# Patient Record
Sex: MALE | Race: BLACK OR AFRICAN AMERICAN | Employment: OTHER | RURAL
[De-identification: names, ages, dates, MRNs, and addresses within clinical notes are randomized per-mention and may not be internally consistent; named-entity substitution may affect disease eponyms.]

---

## 2017-03-14 ENCOUNTER — OFFICE VISIT (OUTPATIENT)
Dept: FAMILY MEDICINE CLINIC | Age: 57
End: 2017-03-14

## 2017-03-14 VITALS
RESPIRATION RATE: 20 BRPM | DIASTOLIC BLOOD PRESSURE: 100 MMHG | SYSTOLIC BLOOD PRESSURE: 142 MMHG | TEMPERATURE: 96.4 F | HEART RATE: 89 BPM

## 2017-03-14 DIAGNOSIS — R07.2 PRECORDIAL PAIN: Primary | ICD-10-CM

## 2017-03-30 ENCOUNTER — HOSPITAL ENCOUNTER (EMERGENCY)
Age: 57
Discharge: HOME | End: 2017-03-30
Attending: EMERGENCY MEDICINE | Admitting: EMERGENCY MEDICINE
Payer: SELF-PAY

## 2017-03-30 VITALS
TEMPERATURE: 97.2 F | RESPIRATION RATE: 18 BRPM | BODY MASS INDEX: 31.44 KG/M2 | WEIGHT: 245 LBS | OXYGEN SATURATION: 97 % | SYSTOLIC BLOOD PRESSURE: 181 MMHG | HEART RATE: 100 BPM | HEIGHT: 74 IN | DIASTOLIC BLOOD PRESSURE: 110 MMHG

## 2017-03-30 DIAGNOSIS — Z79.4 TYPE 2 DIABETES MELLITUS WITH DIABETIC NEPHROPATHY, WITH LONG-TERM CURRENT USE OF INSULIN (HCC): ICD-10-CM

## 2017-03-30 DIAGNOSIS — E11.21 TYPE 2 DIABETES MELLITUS WITH DIABETIC NEPHROPATHY, WITH LONG-TERM CURRENT USE OF INSULIN (HCC): ICD-10-CM

## 2017-03-30 DIAGNOSIS — E16.2 HYPOGLYCEMIA: Primary | ICD-10-CM

## 2017-03-30 LAB
ALBUMIN SERPL BCP-MCNC: 1.9 G/DL (ref 3.5–5)
ALBUMIN/GLOB SERPL: 0.3 {RATIO} (ref 1.1–2.2)
ALP SERPL-CCNC: 84 U/L (ref 45–117)
ALT SERPL-CCNC: 27 U/L (ref 12–78)
ANION GAP BLD CALC-SCNC: 7 MMOL/L (ref 5–15)
APPEARANCE UR: CLEAR
AST SERPL W P-5'-P-CCNC: 51 U/L (ref 15–37)
BACTERIA URNS QL MICRO: NEGATIVE /HPF
BASOPHILS # BLD AUTO: 0.1 K/UL (ref 0–0.1)
BASOPHILS # BLD: 1 % (ref 0–1)
BILIRUB SERPL-MCNC: 0.7 MG/DL (ref 0.2–1)
BILIRUB UR QL: NEGATIVE
BUN SERPL-MCNC: 24 MG/DL (ref 6–20)
BUN/CREAT SERPL: 13 (ref 12–20)
CALCIUM SERPL-MCNC: 7.8 MG/DL (ref 8.5–10.1)
CHLORIDE SERPL-SCNC: 108 MMOL/L (ref 97–108)
CO2 SERPL-SCNC: 22 MMOL/L (ref 21–32)
COLOR UR: ABNORMAL
CREAT SERPL-MCNC: 1.84 MG/DL (ref 0.7–1.3)
DIFFERENTIAL METHOD BLD: ABNORMAL
EOSINOPHIL # BLD: 0.1 K/UL (ref 0–0.4)
EOSINOPHIL NFR BLD: 3 % (ref 0–7)
EPITH CASTS URNS QL MICRO: ABNORMAL /LPF
ERYTHROCYTE [DISTWIDTH] IN BLOOD BY AUTOMATED COUNT: 19.1 % (ref 11.5–14.5)
GLOBULIN SER CALC-MCNC: 7.1 G/DL (ref 2–4)
GLUCOSE BLD STRIP.AUTO-MCNC: 140 MG/DL (ref 65–100)
GLUCOSE BLD STRIP.AUTO-MCNC: 147 MG/DL (ref 65–100)
GLUCOSE BLD STRIP.AUTO-MCNC: 82 MG/DL (ref 65–100)
GLUCOSE BLD STRIP.AUTO-MCNC: 92 MG/DL (ref 65–100)
GLUCOSE SERPL-MCNC: 106 MG/DL (ref 65–100)
GLUCOSE UR STRIP.AUTO-MCNC: NEGATIVE MG/DL
HCT VFR BLD AUTO: 29.5 % (ref 36.6–50.3)
HGB BLD-MCNC: 9.8 G/DL (ref 12.1–17)
HGB UR QL STRIP: ABNORMAL
HYALINE CASTS URNS QL MICRO: ABNORMAL /LPF (ref 0–5)
KETONES UR QL STRIP.AUTO: NEGATIVE MG/DL
LEUKOCYTE ESTERASE UR QL STRIP.AUTO: NEGATIVE
LYMPHOCYTES # BLD AUTO: 29 % (ref 12–49)
LYMPHOCYTES # BLD: 1.4 K/UL (ref 0.8–3.5)
MCH RBC QN AUTO: 21.4 PG (ref 26–34)
MCHC RBC AUTO-ENTMCNC: 33.2 G/DL (ref 30–36.5)
MCV RBC AUTO: 64.3 FL (ref 80–99)
MONOCYTES # BLD: 0.4 K/UL (ref 0–1)
MONOCYTES NFR BLD AUTO: 9 % (ref 5–13)
NEUTS SEG # BLD: 2.9 K/UL (ref 1.8–8)
NEUTS SEG NFR BLD AUTO: 58 % (ref 32–75)
NITRITE UR QL STRIP.AUTO: NEGATIVE
PH UR STRIP: 6 [PH] (ref 5–8)
PLATELET # BLD AUTO: 150 K/UL (ref 150–400)
POTASSIUM SERPL-SCNC: 5.1 MMOL/L (ref 3.5–5.1)
PROT SERPL-MCNC: 9 G/DL (ref 6.4–8.2)
PROT UR STRIP-MCNC: 300 MG/DL
RBC # BLD AUTO: 4.59 M/UL (ref 4.1–5.7)
RBC #/AREA URNS HPF: ABNORMAL /HPF (ref 0–5)
RBC MORPH BLD: ABNORMAL
SERVICE CMNT-IMP: ABNORMAL
SERVICE CMNT-IMP: ABNORMAL
SERVICE CMNT-IMP: NORMAL
SERVICE CMNT-IMP: NORMAL
SODIUM SERPL-SCNC: 137 MMOL/L (ref 136–145)
SP GR UR REFRACTOMETRY: 1.01 (ref 1–1.03)
UA: UC IF INDICATED,UAUC: ABNORMAL
UROBILINOGEN UR QL STRIP.AUTO: 0.2 EU/DL (ref 0.2–1)
WBC # BLD AUTO: 4.9 K/UL (ref 4.1–11.1)
WBC URNS QL MICRO: ABNORMAL /HPF (ref 0–4)

## 2017-03-30 PROCEDURE — 36415 COLL VENOUS BLD VENIPUNCTURE: CPT

## 2017-03-30 PROCEDURE — 82962 GLUCOSE BLOOD TEST: CPT

## 2017-03-30 PROCEDURE — 81001 URINALYSIS AUTO W/SCOPE: CPT

## 2017-03-30 PROCEDURE — 85025 COMPLETE CBC W/AUTO DIFF WBC: CPT

## 2017-03-30 PROCEDURE — 99285 EMERGENCY DEPT VISIT HI MDM: CPT

## 2017-03-30 PROCEDURE — 80053 COMPREHEN METABOLIC PANEL: CPT

## 2017-03-30 NOTE — LETTER
[address] 
[facility] EMERGENCY DEPT 
[address] [place] [address] [address] [id number] [phone] Work/School Note Date: 3/30/2017 To Whom It May concern: 
 
Hardy Jones was seen and treated today in the emergency room by the following provider(s): 
Attending Provider: Marlon Montiel MD 
Resident: Oscar Cortes MD.   
 
Hardy Jones may return to work on 4/1/17. Sincerely, Oscar Cortes MD

## 2017-03-30 NOTE — DISCHARGE INSTRUCTIONS
Diabetes Blood Sugar Emergencies: Your Action Plan  How can you prevent a blood sugar emergency? An important part of living with diabetes is keeping your blood sugar in your target range. You'll need to know what to do if it's too high or too low. Managing your blood sugar levels helps you avoid emergencies. This care sheet will teach you about the signs of high and low blood sugar. It will help you make an action plan with your doctor for when these signs occur. Low blood sugar is more likely to happen if you take certain medicines for diabetes. It can also happen if you skip a meal, drink alcohol, or exercise more than usual.  You may get high blood sugar if you eat differently than you normally do. One example is eating more carbohydrate than usual. Having a cold, the flu, or other sudden illness can also cause high blood sugar levels. Levels can also rise if you miss a dose of medicine. Any change in how you take your medicine may affect your blood sugar level. So it's important to work with your doctor before you make any changes. Check your blood sugar  Work with your doctor to fill in the blank spaces below that apply to you. Track your levels, know your target range, and write down ways you can get your blood sugar back in your target range. A log book can help you track your levels. Take the book to all of your medical appointments. · Check your blood sugar _____ times a day, at these times:________________________________________________. (For example: Before meals, at bedtime, before exercise, during exercise, other.)  · Your blood sugar target range before a meal is ___________________. Your blood sugar target range after a meal is _______________________. · Do this--___________________________________________________--to get your blood sugar back within your safe range if your blood sugar results are _________________________________________.  (For example: Less than 70 or above 250 mg/dL.)  Call your doctor when your blood sugar results are ___________________________________. (For example: Less than 70 or above 250 mg/dL.)  What are the symptoms of low and high blood sugar? Common symptoms of low blood sugar are sweating and feeling shaky, weak, hungry, or confused. Symptoms can start quickly. Common symptoms of high blood sugar are feeling very thirsty or very hungry. You may also pass urine more often than usual. You may have blurry vision and may lose weight without trying. But some people may have high or low blood sugar without having any symptoms. That's a good reason to check your blood sugar on a regular schedule. What should you do if you have symptoms? Work with your doctor to fill in the blank spaces below that apply to you. Low blood sugar  If you have symptoms of low blood sugar, check your blood sugar. If it's below _____ (for example, below 70), eat or drink a quick-sugar food that has about 15 grams of carbohydrate. Your goal is to get your level back to your safe range. Check your blood sugar again 15 minutes later. If it's still not in your target range, take another 15 grams of carbohydrate and check your blood sugar again in 15 minutes. Repeat this until you reach your target. Then go back to your regular testing schedule. When you have low blood sugar, it's best to stop or reduce any physical activity until your blood sugar is back in your target range and is stable. If you must stay active, eat or drink 30 grams of carbohydrate. Then check your blood sugar again in 15 minutes. If it's not in your target range, take another 30 grams of carbohydrates. Check your blood sugar again in 15 minutes. Keep doing this until you reach your target. You can then go back to your regular testing schedule. If your symptoms or blood sugar levels are getting worse or have not improved after 15 minutes, seek medical care right away.   Here are some examples of quick-sugar foods with 15 grams of carbohydrate:  · 3 or 4 glucose tablets  · 1 tube of glucose gel  · Hard candy (such as 3 Jolly Ranchers or 5 to 7 Life Savers)  · ½ cup to ¾ cup (4 to 6 ounces) of fruit juice or regular (not diet) soda  High blood sugar  If you have symptoms of high blood sugar, check your blood sugar. Your goal is to get your level back to your target range. If it's above ______ (for example, above 250), follow these steps:  · If you missed a dose of your diabetes medicine, take it now. Take only the amount of medicine that you have been prescribed. Do not take more or less medicine. · Give yourself insulin if your doctor has prescribed it for high blood sugar. · Test for ketones, if the doctor told you to do so. If the results of the ketone test show a moderate-to-large amount of ketones, call the doctor for advice. · Wait 30 minutes after you take the extra insulin or the missed medicine. Check your blood sugar again. If your symptoms or blood sugar levels are getting worse or have not improved after taking these steps, seek medical care right away. Follow-up care is a key part of your treatment and safety. Be sure to make and go to all appointments, and call your doctor if you are having problems. It's also a good idea to know your test results and keep a list of the medicines you take. Where can you learn more? Go to http://mercy-duyen.info/. Enter C213 in the search box to learn more about \"Diabetes Blood Sugar Emergencies: Your Action Plan. \"  Current as of: May 23, 2016  Content Version: 11.2  © 4996-5603 [employer]. Care instructions adapted under license by [employer] (which disclaims liability or warranty for this information). If you have questions about a medical condition or this instruction, always ask your healthcare professional. [address] any warranty or liability for your use of this information.

## 2017-03-30 NOTE — ROUTINE PROCESS
The ED physician reviewed discharge instructions with the patient. The patient verbalized understanding.
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
## 2017-03-31 ENCOUNTER — OFFICE VISIT (OUTPATIENT)
Dept: FAMILY MEDICINE CLINIC | Age: 57
End: 2017-03-31

## 2017-03-31 VITALS
SYSTOLIC BLOOD PRESSURE: 178 MMHG | OXYGEN SATURATION: 95 % | HEART RATE: 100 BPM | RESPIRATION RATE: 20 BRPM | DIASTOLIC BLOOD PRESSURE: 109 MMHG | TEMPERATURE: 98.4 F | BODY MASS INDEX: 29.13 KG/M2 | HEIGHT: 74 IN | WEIGHT: 227 LBS

## 2017-03-31 DIAGNOSIS — I10 ESSENTIAL HYPERTENSION: ICD-10-CM

## 2017-03-31 DIAGNOSIS — E78.00 PURE HYPERCHOLESTEROLEMIA: ICD-10-CM

## 2017-03-31 DIAGNOSIS — E11.21 TYPE 2 DIABETES MELLITUS WITH DIABETIC NEPHROPATHY, WITH LONG-TERM CURRENT USE OF INSULIN (HCC): Primary | ICD-10-CM

## 2017-03-31 DIAGNOSIS — Z86.39 HISTORY OF HYPOGLYCEMIA: ICD-10-CM

## 2017-03-31 DIAGNOSIS — E16.2 HYPOGLYCEMIA: ICD-10-CM

## 2017-03-31 DIAGNOSIS — Z79.4 TYPE 2 DIABETES MELLITUS WITH DIABETIC NEPHROPATHY, WITH LONG-TERM CURRENT USE OF INSULIN (HCC): Primary | ICD-10-CM

## 2017-03-31 DIAGNOSIS — R60.0 LOCALIZED EDEMA: ICD-10-CM

## 2017-03-31 RX ORDER — FUROSEMIDE 40 MG/1
40 TABLET ORAL DAILY
Qty: 30 TAB | Refills: 11 | Status: SHIPPED | OUTPATIENT
Start: 2017-03-31 | End: 2018-01-22 | Stop reason: SDUPTHER
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
## 2017-03-31 NOTE — MR AVS SNAPSHOT
Visit Information Date & Time Provider Department Dept. Phone Encounter #  
 3/31/2017  1:40 PM Arlene Payan MD [address] [place] [address] [id number] Follow-up Instructions Return in about 1 week (around 4/7/2017). Follow-up and Disposition History Your Appointments 4/7/2017  1:40 PM  
ESTABLISHED PATIENT with Arlene Payan MD  
[address] ([facility]) Appt Note: PER BAVUSO/1 wk F/U  
 [address] [facility]. [address] [employer] [address] [phone]  
  
   
 [address] [facility] [address] [employer] [employer] [address] Upcoming Health Maintenance Date Due Hepatitis C Screening 1960 DTaP/Tdap/Td series (1 - Tdap) 1/31/1983 FOBT Q 1 YEAR AGE 50-75 1/31/2012 EYE EXAM RETINAL OR DILATED Q1 4/17/2016 Pneumococcal 19-64 Highest Risk (2 of 3 - PPSV23) 2/8/2017 HEMOGLOBIN A1C Q6M 5/9/2017 MICROALBUMIN Q1 11/9/2017 LIPID PANEL Q1 11/9/2017 FOOT EXAM Q1 12/14/2017 Allergies as of 3/31/2017  Review Complete On: 3/31/2017 By: Arlene Payan MD  
 No Known Allergies Current Immunizations  Never Reviewed Name Date Influenza Vaccine 12/14/2016  4:38 PM, 11/25/2015, 10/4/2013 Pneumococcal Conjugate (PCV-13) 12/14/2016  4:37 PM  
  
 Not reviewed this visit You Were Diagnosed With   
  
 Codes Comments Type 2 diabetes mellitus with diabetic nephropathy, with long-term current use of insulin (HCC)    -  Primary ICD-10-CM: E11.21, Z79.4 ICD-9-CM: 250.40, 583.81, V58.67 Pure hypercholesterolemia     ICD-10-CM: E78.00 ICD-9-CM: 272.0 Essential hypertension     ICD-10-CM: I10 
ICD-9-CM: 401.9 Hypoglycemia     ICD-10-CM: E16.2 ICD-9-CM: 251.2 Localized edema     ICD-10-CM: R60.0 ICD-9-CM: 782.3 History of hypoglycemia     ICD-10-CM: Z86.39 
ICD-9-CM: V12.29 Vitals BP Pulse Temp Resp Height(growth percentile) Weight(growth percentile) (!) 178/109 (BP 1 Location: Right arm, BP Patient Position: Sitting) 100 98.4 °F (36.9 °C) (Oral) 20 6' 2\" (1.88 m) 227 lb (103 kg) SpO2 BMI Smoking Status 95% 29.15 kg/m2 Never Smoker BMI and BSA Data Body Mass Index Body Surface Area  
 29.15 kg/m 2 2.32 m 2 Preferred Pharmacy Pharmacy Name Phone [address], [address] Daniela Gregory [phone] Your Updated Medication List  
  
   
This list is accurate as of: 3/31/17  2:54 PM.  Always use your most recent med list.  
  
  
  
  
 aspirin delayed-release 81 mg tablet Take  by mouth daily. atorvastatin 20 mg tablet Commonly known as:  LIPITOR Take 1 Tab by mouth nightly. CENTRUM SILVER Tab tablet Generic drug:  multivitamins-minerals-lutein Take  by mouth. clotrimazole 1 % topical cream  
Commonly known as:  LOTRIMIN  
AAA in thin coat to scaly areas on feet and between toes BID for 2 weeks and as needed for recurrence  
  
 furosemide 40 mg tablet Commonly known as:  LASIX Take 1 Tab by mouth daily. Indications: fluid, new higher dose  
  
 insulin aspart protamine/insulin aspart 100 unit/mL (70-30) injection Commonly known as:  NovoLOG Mix 70-30  
42 units AC breakfast, 30 AC lunch, 48units AC dinner  Indications: type 2 diabetes mellitus  
  
 lisinopril 20 mg tablet Commonly known as:  Darling Romero Take 1 Tab by mouth daily. Indications: kidney, heart and pressure  
  
 metFORMIN 1,000 mg tablet Commonly known as:  GLUCOPHAGE Take 1 Tab by mouth two (2) times daily (with meals). Indications: sugar, boosted dose  
  
 spironolactone 25 mg tablet Commonly known as:  ALDACTONE Take 1 Tab by mouth daily. Indications: fluid and pressure Prescriptions Sent to Pharmacy Refills  
 furosemide (LASIX) 40 mg tablet 11 Sig: Take 1 Tab by mouth daily. Indications: fluid, new higher dose  Class: Normal  
 Pharmacy: [address] [facility], [address] Daniela Montes  #: [phone] Route: Oral  
  
Follow-up Instructions Return in about 1 week (around 4/7/2017). Introducing [facility] SERVICES! Donte Jones introduces fl3ur patient portal. Now you can access parts of your medical record, email your doctor's office, and request medication refills online. 1. In your internet browser, go to [email or web address] 2. Click on the First Time User? Click Here link in the Sign In box. You will see the New Member Sign Up page. 3. Enter your fl3ur Access Code exactly as it appears below. You will not need to use this code after youve completed the sign-up process. If you do not sign up before the expiration date, you must request a new code. · fl3ur Access Code: [email or web address] Expires: 6/12/2017  5:44 PM 
 
4. Enter the last four digits of your Social Security Number (xxxx) and Date of Birth (mm/dd/yyyy) as indicated and click Submit. You will be taken to the next sign-up page. 5. Create a fl3ur ID. This will be your fl3ur login ID and cannot be changed, so think of one that is secure and easy to remember. 6. Create a fl3ur password. You can change your password at any time. 7. Enter your Password Reset Question and Answer. This can be used at a later time if you forget your password. 8. Enter your e-mail address. You will receive e-mail notification when new information is available in [address] 9. Click Sign Up. You can now view and download portions of your medical record. 10. Click the Download Summary menu link to download a portable copy of your medical information. If you have questions, please visit the Frequently Asked Questions section of the fl3ur website. Remember, fl3ur is NOT to be used for urgent needs. For medical emergencies, dial 911. Now available from your iPhone and Android! Please provide this summary of care documentation to your next provider. Your primary care clinician is listed as Radha Amaro. If you have any questions after today's visit, please call [phone].

## 2017-05-11 ENCOUNTER — OFFICE VISIT (OUTPATIENT)
Dept: FAMILY MEDICINE CLINIC | Age: 57
End: 2017-05-11

## 2017-05-11 VITALS
OXYGEN SATURATION: 98 % | BODY MASS INDEX: 27.85 KG/M2 | DIASTOLIC BLOOD PRESSURE: 85 MMHG | TEMPERATURE: 97.6 F | HEIGHT: 74 IN | RESPIRATION RATE: 16 BRPM | HEART RATE: 95 BPM | WEIGHT: 217 LBS | SYSTOLIC BLOOD PRESSURE: 149 MMHG

## 2017-05-11 DIAGNOSIS — Z79.4 TYPE 2 DIABETES MELLITUS WITH DIABETIC NEPHROPATHY, WITH LONG-TERM CURRENT USE OF INSULIN (HCC): ICD-10-CM

## 2017-05-11 DIAGNOSIS — E11.21 TYPE 2 DIABETES MELLITUS WITH DIABETIC NEPHROPATHY, WITH LONG-TERM CURRENT USE OF INSULIN (HCC): ICD-10-CM

## 2017-05-11 RX ORDER — INSULIN ASPART 100 [IU]/ML
INJECTION, SUSPENSION SUBCUTANEOUS
Qty: 40 ML | Refills: 11
Start: 2017-05-11 | End: 2018-01-22

## 2017-05-11 NOTE — PROGRESS NOTES
Volodymyr Tolliver is a 56 y.o. male presenting for/with:    Diabetes (dmv papers)    HPI:  Hypertension. Blood pressures better lately. Management at last visit included boosting lasix to 40mg and con't aldactone 25mg every day and lisnopril 20 every day. Sandee well. Current regimen: ACE inhibitor, loop diuretic, aldactone. Symptoms include no sx. Patient denies chest pain, palpitations, orthopnea  Lab review:   Lab Results   Component Value Date/Time    Sodium 137 03/30/2017 03:41 PM    Potassium 5.1 03/30/2017 03:41 PM    Chloride 108 03/30/2017 03:41 PM    CO2 22 03/30/2017 03:41 PM    Anion gap 7 03/30/2017 03:41 PM    Glucose 106 03/30/2017 03:41 PM    BUN 24 03/30/2017 03:41 PM    Creatinine 1.84 03/30/2017 03:41 PM    BUN/Creatinine ratio 13 03/30/2017 03:41 PM    GFR est AA 47 03/30/2017 03:41 PM    GFR est non-AA 38 03/30/2017 03:41 PM    Calcium 7.8 03/30/2017 03:41 PM     Diabetes. Sugars still controlled fairly to poorly by A1c's. home checks showed running low 100's in AM and 500's at bedtime. Hypoglycemia: had a 50 fasting on the 20 unit dose qhs, but no AMS. Tolerating current treatment well. Current medications include insulin novolog mix 70/30, supposed to be 20/15/20, but pt actually taking 10/7/10 qac. Con't on metformin 1000 BID. Lab Results   Component Value Date/Time    Hemoglobin A1c 12.5 11/09/2016 04:12 PM    Hemoglobin A1c 12.0 11/25/2015 09:23 AM    Hemoglobin A1c 11.1 06/30/2015 10:00 AM    Glucose 106 03/30/2017 03:41 PM    Glucose (POC) 147 03/30/2017 05:36 PM    Microalb/Creat ratio (ug/mg creat.) 3487.6 11/09/2016 04:11 PM    LDL, calculated 92 11/09/2016 04:12 PM    Creatinine 1.84 03/30/2017 03:41 PM     Lab Results   Component Value Date/Time    Microalb/Creat ratio (ug/mg creat.) 3487.6 11/09/2016 04:11 PM    Microalbumin, urine 3295.8 11/09/2016 04:11 PM     Last eye exam performed 4/2015 with Dr. Luanne Carey, No DR. Last foot exam 6/15 performed.   warm, good capillary refill, no trophic changes or ulcerative lesions and reduced sensation at all points    ROS:  Constitutional: No fever, chills or weight loss  Respiratory: No cough, SOB   CV: No chest pain or Palpitations    Visit Vitals    /85    Pulse 95    Temp 97.6 °F (36.4 °C) (Oral)    Resp 16    Ht 6' 2\" (1.88 m)    Wt 217 lb (98.4 kg)    SpO2 98%    BMI 27.86 kg/m2     Wt Readings from Last 3 Encounters:   05/11/17 217 lb (98.4 kg)   03/31/17 227 lb (103 kg)   03/30/17 245 lb (111.1 kg)     BP Readings from Last 3 Encounters:   05/11/17 149/85   03/31/17 (!) 178/109   03/30/17 (!) 181/110     Physical Examination: General appearance - alert, well appearing, and in no distress  Mental status - alert, oriented to person, place, and time  Eyes - pupils equal and reactive, extraocular eye movements intact  ENT - bilateral external ears and nose normal. Normal lips  Neck - supple, no significant adenopathy, no thyromegaly or mass  Lymphatics - no palpable lymphadenopathy, no hepatosplenomegaly  Chest - clear to auscultation, no wheezes, rales or rhonchi, symmetric air entry  Heart - normal rate, regular rhythm, normal S1, S2, no murmurs, rubs, clicks or gallops  Extremities - peripheral pulses normal, 1+ pedal edema, no clubbing or cyanosis. Foot check: No calluses, mild dry tinea bilat. DP, PT pulses 2+ bilat. Monofilament test normal bilat. A/p:  DM2  Poor control still. Had some severe hypoglycemia in March, but none since cutting back. Boost insulin dose back up a bit to 15 units AM, 15 units with lunch, and 15 units before dinner. Sugar checks with each meal x1wk. Cut metformin to 500mg BID due to low GFR aa ~40. Review sugars over phone in 1 wk and adjust.    HTN  With improving GFRaa. Pressure better. con't lisinopril at 20mg every day due to CKD and elev K+. Keep lasix at 40mg qam, keep spironolactone 25mg every day. consider adding adalat CC (cheaper than norvasc for cash pt) if needs add'l control. F/U 1wk by phone, 1mo in person.

## 2017-05-11 NOTE — MR AVS SNAPSHOT
Visit Information Date & Time Provider Department Dept. Phone Encounter #  
 5/11/2017  3:40 PM Brooke Jones MD [address] [place] [address] [id number] Follow-up Instructions Return in about 4 weeks (around 6/8/2017), or if symptoms worsen or fail to improve. Follow-up and Disposition History Upcoming Health Maintenance Date Due Hepatitis C Screening 1960 DTaP/Tdap/Td series (1 - Tdap) 1/31/1983 FOBT Q 1 YEAR AGE 50-75 1/31/2012 EYE EXAM RETINAL OR DILATED Q1 4/17/2016 Pneumococcal 19-64 Highest Risk (2 of 3 - PPSV23) 2/8/2017 HEMOGLOBIN A1C Q6M 5/9/2017 INFLUENZA AGE 9 TO ADULT 8/1/2017 MICROALBUMIN Q1 11/9/2017 LIPID PANEL Q1 11/9/2017 FOOT EXAM Q1 12/14/2017 Allergies as of 5/11/2017  Review Complete On: 5/11/2017 By: Brooke Jones MD  
 No Known Allergies Current Immunizations  Never Reviewed Name Date Influenza Vaccine 12/14/2016  4:38 PM, 11/25/2015, 10/4/2013 Pneumococcal Conjugate (PCV-13) 12/14/2016  4:37 PM  
  
 Not reviewed this visit You Were Diagnosed With   
  
 Codes Comments Type 2 diabetes mellitus with diabetic nephropathy, with long-term current use of insulin (HCC)     ICD-10-CM: E11.21, Z79.4 ICD-9-CM: 250.40, 583.81, V58.67 Vitals BP Pulse Temp Resp Height(growth percentile) Weight(growth percentile) 149/85 95 97.6 °F (36.4 °C) (Oral) 16 6' 2\" (1.88 m) 217 lb (98.4 kg) SpO2 BMI Smoking Status 98% 27.86 kg/m2 Never Smoker BMI and BSA Data Body Mass Index Body Surface Area  
 27.86 kg/m 2 2.27 m 2 Preferred Pharmacy Pharmacy Name Phone [address], [facility] [place] Daniela Stanley [phone] Your Updated Medication List  
  
   
This list is accurate as of: 5/11/17  4:13 PM.  Always use your most recent med list.  
  
  
  
  
 aspirin delayed-release 81 mg tablet Take  by mouth daily. atorvastatin 20 mg tablet Commonly known as:  LIPITOR Take 1 Tab by mouth nightly. CENTRUM SILVER Tab tablet Generic drug:  multivitamins-minerals-lutein Take  by mouth. clotrimazole 1 % topical cream  
Commonly known as:  LOTRIMIN  
AAA in thin coat to scaly areas on feet and between toes BID for 2 weeks and as needed for recurrence  
  
 furosemide 40 mg tablet Commonly known as:  LASIX Take 1 Tab by mouth daily. Indications: fluid, new higher dose  
  
 insulin aspart protamine/insulin aspart 100 unit/mL (70-30) injection Commonly known as:  NovoLOG Mix 70-30  
15 units AC breakfast, 15 AC lunch, 15 units AC dinner and as directed. Indications: type 2 diabetes mellitus  
  
 lisinopril 20 mg tablet Commonly known as:  Gabo Leys Take 1 Tab by mouth daily. Indications: kidney, heart and pressure  
  
 metFORMIN 1,000 mg tablet Commonly known as:  GLUCOPHAGE Take 1 Tab by mouth two (2) times daily (with meals). Indications: sugar, boosted dose  
  
 spironolactone 25 mg tablet Commonly known as:  ALDACTONE Take 1 Tab by mouth daily. Indications: fluid and pressure Follow-up Instructions Return in about 4 weeks (around 6/8/2017), or if symptoms worsen or fail to improve. Patient Instructions New insulin dose 15 units before each meal. 
 
 If you feel the need to cut your dose because of low sugars, cut 2 units off the dose that was prior to the low sugar spell, and carry that new dose forward. If you're below 70, cut the current dose in half only once, then revert back to the usual dose for that time's shot. If you have any questions regarding Sharypic, you may call Sharypic support at [phone]. Patient Instructions History Introducing \[facility]\"" & HEALTH SERVICES!    
 Idania Cruz introduces [employer] patient portal. Now you can access parts of your medical record, email your doctor's office, and request medication refills online. 1. In your internet browser, go to [email or web address] 2. Click on the First Time User? Click Here link in the Sign In box. You will see the New Member Sign Up page. 3. Enter your [employer] Access Code exactly as it appears below. You will not need to use this code after youve completed the sign-up process. If you do not sign up before the expiration date, you must request a new code. · [employer] Access Code: [email or web address] Expires: 6/12/2017  5:44 PM 
 
4. Enter the last four digits of your Social Security Number (xxxx) and Date of Birth (mm/dd/yyyy) as indicated and click Submit. You will be taken to the next sign-up page. 5. Create a [employer] ID. This will be your [employer] login ID and cannot be changed, so think of one that is secure and easy to remember. 6. Create a [employer] password. You can change your password at any time. 7. Enter your Password Reset Question and Answer. This can be used at a later time if you forget your password. 8. Enter your e-mail address. You will receive e-mail notification when new information is available in [address] 9. Click Sign Up. You can now view and download portions of your medical record. 10. Click the Download Summary menu link to download a portable copy of your medical information. If you have questions, please visit the Frequently Asked Questions section of the [employer] website. Remember, [employer] is NOT to be used for urgent needs. For medical emergencies, dial 911. Now available from your iPhone and Android! Please provide this summary of care documentation to your next provider. Your primary care clinician is listed as Kell Amaro. If you have any questions after today's visit, please call [phone].

## 2017-08-10 RX ORDER — ATORVASTATIN CALCIUM 20 MG/1
20 TABLET, FILM COATED ORAL
Qty: 30 TAB | Refills: 11 | Status: SHIPPED | OUTPATIENT
Start: 2017-08-10 | End: 2018-02-13 | Stop reason: DRUGHIGH

## 2018-01-22 ENCOUNTER — OFFICE VISIT (OUTPATIENT)
Dept: FAMILY MEDICINE CLINIC | Age: 58
End: 2018-01-22

## 2018-01-22 VITALS
DIASTOLIC BLOOD PRESSURE: 80 MMHG | SYSTOLIC BLOOD PRESSURE: 154 MMHG | BODY MASS INDEX: 25.67 KG/M2 | HEART RATE: 86 BPM | OXYGEN SATURATION: 100 % | HEIGHT: 74 IN | WEIGHT: 200 LBS | TEMPERATURE: 97.9 F

## 2018-01-22 DIAGNOSIS — Z79.4 TYPE 2 DIABETES MELLITUS WITH DIABETIC NEPHROPATHY, WITH LONG-TERM CURRENT USE OF INSULIN (HCC): Primary | ICD-10-CM

## 2018-01-22 DIAGNOSIS — E11.21 TYPE 2 DIABETES MELLITUS WITH DIABETIC NEPHROPATHY, WITH LONG-TERM CURRENT USE OF INSULIN (HCC): Primary | ICD-10-CM

## 2018-01-22 DIAGNOSIS — R60.0 LOCALIZED EDEMA: ICD-10-CM

## 2018-01-22 DIAGNOSIS — E78.00 PURE HYPERCHOLESTEROLEMIA: ICD-10-CM

## 2018-01-22 DIAGNOSIS — I10 ESSENTIAL HYPERTENSION: ICD-10-CM

## 2018-01-22 LAB
ALBUMIN UR QL STRIP: 150 MG/L
CREATININE, URINE POC: 200 MG/DL
GLUCOSE POC: 65 MG/DL
MICROALBUMIN/CREAT RATIO POC: >300 MG/G

## 2018-01-22 RX ORDER — FUROSEMIDE 40 MG/1
40 TABLET ORAL DAILY
Qty: 90 TAB | Refills: 3 | Status: SHIPPED | OUTPATIENT
Start: 2018-01-22 | End: 2018-02-15

## 2018-01-22 RX ORDER — SPIRONOLACTONE 25 MG/1
25 TABLET ORAL DAILY
Qty: 90 TAB | Refills: 3 | Status: SHIPPED | OUTPATIENT
Start: 2018-01-22 | End: 2018-02-15

## 2018-01-22 RX ORDER — NIFEDIPINE 30 MG/1
30 TABLET, FILM COATED, EXTENDED RELEASE ORAL DAILY
Qty: 30 TAB | Refills: 11 | Status: SHIPPED | OUTPATIENT
Start: 2018-01-22 | End: 2018-02-19 | Stop reason: ALTCHOICE

## 2018-01-22 RX ORDER — ASPIRIN 81 MG/1
81 TABLET ORAL DAILY
Qty: 100 TAB | Refills: 3
Start: 2018-01-22 | End: 2018-02-13 | Stop reason: DRUGHIGH

## 2018-01-22 RX ORDER — LISINOPRIL 20 MG/1
TABLET ORAL
Qty: 90 TAB | Refills: 3 | Status: SHIPPED | OUTPATIENT
Start: 2018-01-22 | End: 2018-02-19 | Stop reason: ALTCHOICE

## 2018-01-22 NOTE — MR AVS SNAPSHOT
[address] [facility] [address] 
 
 
 1000 [facility] [address] [facility],[address] [address] [phone] Patient: Victoriano Lesches MRN: [id number] Visit Information Date & Time Provider Department Dept. Phone Encounter #  
 1/22/2018  2:40 PM Jaison Miranda MD [address] [place] [address] [id number] Follow-up Instructions Return in about 3 months (around 4/22/2018). Your Appointments 2/9/2018  7:30 AM  
ESTABLISHED PATIENT with MD Honorio Posada 38 ([facility]) Appt Note: for general f/up visit,pt will pay $60 towards the bill at time of appt/abdullahi/1/8/17  
 [address] [id number] [phone]  
  
   
 [address] [place] [address],[address] [address] Upcoming Health Maintenance Date Due Hepatitis C Screening 1960 FOBT Q 1 YEAR AGE 50-75 1/31/2012 EYE EXAM RETINAL OR DILATED Q1 4/17/2016 HEMOGLOBIN A1C Q6M 5/9/2017 Influenza Age 5 to Adult 8/1/2017 MICROALBUMIN Q1 11/9/2017 LIPID PANEL Q1 11/9/2017 FOOT EXAM Q1 12/14/2017 Pneumococcal 19-64 Highest Risk (2 of 3 - PPSV23) 2/1/2027* DTaP/Tdap/Td series (2 - Td) 1/22/2028 *Topic was postponed. The date shown is not the original due date. Allergies as of 1/22/2018  Review Complete On: 1/22/2018 By: Gianfranco Kerr LPN No Known Allergies Current Immunizations  Never Reviewed Name Date Influenza Vaccine 12/14/2016  4:38 PM, 11/25/2015, 10/4/2013 Pneumococcal Conjugate (PCV-13) 12/14/2016  4:37 PM  
  
 Not reviewed this visit You Were Diagnosed With   
  
 Codes Comments Type 2 diabetes mellitus with diabetic nephropathy, with long-term current use of insulin (HCC)    -  Primary ICD-10-CM: E11.21, Z79.4 ICD-9-CM: 250.40, 583.81, V58.67 Pure hypercholesterolemia     ICD-10-CM: E78.00 ICD-9-CM: 272.0 Essential hypertension     ICD-10-CM: I10 
ICD-9-CM: 401.9 Localized edema     ICD-10-CM: R60.0 ICD-9-CM: 784. 3 Vitals BP Pulse Temp Height(growth percentile) Weight(growth percentile) SpO2  
 154/80 86 97.9 °F (36.6 °C) (Oral) 6' 2\" (1.88 m) 200 lb (90.7 kg) 100% BMI Smoking Status 25.68 kg/m2 Never Smoker BMI and BSA Data Body Mass Index Body Surface Area  
 25.68 kg/m 2 2.18 m 2 Preferred Pharmacy Pharmacy Name Phone [address], [place] Daniela Funez [phone] Your Updated Medication List  
  
   
This list is accurate as of: 1/22/18  4:33 PM.  Always use your most recent med list.  
  
  
  
  
 aspirin delayed-release 81 mg tablet Take 1 Tab by mouth daily. atorvastatin 20 mg tablet Commonly known as:  LIPITOR Take 1 Tab by mouth nightly. CENTRUM SILVER Tab tablet Generic drug:  multivitamins-minerals-lutein Take  by mouth. clotrimazole 1 % topical cream  
Commonly known as:  LOTRIMIN  
AAA in thin coat to scaly areas on feet and between toes BID for 2 weeks and as needed for recurrence  
  
 furosemide 40 mg tablet Commonly known as:  LASIX Take 1 Tab by mouth daily. Indications: fluid  
  
 lisinopril 20 mg tablet Commonly known as:  PRINIVIL, ZESTRIL  
TAKE ONE TABLET BY MOUTH EVERY DAY for heart and pressure NIFEdipine ER 30 mg ER tablet Commonly known as:  ADALAT CC Take 1 Tab by mouth daily. Indications: pressure, add to regimen  
  
 ondansetron 4 mg disintegrating tablet Commonly known as:  ZOFRAN ODT Take 1 Tab by mouth every eight (8) hours as needed for Nausea. spironolactone 25 mg tablet Commonly known as:  ALDACTONE Take 1 Tab by mouth daily. Indications: fluid and pressure Prescriptions Sent to Pharmacy Refills  
 furosemide (LASIX) 40 mg tablet 3 Sig: Take 1 Tab by mouth daily. Indications: fluid  Class: Normal  
 Pharmacy: [address] [place] [address] Daniela AlmonteVadito Ph #: [phone] Route: Oral  
 lisinopril (PRINIVIL, ZESTRIL) 20 mg tablet 3 Sig: TAKE ONE TABLET BY MOUTH EVERY DAY for heart and pressure Class: Normal  
 Pharmacy: [address] [place] [address] Daniela AlmonteVadito Ph #: [phone]  
 spironolactone (ALDACTONE) 25 mg tablet 3 Sig: Take 1 Tab by mouth daily. Indications: fluid and pressure Class: Normal  
 Pharmacy: [address] [place], [place] [address]alexi AlmonteVadito Ph #: [phone] Route: Oral  
 NIFEdipine ER (ADALAT CC) 30 mg ER tablet 11 Sig: Take 1 Tab by mouth daily. Indications: pressure, add to regimen Class: Normal  
 Pharmacy: [address] [place] [address] Daniela AlmonteVadito Ph #: [phone] Route: Oral  
  
We Performed the Following AMB POC GLUCOSE, QUANTITATIVE, BLOOD [59318 CPT(R)] AMB POC URINE, MICROALBUMIN, SEMIQUANT (3 RESULTS) [96479 CPT(R)] HEMOGLOBIN A1C WITH EAG [07505 CPT(R)]  DIABETES FOOT EXAM [HM7 Custom] LIPID PANEL [81016 CPT(R)] METABOLIC PANEL, BASIC [71285 CPT(R)] Follow-up Instructions Return in about 3 months (around 4/22/2018). Patient Instructions Please bring your sugar log to every visit. Introducing [facility] & HEALTH SERVICES! Conchita Gordon introduces Sendmail patient portal. Now you can access parts of your medical record, email your doctor's office, and request medication refills online. 1. In your internet browser, go to [email or web address] 2. Click on the First Time User? Click Here link in the Sign In box. You will see the New Member Sign Up page. 3. Enter your Sendmail Access Code exactly as it appears below. You will not need to use this code after youve completed the sign-up process. If you do not sign up before the expiration date, you must request a new code. · [employer] Access Code: [email or web address] Expires: 3/27/2018  9:50 AM 
 
4. Enter the last four digits of your Social Security Number (xxxx) and Date of Birth (mm/dd/yyyy) as indicated and click Submit. You will be taken to the next sign-up page. 5. Create a [employer] ID. This will be your [employer] login ID and cannot be changed, so think of one that is secure and easy to remember. 6. Create a [employer] password. You can change your password at any time. 7. Enter your Password Reset Question and Answer. This can be used at a later time if you forget your password. 8. Enter your e-mail address. You will receive e-mail notification when new information is available in [address] 9. Click Sign Up. You can now view and download portions of your medical record. 10. Click the Download Summary menu link to download a portable copy of your medical information. If you have questions, please visit the Frequently Asked Questions section of the [employer] website. Remember, [employer] is NOT to be used for urgent needs. For medical emergencies, dial 911. Now available from your iPhone and Android! Please provide this summary of care documentation to your next provider. Your primary care clinician is listed as Nasim Amaro. If you have any questions after today's visit, please call [phone].

## 2018-01-22 NOTE — PROGRESS NOTES
Corie Quach is a 56 y.o. male presenting for/with:    Diabetes (not eating) and Diarrhea (loose stool.)    HPI:  Hypertension. Blood pressures better lately. Management at last visit included boosting lasix to 40mg and con't aldactone 25mg every day and lisnopril 20 every day. Sandee well. Current regimen: ACE inhibitor, loop diuretic, aldactone. Symptoms include no sx. Patient denies chest pain, palpitations, orthopnea  Lab review:   Lab Results   Component Value Date/Time    Sodium 134 10/21/2017 11:15 PM    Potassium 4.1 10/21/2017 11:15 PM    Chloride 101 10/21/2017 11:15 PM    CO2 21 10/21/2017 11:15 PM    Anion gap 12 10/21/2017 11:15 PM    Glucose 573 10/21/2017 11:15 PM    BUN 32 10/21/2017 11:15 PM    Creatinine 3.54 10/21/2017 11:15 PM    BUN/Creatinine ratio 9 10/21/2017 11:15 PM    GFR est AA 22 10/21/2017 11:15 PM    GFR est non-AA 18 10/21/2017 11:15 PM    Calcium 8.1 10/21/2017 11:15 PM     Diabetes. Sugars still controlled fairly to poorly by A1c's. home checks showed running low 100's in AM and 500's at bedtime. Hypoglycemia: had a 50 fasting on the 20 unit dose qhs, but no AMS. Tolerating current treatment well. Current medications include insulin novolin 70/30, taking 15/15/15 qac. Was on metformin 500mg qd due to CKD last fall, but GFR a lot lower on fall check.     Lab Results   Component Value Date/Time    Hemoglobin A1c 12.5 11/09/2016 04:12 PM    Hemoglobin A1c 12.0 11/25/2015 09:23 AM    Hemoglobin A1c 11.1 06/30/2015 10:00 AM    Glucose 573 10/21/2017 11:15 PM    Glucose (POC) 293 10/22/2017 03:00 AM    Glucose POC 65 01/22/2018 03:11 PM    Microalb/Creat ratio (ug/mg creat.) 3487.6 11/09/2016 04:11 PM    Microalbumin/creat ratio (POC) >300 01/22/2018 03:30 PM    LDL, calculated 92 11/09/2016 04:12 PM    Creatinine 3.54 10/21/2017 11:15 PM     Lab Results   Component Value Date/Time    Microalb/Creat ratio (ug/mg creat.) 3487.6 11/09/2016 04:11 PM     Last eye exam performed 4/2015 with Dr. Danita Kahn, No DR. Last foot exam 6/15 performed. warm, good capillary refill, no trophic changes or ulcerative lesions and reduced sensation at all points    ROS:  Constitutional: No fever, chills or weight loss  Respiratory: No cough, SOB   CV: No chest pain or Palpitations    Visit Vitals    /80    Pulse 86    Temp 97.9 °F (36.6 °C) (Oral)    Ht 6' 2\" (1.88 m)    Wt 200 lb (90.7 kg)    SpO2 100%    BMI 25.68 kg/m2     Wt Readings from Last 3 Encounters:   01/22/18 200 lb (90.7 kg)   10/21/17 200 lb (90.7 kg)   05/11/17 217 lb (98.4 kg)     BP Readings from Last 3 Encounters:   01/22/18 154/80   10/22/17 152/79   05/11/17 149/85     Physical Examination: General appearance - alert, well appearing, and in no distress  Mental status - alert, oriented to person, place, and time  Eyes - pupils equal and reactive, extraocular eye movements intact  ENT - bilateral external ears and nose normal. Normal lips  Neck - supple, no significant adenopathy, no thyromegaly or mass  Lymphatics - no palpable lymphadenopathy, no hepatosplenomegaly  Chest - clear to auscultation, no wheezes, rales or rhonchi, symmetric air entry  Heart - normal rate, regular rhythm, normal S1, S2, no murmurs, rubs, clicks or gallops  Extremities - peripheral pulses normal, 1+ pedal edema, no clubbing or cyanosis. Foot check: No calluses, mild dry tinea bilat. DP, PT pulses 2+ bilat. Monofilament test normal bilat. A/p:  DM2  Much better control by reports. No further severe hypoglycemia, in March, but none since cutting back. con't insulin dose at 15 units AM, 15 units with lunch, and 15 units before dinner. Sugar checks with each meal. D/c metformin due to low GFR aa. Review labs and adjust PRN. HTN  With worsening GFRaa on last check. Pressure high. con't lisinopril at 20mg every day due to CKD and elev K+. Keep lasix at 40mg qam, keep spironolactone 25mg every day.  Add adalat CC (cheaper than norvasc for cash pt) if needs add'l control. F/U 1wk by phone, 1mo in person.

## 2018-01-23 ENCOUNTER — TELEPHONE (OUTPATIENT)
Dept: FAMILY MEDICINE CLINIC | Age: 58
End: 2018-01-23

## 2018-01-23 LAB
BUN SERPL-MCNC: 40 MG/DL (ref 6–24)
BUN/CREAT SERPL: 10 (ref 9–20)
CALCIUM SERPL-MCNC: 7.6 MG/DL (ref 8.7–10.2)
CHLORIDE SERPL-SCNC: 103 MMOL/L (ref 96–106)
CHOLEST SERPL-MCNC: 115 MG/DL (ref 100–199)
CO2 SERPL-SCNC: 15 MMOL/L (ref 18–29)
CREAT SERPL-MCNC: 4.1 MG/DL (ref 0.76–1.27)
EST. AVERAGE GLUCOSE BLD GHB EST-MCNC: 266 MG/DL
GLUCOSE SERPL-MCNC: 27 MG/DL (ref 65–99)
HBA1C MFR BLD: 10.9 % (ref 4.8–5.6)
HDLC SERPL-MCNC: 44 MG/DL
LDLC SERPL CALC-MCNC: 48 MG/DL (ref 0–99)
POTASSIUM SERPL-SCNC: 4.6 MMOL/L (ref 3.5–5.2)
SODIUM SERPL-SCNC: 135 MMOL/L (ref 134–144)
TRIGL SERPL-MCNC: 117 MG/DL (ref 0–149)
VLDLC SERPL CALC-MCNC: 23 MG/DL (ref 5–40)

## 2018-02-02 ENCOUNTER — OFFICE VISIT (OUTPATIENT)
Dept: FAMILY MEDICINE CLINIC | Age: 58
End: 2018-02-02

## 2018-02-02 ENCOUNTER — TELEPHONE (OUTPATIENT)
Dept: FAMILY MEDICINE CLINIC | Age: 58
End: 2018-02-02

## 2018-02-02 VITALS
TEMPERATURE: 99.8 F | BODY MASS INDEX: 27.11 KG/M2 | WEIGHT: 211.2 LBS | HEART RATE: 102 BPM | DIASTOLIC BLOOD PRESSURE: 88 MMHG | OXYGEN SATURATION: 93 % | RESPIRATION RATE: 20 BRPM | SYSTOLIC BLOOD PRESSURE: 152 MMHG | HEIGHT: 74 IN

## 2018-02-02 DIAGNOSIS — I10 ESSENTIAL HYPERTENSION: ICD-10-CM

## 2018-02-02 DIAGNOSIS — Z79.4 TYPE 2 DIABETES MELLITUS WITH DIABETIC NEPHROPATHY, WITH LONG-TERM CURRENT USE OF INSULIN (HCC): ICD-10-CM

## 2018-02-02 DIAGNOSIS — E11.22 CKD STAGE 4 DUE TO TYPE 2 DIABETES MELLITUS (HCC): ICD-10-CM

## 2018-02-02 DIAGNOSIS — N18.4 CKD STAGE 4 DUE TO TYPE 2 DIABETES MELLITUS (HCC): ICD-10-CM

## 2018-02-02 DIAGNOSIS — E11.21 TYPE 2 DIABETES MELLITUS WITH DIABETIC NEPHROPATHY, WITH LONG-TERM CURRENT USE OF INSULIN (HCC): ICD-10-CM

## 2018-02-02 DIAGNOSIS — E11.21 DIABETIC NEPHROPATHY ASSOCIATED WITH TYPE 2 DIABETES MELLITUS (HCC): Primary | ICD-10-CM

## 2018-02-02 DIAGNOSIS — J11.1 FLU: Primary | ICD-10-CM

## 2018-02-02 RX ORDER — OSELTAMIVIR PHOSPHATE 30 MG/1
30 CAPSULE ORAL DAILY
Qty: 5 CAP | Refills: 0 | Status: SHIPPED | OUTPATIENT
Start: 2018-02-02 | End: 2018-02-07

## 2018-02-02 NOTE — MR AVS SNAPSHOT
[facility] 
 
 
 [address] [address] [phone] Patient: Sumi Salgado MRN: [id number] RENATO:[birth year] Visit Information Date & Time Provider Department Dept. Phone Encounter #  
 2/2/2018  8:15 AM Bert Smith MD [address] [place] [address] [id number] Follow-up Instructions Return in about 4 weeks (around 3/2/2018). Follow-up and Disposition History Your Appointments 2/9/2018  7:30 AM  
ESTABLISHED PATIENT with MD Honorio Cao ([facility]) Appt Note: for general f/up visit,pt will pay $60 towards the bill at time of appt/abdullahi/1/8/17  
 [address] [address] [phone]  
  
   
 [address] [place] [address],[address] [address] Upcoming Health Maintenance Date Due Hepatitis C Screening 1960 FOBT Q 1 YEAR AGE 50-75 1/31/2012 EYE EXAM RETINAL OR DILATED Q1 4/17/2016 Influenza Age 5 to Adult 8/1/2017 Pneumococcal 19-64 Highest Risk (2 of 3 - PPSV23) 2/1/2027* HEMOGLOBIN A1C Q6M 7/22/2018 FOOT EXAM Q1 1/22/2019 MICROALBUMIN Q1 1/22/2019 LIPID PANEL Q1 1/22/2019 DTaP/Tdap/Td series (2 - Td) 1/22/2028 *Topic was postponed. The date shown is not the original due date. Allergies as of 2/2/2018  Review Complete On: 2/2/2018 By: Bert Smith MD  
 No Known Allergies Current Immunizations  Never Reviewed Name Date Influenza Vaccine 12/14/2016  4:38 PM, 11/25/2015, 10/4/2013 Pneumococcal Conjugate (PCV-13) 12/14/2016  4:37 PM  
  
 Not reviewed this visit You Were Diagnosed With   
  
 Codes Comments Flu    -  Primary ICD-10-CM: J11.1 ICD-9-CM: 466.8 Type 2 diabetes mellitus with diabetic nephropathy, with long-term current use of insulin (HCC)     ICD-10-CM: E11.21, Z79.4 ICD-9-CM: 250.40, 583.81, V58.67 Essential hypertension     ICD-10-CM: I10 
ICD-9-CM: 401.9 CKD stage 4 due to type 2 diabetes mellitus ([facility]     ICD-10-CM: E11.22, N18.4 ICD-9-CM: 250.40, 585.4 Vitals BP Pulse Temp Resp Height(growth percentile) Weight(growth percentile) 152/88 (BP 1 Location: Left arm, BP Patient Position: Sitting) (!) 102 99.8 °F (37.7 °C) (Oral) 20 6' 2\" (1.88 m) 211 lb 3.2 oz (95.8 kg) SpO2 PF BMI Smoking Status 93% 93 L/min 27.12 kg/m2 Never Smoker BMI and BSA Data Body Mass Index Body Surface Area  
 27.12 kg/m 2 2.24 m 2 Preferred Pharmacy Pharmacy Name Phone [address], [address] [phone] Your Updated Medication List  
  
   
This list is accurate as of: 2/2/18  9:18 AM.  Always use your most recent med list.  
  
  
  
  
 aspirin delayed-release 81 mg tablet Take 1 Tab by mouth daily. atorvastatin 20 mg tablet Commonly known as:  LIPITOR Take 1 Tab by mouth nightly. CENTRUM SILVER Tab tablet Generic drug:  multivitamins-minerals-lutein Take  by mouth. clotrimazole 1 % topical cream  
Commonly known as:  LOTRIMIN  
AAA in thin coat to scaly areas on feet and between toes BID for 2 weeks and as needed for recurrence  
  
 furosemide 40 mg tablet Commonly known as:  LASIX Take 1 Tab by mouth daily. Indications: fluid  
  
 insulin NPH/insulin regular 100 unit/mL (70-30) injection Commonly known as:  NOVOLIN 70/30, HUMULIN 70/30  
15 units with Breakfast, lunch, and dinner  
  
 lisinopril 20 mg tablet Commonly known as:  PRINIVIL, ZESTRIL  
TAKE ONE TABLET BY MOUTH EVERY DAY for heart and pressure NIFEdipine ER 30 mg ER tablet Commonly known as:  ADALAT CC Take 1 Tab by mouth daily. Indications: pressure, add to regimen  
  
 oseltamivir 30 mg capsule Commonly known as:  TAMIFLU Take 1 Cap by mouth daily for 5 doses. Indications: flu  
  
 spironolactone 25 mg tablet Commonly known as:  ALDACTONE  
 Take 1 Tab by mouth daily. Indications: fluid and pressure Prescriptions Sent to Pharmacy Refills  
 oseltamivir (TAMIFLU) 30 mg capsule 0 Sig: Take 1 Cap by mouth daily for 5 doses. Indications: flu  
 Class: Normal  
 Pharmacy: [address], [address]  #: [phone] Route: Oral  
  
Follow-up Instructions Return in about 4 weeks (around 3/2/2018). Introducing [facility]! [facility] introduces WalkMe patient portal. Now you can access parts of your medical record, email your doctor's office, and request medication refills online. 1. In your internet browser, go to [email or web address] 2. Click on the First Time User? Click Here link in the Sign In box. You will see the New Member Sign Up page. 3. Enter your WalkMe Access Code exactly as it appears below. You will not need to use this code after youve completed the sign-up process. If you do not sign up before the expiration date, you must request a new code. · WalkMe Access Code: [email or web address] Expires: 3/27/2018  9:50 AM 
 
4. Enter the last four digits of your Social Security Number (xxxx) and Date of Birth (mm/dd/yyyy) as indicated and click Submit. You will be taken to the next sign-up page. 5. Create a WalkMe ID. This will be your WalkMe login ID and cannot be changed, so think of one that is secure and easy to remember. 6. Create a WalkMe password. You can change your password at any time. 7. Enter your Password Reset Question and Answer. This can be used at a later time if you forget your password. 8. Enter your e-mail address. You will receive e-mail notification when new information is available in [address] 9. Click Sign Up. You can now view and download portions of your medical record. 10. Click the Download Summary menu link to download a portable copy of your medical information. If you have questions, please visit the Frequently Asked Questions section of the [employer] website. Remember, [employer] is NOT to be used for urgent needs. For medical emergencies, dial 911. Now available from your iPhone and Android! Please provide this summary of care documentation to your next provider. Your primary care clinician is listed as Kal Amaro. If you have any questions after today's visit, please call [phone].

## 2018-02-02 NOTE — TELEPHONE ENCOUNTER
[phone] contact # per Yanni(wife) need a referral for Kidney Specialist that Dr. Esther Buchanan wanted him to make an appt with (Dr. Tommie Rivera) at the [facility] is who they are requesting the referral to be scheduled with. .    Thanks,

## 2018-02-02 NOTE — PROGRESS NOTES
Derek Murcia is a 66 y.o. male presenting for/with:    Cold Symptoms (X 1 Week.)    HPI:  Hypertension. Blood pressures better lately. Management at last visit included boosting lasix to 40mg and con't aldactone 25mg every day and lisnopril 20 every day. Sandee well. Current regimen: ACE inhibitor, loop diuretic, aldactone. Symptoms include no sx. Patient denies chest pain, palpitations, orthopnea  Lab review:   Lab Results   Component Value Date/Time    Sodium 135 01/22/2018 03:55 PM    Potassium 4.6 01/22/2018 03:55 PM    Chloride 103 01/22/2018 03:55 PM    CO2 15 01/22/2018 03:55 PM    Anion gap 12 10/21/2017 11:15 PM    Glucose 27 01/22/2018 03:55 PM    BUN 40 01/22/2018 03:55 PM    Creatinine 4.10 01/22/2018 03:55 PM    BUN/Creatinine ratio 10 01/22/2018 03:55 PM    GFR est AA 18 01/22/2018 03:55 PM    GFR est non-AA 15 01/22/2018 03:55 PM    Calcium 7.6 01/22/2018 03:55 PM     Diabetes. Sugars still controlled fairly to poorly by A1c's. home checks showed running low 100's in AM and 500's at bedtime. Hypoglycemia: had a 50 fasting on the 20 unit dose qhs, but no AMS. Tolerating current treatment well. Current medications include insulin novolin 70/30, taking 15/15/15 qac. Was on metformin 500mg qd due to CKD last fall, but GFR a lot lower on fall check.     Lab Results   Component Value Date/Time    Hemoglobin A1c 10.9 01/22/2018 03:55 PM    Hemoglobin A1c 12.5 11/09/2016 04:12 PM    Hemoglobin A1c 12.0 11/25/2015 09:23 AM    Glucose 27 01/22/2018 03:55 PM    Glucose (POC) 293 10/22/2017 03:00 AM    Glucose POC 65 01/22/2018 03:11 PM    Microalb/Creat ratio (ug/mg creat.) 3487.6 11/09/2016 04:11 PM    Microalbumin/creat ratio (POC) >300 01/22/2018 03:30 PM    LDL, calculated 48 01/22/2018 03:55 PM    Creatinine 4.10 01/22/2018 03:55 PM     Lab Results   Component Value Date/Time    Microalb/Creat ratio (ug/mg creat.) 3487.6 11/09/2016 04:11 PM     Last eye exam performed 4/2015 with Dr. Nicholas Aiken, No    Last foot exam 6/15 performed. warm, good capillary refill, no trophic changes or ulcerative lesions and reduced sensation at all points    ROS:  Constitutional: No fever, chills or weight loss  Respiratory: No cough, SOB   CV: No chest pain or Palpitations    Visit Vitals    /88 (BP 1 Location: Left arm, BP Patient Position: Sitting)    Pulse (!) 102    Temp 99.8 °F (37.7 °C) (Oral)    Resp 20    Ht 6' 2\" (1.88 m)    Wt 211 lb 3.2 oz (95.8 kg)    SpO2 93%    PF 93 L/min    BMI 27.12 kg/m2     Wt Readings from Last 3 Encounters:   02/02/18 211 lb 3.2 oz (95.8 kg)   01/22/18 200 lb (90.7 kg)   10/21/17 200 lb (90.7 kg)     BP Readings from Last 3 Encounters:   02/02/18 152/88   01/22/18 154/80   10/22/17 152/79     Physical Examination: General appearance - alert, ill appearing, and in mod discomfort  Mental status - alert, oriented to person, place, and time  Eyes - pupils equal and reactive, extraocular eye movements intact  ENT - bilateral external ears and nose normal. Normal lips  Neck - supple, no significant adenopathy, no thyromegaly or mass  Lymphatics - no palpable lymphadenopathy, no hepatosplenomegaly  Chest - clear to auscultation, no wheezes, rales or rhonchi, symmetric air entry  Heart - normal rate, regular rhythm, normal S1, S2, no murmurs, rubs, clicks or gallops  Extremities - peripheral pulses normal, 1+ pedal edema, no clubbing or cyanosis. Widespread ttp to muscle bellies    A/p:  Acute systemic infection c/w influenza. Tx empirically with tamiflu, given compromised immune status. No sign CAP. Renal dose 30mg qdx5d ore. DM2  Much better control by reports. No further severe hypoglycemia. con't insulin dose at 15 units AM, 15 units with lunch, and 15 units before dinner. Sugar checks with each meal. stay off metformin due to low GFR aa. HTN  Not in goal. worsening GFRaa on last check. con't lisinopril at 20mg every day, K+ ok.  Keep lasix at 40mg qam, keep spironolactone 25mg every day. Add adalat CC 30mg daily. F/U 1wk by phone, 1mo in person.

## 2018-02-13 ENCOUNTER — HOSPITAL ENCOUNTER (INPATIENT)
Age: 58
LOS: 2 days | Discharge: HOME | DRG: 637 | End: 2018-02-15
Attending: EMERGENCY MEDICINE | Admitting: INTERNAL MEDICINE
Payer: MEDICARE

## 2018-02-13 ENCOUNTER — APPOINTMENT (OUTPATIENT)
Dept: ULTRASOUND IMAGING | Age: 58
DRG: 637 | End: 2018-02-13
Attending: INTERNAL MEDICINE
Payer: MEDICARE

## 2018-02-13 DIAGNOSIS — Z79.4 TYPE 2 DIABETES MELLITUS WITH KETOACIDOSIS WITHOUT COMA, WITH LONG-TERM CURRENT USE OF INSULIN (HCC): Primary | ICD-10-CM

## 2018-02-13 DIAGNOSIS — E11.10 TYPE 2 DIABETES MELLITUS WITH KETOACIDOSIS WITHOUT COMA, WITH LONG-TERM CURRENT USE OF INSULIN (HCC): Primary | ICD-10-CM

## 2018-02-13 LAB
ABO + RH BLD: NORMAL
ADMINISTERED INITIALS, ADMINIT: NORMAL
ALBUMIN SERPL-MCNC: 1.6 G/DL (ref 3.5–5)
ALBUMIN/GLOB SERPL: 0.2 {RATIO} (ref 1.1–2.2)
ALP SERPL-CCNC: 101 U/L (ref 45–117)
ALT SERPL-CCNC: 12 U/L (ref 12–78)
ANION GAP SERPL CALC-SCNC: 8 MMOL/L (ref 5–15)
AST SERPL-CCNC: 22 U/L (ref 15–37)
BASOPHILS # BLD: 0 K/UL (ref 0–0.1)
BASOPHILS NFR BLD: 0 % (ref 0–1)
BILIRUB SERPL-MCNC: 0.4 MG/DL (ref 0.2–1)
BLOOD GROUP ANTIBODIES SERPL: NORMAL
BUN SERPL-MCNC: 70 MG/DL (ref 6–20)
BUN/CREAT SERPL: 13 (ref 12–20)
CALCIUM SERPL-MCNC: 7.2 MG/DL (ref 8.5–10.1)
CHLORIDE SERPL-SCNC: 104 MMOL/L (ref 97–108)
CO2 SERPL-SCNC: 16 MMOL/L (ref 21–32)
CREAT SERPL-MCNC: 5.48 MG/DL (ref 0.7–1.3)
D50 ADMINISTERED, D50ADM: 0 ML
D50 ORDER, D50ORD: 0 ML
DIFFERENTIAL METHOD BLD: ABNORMAL
EOSINOPHIL # BLD: 0.1 K/UL (ref 0–0.4)
EOSINOPHIL NFR BLD: 1 % (ref 0–7)
ERYTHROCYTE [DISTWIDTH] IN BLOOD BY AUTOMATED COUNT: 17.1 % (ref 11.5–14.5)
GLOBULIN SER CALC-MCNC: 6.5 G/DL (ref 2–4)
GLSCOM COMMENTS: NORMAL
GLUCOSE BLD STRIP.AUTO-MCNC: 242 MG/DL (ref 65–100)
GLUCOSE BLD STRIP.AUTO-MCNC: 354 MG/DL (ref 65–100)
GLUCOSE BLD STRIP.AUTO-MCNC: 452 MG/DL (ref 65–100)
GLUCOSE BLD STRIP.AUTO-MCNC: 473 MG/DL (ref 65–100)
GLUCOSE BLD STRIP.AUTO-MCNC: 597 MG/DL (ref 65–100)
GLUCOSE BLD STRIP.AUTO-MCNC: >600 MG/DL (ref 65–100)
GLUCOSE SERPL-MCNC: 507 MG/DL (ref 65–100)
GLUCOSE, GLC: 242 MG/DL
GLUCOSE, GLC: 354 MG/DL
GLUCOSE, GLC: 452 MG/DL
GLUCOSE, GLC: 473 MG/DL
HCT VFR BLD AUTO: 23.2 % (ref 36.6–50.3)
HGB BLD-MCNC: 7.7 G/DL (ref 12.1–17)
HIGH TARGET, HITG: 250 MG/DL
IMM GRANULOCYTES # BLD: 0.2 K/UL (ref 0–0.04)
IMM GRANULOCYTES NFR BLD AUTO: 2 % (ref 0–0.5)
INSULIN ADMINSTERED, INSADM: 11.8 UNITS/HOUR
INSULIN ADMINSTERED, INSADM: 11.8 UNITS/HOUR
INSULIN ADMINSTERED, INSADM: 7.3 UNITS/HOUR
INSULIN ADMINSTERED, INSADM: 8.3 UNITS/HOUR
INSULIN ORDER, INSORD: 11.8 UNITS/HOUR
INSULIN ORDER, INSORD: 11.8 UNITS/HOUR
INSULIN ORDER, INSORD: 7.3 UNITS/HOUR
INSULIN ORDER, INSORD: 8.3 UNITS/HOUR
IRON SATN MFR SERPL: 22 % (ref 20–50)
IRON SERPL-MCNC: 31 UG/DL (ref 35–150)
LACTATE SERPL-SCNC: 1.2 MMOL/L (ref 0.4–2)
LOW TARGET, LOT: 150 MG/DL
LYMPHOCYTES # BLD: 1.4 K/UL (ref 0.8–3.5)
LYMPHOCYTES NFR BLD: 17 % (ref 12–49)
MAGNESIUM SERPL-MCNC: 1.9 MG/DL (ref 1.6–2.4)
MAGNESIUM SERPL-MCNC: 2.1 MG/DL (ref 1.6–2.4)
MCH RBC QN AUTO: 21.4 PG (ref 26–34)
MCHC RBC AUTO-ENTMCNC: 33.2 G/DL (ref 30–36.5)
MCV RBC AUTO: 64.6 FL (ref 80–99)
MINUTES UNTIL NEXT BG, NBG: 60 MIN
MONOCYTES # BLD: 0.6 K/UL (ref 0–1)
MONOCYTES NFR BLD: 7 % (ref 5–13)
MULTIPLIER, MUL: 0.02
MULTIPLIER, MUL: 0.03
MULTIPLIER, MUL: 0.04
MULTIPLIER, MUL: 0.04
NEUTS SEG # BLD: 6.1 K/UL (ref 1.8–8)
NEUTS SEG NFR BLD: 73 % (ref 32–75)
NRBC # BLD: 0.03 K/UL (ref 0–0.01)
NRBC BLD-RTO: 0.4 PER 100 WBC
ORDER INITIALS, ORDINIT: NORMAL
PHOSPHATE SERPL-MCNC: 6.5 MG/DL (ref 2.6–4.7)
PLATELET # BLD AUTO: 129 K/UL (ref 150–400)
PMV BLD AUTO: ABNORMAL FL (ref 8.9–12.9)
POTASSIUM SERPL-SCNC: 5 MMOL/L (ref 3.5–5.1)
PROT SERPL-MCNC: 8.1 G/DL (ref 6.4–8.2)
RBC # BLD AUTO: 3.59 M/UL (ref 4.1–5.7)
RBC MORPH BLD: ABNORMAL
RBC MORPH BLD: ABNORMAL
SERVICE CMNT-IMP: ABNORMAL
SODIUM SERPL-SCNC: 128 MMOL/L (ref 136–145)
SPECIMEN EXP DATE BLD: NORMAL
TIBC SERPL-MCNC: 141 UG/DL (ref 250–450)
VIT B12 SERPL-MCNC: 1356 PG/ML (ref 211–911)
WBC # BLD AUTO: 8.4 K/UL (ref 4.1–11.1)

## 2018-02-13 PROCEDURE — 82607 VITAMIN B-12: CPT | Performed by: INTERNAL MEDICINE

## 2018-02-13 PROCEDURE — 99284 EMERGENCY DEPT VISIT MOD MDM: CPT

## 2018-02-13 PROCEDURE — 74011250636 HC RX REV CODE- 250/636: Performed by: INTERNAL MEDICINE

## 2018-02-13 PROCEDURE — 83735 ASSAY OF MAGNESIUM: CPT | Performed by: INTERNAL MEDICINE

## 2018-02-13 PROCEDURE — 76700 US EXAM ABDOM COMPLETE: CPT

## 2018-02-13 PROCEDURE — 83036 HEMOGLOBIN GLYCOSYLATED A1C: CPT | Performed by: INTERNAL MEDICINE

## 2018-02-13 PROCEDURE — 36415 COLL VENOUS BLD VENIPUNCTURE: CPT | Performed by: EMERGENCY MEDICINE

## 2018-02-13 PROCEDURE — 65660000000 HC RM CCU STEPDOWN

## 2018-02-13 PROCEDURE — 82962 GLUCOSE BLOOD TEST: CPT

## 2018-02-13 PROCEDURE — 74011000258 HC RX REV CODE- 258: Performed by: INTERNAL MEDICINE

## 2018-02-13 PROCEDURE — 74011000250 HC RX REV CODE- 250: Performed by: INTERNAL MEDICINE

## 2018-02-13 PROCEDURE — 86900 BLOOD TYPING SEROLOGIC ABO: CPT | Performed by: INTERNAL MEDICINE

## 2018-02-13 PROCEDURE — 80053 COMPREHEN METABOLIC PANEL: CPT | Performed by: EMERGENCY MEDICINE

## 2018-02-13 PROCEDURE — 94761 N-INVAS EAR/PLS OXIMETRY MLT: CPT

## 2018-02-13 PROCEDURE — 83605 ASSAY OF LACTIC ACID: CPT | Performed by: INTERNAL MEDICINE

## 2018-02-13 PROCEDURE — 85025 COMPLETE CBC W/AUTO DIFF WBC: CPT | Performed by: INTERNAL MEDICINE

## 2018-02-13 PROCEDURE — 83735 ASSAY OF MAGNESIUM: CPT | Performed by: EMERGENCY MEDICINE

## 2018-02-13 PROCEDURE — 87040 BLOOD CULTURE FOR BACTERIA: CPT | Performed by: INTERNAL MEDICINE

## 2018-02-13 PROCEDURE — 80048 BASIC METABOLIC PNL TOTAL CA: CPT | Performed by: INTERNAL MEDICINE

## 2018-02-13 PROCEDURE — 84100 ASSAY OF PHOSPHORUS: CPT | Performed by: EMERGENCY MEDICINE

## 2018-02-13 PROCEDURE — 74011636637 HC RX REV CODE- 636/637: Performed by: INTERNAL MEDICINE

## 2018-02-13 PROCEDURE — 82728 ASSAY OF FERRITIN: CPT | Performed by: INTERNAL MEDICINE

## 2018-02-13 PROCEDURE — 83540 ASSAY OF IRON: CPT | Performed by: INTERNAL MEDICINE

## 2018-02-13 PROCEDURE — 82746 ASSAY OF FOLIC ACID SERUM: CPT | Performed by: INTERNAL MEDICINE

## 2018-02-13 RX ORDER — MAGNESIUM SULFATE 100 %
4 CRYSTALS MISCELLANEOUS AS NEEDED
Status: DISCONTINUED | OUTPATIENT
Start: 2018-02-13 | End: 2018-02-14 | Stop reason: SDUPTHER

## 2018-02-13 RX ORDER — MAGNESIUM SULFATE 100 %
4 CRYSTALS MISCELLANEOUS AS NEEDED
Status: DISCONTINUED | OUTPATIENT
Start: 2018-02-13 | End: 2018-02-15 | Stop reason: HOSPADM

## 2018-02-13 RX ORDER — ATORVASTATIN CALCIUM 20 MG/1
20 TABLET, FILM COATED ORAL DAILY
COMMUNITY
End: 2019-01-01 | Stop reason: SDUPTHER

## 2018-02-13 RX ORDER — HEPARIN SODIUM 5000 [USP'U]/ML
5000 INJECTION, SOLUTION INTRAVENOUS; SUBCUTANEOUS EVERY 8 HOURS
Status: DISCONTINUED | OUTPATIENT
Start: 2018-02-13 | End: 2018-02-15 | Stop reason: HOSPADM

## 2018-02-13 RX ORDER — FACIAL-BODY WIPES
10 EACH TOPICAL DAILY PRN
Status: DISCONTINUED | OUTPATIENT
Start: 2018-02-13 | End: 2018-02-15 | Stop reason: HOSPADM

## 2018-02-13 RX ORDER — CHLORPHENIRAMINE MALEATE 4 MG
TABLET ORAL 2 TIMES DAILY
COMMUNITY
End: 2018-03-20

## 2018-02-13 RX ORDER — INSULIN LISPRO 100 [IU]/ML
INJECTION, SOLUTION INTRAVENOUS; SUBCUTANEOUS
Status: DISCONTINUED | OUTPATIENT
Start: 2018-02-13 | End: 2018-02-15 | Stop reason: SDUPTHER

## 2018-02-13 RX ORDER — LEVOFLOXACIN 5 MG/ML
750 INJECTION, SOLUTION INTRAVENOUS
Status: COMPLETED | OUTPATIENT
Start: 2018-02-13 | End: 2018-02-13

## 2018-02-13 RX ORDER — INSULIN LISPRO 100 [IU]/ML
INJECTION, SOLUTION INTRAVENOUS; SUBCUTANEOUS
Status: DISCONTINUED | OUTPATIENT
Start: 2018-02-13 | End: 2018-02-14 | Stop reason: SDUPTHER

## 2018-02-13 RX ORDER — DEXTROSE 50 % IN WATER (D50W) INTRAVENOUS SYRINGE
12.5-25 AS NEEDED
Status: DISCONTINUED | OUTPATIENT
Start: 2018-02-13 | End: 2018-02-15 | Stop reason: HOSPADM

## 2018-02-13 RX ORDER — SODIUM CHLORIDE 0.9 % (FLUSH) 0.9 %
5-10 SYRINGE (ML) INJECTION EVERY 8 HOURS
Status: DISCONTINUED | OUTPATIENT
Start: 2018-02-13 | End: 2018-02-15 | Stop reason: HOSPADM

## 2018-02-13 RX ORDER — METRONIDAZOLE 500 MG/100ML
500 INJECTION, SOLUTION INTRAVENOUS EVERY 8 HOURS
Status: DISCONTINUED | OUTPATIENT
Start: 2018-02-13 | End: 2018-02-14

## 2018-02-13 RX ORDER — SODIUM CHLORIDE 0.9 % (FLUSH) 0.9 %
5-10 SYRINGE (ML) INJECTION AS NEEDED
Status: DISCONTINUED | OUTPATIENT
Start: 2018-02-13 | End: 2018-02-15 | Stop reason: HOSPADM

## 2018-02-13 RX ORDER — NIFEDIPINE 30 MG/1
30 TABLET, EXTENDED RELEASE ORAL DAILY
Status: DISCONTINUED | OUTPATIENT
Start: 2018-02-14 | End: 2018-02-15 | Stop reason: HOSPADM

## 2018-02-13 RX ORDER — HYDRALAZINE HYDROCHLORIDE 20 MG/ML
10 INJECTION INTRAMUSCULAR; INTRAVENOUS
Status: DISCONTINUED | OUTPATIENT
Start: 2018-02-13 | End: 2018-02-15 | Stop reason: HOSPADM

## 2018-02-13 RX ORDER — DEXTROSE 50 % IN WATER (D50W) INTRAVENOUS SYRINGE
12.5-25 AS NEEDED
Status: DISCONTINUED | OUTPATIENT
Start: 2018-02-13 | End: 2018-02-14 | Stop reason: SDUPTHER

## 2018-02-13 RX ORDER — ASPIRIN 81 MG/1
162 TABLET ORAL DAILY
COMMUNITY
End: 2018-05-12 | Stop reason: DRUGHIGH

## 2018-02-13 RX ORDER — ENOXAPARIN SODIUM 100 MG/ML
40 INJECTION SUBCUTANEOUS EVERY 24 HOURS
Status: DISCONTINUED | OUTPATIENT
Start: 2018-02-13 | End: 2018-02-13

## 2018-02-13 RX ORDER — SODIUM CHLORIDE 9 MG/ML
75 INJECTION, SOLUTION INTRAVENOUS CONTINUOUS
Status: DISCONTINUED | OUTPATIENT
Start: 2018-02-13 | End: 2018-02-14

## 2018-02-13 RX ORDER — ASPIRIN 81 MG/1
162 TABLET ORAL DAILY
Status: DISCONTINUED | OUTPATIENT
Start: 2018-02-14 | End: 2018-02-15 | Stop reason: HOSPADM

## 2018-02-13 RX ORDER — ONDANSETRON 2 MG/ML
4 INJECTION INTRAMUSCULAR; INTRAVENOUS
Status: DISCONTINUED | OUTPATIENT
Start: 2018-02-13 | End: 2018-02-15 | Stop reason: HOSPADM

## 2018-02-13 RX ORDER — ATORVASTATIN CALCIUM 20 MG/1
20 TABLET, FILM COATED ORAL DAILY
Status: DISCONTINUED | OUTPATIENT
Start: 2018-02-14 | End: 2018-02-15 | Stop reason: HOSPADM

## 2018-02-13 RX ORDER — ACETAMINOPHEN 325 MG/1
650 TABLET ORAL
Status: DISCONTINUED | OUTPATIENT
Start: 2018-02-13 | End: 2018-02-15 | Stop reason: HOSPADM

## 2018-02-13 RX ORDER — LEVOFLOXACIN 5 MG/ML
500 INJECTION, SOLUTION INTRAVENOUS
Status: DISCONTINUED | OUTPATIENT
Start: 2018-02-15 | End: 2018-02-15 | Stop reason: HOSPADM

## 2018-02-13 RX ADMIN — Medication 10 ML: at 23:08

## 2018-02-13 RX ADMIN — SODIUM CHLORIDE 75 ML/HR: 900 INJECTION, SOLUTION INTRAVENOUS at 19:00

## 2018-02-13 RX ADMIN — SODIUM CHLORIDE 8.3 UNITS/HR: 900 INJECTION, SOLUTION INTRAVENOUS at 19:10

## 2018-02-13 RX ADMIN — METRONIDAZOLE 500 MG: 500 INJECTION, SOLUTION INTRAVENOUS at 20:18

## 2018-02-13 RX ADMIN — LEVOFLOXACIN 750 MG: 5 INJECTION, SOLUTION INTRAVENOUS at 21:23

## 2018-02-13 NOTE — PROGRESS NOTES
Pharmacy Clarification of Prior to Admission Medication Regimen     The patient was interviewed regarding clarification of the prior to admission medication regimen and was questioned regarding use of any other inhalers, topical products, over the counter medications, herbal medications, vitamin products or ophthalmic/nasal/otic medication use. Information Obtained From: Patient and Rx Query    Pertinent Pharmacy Findings:   clindamycin (CLEOCIN) 300 mg capsule: Patient started a 7 day regimen on 02/12/2013. Patient has completed 1 day of therapy as of 02/13/2018. Antibiotic Indication: Tooth infection   GUAIFENESIN/DEXTROMETHORPHAN (ROBITUSSIN-DM PO): Patient stated that he recently took this PRN OTC agent at home, but was unsure of the strength. PTA medication list was corrected to the following:     Prior to Admission Medications   Prescriptions Last Dose Informant Patient Reported? Taking? GUAIFENESIN/DEXTROMETHORPHAN (ROBITUSSIN-DM PO) 2/10/2018 at Unknown time Self Yes Yes   Sig: Take  by mouth daily as needed for Cough. Patient takes 'a little cupful.'   NIFEdipine ER (ADALAT CC) 30 mg ER tablet 2/12/2018 at Unknown time Self No Yes   Sig: Take 1 Tab by mouth daily. Indications: pressure, add to regimen   aspirin delayed-release 81 mg tablet 2/12/2018 at Unknown time Self Yes Yes   Sig: Take 162 mg by mouth daily. atorvastatin (LIPITOR) 20 mg tablet 2/12/2018 at Unknown time Self Yes Yes   Sig: Take 20 mg by mouth daily. clindamycin (CLEOCIN) 300 mg capsule 2/12/2018 at Unknown time Self Yes Yes   Sig: Take 300 mg by mouth four (4) times daily. For 7 days. clotrimazole (LOTRIMIN) 1 % topical cream 2/12/2018 at Unknown time Self Yes Yes   Sig: Apply  to affected area two (2) times a day. Patient applies 'a thin layer' to scaly areas on feet and between toes   furosemide (LASIX) 40 mg tablet 2/12/2018 at Unknown time Self No Yes   Sig: Take 1 Tab by mouth daily.  Indications: fluid   insulin NPH/insulin regular (NOVOLIN 70/30, HUMULIN 70/30) 100 unit/mL (70-30) injection 2/12/2018 at Unknown time Self No Yes   Sig: 15 units with Breakfast, lunch, and dinner   lisinopril (PRINIVIL, ZESTRIL) 20 mg tablet 2/12/2018 at Unknown time Self No Yes   Sig: TAKE ONE TABLET BY MOUTH EVERY DAY for heart and pressure   multivit-min-FA-lycopen-lutein (CENTRUM SILVER MEN) 300-600-300 mcg tab 2/12/2018 at Unknown time Self Yes Yes   Sig: Take 1 Tab by mouth daily. spironolactone (ALDACTONE) 25 mg tablet 2/12/2018 at Unknown time Self No Yes   Sig: Take 1 Tab by mouth daily.  Indications: fluid and pressure      Facility-Administered Medications: None              Thank you,  Tori Mcdonnell, Shanae  Medication History Pharmacy Technician
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
## 2018-02-13 NOTE — IP AVS SNAPSHOT
[address] [facility] 
[phone] Patient: Gary Oliveros MRN: [id number] WOB:[birth year] A check  indicates which time of day the medication should be taken. My Medications START taking these medications Instructions Each Dose to Equal  
 Morning Noon Evening Bedtime  
 calcitRIOL 0.25 mcg capsule Commonly known as:  ROCALTROL Your last dose was: Your next dose is: Take 1 Cap by mouth daily. 0.25 mcg  
    
   
   
   
  
 carvedilol 6.25 mg tablet Commonly known as:  Cherelle Chuy Your last dose was: Your next dose is: Take 1 Tab by mouth two (2) times daily (with meals). 6.25 mg  
    
   
   
   
  
 levoFLOXacin 500 mg tablet Commonly known as:  Flodavid Silvius Your last dose was: Your next dose is: Take 1 Tab by mouth every other day for 6 days. 500 mg  
    
   
   
   
  
 metroNIDAZOLE 500 mg tablet Commonly known as:  FLAGYL Your last dose was: Your next dose is: Take 1 Tab by mouth two (2) times a day for 5 days. 500 mg CHANGE how you take these medications Instructions Each Dose to Equal  
 Morning Noon Evening Bedtime  
 aspirin delayed-release 81 mg tablet What changed:  Another medication with the same name was removed. Continue taking this medication, and follow the directions you see here. Your last dose was: Your next dose is: Take 162 mg by mouth daily. 162 mg  
    
   
   
   
  
 atorvastatin 20 mg tablet Commonly known as:  LIPITOR What changed:  Another medication with the same name was removed. Continue taking this medication, and follow the directions you see here. Your last dose was: Your next dose is: Take 20 mg by mouth daily.   
 20 mg  
    
   
   
   
  
 clotrimazole 1 % topical cream  
Commonly known as:  Ratna Lester What changed:  Another medication with the same name was removed. Continue taking this medication, and follow the directions you see here. Your last dose was: Your next dose is:    
   
   
 Apply  to affected area two (2) times a day. Patient applies 'a thin layer' to scaly areas on feet and between toes  
     
   
   
   
  
 furosemide 20 mg tablet Commonly known as:  LASIX What changed:   
- medication strength 
- how much to take Your last dose was: Your next dose is: Take 1 Tab by mouth daily. 20 mg CONTINUE taking these medications Instructions Each Dose to Equal  
 Morning Noon Evening Bedtime CENTRUM SILVER -600-300 mcg Tab Generic drug:  multivit-min-FA-lycopen-lutein Your last dose was: Your next dose is: Take 1 Tab by mouth daily. 1 Tab  
    
   
   
   
  
 insulin NPH/insulin regular 100 unit/mL (70-30) injection Commonly known as:  NOVOLIN 70/30, HUMULIN 70/30 Your last dose was: Your next dose is:    
   
   
 15 units with Breakfast, lunch, and dinner  
     
   
   
   
  
 lisinopril 20 mg tablet Commonly known as:  Yamilex Bolanos Your last dose was: Your next dose is: TAKE ONE TABLET BY MOUTH EVERY DAY for heart and pressure NIFEdipine ER 30 mg ER tablet Commonly known as:  ADALAT CC Your last dose was: Your next dose is: Take 1 Tab by mouth daily. Indications: pressure, add to regimen 30 mg  
    
   
   
   
  
 ROBITUSSIN-DM PO Your last dose was: Your next dose is: Take  by mouth daily as needed for Cough. Patient takes 'a little cupful.' STOP taking these medications CENTRUM SILVER Tab tablet Generic drug:  multivitamins-minerals-lutein clindamycin 300 mg capsule Commonly known as:  CLEOCIN  
   
  
 spironolactone 25 mg tablet Commonly known as:  ALDACTONE Where to Get Your Medications Information on where to get these meds will be given to you by the nurse or doctor. ! Ask your nurse or doctor about these medications  
  calcitRIOL 0.25 mcg capsule  
 carvedilol 6.25 mg tablet  
 furosemide 20 mg tablet  
 levoFLOXacin 500 mg tablet  
 metroNIDAZOLE 500 mg tablet
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
## 2018-02-13 NOTE — PROGRESS NOTES
Pharmacy Automatic Renal Dosing Protocol - Antimicrobials    Indication for Antimicrobials: CAP, Aspiration    Current Regimen of Each Antimicrobial:  Levofloxacin 750 mg x 1 then 500 mg Q48H (Start Date ; Day # 1)  Metronidazole 500 mg Q8H (Start Date , Day 1)    Previous Antimicrobial Therapy:    Vancomycin Goal Level:     Measured / Extrapolated Vancomycin Level:     Significant Cultures:       Labs:  Recent Labs      18   1022   CREA  5.94*   BUN  75*   WBC  8.5     Temp (24hrs), Av.6 ° F (36.4 ° C), Min:97.6 ° F (36.4 ° C), Max:97.6 ° F (36.4 ° C)        Paralysis, amputations, malnutrition:   Creatinine Clearance (mL/min) or Dialysis: 16    Impression/Plan:   Levofloxacin 750 mg x 1 then 500 mg Q48H per renal dosing protocol  Metronidazole 500 mg Q8H   BMP Q4H per MD     Pharmacy will follow daily and adjust medications as appropriate for renal function and/or serum levels. Thank you,  Sherron Castro, [facility]      Recommended duration of therapy  [email or web address]/[place]/[facility]/Pharmacy/Clinical%20Companion/[email or web address]. docx    Renal Dosing  [email or web address]/[place]/[facility]/Pharmacy/Clinical%20Companion/Renal%[email or web address] pdf

## 2018-02-13 NOTE — IP AVS SNAPSHOT
[address] 
[phone] Patient: Saúl Briscoe MRN: [id number] MP About your hospitalization You were admitted on:  2018 You last received care in the:  [facility] 2 PROGRESSIVE CARE You were discharged on:  February 15, 2018 Why you were hospitalized Your primary diagnosis was:  Not on File Your diagnoses also included:  Dka (Diabetic Ketoacidoses) ([facility]) Follow-up Information Follow up With Details Comments Contact Info [facility] DIABETIC TREATMENT Go on 2018 appointment scheduled at 1:00pm- please bring discharge instructions from your hospital stay [address] [facility] [address]. [address] [facility] 
[phone] Dr. Landy Galvan In 2 weeks Practice will contact you with appointment date and time  [phone] Make the appointment at the [address] [place] [address] for couple weeks after discharge. Dental  Patient states he has a dental appointment scheduled for  Johanny Wallis MD On 3/7/2018 For hospital follow up appointment at 8:10AM  [address] [facility] [address] [phone] Your Scheduled Appointments 2018  1:00 PM EST DIABETES CLASS 1HR with SURVIVAL SKILLS CLASS [facility] DIABETIC TREATMENT ([address]) [address] [facility] [address]. [address]  
[phone] 2018  8:10 AM EST  
ESTABLISHED PATIENT with MD Honorio Rutledge 38 ([address]) [address] [facility] [address] [phone] Discharge Orders None A check  indicates which time of day the medication should be taken. My Medications START taking these medications Instructions Each Dose to Equal  
 Morning Noon Evening Bedtime  
 calcitRIOL 0.25 mcg capsule Commonly known as:  ROCALTROL Your last dose was: Your next dose is: Take 1 Cap by mouth daily. 0.25 mcg  
    
   
   
   
  
 carvedilol 6.25 mg tablet Commonly known as:  Mohsen Ralph Your last dose was: Your next dose is: Take 1 Tab by mouth two (2) times daily (with meals). 6.25 mg  
    
   
   
   
  
 levoFLOXacin 500 mg tablet Commonly known as:  Dav Julian Your last dose was: Your next dose is: Take 1 Tab by mouth every other day for 6 days. 500 mg  
    
   
   
   
  
 metroNIDAZOLE 500 mg tablet Commonly known as:  FLAGYL Your last dose was: Your next dose is: Take 1 Tab by mouth two (2) times a day for 5 days. 500 mg CHANGE how you take these medications Instructions Each Dose to Equal  
 Morning Noon Evening Bedtime  
 aspirin delayed-release 81 mg tablet What changed:  Another medication with the same name was removed. Continue taking this medication, and follow the directions you see here. Your last dose was: Your next dose is: Take 162 mg by mouth daily. 162 mg  
    
   
   
   
  
 atorvastatin 20 mg tablet Commonly known as:  LIPITOR What changed:  Another medication with the same name was removed. Continue taking this medication, and follow the directions you see here. Your last dose was: Your next dose is: Take 20 mg by mouth daily. 20 mg  
    
   
   
   
  
 clotrimazole 1 % topical cream  
Commonly known as:  Jesus Donaldson What changed:  Another medication with the same name was removed. Continue taking this medication, and follow the directions you see here. Your last dose was: Your next dose is:    
   
   
 Apply  to affected area two (2) times a day. Patient applies 'a thin layer' to scaly areas on feet and between toes furosemide 20 mg tablet Commonly known as:  LASIX What changed:   
- medication strength 
- how much to take Your last dose was: Your next dose is: Take 1 Tab by mouth daily. 20 mg CONTINUE taking these medications Instructions Each Dose to Equal  
 Morning Noon Evening Bedtime CENTRUM SILVER -600-300 mcg Tab Generic drug:  multivit-min-FA-lycopen-lutein Your last dose was: Your next dose is: Take 1 Tab by mouth daily. 1 Tab  
    
   
   
   
  
 insulin NPH/insulin regular 100 unit/mL (70-30) injection Commonly known as:  NOVOLIN 70/30, HUMULIN 70/30 Your last dose was: Your next dose is:    
   
   
 15 units with Breakfast, lunch, and dinner  
     
   
   
   
  
 lisinopril 20 mg tablet Commonly known as:  Betzaida Aguilar Your last dose was: Your next dose is: TAKE ONE TABLET BY MOUTH EVERY DAY for heart and pressure NIFEdipine ER 30 mg ER tablet Commonly known as:  ADALAT CC Your last dose was: Your next dose is: Take 1 Tab by mouth daily. Indications: pressure, add to regimen 30 mg  
    
   
   
   
  
 ROBITUSSIN-DM PO Your last dose was: Your next dose is: Take  by mouth daily as needed for Cough. Patient takes 'a little cupful.' STOP taking these medications CENTRUM SILVER Tab tablet Generic drug:  multivitamins-minerals-lutein  
   
  
 clindamycin 300 mg capsule Commonly known as:  CLEOCIN  
   
  
 spironolactone 25 mg tablet Commonly known as:  ALDACTONE Where to Get Your Medications Information on where to get these meds will be given to you by the nurse or doctor. ! Ask your nurse or doctor about these medications  
  calcitRIOL 0.25 mcg capsule  
 carvedilol 6.25 mg tablet  
 furosemide 20 mg tablet levoFLOXacin 500 mg tablet  
 metroNIDAZOLE 500 mg tablet Discharge Instructions None [facility] Announcement We are excited to announce that we are making your provider's discharge notes available to you in Education.[email or web address]. You will see these notes when they are completed and signed by the physician that discharged you from your recent hospital stay. If you have any questions or concerns about any information you see in Education.com, please call the Health Information Department where you were seen or reach out to your Primary Care Provider for more information about your plan of care. Introducing [facility]! [employer] introduces Education.com patient portal. Now you can access parts of your medical record, email your doctor's office, and request medication refills online. 1. In your internet browser, go to [email or web address] 2. Click on the First Time User? Click Here link in the Sign In box. You will see the New Member Sign Up page. 3. Enter your Education.com Access Code exactly as it appears below. You will not need to use this code after youve completed the sign-up process. If you do not sign up before the expiration date, you must request a new code. · Education.com Access Code: [email or web address] Expires: 3/27/2018  9:50 AM 
 
4. Enter the last four digits of your Social Security Number (xxxx) and Date of Birth (mm/dd/yyyy) as indicated and click Submit. You will be taken to the next sign-up page. 5. Create a Education.com ID. This will be your Education.com login ID and cannot be changed, so think of one that is secure and easy to remember. 6. Create a Education.com password. You can change your password at any time. 7. Enter your Password Reset Question and Answer. This can be used at a later time if you forget your password. 8. Enter your e-mail address. You will receive e-mail notification when new information is available in [address] 9. Click Sign Up. You can now view and download portions of your medical record. 10. Click the Download Summary menu link to download a portable copy of your medical information. If you have questions, please visit the Frequently Asked Questions section of the [employer] website. Remember, [employer] is NOT to be used for urgent needs. For medical emergencies, dial 911. Now available from your iPhone and Android! Unresulted Labs-Please follow up with your PCP about these lab tests Order Current Status OVA & PARASITES, STOOL In process CULTURE, BLOOD Preliminary result CULTURE, BLOOD Preliminary result CULTURE, STOOL Preliminary result Providers Seen During Your Hospitalization Provider Specialty Primary office phone Lissy Curiel. Isa García MD Emergency Medicine [phone] Clifford Norton MD Emergency Medicine [phone] Jj Sagastume MD Internal Medicine [phone] Immunizations Administered for This Admission Name Date Influenza Vaccine (Quad) PF 2/14/2018 Your Primary Care Physician (PCP) Primary Care Physician Office Phone Office Fax Jacques Gottron, [address] [phone] You are allergic to the following No active allergies Recent Documentation Smoking Status Never Smoker Emergency Contacts Name Discharge Info Relation Home Work Mobile WillieYanni DISCHARGE CAREGIVER [3] Spouse [3] [phone] Patient Belongings The following personal items are in your possession at time of discharge: 
  Dental Appliances: None  Visual Aid: None      Home Medications: None   Jewelry: None  Clothing: Footwear, At bedside, Pants, Shirt, Socks, Undergarments, With patient    Other Valuables: Cell Phone Discharge Instructions Attachments/References CERTIFIED DIABETES EDUCATOR REFERRAL: GENERAL INFO (ENGLISH) Patient Handouts Learning About Certified Diabetes Educators What is a certified diabetes educator? Certified diabetes educators are health professionals who have special training to help you manage your diabetes. They may be: · Registered nurses. · Registered dietitians. · Pharmacists. · Social workers. · Doctors. Your diabetes educator will give you tips and help with daily diabetes care. He or she also may teach classes. These classes give you a chance to learn from and connect with others who have diabetes. Why see a diabetes educator? Your doctor wants you to get the personal support and help that a diabetes educator can give. And most insurance plans will cover part or all of the cost. 
Learning is a key part of living with diabetes. A diabetes educator teaches about the most important parts of your care. You will learn about: 
· Eating healthy meals. · Being active. · Taking medicine. · Checking your blood sugar. · Dealing with your feelings about having diabetes. He or she can help you find ways to live better with diabetes. And your diabetes educator can show you small changes that can make a big difference in your daily routine and your health. If you need to make a big change, he or she will be able to answer your questions and guide you though each step. When should you see one? It can be helpful to see a diabetes educator at certain points in your care. He or she can: · Get you started when you're first diagnosed with diabetes. · Check in once a year for a review of your health and daily routine. · Show you how to handle a new health problem along with your diabetes. · Help you work with a new health care team. 
Follow-up care is a key part of your treatment and safety. Be sure to make and go to all appointments, and call your doctor if you are having problems. It's also a good idea to know your test results and keep a list of the medicines you take. Where can you learn more? Go to http://mercy-duyen.info/. Enter I097 in the search box to learn more about \"Learning About Certified Diabetes Educators. \" Current as of: March 13, 2017 Content Version: 11.4 © 2006-2017 Healthwise, Incorporated. Care instructions adapted under license by [employer] (which disclaims liability or warranty for this information). If you have questions about a medical condition or this instruction, always ask your healthcare professional. [address] any warranty or liability for your use of this information. Please provide this summary of care documentation to your next provider. Signatures-by signing, you are acknowledging that this After Visit Summary has been reviewed with you and you have received a copy. Patient Signature:  ____________________________________________________________ Date:  ____________________________________________________________  
  
Evette Rand Provider Signature:  ____________________________________________________________ Date:  ____________________________________________________________

## 2018-02-13 NOTE — ED NOTES
Pt reported to MD that he had diarrhea stool after starting clindamycin 2 days ago.   Placed on enteric precautions

## 2018-02-13 NOTE — ED TRIAGE NOTES
Assumed care of pt upon transfer from [facility]. Pt reports he has not fell well for several days. States he has had a poor appetite as well as a bad tooth at left lower. Clindamycin was started yesterday. To [facility] this am, found to have elevated blood glucose and AG of 17. Transferred here for management and admission     IV NS at 100cc/hr and insulin gtt at [address] currently infusing. FSBS 597 and BERTHA SIMMONS to bedside and current orders entered. Request to pharmacy to send insulin asap. Pt resting, denies specific complaints, states he does not feel well all over. Cough nonproductive for 1 week. Plan for admission explained.

## 2018-02-14 LAB
ADMINISTERED INITIALS, ADMINIT: NORMAL
ANION GAP SERPL CALC-SCNC: 11 MMOL/L (ref 5–15)
ANION GAP SERPL CALC-SCNC: 8 MMOL/L (ref 5–15)
ANION GAP SERPL CALC-SCNC: 8 MMOL/L (ref 5–15)
BUN SERPL-MCNC: 64 MG/DL (ref 6–20)
BUN SERPL-MCNC: 66 MG/DL (ref 6–20)
BUN SERPL-MCNC: 72 MG/DL (ref 6–20)
BUN/CREAT SERPL: 12 (ref 12–20)
BUN/CREAT SERPL: 13 (ref 12–20)
BUN/CREAT SERPL: 14 (ref 12–20)
C DIFF TOX GENS STL QL NAA+PROBE: NEGATIVE
CALCIUM SERPL-MCNC: 6.7 MG/DL (ref 8.5–10.1)
CALCIUM SERPL-MCNC: 7.5 MG/DL (ref 8.5–10.1)
CALCIUM SERPL-MCNC: 7.6 MG/DL (ref 8.5–10.1)
CHLORIDE SERPL-SCNC: 106 MMOL/L (ref 97–108)
CHLORIDE SERPL-SCNC: 109 MMOL/L (ref 97–108)
CHLORIDE SERPL-SCNC: 109 MMOL/L (ref 97–108)
CO2 SERPL-SCNC: 17 MMOL/L (ref 21–32)
CO2 SERPL-SCNC: 17 MMOL/L (ref 21–32)
CO2 SERPL-SCNC: 19 MMOL/L (ref 21–32)
CREAT SERPL-MCNC: 5.01 MG/DL (ref 0.7–1.3)
CREAT SERPL-MCNC: 5.12 MG/DL (ref 0.7–1.3)
CREAT SERPL-MCNC: 5.19 MG/DL (ref 0.7–1.3)
CREAT UR-MCNC: 91.4 MG/DL
D50 ADMINISTERED, D50ADM: 0 ML
D50 ADMINISTERED, D50ADM: 17 ML
D50 ADMINISTERED, D50ADM: NORMAL ML
D50 ORDER, D50ORD: 0 ML
D50 ORDER, D50ORD: 17 ML
D50 ORDER, D50ORD: NORMAL ML
EST. AVERAGE GLUCOSE BLD GHB EST-MCNC: 298 MG/DL
FERRITIN SERPL-MCNC: 420 NG/ML (ref 26–388)
FOLATE SERPL-MCNC: 5.9 NG/ML (ref 5–21)
GLSCOM COMMENTS: NORMAL
GLUCOSE BLD STRIP.AUTO-MCNC: 109 MG/DL (ref 65–100)
GLUCOSE BLD STRIP.AUTO-MCNC: 110 MG/DL (ref 65–100)
GLUCOSE BLD STRIP.AUTO-MCNC: 110 MG/DL (ref 65–100)
GLUCOSE BLD STRIP.AUTO-MCNC: 111 MG/DL (ref 65–100)
GLUCOSE BLD STRIP.AUTO-MCNC: 174 MG/DL (ref 65–100)
GLUCOSE BLD STRIP.AUTO-MCNC: 177 MG/DL (ref 65–100)
GLUCOSE BLD STRIP.AUTO-MCNC: 187 MG/DL (ref 65–100)
GLUCOSE BLD STRIP.AUTO-MCNC: 203 MG/DL (ref 65–100)
GLUCOSE BLD STRIP.AUTO-MCNC: 205 MG/DL (ref 65–100)
GLUCOSE BLD STRIP.AUTO-MCNC: 236 MG/DL (ref 65–100)
GLUCOSE BLD STRIP.AUTO-MCNC: 281 MG/DL (ref 65–100)
GLUCOSE BLD STRIP.AUTO-MCNC: 294 MG/DL (ref 65–100)
GLUCOSE BLD STRIP.AUTO-MCNC: 312 MG/DL (ref 65–100)
GLUCOSE BLD STRIP.AUTO-MCNC: 57 MG/DL (ref 65–100)
GLUCOSE BLD STRIP.AUTO-MCNC: 81 MG/DL (ref 65–100)
GLUCOSE BLD STRIP.AUTO-MCNC: 84 MG/DL (ref 65–100)
GLUCOSE SERPL-MCNC: 259 MG/DL (ref 65–100)
GLUCOSE SERPL-MCNC: 324 MG/DL (ref 65–100)
GLUCOSE SERPL-MCNC: 77 MG/DL (ref 65–100)
GLUCOSE, GLC: 109 MG/DL
GLUCOSE, GLC: 110 MG/DL
GLUCOSE, GLC: 110 MG/DL
GLUCOSE, GLC: 111 MG/DL
GLUCOSE, GLC: 174 MG/DL
GLUCOSE, GLC: 177 MG/DL
GLUCOSE, GLC: 203 MG/DL
GLUCOSE, GLC: 205 MG/DL
GLUCOSE, GLC: 236 MG/DL
GLUCOSE, GLC: 294 MG/DL
GLUCOSE, GLC: 312 MG/DL
GLUCOSE, GLC: 57 MG/DL
GLUCOSE, GLC: 81 MG/DL
GLUCOSE, GLC: 84 MG/DL
GLUCOSE, GLC: NORMAL MG/DL
HBA1C MFR BLD: 12 % (ref 4.2–6.3)
HEMOCCULT STL QL: POSITIVE
HIGH TARGET, HITG: 250 MG/DL
HIGH TARGET, HITG: NORMAL MG/DL
INSULIN ADMINSTERED, INSADM: 0 UNITS/HOUR
INSULIN ADMINSTERED, INSADM: 0.1 UNITS/HOUR
INSULIN ADMINSTERED, INSADM: 1.5 UNITS/HOUR
INSULIN ADMINSTERED, INSADM: 2.3 UNITS/HOUR
INSULIN ADMINSTERED, INSADM: 4.7 UNITS/HOUR
INSULIN ADMINSTERED, INSADM: 5 UNITS/HOUR
INSULIN ADMINSTERED, INSADM: NORMAL UNITS/HOUR
INSULIN ORDER, INSORD: 0 UNITS/HOUR
INSULIN ORDER, INSORD: 0.1 UNITS/HOUR
INSULIN ORDER, INSORD: 1.5 UNITS/HOUR
INSULIN ORDER, INSORD: 2.3 UNITS/HOUR
INSULIN ORDER, INSORD: 4.7 UNITS/HOUR
INSULIN ORDER, INSORD: 5 UNITS/HOUR
INSULIN ORDER, INSORD: NORMAL UNITS/HOUR
LOW TARGET, LOT: 150 MG/DL
LOW TARGET, LOT: NORMAL MG/DL
MAGNESIUM SERPL-MCNC: 2 MG/DL (ref 1.6–2.4)
MINUTES UNTIL NEXT BG, NBG: 15 MIN
MINUTES UNTIL NEXT BG, NBG: 60 MIN
MINUTES UNTIL NEXT BG, NBG: NORMAL MIN
MULTIPLIER, MUL: 0
MULTIPLIER, MUL: 0.01
MULTIPLIER, MUL: 0.02
MULTIPLIER, MUL: 0.03
MULTIPLIER, MUL: 0.04
MULTIPLIER, MUL: NORMAL
ORDER INITIALS, ORDINIT: NORMAL
POTASSIUM SERPL-SCNC: 3.7 MMOL/L (ref 3.5–5.1)
POTASSIUM SERPL-SCNC: 3.8 MMOL/L (ref 3.5–5.1)
POTASSIUM SERPL-SCNC: 5.3 MMOL/L (ref 3.5–5.1)
PROT UR-MCNC: 386 MG/DL (ref 0–11.9)
PROT/CREAT UR-RTO: 4.2
SERVICE CMNT-IMP: ABNORMAL
SERVICE CMNT-IMP: NORMAL
SERVICE CMNT-IMP: NORMAL
SODIUM SERPL-SCNC: 134 MMOL/L (ref 136–145)
SODIUM SERPL-SCNC: 134 MMOL/L (ref 136–145)
SODIUM SERPL-SCNC: 136 MMOL/L (ref 136–145)
WBC #/AREA STL HPF: NORMAL /HPF (ref 0–4)

## 2018-02-14 PROCEDURE — G8987 SELF CARE CURRENT STATUS: HCPCS | Performed by: OCCUPATIONAL THERAPIST

## 2018-02-14 PROCEDURE — 97165 OT EVAL LOW COMPLEX 30 MIN: CPT | Performed by: OCCUPATIONAL THERAPIST

## 2018-02-14 PROCEDURE — G8988 SELF CARE GOAL STATUS: HCPCS | Performed by: OCCUPATIONAL THERAPIST

## 2018-02-14 PROCEDURE — 97161 PT EVAL LOW COMPLEX 20 MIN: CPT

## 2018-02-14 PROCEDURE — 74011250637 HC RX REV CODE- 250/637: Performed by: INTERNAL MEDICINE

## 2018-02-14 PROCEDURE — 90686 IIV4 VACC NO PRSV 0.5 ML IM: CPT | Performed by: INTERNAL MEDICINE

## 2018-02-14 PROCEDURE — 74011000250 HC RX REV CODE- 250: Performed by: INTERNAL MEDICINE

## 2018-02-14 PROCEDURE — 82962 GLUCOSE BLOOD TEST: CPT

## 2018-02-14 PROCEDURE — 36415 COLL VENOUS BLD VENIPUNCTURE: CPT | Performed by: INTERNAL MEDICINE

## 2018-02-14 PROCEDURE — 74011636637 HC RX REV CODE- 636/637: Performed by: INTERNAL MEDICINE

## 2018-02-14 PROCEDURE — 74011250636 HC RX REV CODE- 250/636: Performed by: INTERNAL MEDICINE

## 2018-02-14 PROCEDURE — 90471 IMMUNIZATION ADMIN: CPT

## 2018-02-14 PROCEDURE — 87493 C DIFF AMPLIFIED PROBE: CPT | Performed by: INTERNAL MEDICINE

## 2018-02-14 PROCEDURE — 84156 ASSAY OF PROTEIN URINE: CPT | Performed by: INTERNAL MEDICINE

## 2018-02-14 PROCEDURE — 82272 OCCULT BLD FECES 1-3 TESTS: CPT | Performed by: INTERNAL MEDICINE

## 2018-02-14 PROCEDURE — 83735 ASSAY OF MAGNESIUM: CPT | Performed by: INTERNAL MEDICINE

## 2018-02-14 PROCEDURE — 87177 OVA AND PARASITES SMEARS: CPT | Performed by: INTERNAL MEDICINE

## 2018-02-14 PROCEDURE — 87045 FECES CULTURE AEROBIC BACT: CPT | Performed by: INTERNAL MEDICINE

## 2018-02-14 PROCEDURE — 74011000258 HC RX REV CODE- 258: Performed by: INTERNAL MEDICINE

## 2018-02-14 PROCEDURE — 65660000000 HC RM CCU STEPDOWN

## 2018-02-14 PROCEDURE — 80048 BASIC METABOLIC PNL TOTAL CA: CPT | Performed by: INTERNAL MEDICINE

## 2018-02-14 PROCEDURE — 89055 LEUKOCYTE ASSESSMENT FECAL: CPT | Performed by: INTERNAL MEDICINE

## 2018-02-14 PROCEDURE — G8989 SELF CARE D/C STATUS: HCPCS | Performed by: OCCUPATIONAL THERAPIST

## 2018-02-14 RX ORDER — INSULIN LISPRO 100 [IU]/ML
8 INJECTION, SOLUTION INTRAVENOUS; SUBCUTANEOUS ONCE
Status: COMPLETED | OUTPATIENT
Start: 2018-02-14 | End: 2018-02-14

## 2018-02-14 RX ORDER — METRONIDAZOLE 500 MG/100ML
500 INJECTION, SOLUTION INTRAVENOUS EVERY 12 HOURS
Status: DISCONTINUED | OUTPATIENT
Start: 2018-02-14 | End: 2018-02-15 | Stop reason: HOSPADM

## 2018-02-14 RX ORDER — DEXTROSE 50 % IN WATER (D50W) INTRAVENOUS SYRINGE
12.5-25 AS NEEDED
Status: DISCONTINUED | OUTPATIENT
Start: 2018-02-14 | End: 2018-02-15 | Stop reason: SDUPTHER

## 2018-02-14 RX ORDER — DEXTROSE MONOHYDRATE AND SODIUM CHLORIDE 5; .45 G/100ML; G/100ML
100 INJECTION, SOLUTION INTRAVENOUS CONTINUOUS
Status: DISCONTINUED | OUTPATIENT
Start: 2018-02-14 | End: 2018-02-14

## 2018-02-14 RX ORDER — INSULIN LISPRO 100 [IU]/ML
INJECTION, SOLUTION INTRAVENOUS; SUBCUTANEOUS
Status: DISCONTINUED | OUTPATIENT
Start: 2018-02-14 | End: 2018-02-15 | Stop reason: HOSPADM

## 2018-02-14 RX ORDER — LANOLIN ALCOHOL/MO/W.PET/CERES
1 CREAM (GRAM) TOPICAL
Status: DISCONTINUED | OUTPATIENT
Start: 2018-02-15 | End: 2018-02-15 | Stop reason: HOSPADM

## 2018-02-14 RX ORDER — MAGNESIUM SULFATE 100 %
4 CRYSTALS MISCELLANEOUS AS NEEDED
Status: DISCONTINUED | OUTPATIENT
Start: 2018-02-14 | End: 2018-02-15 | Stop reason: SDUPTHER

## 2018-02-14 RX ADMIN — HEPARIN SODIUM 5000 UNITS: 5000 INJECTION, SOLUTION INTRAVENOUS; SUBCUTANEOUS at 18:33

## 2018-02-14 RX ADMIN — INSULIN HUMAN 15 UNITS: 100 INJECTION, SUSPENSION SUBCUTANEOUS at 18:32

## 2018-02-14 RX ADMIN — ATORVASTATIN CALCIUM 20 MG: 20 TABLET, FILM COATED ORAL at 09:51

## 2018-02-14 RX ADMIN — ERYTHROPOIETIN 10000 UNITS: 10000 INJECTION, SOLUTION INTRAVENOUS; SUBCUTANEOUS at 22:24

## 2018-02-14 RX ADMIN — INFLUENZA VIRUS VACCINE 0.5 ML: 15; 15; 15; 15 SUSPENSION INTRAMUSCULAR at 09:50

## 2018-02-14 RX ADMIN — Medication 10 ML: at 13:48

## 2018-02-14 RX ADMIN — ASPIRIN 162 MG: 81 TABLET, COATED ORAL at 09:51

## 2018-02-14 RX ADMIN — SODIUM CHLORIDE 0.1 UNITS/HR: 900 INJECTION, SOLUTION INTRAVENOUS at 09:40

## 2018-02-14 RX ADMIN — INSULIN LISPRO 2 UNITS: 100 INJECTION, SOLUTION INTRAVENOUS; SUBCUTANEOUS at 22:29

## 2018-02-14 RX ADMIN — ACETAMINOPHEN 650 MG: 325 TABLET ORAL at 07:15

## 2018-02-14 RX ADMIN — Medication 10 ML: at 22:29

## 2018-02-14 RX ADMIN — Medication 10 ML: at 05:05

## 2018-02-14 RX ADMIN — NIFEDIPINE 30 MG: 30 TABLET, FILM COATED, EXTENDED RELEASE ORAL at 09:51

## 2018-02-14 RX ADMIN — HEPARIN SODIUM 5000 UNITS: 5000 INJECTION, SOLUTION INTRAVENOUS; SUBCUTANEOUS at 09:51

## 2018-02-14 RX ADMIN — DEXTROSE MONOHYDRATE 8.5 G: 25 INJECTION, SOLUTION INTRAVENOUS at 03:33

## 2018-02-14 RX ADMIN — METRONIDAZOLE 500 MG: 500 INJECTION, SOLUTION INTRAVENOUS at 18:32

## 2018-02-14 RX ADMIN — DEXTROSE MONOHYDRATE AND SODIUM CHLORIDE 100 ML/HR: 5; .45 INJECTION, SOLUTION INTRAVENOUS at 13:46

## 2018-02-14 RX ADMIN — INSULIN HUMAN 15 UNITS: 100 INJECTION, SUSPENSION SUBCUTANEOUS at 13:33

## 2018-02-14 RX ADMIN — HEPARIN SODIUM 5000 UNITS: 5000 INJECTION, SOLUTION INTRAVENOUS; SUBCUTANEOUS at 00:05

## 2018-02-14 RX ADMIN — METRONIDAZOLE 500 MG: 500 INJECTION, SOLUTION INTRAVENOUS at 03:36

## 2018-02-14 RX ADMIN — INSULIN LISPRO 8 UNITS: 100 INJECTION, SOLUTION INTRAVENOUS; SUBCUTANEOUS at 18:32

## 2018-02-14 RX ADMIN — GUAIFENESIN AND DEXTROMETHORPHAN HYDROBROMIDE 1 TABLET: 600; 30 TABLET, EXTENDED RELEASE ORAL at 00:08
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
## 2018-02-14 NOTE — PROGRESS NOTES
Primary Nurse Russel Hodges RN and Rocío Garrett RN performed a dual skin assessment on this patient No impairment noted  Ruddy score is 22

## 2018-02-14 NOTE — ED NOTES
Bedside shift change report given to Marva Nobles  (oncoming nurse) by Liz Lutz  (offgoing nurse). Report included the following information SBAR and ED Summary.

## 2018-02-14 NOTE — CONSULTS
Pt seen. Consult dictated [id number]    A:  Mild COLTEN on CKD 4/5 vs advance CKD 4/5- due to diabetic/HTN CKD  DM-2, uncontrolled, with DKA and DN (has proteinuria)  HTN  Anemia- likely anemia of CKD; iron stores OK. B12 high (not low)  Acidosis- d/t DKA as well as renal failure; improved. AG normal. DKA seems to have resolved  Solitary R kidney; s/p previous L Nephrectomy for RCC; US shows appropriate compensatory R kidney hypertrophy (14 cm)  Proteinuria- due to DN    P:  Ct Insulin- needs optimal DM control; would benefit from Endo consult- if not inpt, then as outpt  Avoid aldactone with advance CKD 4/5  Preferably even avoid lisinopril on d/c.  We may resume as outpt based on his Cr/K/BP   Discussed advanced CKD with pt, potential need for dialysis in future and importance of preparing him for future dialysis  Will review HD vs PD in more detail tomm; briefly introduced him today  Would not be a good PD candidate given his poorly controlled brittle DM; he also does not seem interested  Protect L arm from blood draws and BP checks- for future AV access  Will need close outpt renal f/u; I will arrange f/u in [address] [place] [address] office after discharge  Daily labs  Urine prot/cr ratio in am  Check other CKD labs- Phos, PTH, 25-D levels in am  Start EPO- will need to ct as outpt thru our office  Can reduce freq of labs with resolving DKA    D/W pt    Ayleen Hopper MD  [address]

## 2018-02-14 NOTE — ED NOTES
TRANSFER - OUT REPORT:    Verbal report given to Edyta Avila (name) on Saúl Briscoe  being transferred to PCU (unit) for routine progression of care       Report consisted of patients Situation, Background, Assessment and   Recommendations(SBAR). Information from the following report(s) SBAR and ED Summary was reviewed with the receiving nurse. Lines:   Peripheral IV 02/13/18 Right Forearm (Active)   Site Assessment Clean, dry, & intact 2/13/2018  5:48 PM   Phlebitis Assessment 0 2/13/2018  5:48 PM   Infiltration Assessment 0 2/13/2018  5:48 PM   Dressing Status Clean, dry, & intact 2/13/2018  5:48 PM   Dressing Type Transparent 2/13/2018  5:48 PM   Hub Color/Line Status Flushed 2/13/2018  5:48 PM   Action Taken Blood drawn 2/13/2018  5:48 PM       Peripheral IV 02/13/18 Left Hand (Active)   Site Assessment Clean, dry, & intact 2/13/2018  7:10 PM   Phlebitis Assessment 0 2/13/2018  7:10 PM   Infiltration Assessment 0 2/13/2018  7:10 PM   Dressing Status Clean, dry, & intact 2/13/2018  7:10 PM        Opportunity for questions and clarification was provided.       Patient transported with:   Monitor  Registered Nurse

## 2018-02-14 NOTE — PROGRESS NOTES
This CM sent an email to Mimi Aviles ([facility] [employer] [facility]) for screening    11;41am  Per Samantha Toledo, the patient had already been screened and a care card will be mailed to his home to send with his verifications upon d/c    Darío Blanco  Ext [phone]

## 2018-02-14 NOTE — PROGRESS NOTES
Bedside and Verbal shift change report given to Zara Zhang RN (oncoming nurse) by Madhu Vogt (offgoing nurse). Report included the following information SBAR, Kardex, Intake/Output, MAR, Recent Results, Med Rec Status and Cardiac Rhythm NSR.

## 2018-02-14 NOTE — PROGRESS NOTES
Problem: Falls - Risk of  Goal: *Absence of Falls  Document Chuck Fall Risk and appropriate interventions in the flowsheet.    Outcome: Progressing Towards Goal  Fall Risk Interventions:            Medication Interventions: Evaluate medications/consider consulting pharmacy, Patient to call before getting OOB, Teach patient to arise slowly    Elimination Interventions: Call light in reach, Patient to call for help with toileting needs, Toileting schedule/hourly rounds, Urinal in reach
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
## 2018-02-14 NOTE — ED NOTES
Pt turned s/s, perineal care done, placed on clean linens and positioned up in bed for comfort. Socks provided. Pt resting with lights off. No distress or complaints at this time.

## 2018-02-14 NOTE — PROGRESS NOTES
Pharmacy Automatic Renal Dosing Protocol - Antimicrobials    Indication for Antimicrobials: Aspiration pneumonia, dental infection (PTA)    Current Regimen of Each Antimicrobial:  Levofloxacin 750 mg x 1 then 500 mg Q48H (Start Date ; Day # 2)  Metronidazole 500 mg Q12H (Start Date , Day 2)    Previous Antimicrobial Therapy:  Clindamycin 300 mg QID (for 7 days PTA)    Vancomycin Goal Level:     Measured / Extrapolated Vancomycin Level:     Significant Cultures:    Blood: NGTD- Pending   Blood: Prelim   C. Dif DNA: Prelim   Stool: Prelim    Labs:  Recent Labs      18   0202  18   2121  18   1719  18   1022   CREA  5.12*  5.01*  5.48*  5.94*   BUN  72*  66*  70*  75*   WBC   --    --   8.4  8.5     Temp (24hrs), Av.7 °F (36.5 °C), Min:97.5 °F (36.4 °C), Max:97.9 °F (36.6 °C)    Paralysis, amputations, malnutrition: n/a  Creatinine Clearance (mL/min) or Dialysis: ~19 ml/min    Impression/Plan:   - Patient with diarrhea previously on clindamycin, C. Dif DNA and stool cultures pending  - Levaquin dosed appropriately for renal function and indication  - Metronidazole dose adjusted to 500 mg IV Q12 hours per protocol  - BMP Q4H per MD     Pharmacy will follow daily and adjust medications as appropriate for renal function and/or serum levels. Thank you,  SYL Kelley      Recommended duration of therapy  http://[facility]/[place]/[facility]/Pharmacy/Clinical%20Companion/Duration%20of%20ABX%20therapy. docx    Renal Dosing  http://[facility]/[place]/[facility]/Pharmacy/Clinical%20Companion/Renal%[id number]. pdf

## 2018-02-14 NOTE — PROGRESS NOTES
TRANSFER - IN REPORT:    Verbal report received from [facility]) on Griffin Rodriguez  being received from ED\(unit) for routine progression of care      Report consisted of patients Situation, Background, Assessment and   Recommendations(SBAR). Information from the following report(s) SBAR, ED Summary, Intake/Output, Recent Results and Cardiac Rhythm NSR was reviewed with the receiving nurse. Opportunity for questions and clarification was provided. ED nurse to check with lab to verify if Lactic was drawn and obtain 2100 BS and adjust Insulin gtt as needed. Assessment completed upon patients arrival to unit and care assumed.

## 2018-02-14 NOTE — PROGRESS NOTES
Patient is a 66yr old [place] American male admitted with DKA. Patient is independent with ADL/IADL and drives. Lives with spouse in a one story home with one step to enter the home. 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 PCP-Dr Amaro  Pt does not use any DME  Family will transport pt to follow-up appointments  An email was sent to [facility] for screening    CM will continue to follow for discharge needs    Care Management Interventions  PCP Verified by CM: Yes (Dr Robert Marie)  Mode of Transport at Discharge: Other (see comment) (wife will transport pt home at [address] [facility])  Transition of Care Consult (CM Consult): Discharge Planning  Discharge Durable Medical Equipment: No (pt does not use any DME)  Physical Therapy Consult: No  Occupational Therapy Consult: No  Speech Therapy Consult: No  Current Support Network: Lives with Spouse, Own Home (lives in a one story home with one step to enter the home.   other family members live in the home (grand daughter,mother in-law, son,and grandson)  Confirm Follow Up Transport: Family (Patient drives, but family will transport to appointments at this time)  Plan discussed with Pt/Family/Caregiver: Yes (Wife was at the bedside and helped verify info)  Discharge Location  Discharge Placement: Home      Darío Blanco  Ext [phone]

## 2018-02-14 NOTE — PROGRESS NOTES
Hospitalist Progress Note    NAME: Salvador Nathan   :  1960   MRN:  [id number]   LOS:   1      Assessment / Plan:  Diabetic Ketoacidosis   -likely precipitated by acute PNA and vomiting/diarrhea, dental infection  -anion gap closed, transition to SC insulin, on 70/30 bid, will start NPH for now  -diabetic teaching consulted    Community acquired PNA  -continue levaquin and flagyl     Anemia  -likely in setting of CKD    Diarrhea  -no c diff  -likely related to abx    Dental infection  -stop PTA clindamycin  -on levaquin and flagyl as above     COLTEN with CKD 4   Hx of renal CA s/p nephrectomy  -cr elevated due to dehydration  -cont' IVF, monitor volume status     HTN  -resume nifedipine  -hold nephrotoxic agents due to worsening of renal (lasix, spironolactone, lisinopril)  -hydralazine prn     Code Status: Full  Surrogate Decision Maker: pt's wife  DVT Prophylaxis: heparin     Subjective:     Chief Complaint: DKA  Patient doing well, wanting to eat more      Objective:     VITALS:   Last 24hrs VS reviewed since prior progress note.  Most recent are:  Patient Vitals for the past 24 hrs:   Temp Pulse Resp BP SpO2   18 1526 97.8 °F (36.6 °C) 73 18 150/88 100 %   18 1106 98.1 °F (36.7 °C) 82 24 149/74 99 %   18 0953 - 82 - 149/74 100 %   18 0950 - 82 - 149/74 -   18 0738 98.2 °F (36.8 °C) 78 20 142/80 97 %   18 0400 97.7 °F (36.5 °C) 75 16 131/66 98 %   18 0200 - 81 - 141/84 -   18 2300 97.5 °F (36.4 °C) 79 15 128/77 100 %   18 2218 97.9 °F (36.6 °C) 75 16 130/67 99 %   18 2100 - - - 145/88 96 %   18 - 74 9 139/87 96 %   18 -  17 147/87 95 %   18 1930 - 81 18 144/79 93 %   18 1900 - 77 16 132/73 94 %   18 1830 - 76 17 129/77 96 %       Intake/Output Summary (Last 24 hours) at 18 1829  Last data filed at 18 0940   Gross per 24 hour   Intake           273.28 ml   Output              525 ml   Net -251.72 ml        PHYSICAL EXAM:  General: Alert, cooperative, no acute distress    EENT:  EOMI. Anicteric sclerae. Mucous membranes moist  Resp:  CTA bilaterally, no wheezing or rales. No accessory muscle use  CV:  Regular rhythm, no edema  GI:  Soft, non distended, non tender. +Bowel sounds  Neurologic:  Alert and oriented X 3, normal speech  Psych:   Good insight, not anxious nor agitated  Skin:  No rashes, no jaundice  ________________________________________________________________________  Care Plan discussed with:    Comments   Patient x    Family      RN x    Care Manager     Consultant                        Multidiciplinary team rounds were held today with , nursing, pharmacist and clinical coordinator. Patient's plan of care was discussed; medications were reviewed and discharge planning was addressed. ________________________________________________________________________  Total NON critical care TIME:  30   Minutes    >50% of visit spent in counseling and coordination of care       ________________________________________________________________________    Procedures: see electronic medical records for all procedures/Xrays and details which were not copied into this note but were reviewed prior to creation of Plan. LABS:  I reviewed today's most current labs and imaging studies.   Pertinent labs include:  Recent Labs      02/13/18 1719 02/13/18   1022   WBC  8.4  8.5   HGB  7.7*  7.5*   HCT  23.2*  22.6*   PLT  129*  139*     Recent Labs      02/14/18   1154  02/14/18   0202  02/13/18   2121 02/13/18   1719  02/13/18   1022   NA  134*  136  134*  128*  122*   K  5.3*  3.8  3.7  5.0  5.0   CL  106  109*  109*  104  93*   CO2  17*  19*  17*  16*  12*   GLU  259*  77  324*  507*  911*   BUN  64*  72*  66*  70*  75*   CREA  5.19*  5.12*  5.01*  5.48*  5.94*   CA  7.5*  7.6*  6.7*  7.2*  7.9*   MG   --   2.0  1.9  2.1   --    PHOS   --    --    --   6.5*   --    ALB   --    -- --   1.6*  1.7*   TBILI   --    --    --   0.4  0.3   SGOT   --    --    --   22  6*   ALT   --    --    --   12  16       Signed: Zoya Sandy MD

## 2018-02-14 NOTE — DIABETES MGMT
DTC Progress Note    Recommendations/ Comments: If appropriate, please consider:  1) using Lispro correctional insulin high sensitivity scale due to renal status. 2) when po intakes transition to solids, consider starting 3 units Lispro ac tid for prandial coverage. Current hospital DM medication: NPH 15 units bid - transitioning off insulin drip. Chart reviewed on Flori Orourke and attempted to complete consult on pt - sleeping and would not arouse to name x2 occasions. Will attempt to see pt tomorrow for education. Patient is a 66 y.o. male with known Type 2 DM complicated by CKD 4 and jansen kidney on Novolin 70/30 15 units bid for his DM management. Pt is currently on 21 units of total basal insulin and 9 units of prandial coverage. A1c:   Lab Results   Component Value Date/Time    Hemoglobin A1c 12.0 (H) 02/13/2018 05:19 PM    Hemoglobin A1c 10.9 (H) 01/22/2018 03:55 PM       Recent Glucose Results: Lab Results   Component Value Date/Time    GLU 77 02/14/2018 02:02 AM     (H) 02/13/2018 09:21 PM     (H) 02/13/2018 05:19 PM    GLUCPOC 294 (H) 02/14/2018 01:56 PM    GLUCPOC 236 (H) 02/14/2018 12:52 PM    GLUCPOC 205 (H) 02/14/2018 11:47 AM        Lab Results   Component Value Date/Time    Creatinine 5.12 (H) 02/14/2018 02:02 AM     Estimated Creatinine Clearance: 18.7 mL/min (based on Cr of 5.12). Active Orders   Diet    DIET CLEAR LIQUID        PO intake: Patient Vitals for the past 72 hrs:   % Diet Eaten   02/14/18 0830 100 %       Will continue to follow as needed.     Thank you  Nuha Sheets [address] [facility]  [phone]

## 2018-02-15 VITALS
RESPIRATION RATE: 18 BRPM | HEART RATE: 79 BPM | DIASTOLIC BLOOD PRESSURE: 76 MMHG | TEMPERATURE: 98.3 F | OXYGEN SATURATION: 100 % | SYSTOLIC BLOOD PRESSURE: 142 MMHG

## 2018-02-15 DIAGNOSIS — E11.9 DIABETES MELLITUS WITHOUT COMPLICATION (HCC): Primary | ICD-10-CM

## 2018-02-15 LAB
25(OH)D3 SERPL-MCNC: <9 NG/ML (ref 30–100)
ANION GAP SERPL CALC-SCNC: 10 MMOL/L (ref 5–15)
BUN SERPL-MCNC: 66 MG/DL (ref 6–20)
BUN/CREAT SERPL: 13 (ref 12–20)
CALCIUM SERPL-MCNC: 7.3 MG/DL (ref 8.5–10.1)
CALCIUM SERPL-MCNC: 7.3 MG/DL (ref 8.5–10.1)
CHLORIDE SERPL-SCNC: 109 MMOL/L (ref 97–108)
CO2 SERPL-SCNC: 17 MMOL/L (ref 21–32)
CREAT SERPL-MCNC: 4.95 MG/DL (ref 0.7–1.3)
ERYTHROCYTE [DISTWIDTH] IN BLOOD BY AUTOMATED COUNT: 17.2 % (ref 11.5–14.5)
GLUCOSE BLD STRIP.AUTO-MCNC: 189 MG/DL (ref 65–100)
GLUCOSE BLD STRIP.AUTO-MCNC: 214 MG/DL (ref 65–100)
GLUCOSE BLD STRIP.AUTO-MCNC: 70 MG/DL (ref 65–100)
GLUCOSE BLD STRIP.AUTO-MCNC: 77 MG/DL (ref 65–100)
GLUCOSE BLD STRIP.AUTO-MCNC: 79 MG/DL (ref 65–100)
GLUCOSE BLD STRIP.AUTO-MCNC: 82 MG/DL (ref 65–100)
GLUCOSE BLD STRIP.AUTO-MCNC: 82 MG/DL (ref 65–100)
GLUCOSE SERPL-MCNC: 63 MG/DL (ref 65–100)
HCT VFR BLD AUTO: 23.6 % (ref 36.6–50.3)
HGB BLD-MCNC: 8 G/DL (ref 12.1–17)
MCH RBC QN AUTO: 21.7 PG (ref 26–34)
MCHC RBC AUTO-ENTMCNC: 33.9 G/DL (ref 30–36.5)
MCV RBC AUTO: 64.1 FL (ref 80–99)
NRBC # BLD: 0.02 K/UL (ref 0–0.01)
NRBC BLD-RTO: 0.3 PER 100 WBC
PHOSPHATE SERPL-MCNC: 4.5 MG/DL (ref 2.6–4.7)
PLATELET # BLD AUTO: 112 K/UL (ref 150–400)
PMV BLD AUTO: ABNORMAL FL (ref 8.9–12.9)
POTASSIUM SERPL-SCNC: 3.9 MMOL/L (ref 3.5–5.1)
PTH-INTACT SERPL-MCNC: 222.3 PG/ML (ref 18.4–88)
RBC # BLD AUTO: 3.68 M/UL (ref 4.1–5.7)
SERVICE CMNT-IMP: ABNORMAL
SERVICE CMNT-IMP: ABNORMAL
SERVICE CMNT-IMP: NORMAL
SODIUM SERPL-SCNC: 136 MMOL/L (ref 136–145)
WBC # BLD AUTO: 7.4 K/UL (ref 4.1–11.1)

## 2018-02-15 PROCEDURE — 74011636637 HC RX REV CODE- 636/637: Performed by: INTERNAL MEDICINE

## 2018-02-15 PROCEDURE — 74011000250 HC RX REV CODE- 250: Performed by: INTERNAL MEDICINE

## 2018-02-15 PROCEDURE — 84100 ASSAY OF PHOSPHORUS: CPT | Performed by: INTERNAL MEDICINE

## 2018-02-15 PROCEDURE — 83970 ASSAY OF PARATHORMONE: CPT | Performed by: INTERNAL MEDICINE

## 2018-02-15 PROCEDURE — 82306 VITAMIN D 25 HYDROXY: CPT | Performed by: INTERNAL MEDICINE

## 2018-02-15 PROCEDURE — 74011250636 HC RX REV CODE- 250/636: Performed by: INTERNAL MEDICINE

## 2018-02-15 PROCEDURE — 85027 COMPLETE CBC AUTOMATED: CPT | Performed by: INTERNAL MEDICINE

## 2018-02-15 PROCEDURE — 80048 BASIC METABOLIC PNL TOTAL CA: CPT | Performed by: INTERNAL MEDICINE

## 2018-02-15 PROCEDURE — 74011250637 HC RX REV CODE- 250/637: Performed by: INTERNAL MEDICINE

## 2018-02-15 PROCEDURE — 82962 GLUCOSE BLOOD TEST: CPT

## 2018-02-15 PROCEDURE — 36415 COLL VENOUS BLD VENIPUNCTURE: CPT | Performed by: INTERNAL MEDICINE

## 2018-02-15 RX ORDER — CARVEDILOL 6.25 MG/1
6.25 TABLET ORAL 2 TIMES DAILY WITH MEALS
Status: DISCONTINUED | OUTPATIENT
Start: 2018-02-15 | End: 2018-02-15 | Stop reason: HOSPADM

## 2018-02-15 RX ORDER — FUROSEMIDE 20 MG/1
20 TABLET ORAL DAILY
Qty: 30 TAB | Refills: 0 | Status: SHIPPED | OUTPATIENT
Start: 2018-02-15 | End: 2018-02-23 | Stop reason: SDUPTHER

## 2018-02-15 RX ORDER — CARVEDILOL 6.25 MG/1
6.25 TABLET ORAL 2 TIMES DAILY WITH MEALS
Qty: 60 TAB | Refills: 0 | Status: SHIPPED | OUTPATIENT
Start: 2018-02-15 | End: 2018-02-19 | Stop reason: SDUPTHER

## 2018-02-15 RX ORDER — METRONIDAZOLE 500 MG/1
500 TABLET ORAL 2 TIMES DAILY
Qty: 10 TAB | Refills: 0 | Status: SHIPPED | OUTPATIENT
Start: 2018-02-15 | End: 2018-02-20

## 2018-02-15 RX ORDER — CALCITRIOL 0.25 UG/1
0.25 CAPSULE ORAL DAILY
Qty: 30 CAP | Refills: 0 | Status: SHIPPED | OUTPATIENT
Start: 2018-02-15 | End: 2018-03-16

## 2018-02-15 RX ORDER — LEVOFLOXACIN 500 MG/1
500 TABLET, FILM COATED ORAL EVERY OTHER DAY
Qty: 3 TAB | Refills: 0 | Status: SHIPPED | OUTPATIENT
Start: 2018-02-15 | End: 2018-02-21

## 2018-02-15 RX ORDER — CALCITRIOL 0.25 UG/1
0.25 CAPSULE ORAL DAILY
Status: DISCONTINUED | OUTPATIENT
Start: 2018-02-15 | End: 2018-02-15 | Stop reason: HOSPADM

## 2018-02-15 RX ADMIN — ACETAMINOPHEN 650 MG: 325 TABLET ORAL at 08:48

## 2018-02-15 RX ADMIN — CARVEDILOL 6.25 MG: 6.25 TABLET, FILM COATED ORAL at 13:46

## 2018-02-15 RX ADMIN — Medication 10 ML: at 03:24

## 2018-02-15 RX ADMIN — ASPIRIN 162 MG: 81 TABLET, COATED ORAL at 08:48

## 2018-02-15 RX ADMIN — NIFEDIPINE 30 MG: 30 TABLET, FILM COATED, EXTENDED RELEASE ORAL at 08:48

## 2018-02-15 RX ADMIN — FERROUS SULFATE TAB 325 MG (65 MG ELEMENTAL FE) 325 MG: 325 (65 FE) TAB at 08:48

## 2018-02-15 RX ADMIN — INSULIN HUMAN 15 UNITS: 100 INJECTION, SUSPENSION SUBCUTANEOUS at 08:48

## 2018-02-15 RX ADMIN — ATORVASTATIN CALCIUM 20 MG: 20 TABLET, FILM COATED ORAL at 08:48

## 2018-02-15 RX ADMIN — METRONIDAZOLE 500 MG: 500 INJECTION, SOLUTION INTRAVENOUS at 03:24

## 2018-02-15 RX ADMIN — CARVEDILOL 6.25 MG: 6.25 TABLET, FILM COATED ORAL at 16:57

## 2018-02-15 RX ADMIN — INSULIN LISPRO 2 UNITS: 100 INJECTION, SOLUTION INTRAVENOUS; SUBCUTANEOUS at 13:59

## 2018-02-15 RX ADMIN — HEPARIN SODIUM 5000 UNITS: 5000 INJECTION, SOLUTION INTRAVENOUS; SUBCUTANEOUS at 08:48

## 2018-02-15 RX ADMIN — Medication 10 ML: at 13:49

## 2018-02-15 RX ADMIN — CALCITRIOL 0.25 MCG: 0.25 CAPSULE, LIQUID FILLED ORAL at 13:46

## 2018-02-15 RX ADMIN — HEPARIN SODIUM 5000 UNITS: 5000 INJECTION, SOLUTION INTRAVENOUS; SUBCUTANEOUS at 03:23

## 2018-02-15 NOTE — PROGRESS NOTES
Pharmacy Automatic Renal Dosing Protocol - Antimicrobials    Indication for Antimicrobials: Aspiration pneumonia, dental infection (PTA)    Current Regimen of Each Antimicrobial:  Levofloxacin 750 mg x 1 then 500 mg Q48H (Start Date ; Day # 3)  Metronidazole 500 mg Q12H (Start Date , Day 3)    Previous Antimicrobial Therapy:  Clindamycin 300 mg QID (for 7 days PTA)    Significant Cultures:    Blood: NGTD- Prelim   Blood: NGTD- Prelim   C. Dif DNA: Negative (final)   Stool: NGTD- Prelim    CXR : Right lung base airspace consolidation. Labs:  Recent Labs      18   0202  18   2121  18   1719  18   1022   CREA  5.12*  5.01*  5.48*  5.94*   BUN  72*  66*  70*  75*   WBC   --    --   8.4  8.5     Temp (24hrs), Av.7 °F (36.5 °C), Min:97.5 °F (36.4 °C), Max:97.9 °F (36.6 °C)    Paralysis, amputations, malnutrition: n/a  Creatinine Clearance (mL/min) or Dialysis: ~19 ml/min    Impression/Plan:   - Patient with diarrhea previously on clindamycin, C. Dif DNA and stool cultures negative  - Levaquin dosed appropriately for renal function and indication  - Metronidazole dose adjusted to 500 mg IV Q12 hours per protocol  - BMP and CBC ordered for tomorrow AM per protocol     Pharmacy will follow daily and adjust medications as appropriate for renal function and/or serum levels.     Thank you,  Lenora Bray, PHARMD

## 2018-02-15 NOTE — PROGRESS NOTES
Patient is being discharged to home with family today. He has an appointment set up to see his dentist on Monday.   The nephrology office will call him to set up an appointment to see him at the [place] [facility] per clinical specialist and this was placed on avs.

## 2018-02-15 NOTE — DISCHARGE SUMMARY
Hospitalist Discharge Summary     Patient ID:  Gary Oliveros  [id number]  66 y.o.  1960    PCP on record: Jason Aranda MD    Admit date: 2/13/2018  Discharge date: 2/15/2018       DISCHARGE DIAGNOSES  DISCHARGE SUMMARY/HOSPITAL COURSE: for full details see H&P, daily progress notes, labs, consult notes. Diabetic Ketoacidosis   -likely precipitated by acute PNA and vomiting/diarrhea, dental infection  -anion gap closed, transition to SC insulin, on 70/30 bid     Community acquired PNA  -will discharge with total 7 day course of levaquin with flagyl      Anemia  -likely in setting of CKD  -plan for follow up and EPO as outpatient     Diarrhea  -no c diff  -likely related to abx     Dental infection  -stop PTA clindamycin  -on levaquin and flagyl as above  -has appt with his dentist early next week      COLTEN with CKD 4   Hx of renal CA s/p nephrectomy  -plan for outpatient renal follow up, likely need dialysis in the future  -started coreg  -decreased lasix to 20 mg daily  -stop spironolactone, avoid ACE/ARB  -start calcitrol    HTN  -resume nifedipine  -coreg as above, stopping spironolactone      Code Status: Full  Surrogate Decision Maker: pt's wife      CONSULTATIONS:  IP CONSULT TO NEPHROLOGY    Excerpted HPI from H&P of Kaitlyn Johsnon MD:  Leidy is a 66 y.o.  male w pmhx significant for renal CA s/p nephrectomy, HTN, IDDM, who was transferred from [facility] [place] for further evaluation of DKA. Per pt, he presented to [facility] [place] with c/o glucose reading too high to registered on glucometer. Pt states he has a dental infection and was recently started on clindamycin which he took 4 doses. He started having multiple episodes of watery diarrhea shortly after. Pt also c/o 2 episodes of vomiting started this morning. Other associated symptoms including including mild sob and nonproductive cough with subjective fever and poor appeitite started about 4-5 days ago.     In the ER, pt was not hypoxic, his BG was 911. He as then giving 2 L IVF boluses and was started on insulin gtt. On arrival to ED [facility], pt appeared comfortable. Pertinent labs from [facility]: , K 5.0, CO2 12, AG 17, , Cr 5.9, A1C 10.9, WBC 8.5,  hgb 7.5, , UA neg  XR acute abd showed R lung base airspace consolidation. 8 mm right upper quadrant calcification. Gallstone or renal stone not excluded. We were asked to admit for work up and evaluation         _______________________________________________________________________  Patient seen and examined by me on discharge day. Pertinent Findings:  Gen:    Not in distress  Chest: Clear lungs  CVS:   Regular rhythm. No edema  Abd:  Soft, not distended, not tender  Neuro:  Alert, calm  _______________________________________________________________________  DISCHARGE MEDICATIONS:   Current Discharge Medication List      START taking these medications    Details   calcitRIOL (ROCALTROL) 0.25 mcg capsule Take 1 Cap by mouth daily. Qty: 30 Cap, Refills: 0      carvedilol (COREG) 6.25 mg tablet Take 1 Tab by mouth two (2) times daily (with meals). Qty: 60 Tab, Refills: 0      levoFLOXacin (LEVAQUIN) 500 mg tablet Take 1 Tab by mouth every other day for 6 days. Qty: 3 Tab, Refills: 0      metroNIDAZOLE (FLAGYL) 500 mg tablet Take 1 Tab by mouth two (2) times a day for 5 days. Qty: 10 Tab, Refills: 0         CONTINUE these medications which have CHANGED    Details   furosemide (LASIX) 20 mg tablet Take 1 Tab by mouth daily. Qty: 30 Tab, Refills: 0         CONTINUE these medications which have NOT CHANGED    Details   aspirin delayed-release 81 mg tablet Take 162 mg by mouth daily. atorvastatin (LIPITOR) 20 mg tablet Take 20 mg by mouth daily. clotrimazole (LOTRIMIN) 1 % topical cream Apply  to affected area two (2) times a day.  Patient applies 'a thin layer' to scaly areas on feet and between toes      multivit-min-FA-lycopen-lutein (CENTRUM SILVER MEN) 300-600-300 mcg tab Take 1 Tab by mouth daily. GUAIFENESIN/DEXTROMETHORPHAN (ROBITUSSIN-DM PO) Take  by mouth daily as needed for Cough. Patient takes 'a little cupful.'      insulin NPH/insulin regular (NOVOLIN 70/30, HUMULIN 70/30) 100 unit/mL (70-30) injection 15 units with Breakfast, lunch, and dinner  Qty: 10 mL, Refills: 11    Associated Diagnoses: Type 2 diabetes mellitus with diabetic nephropathy, with long-term current use of insulin (HCC)      lisinopril (PRINIVIL, ZESTRIL) 20 mg tablet TAKE ONE TABLET BY MOUTH EVERY DAY for heart and pressure  Qty: 90 Tab, Refills: 3    Associated Diagnoses: Essential hypertension; Type 2 diabetes mellitus with diabetic nephropathy, with long-term current use of insulin (HCC)      NIFEdipine ER (ADALAT CC) 30 mg ER tablet Take 1 Tab by mouth daily. Indications: pressure, add to regimen  Qty: 30 Tab, Refills: 11    Associated Diagnoses: Essential hypertension         STOP taking these medications       clindamycin (CLEOCIN) 300 mg capsule Comments:   Reason for Stopping:         spironolactone (ALDACTONE) 25 mg tablet Comments:   Reason for Stopping:         multivitamins-minerals-lutein (CENTRUM SILVER) tab Comments:   Reason for Stopping:               My Recommended Diet, Activity, Wound Care, and follow-up labs are listed in the patient's Discharge Insturctions which I have personally completed and reviewed.     _______________________________________________________________________  DISPOSITION:    Home with Family: x   Home with HH/PT/OT/RN:    SNF/LTC:    ADONAY:    OTHER:        Condition at Discharge:  Stable  _______________________________________________________________________  Follow up with:   PCP : Stephane Parkinson MD  Follow-up Information     Follow up With Details Comments Contact Info    [facility] Go on 2/23/2018 appointment scheduled at 1:00pm- please bring discharge instructions from your hospital stay [address]    Dr. Landy Galvan In 2 weeks Practice will contact you with appointment date and time  [phone]    Make the appointment at the [address] [place] [address] for couple weeks after discharge.     Dental  Patient states he has a dental appointment scheduled for Monday, Uma Payan MD On 3/7/2018 For hospital follow up appointment at 8:10AM  [address] [place] [address]  [phone]                Total time in minutes spent coordinating this discharge (includes going over instructions, follow-up, prescriptions, and preparing report for sign off to her PCP) :  35 minutes    Signed:  Fawad Suggs MD

## 2018-02-15 NOTE — PROGRESS NOTES
Name: Deandre Thakur   MRN: [id number]  : 1960      A:  Mild COLTEN on CKD 4/5 vs advance CKD 4/5- due to diabetic/HTN CKD  DM-2, uncontrolled, with DKA and DN (has proteinuria)  HTN  Anemia- likely anemia of CKD; iron stores OK. B12 high (not low)  Acidosis- d/t DKA as well as renal failure; improved. AG normal. DKA seems to have resolved  Solitary R kidney; s/p previous L Nephrectomy for RCC; US shows appropriate compensatory R kidney hypertrophy (14 cm)  Proteinuria- due to DN  SHPT- PTH is high at 222    Cr is slightly better. BP is not optimal. Need to avoid Aldactone/ACE-I due to advance CKD. Will use Coreg. Agrees to try (HR is also high nl which should be helped by Coreg). He needs close outpt renal f/u     P:  Insulin resumed. Ct EPO/oral iron  Start Coreg 6.25 mg BID  start Calcitriol 0.25 mcg 1 cap daily  Needs outpt renal f/u. Will arrange at our [place] office in couple weeks after d/c  No nsaids; ct to protect L arm  Will need to prepare him for future dialysis (i.e. access planning) as outpt  Avoid aldactone/lisinopril at time of d/c  Hopefully d/c soon  Will need prescriptn for Calcitriol and Coreg (new meds)  Can resume lasix 20 mg every day at time of d/c and will increase to 40 mg daily as outpt    D/W pt    Subjective:  Feels better. Resting.      ROS:   No nausea, no vomiting  No chest pain, no shortness of breath    Exam:  Visit Vitals    /89    Pulse 87    Temp 98 °F (36.7 °C)    Resp 18    SpO2 98%       WB/WN in NAD  No icterus, mmm  Clear  RRR  No edema  AOx3    Current Facility-Administered Medications   Medication Dose Route Frequency Last Dose    carvedilol (COREG) tablet 6.25 mg  6.25 mg Oral BID WITH MEALS      insulin NPH (NOVOLIN N, HUMULIN N) injection 15 Units  15 Units SubCUTAneous ACB&D 15 Units at 02/15/18 0848    metroNIDAZOLE (FLAGYL) IVPB premix 500 mg  500 mg IntraVENous Q12H 500 mg at 02/15/18 0324    epoetin tyler (EPOGEN;PROCRIT) injection 10,000 Units  10,000 Units SubCUTAneous Q MON, WED & FRI 10,000 Units at 02/14/18 2224    ferrous sulfate tablet 325 mg  1 Tab Oral DAILY WITH BREAKFAST 325 mg at 02/15/18 0848    glucagon (GLUCAGEN) injection 1 mg  1 mg IntraMUSCular PRN      insulin lispro (HUMALOG) injection   SubCUTAneous AC&HS Stopped at 02/15/18 0730    glucose chewable tablet 16 g  4 Tab Oral PRN      sodium chloride (NS) flush 5-10 mL  5-10 mL IntraVENous Q8H 10 mL at 02/14/18 2229    sodium chloride (NS) flush 5-10 mL  5-10 mL IntraVENous PRN 10 mL at 02/15/18 0324    dextrose (D50W) injection syrg 12.5-25 g  12.5-25 g IntraVENous PRN 8.5 g at 02/14/18 0333    acetaminophen (TYLENOL) tablet 650 mg  650 mg Oral Q4H  mg at 02/15/18 0848    ondansetron (ZOFRAN) injection 4 mg  4 mg IntraVENous Q4H PRN      bisacodyl (DULCOLAX) suppository 10 mg  10 mg Rectal DAILY PRN      heparin (porcine) injection 5,000 Units  5,000 Units SubCUTAneous Q8H 5,000 Units at 02/15/18 0848    levoFLOXacin (LEVAQUIN) 500 mg in D5W IVPB  500 mg IntraVENous Q48H      aspirin delayed-release tablet 162 mg  162 mg Oral DAILY 162 mg at 02/15/18 0848    atorvastatin (LIPITOR) tablet 20 mg  20 mg Oral DAILY 20 mg at 02/15/18 0848    guaiFENesin-dextromethorphan SR (HUMIBID DM) 600-30 mg tablet 1 Tab  1 Tab Oral Q12H PRN 1 Tab at 02/14/18 0008    NIFEdipine ER (PROCARDIA XL) tablet 30 mg  30 mg Oral DAILY 30 mg at 02/15/18 0848    hydrALAZINE (APRESOLINE) 20 mg/mL injection 10 mg  10 mg IntraVENous Q6H PRN         Labs/Data:    Lab Results   Component Value Date/Time    WBC 7.4 02/15/2018 03:31 AM    HGB (POC) 13.2 11/27/2013 09:37 AM    HGB 8.0 (L) 02/15/2018 03:31 AM    HCT (POC) 41.7 11/27/2013 09:37 AM    HCT 23.6 (L) 02/15/2018 03:31 AM    PLATELET 376 (L) 39/21/7699 03:31 AM    MCV 64.1 (L) 02/15/2018 03:31 AM Lab Results   Component Value Date/Time    Sodium 136 02/15/2018 03:31 AM    Potassium 3.9 02/15/2018 03:31 AM    Chloride 109 (H) 02/15/2018 03:31 AM    CO2 17 (L) 02/15/2018 03:31 AM    Anion gap 10 02/15/2018 03:31 AM    Glucose 63 (L) 02/15/2018 03:31 AM    BUN 66 (H) 02/15/2018 03:31 AM    Creatinine 4.95 (H) 02/15/2018 03:31 AM    BUN/Creatinine ratio 13 02/15/2018 03:31 AM    GFR est AA 15 (L) 02/15/2018 03:31 AM    GFR est non-AA 12 (L) 02/15/2018 03:31 AM    Calcium 7.3 (L) 02/15/2018 03:31 AM    Calcium 7.3 (L) 02/15/2018 03:31 AM       Wt Readings from Last 3 Encounters:   02/13/18 90.5 kg (199 lb 8.3 oz)   02/02/18 95.8 kg (211 lb 3.2 oz)   01/22/18 90.7 kg (200 lb)         Intake/Output Summary (Last 24 hours) at 02/15/18 1144  Last data filed at 02/15/18 0417   Gross per 24 hour   Intake                0 ml   Output              525 ml   Net             -525 ml       Patient seen and examined. Chart reviewed. Labs, data and other pertinent notes reviewed in last 24 hrs.     PMH/SH/FH reviewed and unchanged compared to H&P    Ronnie Andre MD

## 2018-02-15 NOTE — DIABETES MGMT
DTC Consult Note    Recommendations/ Comments: If appropriate, please consider decreasing home Novolog 70/30- to BID- patient has been taking TID. Please also consider decreasing NPH to 12 units BID 2' hypoglycemia. Patient reports hypoglycemia every other morning. Current hospital DM medication: NPH- 15 units BID, Lispro Correctional insulin with normal sensitivity      Consult received for:  [x]             Assessment of home management    Chart reviewed and initial evaluation complete on Carlitos Virgen. Patient is a 66 y.o. male with 9 year history of diabetes- states he takes Novolog 70/30 15 units TID. Also reports low sugars every other morning- discussed how 70/30 is typically given BID. Assessed and instructed patient on the following:   ·  interpretation of lab results, blood sugar goals, insulin adjustments, nutrition, referred to Diabetes Educator, site rotation and self-injection of insulin    Provided patient with the following: [x]             Survival skills education materials               [x]             Insulin education materials                     [x]             Outpatient DTC contact number    Discussed with patient and/or family need for follow up appointment for diabetes management after discharge- patient is scheduled for DTC outpatient education for a survivals skills class- February 23rd. A1c:   Lab Results   Component Value Date/Time    Hemoglobin A1c 12.0 (H) 02/13/2018 05:19 PM       Recent Glucose Results: Lab Results   Component Value Date/Time    GLU 63 (L) 02/15/2018 03:31 AM     (H) 02/14/2018 11:54 AM    GLUCPOC 82 02/15/2018 08:08 AM    GLUCPOC 82 02/15/2018 08:07 AM    GLUCPOC 79 02/15/2018 08:06 AM        Lab Results   Component Value Date/Time    Creatinine 4.95 (H) 02/15/2018 03:31 AM     CrCl cannot be calculated (Unknown ideal weight. ).     Active Orders   Diet    DIET DIABETIC CONSISTENT CARB Regular        PO intake: Patient Vitals for the past 72 hrs:   % Diet Eaten   02/14/18 0830 100 %       Will continue to follow as needed. Thank you.     Miryam Ac, [address] [place] [address], [address]   Office:  [phone]

## 2018-02-15 NOTE — PROGRESS NOTES
PCU SHIFT NURSING NOTE      Bedside shift change report given to Og Zhang (oncoming nurse) by Matt Kapadia (offgoing nurse). Report included the following information SBAR and Cardiac Rhythm NSR. Shift Summary:   5675 Patient in awake in bed watching tv.  VS are WNL  0800Gave patient two cups of orange juice with glucose of 82  Recheck after breakfast  1430 patient aware of discharge orders, but he is waiting for his ride. 36 Wife called concerned about the medication that the patient was on prior to hospitalization. I covered the discharge medication directions with the patient's wife over the phone and let her know that the hard copy would be coming home in a blue and white folder. Wife informed me that the son would be picking the patient up.  [phone] I have reviewed discharge instructions with the patient and spouse. The patient and spouse verbalized understanding. Removed IV and patient getting dressed. 36 Patient's son arrived down stairs on his way up. [id number] Patient taken down in wheelchair    Admission Date 2/13/2018   Admission Diagnosis DKA (diabetic ketoacidoses) ([facility] [address]   Consults IP CONSULT TO NEPHROLOGY        Consults   []PT   []OT   []Speech   []Case Management      [] Palliative      Cardiac Monitoring Order   []Yes   []No     IV drips   []Yes    Drip:                            Dose:  Drip:                            Dose:  Drip:                            Dose:   []No     GI Prophylaxis   []Yes   []No         DVT Prophylaxis   SCDs:             Cornelio stockings:         [] Medication   []Contraindicated   []None      Activity Level Activity Level: Bed Rest     Activity Assistance: Partial (one person)   Purposeful Rounding every 1-2 hour?    []Yes   Cárdenas Score  Total Score: 2   Bed Alarm (If score 3 or >)   []Yes   [] Refused (See signed refusal form in chart)   Ruddy Score  Ruddy Score: 23   Ruddy Score (if score 14 or less)   []PMT consult   []Wound Care consult      []Specialty bed [] Nutrition consult          Needs prior to discharge:   Home O2 required:    []Yes   []No    If yes, how much O2 required? Other:    Last Bowel Movement: Last Bowel Movement Date: 02/14/18      Influenza Vaccine Received Flu Vaccine for Current Season (usually Sept-March): No    Patient/Guardian Refused (Notify MD): No   Pneumonia Vaccine           Diet Active Orders   Diet    DIET DIABETIC CONSISTENT CARB Regular      LDAs               Peripheral IV 02/13/18 Right Forearm (Active)   Site Assessment Clean, dry, & intact 2/14/2018 11:45 PM   Phlebitis Assessment 0 2/14/2018 11:45 PM   Infiltration Assessment 0 2/14/2018 11:45 PM   Dressing Status Clean, dry, & intact 2/14/2018 11:45 PM   Dressing Type Tape;Transparent 2/14/2018 11:45 PM   Hub Color/Line Status Patent; Flushed 2/14/2018 11:45 PM   Action Taken Open ports on tubing capped 2/14/2018  3:04 PM   Alcohol Cap Used Yes 2/14/2018  3:04 PM                      Urinary Catheter      Intake & Output   Date 02/14/18 0700 - 02/15/18 0659 02/15/18 0700 - 02/16/18 0659   Shift 5318-1321 9071-5728 24 Hour Total 3155-5248 5279-8407 24 Hour Total   I  N  T  A  K  E   P.O. 240  240         P. O. 240  240       Shift Total 240  240      O  U  T  P  U  T   Urine 300 525 825         Urine Voided 300 525 825       Shift Total 300 525 825      NET -60 -525 -585      Weight (kg)               Readmission Risk Assessment Tool Score High Risk            22       Total Score        3 Has Seen PCP in Last 6 Months (Yes=3, No=0)    2 . Living with Significant Other. Assisted Living. LTAC. SNF. or   Rehab    4 Pt.  Coverage (Medicare=5 , Medicaid, or Self-Pay=4)    13 Charlson Comorbidity Score (Age + Comorbid Conditions)        Criteria that do not apply:    Patient Length of Stay (>5 days = 3)    IP Visits Last 12 Months (1-3=4, 4=9, >4=11)       Expected Length of Stay 3d 19h   Actual Length of Stay 2

## 2018-02-15 NOTE — PROGRESS NOTES
Problem: Falls - Risk of  Goal: *Absence of Falls  Document Chuck Fall Risk and appropriate interventions in the flowsheet.    Outcome: Progressing Towards Goal  Fall Risk Interventions:            Medication Interventions: Bed/chair exit alarm    Elimination Interventions: Bed/chair exit alarm, Call light in reach    History of Falls Interventions: Bed/chair exit alarm

## 2018-02-15 NOTE — PROGRESS NOTES
Received report from [address] [place] [address]. SARA PEREZ, MAR, RECENT RESULTS. No complaints of pain, nausea, or shortness of breath. Sleeping in bed.

## 2018-02-15 NOTE — PROGRESS NOTES
PCU SHIFT NURSING NOTE      Bedside shift change report given to TE Zhang (oncoming nurse) by Monserrat Gr (offgoing nurse). Report included the following information SBAR, Recent Results and Cardiac Rhythm NSR. Shift Summary:   0730 Patient in bed calm and cooperative. VS are WNL. 1400 Paged Dr Veronica Gray about the patients insulin orders  1620 Paged Dr Veronica Gray about the needing a sliding scale order for insulin. 1930 Removed the patients IV in the left had due to the order from nephrology to limit use of left limb. I placed a limb alert sign in the patients room and informed the patient. Admission Date 2/13/2018   Admission Diagnosis DKA (diabetic ketoacidoses) ([facility] [address]   Consults IP CONSULT TO NEPHROLOGY        Consults   []PT   []OT   []Speech   []Case Management      [] Palliative      Cardiac Monitoring Order   []Yes   []No     IV drips   []Yes    Drip:                            Dose:  Drip:                            Dose:  Drip:                            Dose:   []No     GI Prophylaxis   []Yes   []No         DVT Prophylaxis   SCDs:             Cornelio stockings:         [] Medication   []Contraindicated   []None      Activity Level Activity Level: Bed Rest     Activity Assistance: Partial (one person)   Purposeful Rounding every 1-2 hour? []Yes   Cárdenas Score  Total Score: 2   Bed Alarm (If score 3 or >)   []Yes   [] Refused (See signed refusal form in chart)   Ruddy Score  Ruddy Score: 23   Ruddy Score (if score 14 or less)   []PMT consult   []Wound Care consult      []Specialty bed   [] Nutrition consult          Needs prior to discharge:   Home O2 required:    []Yes   []No    If yes, how much O2 required?     Other:    Last Bowel Movement: Last Bowel Movement Date: 02/14/18      Influenza Vaccine Received Flu Vaccine for Current Season (usually Sept-March): No    Patient/Guardian Refused (Notify MD): No   Pneumonia Vaccine           Diet Active Orders   Diet    DIET DIABETIC CONSISTENT CARB Regular LDAs               Peripheral IV 02/13/18 Right Forearm (Active)   Site Assessment Clean, dry, & intact 2/14/2018 11:45 PM   Phlebitis Assessment 0 2/14/2018 11:45 PM   Infiltration Assessment 0 2/14/2018 11:45 PM   Dressing Status Clean, dry, & intact 2/14/2018 11:45 PM   Dressing Type Tape;Transparent 2/14/2018 11:45 PM   Hub Color/Line Status Patent; Flushed 2/14/2018 11:45 PM   Action Taken Open ports on tubing capped 2/14/2018  3:04 PM   Alcohol Cap Used Yes 2/14/2018  3:04 PM                      Urinary Catheter      Intake & Output   Date 02/14/18 0700 - 02/15/18 0659 02/15/18 0700 - 02/16/18 0659   Shift 5837-9818 9815-0267 24 Hour Total 3086-2194 6458-1163 24 Hour Total   I  N  T  A  K  E   P.O. 240  240         P. O. 240  240       Shift Total 240  240      O  U  T  P  U  T   Urine 300 525 825         Urine Voided 300 525 825       Shift Total 300 525 825      NET -60 -525 -585      Weight (kg)               Readmission Risk Assessment Tool Score High Risk            22       Total Score        3 Has Seen PCP in Last 6 Months (Yes=3, No=0)    2 . Living with Significant Other. Assisted Living. LTAC. SNF. or   Rehab    4 Pt.  Coverage (Medicare=5 , Medicaid, or Self-Pay=4)    13 Charlson Comorbidity Score (Age + Comorbid Conditions)        Criteria that do not apply:    Patient Length of Stay (>5 days = 3)    IP Visits Last 12 Months (1-3=4, 4=9, >4=11)       Expected Length of Stay 3d 19h   Actual Length of Stay 2

## 2018-02-16 ENCOUNTER — DOCUMENTATION ONLY (OUTPATIENT)
Dept: FAMILY MEDICINE CLINIC | Age: 58
End: 2018-02-16

## 2018-02-16 LAB
BACTERIA SPEC CULT: NORMAL
BACTERIA SPEC CULT: NORMAL
C JEJUNI+C COLI AG STL QL: NEGATIVE
E COLI SXT1+2 STL IA: NEGATIVE
O+P SPEC MICRO: NORMAL
O+P STL CONC: NORMAL
SERVICE CMNT-IMP: NORMAL
SPECIMEN SOURCE: NORMAL

## 2018-02-16 NOTE — PROGRESS NOTES
Called pt from hospital discharge on the feb 15th. Reviewed medications and his new diagnosis' with the pt. He is stated he is doing well at this time.  I have made an appointment for him here on feb 19 at 930.  2/16/2018 7:22 AM Addendum:    Robinson Leonard LPN

## 2018-02-19 ENCOUNTER — OFFICE VISIT (OUTPATIENT)
Dept: FAMILY MEDICINE CLINIC | Age: 58
End: 2018-02-19

## 2018-02-19 VITALS
HEIGHT: 74 IN | DIASTOLIC BLOOD PRESSURE: 100 MMHG | TEMPERATURE: 97.9 F | SYSTOLIC BLOOD PRESSURE: 170 MMHG | OXYGEN SATURATION: 100 % | RESPIRATION RATE: 12 BRPM | BODY MASS INDEX: 26.72 KG/M2 | HEART RATE: 78 BPM | WEIGHT: 208.2 LBS

## 2018-02-19 DIAGNOSIS — Z79.4 TYPE 2 DIABETES MELLITUS WITH DIABETIC NEPHROPATHY, WITH LONG-TERM CURRENT USE OF INSULIN (HCC): Primary | ICD-10-CM

## 2018-02-19 DIAGNOSIS — I10 ESSENTIAL HYPERTENSION: ICD-10-CM

## 2018-02-19 DIAGNOSIS — Z09 HOSPITAL DISCHARGE FOLLOW-UP: ICD-10-CM

## 2018-02-19 DIAGNOSIS — E11.21 TYPE 2 DIABETES MELLITUS WITH DIABETIC NEPHROPATHY, WITH LONG-TERM CURRENT USE OF INSULIN (HCC): Primary | ICD-10-CM

## 2018-02-19 DIAGNOSIS — E11.22 CKD STAGE 5 DUE TO TYPE 2 DIABETES MELLITUS (HCC): ICD-10-CM

## 2018-02-19 DIAGNOSIS — N18.5 CKD STAGE 5 DUE TO TYPE 2 DIABETES MELLITUS (HCC): ICD-10-CM

## 2018-02-19 PROBLEM — E11.10 DKA (DIABETIC KETOACIDOSES): Status: RESOLVED | Noted: 2018-02-13 | Resolved: 2018-02-19

## 2018-02-19 RX ORDER — CARVEDILOL 12.5 MG/1
12.5 TABLET ORAL 2 TIMES DAILY WITH MEALS
Qty: 60 TAB | Refills: 11 | Status: SHIPPED | OUTPATIENT
Start: 2018-02-19 | End: 2018-02-23 | Stop reason: ALTCHOICE

## 2018-02-19 RX ORDER — NIFEDIPINE 30 MG/1
30 TABLET, FILM COATED, EXTENDED RELEASE ORAL DAILY
Qty: 30 TAB | Refills: 11 | Status: SHIPPED | OUTPATIENT
Start: 2018-02-19 | End: 2018-02-23 | Stop reason: SDUPTHER

## 2018-02-19 NOTE — PROGRESS NOTES
Vahid Gregory is a 66 y.o. male presenting for/with:    Pneumonia (follow up )    HPI:  Diabetes with hx recent spell of DKA. Pt had onset of cough, fever, then blood sugars went up to 500's, started feeling weak, and went to [facility] for eval. Dx with DKA, CAP, tx with abx, fluids, and had good improvement in sugar and breathing. Feeling much better today. Sugars still controlled fairly to poorly by A1c's. home checks showed running low 100's in AM.  Hypoglycemia: had a 60's and 70's on the 15 TID, so hosp cut to 15 units BID. Tolerating current treatment well. Current medications include insulin novolin 70/30, taking 15 units BID ac. Taking insulin regularly now, getting it ok from the pharmacy. Off metformin due to more severe CKD. GFR headed down on last check, but K+ and water balance is ok. Lab Results   Component Value Date/Time    Hemoglobin A1c 12.0 (H) 02/13/2018 05:19 PM    Hemoglobin A1c 10.9 (H) 01/22/2018 03:55 PM    Hemoglobin A1c 12.5 (H) 11/09/2016 04:12 PM    Glucose 63 (L) 02/15/2018 03:31 AM    Glucose (POC) 214 (H) 02/15/2018 04:27 PM    Microalb/Creat ratio (ug/mg creat.) 3487.6 (H) 11/09/2016 04:11 PM    Microalbumin/creat ratio (POC) >300 01/22/2018 03:30 PM    LDL, calculated 48 01/22/2018 03:55 PM    Creatinine 4.95 (H) 02/15/2018 03:31 AM     Lab Results   Component Value Date/Time    Microalb/Creat ratio (ug/mg creat.) 3487.6 (H) 11/09/2016 04:11 PM     Last eye exam performed 4/2015 with Dr. Dwayne Ruiz, Marcelina DR. Last foot exam 6/15 performed. warm, good capillary refill, no trophic changes or ulcerative lesions and reduced sensation at all points    Hypertension. Blood pressures higher lately. Management at hosp visit included con't lasix 40mg and d/c aldactone 25mg, and lisinopril 20's. R/S on nifedipine ER 30's every day, and adding coreg 6.25 BID. Sandee well. Current Rx: B-blocker, loop diuretic, (CCB- but not taking now). Symptoms include no sx.  Patient denies chest pain, palpitations, orthopnea  Lab review:   Lab Results   Component Value Date/Time    Sodium 136 02/15/2018 03:31 AM    Potassium 3.9 02/15/2018 03:31 AM    Chloride 109 (H) 02/15/2018 03:31 AM    CO2 17 (L) 02/15/2018 03:31 AM    Anion gap 10 02/15/2018 03:31 AM    Glucose 63 (L) 02/15/2018 03:31 AM    BUN 66 (H) 02/15/2018 03:31 AM    Creatinine 4.95 (H) 02/15/2018 03:31 AM    BUN/Creatinine ratio 13 02/15/2018 03:31 AM    GFR est AA 15 (L) 02/15/2018 03:31 AM    GFR est non-AA 12 (L) 02/15/2018 03:31 AM    Calcium 7.3 (L) 02/15/2018 03:31 AM    Calcium 7.3 (L) 02/15/2018 03:31 AM       ROS:  Constitutional: No fever, chills or weight loss  Respiratory: No cough, SOB   CV: No chest pain or Palpitations    Visit Vitals    BP (!) 170/100 (BP 1 Location: Left arm, BP Patient Position: Sitting)    Pulse 78    Temp 97.9 °F (36.6 °C) (Oral)    Resp 12    Ht 6' 2\" (1.88 m)    Wt 208 lb 3.2 oz (94.4 kg)    SpO2 100%    BMI 26.73 kg/m2     Wt Readings from Last 3 Encounters:   02/19/18 208 lb 3.2 oz (94.4 kg)   02/13/18 199 lb 8.3 oz (90.5 kg)   02/02/18 211 lb 3.2 oz (95.8 kg)   +9#  BP Readings from Last 3 Encounters:   02/19/18 (!) 170/100   02/15/18 142/76   02/13/18 127/60     Physical Examination: General appearance - alert, ill appearing, and in mod discomfort  Mental status - alert, oriented to person, place, and time  Eyes - pupils equal and reactive, extraocular eye movements intact  ENT - bilateral external ears and nose normal. Normal lips  Neck - supple, no significant adenopathy, no thyromegaly or mass  Lymphatics - no palpable lymphadenopathy, no hepatosplenomegaly  Chest - clear to auscultation, no wheezes, rales or rhonchi, symmetric air entry  Heart - normal rate, regular rhythm, normal S1, S2, no murmurs, rubs, clicks or gallops  Extremities - peripheral pulses normal, 1+ pedal edema, no clubbing or cyanosis.  Widespread ttp to muscle bellies    A/p:  DM2, with recent DKA episode and CKD5 (worsening). .  Much better control by reports. No further severe hypoglycemia. con't insulin dose at 15 units AM, 15units before dinner. Sugar checks with breakfast and dinner meals. stay off metformin due to low GFR aa. Bring log to next visit. Con't Calcitriol 0.25 mcg 1 cap daily. Set up visit with renal Dr. Anay Henry. Plan labs at f/u (Renal panel). CAP  Sx resolved, lungs clear. Plan rpt cxr next mo for clearance. HTN  Not in goal. worsening GFRaa dropping on last check. Edema worsening. Boost coreg to 12.5 BID, Keep lasix at 20mg qam, r/s adalat CC 30mg daily (vs norvasc due to cost concerns).     F/U 2wk

## 2018-02-19 NOTE — MR AVS SNAPSHOT
[facility] 
 
 
 [address] [facility] [address] [facility] [address] [phone] Patient: Griffin Rodriguez MRN: [id number] Visit Information Date & Time Provider Department Dept. Phone Encounter #  
 2/19/2018  9:50 AM Barney Price MD [address] [place] [address] [id number] Follow-up Instructions Return in about 2 weeks (around 3/5/2018). Routing History Follow-up and Disposition History Your Appointments 3/7/2018  8:10 AM  
ESTABLISHED PATIENT with MD Honorio Barraza 38 ([facility]) Appt Note: hosp f/u DKA  
 [address] [facility]. [address] [facility] [address] [phone]  
  
   
 [address] [address] Upcoming Health Maintenance Date Due Hepatitis C Screening 1960 EYE EXAM RETINAL OR DILATED Q1 4/17/2016 Pneumococcal 19-64 Highest Risk (2 of 3 - PPSV23) 2/1/2027* HEMOGLOBIN A1C Q6M 8/13/2018 FOOT EXAM Q1 1/22/2019 MICROALBUMIN Q1 1/22/2019 LIPID PANEL Q1 1/22/2019 FOBT Q 1 YEAR AGE 50-75 2/14/2019 DTaP/Tdap/Td series (2 - Td) 1/22/2028 *Topic was postponed. The date shown is not the original due date. Allergies as of 2/19/2018  Review Complete On: 2/19/2018 By: Barney Price MD  
 No Known Allergies Current Immunizations  Never Reviewed Name Date Influenza Vaccine 12/14/2016  4:38 PM, 11/25/2015, 10/4/2013 Influenza Vaccine (Quad) PF 2/14/2018  9:50 AM  
 Pneumococcal Conjugate (PCV-13) 12/14/2016  4:37 PM  
  
 Not reviewed this visit You Were Diagnosed With   
  
 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 
ICD-9-CM: 401.9 Vitals BP Pulse Temp Resp Height(growth percentile) Weight(growth percentile) (!) 170/100 (BP 1 Location: Left arm, BP Patient Position: Sitting) 78 97.9 °F (36.6 °C) (Oral) 12 6' 2\" (1.88 m) 208 lb 3.2 oz (94.4 kg) SpO2 BMI Smoking Status 100% 26.73 kg/m2 Never Smoker BMI and BSA Data Body Mass Index Body Surface Area  
 26.73 kg/m 2 2.22 m 2 Preferred Pharmacy Pharmacy Name Phone [address], [facility] [place] Daniela Polanco [phone] Your Updated Medication List  
  
   
This list is accurate as of: 2/19/18 10:34 AM.  Always use your most recent med list.  
  
  
  
  
 aspirin delayed-release 81 mg tablet Take 162 mg by mouth daily. atorvastatin 20 mg tablet Commonly known as:  LIPITOR Take 20 mg by mouth daily. calcitRIOL 0.25 mcg capsule Commonly known as:  ROCALTROL Take 1 Cap by mouth daily. carvedilol 12.5 mg tablet Commonly known as:  Jennifer Sham Take 1 Tab by mouth two (2) times daily (with meals). Indications: pressure and heart  
  
 clotrimazole 1 % topical cream  
Commonly known as:  Tony Frame Apply  to affected area two (2) times a day. Patient applies 'a thin layer' to scaly areas on feet and between toes  
  
 furosemide 20 mg tablet Commonly known as:  LASIX Take 1 Tab by mouth daily. insulin NPH/insulin regular 100 unit/mL (70-30) injection Commonly known as:  NOVOLIN 70/30, HUMULIN 70/30  
15 units with Breakfast, lunch, and dinner  
  
 levoFLOXacin 500 mg tablet Commonly known as:  Margoth Stalling Take 1 Tab by mouth every other day for 6 days. metroNIDAZOLE 500 mg tablet Commonly known as:  FLAGYL Take 1 Tab by mouth two (2) times a day for 5 days. NIFEdipine ER 30 mg ER tablet Commonly known as:  ADALAT CC  
 Take 1 Tab by mouth daily. Indications: pressure, add to regimen Prescriptions Sent to Pharmacy Refills NIFEdipine ER (ADALAT CC) 30 mg ER tablet 11 Sig: Take 1 Tab by mouth daily. Indications: pressure, add to regimen Class: Normal  
 [facility] [address] Ph #: [phone] Route: Oral  
 carvedilol (COREG) 12.5 mg tablet 11 Sig: Take 1 Tab by mouth two (2) times daily (with meals). Indications: pressure and heart Class: Normal  
 Pharmacy: [address], [place]a Garduno Ph #: [phone] Route: Oral  
  
Follow-up Instructions Return in about 2 weeks (around 3/5/2018). To-Do List   
 02/23/2018 1:00 PM  
  Appointment with [facility] ([phone]) Patient Instructions Restart nifedipine. Stop lisinopril. Boost carvedilol to 2x6.25mg tabs twice daily. Introducing [facility]! [facility] introduces LaunchKey patient portal. Now you can access parts of your medical record, email your doctor's office, and request medication refills online. 1. In your internet browser, go to [email or web address]. [email or web address] 2. Click on the First Time User? Click Here link in the Sign In box. You will see the New Member Sign Up page. 3. Enter your LaunchKey Access Code exactly as it appears below. You will not need to use this code after youve completed the sign-up process. If you do not sign up before the expiration date, you must request a new code. · LaunchKey Access Code: [email or web address] Expires: 3/27/2018  9:50 AM 
 
4. Enter the last four digits of your Social Security Number (xxxx) and Date of Birth (mm/dd/yyyy) as indicated and click Submit. You will be taken to the next sign-up page. 5. Create a LaunchKey ID.  This will be your LaunchKey login ID and cannot be changed, so think of one that is secure and easy to remember. 6. Create a Pickie password. You can change your password at any time. 7. Enter your Password Reset Question and Answer. This can be used at a later time if you forget your password. 8. Enter your e-mail address. You will receive e-mail notification when new information is available in [address] 9. Click Sign Up. You can now view and download portions of your medical record. 10. Click the Download Summary menu link to download a portable copy of your medical information. If you have questions, please visit the Frequently Asked Questions section of the Pickie website. Remember, Pickie is NOT to be used for urgent needs. For medical emergencies, dial 911. Now available from your iPhone and Android! Please provide this summary of care documentation to your next provider. Your primary care clinician is listed as Dewitt Severance K. Bavuso. If you have any questions after today's visit, please call [phone].

## 2018-02-19 NOTE — Clinical Note
PARISA. He had some confusion about his meds. I did my best to straighten him out. I'll order the f/u chest xray at next visit with me in early March.

## 2018-02-20 LAB
BACTERIA SPEC CULT: NORMAL
BACTERIA SPEC CULT: NORMAL
SERVICE CMNT-IMP: NORMAL
SERVICE CMNT-IMP: NORMAL

## 2018-02-23 ENCOUNTER — TELEPHONE (OUTPATIENT)
Dept: DIABETES SERVICES | Age: 58
End: 2018-02-23

## 2018-02-23 ENCOUNTER — OFFICE VISIT (OUTPATIENT)
Dept: FAMILY MEDICINE CLINIC | Age: 58
End: 2018-02-23

## 2018-02-23 VITALS
WEIGHT: 220 LBS | RESPIRATION RATE: 16 BRPM | SYSTOLIC BLOOD PRESSURE: 140 MMHG | DIASTOLIC BLOOD PRESSURE: 80 MMHG | OXYGEN SATURATION: 100 % | BODY MASS INDEX: 28.23 KG/M2 | HEART RATE: 78 BPM | HEIGHT: 74 IN | TEMPERATURE: 97.9 F

## 2018-02-23 DIAGNOSIS — Z79.4 TYPE 2 DIABETES MELLITUS WITH DIABETIC NEPHROPATHY, WITH LONG-TERM CURRENT USE OF INSULIN (HCC): ICD-10-CM

## 2018-02-23 DIAGNOSIS — E11.22 CKD STAGE 5 DUE TO TYPE 2 DIABETES MELLITUS (HCC): Primary | ICD-10-CM

## 2018-02-23 DIAGNOSIS — I10 ESSENTIAL HYPERTENSION: ICD-10-CM

## 2018-02-23 DIAGNOSIS — N18.5 CKD STAGE 5 DUE TO TYPE 2 DIABETES MELLITUS (HCC): Primary | ICD-10-CM

## 2018-02-23 DIAGNOSIS — E11.21 TYPE 2 DIABETES MELLITUS WITH DIABETIC NEPHROPATHY, WITH LONG-TERM CURRENT USE OF INSULIN (HCC): ICD-10-CM

## 2018-02-23 RX ORDER — HYDROCODONE BITARTRATE AND ACETAMINOPHEN 5; 325 MG/1; MG/1
TABLET ORAL
Refills: 0 | COMMUNITY
Start: 2018-02-19 | End: 2018-03-16

## 2018-02-23 RX ORDER — FUROSEMIDE 20 MG/1
40 TABLET ORAL DAILY
Qty: 30 TAB | Refills: 0 | Status: ON HOLD
Start: 2018-02-23 | End: 2018-03-20

## 2018-02-23 RX ORDER — METOLAZONE 2.5 MG/1
2.5 TABLET ORAL 2 TIMES WEEKLY
Qty: 2 TAB | Refills: 0 | Status: SHIPPED | OUTPATIENT
Start: 2018-02-23 | End: 2018-02-27

## 2018-02-23 RX ORDER — CARVEDILOL 6.25 MG/1
TABLET ORAL
Refills: 0 | COMMUNITY
Start: 2018-02-15 | End: 2018-02-23 | Stop reason: ALTCHOICE

## 2018-02-23 RX ORDER — CARVEDILOL 12.5 MG/1
12.5 TABLET ORAL 2 TIMES DAILY WITH MEALS
Qty: 60 TAB | Refills: 11 | Status: SHIPPED | OUTPATIENT
Start: 2018-02-23 | End: 2019-01-01 | Stop reason: SDUPTHER

## 2018-02-23 RX ORDER — METRONIDAZOLE 500 MG/1
TABLET ORAL 3 TIMES DAILY
COMMUNITY
End: 2018-03-08

## 2018-02-23 RX ORDER — NIFEDIPINE 30 MG/1
30 TABLET, FILM COATED, EXTENDED RELEASE ORAL DAILY
Qty: 30 TAB | Refills: 11 | Status: SHIPPED | OUTPATIENT
Start: 2018-02-23 | End: 2019-02-05 | Stop reason: SDUPTHER

## 2018-02-23 NOTE — PROGRESS NOTES
Caitlin Wallis is a 66 y.o. male presenting for/with:    Leg Swelling (both feet)    HPI:  Diabetes with hx recent spell of DKA. Feeling ok today, but worsening edema. Sugars still controlled fairly to poorly by A1c's. home checks showed running low 100's in AM.  Hypoglycemia: no lows lately. Tolerating current treatment well. Current medications include insulin novolin 70/30, taking 15 units BID ac. Taking insulin regularly now. GFR headed down on last check. K+ was ok. Lab Results   Component Value Date/Time    Potassium 3.9 02/15/2018 03:31 AM     Lab Results   Component Value Date/Time    Hemoglobin A1c 12.0 (H) 02/13/2018 05:19 PM    Hemoglobin A1c 10.9 (H) 01/22/2018 03:55 PM    Hemoglobin A1c 12.5 (H) 11/09/2016 04:12 PM    Glucose 63 (L) 02/15/2018 03:31 AM    Glucose (POC) 214 (H) 02/15/2018 04:27 PM    Microalb/Creat ratio (ug/mg creat.) 3487.6 (H) 11/09/2016 04:11 PM    Microalbumin/creat ratio (POC) >300 01/22/2018 03:30 PM    LDL, calculated 48 01/22/2018 03:55 PM    Creatinine 4.95 (H) 02/15/2018 03:31 AM     Lab Results   Component Value Date/Time    Microalb/Creat ratio (ug/mg creat.) 3487.6 (H) 11/09/2016 04:11 PM     Last eye exam performed 4/2015 with Dr. Odessa Santos, No DR. Last foot exam 6/15 performed. warm, good capillary refill, no trophic changes or ulcerative lesions and reduced sensation at all points    Hypertension. Blood pressures fair today. Management at hosp visit included con't lasix, but on just 20mg now. Aldactone 25mg and lisinopril 20's d/c due to CKD5. Supposed to be on nifedipine ER 30's, but not with current bag of meds. Sandee well. Current Rx: B-blocker, loop diuretic, (CCB- but not sure if taking). Symptoms include no sx.  Patient denies chest pain, palpitations, orthopnea  Lab review:   Lab Results   Component Value Date/Time    Sodium 136 02/15/2018 03:31 AM    Potassium 3.9 02/15/2018 03:31 AM    Chloride 109 (H) 02/15/2018 03:31 AM    CO2 17 (L) 02/15/2018 03:31 AM    Anion gap 10 02/15/2018 03:31 AM    Glucose 63 (L) 02/15/2018 03:31 AM    BUN 66 (H) 02/15/2018 03:31 AM    Creatinine 4.95 (H) 02/15/2018 03:31 AM    BUN/Creatinine ratio 13 02/15/2018 03:31 AM    GFR est AA 15 (L) 02/15/2018 03:31 AM    GFR est non-AA 12 (L) 02/15/2018 03:31 AM    Calcium 7.3 (L) 02/15/2018 03:31 AM    Calcium 7.3 (L) 02/15/2018 03:31 AM       ROS:  Constitutional: No fever, chills or weight loss  Respiratory: No cough, SOB   CV: No chest pain or Palpitations    Visit Vitals    /80 (BP 1 Location: Right arm, BP Patient Position: Sitting)    Pulse 78    Temp 97.9 °F (36.6 °C)    Resp 16    Ht 6' 2\" (1.88 m)    Wt 220 lb (99.8 kg)    SpO2 100%    BMI 28.25 kg/m2     Wt Readings from Last 3 Encounters:   02/23/18 220 lb (99.8 kg)   02/19/18 208 lb 3.2 oz (94.4 kg)   02/13/18 199 lb 8.3 oz (90.5 kg)   +9#  BP Readings from Last 3 Encounters:   02/23/18 140/80   02/19/18 (!) 170/100   02/15/18 142/76     Physical Examination: General appearance - alert, ill appearing, and in mod discomfort  Mental status - alert, oriented to person, place, and time  Eyes - pupils equal and reactive, extraocular eye movements intact  ENT - bilateral external ears and nose normal. Normal lips  Neck - supple, no significant adenopathy, no thyromegaly or mass  Lymphatics - no palpable lymphadenopathy, no hepatosplenomegaly  Chest - clear to auscultation, no wheezes, rales or rhonchi, symmetric air entry  Heart - normal rate, regular rhythm, normal S1, S2, no murmurs, rubs, clicks or gallops  Extremities - peripheral pulses normal, 3+ pedal edema, no clubbing or cyanosis. A/p:  DM2, with recent DKA episode and CKD5 (worsening). .  Sugar doing better, but not great yet. Boost insulin dose to 18 units AM, 18 units before dinner. Sugar checks with breakfast and dinner meals. stay off metformin due to low GFR aa. Bring log to next visit. Con't Calcitriol 0.25 mcg 1 cap daily.  Keep trying to set up visit with renal Dr. Anay Henry. Check labs (Renal panel). CAP  Rpt cxr for clearance in March    HTN  Much better, but not quite to goal. Worsening GFRaa is concerning. Edema worsening. Add metolazone 2.5mg 2d/week x1wk. Con't coreg to 12.5 BID, Boost lasix to 40mg qam, try again to r/s adalat CC 30mg daily (vs norvasc due to cost concerns).     F/U 1wk

## 2018-02-23 NOTE — PATIENT INSTRUCTIONS
Please bring sugar log to visit. If you have any questions regarding [employer], you may call [employer] support at [phone].

## 2018-02-23 NOTE — MR AVS SNAPSHOT
[facility] 
 
 
 [address] [address] [phone] Patient: Saúl Briscoe MRN: [id number] Visit Information Date & Time Provider Department Dept. Phone Encounter #  
 2/23/2018  9:10 AM Johanny Wallis MD [address] [place] [address] [id number] Follow-up Instructions Return in about 1 week (around 3/2/2018). Follow-up and Disposition History Your Appointments 3/7/2018  8:10 AM  
ESTABLISHED PATIENT with MD Honorio Rutledge 38 ([facility]) Appt Note: hosp f/u DKA  
 1000 [facility]. [address] [place] [address],[address] [address] [phone]  
  
   
 [address] [place] [address],[address] [address] Upcoming Health Maintenance Date Due Hepatitis C Screening 1960 EYE EXAM RETINAL OR DILATED Q1 4/17/2016 Pneumococcal 19-64 Highest Risk (2 of 3 - PPSV23) 2/1/2027* HEMOGLOBIN A1C Q6M 8/13/2018 FOOT EXAM Q1 1/22/2019 MICROALBUMIN Q1 1/22/2019 LIPID PANEL Q1 1/22/2019 FOBT Q 1 YEAR AGE 50-75 2/14/2019 DTaP/Tdap/Td series (2 - Td) 1/22/2028 *Topic was postponed. The date shown is not the original due date. Allergies as of 2/23/2018  Review Complete On: 2/23/2018 By: Johanny Wallis MD  
 No Known Allergies Current Immunizations  Never Reviewed Name Date Influenza Vaccine 12/14/2016  4:38 PM, 11/25/2015, 10/4/2013 Influenza Vaccine (Quad) PF 2/14/2018  9:50 AM  
 Pneumococcal Conjugate (PCV-13) 12/14/2016  4:37 PM  
  
 Not reviewed this visit You Were Diagnosed With   
  
 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 
 ICD-9-CM: 401.9 Vitals BP Pulse Temp Resp Height(growth percentile) Weight(growth percentile) 140/80 (BP 1 Location: Right arm, BP Patient Position: Sitting) 78 97.9 °F (36.6 °C) 16 6' 2\" (1.88 m) 220 lb (99.8 kg) SpO2 BMI Smoking Status 100% 28.25 kg/m2 Never Smoker BMI and BSA Data Body Mass Index Body Surface Area  
 28.25 kg/m 2 2.28 m 2 Preferred Pharmacy Pharmacy Name Phone [address], [place] [phone] Your Updated Medication List  
  
   
This list is accurate as of 2/23/18 11:42 AM.  Always use your most recent med list.  
  
  
  
  
 aspirin delayed-release 81 mg tablet Take 162 mg by mouth daily. atorvastatin 20 mg tablet Commonly known as:  LIPITOR Take 20 mg by mouth daily. calcitRIOL 0.25 mcg capsule Commonly known as:  ROCALTROL Take 1 Cap by mouth daily. carvedilol 12.5 mg tablet Commonly known as:  Easter Brinda Take 1 Tab by mouth two (2) times daily (with meals). Indications: pressure and heart  
  
 clotrimazole 1 % topical cream  
Commonly known as:  Minerva Muzzy Apply  to affected area two (2) times a day. Patient applies 'a thin layer' to scaly areas on feet and between toes FLAGYL 500 mg tablet Generic drug:  metroNIDAZOLE Take  by mouth three (3) times daily. furosemide 20 mg tablet Commonly known as:  LASIX Take 2 Tabs by mouth daily. HYDROcodone-acetaminophen 5-325 mg per tablet Commonly known as:  Mertie [place]  
take 1 tablet by mouth every 4 to 6 hours if needed for pain  
  
 insulin NPH/insulin regular 100 unit/mL (70-30) injection Commonly known as:  NOVOLIN 70/30, HUMULIN 70/30  
18 units with Breakfast and dinner  
  
 metOLazone 2.5 mg tablet Commonly known as:  Orlando Glad Take 1 Tab by mouth two (2) times a week for 2 doses. Indications: fluid NIFEdipine ER 30 mg ER tablet Commonly known as:  ADALAT CC  
 Take 1 Tab by mouth daily. Indications: pressure, add to regimen Prescriptions Sent to Pharmacy Refills NIFEdipine ER (ADALAT CC) 30 mg ER tablet 11 Sig: Take 1 Tab by mouth daily. Indications: pressure, add to regimen Class: Normal  
 [facility]: [address] [place] [address] Ph #: [phone] Route: Oral  
 carvedilol (COREG) 12.5 mg tablet 11 Sig: Take 1 Tab by mouth two (2) times daily (with meals). Indications: pressure and heart Class: Normal  
 Pharm[facility] [address], [facility] Ph #: [phone] Route: Oral  
 metOLazone (ZAROXOLYN) 2.5 mg tablet 0 Sig: Take 1 Tab by mouth two (2) times a week for 2 doses. Indications: fluid Class: Normal  
 Pharmacy: [address] [place] [address], [facility] Ph #: [phone] Route: Oral  
  
We Performed the Following METABOLIC PANEL, BASIC [87909 CPT(R)] Follow-up Instructions Return in about 1 week (around 3/2/2018). To-Do List   
 03/09/2018 9:00 AM  
  Appointment with DIABETES CLASS #1 [facility] at [facility] DIABETIC TREATMENT ([phone]) Patient Instructions Please bring sugar log to visit. If you have any questions regarding [employer], you may call [employer] support at [phone]. Patient Instructions History Introducing [facility]! [facility] introduces [employer] patient portal. Now you can access parts of your medical record, email your doctor's office, and request medication refills online. 1. In your internet browser, go to [email or web address] 2. Click on the First Time User? Click Here link in the Sign In box. You will see the New Member Sign Up page. 3. Enter your [employer] Access Code exactly as it appears below. You will not need to use this code after youve completed the sign-up process.  If you do not sign up before the expiration date, you must request a new code. · [employer] Access Code: [email or web address] Expires: 3/27/2018  9:50 AM 
 
4. Enter the last four digits of your Social Security Number (xxxx) and Date of Birth (mm/dd/yyyy) as indicated and click Submit. You will be taken to the next sign-up page. 5. Create a [employer] ID. This will be your [employer] login ID and cannot be changed, so think of one that is secure and easy to remember. 6. Create a [employer] password. You can change your password at any time. 7. Enter your Password Reset Question and Answer. This can be used at a later time if you forget your password. 8. Enter your e-mail address. You will receive e-mail notification when new information is available in [address] 9. Click Sign Up. You can now view and download portions of your medical record. 10. Click the Download Summary menu link to download a portable copy of your medical information. If you have questions, please visit the Frequently Asked Questions section of the [employer] website. Remember, [employer] is NOT to be used for urgent needs. For medical emergencies, dial 911. Now available from your iPhone and Android! Please provide this summary of care documentation to your next provider. Your primary care clinician is listed as Calos Amaro. If you have any questions after today's visit, please call [phone].

## 2018-02-24 LAB
BUN SERPL-MCNC: 43 MG/DL (ref 6–24)
BUN/CREAT SERPL: 12 (ref 9–20)
CALCIUM SERPL-MCNC: 7.6 MG/DL (ref 8.7–10.2)
CHLORIDE SERPL-SCNC: 105 MMOL/L (ref 96–106)
CO2 SERPL-SCNC: 16 MMOL/L (ref 18–29)
CREAT SERPL-MCNC: 3.56 MG/DL (ref 0.76–1.27)
GFR SERPLBLD CREATININE-BSD FMLA CKD-EPI: 18 ML/MIN/1.73
GFR SERPLBLD CREATININE-BSD FMLA CKD-EPI: 21 ML/MIN/1.73
GLUCOSE SERPL-MCNC: 48 MG/DL (ref 65–99)
POTASSIUM SERPL-SCNC: 4.5 MMOL/L (ref 3.5–5.2)
SODIUM SERPL-SCNC: 135 MMOL/L (ref 134–144)

## 2018-03-08 ENCOUNTER — OFFICE VISIT (OUTPATIENT)
Dept: FAMILY MEDICINE CLINIC | Age: 58
End: 2018-03-08

## 2018-03-08 VITALS
OXYGEN SATURATION: 97 % | BODY MASS INDEX: 29.31 KG/M2 | DIASTOLIC BLOOD PRESSURE: 86 MMHG | HEART RATE: 67 BPM | RESPIRATION RATE: 12 BRPM | WEIGHT: 228.4 LBS | HEIGHT: 74 IN | TEMPERATURE: 97.3 F | SYSTOLIC BLOOD PRESSURE: 140 MMHG

## 2018-03-08 DIAGNOSIS — Z79.4 TYPE 2 DIABETES MELLITUS WITH DIABETIC NEPHROPATHY, WITH LONG-TERM CURRENT USE OF INSULIN (HCC): Primary | ICD-10-CM

## 2018-03-08 DIAGNOSIS — J18.9 COMMUNITY ACQUIRED PNEUMONIA, UNSPECIFIED LATERALITY: ICD-10-CM

## 2018-03-08 DIAGNOSIS — E11.21 TYPE 2 DIABETES MELLITUS WITH DIABETIC NEPHROPATHY, WITH LONG-TERM CURRENT USE OF INSULIN (HCC): Primary | ICD-10-CM

## 2018-03-08 DIAGNOSIS — E11.22 CKD STAGE 5 DUE TO TYPE 2 DIABETES MELLITUS (HCC): ICD-10-CM

## 2018-03-08 DIAGNOSIS — I10 ESSENTIAL HYPERTENSION: ICD-10-CM

## 2018-03-08 DIAGNOSIS — N18.5 CKD STAGE 5 DUE TO TYPE 2 DIABETES MELLITUS (HCC): ICD-10-CM

## 2018-03-08 RX ORDER — METOLAZONE 5 MG/1
5 TABLET ORAL
Qty: 3 TAB | Refills: 0 | Status: SHIPPED | OUTPATIENT
Start: 2018-03-09 | End: 2018-03-16

## 2018-03-08 NOTE — PATIENT INSTRUCTIONS
Boost the insulin to 18 units. Take the metolazone today, Saturday, and see what Dr. Price Chavez says about the 3rd dose.

## 2018-03-08 NOTE — MR AVS SNAPSHOT
[address] [place] [address] Via [email or web address] 62 
[phone] Patient: Sharri Stephenson MRN: [id number] Visit Information Date & Time Provider Department Dept. Phone Encounter #  
 3/8/2018 11:30 AM Laxmi Davidson, [address] [place] [address] [phone] [id number] Follow-up Instructions Return in about 1 week (around 3/15/2018). Upcoming Health Maintenance Date Due Hepatitis C Screening 1960 EYE EXAM RETINAL OR DILATED Q1 4/17/2016 Pneumococcal 19-64 Highest Risk (2 of 3 - PPSV23) 2/1/2027* HEMOGLOBIN A1C Q6M 8/13/2018 FOOT EXAM Q1 1/22/2019 MICROALBUMIN Q1 1/22/2019 LIPID PANEL Q1 1/22/2019 FOBT Q 1 YEAR AGE 50-75 2/14/2019 DTaP/Tdap/Td series (2 - Td) 1/22/2028 *Topic was postponed. The date shown is not the original due date. Allergies as of 3/8/2018  Review Complete On: 3/8/2018 By: Rosalba Pan LPN No Known Allergies Current Immunizations  Never Reviewed Name Date Influenza Vaccine 12/14/2016  4:38 PM, 11/25/2015, 10/4/2013 Influenza Vaccine (Quad) PF 2/14/2018  9:50 AM  
 Pneumococcal Conjugate (PCV-13) 12/14/2016  4:37 PM  
  
 Not reviewed this visit You Were Diagnosed With   
  
 Codes Comments Type 2 diabetes mellitus with diabetic nephropathy, with long-term current use of insulin (HCC)    -  Primary ICD-10-CM: E11.21, Z79.4 ICD-9-CM: 250.40, 583.81, V58.67 Essential hypertension     ICD-10-CM: I10 
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  
 29.32 kg/m 2 2.33 m 2 Preferred Pharmacy Pharmacy Name Phone [facility] [address] [facility], [address] [place] [address] [address] Your Updated Medication List  
  
   
This list is accurate as of 3/8/18 12:48 PM.  Always use your most recent med list.  
  
  
  
  
 aspirin delayed-release 81 mg tablet Take 162 mg by mouth daily. atorvastatin 20 mg tablet Commonly known as:  LIPITOR Take 20 mg by mouth daily. calcitRIOL 0.25 mcg capsule Commonly known as:  ROCALTROL Take 1 Cap by mouth daily. carvedilol 12.5 mg tablet Commonly known as:  Ruthell Fellers Take 1 Tab by mouth two (2) times daily (with meals). Indications: pressure and heart  
  
 clotrimazole 1 % topical cream  
Commonly known as:  Vesta Lester Apply  to affected area two (2) times a day. Patient applies 'a thin layer' to scaly areas on feet and between toes  
  
 furosemide 20 mg tablet Commonly known as:  LASIX Take 2 Tabs by mouth daily. HYDROcodone-acetaminophen 5-325 mg per tablet Commonly known as:  Torie Dalton  
take 1 tablet by mouth every 4 to 6 hours if needed for pain  
  
 insulin NPH/insulin regular 100 unit/mL (70-30) injection Commonly known as:  NOVOLIN 70/30, HUMULIN 70/30  
18 units with Breakfast and dinner  
  
 metOLazone 5 mg tablet Commonly known as:  Kenn Swann Take 1 Tab by mouth every Monday, Wednesday, Friday. Start taking on:  3/9/2018 NIFEdipine ER 30 mg ER tablet Commonly known as:  ADALAT CC Take 1 Tab by mouth daily. Indications: pressure, add to regimen Prescriptions Sent to Pharmacy Refills  
 metOLazone (ZAROXOLYN) 5 mg tablet 0 Starting on: 3/9/2018 Sig: Take 1 Tab by mouth every Monday, Wednesday, Friday. Class: Normal  
 Pharmacy: [id number] [place] Ph #: [phone] Route: Oral  
  
Follow-up Instructions Return in about 1 week (around 3/15/2018). To-Do List   
 03/08/2018 Imaging:  XR CHEST PA LAT   
  
 03/09/2018 9:00 AM  
  Appointment with DIABETES CLASS #1 \[facility]\"" at [facility]"" DI[facility]IC [facility] ([phone]) Patient Instructions Boost the insulin to 18 units. Take the metolazone today, Saturday, and see what Dr. Chela Diaz says about the 3rd dose. Introducing [address]! Apolinar Radford introduces [employer] patient portal. Now you can access parts of your medical record, email your doctor's office, and request medication refills online. 1. In your internet browser, go to [email or web address] 2. Click on the First Time User? Click Here link in the Sign In box. You will see the New Member Sign Up page. 3. Enter your Ep[employer] Access Code exactly as it appears below. You will not need to use this code after youve completed the sign-up process. If you do not sign up before the expiration date, you must request a new code. · [employer] Access Code: [email or web address] Expires: 3/27/2018  9:50 AM 
 
4. Enter the last four digits of your Social Security Number (xxxx) and Date of Birth (mm/dd/yyyy) as indicated and click Submit. You will be taken to the next sign-up page. 5. Create a RealityMinet ID. This will be your Ep[employer] login ID and cannot be changed, so think of one that is secure and easy to remember. 6. Create a Ep[employer]iro password. You can change your password at any time. 7. Enter your Password Reset Question and Answer. This can be used at a later time if you forget your password. 8. Enter your e-mail address. You will receive e-mail notification when new information is available in [address] 9. Click Sign Up. You can now view and download portions of your medical record. 10. Click the Download Summary menu link to download a portable copy of your medical information. If you have questions, please visit the Frequently Asked Questions section of the [facility] website. Remember, [facility] is NOT to be used for urgent needs. For medical emergencies, dial 911. Now available from your iPhone and Android! Please provide this summary of care documentation to your next provider. Your primary care clinician is listed as Israel Amaro. If you have any questions after today's visit, please call [phone].

## 2018-03-08 NOTE — PROGRESS NOTES
Randall Monroe is a 66 y.o. male presenting for/with:    Diabetes (hospital follow up )    HPI:  Diabetes with hx recent spell of DKA. Feeling ok today, but worsening edema. Sugars still controlled fairly to poorly. home checks showed running high 100's in AM, low 200's PM, but never bumped to 18 units as I ordered last visit. Hypoglycemia: no lows lately. Tolerating current treatment well. Current medications include insulin novolin 70/30, taking 15 units BID ac. Taking insulin regularly. GFR headed down on last check. K+ was ok. Lab Results   Component Value Date/Time    Potassium 4.5 02/23/2018 11:35 AM     Lab Results   Component Value Date/Time    Hemoglobin A1c 12.0 (H) 02/13/2018 05:19 PM    Hemoglobin A1c 10.9 (H) 01/22/2018 03:55 PM    Hemoglobin A1c 12.5 (H) 11/09/2016 04:12 PM    Glucose 48 (L) 02/23/2018 11:35 AM    Glucose (POC) 214 (H) 02/15/2018 04:27 PM    Microalb/Creat ratio (ug/mg creat.) 3487.6 (H) 11/09/2016 04:11 PM    Microalbumin/creat ratio (POC) >300 01/22/2018 03:30 PM    LDL, calculated 48 01/22/2018 03:55 PM    Creatinine 3.56 (H) 02/23/2018 11:35 AM     Lab Results   Component Value Date/Time    Microalb/Creat ratio (ug/mg creat.) 3487.6 (H) 11/09/2016 04:11 PM     Hypertension. Blood pressures fair today again. Thinks he's back on nifedipine ER 30mg daily, and con't on coreg 12.5 BID and lasix at 40mg daily. We tried adding metolazone 2.5 BIW last week, but got no results. Weight is up a ton again. Symptoms include edema, marked weight gain.  Patient denies chest pain, palpitations, orthopnea  Lab review:   Lab Results   Component Value Date/Time    Sodium 135 02/23/2018 11:35 AM    Potassium 4.5 02/23/2018 11:35 AM    Chloride 105 02/23/2018 11:35 AM    CO2 16 (L) 02/23/2018 11:35 AM    Anion gap 10 02/15/2018 03:31 AM    Glucose 48 (L) 02/23/2018 11:35 AM    BUN 43 (H) 02/23/2018 11:35 AM    Creatinine 3.56 (H) 02/23/2018 11:35 AM    BUN/Creatinine ratio 12 02/23/2018 11:35 AM    GFR est AA 21 (L) 02/23/2018 11:35 AM    GFR est non-AA 18 (L) 02/23/2018 11:35 AM    Calcium 7.6 (L) 02/23/2018 11:35 AM       ROS:  Constitutional: No fever, chills or weight loss  Respiratory: No cough, SOB   CV: No chest pain or Palpitations    Visit Vitals    /86 (BP 1 Location: Right arm, BP Patient Position: Sitting)    Pulse 67    Temp 97.3 °F (36.3 °C) (Oral)    Resp 12    Ht 6' 2\" (1.88 m)    Wt 228 lb 6.4 oz (103.6 kg)    SpO2 97%    BMI 29.32 kg/m2     Wt Readings from Last 3 Encounters:   03/08/18 228 lb 6.4 oz (103.6 kg)   02/23/18 220 lb (99.8 kg)   02/19/18 208 lb 3.2 oz (94.4 kg)   +8#  BP Readings from Last 3 Encounters:   03/08/18 140/86   02/23/18 140/80   02/19/18 (!) 170/100     Physical Examination: General appearance - alert, ill appearing, and in mod discomfort  Mental status - alert, oriented to person, place, and time  Eyes - pupils equal and reactive, extraocular eye movements intact  ENT - bilateral external ears and nose normal. Normal lips  Neck - supple, no significant adenopathy, no thyromegaly or mass  Lymphatics - no palpable lymphadenopathy, no hepatosplenomegaly  Chest - clear to auscultation, no wheezes, rales or rhonchi, symmetric air entry  Heart - normal rate, regular rhythm, normal S1, S2, no murmurs, rubs, clicks or gallops  Extremities - peripheral pulses normal, 3+ pedal edema, no clubbing or cyanosis. A/p:  DM2, with recent DKA episode and CKD5 (worsening). .  Sugar doing so/so, but never boosted to 18 units AM, 18 units before dinner. Try again to do that. Con't sugar checks with breakfast and dinner meals. Stay off metformin due to low GFR aa. Bring log to next visit. Con't Calcitriol 0.25 mcg 1 cap daily. Keep trying to set up visit with renal Dr. Darion Epps. Check labs (Renal panel). CAP  Rpt cxr for clearance. HTN  Much better, almost to goal. Heri Mech a little better on last check. Edema about the same and weight is worse.  Try boost metolazone to 5mg 3d/week x1wk. Meeting with renal next week to discuss dialysis. Con't coreg 12.5 BID, con't lasix at 40mg qam, con't adalat CC 30mg daily (vs norvasc due to cost concerns).     F/U 1wk

## 2018-03-14 NOTE — PROGRESS NOTES
Contacted by Dr. Linette Alberto. The boosted metolazone didn't do much, pt just not excreting free water. She boosted lasix to 80 BID, and is getting him set up for dialysis, but he's very iron deficient and anemic. Pt trying to get set up for [employer] care card, which will probably help if he needs iron infusion, labs, etc at [facility].

## 2018-03-15 ENCOUNTER — HOSPITAL ENCOUNTER (INPATIENT)
Age: 58
LOS: 4 days | Discharge: HOME | DRG: 637 | End: 2018-03-20
Attending: EMERGENCY MEDICINE | Admitting: INTERNAL MEDICINE
Payer: MEDICARE

## 2018-03-15 DIAGNOSIS — N17.9 ACUTE RENAL FAILURE SUPERIMPOSED ON CHRONIC KIDNEY DISEASE, UNSPECIFIED CKD STAGE, UNSPECIFIED ACUTE RENAL FAILURE TYPE (HCC): ICD-10-CM

## 2018-03-15 DIAGNOSIS — N18.9 ACUTE RENAL FAILURE SUPERIMPOSED ON CHRONIC KIDNEY DISEASE, UNSPECIFIED CKD STAGE, UNSPECIFIED ACUTE RENAL FAILURE TYPE (HCC): ICD-10-CM

## 2018-03-15 DIAGNOSIS — N18.5 CKD STAGE 5 DUE TO TYPE 2 DIABETES MELLITUS (HCC): ICD-10-CM

## 2018-03-15 DIAGNOSIS — I10 ESSENTIAL HYPERTENSION: ICD-10-CM

## 2018-03-15 DIAGNOSIS — E08.10 DIABETIC KETOACIDOSIS WITHOUT COMA ASSOCIATED WITH DIABETES MELLITUS DUE TO UNDERLYING CONDITION (HCC): Primary | ICD-10-CM

## 2018-03-15 DIAGNOSIS — E11.22 CKD STAGE 5 DUE TO TYPE 2 DIABETES MELLITUS (HCC): ICD-10-CM

## 2018-03-15 DIAGNOSIS — J18.9 HCAP (HEALTHCARE-ASSOCIATED PNEUMONIA): ICD-10-CM

## 2018-03-15 LAB
GLUCOSE BLD STRIP.AUTO-MCNC: 465 MG/DL (ref 65–100)
SERVICE CMNT-IMP: ABNORMAL

## 2018-03-15 PROCEDURE — 82962 GLUCOSE BLOOD TEST: CPT

## 2018-03-15 PROCEDURE — 99285 EMERGENCY DEPT VISIT HI MDM: CPT

## 2018-03-15 RX ORDER — MAGNESIUM SULFATE 100 %
4 CRYSTALS MISCELLANEOUS AS NEEDED
Status: DISCONTINUED | OUTPATIENT
Start: 2018-03-15 | End: 2018-03-17

## 2018-03-15 RX ORDER — MAGNESIUM SULFATE 100 %
4 CRYSTALS MISCELLANEOUS AS NEEDED
Status: DISCONTINUED | OUTPATIENT
Start: 2018-03-15 | End: 2018-03-15

## 2018-03-15 RX ORDER — DEXTROSE 50 % IN WATER (D50W) INTRAVENOUS SYRINGE
12.5-25 AS NEEDED
Status: DISCONTINUED | OUTPATIENT
Start: 2018-03-15 | End: 2018-03-15

## 2018-03-15 RX ORDER — DEXTROSE 50 % IN WATER (D50W) INTRAVENOUS SYRINGE
12.5-25 AS NEEDED
Status: DISCONTINUED | OUTPATIENT
Start: 2018-03-15 | End: 2018-03-17

## 2018-03-15 RX ORDER — ENOXAPARIN SODIUM 100 MG/ML
40 INJECTION SUBCUTANEOUS EVERY 24 HOURS
Status: DISCONTINUED | OUTPATIENT
Start: 2018-03-15 | End: 2018-03-15

## 2018-03-15 RX ORDER — INSULIN LISPRO 100 [IU]/ML
INJECTION, SOLUTION INTRAVENOUS; SUBCUTANEOUS
Status: DISCONTINUED | OUTPATIENT
Start: 2018-03-16 | End: 2018-03-15

## 2018-03-15 RX ORDER — SODIUM CHLORIDE 0.9 % (FLUSH) 0.9 %
5-10 SYRINGE (ML) INJECTION EVERY 8 HOURS
Status: DISCONTINUED | OUTPATIENT
Start: 2018-03-15 | End: 2018-03-15

## 2018-03-15 RX ORDER — SODIUM CHLORIDE 0.9 % (FLUSH) 0.9 %
5-10 SYRINGE (ML) INJECTION AS NEEDED
Status: DISCONTINUED | OUTPATIENT
Start: 2018-03-15 | End: 2018-03-15

## 2018-03-15 NOTE — IP AVS SNAPSHOT
[address] 845 [facility] 
[phone] Patient: Fritz Batista MRN: [id number] [facility]:[birth year] About your hospitalization You were admitted on:  March 16, 2018 You last received care in the:  [facility] 3 ORTHOPEDICS You were discharged on:  March 20, 2018 Why you were hospitalized Your primary diagnosis was:  Not on File Your diagnoses also included:  Dka (Diabetic Ketoacidoses) (Hcc), Htn (Hypertension), Renal Cell Cancer (Hcc), Hld (Hyperlipidemia), Acute On Chronic Renal Failure (Hcc), Hcap (Healthcare-Associated Pneumonia) Follow-up Information Follow up With Details Comments Contact Info Kike Hussein MD Schedule an appointment as soon as possible for a visit in 2 weeks  1000 [facility] 2200 [facility],5Th Floor [id number] [phone] Valeria Libman, MD Schedule an appointment as soon as possible for a visit in 3 weeks  [address] 845 [facility] 
[phone] Discharge Orders None A check  indicates which time of day the medication should be taken. My Medications START taking these medications Instructions Each Dose to Equal  
 Morning Noon Evening Bedtime  
 levoFLOXacin 500 mg tablet Commonly known as:  Dav Julian Start taking on:  3/21/2018 Your last dose was: Your next dose is: Take 1 Tab by mouth every fourty-eight (48) hours for 1 day. 500 mg  
    
   
   
   
  
 sodium bicarbonate 650 mg tablet Your last dose was: Your next dose is: Take 1 Tab by mouth three (3) times daily. 650 mg CHANGE how you take these medications Instructions Each Dose to Equal  
 Morning Noon Evening Bedtime  
 furosemide 80 mg tablet Commonly known as:  LASIX What changed:   
- medication strength 
- how much to take Your last dose was: Your next dose is: Take 1 Tab by mouth daily. 80 mg CONTINUE taking these medications Instructions Each Dose to Equal  
 Morning Noon Evening Bedtime  
 aspirin delayed-release 81 mg tablet Your last dose was: Your next dose is: Take 162 mg by mouth daily. 162 mg  
    
   
   
   
  
 atorvastatin 20 mg tablet Commonly known as:  LIPITOR Your last dose was: Your next dose is: Take 20 mg by mouth daily. 20 mg  
    
   
   
   
  
 carvedilol 12.5 mg tablet Commonly known as:  Brianne Lorenzo Your last dose was: Your next dose is: Take 1 Tab by mouth two (2) times daily (with meals). Indications: pressure and heart 12.5 mg  
    
   
   
   
  
 insulin NPH/insulin regular 100 unit/mL (70-30) injection Commonly known as:  NOVOLIN 70/30, HUMULIN 70/30 Your last dose was: Your next dose is:    
   
   
 18 units with Breakfast and dinner NIFEdipine ER 30 mg ER tablet Commonly known as:  ADALAT CC Your last dose was: Your next dose is: Take 1 Tab by mouth daily. Indications: pressure, add to regimen 30 mg  
    
   
   
   
  
  
STOP taking these medications   
 clotrimazole 1 % topical cream  
Commonly known as:  David Canal Where to Get Your Medications Information on where to get these meds will be given to you by the nurse or doctor. ! Ask your nurse or doctor about these medications  
  furosemide 80 mg tablet  
 levoFLOXacin 500 mg tablet  
 sodium bicarbonate 650 mg tablet Discharge Instructions Patient Discharge Instructions Saúl Briscoe / [id number] : 1960 Admitted 3/15/2018 Discharged: 3/20/2018 DISCHARGE DIAGNOSIS:  
 
  DKA (diabetic ketoacidoses) ([facility] (3/16/2018) Acute on chronic renal failure ([facility] (3/16/2018) HCAP (healthcare-associated pneumonia) (3/16/2018) What to do at [facility] 1. Recommended diet: Diabetic Diet 2. Recommended activity: Activity as tolerated 3. If you experience any of the following symptoms then please call your primary care physician or return to the emergency room if you cannot get hold of your doctor: 
 Fevers > 100.5, chills Nausea or vomiting, persistent diarrhea > 24 hours Blood in stool or black stools Chest pain or SOB Follow-up Information Follow up With Details Comments Contact Info Osman Hatfield MD Schedule an appointment as soon as possible for a visit in 2 weeks  1000 [facility] 2200 [facility],5Th Floor [address] [phone] Loco Sánchez MD Schedule an appointment as soon as possible for a visit in 3 weeks  [address] 845 [facility] 
[phone] Take the last dose of antibiotic Wednesday Am, then you are done Information obtained by : 
I understand that if any problems occur once I am at home I am to contact my physician. I understand and acknowledge receipt of the instructions indicated above. Physician's or R.N.'s Signature                                                                  Date/Time Patient or Representative Signature                                                          Date/Time [place] Announcement We are excited to announce that we are making your provider's discharge notes available to you in EZ4U. You will see these notes when they are completed and signed by the physician that discharged you from your recent hospital stay.   If you have any questions or concerns about any information you see in ZikBit, please call the Health Information Department where you were seen or reach out to your Primary Care Provider for more information about your plan of care. Introducing [facility]! [employer] introduces ZikBit patient portal. Now you can access parts of your medical record, email your doctor's office, and request medication refills online. 1. In your internet browser, go to [email or web address] 2. Click on the First Time User? Click Here link in the Sign In box. You will see the New Member Sign Up page. 3. Enter your ZikBit Access Code exactly as it appears below. You will not need to use this code after youve completed the sign-up process. If you do not sign up before the expiration date, you must request a new code. · ZikBit Access Code: [email or web address] Expires: 3/27/2018 10:50 AM 
 
4. Enter the last four digits of your Social Security Number (xxxx) and Date of Birth (mm/dd/yyyy) as indicated and click Submit. You will be taken to the next sign-up page. 5. Create a ZikBit ID. This will be your ZikBit login ID and cannot be changed, so think of one that is secure and easy to remember. 6. Create a ZikBit password. You can change your password at any time. 7. Enter your Password Reset Question and Answer. This can be used at a later time if you forget your password. 8. Enter your e-mail address. You will receive e-mail notification when new information is available in [address] 9. Click Sign Up. You can now view and download portions of your medical record. 10. Click the Download Summary menu link to download a portable copy of your medical information. If you have questions, please visit the Frequently Asked Questions section of the ZikBit website. Remember, ZikBit is NOT to be used for urgent needs. For medical emergencies, dial 911. Now available from your iPhone and Android! Providers Seen During Your Hospitalization Provider Specialty Primary office phone Arlin Orourke MD Emergency Medicine [phone] Rabia Gottlieb MD Internal Medicine MD Edwige Hospitalist [phone] Your Primary Care Physician (PCP) Primary Care Physician Office Phone Office Fax Anoop West [address] [phone] You are allergic to the following No active allergies Recent Documentation Height Weight BMI Smoking Status 1.88 m 100.3 kg 28.39 kg/m2 Never Smoker Emergency Contacts Name Discharge Info Relation Home Work Mobile WillieYanni DISCHARGE CAREGIVER [3] Spouse [3] [phone] Patient Belongings The following personal items are in your possession at time of discharge: 
     Visual Aid: None      Home Medications: Kept at bedside   Jewelry: None       Other Valuables: Cell Phone Please provide this summary of care documentation to your next provider. Signatures-by signing, you are acknowledging that this After Visit Summary has been reviewed with you and you have received a copy. Patient Signature:  ____________________________________________________________ Date:  ____________________________________________________________  
  
Arley Moya Provider Signature:  ____________________________________________________________ Date:  ____________________________________________________________

## 2018-03-16 ENCOUNTER — APPOINTMENT (OUTPATIENT)
Dept: ULTRASOUND IMAGING | Age: 58
DRG: 637 | End: 2018-03-16
Attending: INTERNAL MEDICINE
Payer: MEDICARE

## 2018-03-16 PROBLEM — E11.10 DKA (DIABETIC KETOACIDOSES): Status: ACTIVE | Noted: 2018-03-16

## 2018-03-16 PROBLEM — J18.9 HCAP (HEALTHCARE-ASSOCIATED PNEUMONIA): Status: ACTIVE | Noted: 2018-03-16

## 2018-03-16 PROBLEM — N18.9 ACUTE ON CHRONIC RENAL FAILURE (HCC): Status: ACTIVE | Noted: 2018-03-16

## 2018-03-16 PROBLEM — N17.9 ACUTE ON CHRONIC RENAL FAILURE (HCC): Status: ACTIVE | Noted: 2018-03-16

## 2018-03-16 LAB
ADMINISTERED INITIALS, ADMINIT: NORMAL
ALBUMIN SERPL-MCNC: 1.5 G/DL (ref 3.5–5)
ALBUMIN/GLOB SERPL: 0.2 {RATIO} (ref 1.1–2.2)
ALP SERPL-CCNC: 153 U/L (ref 45–117)
ALT SERPL-CCNC: 13 U/L (ref 12–78)
AMORPH CRY URNS QL MICRO: ABNORMAL
ANION GAP SERPL CALC-SCNC: 10 MMOL/L (ref 5–15)
ANION GAP SERPL CALC-SCNC: 11 MMOL/L (ref 5–15)
ANION GAP SERPL CALC-SCNC: 13 MMOL/L (ref 5–15)
ANION GAP SERPL CALC-SCNC: 13 MMOL/L (ref 5–15)
ANION GAP SERPL CALC-SCNC: 14 MMOL/L (ref 5–15)
ANION GAP SERPL CALC-SCNC: 8 MMOL/L (ref 5–15)
APPEARANCE UR: CLEAR
ARTERIAL PATENCY WRIST A: ABNORMAL
AST SERPL-CCNC: 6 U/L (ref 15–37)
B-OH-BUTYR SERPL-SCNC: 0.13 MMOL/L
BACTERIA URNS QL MICRO: ABNORMAL /HPF
BASE DEFICIT BLDA-SCNC: 10.7 MMOL/L
BDY SITE: ABNORMAL
BILIRUB SERPL-MCNC: 0.4 MG/DL (ref 0.2–1)
BILIRUB UR QL: NEGATIVE
BUN SERPL-MCNC: 54 MG/DL (ref 6–20)
BUN SERPL-MCNC: 54 MG/DL (ref 6–20)
BUN SERPL-MCNC: 55 MG/DL (ref 6–20)
BUN SERPL-MCNC: 58 MG/DL (ref 6–20)
BUN SERPL-MCNC: 59 MG/DL (ref 6–20)
BUN SERPL-MCNC: 59 MG/DL (ref 6–20)
BUN/CREAT SERPL: 11 (ref 12–20)
BUN/CREAT SERPL: 11 (ref 12–20)
BUN/CREAT SERPL: 12 (ref 12–20)
CALCIUM SERPL-MCNC: 6.4 MG/DL (ref 8.5–10.1)
CALCIUM SERPL-MCNC: 6.6 MG/DL (ref 8.5–10.1)
CALCIUM SERPL-MCNC: 6.9 MG/DL (ref 8.5–10.1)
CALCIUM SERPL-MCNC: 6.9 MG/DL (ref 8.5–10.1)
CALCIUM SERPL-MCNC: 7 MG/DL (ref 8.5–10.1)
CALCIUM SERPL-MCNC: 7.2 MG/DL (ref 8.5–10.1)
CHLORIDE SERPL-SCNC: 104 MMOL/L (ref 97–108)
CHLORIDE SERPL-SCNC: 105 MMOL/L (ref 97–108)
CHLORIDE SERPL-SCNC: 107 MMOL/L (ref 97–108)
CHLORIDE SERPL-SCNC: 108 MMOL/L (ref 97–108)
CHLORIDE SERPL-SCNC: 109 MMOL/L (ref 97–108)
CHLORIDE SERPL-SCNC: 112 MMOL/L (ref 97–108)
CO2 SERPL-SCNC: 12 MMOL/L (ref 21–32)
CO2 SERPL-SCNC: 14 MMOL/L (ref 21–32)
CO2 SERPL-SCNC: 15 MMOL/L (ref 21–32)
CO2 SERPL-SCNC: 15 MMOL/L (ref 21–32)
CO2 SERPL-SCNC: 16 MMOL/L (ref 21–32)
CO2 SERPL-SCNC: 17 MMOL/L (ref 21–32)
COLOR UR: ABNORMAL
CREAT SERPL-MCNC: 4.61 MG/DL (ref 0.7–1.3)
CREAT SERPL-MCNC: 4.63 MG/DL (ref 0.7–1.3)
CREAT SERPL-MCNC: 4.74 MG/DL (ref 0.7–1.3)
CREAT SERPL-MCNC: 4.8 MG/DL (ref 0.7–1.3)
CREAT SERPL-MCNC: 4.83 MG/DL (ref 0.7–1.3)
CREAT SERPL-MCNC: 5.16 MG/DL (ref 0.7–1.3)
D50 ADMINISTERED, D50ADM: 0 ML
D50 ADMINISTERED, D50ADM: 13 ML
D50 ORDER, D50ORD: 0 ML
D50 ORDER, D50ORD: 13 ML
EPITH CASTS URNS QL MICRO: ABNORMAL /LPF
EST. AVERAGE GLUCOSE BLD GHB EST-MCNC: 255 MG/DL
GAS FLOW.O2 O2 DELIVERY SYS: 3 L/MIN
GLOBULIN SER CALC-MCNC: 6.5 G/DL (ref 2–4)
GLSCOM COMMENTS: NORMAL
GLUCOSE BLD STRIP.AUTO-MCNC: 104 MG/DL (ref 65–100)
GLUCOSE BLD STRIP.AUTO-MCNC: 104 MG/DL (ref 65–100)
GLUCOSE BLD STRIP.AUTO-MCNC: 106 MG/DL (ref 65–100)
GLUCOSE BLD STRIP.AUTO-MCNC: 122 MG/DL (ref 65–100)
GLUCOSE BLD STRIP.AUTO-MCNC: 131 MG/DL (ref 65–100)
GLUCOSE BLD STRIP.AUTO-MCNC: 134 MG/DL (ref 65–100)
GLUCOSE BLD STRIP.AUTO-MCNC: 142 MG/DL (ref 65–100)
GLUCOSE BLD STRIP.AUTO-MCNC: 158 MG/DL (ref 65–100)
GLUCOSE BLD STRIP.AUTO-MCNC: 162 MG/DL (ref 65–100)
GLUCOSE BLD STRIP.AUTO-MCNC: 163 MG/DL (ref 65–100)
GLUCOSE BLD STRIP.AUTO-MCNC: 182 MG/DL (ref 65–100)
GLUCOSE BLD STRIP.AUTO-MCNC: 193 MG/DL (ref 65–100)
GLUCOSE BLD STRIP.AUTO-MCNC: 211 MG/DL (ref 65–100)
GLUCOSE BLD STRIP.AUTO-MCNC: 238 MG/DL (ref 65–100)
GLUCOSE BLD STRIP.AUTO-MCNC: 342 MG/DL (ref 65–100)
GLUCOSE BLD STRIP.AUTO-MCNC: 391 MG/DL (ref 65–100)
GLUCOSE BLD STRIP.AUTO-MCNC: 455 MG/DL (ref 65–100)
GLUCOSE BLD STRIP.AUTO-MCNC: 477 MG/DL (ref 65–100)
GLUCOSE BLD STRIP.AUTO-MCNC: 68 MG/DL (ref 65–100)
GLUCOSE BLD STRIP.AUTO-MCNC: 84 MG/DL (ref 65–100)
GLUCOSE BLD STRIP.AUTO-MCNC: 86 MG/DL (ref 65–100)
GLUCOSE BLD STRIP.AUTO-MCNC: 94 MG/DL (ref 65–100)
GLUCOSE BLD STRIP.AUTO-MCNC: 99 MG/DL (ref 65–100)
GLUCOSE SERPL-MCNC: 106 MG/DL (ref 65–100)
GLUCOSE SERPL-MCNC: 196 MG/DL (ref 65–100)
GLUCOSE SERPL-MCNC: 340 MG/DL (ref 65–100)
GLUCOSE SERPL-MCNC: 410 MG/DL (ref 65–100)
GLUCOSE SERPL-MCNC: 450 MG/DL (ref 65–100)
GLUCOSE SERPL-MCNC: 99 MG/DL (ref 65–100)
GLUCOSE UR STRIP.AUTO-MCNC: NEGATIVE MG/DL
GLUCOSE, GLC: 104 MG/DL
GLUCOSE, GLC: 104 MG/DL
GLUCOSE, GLC: 106 MG/DL
GLUCOSE, GLC: 122 MG/DL
GLUCOSE, GLC: 131 MG/DL
GLUCOSE, GLC: 134 MG/DL
GLUCOSE, GLC: 158 MG/DL
GLUCOSE, GLC: 162 MG/DL
GLUCOSE, GLC: 163 MG/DL
GLUCOSE, GLC: 182 MG/DL
GLUCOSE, GLC: 193 MG/DL
GLUCOSE, GLC: 211 MG/DL
GLUCOSE, GLC: 238 MG/DL
GLUCOSE, GLC: 342 MG/DL
GLUCOSE, GLC: 391 MG/DL
GLUCOSE, GLC: 455 MG/DL
GLUCOSE, GLC: 465 MG/DL
GLUCOSE, GLC: 477 MG/DL
GLUCOSE, GLC: 68 MG/DL
GLUCOSE, GLC: 84 MG/DL
GLUCOSE, GLC: 86 MG/DL
GLUCOSE, GLC: 99 MG/DL
HBA1C MFR BLD: 10.5 % (ref 4.2–6.3)
HCO3 BLDA-SCNC: 14 MMOL/L (ref 22–26)
HGB UR QL STRIP: ABNORMAL
HIGH TARGET, HITG: 250 MG/DL
HYALINE CASTS URNS QL MICRO: ABNORMAL /LPF (ref 0–5)
INSULIN ADMINSTERED, INSADM: 0 UNITS/HOUR
INSULIN ADMINSTERED, INSADM: 0.1 UNITS/HOUR
INSULIN ADMINSTERED, INSADM: 0.1 UNITS/HOUR
INSULIN ADMINSTERED, INSADM: 1 UNITS/HOUR
INSULIN ADMINSTERED, INSADM: 10.2 UNITS/HOUR
INSULIN ADMINSTERED, INSADM: 10.7 UNITS/HOUR
INSULIN ADMINSTERED, INSADM: 12.5 UNITS/HOUR
INSULIN ADMINSTERED, INSADM: 13.3 UNITS/HOUR
INSULIN ADMINSTERED, INSADM: 15.1 UNITS/HOUR
INSULIN ADMINSTERED, INSADM: 15.8 UNITS/HOUR
INSULIN ADMINSTERED, INSADM: 16.6 UNITS/HOUR
INSULIN ADMINSTERED, INSADM: 16.9 UNITS/HOUR
INSULIN ADMINSTERED, INSADM: 2.1 UNITS/HOUR
INSULIN ADMINSTERED, INSADM: 2.9 UNITS/HOUR
INSULIN ADMINSTERED, INSADM: 5.7 UNITS/HOUR
INSULIN ADMINSTERED, INSADM: 5.9 UNITS/HOUR
INSULIN ADMINSTERED, INSADM: 8.1 UNITS/HOUR
INSULIN ORDER, INSORD: 0 UNITS/HOUR
INSULIN ORDER, INSORD: 0.1 UNITS/HOUR
INSULIN ORDER, INSORD: 0.1 UNITS/HOUR
INSULIN ORDER, INSORD: 0.2 UNITS/HOUR
INSULIN ORDER, INSORD: 1 UNITS/HOUR
INSULIN ORDER, INSORD: 10.2 UNITS/HOUR
INSULIN ORDER, INSORD: 10.7 UNITS/HOUR
INSULIN ORDER, INSORD: 12.5 UNITS/HOUR
INSULIN ORDER, INSORD: 13.3 UNITS/HOUR
INSULIN ORDER, INSORD: 15.1 UNITS/HOUR
INSULIN ORDER, INSORD: 15.8 UNITS/HOUR
INSULIN ORDER, INSORD: 16.6 UNITS/HOUR
INSULIN ORDER, INSORD: 16.9 UNITS/HOUR
INSULIN ORDER, INSORD: 2.1 UNITS/HOUR
INSULIN ORDER, INSORD: 2.9 UNITS/HOUR
INSULIN ORDER, INSORD: 5.7 UNITS/HOUR
INSULIN ORDER, INSORD: 5.9 UNITS/HOUR
INSULIN ORDER, INSORD: 8.1 UNITS/HOUR
KETONES UR QL STRIP.AUTO: NEGATIVE MG/DL
LEUKOCYTE ESTERASE UR QL STRIP.AUTO: NEGATIVE
LOW TARGET, LOT: 150 MG/DL
MAGNESIUM SERPL-MCNC: 1.9 MG/DL (ref 1.6–2.4)
MAGNESIUM SERPL-MCNC: 2.1 MG/DL (ref 1.6–2.4)
MINUTES UNTIL NEXT BG, NBG: 15 MIN
MINUTES UNTIL NEXT BG, NBG: 60 MIN
MULTIPLIER, MUL: 0
MULTIPLIER, MUL: 0.01
MULTIPLIER, MUL: 0.02
MULTIPLIER, MUL: 0.02
MULTIPLIER, MUL: 0.03
MULTIPLIER, MUL: 0.04
MULTIPLIER, MUL: 0.04
MULTIPLIER, MUL: 0.05
MULTIPLIER, MUL: 0.05
MULTIPLIER, MUL: 0.06
MULTIPLIER, MUL: 0.08
MULTIPLIER, MUL: 0.1
NITRITE UR QL STRIP.AUTO: NEGATIVE
ORDER INITIALS, ORDINIT: NORMAL
PCO2 BLDA: 29 MMHG (ref 35–45)
PH BLDA: 7.3 [PH] (ref 7.35–7.45)
PH UR STRIP: 5 [PH] (ref 5–8)
PHOSPHATE SERPL-MCNC: 5.7 MG/DL (ref 2.6–4.7)
PHOSPHATE SERPL-MCNC: 6.3 MG/DL (ref 2.6–4.7)
PO2 BLDA: 60 MMHG (ref 80–100)
POTASSIUM SERPL-SCNC: 3.7 MMOL/L (ref 3.5–5.1)
POTASSIUM SERPL-SCNC: 3.7 MMOL/L (ref 3.5–5.1)
POTASSIUM SERPL-SCNC: 3.8 MMOL/L (ref 3.5–5.1)
POTASSIUM SERPL-SCNC: 4.3 MMOL/L (ref 3.5–5.1)
POTASSIUM SERPL-SCNC: 4.5 MMOL/L (ref 3.5–5.1)
POTASSIUM SERPL-SCNC: 4.6 MMOL/L (ref 3.5–5.1)
PROT SERPL-MCNC: 8 G/DL (ref 6.4–8.2)
PROT UR STRIP-MCNC: 300 MG/DL
RBC #/AREA URNS HPF: ABNORMAL /HPF (ref 0–5)
SAO2 % BLD: 89 % (ref 92–97)
SAO2% DEVICE SAO2% SENSOR NAME: ABNORMAL
SERVICE CMNT-IMP: ABNORMAL
SERVICE CMNT-IMP: NORMAL
SODIUM SERPL-SCNC: 132 MMOL/L (ref 136–145)
SODIUM SERPL-SCNC: 132 MMOL/L (ref 136–145)
SODIUM SERPL-SCNC: 133 MMOL/L (ref 136–145)
SODIUM SERPL-SCNC: 134 MMOL/L (ref 136–145)
SODIUM SERPL-SCNC: 134 MMOL/L (ref 136–145)
SODIUM SERPL-SCNC: 138 MMOL/L (ref 136–145)
SP GR UR REFRACTOMETRY: 1.01 (ref 1–1.03)
SPECIMEN SITE: ABNORMAL
UA: UC IF INDICATED,UAUC: ABNORMAL
UROBILINOGEN UR QL STRIP.AUTO: 0.2 EU/DL (ref 0.2–1)
WBC URNS QL MICRO: ABNORMAL /HPF (ref 0–4)

## 2018-03-16 PROCEDURE — G8980 MOBILITY D/C STATUS: HCPCS

## 2018-03-16 PROCEDURE — 36600 WITHDRAWAL OF ARTERIAL BLOOD: CPT | Performed by: INTERNAL MEDICINE

## 2018-03-16 PROCEDURE — G8989 SELF CARE D/C STATUS: HCPCS | Performed by: OCCUPATIONAL THERAPIST

## 2018-03-16 PROCEDURE — 80048 BASIC METABOLIC PNL TOTAL CA: CPT | Performed by: INTERNAL MEDICINE

## 2018-03-16 PROCEDURE — 87086 URINE CULTURE/COLONY COUNT: CPT | Performed by: INTERNAL MEDICINE

## 2018-03-16 PROCEDURE — 82962 GLUCOSE BLOOD TEST: CPT

## 2018-03-16 PROCEDURE — 96368 THER/DIAG CONCURRENT INF: CPT

## 2018-03-16 PROCEDURE — 82803 BLOOD GASES ANY COMBINATION: CPT | Performed by: INTERNAL MEDICINE

## 2018-03-16 PROCEDURE — 97161 PT EVAL LOW COMPLEX 20 MIN: CPT

## 2018-03-16 PROCEDURE — 74011000250 HC RX REV CODE- 250

## 2018-03-16 PROCEDURE — 74011250637 HC RX REV CODE- 250/637: Performed by: INTERNAL MEDICINE

## 2018-03-16 PROCEDURE — 80053 COMPREHEN METABOLIC PANEL: CPT | Performed by: EMERGENCY MEDICINE

## 2018-03-16 PROCEDURE — 74011000250 HC RX REV CODE- 250: Performed by: INTERNAL MEDICINE

## 2018-03-16 PROCEDURE — 82010 KETONE BODYS QUAN: CPT | Performed by: INTERNAL MEDICINE

## 2018-03-16 PROCEDURE — G8987 SELF CARE CURRENT STATUS: HCPCS | Performed by: OCCUPATIONAL THERAPIST

## 2018-03-16 PROCEDURE — 83036 HEMOGLOBIN GLYCOSYLATED A1C: CPT | Performed by: EMERGENCY MEDICINE

## 2018-03-16 PROCEDURE — 74011000258 HC RX REV CODE- 258: Performed by: EMERGENCY MEDICINE

## 2018-03-16 PROCEDURE — G8988 SELF CARE GOAL STATUS: HCPCS | Performed by: OCCUPATIONAL THERAPIST

## 2018-03-16 PROCEDURE — 74011636637 HC RX REV CODE- 636/637: Performed by: EMERGENCY MEDICINE

## 2018-03-16 PROCEDURE — 97165 OT EVAL LOW COMPLEX 30 MIN: CPT | Performed by: OCCUPATIONAL THERAPIST

## 2018-03-16 PROCEDURE — 83735 ASSAY OF MAGNESIUM: CPT | Performed by: INTERNAL MEDICINE

## 2018-03-16 PROCEDURE — 76770 US EXAM ABDO BACK WALL COMP: CPT

## 2018-03-16 PROCEDURE — 74011250636 HC RX REV CODE- 250/636: Performed by: INTERNAL MEDICINE

## 2018-03-16 PROCEDURE — 81001 URINALYSIS AUTO W/SCOPE: CPT | Performed by: INTERNAL MEDICINE

## 2018-03-16 PROCEDURE — 96366 THER/PROPH/DIAG IV INF ADDON: CPT

## 2018-03-16 PROCEDURE — 65660000000 HC RM CCU STEPDOWN

## 2018-03-16 PROCEDURE — G8978 MOBILITY CURRENT STATUS: HCPCS

## 2018-03-16 PROCEDURE — 84100 ASSAY OF PHOSPHORUS: CPT | Performed by: INTERNAL MEDICINE

## 2018-03-16 PROCEDURE — 96365 THER/PROPH/DIAG IV INF INIT: CPT

## 2018-03-16 PROCEDURE — 36415 COLL VENOUS BLD VENIPUNCTURE: CPT | Performed by: INTERNAL MEDICINE

## 2018-03-16 PROCEDURE — 74011000258 HC RX REV CODE- 258: Performed by: INTERNAL MEDICINE

## 2018-03-16 PROCEDURE — 77010033678 HC OXYGEN DAILY

## 2018-03-16 PROCEDURE — G8979 MOBILITY GOAL STATUS: HCPCS

## 2018-03-16 PROCEDURE — 97530 THERAPEUTIC ACTIVITIES: CPT

## 2018-03-16 PROCEDURE — 74011250636 HC RX REV CODE- 250/636: Performed by: EMERGENCY MEDICINE

## 2018-03-16 RX ORDER — SODIUM CHLORIDE 0.9 % (FLUSH) 0.9 %
5-10 SYRINGE (ML) INJECTION AS NEEDED
Status: DISCONTINUED | OUTPATIENT
Start: 2018-03-16 | End: 2018-03-20 | Stop reason: HOSPADM

## 2018-03-16 RX ORDER — HYDRALAZINE HYDROCHLORIDE 20 MG/ML
10 INJECTION INTRAMUSCULAR; INTRAVENOUS
Status: DISCONTINUED | OUTPATIENT
Start: 2018-03-16 | End: 2018-03-20 | Stop reason: HOSPADM

## 2018-03-16 RX ORDER — ATORVASTATIN CALCIUM 20 MG/1
20 TABLET, FILM COATED ORAL DAILY
Status: DISCONTINUED | OUTPATIENT
Start: 2018-03-16 | End: 2018-03-20 | Stop reason: HOSPADM

## 2018-03-16 RX ORDER — ACETAMINOPHEN 325 MG/1
650 TABLET ORAL
Status: DISCONTINUED | OUTPATIENT
Start: 2018-03-16 | End: 2018-03-20 | Stop reason: HOSPADM

## 2018-03-16 RX ORDER — DEXTROSE MONOHYDRATE AND SODIUM CHLORIDE 5; .45 G/100ML; G/100ML
100 INJECTION, SOLUTION INTRAVENOUS CONTINUOUS
Status: DISCONTINUED | OUTPATIENT
Start: 2018-03-16 | End: 2018-03-16

## 2018-03-16 RX ORDER — DEXTROSE 50 % IN WATER (D50W) INTRAVENOUS SYRINGE
Status: COMPLETED
Start: 2018-03-16 | End: 2018-03-16

## 2018-03-16 RX ORDER — SODIUM CHLORIDE 0.9 % (FLUSH) 0.9 %
5-10 SYRINGE (ML) INJECTION EVERY 8 HOURS
Status: DISCONTINUED | OUTPATIENT
Start: 2018-03-16 | End: 2018-03-20 | Stop reason: HOSPADM

## 2018-03-16 RX ORDER — ONDANSETRON 2 MG/ML
4 INJECTION INTRAMUSCULAR; INTRAVENOUS
Status: DISCONTINUED | OUTPATIENT
Start: 2018-03-16 | End: 2018-03-20 | Stop reason: HOSPADM

## 2018-03-16 RX ORDER — LEVOFLOXACIN 5 MG/ML
750 INJECTION, SOLUTION INTRAVENOUS EVERY 24 HOURS
Status: DISCONTINUED | OUTPATIENT
Start: 2018-03-16 | End: 2018-03-16 | Stop reason: SDUPTHER

## 2018-03-16 RX ORDER — CARVEDILOL 12.5 MG/1
12.5 TABLET ORAL 2 TIMES DAILY WITH MEALS
Status: DISCONTINUED | OUTPATIENT
Start: 2018-03-16 | End: 2018-03-20 | Stop reason: HOSPADM

## 2018-03-16 RX ORDER — CALCITRIOL 0.25 UG/1
0.25 CAPSULE ORAL DAILY
Status: DISCONTINUED | OUTPATIENT
Start: 2018-03-16 | End: 2018-03-18

## 2018-03-16 RX ORDER — SODIUM CHLORIDE 9 MG/ML
50 INJECTION, SOLUTION INTRAVENOUS CONTINUOUS
Status: DISCONTINUED | OUTPATIENT
Start: 2018-03-16 | End: 2018-03-16

## 2018-03-16 RX ORDER — LEVOFLOXACIN 5 MG/ML
750 INJECTION, SOLUTION INTRAVENOUS
Status: DISCONTINUED | OUTPATIENT
Start: 2018-03-16 | End: 2018-03-18

## 2018-03-16 RX ORDER — NIFEDIPINE 30 MG/1
30 TABLET, FILM COATED, EXTENDED RELEASE ORAL DAILY
Status: DISCONTINUED | OUTPATIENT
Start: 2018-03-16 | End: 2018-03-18

## 2018-03-16 RX ORDER — ASPIRIN 81 MG/1
162 TABLET ORAL DAILY
Status: DISCONTINUED | OUTPATIENT
Start: 2018-03-16 | End: 2018-03-20 | Stop reason: HOSPADM

## 2018-03-16 RX ORDER — LEVOFLOXACIN 5 MG/ML
750 INJECTION, SOLUTION INTRAVENOUS
Status: DISCONTINUED | OUTPATIENT
Start: 2018-03-17 | End: 2018-03-16

## 2018-03-16 RX ORDER — DEXTROSE MONOHYDRATE AND SODIUM CHLORIDE 5; .45 G/100ML; G/100ML
75 INJECTION, SOLUTION INTRAVENOUS CONTINUOUS
Status: DISCONTINUED | OUTPATIENT
Start: 2018-03-16 | End: 2018-03-17

## 2018-03-16 RX ORDER — VANCOMYCIN/0.9 % SOD CHLORIDE 1.5G/250ML
1500 PLASTIC BAG, INJECTION (ML) INTRAVENOUS
Status: COMPLETED | OUTPATIENT
Start: 2018-03-16 | End: 2018-03-16

## 2018-03-16 RX ORDER — VANCOMYCIN 1.75 GRAM/500 ML IN 0.9 % SODIUM CHLORIDE INTRAVENOUS
1750
Status: DISCONTINUED | OUTPATIENT
Start: 2018-03-18 | End: 2018-03-17

## 2018-03-16 RX ORDER — HEPARIN SODIUM 5000 [USP'U]/ML
5000 INJECTION, SOLUTION INTRAVENOUS; SUBCUTANEOUS EVERY 8 HOURS
Status: DISCONTINUED | OUTPATIENT
Start: 2018-03-16 | End: 2018-03-17

## 2018-03-16 RX ADMIN — WATER: 1000 INJECTION, SOLUTION INTRAVENOUS at 13:29

## 2018-03-16 RX ADMIN — ACETAMINOPHEN 650 MG: 325 TABLET ORAL at 16:38

## 2018-03-16 RX ADMIN — ACETAMINOPHEN 650 MG: 325 TABLET ORAL at 06:54

## 2018-03-16 RX ADMIN — Medication 10 ML: at 14:00

## 2018-03-16 RX ADMIN — HEPARIN SODIUM 5000 UNITS: 5000 INJECTION, SOLUTION INTRAVENOUS; SUBCUTANEOUS at 14:00

## 2018-03-16 RX ADMIN — SODIUM CHLORIDE 50 ML/HR: 0.9 INJECTION, SOLUTION INTRAVENOUS at 03:06

## 2018-03-16 RX ADMIN — DEXTROSE MONOHYDRATE AND SODIUM CHLORIDE 75 ML/HR: 5; .45 INJECTION, SOLUTION INTRAVENOUS at 14:29

## 2018-03-16 RX ADMIN — ERYTHROPOIETIN 10000 UNITS: 10000 INJECTION, SOLUTION INTRAVENOUS; SUBCUTANEOUS at 18:36

## 2018-03-16 RX ADMIN — Medication 10 ML: at 22:21

## 2018-03-16 RX ADMIN — SODIUM CHLORIDE 16.6 UNITS/HR: 900 INJECTION, SOLUTION INTRAVENOUS at 03:49

## 2018-03-16 RX ADMIN — NIFEDIPINE 30 MG: 30 TABLET, EXTENDED RELEASE ORAL at 09:39

## 2018-03-16 RX ADMIN — CALCITRIOL 0.25 MCG: 0.25 CAPSULE, LIQUID FILLED ORAL at 09:39

## 2018-03-16 RX ADMIN — DEXTROSE MONOHYDRATE 6.5 G: 500 INJECTION PARENTERAL at 10:12

## 2018-03-16 RX ADMIN — ATORVASTATIN CALCIUM 20 MG: 20 TABLET, FILM COATED ORAL at 08:02

## 2018-03-16 RX ADMIN — HEPARIN SODIUM 5000 UNITS: 5000 INJECTION, SOLUTION INTRAVENOUS; SUBCUTANEOUS at 22:21

## 2018-03-16 RX ADMIN — ASPIRIN 162 MG: 81 TABLET, COATED ORAL at 08:02

## 2018-03-16 RX ADMIN — CEFEPIME 2 G: 2 INJECTION, POWDER, FOR SOLUTION INTRAMUSCULAR; INTRAVENOUS at 03:53

## 2018-03-16 RX ADMIN — VANCOMYCIN HYDROCHLORIDE 1500 MG: 10 INJECTION, POWDER, LYOPHILIZED, FOR SOLUTION INTRAVENOUS at 04:47

## 2018-03-16 RX ADMIN — SODIUM CHLORIDE 2.1 UNITS/HR: 900 INJECTION, SOLUTION INTRAVENOUS at 07:57

## 2018-03-16 RX ADMIN — CARVEDILOL 12.5 MG: 12.5 TABLET, FILM COATED ORAL at 16:37

## 2018-03-16 RX ADMIN — CARVEDILOL 12.5 MG: 12.5 TABLET, FILM COATED ORAL at 08:02

## 2018-03-16 RX ADMIN — SODIUM CHLORIDE 8.1 UNITS/HR: 900 INJECTION, SOLUTION INTRAVENOUS at 00:33

## 2018-03-16 RX ADMIN — DEXTROSE MONOHYDRATE AND SODIUM CHLORIDE 100 ML/HR: 5; .45 INJECTION, SOLUTION INTRAVENOUS at 08:38

## 2018-03-16 RX ADMIN — LEVOFLOXACIN 750 MG: 5 INJECTION, SOLUTION INTRAVENOUS at 02:02

## 2018-03-16 RX ADMIN — DEXTROSE MONOHYDRATE 6.5 G: 25 INJECTION, SOLUTION INTRAVENOUS at 10:12

## 2018-03-16 NOTE — ED NOTES
Pt readjusted self in bed for comfort. No further needs at this time. Existing RAC PIV placed by [facility] removed due to infiltration.   Alternate IV placed to L wrist.

## 2018-03-16 NOTE — DIABETES MGMT
DTC Consult Note    Recommendations/ Comments: Pt's anion gap now 13 and BG rebounded to 410 on current labs. Please consider resuming insulin gtt until pt has 2 consecutive anion gap values less than 12, beginning D5 IVF to run concurrently with insulin gtt once BG below 250 again and checking a serum ketone level. Serum ketone would help clarify if acidosis is related to DKA vs related to renal failure. Discussed pt with Dr. Shruthi Mendieta and she will contact the nurse. DTC will f/u with pt. Addendum: Current [facility] 94, spoke with Dr. Shruthi Mendieta. Continue plan above until serum ketone resulted. Current hospital DM medication: insulin gtt stopped, NPH 12units ac b/d    Chart reviewed on Fritz Batista. Patient is a 66 y.o. male with known Type 2 Diabetes on Novolin 70/30 18units ac b/d at home. A1c:   Lab Results   Component Value Date/Time    Hemoglobin A1c 10.5 (H) 03/16/2018 12:06 AM    Hemoglobin A1c 12.0 (H) 02/13/2018 05:19 PM       Recent Glucose Results:   Lab Results   Component Value Date/Time     (H) 03/16/2018 10:42 AM    GLU 99 03/16/2018 08:04 AM     (H) 03/16/2018 04:00 AM    GLUCPOC 94 03/16/2018 11:35 AM    GLUCPOC 84 03/16/2018 10:38 AM    GLUCPOC 68 03/16/2018 10:08 AM        Lab Results   Component Value Date/Time    Creatinine 4.63 (H) 03/16/2018 10:42 AM     Estimated Creatinine Clearance: 22.5 mL/min (based on Cr of 4.63). Active Orders   Diet    DIET DIABETIC CONSISTENT CARB Regular        PO intake: No data found. Will continue to follow as needed.     Thank you  Geetha Cordero, EMERSONN, RN, [address]
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
## 2018-03-16 NOTE — ED NOTES
Bedside and Verbal shift change report given to Shannon Jimenez RN (oncoming nurse) by Nydia Rico RN (offgoing nurse). Report included the following information SBAR, Kardex, ED Summary, MAR, Recent Results and Med Rec Status.
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
## 2018-03-16 NOTE — ED NOTES
11:37 AM  Paged Dr. Vikram Perez for critical labs. Awaiting a call back. 11:41 AM  Spoke with Dr. Vikram Perez made aware of CA 6.4 and CO2 14.

## 2018-03-16 NOTE — ED NOTES
Assumed care of pt from EMS at this time, report received. Pt arrives via transfer from [address] for DKA, BG ranging from 300-500, on insulin drip on arrival at 2 units/hr. Pt states he was recently dc'd from hospital for pneumonia, followed up with PCP for CXR which showed worsening RML pneumonia today. Went to [address] for SOB and \"fluid on lungs\" as well as a worsening, non-productive cough. Pt states he has been compliant with insulin and other medications. Pt denies any recent illness. Pt denies any CP, SOB. Pt denies any pain at this time. Pt denies N/V. Pt resting comfortably on the stretcher in a position of comfort.  Pt in no acute distress at this time.  Call bell within reach.  Side rails x 2.  Cardiac monitor x 3. Pt ANO x 4  Stretcher locked in the lowest position. Pt aware of plan to await for MD/PA-C/NP assessment, and pt/family verbalizes understanding.  Will continue to monitor BG levels.

## 2018-03-16 NOTE — PROGRESS NOTES
Hospitalist Note:    Presented with DKA, HCAP, COLTEN    DKA  -await next BMP if gap remains closed will bridge to SC insulin, NPH 12 units BID  -renal failure could also be contributing to acidosis, as ketones negative on both urine and serum today suggests ketosis resolved    HCAP  -continue antibiotics    Chronic diastolic heart failure  -not in acute exacerbation    COLTEN  -Cr slightly improving, good UOP  -nephrology consulted    Karen De León MD

## 2018-03-16 NOTE — PROGRESS NOTES
Pharmacy Clarification of the Prior to Admission Medication Regimen Retrospective to the Admission Medication Reconciliation    The patient was interviewed regarding clarification of the prior to admission medication regimen and was questioned regarding use of any other inhalers, topical products, over the counter medications, herbal medications, vitamin products or ophthalmic/nasal/otic medication use. Information Obtained From: Rx Query and patient    Recommendations/Findings: The following amendments were made to the patient's active medication list on file at [address]:     1) Additions: None      2) Removals:    Calcitriol   Metolazone    3) Changes: None      4) Pertinent Pharmacy Findings: None         PTA medication list was corrected to the following:     Prior to Admission Medications   Prescriptions Last Dose Informant Patient Reported? Taking? NIFEdipine ER (ADALAT CC) 30 mg ER tablet 3/15/2018 at Unknown time Self No Yes   Sig: Take 1 Tab by mouth daily. Indications: pressure, add to regimen   aspirin delayed-release 81 mg tablet 3/15/2018 at Unknown time Self Yes Yes   Sig: Take 162 mg by mouth daily. atorvastatin (LIPITOR) 20 mg tablet 3/15/2018 at Unknown time Self Yes Yes   Sig: Take 20 mg by mouth daily. carvedilol (COREG) 12.5 mg tablet 3/15/2018 at Unknown time Self No Yes   Sig: Take 1 Tab by mouth two (2) times daily (with meals). Indications: pressure and heart   clotrimazole (LOTRIMIN) 1 % topical cream 3/15/2018 at Unknown time Self Yes Yes   Sig: Apply  to affected area two (2) times a day. Patient applies 'a thin layer' to scaly areas on feet and between toes   furosemide (LASIX) 20 mg tablet 3/15/2018 at Unknown time Self No Yes   Sig: Take 2 Tabs by mouth daily.    insulin NPH/insulin regular (NOVOLIN 70/30, HUMULIN 70/30) 100 unit/mL (70-30) injection 3/15/2018 at Unknown time Self No Yes   Si units with Breakfast and dinner      Facility-Administered Medications: None Thank you,  Cyndee Liao, [facility]  Medication History Pharmacy Technician
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
## 2018-03-16 NOTE — ED NOTES
Spoke with Dr. Polo Paez about pt blood sugar. Hold insulin drip at this time and check metabolic panel.

## 2018-03-16 NOTE — PROGRESS NOTES
Nurse from floor called  To let us know that MR. Jarett Sanders is ready for his Ultrasound now. Order was placed in ER 3:15am as routine, attempted to do the ultrasound earlier. Pt refused.

## 2018-03-16 NOTE — CDMP QUERY
Dr. Ava Hooks :  Please clarify if this patient is (was) being treated/managed for:     => Chronic diastolic (congestive) heart failure  => Other explanation of clinical findings  => Clinically Undetermined (no explanation for clinical findings)    The medical record reflects the following clinical findings, treatment, and risk factors. Risk Factors:  65 yo male admitted w/ Diabetic Ketoacidosis  Clinical Indicators: BNP: 1938. .. Noted in the chart: Pt also states he has continued to have dry cough and SOB and had to come to ED. Ahmet Odonnell Gentle IV hydration considering +3 edema and history of volume overload/pulmonary edema with CKD  Treatment: Holding diuretics /  Monitor fluid status closely    Please clarify and document your clinical opinion in the progress notes and discharge summary including the definitive and/or presumptive diagnosis, (suspected or probable), related to the above clinical findings. Please include clinical findings supporting your diagnosis.   Thank Hao Pal

## 2018-03-16 NOTE — DIABETES MGMT
[facility] Consult Note    Recommendations/ Comments: If appropriate, please consider transitioning patient off insulin per protocol using NPH 10 units BID. Please continue to have drip and D5 run for 2 hours after giving NPH insulin. Note ketones negative in urinalysis. Current hospital DM medication: insulin gtt    Consult received for:  [x]             Assessment of home management                  Chart reviewed and initial evaluation complete on Flori Orourke. Patient was educated last month when he was hospitalized. Patient very tired and politely refused education at this time. Patient does plan to attend diabetes classes in the near future. Was scheduled for classes starting on Friday, but cancelled. Patient to contact [facility] when discharged to reschedule class. A1C down to 10.5% from 12% 1 month ago. Patient is a 66 y.o. male with known diabetes- medication verified with patient- Novolin 70/30 18  Units BID at home. Provided patient with the following: [x]             Survival skills education materials                        [x]             Outpatient [facility] contact number         A1c:   Lab Results   Component Value Date/Time    Hemoglobin A1c 10.5 (H) 03/16/2018 12:06 AM       Recent Glucose Results:   Lab Results   Component Value Date/Time     (H) 03/16/2018 10:42 AM    GLU 99 03/16/2018 08:04 AM     (H) 03/16/2018 04:00 AM    GLUCPOC 122 (H) 03/16/2018 02:32 PM    GLUCPOC 104 (H) 03/16/2018 01:33 PM    GLUCPOC 99 03/16/2018 12:37 PM        Lab Results   Component Value Date/Time    Creatinine 4.63 (H) 03/16/2018 10:42 AM     Estimated Creatinine Clearance: 22.5 mL/min (based on Cr of 4.63). Active Orders   Diet    DIET DIABETIC CONSISTENT CARB Regular        PO intake: No data found. Will continue to follow as needed. Thank you.   Mj Rodriguez, [address], [address]  Office:  [phone]

## 2018-03-16 NOTE — PROGRESS NOTES
Went to get patient, Patient refused the exam at this time. Want to wait until later because he want to rest. Nurse  Kamari Maza was notified.  RK
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
## 2018-03-16 NOTE — ED NOTES
TRANSFER - OUT REPORT:    Verbal report given to Radu(name) on You Skelton  being transferred to PCU(unit) for ordered procedure       Report consisted of patients Situation, Background, Assessment and   Recommendations(SBAR). Information from the following report(s) SBAR, Kardex and Intake/Output was reviewed with the receiving nurse. Lines:   Peripheral IV 03/16/18 Left Forearm (Active)   Site Assessment Clean, dry, & intact 3/16/2018  9:00 AM   Phlebitis Assessment 0 3/16/2018  9:00 AM   Infiltration Assessment 0 3/16/2018  9:00 AM   Dressing Status Clean, dry, & intact 3/16/2018  9:00 AM   Dressing Type Tape;Transparent 3/16/2018  9:00 AM   Hub Color/Line Status Patent; Infusing 3/16/2018  9:00 AM   Action Taken Dressing reinforced 3/16/2018  2:03 AM   Alcohol Cap Used Yes 3/16/2018  2:03 AM       Peripheral IV 03/16/18 Left Wrist (Active)   Site Assessment Clean, dry, & intact 3/16/2018  9:00 AM   Phlebitis Assessment 0 3/16/2018  9:00 AM   Infiltration Assessment 0 3/16/2018  9:00 AM   Dressing Status Clean, dry, & intact 3/16/2018  9:00 AM   Dressing Type Tape;Transparent 3/16/2018  9:00 AM   Hub Color/Line Status Pink 3/16/2018  9:00 AM   Action Taken Dressing reinforced 3/16/2018  2:26 AM   Alcohol Cap Used Yes 3/16/2018  2:26 AM        Opportunity for questions and clarification was provided.       Patient transported with:   Monitor  Registered Nurse
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
## 2018-03-16 NOTE — ROUTINE PROCESS
TRANSFER - IN REPORT:    Verbal report received from Maggie(name) on Vernel Shone  being received from ED (unit) for routine progression of care      Report consisted of patients Situation, Background, Assessment and   Recommendations(SBAR). Information from the following report(s) SBAR, Kardex, Intake/Output, MAR, Accordion and Recent Results was reviewed with the receiving nurse. Opportunity for questions and clarification was provided. Assessment completed upon patients arrival to unit and care assumed. Primary Nurse Ricci Galeazzi, RN and TE Massey performed a dual skin assessment on this patient No impairment noted. Dry scaly skin no skin breakdown noted. Ruddy score is 19     1732: Respiratory turned NC to 5Lnc. 1930: Bedside shift change report given to Sara Rubio (oncoming nurse) by Cirilo Haynes (offgoing nurse). Report included the following information SBAR, Kardex, Intake/Output, MAR, Accordion and Recent Results.

## 2018-03-17 LAB
ADMINISTERED INITIALS, ADMINIT: NORMAL
ALBUMIN SERPL-MCNC: 1.2 G/DL (ref 3.5–5)
ALBUMIN/GLOB SERPL: 0.2 {RATIO} (ref 1.1–2.2)
ALP SERPL-CCNC: 122 U/L (ref 45–117)
ALT SERPL-CCNC: 11 U/L (ref 12–78)
ANION GAP SERPL CALC-SCNC: 13 MMOL/L (ref 5–15)
ANION GAP SERPL CALC-SCNC: 8 MMOL/L (ref 5–15)
AST SERPL-CCNC: 11 U/L (ref 15–37)
BASOPHILS # BLD: 0 K/UL (ref 0–0.1)
BASOPHILS NFR BLD: 0 % (ref 0–1)
BILIRUB SERPL-MCNC: 0.3 MG/DL (ref 0.2–1)
BUN SERPL-MCNC: 57 MG/DL (ref 6–20)
BUN SERPL-MCNC: 58 MG/DL (ref 6–20)
BUN/CREAT SERPL: 12 (ref 12–20)
BUN/CREAT SERPL: 12 (ref 12–20)
CALCIUM SERPL-MCNC: 7.1 MG/DL (ref 8.5–10.1)
CALCIUM SERPL-MCNC: 7.2 MG/DL (ref 8.5–10.1)
CHLORIDE SERPL-SCNC: 106 MMOL/L (ref 97–108)
CHLORIDE SERPL-SCNC: 109 MMOL/L (ref 97–108)
CO2 SERPL-SCNC: 16 MMOL/L (ref 21–32)
CO2 SERPL-SCNC: 18 MMOL/L (ref 21–32)
CREAT SERPL-MCNC: 4.7 MG/DL (ref 0.7–1.3)
CREAT SERPL-MCNC: 4.82 MG/DL (ref 0.7–1.3)
D50 ADMINISTERED, D50ADM: 0 ML
D50 ADMINISTERED, D50ADM: 13 ML
D50 ORDER, D50ORD: 0 ML
D50 ORDER, D50ORD: 13 ML
DIFFERENTIAL METHOD BLD: ABNORMAL
EOSINOPHIL # BLD: 0.1 K/UL (ref 0–0.4)
EOSINOPHIL NFR BLD: 1 % (ref 0–7)
ERYTHROCYTE [DISTWIDTH] IN BLOOD BY AUTOMATED COUNT: 18.4 % (ref 11.5–14.5)
GLOBULIN SER CALC-MCNC: 6.4 G/DL (ref 2–4)
GLSCOM COMMENTS: NORMAL
GLUCOSE BLD STRIP.AUTO-MCNC: 100 MG/DL (ref 65–100)
GLUCOSE BLD STRIP.AUTO-MCNC: 111 MG/DL (ref 65–100)
GLUCOSE BLD STRIP.AUTO-MCNC: 135 MG/DL (ref 65–100)
GLUCOSE BLD STRIP.AUTO-MCNC: 150 MG/DL (ref 65–100)
GLUCOSE BLD STRIP.AUTO-MCNC: 179 MG/DL (ref 65–100)
GLUCOSE BLD STRIP.AUTO-MCNC: 180 MG/DL (ref 65–100)
GLUCOSE BLD STRIP.AUTO-MCNC: 186 MG/DL (ref 65–100)
GLUCOSE BLD STRIP.AUTO-MCNC: 207 MG/DL (ref 65–100)
GLUCOSE BLD STRIP.AUTO-MCNC: 209 MG/DL (ref 65–100)
GLUCOSE BLD STRIP.AUTO-MCNC: 67 MG/DL (ref 65–100)
GLUCOSE BLD STRIP.AUTO-MCNC: 68 MG/DL (ref 65–100)
GLUCOSE BLD STRIP.AUTO-MCNC: 73 MG/DL (ref 65–100)
GLUCOSE BLD STRIP.AUTO-MCNC: 83 MG/DL (ref 65–100)
GLUCOSE BLD STRIP.AUTO-MCNC: 88 MG/DL (ref 65–100)
GLUCOSE BLD STRIP.AUTO-MCNC: 91 MG/DL (ref 65–100)
GLUCOSE BLD STRIP.AUTO-MCNC: 95 MG/DL (ref 65–100)
GLUCOSE SERPL-MCNC: 74 MG/DL (ref 65–100)
GLUCOSE SERPL-MCNC: 80 MG/DL (ref 65–100)
GLUCOSE, GLC: 100 MG/DL
GLUCOSE, GLC: 111 MG/DL
GLUCOSE, GLC: 135 MG/DL
GLUCOSE, GLC: 179 MG/DL
GLUCOSE, GLC: 180 MG/DL
GLUCOSE, GLC: 68 MG/DL
GLUCOSE, GLC: 83 MG/DL
GLUCOSE, GLC: 88 MG/DL
GLUCOSE, GLC: 91 MG/DL
GLUCOSE, GLC: 95 MG/DL
HCT VFR BLD AUTO: 20.3 % (ref 36.6–50.3)
HGB BLD-MCNC: 6.5 G/DL (ref 12.1–17)
HIGH TARGET, HITG: 250 MG/DL
IMM GRANULOCYTES # BLD: 0.1 K/UL (ref 0–0.04)
IMM GRANULOCYTES NFR BLD AUTO: 1 % (ref 0–0.5)
INSULIN ADMINSTERED, INSADM: 0 UNITS/HOUR
INSULIN ADMINSTERED, INSADM: 0.1 UNITS/HOUR
INSULIN ADMINSTERED, INSADM: 0.2 UNITS/HOUR
INSULIN ADMINSTERED, INSADM: 0.6 UNITS/HOUR
INSULIN ADMINSTERED, INSADM: 1.2 UNITS/HOUR
INSULIN ADMINSTERED, INSADM: 1.2 UNITS/HOUR
INSULIN ORDER, INSORD: 0 UNITS/HOUR
INSULIN ORDER, INSORD: 0.1 UNITS/HOUR
INSULIN ORDER, INSORD: 0.2 UNITS/HOUR
INSULIN ORDER, INSORD: 0.6 UNITS/HOUR
INSULIN ORDER, INSORD: 1.2 UNITS/HOUR
INSULIN ORDER, INSORD: 1.2 UNITS/HOUR
LOW TARGET, LOT: 150 MG/DL
LYMPHOCYTES # BLD: 1 K/UL (ref 0.8–3.5)
LYMPHOCYTES NFR BLD: 8 % (ref 12–49)
MAGNESIUM SERPL-MCNC: 2 MG/DL (ref 1.6–2.4)
MCH RBC QN AUTO: 20.4 PG (ref 26–34)
MCHC RBC AUTO-ENTMCNC: 32 G/DL (ref 30–36.5)
MCV RBC AUTO: 63.6 FL (ref 80–99)
MINUTES UNTIL NEXT BG, NBG: 15 MIN
MINUTES UNTIL NEXT BG, NBG: 60 MIN
MONOCYTES # BLD: 0.8 K/UL (ref 0–1)
MONOCYTES NFR BLD: 7 % (ref 5–13)
MULTIPLIER, MUL: 0
MULTIPLIER, MUL: 0.01
MULTIPLIER, MUL: 0.02
MULTIPLIER, MUL: 0.03
MULTIPLIER, MUL: 0.03
MULTIPLIER, MUL: 0.05
NEUTS SEG # BLD: 10 K/UL (ref 1.8–8)
NEUTS SEG NFR BLD: 83 % (ref 32–75)
NRBC # BLD: 0.06 K/UL (ref 0–0.01)
NRBC BLD-RTO: 0.5 PER 100 WBC
ORDER INITIALS, ORDINIT: NORMAL
PHOSPHATE SERPL-MCNC: 6.4 MG/DL (ref 2.6–4.7)
PLATELET # BLD AUTO: 220 K/UL (ref 150–400)
POTASSIUM SERPL-SCNC: 3.9 MMOL/L (ref 3.5–5.1)
POTASSIUM SERPL-SCNC: 4.5 MMOL/L (ref 3.5–5.1)
PROT SERPL-MCNC: 7.6 G/DL (ref 6.4–8.2)
RBC # BLD AUTO: 3.19 M/UL (ref 4.1–5.7)
RBC MORPH BLD: ABNORMAL
SERVICE CMNT-IMP: ABNORMAL
SERVICE CMNT-IMP: NORMAL
SODIUM SERPL-SCNC: 135 MMOL/L (ref 136–145)
SODIUM SERPL-SCNC: 135 MMOL/L (ref 136–145)
WBC # BLD AUTO: 12 K/UL (ref 4.1–11.1)

## 2018-03-17 PROCEDURE — 74011636637 HC RX REV CODE- 636/637: Performed by: INTERNAL MEDICINE

## 2018-03-17 PROCEDURE — 87070 CULTURE OTHR SPECIMN AEROBIC: CPT | Performed by: INTERNAL MEDICINE

## 2018-03-17 PROCEDURE — 74011250636 HC RX REV CODE- 250/636: Performed by: INTERNAL MEDICINE

## 2018-03-17 PROCEDURE — 80048 BASIC METABOLIC PNL TOTAL CA: CPT | Performed by: INTERNAL MEDICINE

## 2018-03-17 PROCEDURE — 74011250637 HC RX REV CODE- 250/637: Performed by: INTERNAL MEDICINE

## 2018-03-17 PROCEDURE — 82962 GLUCOSE BLOOD TEST: CPT

## 2018-03-17 PROCEDURE — 85025 COMPLETE CBC W/AUTO DIFF WBC: CPT | Performed by: INTERNAL MEDICINE

## 2018-03-17 PROCEDURE — 74011000250 HC RX REV CODE- 250: Performed by: EMERGENCY MEDICINE

## 2018-03-17 PROCEDURE — 36415 COLL VENOUS BLD VENIPUNCTURE: CPT | Performed by: INTERNAL MEDICINE

## 2018-03-17 PROCEDURE — 77010033678 HC OXYGEN DAILY

## 2018-03-17 PROCEDURE — 65660000000 HC RM CCU STEPDOWN

## 2018-03-17 PROCEDURE — 80053 COMPREHEN METABOLIC PANEL: CPT | Performed by: INTERNAL MEDICINE

## 2018-03-17 PROCEDURE — 83735 ASSAY OF MAGNESIUM: CPT | Performed by: INTERNAL MEDICINE

## 2018-03-17 PROCEDURE — 84100 ASSAY OF PHOSPHORUS: CPT | Performed by: INTERNAL MEDICINE

## 2018-03-17 RX ORDER — SODIUM CHLORIDE 9 MG/ML
100 INJECTION, SOLUTION INTRAVENOUS CONTINUOUS
Status: DISCONTINUED | OUTPATIENT
Start: 2018-03-17 | End: 2018-03-18

## 2018-03-17 RX ORDER — INSULIN LISPRO 100 [IU]/ML
INJECTION, SOLUTION INTRAVENOUS; SUBCUTANEOUS
Status: DISCONTINUED | OUTPATIENT
Start: 2018-03-17 | End: 2018-03-20 | Stop reason: HOSPADM

## 2018-03-17 RX ORDER — VANCOMYCIN HYDROCHLORIDE
1250
Status: DISCONTINUED | OUTPATIENT
Start: 2018-03-17 | End: 2018-03-19

## 2018-03-17 RX ORDER — MAGNESIUM SULFATE 100 %
4 CRYSTALS MISCELLANEOUS AS NEEDED
Status: DISCONTINUED | OUTPATIENT
Start: 2018-03-17 | End: 2018-03-20 | Stop reason: HOSPADM

## 2018-03-17 RX ORDER — DEXTROSE 50 % IN WATER (D50W) INTRAVENOUS SYRINGE
12.5-25 AS NEEDED
Status: DISCONTINUED | OUTPATIENT
Start: 2018-03-17 | End: 2018-03-20 | Stop reason: HOSPADM

## 2018-03-17 RX ORDER — HEPARIN SODIUM 5000 [USP'U]/ML
5000 INJECTION, SOLUTION INTRAVENOUS; SUBCUTANEOUS EVERY 12 HOURS
Status: DISCONTINUED | OUTPATIENT
Start: 2018-03-17 | End: 2018-03-20 | Stop reason: HOSPADM

## 2018-03-17 RX ORDER — SODIUM BICARBONATE 650 MG/1
650 TABLET ORAL 3 TIMES DAILY
Status: DISCONTINUED | OUTPATIENT
Start: 2018-03-17 | End: 2018-03-20 | Stop reason: HOSPADM

## 2018-03-17 RX ADMIN — CALCITRIOL 0.25 MCG: 0.25 CAPSULE, LIQUID FILLED ORAL at 08:21

## 2018-03-17 RX ADMIN — CARVEDILOL 12.5 MG: 12.5 TABLET, FILM COATED ORAL at 08:20

## 2018-03-17 RX ADMIN — HEPARIN SODIUM 5000 UNITS: 5000 INJECTION, SOLUTION INTRAVENOUS; SUBCUTANEOUS at 06:36

## 2018-03-17 RX ADMIN — INSULIN HUMAN 5 UNITS: 100 INJECTION, SUSPENSION SUBCUTANEOUS at 17:07

## 2018-03-17 RX ADMIN — VANCOMYCIN HYDROCHLORIDE 1250 MG: 10 INJECTION, POWDER, LYOPHILIZED, FOR SOLUTION INTRAVENOUS at 17:06

## 2018-03-17 RX ADMIN — INSULIN HUMAN 12 UNITS: 100 INJECTION, SUSPENSION SUBCUTANEOUS at 11:07

## 2018-03-17 RX ADMIN — ASPIRIN 162 MG: 81 TABLET, COATED ORAL at 08:20

## 2018-03-17 RX ADMIN — INSULIN LISPRO 3 UNITS: 100 INJECTION, SOLUTION INTRAVENOUS; SUBCUTANEOUS at 12:05

## 2018-03-17 RX ADMIN — CARVEDILOL 12.5 MG: 12.5 TABLET, FILM COATED ORAL at 17:16

## 2018-03-17 RX ADMIN — Medication 10 ML: at 14:00

## 2018-03-17 RX ADMIN — SODIUM BICARBONATE 650 MG: 650 TABLET ORAL at 21:18

## 2018-03-17 RX ADMIN — CEFEPIME 2 G: 2 INJECTION, POWDER, FOR SOLUTION INTRAMUSCULAR; INTRAVENOUS at 04:37

## 2018-03-17 RX ADMIN — Medication 10 ML: at 06:36

## 2018-03-17 RX ADMIN — ATORVASTATIN CALCIUM 20 MG: 20 TABLET, FILM COATED ORAL at 08:20

## 2018-03-17 RX ADMIN — SODIUM CHLORIDE 100 ML/HR: 900 INJECTION, SOLUTION INTRAVENOUS at 17:07

## 2018-03-17 RX ADMIN — HEPARIN SODIUM 5000 UNITS: 5000 INJECTION, SOLUTION INTRAVENOUS; SUBCUTANEOUS at 21:18

## 2018-03-17 RX ADMIN — Medication 10 ML: at 21:18

## 2018-03-17 RX ADMIN — NIFEDIPINE 30 MG: 30 TABLET, EXTENDED RELEASE ORAL at 08:31

## 2018-03-17 RX ADMIN — SODIUM BICARBONATE 650 MG: 650 TABLET ORAL at 17:06

## 2018-03-17 RX ADMIN — INSULIN LISPRO 2 UNITS: 100 INJECTION, SOLUTION INTRAVENOUS; SUBCUTANEOUS at 17:06

## 2018-03-17 RX ADMIN — DEXTROSE MONOHYDRATE 12.5 G: 25 INJECTION, SOLUTION INTRAVENOUS at 01:44
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
## 2018-03-17 NOTE — PROGRESS NOTES
Pharmacy Automatic Renal Dosing Protocol - Antimicrobials     Indication for Antimicrobials: HCAP   PMH:   unilateral nephrectomy due to renal cell cancer    Current Regimen of Each Antimicrobial:  Cefepime 2gm IV q24h - started 3/16 day 2  Levofloxacin 750 mg IV Q48H -started 3/16 day 2  Vancomycin 1750mg (17.4mg/kg) IV q48h - started 3/16 day 2    Discontinued:  Zosyn 3.375 g IV Q8H (Start Date 3/16; Day # 1)    Significant Cultures:  Microbiology Results (Current encounter)   Date/Time Order Name Specimen Source Lab Status   3/16/18 1239 CULTURE, URINE -- In process   3/16/18 0318 CULTURE, RESPIRATORY/SPUTUM/BRONCH W GRAM STAIN Sputum --         Paralysis, amputations, malnutrition: documented malnutrition, albumin 1.2  Estimated Creatinine Clearance: 22.2 mL/min (based on Cr of 4.7). Estimated Creatinine Clearance (using IBW):20.4 mL/min    Recent Labs      18   0524  18   0046  18   1859  18   1453  18   1042   18   0400  18   0006  03/15/18   1630   CREA  4.70*  4.82*  4.74*  4.80*  4.63*   < >  4.83*  5.16*  5.29*   BUN  58*  57*  59*  58*  55*   < >  54*  59*  60*   WBC  12.0*   --    --    --    --    --    --    --   11.9*   NA  135*  135*  133*  134*  132*   < >  134*  132*  139   K  4.5  3.9  4.3  4.6  3.8   < >  3.7  4.5  4.6   MG  2.0   --    --    --   1.9   --   2.1   --   2.0   CA  7.2*  7.1*  7.2*  7.0*  6.4*   < >  6.6*  6.9*  7.1*   PHOS  6.4*   --    --    --   5.7*   --   6.3*   --    --    ALB  1.2*   --    --    --    --    --    --   1.5*  1.4*   HGB  6.5*   --    --    --    --    --    --    --   7.3*   HCT  20.3*   --    --    --    --    --    --    --   22.4*   PLT  220   --    --    --    --    --    --    --   255   INR   --    --    --    --    --    --    --    --   1.2*   ALT  11*   --    --    --    --    --    --   13  16    < > = values in this interval not displayed.      Temp (24hrs), Av.7 °F (36.5 °C), Min:97.3 °F (36.3 °C), Max:98 °F (36.7 °C)    Impression/Plan:   -Per renal note pt is non-oliguric, no acute need for RRT  -Afebrile, WBC about the same as 3/16  -ordered daily BMP and CBC  -Note:  Vancomycin given at [facility] Thursday 3/15:18:50 per ED provider note from 3/15 16:22  -Continue LQ 750mg IV q48h, Cefepime 2gm IV q24h. -Given this am SCr the projected Vancomycin trough would be 14.8, thus will adjust Vancomycin to 1250mg IV q36h for a projected trough of 16.4 at Css  -Antimicrobial stop date TBD  -Will need to order Vancomycin trough before second maintenance dose 3/19 4am      Pharmacy will follow daily and adjust medications as appropriate for renal function and/or serum levels.     Thank you,  Latrice Mcfarlane, [facility]

## 2018-03-17 NOTE — PROGRESS NOTES
Hospitalist Progress Note    NAME: Randall Monroe   :  1960   MRN:  [id number]   LOS:   1      Assessment / Plan:  DKA resolved with concomitant metabolic acidosis  -transitioned off of drip today  -continues to have low bicarb, however no ketones in serum or in urine so do not suspect this is due to ketoacidosis, may be related to renal failure  -continue NPH BID, add lispro 5 units before meals, and SSI    HCAP  -continue broad antibiotics  -wean O2 as tolerated    Acute on chronic renal failure with baseline CKD3  History of unilateral nephrectomy due to renal cell cancer  -appreciate nephrology evaluation  -Cr slightly improved  -continue IV hydration  -start bicarb PO  -for hypocalcemia follow PTH, vitamin D, started calcitriol    Anemia due to chronic disease  -started EPO, check iron may benefit from IV iron    HTN   Chronic diastolic heart failure without exacerbation  -continue home meds  -resume PO lasix tomorrow    HLD   Continue statins     Code Status: full  Surrogate Decision Maker: wife     Subjective:     Chief Complaint: sob  Feeling better today, no complaints      Objective:     VITALS:   Last 24hrs VS reviewed since prior progress note.  Most recent are:  Patient Vitals for the past 24 hrs:   Temp Pulse Resp BP SpO2   18 1043 97.3 °F (36.3 °C) 65 18 103/58 95 %   18 0722 97.8 °F (36.6 °C) 69 18 105/64 95 %   18 0339 97.4 °F (36.3 °C) 66 20 111/73 96 %   18 2251 - 69 - 116/70 91 %   18 2249 97.5 °F (36.4 °C) 71 20 116/70 93 %   18 1935 97.9 °F (36.6 °C) 73 18 115/57 96 %   18 1731 - - - - 93 %   18 1637 - 79 - 138/89 -   18 1600 98 °F (36.7 °C) 78 20 139/79 93 %       Intake/Output Summary (Last 24 hours) at 18 1415  Last data filed at 18 1355   Gross per 24 hour   Intake          2327.21 ml   Output              600 ml   Net          1727.21 ml        PHYSICAL EXAM:  General: Alert, cooperative, no acute distress    EENT:  EOMI. Anicteric sclerae. Mucous membranes moist  Resp:  CTA bilaterally, no wheezing or rales. No accessory muscle use  CV:  Regular rhythm, 1+edema in LE  GI:  Soft, non distended, non tender. +Bowel sounds  Neurologic:  Alert and oriented X 3, normal speech  Psych:   Good insight, not anxious nor agitated  Skin:  No rashes, no jaundice  ________________________________________________________________________  Care Plan discussed with:    Comments   Patient x    Family      RN x    Care Manager     Consultant                        Multidiciplinary team rounds were held today with , nursing, pharmacist and clinical coordinator. Patient's plan of care was discussed; medications were reviewed and discharge planning was addressed. ________________________________________________________________________  Total NON critical care TIME:  30   Minutes    >50% of visit spent in counseling and coordination of care       ________________________________________________________________________    Procedures: see electronic medical records for all procedures/Xrays and details which were not copied into this note but were reviewed prior to creation of Plan. LABS:  I reviewed today's most current labs and imaging studies.   Pertinent labs include:  Recent Labs      03/17/18   0524  03/15/18   1630   WBC  12.0*  11.9*   HGB  6.5*  7.3*   HCT  20.3*  22.4*   PLT  220  255     Recent Labs      03/17/18   0524  03/17/18   0046  03/16/18   1859   03/16/18   1042   03/16/18   0400  03/16/18   0006  03/15/18   1630   NA  135*  135*  133*   < >  132*   < >  134*  132*  139   K  4.5  3.9  4.3   < >  3.8   < >  3.7  4.5  4.6   CL  106  109*  107   < >  105   < >  108  104  99   CO2  16*  18*  16*   < >  14*   < >  12*  15*  14*   GLU  80  74  196*   < >  410*   < >  340*  450*  491*   BUN  58*  57*  59*   < >  55*   < >  54*  59*  60*   CREA  4.70*  4.82*  4.74*   < >  4.63*   < >  4.83*  5.16* 5.29*   CA  7.2*  7.1*  7.2*   < >  6.4*   < >  6.6*  6.9*  7.1*   MG  2.0   --    --    --   1.9   --   2.1   --   2.0   PHOS  6.4*   --    --    --   5.7*   --   6.3*   --    --    ALB  1.2*   --    --    --    --    --    --   1.5*  1.4*   TBILI  0.3   --    --    --    --    --    --   0.4  0.4   SGOT  11*   --    --    --    --    --    --   6*  12*   ALT  11*   --    --    --    --    --    --   13  16   INR   --    --    --    --    --    --    --    --   1.2*    < > = values in this interval not displayed.        Signed: Karson Sapp MD

## 2018-03-17 NOTE — PROGRESS NOTES
Problem: Falls - Risk of  Goal: *Absence of Falls  Document Chuck Fall Risk and appropriate interventions in the flowsheet.    Outcome: Progressing Towards Goal  Fall Risk Interventions:            Medication Interventions: Patient to call before getting OOB, Teach patient to arise slowly         History of Falls Interventions: Evaluate medications/consider consulting pharmacy, Investigate reason for fall, Room close to nurse's station

## 2018-03-17 NOTE — PROGRESS NOTES
PCU SHIFT NURSING NOTE      Bedside and Verbal shift change report given to TE Msasey and Bishnu Andre RN (oncoming nurse) by Thomas Faria RN (offgoing nurse). Report included the following information SBAR, Kardex, Intake/Output, MAR, Accordion and Recent Results. Shift Summary:    0730 Received report and assumed care of patient. A/Ox4. VSS. Skin intact with old scarring. NSR. O2 sats 89 on room air so initiated NC @ 1L which brought O2 sats up to 95. Lung sounds bilaterally diminished with coarse sounds in upper lobes. Coughing is sporadic and unproductive. Patient reports no pain. Insulin gtt still active, but held at 0.0 units per hour based on recent blood sugars. D5 in 0.45 NS gtt 75 ml/hr. Goals for therapy: work towards discontinuing insulin gtt, encourage nutrition/hydration, increase time oob. (TE Massey added magnesium and phospate to the BMP lab since patient values had not been assessed recently.)  0740 Blood sugar 111. Insulin gtt continued to be held per glucostabilizer. 0830 Patient washed up and up to the chair for breakfast.  0844 Blood sugar 135. Insulin gtt continued to be held per glucostabilizer. 4078 Blood sugar 180. Insulin gtt restarted at 0.1 units per hour per glucostabilizer. 1030 Added humidifier to NC @ 1L to prevent drying of the nares. 1107 Discontinued Insulin gtt and D5 in 0.45 NS per STAT order. Initiated NPH therapy 12 units.         Admission Date 3/15/2018   Admission Diagnosis DKA (diabetic ketoacidoses) ([facility] [address]   Consults IP CONSULT TO HOSPITALIST  IP CONSULT TO NEPHROLOGY        Consults   []PT   []OT   []Speech   []Case Management      [] Palliative      Cardiac Monitoring Order   [x]Yes   []No     IV drips   [x]Yes    Drip:    D5 NS                        Dose: 75 ml/hr  Drip:   Insulin                         Dose: 0.0   Drip:                            Dose:   []No     GI Prophylaxis   []Yes   [x]No         DVT Prophylaxis   SCDs:             Cornelio stockings:         [x] Medication   []Contraindicated   []None      Activity Level Activity Level: Up ad konrad     Activity Assistance: Partial (one person)   Purposeful Rounding every 1-2 hour? [x]Yes   Cárdenas Score  Total Score: 2   Bed Alarm (If score 3 or >)   [x]Yes   [] Refused (See signed refusal form in chart)   Ruddy Score  Ruddy Score: 19   Ruddy Score (if score 14 or less)   []PMT consult   []Wound Care consult      []Specialty bed   [] Nutrition consult          Needs prior to discharge:   Home O2 required:    []Yes   [x]No    If yes, how much O2 required? Other:    Last Bowel Movement: Last Bowel Movement Date: 03/16/18      Influenza Vaccine Received Flu Vaccine for Current Season (usually Sept-March): Yes (say he had flu shot this year )    Patient/Guardian Refused (Notify MD): No   Pneumonia Vaccine           Diet Active Orders   Diet    DIET DIABETIC CONSISTENT CARB Regular      LDAs               Peripheral IV 03/16/18 Left Forearm (Active)   Site Assessment Clean, dry, & intact 3/17/2018  7:22 AM   Phlebitis Assessment 0 3/17/2018  7:22 AM   Infiltration Assessment 0 3/17/2018  7:22 AM   Dressing Status Clean, dry, & intact 3/17/2018  7:22 AM   Dressing Type Tape;Transparent 3/17/2018  7:22 AM   Hub Color/Line Status Green; Infusing 3/17/2018  7:22 AM   Action Taken Dressing reinforced 3/16/2018  2:03 AM   Alcohol Cap Used Yes 3/16/2018  2:03 AM       Peripheral IV 03/16/18 Left Wrist (Active)   Site Assessment Clean, dry, & intact 3/17/2018  7:22 AM   Phlebitis Assessment 0 3/17/2018  7:22 AM   Infiltration Assessment 0 3/17/2018  7:22 AM   Dressing Status Clean, dry, & intact 3/17/2018  7:22 AM   Dressing Type Tape;Transparent 3/17/2018  7:22 AM   Hub Color/Line Status Pink; Infusing 3/17/2018  7:22 AM   Action Taken Open ports on tubing capped 3/16/2018  4:48 PM   Alcohol Cap Used Yes 3/16/2018  4:48 PM                      Urinary Catheter      Intake & Output   Date 03/16/18 0700 - 03/17/18 0659 03/17/18 0700 - 03/18/18 0659   Shift 6598-8423 4649-8869 24 Hour Total 9809-5115 6704-0991 24 Hour Total   I  N  T  A  K  E   I.V.  (mL/kg/hr) 330  (0.3) 333.8  (0.3) 663.8  (0.3) 1103.5  1103.5      Insulin Volume    151  151      Volume (dextrose 5 % - 0.45% NaCl infusion) 330 83.8 413.8 852.5  852.5      Volume (levoFLOXacin (LEVAQUIN) 750 mg in D5W IVPB)  150 150         Volume (cefepime (MAXIPIME) 2 g in 0.9 %  mL IVPB)  100 100 100  100    Shift Total  (mL/kg) 330  (3.3) 333.8  (3.3) 663.8  (6.6) 1103.5  (11)  1103.5  (11)   O  U  T  P  U  T   Urine  (mL/kg/hr) 1150  (1) 250  (0.2) 1400  (0.6)         Urine Voided 5564 766 2033       Shift Total  (mL/kg) 1150  (11.5) 250  (2.5) 1400  (14)      NET -820 83.8 -736.3 1103.5  1103.5   Weight (kg) 100.3 100.3 100.3 100.3 100.3 100.3         Readmission Risk Assessment Tool Score High Risk            24       Total Score        3 Has Seen PCP in Last 6 Months (Yes=3, No=0)    4 IP Visits Last 12 Months (1-3=4, 4=9, >4=11)    4 Pt. Coverage (Medicare=5 , Medicaid, or Self-Pay=4)    13 Charlson Comorbidity Score (Age + Comorbid Conditions)        Criteria that do not apply:    . Living with Significant Other. Assisted Living. LTAC. SNF.  or   Rehab    Patient Length of Stay (>5 days = 3)       Expected Length of Stay 3d 19h   Actual Length of Stay 1

## 2018-03-17 NOTE — PROGRESS NOTES
Bedside shift change report given to Marva Bowman (oncoming nurse) by Javon Sweet RN (offgoing nurse). Report included the following information SBAR and Kardex.

## 2018-03-18 LAB
25(OH)D3 SERPL-MCNC: 12.2 NG/ML (ref 30–100)
ALBUMIN SERPL-MCNC: 1.3 G/DL (ref 3.5–5)
ALBUMIN/GLOB SERPL: 0.2 {RATIO} (ref 1.1–2.2)
ALP SERPL-CCNC: 120 U/L (ref 45–117)
ALT SERPL-CCNC: 10 U/L (ref 12–78)
ANION GAP SERPL CALC-SCNC: 13 MMOL/L (ref 5–15)
ARTERIAL PATENCY WRIST A: YES
AST SERPL-CCNC: 10 U/L (ref 15–37)
BACTERIA SPEC CULT: NORMAL
BASE DEFICIT BLDA-SCNC: 8.2 MMOL/L
BASOPHILS # BLD: 0 K/UL (ref 0–0.1)
BASOPHILS NFR BLD: 0 % (ref 0–1)
BDY SITE: ABNORMAL
BILIRUB SERPL-MCNC: 0.3 MG/DL (ref 0.2–1)
BREATHS.SPONTANEOUS ON VENT: 28
BUN SERPL-MCNC: 61 MG/DL (ref 6–20)
BUN/CREAT SERPL: 13 (ref 12–20)
CALCIUM SERPL-MCNC: 6.9 MG/DL (ref 8.5–10.1)
CALCIUM SERPL-MCNC: 7 MG/DL (ref 8.5–10.1)
CC UR VC: NORMAL
CHLORIDE SERPL-SCNC: 109 MMOL/L (ref 97–108)
CO2 SERPL-SCNC: 13 MMOL/L (ref 21–32)
CREAT SERPL-MCNC: 4.85 MG/DL (ref 0.7–1.3)
DIFFERENTIAL METHOD BLD: ABNORMAL
EOSINOPHIL # BLD: 0.2 K/UL (ref 0–0.4)
EOSINOPHIL NFR BLD: 2 % (ref 0–7)
ERYTHROCYTE [DISTWIDTH] IN BLOOD BY AUTOMATED COUNT: 18.2 % (ref 11.5–14.5)
FERRITIN SERPL-MCNC: 336 NG/ML (ref 26–388)
GLOBULIN SER CALC-MCNC: 6.1 G/DL (ref 2–4)
GLUCOSE BLD STRIP.AUTO-MCNC: 150 MG/DL (ref 65–100)
GLUCOSE BLD STRIP.AUTO-MCNC: 205 MG/DL (ref 65–100)
GLUCOSE BLD STRIP.AUTO-MCNC: 260 MG/DL (ref 65–100)
GLUCOSE BLD STRIP.AUTO-MCNC: 274 MG/DL (ref 65–100)
GLUCOSE SERPL-MCNC: 108 MG/DL (ref 65–100)
HCO3 BLDA-SCNC: 15 MMOL/L (ref 22–26)
HCT VFR BLD AUTO: 20.3 % (ref 36.6–50.3)
HGB BLD-MCNC: 6.6 G/DL (ref 12.1–17)
IMM GRANULOCYTES # BLD: 0.1 K/UL (ref 0–0.04)
IMM GRANULOCYTES NFR BLD AUTO: 1 % (ref 0–0.5)
IRON SATN MFR SERPL: 15 % (ref 20–50)
IRON SERPL-MCNC: 16 UG/DL (ref 35–150)
LACTATE SERPL-SCNC: 1 MMOL/L (ref 0.4–2)
LYMPHOCYTES # BLD: 1.3 K/UL (ref 0.8–3.5)
LYMPHOCYTES NFR BLD: 12 % (ref 12–49)
MCH RBC QN AUTO: 20.4 PG (ref 26–34)
MCHC RBC AUTO-ENTMCNC: 32.5 G/DL (ref 30–36.5)
MCV RBC AUTO: 62.8 FL (ref 80–99)
MONOCYTES # BLD: 0.9 K/UL (ref 0–1)
MONOCYTES NFR BLD: 8 % (ref 5–13)
NEUTS SEG # BLD: 8.6 K/UL (ref 1.8–8)
NEUTS SEG NFR BLD: 77 % (ref 32–75)
NRBC # BLD: 0.06 K/UL (ref 0–0.01)
NRBC BLD-RTO: 0.5 PER 100 WBC
PCO2 BLDA: 24 MMHG (ref 35–45)
PH BLDA: 7.4 [PH] (ref 7.35–7.45)
PLATELET # BLD AUTO: 202 K/UL (ref 150–400)
PO2 BLDA: 75 MMHG (ref 80–100)
POTASSIUM SERPL-SCNC: 4.2 MMOL/L (ref 3.5–5.1)
PROT SERPL-MCNC: 7.4 G/DL (ref 6.4–8.2)
PTH-INTACT SERPL-MCNC: 240.9 PG/ML (ref 18.4–88)
RBC # BLD AUTO: 3.23 M/UL (ref 4.1–5.7)
RBC MORPH BLD: ABNORMAL
SAO2 % BLD: 95 % (ref 92–97)
SAO2% DEVICE SAO2% SENSOR NAME: ABNORMAL
SERVICE CMNT-IMP: ABNORMAL
SERVICE CMNT-IMP: NORMAL
SODIUM SERPL-SCNC: 135 MMOL/L (ref 136–145)
SPECIMEN SITE: ABNORMAL
TIBC SERPL-MCNC: 106 UG/DL (ref 250–450)
URATE SERPL-MCNC: 9.7 MG/DL (ref 3.5–7.2)
WBC # BLD AUTO: 11.1 K/UL (ref 4.1–11.1)

## 2018-03-18 PROCEDURE — 80053 COMPREHEN METABOLIC PANEL: CPT | Performed by: INTERNAL MEDICINE

## 2018-03-18 PROCEDURE — 36600 WITHDRAWAL OF ARTERIAL BLOOD: CPT | Performed by: INTERNAL MEDICINE

## 2018-03-18 PROCEDURE — 74011250636 HC RX REV CODE- 250/636: Performed by: INTERNAL MEDICINE

## 2018-03-18 PROCEDURE — 83970 ASSAY OF PARATHORMONE: CPT | Performed by: INTERNAL MEDICINE

## 2018-03-18 PROCEDURE — 83540 ASSAY OF IRON: CPT | Performed by: INTERNAL MEDICINE

## 2018-03-18 PROCEDURE — 74011250637 HC RX REV CODE- 250/637: Performed by: INTERNAL MEDICINE

## 2018-03-18 PROCEDURE — 36415 COLL VENOUS BLD VENIPUNCTURE: CPT | Performed by: INTERNAL MEDICINE

## 2018-03-18 PROCEDURE — 82306 VITAMIN D 25 HYDROXY: CPT | Performed by: INTERNAL MEDICINE

## 2018-03-18 PROCEDURE — 86923 COMPATIBILITY TEST ELECTRIC: CPT | Performed by: INTERNAL MEDICINE

## 2018-03-18 PROCEDURE — 74011636637 HC RX REV CODE- 636/637: Performed by: INTERNAL MEDICINE

## 2018-03-18 PROCEDURE — 82728 ASSAY OF FERRITIN: CPT | Performed by: INTERNAL MEDICINE

## 2018-03-18 PROCEDURE — 85025 COMPLETE CBC W/AUTO DIFF WBC: CPT | Performed by: INTERNAL MEDICINE

## 2018-03-18 PROCEDURE — 74011250636 HC RX REV CODE- 250/636: Performed by: EMERGENCY MEDICINE

## 2018-03-18 PROCEDURE — 82962 GLUCOSE BLOOD TEST: CPT

## 2018-03-18 PROCEDURE — P9016 RBC LEUKOCYTES REDUCED: HCPCS | Performed by: INTERNAL MEDICINE

## 2018-03-18 PROCEDURE — 65660000000 HC RM CCU STEPDOWN

## 2018-03-18 PROCEDURE — 86900 BLOOD TYPING SEROLOGIC ABO: CPT | Performed by: INTERNAL MEDICINE

## 2018-03-18 PROCEDURE — 83605 ASSAY OF LACTIC ACID: CPT | Performed by: INTERNAL MEDICINE

## 2018-03-18 PROCEDURE — 36430 TRANSFUSION BLD/BLD COMPNT: CPT

## 2018-03-18 PROCEDURE — 74011000250 HC RX REV CODE- 250: Performed by: INTERNAL MEDICINE

## 2018-03-18 PROCEDURE — 84550 ASSAY OF BLOOD/URIC ACID: CPT | Performed by: INTERNAL MEDICINE

## 2018-03-18 PROCEDURE — 82803 BLOOD GASES ANY COMBINATION: CPT | Performed by: INTERNAL MEDICINE

## 2018-03-18 RX ORDER — ERGOCALCIFEROL 1.25 MG/1
50000 CAPSULE ORAL
Status: DISCONTINUED | OUTPATIENT
Start: 2018-03-18 | End: 2018-03-20 | Stop reason: HOSPADM

## 2018-03-18 RX ORDER — INSULIN LISPRO 100 [IU]/ML
5 INJECTION, SOLUTION INTRAVENOUS; SUBCUTANEOUS
Status: DISCONTINUED | OUTPATIENT
Start: 2018-03-18 | End: 2018-03-19

## 2018-03-18 RX ORDER — SODIUM CHLORIDE 9 MG/ML
250 INJECTION, SOLUTION INTRAVENOUS AS NEEDED
Status: DISCONTINUED | OUTPATIENT
Start: 2018-03-18 | End: 2018-03-20 | Stop reason: HOSPADM

## 2018-03-18 RX ORDER — FUROSEMIDE 10 MG/ML
40 INJECTION INTRAMUSCULAR; INTRAVENOUS ONCE
Status: COMPLETED | OUTPATIENT
Start: 2018-03-18 | End: 2018-03-18

## 2018-03-18 RX ORDER — SODIUM BICARBONATE 1 MEQ/ML
100 SYRINGE (ML) INTRAVENOUS ONCE
Status: COMPLETED | OUTPATIENT
Start: 2018-03-18 | End: 2018-03-18

## 2018-03-18 RX ADMIN — SODIUM BICARBONATE 650 MG: 650 TABLET ORAL at 18:23

## 2018-03-18 RX ADMIN — ERGOCALCIFEROL 50000 UNITS: 1.25 CAPSULE ORAL at 18:23

## 2018-03-18 RX ADMIN — NEPHROCAP 1 CAPSULE: 1 CAP ORAL at 10:07

## 2018-03-18 RX ADMIN — INSULIN LISPRO 3 UNITS: 100 INJECTION, SOLUTION INTRAVENOUS; SUBCUTANEOUS at 22:31

## 2018-03-18 RX ADMIN — SODIUM BICARBONATE 100 MEQ: 84 INJECTION, SOLUTION INTRAVENOUS at 18:30

## 2018-03-18 RX ADMIN — INSULIN HUMAN 12 UNITS: 100 INJECTION, SUSPENSION SUBCUTANEOUS at 18:34

## 2018-03-18 RX ADMIN — HEPARIN SODIUM 5000 UNITS: 5000 INJECTION, SOLUTION INTRAVENOUS; SUBCUTANEOUS at 10:08

## 2018-03-18 RX ADMIN — INSULIN LISPRO 5 UNITS: 100 INJECTION, SOLUTION INTRAVENOUS; SUBCUTANEOUS at 18:34

## 2018-03-18 RX ADMIN — INSULIN LISPRO 2 UNITS: 100 INJECTION, SOLUTION INTRAVENOUS; SUBCUTANEOUS at 08:36

## 2018-03-18 RX ADMIN — Medication 10 ML: at 18:33

## 2018-03-18 RX ADMIN — ASPIRIN 162 MG: 81 TABLET, COATED ORAL at 10:08

## 2018-03-18 RX ADMIN — SODIUM CHLORIDE 250 ML: 900 INJECTION, SOLUTION INTRAVENOUS at 13:50

## 2018-03-18 RX ADMIN — Medication 10 ML: at 22:31

## 2018-03-18 RX ADMIN — CARVEDILOL 12.5 MG: 12.5 TABLET, FILM COATED ORAL at 18:23

## 2018-03-18 RX ADMIN — FUROSEMIDE 40 MG: 10 INJECTION, SOLUTION INTRAMUSCULAR; INTRAVENOUS at 18:22

## 2018-03-18 RX ADMIN — ATORVASTATIN CALCIUM 20 MG: 20 TABLET, FILM COATED ORAL at 10:08

## 2018-03-18 RX ADMIN — INSULIN LISPRO 5 UNITS: 100 INJECTION, SOLUTION INTRAVENOUS; SUBCUTANEOUS at 13:41

## 2018-03-18 RX ADMIN — CARVEDILOL 12.5 MG: 12.5 TABLET, FILM COATED ORAL at 10:07

## 2018-03-18 RX ADMIN — INSULIN LISPRO 3 UNITS: 100 INJECTION, SOLUTION INTRAVENOUS; SUBCUTANEOUS at 13:41

## 2018-03-18 RX ADMIN — SODIUM BICARBONATE 650 MG: 650 TABLET ORAL at 22:26

## 2018-03-18 RX ADMIN — SODIUM BICARBONATE 650 MG: 650 TABLET ORAL at 10:07

## 2018-03-18 RX ADMIN — HEPARIN SODIUM 5000 UNITS: 5000 INJECTION, SOLUTION INTRAVENOUS; SUBCUTANEOUS at 22:26

## 2018-03-18 RX ADMIN — SODIUM CHLORIDE 100 ML/HR: 900 INJECTION, SOLUTION INTRAVENOUS at 07:51

## 2018-03-18 RX ADMIN — LEVOFLOXACIN 750 MG: 5 INJECTION, SOLUTION INTRAVENOUS at 01:52

## 2018-03-18 RX ADMIN — CEFEPIME 2 G: 2 INJECTION, POWDER, FOR SOLUTION INTRAMUSCULAR; INTRAVENOUS at 04:52

## 2018-03-18 RX ADMIN — CALCITRIOL 0.25 MCG: 0.25 CAPSULE, LIQUID FILLED ORAL at 10:07

## 2018-03-18 RX ADMIN — INSULIN HUMAN 5 UNITS: 100 INJECTION, SUSPENSION SUBCUTANEOUS at 08:35

## 2018-03-18 RX ADMIN — IRON SUCROSE 200 MG: 20 INJECTION, SOLUTION INTRAVENOUS at 18:23

## 2018-03-18 RX ADMIN — Medication 5 ML: at 05:22
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
## 2018-03-18 NOTE — PROGRESS NOTES
Hospitalist Progress Note    NAME: Flori Orourke   :  1960   MRN:  [id number]   LOS:   2      Assessment / Plan:  DKA resolved with concomitant non gap metabolic acidosis  -transitioned off of drip  -continues to have low bicarb, however no ketones in serum or in urine so do not suspect this is due to ketoacidosis, may be related to renal failure  -lactate normal, ABG with normal pH 7.4  -continue NPH BID, add lispro 5 units before meals, and SSI    HCAP  -continue broad antibiotics, will d/c levaquin, cont cefepime  -wean O2 as tolerated    Acute on chronic renal failure with baseline CKD3  History of unilateral nephrectomy due to renal cell cancer  -appreciate nephrology evaluation  -Cr slightly improved  -will likely need HD soon  -stopped NaCl fluids  -start bicarb PO  -for hypocalcemia follow PTH, vitamin D, started calcitriol    Anemia due to chronic disease  -started EPO, check iron may benefit from IV iron  -will transfuse 1 unit today    HTN   Chronic diastolic heart failure without exacerbation  -continue home meds  -resume PO lasix per nephrology    HLD   Continue statins     Code Status: full  Surrogate Decision Maker: wife     Subjective:     Chief Complaint: sob  Still not feeling well, continues to have cough    Objective:     VITALS:   Last 24hrs VS reviewed since prior progress note. Most recent are:  Patient Vitals for the past 24 hrs:   Temp Pulse Resp BP SpO2   18 0755 98.2 °F (36.8 °C) 69 18 106/66 91 %   18 2337 98 °F (36.7 °C) 69 18 117/71 94 %   18 97.7 °F (36.5 °C) 70 18 127/64 92 %   18 1425 97.6 °F (36.4 °C) 73 18 118/69 92 %       Intake/Output Summary (Last 24 hours) at 18 1401  Last data filed at 18 5210   Gross per 24 hour   Intake          1413.33 ml   Output             1250 ml   Net           163.33 ml        PHYSICAL EXAM:  General: Alert, cooperative, no acute distress    EENT:  EOMI. Anicteric sclerae.  Mucous membranes moist  Resp:  CTA bilaterally, no wheezing or rales. No accessory muscle use  CV:  Regular rhythm, 1+edema in LE  GI:  Soft, non distended, non tender. +Bowel sounds  Neurologic:  Alert and oriented X 3, normal speech  Psych:   Good insight, not anxious nor agitated  Skin:  No rashes, no jaundice  ________________________________________________________________________  Care Plan discussed with:    Comments   Patient x    Family  x    RN x    Care Manager     Consultant                        Multidiciplinary team rounds were held today with , nursing, pharmacist and clinical coordinator. Patient's plan of care was discussed; medications were reviewed and discharge planning was addressed. ________________________________________________________________________  Total NON critical care TIME:  30   Minutes    >50% of visit spent in counseling and coordination of care       ________________________________________________________________________    Procedures: see electronic medical records for all procedures/Xrays and details which were not copied into this note but were reviewed prior to creation of Plan. LABS:  I reviewed today's most current labs and imaging studies.   Pertinent labs include:  Recent Labs      03/18/18   0422  03/17/18   0524  03/15/18   1630   WBC  11.1  12.0*  11.9*   HGB  6.6*  6.5*  7.3*   HCT  20.3*  20.3*  22.4*   PLT  202  220  255     Recent Labs      03/18/18   0422  03/17/18   0524  03/17/18   0046   03/16/18   1042   03/16/18   0400  03/16/18   0006  03/15/18   1630   NA  135*  135*  135*   < >  132*   < >  134*  132*  139   K  4.2  4.5  3.9   < >  3.8   < >  3.7  4.5  4.6   CL  109*  106  109*   < >  105   < >  108  104  99   CO2  13*  16*  18*   < >  14*   < >  12*  15*  14*   GLU  108*  80  74   < >  410*   < >  340*  450*  491*   BUN  61*  58*  57*   < >  55*   < >  54*  59*  60*   CREA  4.85*  4.70*  4.82*   < >  4.63*   < >  4.83*  5.16*  5.29*   CA  7.0* 6. 9*  7.2*  7.1*   < >  6.4*   < >  6.6*  6.9*  7.1*   MG   --   2.0   --    --   1.9   --   2.1   --   2.0   PHOS   --   6.4*   --    --   5.7*   --   6.3*   --    --    ALB  1.3*  1.2*   --    --    --    --    --   1.5*  1.4*   TBILI  0.3  0.3   --    --    --    --    --   0.4  0.4   SGOT  10*  11*   --    --    --    --    --   6*  12*   ALT  10*  11*   --    --    --    --    --   13  16   INR   --    --    --    --    --    --    --    --   1.2*    < > = values in this interval not displayed.        Signed: Seward Mohs, MD

## 2018-03-19 LAB
ABO + RH BLD: NORMAL
ANION GAP SERPL CALC-SCNC: 10 MMOL/L (ref 5–15)
BASOPHILS # BLD: 0 K/UL (ref 0–0.1)
BASOPHILS NFR BLD: 0 % (ref 0–1)
BLD PROD TYP BPU: NORMAL
BLOOD GROUP ANTIBODIES SERPL: NORMAL
BPU ID: NORMAL
BUN SERPL-MCNC: 60 MG/DL (ref 6–20)
BUN/CREAT SERPL: 13 (ref 12–20)
CALCIUM SERPL-MCNC: 7 MG/DL (ref 8.5–10.1)
CHLORIDE SERPL-SCNC: 111 MMOL/L (ref 97–108)
CO2 SERPL-SCNC: 17 MMOL/L (ref 21–32)
CREAT SERPL-MCNC: 4.68 MG/DL (ref 0.7–1.3)
CROSSMATCH RESULT,%XM: NORMAL
DATE LAST DOSE: ABNORMAL
DIFFERENTIAL METHOD BLD: ABNORMAL
EOSINOPHIL # BLD: 0.2 K/UL (ref 0–0.4)
EOSINOPHIL NFR BLD: 2 % (ref 0–7)
ERYTHROCYTE [DISTWIDTH] IN BLOOD BY AUTOMATED COUNT: 20.3 % (ref 11.5–14.5)
GLUCOSE BLD STRIP.AUTO-MCNC: 110 MG/DL (ref 65–100)
GLUCOSE BLD STRIP.AUTO-MCNC: 209 MG/DL (ref 65–100)
GLUCOSE BLD STRIP.AUTO-MCNC: 265 MG/DL (ref 65–100)
GLUCOSE BLD STRIP.AUTO-MCNC: 298 MG/DL (ref 65–100)
GLUCOSE BLD STRIP.AUTO-MCNC: 53 MG/DL (ref 65–100)
GLUCOSE BLD STRIP.AUTO-MCNC: 55 MG/DL (ref 65–100)
GLUCOSE SERPL-MCNC: 69 MG/DL (ref 65–100)
HCT VFR BLD AUTO: 23.5 % (ref 36.6–50.3)
HGB BLD-MCNC: 7.6 G/DL (ref 12.1–17)
IMM GRANULOCYTES # BLD: 0.2 K/UL (ref 0–0.04)
IMM GRANULOCYTES NFR BLD AUTO: 2 % (ref 0–0.5)
LYMPHOCYTES # BLD: 1.3 K/UL (ref 0.8–3.5)
LYMPHOCYTES NFR BLD: 12 % (ref 12–49)
MAGNESIUM SERPL-MCNC: 2 MG/DL (ref 1.6–2.4)
MCH RBC QN AUTO: 21.3 PG (ref 26–34)
MCHC RBC AUTO-ENTMCNC: 32.3 G/DL (ref 30–36.5)
MCV RBC AUTO: 65.8 FL (ref 80–99)
MONOCYTES # BLD: 1.1 K/UL (ref 0–1)
MONOCYTES NFR BLD: 10 % (ref 5–13)
NEUTS SEG # BLD: 7.9 K/UL (ref 1.8–8)
NEUTS SEG NFR BLD: 74 % (ref 32–75)
NRBC # BLD: 0.08 K/UL (ref 0–0.01)
NRBC BLD-RTO: 0.8 PER 100 WBC
PHOSPHATE SERPL-MCNC: 6.7 MG/DL (ref 2.6–4.7)
PLATELET # BLD AUTO: 222 K/UL (ref 150–400)
POTASSIUM SERPL-SCNC: 3.8 MMOL/L (ref 3.5–5.1)
RBC # BLD AUTO: 3.57 M/UL (ref 4.1–5.7)
RBC MORPH BLD: ABNORMAL
REPORTED DOSE,DOSE: ABNORMAL UNITS
REPORTED DOSE/TIME,TMG: 1706
SERVICE CMNT-IMP: ABNORMAL
SODIUM SERPL-SCNC: 138 MMOL/L (ref 136–145)
SPECIMEN EXP DATE BLD: NORMAL
STATUS OF UNIT,%ST: NORMAL
UNIT DIVISION, %UDIV: 0
VANCOMYCIN TROUGH SERPL-MCNC: 22 UG/ML (ref 5–10)
WBC # BLD AUTO: 10.7 K/UL (ref 4.1–11.1)

## 2018-03-19 PROCEDURE — 83735 ASSAY OF MAGNESIUM: CPT | Performed by: INTERNAL MEDICINE

## 2018-03-19 PROCEDURE — 80048 BASIC METABOLIC PNL TOTAL CA: CPT | Performed by: INTERNAL MEDICINE

## 2018-03-19 PROCEDURE — 74011000258 HC RX REV CODE- 258: Performed by: INTERNAL MEDICINE

## 2018-03-19 PROCEDURE — 85027 COMPLETE CBC AUTOMATED: CPT | Performed by: INTERNAL MEDICINE

## 2018-03-19 PROCEDURE — 74011250636 HC RX REV CODE- 250/636: Performed by: INTERNAL MEDICINE

## 2018-03-19 PROCEDURE — 82962 GLUCOSE BLOOD TEST: CPT

## 2018-03-19 PROCEDURE — 74011636637 HC RX REV CODE- 636/637: Performed by: INTERNAL MEDICINE

## 2018-03-19 PROCEDURE — 65270000029 HC RM PRIVATE

## 2018-03-19 PROCEDURE — 80202 ASSAY OF VANCOMYCIN: CPT | Performed by: INTERNAL MEDICINE

## 2018-03-19 PROCEDURE — 74011000250 HC RX REV CODE- 250: Performed by: INTERNAL MEDICINE

## 2018-03-19 PROCEDURE — 84100 ASSAY OF PHOSPHORUS: CPT | Performed by: INTERNAL MEDICINE

## 2018-03-19 PROCEDURE — 74011250637 HC RX REV CODE- 250/637: Performed by: INTERNAL MEDICINE

## 2018-03-19 PROCEDURE — 36415 COLL VENOUS BLD VENIPUNCTURE: CPT | Performed by: INTERNAL MEDICINE

## 2018-03-19 RX ORDER — LEVOFLOXACIN 750 MG/1
750 TABLET ORAL ONCE
Status: COMPLETED | OUTPATIENT
Start: 2018-03-19 | End: 2018-03-19

## 2018-03-19 RX ORDER — FUROSEMIDE 80 MG/1
80 TABLET ORAL DAILY
Status: DISCONTINUED | OUTPATIENT
Start: 2018-03-19 | End: 2018-03-20 | Stop reason: HOSPADM

## 2018-03-19 RX ORDER — NIFEDIPINE 30 MG/1
30 TABLET, EXTENDED RELEASE ORAL DAILY
Status: DISCONTINUED | OUTPATIENT
Start: 2018-03-19 | End: 2018-03-20 | Stop reason: HOSPADM

## 2018-03-19 RX ORDER — LEVOFLOXACIN 500 MG/1
500 TABLET, FILM COATED ORAL
Status: DISCONTINUED | OUTPATIENT
Start: 2018-03-21 | End: 2018-03-20 | Stop reason: HOSPADM

## 2018-03-19 RX ORDER — CALCIUM CARBONATE 200(500)MG
400 TABLET,CHEWABLE ORAL
Status: DISCONTINUED | OUTPATIENT
Start: 2018-03-19 | End: 2018-03-20 | Stop reason: HOSPADM

## 2018-03-19 RX ADMIN — INSULIN LISPRO 5 UNITS: 100 INJECTION, SOLUTION INTRAVENOUS; SUBCUTANEOUS at 17:09

## 2018-03-19 RX ADMIN — INSULIN LISPRO 3 UNITS: 100 INJECTION, SOLUTION INTRAVENOUS; SUBCUTANEOUS at 22:06

## 2018-03-19 RX ADMIN — VANCOMYCIN HYDROCHLORIDE 1250 MG: 10 INJECTION, POWDER, LYOPHILIZED, FOR SOLUTION INTRAVENOUS at 05:41

## 2018-03-19 RX ADMIN — CARVEDILOL 12.5 MG: 12.5 TABLET, FILM COATED ORAL at 17:10

## 2018-03-19 RX ADMIN — FUROSEMIDE 80 MG: 80 TABLET ORAL at 15:01

## 2018-03-19 RX ADMIN — CEFEPIME HYDROCHLORIDE 2 G: 2 INJECTION, POWDER, FOR SOLUTION INTRAVENOUS at 05:54

## 2018-03-19 RX ADMIN — ATORVASTATIN CALCIUM 20 MG: 20 TABLET, FILM COATED ORAL at 08:23

## 2018-03-19 RX ADMIN — CALCIUM CARBONATE (ANTACID) CHEW TAB 500 MG 400 MG: 500 CHEW TAB at 12:04

## 2018-03-19 RX ADMIN — IRON SUCROSE 200 MG: 20 INJECTION, SOLUTION INTRAVENOUS at 15:01

## 2018-03-19 RX ADMIN — ASPIRIN 162 MG: 81 TABLET, COATED ORAL at 08:24

## 2018-03-19 RX ADMIN — LEVOFLOXACIN 750 MG: 750 TABLET, FILM COATED ORAL at 15:01

## 2018-03-19 RX ADMIN — INSULIN HUMAN 12 UNITS: 100 INJECTION, SUSPENSION SUBCUTANEOUS at 17:10

## 2018-03-19 RX ADMIN — SODIUM BICARBONATE 650 MG: 650 TABLET ORAL at 22:06

## 2018-03-19 RX ADMIN — Medication 10 ML: at 15:01

## 2018-03-19 RX ADMIN — HYDRALAZINE HYDROCHLORIDE 10 MG: 20 INJECTION INTRAMUSCULAR; INTRAVENOUS at 17:39

## 2018-03-19 RX ADMIN — Medication 10 ML: at 05:41

## 2018-03-19 RX ADMIN — SODIUM BICARBONATE 650 MG: 650 TABLET ORAL at 08:23

## 2018-03-19 RX ADMIN — ERYTHROPOIETIN 10000 UNITS: 10000 INJECTION, SOLUTION INTRAVENOUS; SUBCUTANEOUS at 18:16

## 2018-03-19 RX ADMIN — CALCIUM CARBONATE (ANTACID) CHEW TAB 500 MG 400 MG: 500 CHEW TAB at 17:10

## 2018-03-19 RX ADMIN — CARVEDILOL 12.5 MG: 12.5 TABLET, FILM COATED ORAL at 08:23

## 2018-03-19 RX ADMIN — HEPARIN SODIUM 5000 UNITS: 5000 INJECTION, SOLUTION INTRAVENOUS; SUBCUTANEOUS at 08:24

## 2018-03-19 RX ADMIN — SODIUM BICARBONATE 650 MG: 650 TABLET ORAL at 17:10

## 2018-03-19 RX ADMIN — NEPHROCAP 1 CAPSULE: 1 CAP ORAL at 08:24

## 2018-03-19 RX ADMIN — Medication 10 ML: at 22:08

## 2018-03-19 RX ADMIN — DEXTROSE MONOHYDRATE 12.5 G: 25 INJECTION, SOLUTION INTRAVENOUS at 08:32

## 2018-03-19 RX ADMIN — HEPARIN SODIUM 5000 UNITS: 5000 INJECTION, SOLUTION INTRAVENOUS; SUBCUTANEOUS at 19:08

## 2018-03-19 RX ADMIN — NIFEDIPINE 30 MG: 30 TABLET, FILM COATED, EXTENDED RELEASE ORAL at 12:04

## 2018-03-19 RX ADMIN — NITROGLYCERIN 1 INCH: 20 OINTMENT TOPICAL at 15:10

## 2018-03-19 NOTE — PROGRESS NOTES
Hospitalist Progress Note    NAME: Victoriano Lesches   :  1960   MRN:  [id number]   LOS:   3      Assessment / Plan:  DKA resolved with concomitant non gap metabolic acidosis  -transitioned off of drip  -continues to have low bicarb, however no ketones in serum or in urine so do not suspect this is due to ketoacidosis, may be related to renal failure  -lactate normal, ABG with normal pH 7.4  -continue NPH BID, and SSI    HCAP  -will de escalate antibiotics today  -wean O2 as tolerated    Acute on chronic renal failure with baseline CKD3  History of unilateral nephrectomy due to renal cell cancer  -appreciate nephrology evaluation  -Cr slightly improved  -will likely need HD soon  -stopped NaCl fluids  -start bicarb PO  -for hypocalcemia follow PTH, vitamin D, started calcitriol    Anemia due to chronic disease  -started EPO, iron  -will transfuse 1 unit 3/18/18    HTN   Chronic diastolic heart failure without exacerbation  -continue home meds  -Nephrology recommends lasix 80 mg daily    HLD   Continue statins     Code Status: full  Surrogate Decision Maker: wife     Subjective:     Chief Complaint: sob  Feeling better today, anxious to go home    Objective:     VITALS:   Last 24hrs VS reviewed since prior progress note.  Most recent are:  Patient Vitals for the past 24 hrs:   Temp Pulse Resp BP SpO2   18 1204 98 °F (36.7 °C) 71 18 (!) 163/95 100 %   18 0743 97.8 °F (36.6 °C) 72 16 (!) 165/94 97 %   18 0006 98.3 °F (36.8 °C) 77 18 156/89 94 %   18 2038 98 °F (36.7 °C) 77 18 138/84 95 %   18 1700 97.8 °F (36.6 °C) 71 20 148/83 97 %   18 1600 97.9 °F (36.6 °C) 70 18 (!) 143/92 95 %   18 1530 97.7 °F (36.5 °C) 69 20 139/90 95 %   18 1500 98 °F (36.7 °C) 68 18 142/83 97 %   18 1445 98 °F (36.7 °C) 69 20 138/81 97 %       Intake/Output Summary (Last 24 hours) at 18 1431  Last data filed at 18 0400   Gross per 24 hour   Intake              350 ml Output             1200 ml   Net             -850 ml        PHYSICAL EXAM:  General: Alert, cooperative, no acute distress    EENT:  EOMI. Anicteric sclerae. Mucous membranes moist  Resp:  CTA bilaterally, no wheezing or rales. No accessory muscle use  CV:  Regular rhythm, 1+edema in LE  GI:  Soft, non distended, non tender. +Bowel sounds  Neurologic:  Alert and oriented X 3, normal speech  Psych:   Good insight, not anxious nor agitated  Skin:  No rashes, no jaundice  ________________________________________________________________________  Care Plan discussed with:    Comments   Patient x    Family      RN x    Care Manager x    Consultant                       x Multidiciplinary team rounds were held today with , nursing, pharmacist and clinical coordinator. Patient's plan of care was discussed; medications were reviewed and discharge planning was addressed. ________________________________________________________________________  Total NON critical care TIME:  30   Minutes    >50% of visit spent in counseling and coordination of care       ________________________________________________________________________    Procedures: see electronic medical records for all procedures/Xrays and details which were not copied into this note but were reviewed prior to creation of Plan. LABS:  I reviewed today's most current labs and imaging studies.   Pertinent labs include:  Recent Labs      03/19/18   0426  03/18/18   0422  03/17/18   0524   WBC  10.7  11.1  12.0*   HGB  7.6*  6.6*  6.5*   HCT  23.5*  20.3*  20.3*   PLT  222  202  220     Recent Labs      03/19/18   0426  03/18/18   0422 03/17/18   0524   NA  138  135*  135*   K  3.8  4.2  4.5   CL  111*  109*  106   CO2  17*  13*  16*   GLU  69  108*  80   BUN  60*  61*  58*   CREA  4.68*  4.85*  4.70*   CA  7.0*  7.0*  6.9*  7.2*   MG  2.0   --   2.0   PHOS  6.7*   --   6.4*   ALB   --   1.3*  1.2*   TBILI   --   0.3  0.3   SGOT   --   10*  11* ALT   --   10*  11*       Signed: Hamilton West MD

## 2018-03-19 NOTE — PROGRESS NOTES
..    PCU SHIFT NURSING NOTE      Bedside shift change report given to  [employer] [place] (oncoming nurse) by  Annelise Alegre RN (offgoing nurse). Report included the following information SBAR. Shift Summary:        Admission Date 3/15/2018   Admission Diagnosis DKA (diabetic ketoacidoses) ([facility] [address]   Consults IP CONSULT TO HOSPITALIST  IP CONSULT TO NEPHROLOGY        Consults   []PT   []OT   []Speech   []Case Management      [] Palliative      Cardiac Monitoring Order   [x]Yes   []No     IV drips   []Yes    Drip:                            Dose:  Drip:                            Dose:  Drip:                            Dose:   []No     GI Prophylaxis   [x]Yes   []No         DVT Prophylaxis   SCDs:             Cornelio stockings:         [x] Medication   []Contraindicated   []None      Activity Level Activity Level: Up with Assistance     Activity Assistance: Partial (one person)   Purposeful Rounding every 1-2 hour? [x]Yes   Cárdenas Score  Total Score: 2   Bed Alarm (If score 3 or >)   [x]Yes   [] Refused (See signed refusal form in chart)   Ruddy Score  Ruddy Score: 20   Ruddy Score (if score 14 or less)   []PMT consult   []Wound Care consult      []Specialty bed   [] Nutrition consult          Needs prior to discharge:   Home O2 required:    []Yes   [x]No    If yes, how much O2 required?     Other:    Last Bowel Movement: Last Bowel Movement Date: 03/16/18      Influenza Vaccine Received Flu Vaccine for Current Season (usually Sept-March): Yes (say he had flu shot this year )    Patient/Guardian Refused (Notify MD): No   Pneumonia Vaccine           Diet Active Orders   Diet    DIET DIABETIC CONSISTENT CARB Regular      LDAs               Peripheral IV 03/16/18 Left Forearm (Active)   Site Assessment Clean, dry, & intact 3/19/2018  4:00 AM   Phlebitis Assessment 0 3/19/2018  4:00 AM   Infiltration Assessment 0 3/19/2018  4:00 AM   Dressing Status Clean, dry, & intact 3/19/2018  4:00 AM   Dressing Type Transparent 3/19/2018  4:00 AM Hub Color/Line Status Green 3/19/2018  4:00 AM   Action Taken Dressing reinforced 3/16/2018  2:03 AM   Alcohol Cap Used Yes 3/16/2018  2:03 AM       Peripheral IV 03/16/18 Left Wrist (Active)   Site Assessment Clean, dry, & intact 3/19/2018  4:00 AM   Phlebitis Assessment 0 3/19/2018  4:00 AM   Infiltration Assessment 0 3/19/2018  4:00 AM   Dressing Status Clean, dry, & intact 3/19/2018  4:00 AM   Dressing Type Transparent 3/19/2018  4:00 AM   Hub Color/Line Status Pink 3/19/2018  4:00 AM   Action Taken Open ports on tubing capped 3/16/2018  4:48 PM   Alcohol Cap Used Yes 3/16/2018  4:48 PM                      Urinary Catheter      Intake & Output   Date 03/18/18 0700 - 03/19/18 0659 03/19/18 0700 - 03/20/18 0659   Shift 6842-3762 2662-1898 24 Hour Total 0648-8421 1031-4084 24 Hour Total   I  N  T  A  K  E   Blood 350  350         Volume (TRANSFUSE PACKED RBC'S) 350  350       Shift Total  (mL/kg) 350  (3.5)  350  (3.5)      O  U  T  P  U  T   Urine  (mL/kg/hr) 800  (0.7) 1200 2000         Urine Voided 800 1200 2000       Shift Total  (mL/kg) 800  (8) 1200  (12) 2000  (19.9)      NET -450 -1200 -1650      Weight (kg) 100.3 100.3 100.3 100.3 100.3 100.3         Readmission Risk Assessment Tool Score High Risk            24       Total Score        3 Has Seen PCP in Last 6 Months (Yes=3, No=0)    4 IP Visits Last 12 Months (1-3=4, 4=9, >4=11)    4 Pt. Coverage (Medicare=5 , Medicaid, or Self-Pay=4)    13 Charlson Comorbidity Score (Age + Comorbid Conditions)        Criteria that do not apply:    . Living with Significant Other. Assisted Living. LTAC. SNF.  or   Rehab    Patient Length of Stay (>5 days = 3)       Expected Length of Stay 3d 19h   Actual Length of Stay 3

## 2018-03-19 NOTE — PROGRESS NOTES
Nephrology Progress Note     Ruperto Parikh     [email or web address]                  Phone - [phone]   Patient: Randall Monroe   YOB: 1960    Date- 3/19/2018     CC: Follow up for CKD         Subjective: Interval History:   -  Cr. Stable  BP HIGH  ACIDOSIS PRESENT  PHOS HIGH  C/O LEG EDEMA  ROS-No c/o sob, fever. No c/o nausea or vomiting  No c/o chest pain     Assessment:   CKD 4/5- dm, htn  Dm 2  MET ACIDOSIS  ANEMIA OF CKD  PROTEINURIA  Solitary R kidney; s/p previous L Nephrectomy for RCC;  SHPT       Plan:   No need for RRT  Continue epogen and venofer  Continue po bicarb  Start tums with meals  Resume lasix 80 mg daily  Continue coreg and nifedipine        Care Plan discussed with: patient  Review of Systems: Pertinent items are noted in HPI. Objective:   Vitals:    03/18/18 1700 03/18/18 2038 03/19/18 0006 03/19/18 0743   BP: 148/83 138/84 156/89 (!) 165/94   Pulse: 71 77 77 72   Resp: 20 18 18 16   Temp: 97.8 °F (36.6 °C) 98 °F (36.7 °C) 98.3 °F (36.8 °C) 97.8 °F (36.6 °C)   SpO2: 97% 95% 94% 97%   Weight:       Height:         Last 3 Recorded Weights in this Encounter    03/15/18 2325   Weight: 100.3 kg (221 lb 1.9 oz)     Patient Vitals for the past 8 hrs:   BP Temp Pulse Resp SpO2   03/19/18 0743 (!) 165/94 97.8 °F (36.6 °C) 72 16 97 %        03/17 1901 - 03/19 0700  In: 1763.3 [I.V.:1413.3]  Out: 3050 [Urine:3050]  Physical Exam:   GEN: NAD  NECK- Supple, no thyromegaly  RESP: Clear b/l, no wheezing, No accessory muscle use  CVS: RRR,S1,S2    EXT:+++ Edema   NEURO: non focal, normal speech  SKIN: No Rash, No Jaundice  ABDO: soft , non tender, No hepatosplenomegaly    Chart reviewed. Pertinent Notes reviewed.      Medications list  reviewed       Data Review :  Recent Labs      03/19/18   0426  03/18/18   0422  03/17/18   0524   03/16/18   1042   NA  138  135*  135*   < >  132*   K  3.8  4.2  4.5   < >  3.8   CL  111*  109*  106   < >  105   CO2 17*  13*  16*   < >  14*   GLU  69  108*  80   < >  410*   BUN  60*  61*  58*   < >  55*   CREA  4.68*  4.85*  4.70*   < >  4.63*   CA  7.0*  7.0*  6.9*  7.2*   < >  6.4*   MG  2.0   --   2.0   --   1.9   PHOS  6.7*   --   6.4*   --   5.7*   ALB   --   1.3*  1.2*   --    --    SGOT   --   10*  11*   --    --    ALT   --   10*  11*   --    --     < > = values in this interval not displayed. Recent Labs      03/19/18   0426  03/18/18   0422  03/17/18   0524   WBC  10.7  11.1  12.0*   HGB  7.6*  6.6*  6.5*   HCT  23.5*  20.3*  20.3*   PLT  222  202  220     No results found for: SDES  Current Facility-Administered Medications   Medication    calcium carbonate (TUMS) chewable tablet 400 mg [elemental]    cefepime (MAXIPIME) 2 g in 0.9% sodium chloride (MBP/ADV) 100 mL    insulin NPH (NOVOLIN N, HUMULIN N) injection 12 Units    insulin lispro (HUMALOG) injection 5 Units    0.9% sodium chloride infusion 250 mL    ergocalciferol (ERGOCALCIFEROL) capsule 50,000 Units    iron sucrose (VENOFER) 100 mg iron/5 mL injection 200 mg    glucose chewable tablet 16 g    dextrose (D50W) injection syrg 12.5-25 g    glucagon (GLUCAGEN) injection 1 mg    insulin lispro (HUMALOG) injection    B complex-vitaminC-folic acid (NEPHROCAP) cap    heparin (porcine) injection 5,000 Units    sodium bicarbonate tablet 650 mg    vancomycin (VANCOCIN) 1250 mg in  ml infusion    aspirin delayed-release tablet 162 mg    atorvastatin (LIPITOR) tablet 20 mg    carvedilol (COREG) tablet 12.5 mg    sodium chloride (NS) flush 5-10 mL    sodium chloride (NS) flush 5-10 mL    acetaminophen (TYLENOL) tablet 650 mg    ondansetron (ZOFRAN) injection 4 mg    nitroglycerin (NITROBID) 2 % ointment 1 Inch    hydrALAZINE (APRESOLINE) 20 mg/mL injection 10 mg    epoetin tyler (EPOGEN;PROCRIT) injection 10,000 Units       Jna Nicholas MD  [facility]  [email or web address]  [address], [employer] [facility] [place], [address] [place]  Phone - [phone]         Fax - [phone]  [facility] Office  [address] [address], [facility]  Phone - [phone]        Fax - [phone]

## 2018-03-19 NOTE — PROGRESS NOTES
PCU SHIFT NURSING NOTE      Bedside shift change report given to Luba Torres (oncoming nurse) by Yajaira Nugent (offgoing nurse). Report included the following information SBAR, Kardex, Intake/Output, MAR and Recent Results. Shift Summary: 1576  Pt in bed resting, drowsy  NSR on monitor  On RA  A&O  BA on call bell in reach     BS 53  Gave 8 oz OJ  Recheck 55  Administered 12.5gm of D50     All insulin held  Continue to monitor  0843  Critical vanc level 22.0  Jax in pharmacy advised  1200  Spoke with Dr Jamila Deluna - will continue to hold insulin for now and monitor BS    1630  Pt being transferred to Ortho - room 3241  Attempted to call report  No answer   1720  Called report to [address] [place] [address] requested    1502  Pt left unit         Admission Date 3/15/2018   Admission Diagnosis DKA (diabetic ketoacidoses) ([facility] [address]   Consults IP CONSULT TO HOSPITALIST  IP CONSULT TO NEPHROLOGY        Consults   []PT   []OT   []Speech   []Case Management      [] Palliative      Cardiac Monitoring Order   []Yes   []No     IV drips   []Yes    Drip:                            Dose:  Drip:                            Dose:  Drip:                            Dose:   []No     GI Prophylaxis   []Yes   []No         DVT Prophylaxis   SCDs:             Cornelio stockings:         [] Medication   []Contraindicated   []None      Activity Level Activity Level: Up with Assistance     Activity Assistance: Partial (one person)   Purposeful Rounding every 1-2 hour? []Yes   Cárdenas Score  Total Score: 2   Bed Alarm (If score 3 or >)   []Yes   [] Refused (See signed refusal form in chart)   Ruddy Score  Ruddy Score: 20   Ruddy Score (if score 14 or less)   []PMT consult   []Wound Care consult      []Specialty bed   [] Nutrition consult          Needs prior to discharge:   Home O2 required:    []Yes   []No    If yes, how much O2 required?     Other:    Last Bowel Movement: Last Bowel Movement Date: 03/19/18      Influenza Vaccine Received Flu Vaccine for Current Season (usually Sept-March): Yes (say he had flu shot this year )    Patient/Guardian Refused (Notify MD): No   Pneumonia Vaccine           Diet Active Orders   Diet    DIET DIABETIC CONSISTENT CARB Regular      LDAs               Peripheral IV 03/16/18 Left Forearm (Active)   Site Assessment Clean, dry, & intact 3/19/2018  7:43 AM   Phlebitis Assessment 0 3/19/2018  7:43 AM   Infiltration Assessment 0 3/19/2018  7:43 AM   Dressing Status Clean, dry, & intact 3/19/2018  7:43 AM   Dressing Type Transparent 3/19/2018  7:43 AM   Hub Color/Line Status Green; Infusing 3/19/2018  7:43 AM   Action Taken Dressing reinforced 3/16/2018  2:03 AM   Alcohol Cap Used Yes 3/16/2018  2:03 AM       Peripheral IV 03/16/18 Left Wrist (Active)   Site Assessment Clean, dry, & intact 3/19/2018  7:43 AM   Phlebitis Assessment 0 3/19/2018  7:43 AM   Infiltration Assessment 0 3/19/2018  7:43 AM   Dressing Status Clean, dry, & intact 3/19/2018  7:43 AM   Dressing Type Transparent 3/19/2018  7:43 AM   Hub Color/Line Status Pink; Infusing 3/19/2018  7:43 AM   Action Taken Open ports on tubing capped 3/16/2018  4:48 PM   Alcohol Cap Used Yes 3/16/2018  4:48 PM                      Urinary Catheter      Intake & Output   Date 03/18/18 0700 - 03/19/18 0659 03/19/18 0700 - 03/20/18 0659   Shift 4923-6837 9487-9474 24 Hour Total 8066-0551 0357-5285 24 Hour Total   I  N  T  A  K  E   Blood 350  350         Volume (TRANSFUSE PACKED RBC'S) 350  350       Shift Total  (mL/kg) 350  (3.5)  350  (3.5)      O  U  T  P  U  T   Urine  (mL/kg/hr) 800  (0.7) 1200  (1) 2000  (0.8)         Urine Voided 800 1200 2000       Shift Total  (mL/kg) 800  (8) 1200  (12) 2000  (19.9)      NET -450 -1200 -1650      Weight (kg) 100.3 100.3 100.3 100.3 100.3 100.3         Readmission Risk Assessment Tool Score High Risk            24       Total Score        3 Has Seen PCP in Last 6 Months (Yes=3, No=0)    4 IP Visits Last 12 Months (1-3=4, 4=9, >4=11)    4 Pt.  Coverage (Medicare=5 , Medicaid, or Self-Pay=4)    13 Charlson Comorbidity Score (Age + Comorbid Conditions)        Criteria that do not apply:    . Living with Significant Other. Assisted Living. LTAC. SNF.  or   Rehab    Patient Length of Stay (>5 days = 3)       Expected Length of Stay 3d 19h   Actual Length of Stay 3

## 2018-03-19 NOTE — PROGRESS NOTES
Problem: Falls - Risk of  Goal: *Absence of Falls  Document Chuck Fall Risk and appropriate interventions in the flowsheet.    Outcome: Progressing Towards Goal  Fall Risk Interventions:            Medication Interventions: Bed/chair exit alarm, Patient to call before getting OOB         History of Falls Interventions: Bed/chair exit alarm

## 2018-03-19 NOTE — DIABETES MGMT
DTC Progress Note    Recommendations/ Comments: If appropriate, please consider changing correctional insulin to Lispro Correctional insulin with high sensitivity given current renal function    Chart reviewed on Mary Jane Bangura.       Patient is a 66 y.o. male with known diabetes- medication verified with patient- Novolin 70/30 18  Units BID at home.     A1c:   Lab Results   Component Value Date/Time    Hemoglobin A1c 10.5 (H) 03/16/2018 12:06 AM    Hemoglobin A1c 12.0 (H) 02/13/2018 05:19 PM       Recent Glucose Results: Lab Results   Component Value Date/Time    GLU 69 03/19/2018 04:26 AM    GLUCPOC 110 (H) 03/19/2018 08:56 AM    GLUCPOC 55 (L) 03/19/2018 08:29 AM    GLUCPOC 53 (L) 03/19/2018 08:08 AM        Lab Results   Component Value Date/Time    Creatinine 4.68 (H) 03/19/2018 04:26 AM     Estimated Creatinine Clearance: 22.3 mL/min (based on Cr of 4.68). Active Orders   Diet    DIET DIABETIC CONSISTENT CARB Regular        PO intake: Patient Vitals for the past 72 hrs:   % Diet Eaten   03/17/18 1355 50 %   03/17/18 0954 50 %       Current hospital DM medication: NPH- 12 BID, Lispro 5 units ac meals, Lispro Correctional insulin with normal sensitivity      Will continue to follow as needed. Thank you.   Sula Mcburney, [address] [place] [address], [address]  Office:  [phone]

## 2018-03-19 NOTE — PROGRESS NOTES
Patient arrived to room via wheelchair at 18:35 with patient belongings.       Primary Nurse Terence Lamar RN and Daniel Cueto RN performed a dual skin assessment on this patient No impairment noted  Ruddy score is 20

## 2018-03-19 NOTE — PROGRESS NOTES
Patient admitted 3/16/2018 HCAP, DKA, Acute Renal Failure an h/o CHF  Patient confirmed name and  as pt identifiers. Demographics confirmed. Patient is self employed and provides medical transport. Patient is  and lives in a single story home with wife. 1 step entry. ADL's/IADl's - independent to include steps and driving  DME - none  Preferred pharmacy - [address] assists with cost of medications. Daughter and wife assist with transportation to/from appointments when necessary  HH/SNF - no experience. Patient reports having kept all follow up appointments after previous discharge. Patient reports being compliant with medications. Patient reports visiting PCP twice prior to this admission, complaining of not feeling well and shortness of breath. Reason for Admission:   HCAP, DKA       RRAT Score:         24  Reason for Admission as identified by patient:      Patient reportsgoingto PCP twice due to not feeling well, with shortness of breath. PCP told the patient he had the flu. Resources/supports as identified by patient/family:    wifeand daughters and sons         Challenges facing patient:        CM reviewed each of the categories with the patient and he confirmed no issues. Finances          Transportation             Support system           Living arrangements          Self-care/ADLs/Cognition           Connection to healthcare providers/adherence to healthcare plan             Health literacy                       Prescription concerns             Current Advanced Care Plan:        None in place    Plan for utilizing home health:        Pending.   PTOT notes pending   Plan while patient is hospitalized:        PTOT medical management   Expected Date of Discharge:       pending  Plan for communication with patient post discharge (who, when, how):         patient  Likelihood of readmission:     high  Transition of Care Plan:      Pending    Care Management Interventions  PCP Verified by CM: Yes  Transition of Care Consult (CM Consult): Discharge Planning  Physical Therapy Consult: Yes  Occupational Therapy Consult: Yes  Current Support Network: Lives with Spouse (one story home with 1 step entry )  Confirm Follow Up Transport: Family  Plan discussed with Pt/Family/Caregiver: Yes  Discharge Location  Discharge Placement:  (tbd)     PTOT notes are pending at this time.      [address] [facility]  Ext [phone]

## 2018-03-20 VITALS
HEART RATE: 75 BPM | DIASTOLIC BLOOD PRESSURE: 85 MMHG | BODY MASS INDEX: 28.38 KG/M2 | TEMPERATURE: 97.3 F | RESPIRATION RATE: 16 BRPM | SYSTOLIC BLOOD PRESSURE: 138 MMHG | HEIGHT: 74 IN | OXYGEN SATURATION: 96 % | WEIGHT: 221.12 LBS

## 2018-03-20 LAB
ANION GAP SERPL CALC-SCNC: 10 MMOL/L (ref 5–15)
BACTERIA SPEC CULT: ABNORMAL
BACTERIA SPEC CULT: ABNORMAL
BASOPHILS # BLD: 0 K/UL (ref 0–0.1)
BASOPHILS NFR BLD: 0 % (ref 0–1)
BUN SERPL-MCNC: 58 MG/DL (ref 6–20)
BUN/CREAT SERPL: 13 (ref 12–20)
CALCIUM SERPL-MCNC: 7.3 MG/DL (ref 8.5–10.1)
CHLORIDE SERPL-SCNC: 110 MMOL/L (ref 97–108)
CO2 SERPL-SCNC: 18 MMOL/L (ref 21–32)
CREAT SERPL-MCNC: 4.57 MG/DL (ref 0.7–1.3)
DIFFERENTIAL METHOD BLD: ABNORMAL
EOSINOPHIL # BLD: 0.2 K/UL (ref 0–0.4)
EOSINOPHIL NFR BLD: 2 % (ref 0–7)
ERYTHROCYTE [DISTWIDTH] IN BLOOD BY AUTOMATED COUNT: 20.6 % (ref 11.5–14.5)
GLUCOSE BLD STRIP.AUTO-MCNC: 185 MG/DL (ref 65–100)
GLUCOSE BLD STRIP.AUTO-MCNC: 236 MG/DL (ref 65–100)
GLUCOSE SERPL-MCNC: 131 MG/DL (ref 65–100)
GRAM STN SPEC: ABNORMAL
GRAM STN SPEC: ABNORMAL
HCT VFR BLD AUTO: 24 % (ref 36.6–50.3)
HGB BLD-MCNC: 7.9 G/DL (ref 12.1–17)
IMM GRANULOCYTES # BLD: 0 K/UL (ref 0–0.04)
IMM GRANULOCYTES NFR BLD AUTO: 0 % (ref 0–0.5)
LYMPHOCYTES # BLD: 1.1 K/UL (ref 0.8–3.5)
LYMPHOCYTES NFR BLD: 10 % (ref 12–49)
MCH RBC QN AUTO: 21.2 PG (ref 26–34)
MCHC RBC AUTO-ENTMCNC: 32.9 G/DL (ref 30–36.5)
MCV RBC AUTO: 64.3 FL (ref 80–99)
METAMYELOCYTES NFR BLD MANUAL: 1 %
MONOCYTES # BLD: 0.4 K/UL (ref 0–1)
MONOCYTES NFR BLD: 4 % (ref 5–13)
NEUTS SEG # BLD: 8.8 K/UL (ref 1.8–8)
NEUTS SEG NFR BLD: 83 % (ref 32–75)
NRBC # BLD: 0.07 K/UL (ref 0–0.01)
NRBC BLD-RTO: 0.7 PER 100 WBC
PLATELET # BLD AUTO: 220 K/UL (ref 150–400)
POTASSIUM SERPL-SCNC: 4 MMOL/L (ref 3.5–5.1)
RBC # BLD AUTO: 3.73 M/UL (ref 4.1–5.7)
RBC MORPH BLD: ABNORMAL
SERVICE CMNT-IMP: ABNORMAL
SODIUM SERPL-SCNC: 138 MMOL/L (ref 136–145)
WBC # BLD AUTO: 10.6 K/UL (ref 4.1–11.1)

## 2018-03-20 PROCEDURE — 74011250637 HC RX REV CODE- 250/637: Performed by: INTERNAL MEDICINE

## 2018-03-20 PROCEDURE — 82962 GLUCOSE BLOOD TEST: CPT

## 2018-03-20 PROCEDURE — 85025 COMPLETE CBC W/AUTO DIFF WBC: CPT | Performed by: INTERNAL MEDICINE

## 2018-03-20 PROCEDURE — 74011636637 HC RX REV CODE- 636/637: Performed by: INTERNAL MEDICINE

## 2018-03-20 PROCEDURE — 74011250636 HC RX REV CODE- 250/636: Performed by: INTERNAL MEDICINE

## 2018-03-20 PROCEDURE — 36415 COLL VENOUS BLD VENIPUNCTURE: CPT | Performed by: INTERNAL MEDICINE

## 2018-03-20 PROCEDURE — 80048 BASIC METABOLIC PNL TOTAL CA: CPT | Performed by: INTERNAL MEDICINE

## 2018-03-20 RX ORDER — LEVOFLOXACIN 500 MG/1
500 TABLET, FILM COATED ORAL
Qty: 1 TAB | Refills: 0 | Status: SHIPPED | OUTPATIENT
Start: 2018-03-21 | End: 2018-03-20

## 2018-03-20 RX ORDER — FUROSEMIDE 80 MG/1
80 TABLET ORAL DAILY
Qty: 30 TAB | Refills: 6 | Status: SHIPPED
Start: 2018-03-20 | End: 2018-07-23 | Stop reason: CLARIF

## 2018-03-20 RX ORDER — SODIUM BICARBONATE 650 MG/1
650 TABLET ORAL 3 TIMES DAILY
Qty: 90 TAB | Refills: 6 | Status: SHIPPED | OUTPATIENT
Start: 2018-03-20 | End: 2019-01-17

## 2018-03-20 RX ORDER — LEVOFLOXACIN 500 MG/1
500 TABLET, FILM COATED ORAL
Qty: 1 TAB | Refills: 0 | Status: SHIPPED | OUTPATIENT
Start: 2018-03-21 | End: 2018-03-22

## 2018-03-20 RX ADMIN — INSULIN LISPRO 2 UNITS: 100 INJECTION, SOLUTION INTRAVENOUS; SUBCUTANEOUS at 08:17

## 2018-03-20 RX ADMIN — SODIUM BICARBONATE 650 MG: 650 TABLET ORAL at 08:18

## 2018-03-20 RX ADMIN — NIFEDIPINE 30 MG: 30 TABLET, FILM COATED, EXTENDED RELEASE ORAL at 08:18

## 2018-03-20 RX ADMIN — NEPHROCAP 1 CAPSULE: 1 CAP ORAL at 08:19

## 2018-03-20 RX ADMIN — CALCIUM CARBONATE (ANTACID) CHEW TAB 500 MG 400 MG: 500 CHEW TAB at 08:18

## 2018-03-20 RX ADMIN — INSULIN HUMAN 12 UNITS: 100 INJECTION, SUSPENSION SUBCUTANEOUS at 08:17

## 2018-03-20 RX ADMIN — FUROSEMIDE 80 MG: 80 TABLET ORAL at 08:18

## 2018-03-20 RX ADMIN — ASPIRIN 162 MG: 81 TABLET, COATED ORAL at 08:18

## 2018-03-20 RX ADMIN — HEPARIN SODIUM 5000 UNITS: 5000 INJECTION, SOLUTION INTRAVENOUS; SUBCUTANEOUS at 08:18

## 2018-03-20 RX ADMIN — ATORVASTATIN CALCIUM 20 MG: 20 TABLET, FILM COATED ORAL at 08:18

## 2018-03-20 RX ADMIN — CARVEDILOL 12.5 MG: 12.5 TABLET, FILM COATED ORAL at 08:18
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
## 2018-03-20 NOTE — PROGRESS NOTES
Nephrology Progress Note     Ruperto Parikh     [facility]. [email or web address]                  Phone - [phone]   Patient: Vahid Gregory   YOB: 1960    Date- 3/20/2018     CC: Follow up for CKD         Subjective: Interval History:   -  Cr. Stable  bp improving  C/o leg edema  No sob  No nausea or vomiting     Assessment:   ckd 4/5- dm, htn  Dm 2  Met. acidosis  Anemia of ckd  PROTEINURIA  Solitary R kidney; s/p previous L Nephrectomy for RCC;  SHPT       Plan:   No need for RRT  Continue epogen  Continue po bicarb  Start tums with meals  Continue lasix 80 mg daily. Continue coreg and nifedipine  OKAY TO D/C TODAY- RENAL STAND POINT  He will follow up with Thomas discussed with: patient  Review of Systems: Pertinent items are noted in HPI. Objective:   Vitals:    03/19/18 2352 03/20/18 0417 03/20/18 0813 03/20/18 1204   BP: (!) 145/92 131/74 137/87 138/85   Pulse: 78 83 85 75   Resp: 16 16 16 16   Temp: 97.9 °F (36.6 °C) 98.3 °F (36.8 °C) 98.6 °F (37 °C) 97.3 °F (36.3 °C)   SpO2: 95%  93% 96%   Weight:       Height:         Last 3 Recorded Weights in this Encounter    03/15/18 2325   Weight: 100.3 kg (221 lb 1.9 oz)     Patient Vitals for the past 8 hrs:   BP Temp Pulse Resp SpO2   03/20/18 1204 138/85 97.3 °F (36.3 °C) 75 16 96 %   03/20/18 0813 137/87 98.6 °F (37 °C) 85 16 93 %   03/20/18 0417 131/74 98.3 °F (36.8 °C) 83 16 -     03/20 0701 - 03/20 1900  In: -   Out: 200 [Urine:200]  03/18 1901 - 03/20 0700  In: -   Out: 4331 [Urine:2950]  Physical Exam:   GEN:  NAD  NECK:  Supple, no thyromegaly  RESP: CTA  b/l, no  wheezing, decreased at base  CVS: RRR,S1,S2   NEURO: non focal, normal speech  ABDO:  soft , non tender, No hepatosplenomegaly  EXT: Edema +nt      Chart reviewed. Pertinent Notes reviewed.      Medications list  reviewed       Data Review :  Recent Labs      03/20/18   0431  03/19/18   0426  03/18/18   0422   NA  138  138  135*   K 4.0  3.8  4.2   CL  110*  111*  109*   CO2  18*  17*  13*   GLU  131*  69  108*   BUN  58*  60*  61*   CREA  4.57*  4.68*  4.85*   CA  7.3*  7.0*  7.0*  6.9*   MG   --   2.0   --    PHOS   --   6.7*   --    ALB   --    --   1.3*   SGOT   --    --   10*   ALT   --    --   10*     Recent Labs      03/20/18   0431  03/19/18   0426  03/18/18   0422   WBC  10.6  10.7  11.1   HGB  7.9*  7.6*  6.6*   HCT  24.0*  23.5*  20.3*   PLT  220  222  202     No results found for: SDES  Current Facility-Administered Medications   Medication    calcium carbonate (TUMS) chewable tablet 400 mg [elemental]    NIFEdipine ER (PROCARDIA XL) tablet 30 mg    furosemide (LASIX) tablet 80 mg    [START ON 3/21/2018] levoFLOXacin (LEVAQUIN) tablet 500 mg    insulin NPH (NOVOLIN N, HUMULIN N) injection 12 Units    0.9% sodium chloride infusion 250 mL    ergocalciferol (ERGOCALCIFEROL) capsule 50,000 Units    iron sucrose (VENOFER) 100 mg iron/5 mL injection 200 mg    glucose chewable tablet 16 g    dextrose (D50W) injection syrg 12.5-25 g    glucagon (GLUCAGEN) injection 1 mg    insulin lispro (HUMALOG) injection    B complex-vitaminC-folic acid (NEPHROCAP) cap    heparin (porcine) injection 5,000 Units    sodium bicarbonate tablet 650 mg    aspirin delayed-release tablet 162 mg    atorvastatin (LIPITOR) tablet 20 mg    carvedilol (COREG) tablet 12.5 mg    sodium chloride (NS) flush 5-10 mL    sodium chloride (NS) flush 5-10 mL    acetaminophen (TYLENOL) tablet 650 mg    ondansetron (ZOFRAN) injection 4 mg    nitroglycerin (NITROBID) 2 % ointment 1 Inch    hydrALAZINE (APRESOLINE) 20 mg/mL injection 10 mg    epoetin tyler (EPOGEN;PROCRIT) injection 10,000 Units       Latricia Bowles MD  [address] [facility]  www. [email or web address]  [facility] [address], [place], [address]  Phone - [phone]         Fax - [phone]  [address] [facility], [place] [address], [facility] [address]  Phone - [phone]        Fax - [phone]

## 2018-03-20 NOTE — PROGRESS NOTES
Discharge instructions discussed with the patient, IV removed x2. Prescriptions with patient. Patient belongings with patient. Volunteers requested.

## 2018-03-20 NOTE — DISCHARGE INSTRUCTIONS
Patient Discharge Instructions    Carlitos Virgen / [id number] : 1960    Admitted 3/15/2018 Discharged: 3/20/2018         DISCHARGE DIAGNOSIS:       DKA (diabetic ketoacidoses) ([facility] (3/16/2018)      Acute on chronic renal failure ([facility] (3/16/2018)      HCAP (healthcare-associated pneumonia) (3/16/2018)           What to do at Home    1. Recommended diet: Diabetic Diet    2. Recommended activity: Activity as tolerated    3. If you experience any of the following symptoms then please call your primary care physician or return to the emergency room if you cannot get hold of your doctor:   Fevers > 100.5, chills   Nausea or vomiting, persistent diarrhea > 24 hours   Blood in stool or black stools   Chest pain or SOB      Follow-up Information     Follow up With Details Comments 0917 Jerrell Nixon MD Schedule an appointment as soon as possible for a visit in 2 weeks  [address]      Linda Pérez MD Schedule an appointment as soon as possible for a visit in 3 weeks  [address] [facility]  [phone]          Take the last dose of antibiotic Wednesday Am, then you are done          Information obtained by :  I understand that if any problems occur once I am at home I am to contact my physician. I understand and acknowledge receipt of the instructions indicated above.                                                                                                                                            Physician's or R.N.'s Signature                                                                  Date/Time                                                                                                                                              Patient or Representative Signature                                                          Date/Time

## 2018-03-21 NOTE — PROGRESS NOTES
Documented on 3/21/18:    Spiritual Care Partner Volunteer visited patient in Ortho on 3/20/2018. Documented by:  Waleska Gonzalez M.Div.    Paging Service [phone])

## 2018-04-23 PROCEDURE — 30233N1 TRANSFUSION OF NONAUTOLOGOUS RED BLOOD CELLS INTO PERIPHERAL VEIN, PERCUTANEOUS APPROACH: ICD-10-PCS | Performed by: INTERNAL MEDICINE

## 2018-04-24 ENCOUNTER — APPOINTMENT (OUTPATIENT)
Dept: GENERAL RADIOLOGY | Age: 58
DRG: 189 | End: 2018-04-24
Attending: INTERNAL MEDICINE
Payer: MEDICARE

## 2018-04-24 ENCOUNTER — HOSPITAL ENCOUNTER (INPATIENT)
Age: 58
LOS: 4 days | Discharge: ANOTHER HOSPITAL | DRG: 189 | End: 2018-04-28
Attending: INTERNAL MEDICINE | Admitting: INTERNAL MEDICINE
Payer: MEDICARE

## 2018-04-24 ENCOUNTER — APPOINTMENT (OUTPATIENT)
Dept: INTERVENTIONAL RADIOLOGY/VASCULAR | Age: 58
DRG: 189 | End: 2018-04-24
Attending: INTERNAL MEDICINE
Payer: MEDICARE

## 2018-04-24 PROBLEM — E87.5 HYPERKALEMIA: Status: ACTIVE | Noted: 2018-04-24

## 2018-04-24 PROBLEM — J96.01 ACUTE RESPIRATORY FAILURE WITH HYPOXIA (HCC): Status: ACTIVE | Noted: 2018-04-24

## 2018-04-24 LAB
ADMINISTERED INITIALS, ADMINIT: NORMAL
ALBUMIN SERPL-MCNC: 1.6 G/DL (ref 3.5–5)
ALBUMIN/GLOB SERPL: 0.2 {RATIO} (ref 1.1–2.2)
ALP SERPL-CCNC: 169 U/L (ref 45–117)
ALT SERPL-CCNC: 15 U/L (ref 12–78)
ANION GAP SERPL CALC-SCNC: 4 MMOL/L (ref 5–15)
ANION GAP SERPL CALC-SCNC: 8 MMOL/L (ref 5–15)
ANION GAP SERPL CALC-SCNC: 9 MMOL/L (ref 5–15)
ANION GAP SERPL CALC-SCNC: 9 MMOL/L (ref 5–15)
APPEARANCE FLD: ABNORMAL
AST SERPL-CCNC: 10 U/L (ref 15–37)
BASOPHILS # BLD: 0 K/UL (ref 0–0.1)
BASOPHILS # BLD: 0 K/UL (ref 0–0.1)
BASOPHILS NFR BLD: 0 % (ref 0–1)
BASOPHILS NFR BLD: 0 % (ref 0–1)
BILIRUB SERPL-MCNC: 0.4 MG/DL (ref 0.2–1)
BNP SERPL-MCNC: ABNORMAL PG/ML (ref 0–125)
BUN SERPL-MCNC: 92 MG/DL (ref 6–20)
BUN SERPL-MCNC: 93 MG/DL (ref 6–20)
BUN SERPL-MCNC: 95 MG/DL (ref 6–20)
BUN SERPL-MCNC: 95 MG/DL (ref 6–20)
BUN/CREAT SERPL: 14 (ref 12–20)
CALCIUM SERPL-MCNC: 7.1 MG/DL (ref 8.5–10.1)
CALCIUM SERPL-MCNC: 7.1 MG/DL (ref 8.5–10.1)
CALCIUM SERPL-MCNC: 7.4 MG/DL (ref 8.5–10.1)
CALCIUM SERPL-MCNC: 7.5 MG/DL (ref 8.5–10.1)
CHLORIDE SERPL-SCNC: 107 MMOL/L (ref 97–108)
CHLORIDE SERPL-SCNC: 108 MMOL/L (ref 97–108)
CHLORIDE SERPL-SCNC: 110 MMOL/L (ref 97–108)
CHLORIDE SERPL-SCNC: 110 MMOL/L (ref 97–108)
CHOLEST FLD-MCNC: 66 MG/DL
CO2 SERPL-SCNC: 20 MMOL/L (ref 21–32)
CO2 SERPL-SCNC: 20 MMOL/L (ref 21–32)
CO2 SERPL-SCNC: 21 MMOL/L (ref 21–32)
CO2 SERPL-SCNC: 25 MMOL/L (ref 21–32)
COLOR FLD: ABNORMAL
CREAT SERPL-MCNC: 6.54 MG/DL (ref 0.7–1.3)
CREAT SERPL-MCNC: 6.62 MG/DL (ref 0.7–1.3)
CREAT SERPL-MCNC: 6.7 MG/DL (ref 0.7–1.3)
CREAT SERPL-MCNC: 6.75 MG/DL (ref 0.7–1.3)
D50 ADMINISTERED, D50ADM: 0 ML
D50 ADMINISTERED, D50ADM: 8 ML
D50 ORDER, D50ORD: 0 ML
D50 ORDER, D50ORD: 8 ML
DIFFERENTIAL METHOD BLD: ABNORMAL
DIFFERENTIAL METHOD BLD: ABNORMAL
EOSINOPHIL # BLD: 0.1 K/UL (ref 0–0.4)
EOSINOPHIL # BLD: 0.1 K/UL (ref 0–0.4)
EOSINOPHIL NFR BLD: 1 % (ref 0–7)
EOSINOPHIL NFR BLD: 1 % (ref 0–7)
ERYTHROCYTE [DISTWIDTH] IN BLOOD BY AUTOMATED COUNT: 21.2 % (ref 11.5–14.5)
ERYTHROCYTE [DISTWIDTH] IN BLOOD BY AUTOMATED COUNT: 21.5 % (ref 11.5–14.5)
EST. AVERAGE GLUCOSE BLD GHB EST-MCNC: 237 MG/DL
GLOBULIN SER CALC-MCNC: 7.3 G/DL (ref 2–4)
GLSCOM COMMENTS: NORMAL
GLUCOSE BLD STRIP.AUTO-MCNC: 113 MG/DL (ref 65–100)
GLUCOSE BLD STRIP.AUTO-MCNC: 183 MG/DL (ref 65–100)
GLUCOSE BLD STRIP.AUTO-MCNC: 211 MG/DL (ref 65–100)
GLUCOSE BLD STRIP.AUTO-MCNC: 256 MG/DL (ref 65–100)
GLUCOSE BLD STRIP.AUTO-MCNC: 290 MG/DL (ref 65–100)
GLUCOSE BLD STRIP.AUTO-MCNC: 322 MG/DL (ref 65–100)
GLUCOSE BLD STRIP.AUTO-MCNC: 372 MG/DL (ref 65–100)
GLUCOSE BLD STRIP.AUTO-MCNC: 415 MG/DL (ref 65–100)
GLUCOSE BLD STRIP.AUTO-MCNC: 449 MG/DL (ref 65–100)
GLUCOSE BLD STRIP.AUTO-MCNC: 80 MG/DL (ref 65–100)
GLUCOSE BLD STRIP.AUTO-MCNC: 89 MG/DL (ref 65–100)
GLUCOSE BLD STRIP.AUTO-MCNC: 96 MG/DL (ref 65–100)
GLUCOSE BLD STRIP.AUTO-MCNC: 98 MG/DL (ref 65–100)
GLUCOSE SERPL-MCNC: 109 MG/DL (ref 65–100)
GLUCOSE SERPL-MCNC: 146 MG/DL (ref 65–100)
GLUCOSE SERPL-MCNC: 355 MG/DL (ref 65–100)
GLUCOSE SERPL-MCNC: 397 MG/DL (ref 65–100)
GLUCOSE, GLC: 113 MG/DL
GLUCOSE, GLC: 183 MG/DL
GLUCOSE, GLC: 211 MG/DL
GLUCOSE, GLC: 290 MG/DL
GLUCOSE, GLC: 322 MG/DL
GLUCOSE, GLC: 372 MG/DL
GLUCOSE, GLC: 415 MG/DL
GLUCOSE, GLC: 80 MG/DL
GLUCOSE, GLC: 96 MG/DL
GLUCOSE, GLC: 98 MG/DL
HBA1C MFR BLD: 9.9 % (ref 4.2–6.3)
HCT VFR BLD AUTO: 21.6 % (ref 36.6–50.3)
HCT VFR BLD AUTO: 24.6 % (ref 36.6–50.3)
HCT VFR BLD AUTO: 25.1 % (ref 36.6–50.3)
HGB BLD-MCNC: 6.7 G/DL (ref 12.1–17)
HGB BLD-MCNC: 7.6 G/DL (ref 12.1–17)
HGB BLD-MCNC: 7.8 G/DL (ref 12.1–17)
HIGH TARGET, HITG: 300 MG/DL
IMM GRANULOCYTES # BLD: 0.1 K/UL (ref 0–0.04)
IMM GRANULOCYTES # BLD: 0.1 K/UL (ref 0–0.04)
IMM GRANULOCYTES NFR BLD AUTO: 1 % (ref 0–0.5)
IMM GRANULOCYTES NFR BLD AUTO: 1 % (ref 0–0.5)
INSULIN ADMINSTERED, INSADM: 0 UNITS/HOUR
INSULIN ADMINSTERED, INSADM: 0 UNITS/HOUR
INSULIN ADMINSTERED, INSADM: 0.4 UNITS/HOUR
INSULIN ADMINSTERED, INSADM: 1.1 UNITS/HOUR
INSULIN ADMINSTERED, INSADM: 10.5 UNITS/HOUR
INSULIN ADMINSTERED, INSADM: 3.7 UNITS/HOUR
INSULIN ADMINSTERED, INSADM: 6 UNITS/HOUR
INSULIN ADMINSTERED, INSADM: 7.1 UNITS/HOUR
INSULIN ADMINSTERED, INSADM: 9.2 UNITS/HOUR
INSULIN ADMINSTERED, INSADM: 9.4 UNITS/HOUR
INSULIN ORDER, INSORD: 0 UNITS/HOUR
INSULIN ORDER, INSORD: 0 UNITS/HOUR
INSULIN ORDER, INSORD: 0.4 UNITS/HOUR
INSULIN ORDER, INSORD: 1.1 UNITS/HOUR
INSULIN ORDER, INSORD: 10.5 UNITS/HOUR
INSULIN ORDER, INSORD: 3.7 UNITS/HOUR
INSULIN ORDER, INSORD: 6 UNITS/HOUR
INSULIN ORDER, INSORD: 7.1 UNITS/HOUR
INSULIN ORDER, INSORD: 9.2 UNITS/HOUR
INSULIN ORDER, INSORD: 9.4 UNITS/HOUR
LOW TARGET, LOT: 200 MG/DL
LYMPHOCYTES # BLD: 0.8 K/UL (ref 0.8–3.5)
LYMPHOCYTES # BLD: 1.4 K/UL (ref 0.8–3.5)
LYMPHOCYTES NFR BLD: 14 % (ref 12–49)
LYMPHOCYTES NFR BLD: 18 % (ref 12–49)
LYMPHOCYTES NFR FLD: 58 %
MCH RBC QN AUTO: 21.1 PG (ref 26–34)
MCH RBC QN AUTO: 21.1 PG (ref 26–34)
MCHC RBC AUTO-ENTMCNC: 31 G/DL (ref 30–36.5)
MCHC RBC AUTO-ENTMCNC: 31.1 G/DL (ref 30–36.5)
MCV RBC AUTO: 67.8 FL (ref 80–99)
MCV RBC AUTO: 67.9 FL (ref 80–99)
MINUTES UNTIL NEXT BG, NBG: 15 MIN
MINUTES UNTIL NEXT BG, NBG: 60 MIN
MONOCYTES # BLD: 0.4 K/UL (ref 0–1)
MONOCYTES # BLD: 0.5 K/UL (ref 0–1)
MONOCYTES NFR BLD: 6 % (ref 5–13)
MONOCYTES NFR BLD: 7 % (ref 5–13)
MONOS+MACROS NFR FLD: 9 %
MULTIPLIER, MUL: 0
MULTIPLIER, MUL: 0
MULTIPLIER, MUL: 0.01
MULTIPLIER, MUL: 0.02
MULTIPLIER, MUL: 0.02
MULTIPLIER, MUL: 0.03
MULTIPLIER, MUL: 0.03
MULTIPLIER, MUL: 0.04
NEUTROPHILS NFR FLD: 33 %
NEUTS SEG # BLD: 4.5 K/UL (ref 1.8–8)
NEUTS SEG # BLD: 5.5 K/UL (ref 1.8–8)
NEUTS SEG NFR BLD: 74 % (ref 32–75)
NEUTS SEG NFR BLD: 77 % (ref 32–75)
NRBC # BLD: 0.04 K/UL (ref 0–0.01)
NRBC # BLD: 0.05 K/UL (ref 0–0.01)
NRBC BLD-RTO: 0.7 PER 100 WBC
NRBC BLD-RTO: 0.7 PER 100 WBC
NUC CELL # FLD: 2450 /CU MM
ORDER INITIALS, ORDINIT: NORMAL
PLATELET # BLD AUTO: 134 K/UL (ref 150–400)
PLATELET # BLD AUTO: 158 K/UL (ref 150–400)
PMV BLD AUTO: ABNORMAL FL (ref 8.9–12.9)
POTASSIUM SERPL-SCNC: 4.4 MMOL/L (ref 3.5–5.1)
POTASSIUM SERPL-SCNC: 4.9 MMOL/L (ref 3.5–5.1)
POTASSIUM SERPL-SCNC: 5.1 MMOL/L (ref 3.5–5.1)
POTASSIUM SERPL-SCNC: 5.4 MMOL/L (ref 3.5–5.1)
PROT SERPL-MCNC: 8.9 G/DL (ref 6.4–8.2)
RBC # BLD AUTO: 3.18 M/UL (ref 4.1–5.7)
RBC # BLD AUTO: 3.7 M/UL (ref 4.1–5.7)
RBC # FLD: >100 /CU MM
RBC MORPH BLD: ABNORMAL
SERVICE CMNT-IMP: ABNORMAL
SERVICE CMNT-IMP: NORMAL
SODIUM SERPL-SCNC: 136 MMOL/L (ref 136–145)
SODIUM SERPL-SCNC: 136 MMOL/L (ref 136–145)
SODIUM SERPL-SCNC: 139 MMOL/L (ref 136–145)
SODIUM SERPL-SCNC: 140 MMOL/L (ref 136–145)
SPECIMEN SOURCE FLD: ABNORMAL
SPECIMEN SOURCE FLD: NORMAL
WBC # BLD AUTO: 5.9 K/UL (ref 4.1–11.1)
WBC # BLD AUTO: 7.6 K/UL (ref 4.1–11.1)

## 2018-04-24 PROCEDURE — 85025 COMPLETE CBC W/AUTO DIFF WBC: CPT | Performed by: INTERNAL MEDICINE

## 2018-04-24 PROCEDURE — C1729 CATH, DRAINAGE: HCPCS

## 2018-04-24 PROCEDURE — 77030011229 HC DIL VESL COON COOK -A

## 2018-04-24 PROCEDURE — 82962 GLUCOSE BLOOD TEST: CPT

## 2018-04-24 PROCEDURE — 71045 X-RAY EXAM CHEST 1 VIEW: CPT

## 2018-04-24 PROCEDURE — 74011000250 HC RX REV CODE- 250: Performed by: RADIOLOGY

## 2018-04-24 PROCEDURE — 77030002986 HC SUT PROL J&J -A

## 2018-04-24 PROCEDURE — 76937 US GUIDE VASCULAR ACCESS: CPT

## 2018-04-24 PROCEDURE — 77030012390 HC DRN CHST BTL GTNG -B

## 2018-04-24 PROCEDURE — 77030011230 HC DIL VESL COON COOK -B

## 2018-04-24 PROCEDURE — C1769 GUIDE WIRE: HCPCS

## 2018-04-24 PROCEDURE — C1894 INTRO/SHEATH, NON-LASER: HCPCS

## 2018-04-24 PROCEDURE — 83880 ASSAY OF NATRIURETIC PEPTIDE: CPT | Performed by: INTERNAL MEDICINE

## 2018-04-24 PROCEDURE — 65660000000 HC RM CCU STEPDOWN

## 2018-04-24 PROCEDURE — 74011636637 HC RX REV CODE- 636/637: Performed by: INTERNAL MEDICINE

## 2018-04-24 PROCEDURE — 36430 TRANSFUSION BLD/BLD COMPNT: CPT

## 2018-04-24 PROCEDURE — 86900 BLOOD TYPING SEROLOGIC ABO: CPT | Performed by: INTERNAL MEDICINE

## 2018-04-24 PROCEDURE — 86923 COMPATIBILITY TEST ELECTRIC: CPT | Performed by: INTERNAL MEDICINE

## 2018-04-24 PROCEDURE — 0W9930Z DRAINAGE OF RIGHT PLEURAL CAVITY WITH DRAINAGE DEVICE, PERCUTANEOUS APPROACH: ICD-10-PCS | Performed by: RADIOLOGY

## 2018-04-24 PROCEDURE — C1892 INTRO/SHEATH,FIXED,PEEL-AWAY: HCPCS

## 2018-04-24 PROCEDURE — 87205 SMEAR GRAM STAIN: CPT | Performed by: RADIOLOGY

## 2018-04-24 PROCEDURE — 93005 ELECTROCARDIOGRAM TRACING: CPT

## 2018-04-24 PROCEDURE — 83036 HEMOGLOBIN GLYCOSYLATED A1C: CPT | Performed by: INTERNAL MEDICINE

## 2018-04-24 PROCEDURE — P9016 RBC LEUKOCYTES REDUCED: HCPCS | Performed by: INTERNAL MEDICINE

## 2018-04-24 PROCEDURE — 83970 ASSAY OF PARATHORMONE: CPT | Performed by: INTERNAL MEDICINE

## 2018-04-24 PROCEDURE — 87116 MYCOBACTERIA CULTURE: CPT | Performed by: RADIOLOGY

## 2018-04-24 PROCEDURE — 76942 ECHO GUIDE FOR BIOPSY: CPT

## 2018-04-24 PROCEDURE — 74011000258 HC RX REV CODE- 258: Performed by: INTERNAL MEDICINE

## 2018-04-24 PROCEDURE — 02HV33Z INSERTION OF INFUSION DEVICE INTO SUPERIOR VENA CAVA, PERCUTANEOUS APPROACH: ICD-10-PCS | Performed by: RADIOLOGY

## 2018-04-24 PROCEDURE — 74011250637 HC RX REV CODE- 250/637: Performed by: INTERNAL MEDICINE

## 2018-04-24 PROCEDURE — 84311 SPECTROPHOTOMETRY: CPT | Performed by: RADIOLOGY

## 2018-04-24 PROCEDURE — 87075 CULTR BACTERIA EXCEPT BLOOD: CPT | Performed by: RADIOLOGY

## 2018-04-24 PROCEDURE — 74011250636 HC RX REV CODE- 250/636: Performed by: RADIOLOGY

## 2018-04-24 PROCEDURE — 80048 BASIC METABOLIC PNL TOTAL CA: CPT | Performed by: INTERNAL MEDICINE

## 2018-04-24 PROCEDURE — C9113 INJ PANTOPRAZOLE SODIUM, VIA: HCPCS | Performed by: INTERNAL MEDICINE

## 2018-04-24 PROCEDURE — 89050 BODY FLUID CELL COUNT: CPT | Performed by: RADIOLOGY

## 2018-04-24 PROCEDURE — 36415 COLL VENOUS BLD VENIPUNCTURE: CPT | Performed by: INTERNAL MEDICINE

## 2018-04-24 PROCEDURE — 74011250636 HC RX REV CODE- 250/636: Performed by: INTERNAL MEDICINE

## 2018-04-24 PROCEDURE — 77030018719 HC DRSG PTCH ANTIMIC J&J -A

## 2018-04-24 PROCEDURE — 77030010545

## 2018-04-24 PROCEDURE — 80053 COMPREHEN METABOLIC PANEL: CPT | Performed by: INTERNAL MEDICINE

## 2018-04-24 PROCEDURE — 85018 HEMOGLOBIN: CPT | Performed by: INTERNAL MEDICINE

## 2018-04-24 PROCEDURE — 74011000250 HC RX REV CODE- 250: Performed by: INTERNAL MEDICINE

## 2018-04-24 PROCEDURE — C1752 CATH,HEMODIALYSIS,SHORT-TERM: HCPCS

## 2018-04-24 RX ORDER — NIFEDIPINE 30 MG/1
30 TABLET, FILM COATED, EXTENDED RELEASE ORAL DAILY
Status: DISCONTINUED | OUTPATIENT
Start: 2018-04-24 | End: 2018-04-24

## 2018-04-24 RX ORDER — CARVEDILOL 12.5 MG/1
12.5 TABLET ORAL 2 TIMES DAILY WITH MEALS
Status: DISCONTINUED | OUTPATIENT
Start: 2018-04-24 | End: 2018-04-28 | Stop reason: HOSPADM

## 2018-04-24 RX ORDER — SODIUM CHLORIDE 0.9 % (FLUSH) 0.9 %
5-10 SYRINGE (ML) INJECTION AS NEEDED
Status: DISCONTINUED | OUTPATIENT
Start: 2018-04-24 | End: 2018-04-28 | Stop reason: HOSPADM

## 2018-04-24 RX ORDER — DEXTROSE 50 % IN WATER (D50W) INTRAVENOUS SYRINGE
12.5-25 AS NEEDED
Status: DISCONTINUED | OUTPATIENT
Start: 2018-04-24 | End: 2018-04-25

## 2018-04-24 RX ORDER — MAGNESIUM SULFATE 100 %
4 CRYSTALS MISCELLANEOUS AS NEEDED
Status: DISCONTINUED | OUTPATIENT
Start: 2018-04-24 | End: 2018-04-25

## 2018-04-24 RX ORDER — LIDOCAINE HYDROCHLORIDE 20 MG/ML
10 INJECTION, SOLUTION INFILTRATION; PERINEURAL ONCE
Status: COMPLETED | OUTPATIENT
Start: 2018-04-24 | End: 2018-04-24

## 2018-04-24 RX ORDER — HEPARIN 100 UNIT/ML
500 SYRINGE INTRAVENOUS ONCE
Status: COMPLETED | OUTPATIENT
Start: 2018-04-24 | End: 2018-04-24

## 2018-04-24 RX ORDER — DEXTROSE 50 % IN WATER (D50W) INTRAVENOUS SYRINGE
12.5-25 AS NEEDED
Status: DISCONTINUED | OUTPATIENT
Start: 2018-04-24 | End: 2018-04-28 | Stop reason: HOSPADM

## 2018-04-24 RX ORDER — INSULIN LISPRO 100 [IU]/ML
INJECTION, SOLUTION INTRAVENOUS; SUBCUTANEOUS
Status: DISCONTINUED | OUTPATIENT
Start: 2018-04-24 | End: 2018-04-25

## 2018-04-24 RX ORDER — ONDANSETRON 2 MG/ML
4 INJECTION INTRAMUSCULAR; INTRAVENOUS
Status: DISCONTINUED | OUTPATIENT
Start: 2018-04-24 | End: 2018-04-28 | Stop reason: HOSPADM

## 2018-04-24 RX ORDER — MAGNESIUM SULFATE 100 %
4 CRYSTALS MISCELLANEOUS AS NEEDED
Status: DISCONTINUED | OUTPATIENT
Start: 2018-04-24 | End: 2018-04-28 | Stop reason: HOSPADM

## 2018-04-24 RX ORDER — ACETAMINOPHEN 325 MG/1
650 TABLET ORAL
Status: DISCONTINUED | OUTPATIENT
Start: 2018-04-24 | End: 2018-04-28 | Stop reason: HOSPADM

## 2018-04-24 RX ORDER — SODIUM CHLORIDE 0.9 % (FLUSH) 0.9 %
5-10 SYRINGE (ML) INJECTION EVERY 8 HOURS
Status: DISCONTINUED | OUTPATIENT
Start: 2018-04-24 | End: 2018-04-28 | Stop reason: HOSPADM

## 2018-04-24 RX ORDER — BUMETANIDE 0.25 MG/ML
4 INJECTION INTRAMUSCULAR; INTRAVENOUS ONCE
Status: COMPLETED | OUTPATIENT
Start: 2018-04-24 | End: 2018-04-24

## 2018-04-24 RX ORDER — BUMETANIDE 0.25 MG/ML
4 INJECTION INTRAMUSCULAR; INTRAVENOUS 2 TIMES DAILY
Status: DISCONTINUED | OUTPATIENT
Start: 2018-04-24 | End: 2018-04-26

## 2018-04-24 RX ORDER — INSULIN LISPRO 100 [IU]/ML
INJECTION, SOLUTION INTRAVENOUS; SUBCUTANEOUS
Status: DISCONTINUED | OUTPATIENT
Start: 2018-04-24 | End: 2018-04-28 | Stop reason: HOSPADM

## 2018-04-24 RX ORDER — SODIUM CHLORIDE 9 MG/ML
250 INJECTION, SOLUTION INTRAVENOUS AS NEEDED
Status: DISCONTINUED | OUTPATIENT
Start: 2018-04-24 | End: 2018-04-28 | Stop reason: HOSPADM

## 2018-04-24 RX ORDER — SODIUM BICARBONATE 650 MG/1
650 TABLET ORAL 3 TIMES DAILY
Status: DISCONTINUED | OUTPATIENT
Start: 2018-04-24 | End: 2018-04-25

## 2018-04-24 RX ORDER — SODIUM POLYSTYRENE SULFONATE 15 G/60ML
30 SUSPENSION ORAL; RECTAL
Status: COMPLETED | OUTPATIENT
Start: 2018-04-24 | End: 2018-04-24

## 2018-04-24 RX ADMIN — BUMETANIDE 4 MG: 0.25 INJECTION INTRAMUSCULAR; INTRAVENOUS at 10:25

## 2018-04-24 RX ADMIN — IRON SUCROSE 300 MG: 20 INJECTION, SOLUTION INTRAVENOUS at 14:28

## 2018-04-24 RX ADMIN — PANTOPRAZOLE SODIUM 40 MG: 40 INJECTION, POWDER, FOR SOLUTION INTRAVENOUS at 21:23

## 2018-04-24 RX ADMIN — ERYTHROPOIETIN 10000 UNITS: 10000 INJECTION, SOLUTION INTRAVENOUS; SUBCUTANEOUS at 18:37

## 2018-04-24 RX ADMIN — SODIUM CHLORIDE, PRESERVATIVE FREE 500 UNITS: 5 INJECTION INTRAVENOUS at 15:34

## 2018-04-24 RX ADMIN — PANTOPRAZOLE SODIUM 40 MG: 40 INJECTION, POWDER, FOR SOLUTION INTRAVENOUS at 09:22

## 2018-04-24 RX ADMIN — CARVEDILOL 12.5 MG: 12.5 TABLET, FILM COATED ORAL at 18:20

## 2018-04-24 RX ADMIN — DEXTROSE MONOHYDRATE 16 G: 25 INJECTION, SOLUTION INTRAVENOUS at 14:52

## 2018-04-24 RX ADMIN — CARVEDILOL 12.5 MG: 12.5 TABLET, FILM COATED ORAL at 09:25

## 2018-04-24 RX ADMIN — BUMETANIDE 4 MG: 0.25 INJECTION INTRAMUSCULAR; INTRAVENOUS at 18:56

## 2018-04-24 RX ADMIN — SODIUM CHLORIDE 7.1 UNITS/HR: 900 INJECTION, SOLUTION INTRAVENOUS at 05:49

## 2018-04-24 RX ADMIN — LIDOCAINE HYDROCHLORIDE 200 MG: 20 INJECTION, SOLUTION INFILTRATION; PERINEURAL at 15:34

## 2018-04-24 RX ADMIN — SODIUM BICARBONATE 650 MG: 650 TABLET ORAL at 18:20

## 2018-04-24 RX ADMIN — PANTOPRAZOLE SODIUM 40 MG: 40 INJECTION, POWDER, FOR SOLUTION INTRAVENOUS at 03:45

## 2018-04-24 RX ADMIN — NIFEDIPINE 30 MG: 30 TABLET, FILM COATED, EXTENDED RELEASE ORAL at 09:38

## 2018-04-24 RX ADMIN — Medication 10 ML: at 14:28

## 2018-04-24 RX ADMIN — SODIUM BICARBONATE 650 MG: 650 TABLET ORAL at 21:23

## 2018-04-24 RX ADMIN — SODIUM POLYSTYRENE SULFONATE 30 G: 15 SUSPENSION ORAL; RECTAL at 01:10

## 2018-04-24 RX ADMIN — NITROGLYCERIN 1 INCH: 20 OINTMENT TOPICAL at 09:22

## 2018-04-24 RX ADMIN — BUMETANIDE 4 MG: 0.25 INJECTION INTRAMUSCULAR; INTRAVENOUS at 01:47

## 2018-04-24 RX ADMIN — Medication 10 ML: at 01:52

## 2018-04-24 RX ADMIN — INSULIN LISPRO 2 UNITS: 100 INJECTION, SOLUTION INTRAVENOUS; SUBCUTANEOUS at 22:05

## 2018-04-24 RX ADMIN — Medication 10 ML: at 21:23

## 2018-04-24 RX ADMIN — SODIUM BICARBONATE 650 MG: 650 TABLET ORAL at 09:33

## 2018-04-24 NOTE — PROGRESS NOTES
Problem: Falls - Risk of  Goal: *Absence of Falls  Document Chuck Fall Risk and appropriate interventions in the flowsheet.    Outcome: Progressing Towards Goal  Fall Risk Interventions:  Mobility Interventions: Patient to call before getting OOB              Elimination Interventions: Call light in reach, Patient to call for help with toileting needs
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
## 2018-04-24 NOTE — DIABETES MGMT
DTC Consult Note    Recommendations/ Comments: continue with drip at this time. 1) consider transitioning pt onto basal insulin - NPH consistent with home medication regimen - 10 units bid (80% of home dose) when ready to wean off drip. 2) consider adding prandial insulin to regimen once transitioned if consuming 50% of meals consistently. Current hospital DM medication: insulin gtt    Consult received for:  [x]             Insulin infusion     []             DKA/HHS    Chart reviewed and initial evaluation complete on Mecca Fuller. Patient is a 66 y.o. male with known Type 2 DM on Novolin 70/30 18 units bid ac - pt's A1c has improved since last seen 3/18/18. Pt was educated at this visit by DTC staff and insulin dosing was verified. A1c:   Lab Results   Component Value Date/Time    Hemoglobin A1c 9.9 (H) 04/24/2018 01:23 AM     Recent Glucose Results:   Lab Results   Component Value Date/Time     (H) 04/24/2018 06:47 AM     (H) 04/24/2018 01:23 AM    GLUCPOC 290 (H) 04/24/2018 09:12 AM    GLUCPOC 322 (H) 04/24/2018 07:58 AM    GLUCPOC 372 (H) 04/24/2018 06:51 AM        Lab Results   Component Value Date/Time    Creatinine 6.62 (H) 04/24/2018 06:47 AM     Estimated Creatinine Clearance: 14.9 mL/min (based on Cr of 6.62). Active Orders   Diet    DIET NPO Except Meds      PO intake: No data found. Will continue to follow as needed. Thank you.   Tracy Astorga RD CDE

## 2018-04-24 NOTE — PROGRESS NOTES
Hospitalist Progress Note    NAME: Zaida Noland   :  1960   MRN:  [id number]       Assessment / Plan:  Acute respiratory failure with hypoxia with Large Right Pleural Effusion due to Volume Overload in Settings of Renal Failure  -I have discussed case with IR, Dr. Corbin Courtney and chest tube to be placed today  -Respiratory status should improve with HD  -Pulmonary consult in place, Dr. Cody Kwong will be following (formal consult to be filled tomorrow) since patient receiving a chest tube  -TTE ordered for tomorrow     Hyperkalemia  COLTEN on CKD stage V  20 lb weight gain in a week  Worsening leg edema  -Nephrology assistance noted, potassium a little better with temporizing measures but patient will be starting HD    Accelerated HTN improved and patient with subsequent Hypotension  -discontinue NTG past, and Nifedipine  -BP and HR parameter added to Coreg    Acute blood loss anemia s/p 1 unit pRBC's  Lower GI bleed with bright red blood per rectum  -Hold home ASA; Uremia also likely contributing to platelet dysfunction  -continue empiric IV PPIs  -will start clear liquid diet once back from HD  -await GI consult, patient so busy today he may not been seen until tomorrow as patient at IR now (4:15 pm)     Hyperosmolar Non Ketotic State with h/o DM type 2  Hypoglycemia on insulin gtt  -stop insulin gtt  -SSI only for now  -start NPH 5 units BID tomorrow     Hyperlipidemia  -Hold statin for now     Code Status: Full  Surrogate Decision Maker: Wife     DVT Prophylaxis: SCDs    Body mass index is 27 kg/(m^2). Mr. Sandy Braun remains critically ill and a total of 30 minutes of non-procedure based critical care was provided during this encounter.        Subjective:     Chief Complaint / Reason for Physician Visit: follow-up COLTEN, hyperkalemia and hypoxia  Review of Systems:  Symptom Y/N Comments  Symptom Y/N Comments   Fever/Chills    Chest Pain n    Poor Appetite    Edema y    Cough    Abdominal Pain n    Sputum    Joint Pain     SOB/IRENE y   Pruritis/Rash     Nausea/vomit    Tolerating PT/OT     Diarrhea    Tolerating Diet     Constipation    Other       Could NOT obtain due to:      Objective:     VITALS:   Last 24hrs VS reviewed since prior progress note. Most recent are:  Patient Vitals for the past 24 hrs:   Temp Pulse Resp BP SpO2   04/24/18 0925 - 85 - 133/90 100 %   04/24/18 0922 - 85 - 133/90 -   04/24/18 0759 - 90 - (!) 169/98 94 %   04/24/18 0424 97.7 °F (36.5 °C) 93 18 (!) 159/108 100 %   04/24/18 0134 97.9 °F (36.6 °C) 93 17 (!) 167/108 95 %   04/24/18 0018 - 93 - (!) 170/107 94 %   04/24/18 0017 97.7 °F (36.5 °C) 93 18 (!) 178/111 93 %       Intake/Output Summary (Last 24 hours) at 04/24/18 1001  Last data filed at 04/24/18 0017   Gross per 24 hour   Intake                0 ml   Output              200 ml   Net             -200 ml        PHYSICAL EXAM:  General: WD, WN. Alert, cooperative, no acute distress    EENT:  EOMI. Anicteric sclerae. MM dry  Resp:  Decreased breath sounds right base. Mild accessory muscle use  CV:  Regular  rhythm,  + 3+ BLE edema  GI:  Soft, mildly distended, Non tender.  hypoactive bowel sounds  Neurologic:  Alert and oriented, speaks in short sentences   Psych:   Fair insight. Not anxious nor agitated  Skin:  No rashes noted. No jaundice    Reviewed most current lab test results and cultures  YES  Reviewed most current radiology test results   YES  Review and summation of old records today    NO  Reviewed patient's current orders and MAR    YES  PMH/SH reviewed - no change compared to H&P  ________________________________________________________________________  Care Plan discussed with:    Comments   Patient x    Family      RN x    Care Manager     Consultant  x IR and Pulm                     Multidiciplinary team rounds were held today with , nursing, pharmacist and clinical coordinator.   Patient's plan of care was discussed; medications were reviewed and discharge planning was addressed. ________________________________________________________________________  Total NON critical care TIME:     Minutes    Total CRITICAL CARE TIME Spent: 30  Minutes non procedure based (this note represents a 72974 charge given that Dr. Christal Richard spent 60 minutes of CC time at admission this am)      Comments   >50% of visit spent in counseling and coordination of care     ________________________________________________________________________  Celia Troy MD     Procedures: see electronic medical records for all procedures/Xrays and details which were not copied into this note but were reviewed prior to creation of Plan. LABS:  I reviewed today's most current labs and imaging studies.   Pertinent labs include:  Recent Labs      04/24/18   0647  04/24/18   0123  04/23/18   1812   WBC  7.6   --   8.0   HGB  7.8*  7.6*  6.7*   HCT  25.1*  24.6*  21.9*   PLT  158   --   156     Recent Labs      04/24/18   0647  04/24/18   0123  04/23/18   1812  04/23/18   1800   NA  136  136   --   130*   K  5.1  5.4*   --   6.0*   CL  108  107   --   99   CO2  20*  20*   --   20*   GLU  355*  397*   --   525*   BUN  95*  95*   --   93*   CREA  6.62*  6.70*   --   6.83*   CA  7.5*  7.1*   --   7.3*   ALB   --   1.6*   --   1.6*   TBILI   --   0.4   --   0.3   SGOT   --   10*   --   11*   ALT   --   15   --   18   INR   --    --   1.1   --        Signed: Celia Troy MD

## 2018-04-24 NOTE — CONSULTS
Renal -    Received consult, but it looks like Mr. Mercy Bose is a patient of Dr. Tasha Orozco. Changed consult to [address] [facility].     Yanni Garcia

## 2018-04-24 NOTE — PROGRESS NOTES
During dialysis catheter placement MD Stephanie Kaiser noted large pleural effusion. Order obtained to place Chest tube, patient agreed to have chest tube placed and consent signed.

## 2018-04-24 NOTE — PROGRESS NOTES
PCU SHIFT NURSING NOTE      Bedside and Verbal shift change report given to Cliff Booker RN (oncoming nurse) by Dante Gale RN (offgoing nurse). Report included the following information SBAR, Kardex, ED Summary, Intake/Output, MAR, Accordion, Recent Results and Cardiac Rhythm NSR. Shift Summary:   2100: Spoke with Dr. Sandhya Obregno from GI as he was calling back with consult called around 2000. Made Dr. Sandhya Obregon aware of pt history, admission reasons, and pt condition. Pt was admitted for possible GI bleed with negative occult stool. Pt has no signs of blood in stool, but has bright red blood from chest tube placed today. Per Dr. Sandhya Obregon, will have someone from GI see pt tomorrow as it doesn't sound like a GI emergency. 2130: Paged hospitalist with lab results (hgb 6.7). Spoke with Dr. Esvin Rodriguez and made aware of pt history, admission summary, and pt condition with current labs. Per Dr. Esvin Rodriguez, have dialysis draw h/h before start of dialysis and if still below 7 dialysis can transfuse during dialysis. 2145: Dialysis paged. Spoke with dialysis nurse on call and made aware of pt condition and current labs, as well as actions for when they come to do dialysis tonight. Per dialysis nurse, she received a note from Dr. Esvin Rodriguez with nephro to move pts dialysis to AM. Therefore, pt will not be receiving dialysis tonight. Paged hospitalist to make aware. Spoke with Dr. Travis Dooley and made aware of pts history, condition, and what RN and Dr. Esvin Rodriguez had discussed. New order for 1 unit of PRBC and recheck h/h in AM.  0400: Pt sat up on side of bed with feet flat on floor. No abundance of fluid out of chest tube like during the day when he stood. Bedside and Verbal shift change report given to [address]  (oncoming nurse) by Cliff Booker RN (offgoing nurse). Report included the following information SBAR, Kardex, ED Summary, Intake/Output, MAR, Accordion, Recent Results and Cardiac Rhythm NSR.        Admission Date 4/24/2018   Admission Diagnosis Hypoglycemia  Acute Renal Failure  Hyperkalemia   Consults IP CONSULT TO GASTROENTEROLOGY  IP CONSULT TO NEPHROLOGY  IP CONSULT TO PULMONOLOGY  IP CONSULT TO PULMONOLOGY        Consults   [x]PT   [x]OT   []Speech   []Case Management      [] Palliative      Cardiac Monitoring Order   [x]Yes   []No     IV drips   []Yes    Drip:                            Dose:  Drip:                            Dose:  Drip:                            Dose:   [x]No     GI Prophylaxis   []Yes   []No         DVT Prophylaxis   SCDs:  Sequential Compression Device: Bilateral     Patient Refused VTE Prophylaxis: Yes    Cornelio stockings:         [] Medication   []Contraindicated   []None      Activity Level Activity Level: Chair     Activity Assistance: Partial (one person)   Purposeful Rounding every 1-2 hour? [x]Yes   Cárdenas Score  Total Score: 2   Bed Alarm (If score 3 or >)   [x]Yes   [] Refused (See signed refusal form in chart)   Ruddy Score  Ruddy Score: 19   Ruddy Score (if score 14 or less)   []PMT consult   []Wound Care consult      []Specialty bed   [] Nutrition consult          Needs prior to discharge:   Home O2 required:    []Yes   [x]No    If yes, how much O2 required? Other:    Last Bowel Movement: Last Bowel Movement Date: 04/24/18      Influenza Vaccine Received Flu Vaccine for Current Season (usually Sept-March): Not Flu Season        Pneumonia Vaccine           Diet Active Orders   Diet    DIET CLEAR LIQUID      LDAs               Peripheral IV 04/23/18 Left Forearm (Active)   Site Assessment Clean, dry, & intact 4/24/2018  7:59 AM   Phlebitis Assessment 0 4/24/2018  7:59 AM   Infiltration Assessment 0 4/24/2018  7:59 AM   Dressing Status Clean, dry, & intact 4/24/2018  7:59 AM   Dressing Type Transparent;Tape 4/24/2018  7:59 AM   Hub Color/Line Status Green; Infusing 4/24/2018  7:59 AM   Action Taken Other (comment) 4/24/2018  7:59 AM   Alcohol Cap Used Yes 4/24/2018  7:59 AM       Peripheral IV 04/23/18 Right Forearm (Active)   Site Assessment Clean, dry, & intact 4/24/2018  7:59 AM   Phlebitis Assessment 0 4/24/2018  7:59 AM   Infiltration Assessment 0 4/24/2018  7:59 AM   Dressing Status Clean, dry, & intact 4/24/2018  7:59 AM   Dressing Type Transparent;Tape 4/24/2018  7:59 AM   Hub Color/Line Status Green;Capped 4/24/2018  7:59 AM   Action Taken Other (comment) 4/24/2018  7:59 AM   Alcohol Cap Used Yes 4/24/2018  7:59 AM        Hemodialysis Access 04/24/18 (Active)                  Urinary Catheter [REMOVED] Condom Catheter 04/24/18-Indications for Use: Strict I/Os, every 1-2 hours    Intake & Output   Date 04/23/18 0700 - 04/24/18 0659(Not Admitted) 04/24/18 0700 - 04/25/18 0659   Shift 7777-8027 2371-3585 24 Hour Total 4752-1098 8737-1588 24 Hour Total   I  N  T  A  K  E   Shift Total  (mL/kg)         O  U  T  P  U  T   Urine  200  (0.2) 200  (0.1)         Urine Voided  200 200         Urine Occurrence(s)  1 x 1 x 1 x  1 x    Stool            Stool Occurrence(s)  1 x 1 x 1 x  1 x    Shift Total  (mL/kg)  200  (2) 200  (2)      NET  -200 -200      Weight (kg)  98 98 98 98 98         Readmission Risk Assessment Tool Score High Risk            26       Total Score        3 Has Seen PCP in Last 6 Months (Yes=3, No=0)    2 . Living with Significant Other. Assisted Living. LTAC. SNF. or   Rehab    4 IP Visits Last 12 Months (1-3=4, 4=9, >4=11)    4 Pt.  Coverage (Medicare=5 , Medicaid, or Self-Pay=4)    13 Charlson Comorbidity Score (Age + Comorbid Conditions)        Criteria that do not apply:    Patient Length of Stay (>5 days = 3)       Expected Length of Stay 4d 7h   Actual Length of Stay 0

## 2018-04-24 NOTE — CONSULTS
Ruperto Parikh     NAME:Naresh Bishop Huyovany  [facility]:[id number]   :1960   -                    Pt seen and examined  ckd   Near esrd  htn  Anemia  Hyperkalemia  Resp. Failure  Leg edema    Plan  - start hd today  Continue bumex  Consent for hd cath and dialysis obtained. Giancarlo Zuñiga, [address] [place] [address] [facility]  [facility] SYSTM FRANCISCAN [facility]  [address], Nicolle Ramin  [place], [address] [place]  Phone - [phone]         Fax - [phone] [facility] Office  [address] [place] [address]  Phone - [phone]        Fax - [phone]     [email or web address]. [email or web address]

## 2018-04-24 NOTE — PROGRESS NOTES
4/24/2018 12:08 AM  Pt admitted to  2247. PRBC infusing at this time with ~125cc remaining. Primary Nurse Kristina Enrique, RN and Cliff Booker RN performed a dual skin assessment on this patient No impairment noted  Ruddy score is 22      0022: Pt glucose 449.    0114: EKG completed, STAT labs drawn per MD.    0305: Blood transfusion completed. Unable to complete in system r/t initial administration at [facility] 1841: Insulin gtt sent from pharmacy, BG check 415. Gtt initiated at 7. 1units/hr. Pt just cleaned of large urinary incontinence after condom cath displaced. 0600: Pt sats 92-93% on 2L NC sitting in bed with HBO elevated. When pt sits on side of bed, sats increase to % on 2L. Pt states it is easier to breathe sitting on the side of the bed. However, BLE 2-3+ pitting edema worsens with sitting upright having legs dangling.

## 2018-04-24 NOTE — PROGRESS NOTES
PCU SHIFT NURSING NOTE      Bedside shift change report given to TE Zhang (oncoming nurse) by Marcy Inman (offgoing nurse). Report included the following information SBAR and Cardiac Rhythm NS. Shift Summary:   2456 Patient awake sitting on side of bed. Patient states it feels more comfortable. Vital signs WNL Patient on gluco stabilizer. 1115 Patient BP dropped to 86/60; I removed the Nitro patch. Patient alert sitting on side of bed  1308 increased patients oxygen due to SpO2 dropping down to 88 range. Patient sleeping  1400 patient BP WNL patient going down to get a cath for dialysis. gluco stabilizer states hold insulin an recheck in one hour. 1 Dr Kyle Goff called to give me an update on the patient. Patient cath for dialysis placed, and also a chest tube was placed for pleural infusion.  stated that patient may not come back prior to getting dialysis.  also discussed the BG of the patient and instructed me to D/C the gluco stabilizer now and he would not give any NPH until tomorrow due to the patient's low BG's. We discussed the concern with the nitro patch since the patient BP dropped earlier. Dr Kyle Goff stated that he would D/C the nitro patch and look to make some other medication changes. 1648 patient returned from IR lab with two nurses. The chest tube drain was filling very fast with dark red sanguinous liquid. Vital signs were WNL however the volume and color of drainage from chest tube was excessive. I immediately called Dr. Kyle Goff and received the support of a fellow nurse [facility]. Dr Kyle Goff stated that he expected to see this type of volume draining from chest tube, but would call the dr that performed the procedure. I also expressed to Dr Pippa Morrison that there appeared to be a leak due to the air bubbles in the chamber with blue liquid.  The md that performed the procedure came up and [facility] and I explained that there was bubbles in the chamber which indicated a leak and the color of and volume of the drainage was alarming. The md that performed the procedure came up and then placed a call to someone else. 1710 I called Dr Gilberto Pulido again because the first pleural vac filled and was replaced and started to fill the first chamber immediately upon changing. Dr Gilberto Pulido stated he would place the consult with Dr Gerry Moya and we would see what the chest xray showed. 1730  Patient resting quietly, vital signs WNL  1800 I went to a room and found Dr Gerry Moya and asked to come to patients room for consult. Admission Date 4/24/2018   Admission Diagnosis Hypoglycemia  Acute Renal Failure  Hyperkalemia   Consults IP CONSULT TO GASTROENTEROLOGY  IP CONSULT TO INTENSIVIST  IP CONSULT TO NEPHROLOGY        Consults   []PT   []OT   []Speech   []Case Management      [] Palliative      Cardiac Monitoring Order   []Yes   []No     IV drips   []Yes    Drip:                            Dose:  Drip:                            Dose:  Drip:                            Dose:   []No     GI Prophylaxis   []Yes   []No         DVT Prophylaxis   SCDs:  Sequential Compression Device: Bilateral     Patient Refused VTE Prophylaxis: Yes    Cornelio stockings:         [] Medication   []Contraindicated   []None      Activity Level Activity Level: Bed Rest     Activity Assistance: Partial (one person)   Purposeful Rounding every 1-2 hour? []Yes   Cárdenas Score  Total Score: 2   Bed Alarm (If score 3 or >)   []Yes   [] Refused (See signed refusal form in chart)   Ruddy Score  Ruddy Score: 19   Ruddy Score (if score 14 or less)   []PMT consult   []Wound Care consult      []Specialty bed   [] Nutrition consult          Needs prior to discharge:   Home O2 required:    []Yes   []No    If yes, how much O2 required?     Other:    Last Bowel Movement: Last Bowel Movement Date: 04/24/18      Influenza Vaccine Received Flu Vaccine for Current Season (usually Sept-March): Not Flu Season        Pneumonia Vaccine           Diet Active Orders   Diet DIET NPO Except Meds      LDAs                                 Urinary Catheter [REMOVED] Condom Catheter 04/24/18-Indications for Use: Strict I/Os, every 1-2 hours    Intake & Output   Date 04/23/18 0700 - 04/24/18 0659(Not Admitted) 04/24/18 0700 - 04/25/18 0659   Shift 8872-6621 1672-5559 24 Hour Total 0338-5872 4071-4323 24 Hour Total   I  N  T  A  K  E   Shift Total  (mL/kg)         O  U  T  P  U  T   Urine  200  (0.2) 200  (0.1)         Urine Voided  200 200         Urine Occurrence(s)  1 x 1 x       Stool            Stool Occurrence(s)  1 x 1 x       Shift Total  (mL/kg)  200  (2) 200  (2)      NET  -200 -200      Weight (kg)  98 98 98 98 98         Readmission Risk Assessment Tool Score High Risk            26       Total Score        3 Has Seen PCP in Last 6 Months (Yes=3, No=0)    2 . Living with Significant Other. Assisted Living. LTAC. SNF. or   Rehab    4 IP Visits Last 12 Months (1-3=4, 4=9, >4=11)    4 Pt.  Coverage (Medicare=5 , Medicaid, or Self-Pay=4)    13 Charlson Comorbidity Score (Age + Comorbid Conditions)        Criteria that do not apply:    Patient Length of Stay (>5 days = 3)       Expected Length of Stay - - -   Actual Length of Stay 0

## 2018-04-24 NOTE — PROGRESS NOTES
Bedside report given to [address] [place] [address] Line Jsoe S. Informed of chest tube placement and dialysis catheter placement.

## 2018-04-24 NOTE — PROGRESS NOTES
Nephrology Progress Note     Ruperto Parikh     www. [facility]                  Phone - [phone]   Patient: Marva Peralta   YOB: 1960    Date- 4/24/2018     CC: Follow up for arf on ckd    Subjective: Interval History:   -  Petar Roldan is aa male with pmh of htn, ckd 5, dm. He was tx from [facility] with arf, resp. Failure, hyperglycemia. His labs on admission - k 6.0, glucose 525, bun 93, cr. 6.8  His cr was 4.57 on 3-10-18  He was seen by DR. JAVIER LAST WEEK  ROS-c/o sob  No fever  No cough  Denies vomiting or diarrhea   Assessment:   ARF-  Multiple factorial - progression of ckd, chf, accelarated htn, hyperglycemia  Acute resp. Failure  Right pl. Effusion  Chf. Fluid overload  Hyperosmolar non ketotic state - hyperglycemia  CKD 5- dm, htn  Dm 2  hyperkalemia  MET ACIDOSIS  ANEMIA OF CKD  PROTEINURIA  Solitary R kidney; s/p previous L Nephrectomy for RCC;  SHPT        Plan:   Start hd  Consult ir for asa cath placement  CONTINUE bumex  Follow bmp  Check hepatitis panel  Give epogen  Give iron    This procedure - asa cath placment and dailysis  has been fully reviewed with the patient and written informed consent has been obtained. Care Plan discussed with:pt and nurse  Review of Systems: Pertinent items are noted in HPI.   Objective:   Vitals:    04/24/18 0424 04/24/18 0759 04/24/18 0922 04/24/18 0925   BP: (!) 159/108 (!) 169/98 133/90 133/90   Pulse: 93 90 85 85   Resp: 18      Temp: 97.7 °F (36.5 °C)      SpO2: 100% 94%  100%   Weight:       Height:         Last 3 Recorded Weights in this Encounter    04/24/18 0017 04/24/18 0153   Weight: 99.3 kg (219 lb) 98 kg (216 lb)     Patient Vitals for the past 8 hrs:   BP Temp Pulse Resp SpO2   04/24/18 0925 133/90 - 85 - 100 %   04/24/18 0922 133/90 - 85 - -   04/24/18 0759 (!) 169/98 - 90 - 94 %   04/24/18 0424 (!) 159/108 97.7 °F (36.5 °C) 93 18 100 %        04/22 1901 - 04/24 0700  In: -   Out: 200 [Urine:200]  Physical Exam:   GEN: NAD  NECK- Supple, no thyromegaly  RESP: rales b/l, decreased air movement, +accessory muscle use  CVS: RRR,S1,S2    EXT: ++++ Edema   NEURO: non focal, normal speech  SKIN: No Rash, No Jaundice  ABDO: soft , non tender, No hepatosplenomegaly    Chart reviewed. Pertinent Notes reviewed.      Medications list  reviewed       Data Review :  Recent Labs      04/24/18   0647  04/24/18   0123  04/23/18   1812  04/23/18   1800   NA  136  136   --   130*   K  5.1  5.4*   --   6.0*   CL  108  107   --   99   CO2  20*  20*   --   20*   GLU  355*  397*   --   525*   BUN  95*  95*   --   93*   CREA  6.62*  6.70*   --   6.83*   CA  7.5*  7.1*   --   7.3*   ALB   --   1.6*   --   1.6*   SGOT   --   10*   --   11*   ALT   --   15   --   18   INR   --    --   1.1   --      Recent Labs      04/24/18   0647  04/24/18   0123  04/23/18   1812   WBC  7.6   --   8.0   HGB  7.8*  7.6*  6.7*   HCT  25.1*  24.6*  21.9*   PLT  158   --   156     No results found for: SDES  Current Facility-Administered Medications   Medication    insulin regular (NOVOLIN R, HUMULIN R) 100 Units in 0.9% sodium chloride 100 mL infusion    insulin lispro (HUMALOG) injection    glucose chewable tablet 16 g    dextrose (D50W) injection syrg 12.5-25 g    glucagon (GLUCAGEN) injection 1 mg    nitroglycerin (NITROBID) 2 % ointment 1 Inch    pantoprazole (PROTONIX) 40 mg in sodium chloride 0.9% 10 mL injection    bumetanide (BUMEX) injection 4 mg    sodium chloride (NS) flush 5-10 mL    sodium chloride (NS) flush 5-10 mL    acetaminophen (TYLENOL) tablet 650 mg    ondansetron (ZOFRAN) injection 4 mg    sodium bicarbonate tablet 650 mg    carvedilol (COREG) tablet 12.5 mg    NIFEdipine ER (ADALAT CC) tablet 30 mg    iron sucrose (VENOFER) 300 mg in 0.9% sodium chloride 100 mL IVPB    epoetin tyler (EPOGEN;PROCRIT) injection 10,000 Units       Baljinder Quintero MD  [address] [facility]  [email or web address]  [address] [place], [address]  Phone - [phone]         Fax - [phone]  [facility] Office  [address] [place] [address]  Phone - [phone]        Fax - [phone]

## 2018-04-24 NOTE — PROGRESS NOTES
TRANSFER - IN REPORT:    2240: Verbal report received from Erin Cooley RN(name) on Jacquie Caputo  being received from [facility]) for routine progression of care      Report consisted of patients Situation, Background, Assessment and   Recommendations(SBAR). Information from the following report(s) SBAR, Kardex, ED Summary, Intake/Output, MAR, Accordion, Recent Results and Cardiac Rhythm NSR was reviewed with the receiving nurse. Opportunity for questions and clarification was provided. Assessment completed upon patients arrival to unit and care assumed. 2300: Bedside and Verbal shift change report given to [address] [place] [address] (oncoming nurse) by Roshan Gr RN (offgoing nurse). Report included the following information SBAR, Kardex, ED Summary, Intake/Output, MAR, Accordion, Recent Results and Cardiac Rhythm NSR.

## 2018-04-24 NOTE — PROGRESS NOTES
Initial Nutrition Assessment:    INTERVENTIONS/RECOMMENDATIONS:   · Meals/Snacks: General/healthful diet: Advance to Consistent carbohydrate, 2g Na, low K+ diet as tolerated    ASSESSMENT:   Patient medically noted for CKD with plans to start HD, respiratory failure, right pleural effusion, and lower GI bleed. PMH for HTN, DM, and hyperlipidemia. Patient remains NPO at this time. Elevated BG noted. MST referral received reporting weight loss PTA. MD notes indicate volume overload and 20# weight gain. Current weight indicates 8% weight gain x 3 and 6 months, 2.3% weight loss x 1 month. Advance diet as tolerated. Monitor PO, labs, and need for ONS. Diet Order: NPO  % Eaten:  No data found. Pertinent Medications: [x]Reviewed []Other: Bumex, Coreg, Epogen, humalog, Protonix  Pertinent Labs: [x]Reviewed []Other: K+ 5.1, -[phone], A1c 9.9  Food Allergies: [x]None []Other   Last BM: 4/24   []Active     []Hyperactive  []Hypoactive       [] Absent BS  Skin:    [x] Intact   [] Incision  [] Breakdown  [x] Other: 2+ edema lower extremities     Anthropometrics:   Height: 6' 3\" (190.5 cm) Weight: 98 kg (216 lb)   IBW (%IBW):   ( ) UBW (%UBW):   (  %)   Last Weight Metrics:  Weight Loss Metrics 4/24/2018 4/23/2018 3/15/2018 3/15/2018 3/8/2018 2/23/2018 2/19/2018   Today's Wt 216 lb 219 lb 221 lb 1.9 oz 221 lb 1.9 oz 228 lb 6.4 oz 220 lb 208 lb 3.2 oz   BMI 27 kg/m2 27.37 kg/m2 28.39 kg/m2 28.39 kg/m2 29.32 kg/m2 28.25 kg/m2 26.73 kg/m2       BMI: Body mass index is 27 kg/(m^2). This BMI is indicative of:   []Underweight    []Normal    [x]Overweight    [] Obesity   [] Extreme Obesity (BMI>40)     Estimated Nutrition Needs (Based on):   2214 Kcals/day (BMR (1896) x 1. 3AF -250kcal) , 98 g (1.0 g/kg bw) Protein  Carbohydrate:  At Least 130 g/day  Fluids: 1800 mL/day (or per MD)    Pt expected to meet estimated nutrient needs: []Yes [x]No (Currently NPO)    NUTRITION DIAGNOSES:   Problem:  Altered nutrition-related lab values      Etiology: related to DM     Signs/Symptoms: as evidenced by , A1c 9.9      NUTRITION INTERVENTIONS:  Meals/Snacks: General/healthful diet                  GOAL:   Diet advanced next 1-2 days, PO intake >50% of meals next 1-3 days    LEARNING NEEDS (Diet, Food/Nutrient-Drug Interaction):    [x] None Identified   [] Identified and Education Provided/Documented   [] Identified and Pt declined/was not appropriate     Cultureal, Confucianism, OR Ethnic Dietary Needs:    [x] None Identified   [] Identified and Addressed     [x] Interdisciplinary Care Plan Reviewed/Documented    [x] Discharge Planning:  CCD/low sodium + renal restrictions as needed      MONITORING /EVALUATION:   Food/Nutrient Intake Outcomes:  Total energy intake, IV fluids  Physical Signs/Symptoms Outcomes: Weight/weight change, GI profile, Glucose profile, Electrolyte and renal profile    NUTRITION RISK:    [x] High              [] Moderate           []  Low  []  Minimal/Uncompromised    PT SEEN FOR:    []  MD Consult: []Calorie Count      []Diabetic Diet Education        []Diet Education     []Electrolyte Management     []General Nutrition Management and Supplements     []Management of Tube Feeding     []TPN Recommendations    [x]  RN Referral:  [x]MST score >=2     []Enteral/Parenteral Nutrition PTA     []Pregnant: Gestational DM or Multigestation     []Pressure Ulcer/Wound Care needs        []  Low BMI  []  TAJ Petersona  Pager [phone]                 Weekend Pager [phone]
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
## 2018-04-24 NOTE — PROGRESS NOTES
1700 Call received from PCU nurse Briana Manzanares), concern over pt. S/p chest tube placement currently with large amount of output. Per nurse pt. Has drained >2L. Pleura-vac change and currently draining approximately 800 ml. Nurse reports B/P stable at this time. Note CXR report of pleural effusion. Advised to notify Interventional Radiologist and MD.  3310 In to observe, pt. Sitting upright in bed. Eyes closed, respirations even and regular. SAO2 saturation %. Right chest tube pigtail intact draining dark serous colored drainage. Staff reports patient breathing easier tripod position prior to tube placement per staff. CXR and pulmonary consult pending. Currently, appears comfortable and in no respiratory distress at this time. 0407 Note CXR results post tube placement.

## 2018-04-25 ENCOUNTER — APPOINTMENT (OUTPATIENT)
Dept: GENERAL RADIOLOGY | Age: 58
DRG: 189 | End: 2018-04-25
Attending: INTERNAL MEDICINE
Payer: MEDICARE

## 2018-04-25 ENCOUNTER — APPOINTMENT (OUTPATIENT)
Dept: INTERVENTIONAL RADIOLOGY/VASCULAR | Age: 58
DRG: 189 | End: 2018-04-25
Attending: INTERNAL MEDICINE
Payer: MEDICARE

## 2018-04-25 ENCOUNTER — APPOINTMENT (OUTPATIENT)
Dept: CT IMAGING | Age: 58
DRG: 189 | End: 2018-04-25
Attending: INTERNAL MEDICINE
Payer: MEDICARE

## 2018-04-25 LAB
ALBUMIN SERPL-MCNC: 1.4 G/DL (ref 3.5–5)
ALBUMIN/GLOB SERPL: 0.2 {RATIO} (ref 1.1–2.2)
ALP SERPL-CCNC: 132 U/L (ref 45–117)
ALT SERPL-CCNC: 14 U/L (ref 12–78)
ANION GAP SERPL CALC-SCNC: 11 MMOL/L (ref 5–15)
AST SERPL-CCNC: 11 U/L (ref 15–37)
ATRIAL RATE: 96 BPM
BASOPHILS # BLD: 0 K/UL (ref 0–0.1)
BASOPHILS NFR BLD: 0 % (ref 0–1)
BILIRUB SERPL-MCNC: 0.4 MG/DL (ref 0.2–1)
BUN SERPL-MCNC: 97 MG/DL (ref 6–20)
BUN/CREAT SERPL: 15 (ref 12–20)
CALCIUM SERPL-MCNC: 7.1 MG/DL (ref 8.5–10.1)
CALCIUM SERPL-MCNC: 7.2 MG/DL (ref 8.5–10.1)
CALCULATED P AXIS, ECG09: 31 DEGREES
CALCULATED R AXIS, ECG10: -5 DEGREES
CALCULATED T AXIS, ECG11: 6 DEGREES
CHLORIDE SERPL-SCNC: 109 MMOL/L (ref 97–108)
CO2 SERPL-SCNC: 20 MMOL/L (ref 21–32)
CREAT SERPL-MCNC: 6.65 MG/DL (ref 0.7–1.3)
DIAGNOSIS, 93000: NORMAL
DIFFERENTIAL METHOD BLD: ABNORMAL
EOSINOPHIL # BLD: 0.1 K/UL (ref 0–0.4)
EOSINOPHIL NFR BLD: 1 % (ref 0–7)
ERYTHROCYTE [DISTWIDTH] IN BLOOD BY AUTOMATED COUNT: 22.6 % (ref 11.5–14.5)
GLOBULIN SER CALC-MCNC: 6.3 G/DL (ref 2–4)
GLUCOSE BLD STRIP.AUTO-MCNC: 217 MG/DL (ref 65–100)
GLUCOSE BLD STRIP.AUTO-MCNC: 233 MG/DL (ref 65–100)
GLUCOSE BLD STRIP.AUTO-MCNC: 251 MG/DL (ref 65–100)
GLUCOSE BLD STRIP.AUTO-MCNC: 268 MG/DL (ref 65–100)
GLUCOSE SERPL-MCNC: 184 MG/DL (ref 65–100)
HAV IGM SER QL: NONREACTIVE
HBV CORE IGM SER QL: NONREACTIVE
HBV CORE IGM SER QL: NONREACTIVE
HBV SURFACE AG SER QL: <0.1 INDEX
HBV SURFACE AG SER QL: NEGATIVE
HCT VFR BLD AUTO: 25.5 % (ref 36.6–50.3)
HCV AB SERPL QL IA: NONREACTIVE
HCV COMMENT,HCGAC: NORMAL
HGB BLD-MCNC: 8.1 G/DL (ref 12.1–17)
IMM GRANULOCYTES # BLD: 0.1 K/UL (ref 0–0.04)
IMM GRANULOCYTES NFR BLD AUTO: 1 % (ref 0–0.5)
LYMPHOCYTES # BLD: 1.1 K/UL (ref 0.8–3.5)
LYMPHOCYTES NFR BLD: 17 % (ref 12–49)
MCH RBC QN AUTO: 21.9 PG (ref 26–34)
MCHC RBC AUTO-ENTMCNC: 31.8 G/DL (ref 30–36.5)
MCV RBC AUTO: 68.9 FL (ref 80–99)
MONOCYTES # BLD: 0.4 K/UL (ref 0–1)
MONOCYTES NFR BLD: 6 % (ref 5–13)
NEUTS SEG # BLD: 4.9 K/UL (ref 1.8–8)
NEUTS SEG NFR BLD: 75 % (ref 32–75)
NRBC # BLD: 0.03 K/UL (ref 0–0.01)
NRBC BLD-RTO: 0.5 PER 100 WBC
P-R INTERVAL, ECG05: 174 MS
PLATELET # BLD AUTO: 137 K/UL (ref 150–400)
PMV BLD AUTO: ABNORMAL FL (ref 8.9–12.9)
POTASSIUM SERPL-SCNC: 5 MMOL/L (ref 3.5–5.1)
PROT SERPL-MCNC: 7.7 G/DL (ref 6.4–8.2)
PTH-INTACT SERPL-MCNC: 236.3 PG/ML (ref 18.4–88)
Q-T INTERVAL, ECG07: 366 MS
QRS DURATION, ECG06: 98 MS
QTC CALCULATION (BEZET), ECG08: 462 MS
RBC # BLD AUTO: 3.7 M/UL (ref 4.1–5.7)
RBC MORPH BLD: ABNORMAL
SERVICE CMNT-IMP: ABNORMAL
SODIUM SERPL-SCNC: 140 MMOL/L (ref 136–145)
SP1: NORMAL
SP2: NORMAL
SP3: NORMAL
VENTRICULAR RATE, ECG03: 96 BPM
WBC # BLD AUTO: 6.6 K/UL (ref 4.1–11.1)

## 2018-04-25 PROCEDURE — C9113 INJ PANTOPRAZOLE SODIUM, VIA: HCPCS | Performed by: INTERNAL MEDICINE

## 2018-04-25 PROCEDURE — 74011636637 HC RX REV CODE- 636/637: Performed by: INTERNAL MEDICINE

## 2018-04-25 PROCEDURE — 74011250636 HC RX REV CODE- 250/636: Performed by: RADIOLOGY

## 2018-04-25 PROCEDURE — 85025 COMPLETE CBC W/AUTO DIFF WBC: CPT | Performed by: INTERNAL MEDICINE

## 2018-04-25 PROCEDURE — 32561 LYSE CHEST FIBRIN INIT DAY: CPT

## 2018-04-25 PROCEDURE — 74011250637 HC RX REV CODE- 250/637: Performed by: INTERNAL MEDICINE

## 2018-04-25 PROCEDURE — 71250 CT THORAX DX C-: CPT

## 2018-04-25 PROCEDURE — 65660000000 HC RM CCU STEPDOWN

## 2018-04-25 PROCEDURE — 36415 COLL VENOUS BLD VENIPUNCTURE: CPT | Performed by: INTERNAL MEDICINE

## 2018-04-25 PROCEDURE — 71045 X-RAY EXAM CHEST 1 VIEW: CPT

## 2018-04-25 PROCEDURE — 88305 TISSUE EXAM BY PATHOLOGIST: CPT | Performed by: INTERNAL MEDICINE

## 2018-04-25 PROCEDURE — 82962 GLUCOSE BLOOD TEST: CPT

## 2018-04-25 PROCEDURE — 90935 HEMODIALYSIS ONE EVALUATION: CPT

## 2018-04-25 PROCEDURE — 86705 HEP B CORE ANTIBODY IGM: CPT | Performed by: INTERNAL MEDICINE

## 2018-04-25 PROCEDURE — 77010033678 HC OXYGEN DAILY

## 2018-04-25 PROCEDURE — 74011000250 HC RX REV CODE- 250: Performed by: INTERNAL MEDICINE

## 2018-04-25 PROCEDURE — 88112 CYTOPATH CELL ENHANCE TECH: CPT | Performed by: INTERNAL MEDICINE

## 2018-04-25 PROCEDURE — 32560 TREAT PLEURODESIS W/AGENT: CPT

## 2018-04-25 PROCEDURE — 74011250636 HC RX REV CODE- 250/636: Performed by: INTERNAL MEDICINE

## 2018-04-25 PROCEDURE — 5A1D70Z PERFORMANCE OF URINARY FILTRATION, INTERMITTENT, LESS THAN 6 HOURS PER DAY: ICD-10-PCS | Performed by: INTERNAL MEDICINE

## 2018-04-25 PROCEDURE — 80074 ACUTE HEPATITIS PANEL: CPT | Performed by: INTERNAL MEDICINE

## 2018-04-25 PROCEDURE — 93306 TTE W/DOPPLER COMPLETE: CPT

## 2018-04-25 PROCEDURE — 80053 COMPREHEN METABOLIC PANEL: CPT | Performed by: INTERNAL MEDICINE

## 2018-04-25 RX ORDER — PANTOPRAZOLE SODIUM 40 MG/1
40 TABLET, DELAYED RELEASE ORAL
Status: DISCONTINUED | OUTPATIENT
Start: 2018-04-26 | End: 2018-04-28 | Stop reason: HOSPADM

## 2018-04-25 RX ORDER — OXYCODONE HYDROCHLORIDE 5 MG/1
5 TABLET ORAL
Status: DISCONTINUED | OUTPATIENT
Start: 2018-04-25 | End: 2018-04-28 | Stop reason: HOSPADM

## 2018-04-25 RX ORDER — FENTANYL CITRATE 50 UG/ML
25 INJECTION, SOLUTION INTRAMUSCULAR; INTRAVENOUS
Status: DISCONTINUED | OUTPATIENT
Start: 2018-04-25 | End: 2018-04-28 | Stop reason: HOSPADM

## 2018-04-25 RX ORDER — INSULIN LISPRO 100 [IU]/ML
3 INJECTION, SOLUTION INTRAVENOUS; SUBCUTANEOUS
Status: DISCONTINUED | OUTPATIENT
Start: 2018-04-25 | End: 2018-04-28 | Stop reason: HOSPADM

## 2018-04-25 RX ADMIN — FENTANYL CITRATE 25 MCG: 50 INJECTION, SOLUTION INTRAMUSCULAR; INTRAVENOUS at 18:21

## 2018-04-25 RX ADMIN — PANTOPRAZOLE SODIUM 40 MG: 40 INJECTION, POWDER, FOR SOLUTION INTRAVENOUS at 08:27

## 2018-04-25 RX ADMIN — ALTEPLASE 2 MG: 2.2 INJECTION, POWDER, LYOPHILIZED, FOR SOLUTION INTRAVENOUS at 15:00

## 2018-04-25 RX ADMIN — FENTANYL CITRATE 25 MCG: 50 INJECTION, SOLUTION INTRAMUSCULAR; INTRAVENOUS at 21:32

## 2018-04-25 RX ADMIN — ACETAMINOPHEN 650 MG: 325 TABLET ORAL at 10:01

## 2018-04-25 RX ADMIN — INSULIN LISPRO 4 UNITS: 100 INJECTION, SOLUTION INTRAVENOUS; SUBCUTANEOUS at 13:42

## 2018-04-25 RX ADMIN — SODIUM BICARBONATE 650 MG: 650 TABLET ORAL at 08:27

## 2018-04-25 RX ADMIN — INSULIN LISPRO 1 UNITS: 100 INJECTION, SOLUTION INTRAVENOUS; SUBCUTANEOUS at 21:39

## 2018-04-25 RX ADMIN — INSULIN LISPRO 3 UNITS: 100 INJECTION, SOLUTION INTRAVENOUS; SUBCUTANEOUS at 13:41

## 2018-04-25 RX ADMIN — OXYCODONE HYDROCHLORIDE 5 MG: 5 TABLET ORAL at 16:20

## 2018-04-25 RX ADMIN — INSULIN HUMAN 8 UNITS: 100 INJECTION, SUSPENSION SUBCUTANEOUS at 18:25

## 2018-04-25 RX ADMIN — BUMETANIDE 4 MG: 0.25 INJECTION INTRAMUSCULAR; INTRAVENOUS at 11:36

## 2018-04-25 RX ADMIN — ACETAMINOPHEN 650 MG: 325 TABLET ORAL at 05:11

## 2018-04-25 RX ADMIN — BUMETANIDE 4 MG: 0.25 INJECTION INTRAMUSCULAR; INTRAVENOUS at 18:26

## 2018-04-25 RX ADMIN — INSULIN HUMAN 5 UNITS: 100 INJECTION, SUSPENSION SUBCUTANEOUS at 08:26

## 2018-04-25 RX ADMIN — INSULIN LISPRO 2 UNITS: 100 INJECTION, SOLUTION INTRAVENOUS; SUBCUTANEOUS at 18:24

## 2018-04-25 RX ADMIN — Medication 10 ML: at 03:41

## 2018-04-25 RX ADMIN — INSULIN LISPRO 3 UNITS: 100 INJECTION, SOLUTION INTRAVENOUS; SUBCUTANEOUS at 18:24

## 2018-04-25 RX ADMIN — Medication 10 ML: at 21:32

## 2018-04-25 RX ADMIN — CARVEDILOL 12.5 MG: 12.5 TABLET, FILM COATED ORAL at 11:35

## 2018-04-25 NOTE — PROGRESS NOTES
Hospitalist Progress Note    NAME: Henrry Myers   :  1960   MRN:  [id number]       Assessment / Plan:  Acute respiratory failure with hypoxia with Large Right Pleural Effusion due to Volume Overload in Settings of Renal Failure; s/p chest tube placement on   -CT Chest on  shows hemopneumothorax, case discussed with Dr. Ada Sanford  -follow-up pleural fluid cytology  -await TTE  -oxycodone added prn for pain from chest tube     Hyperkalemia: improved  COLTEN on CKD stage V  20 lb weight gain in a week  Worsening leg edema  -receiving HD this am    Accelerated HTN improved and patient with subsequent Hypotension; BP with adequate control today   -discontinued NTG paste and Nifedipine on  with hypotension  -Continue Coreg    Acute blood loss anemia s/p 1 unit pRBC's  Lower GI bleed with bright red blood per rectum  -Hold home ASA; Uremia also likely contributing to platelet dysfunction  -empiric IV PPI changed to PO  -appreciate GI eval  -since no further bleeding at this time I have advanced diet     Hyperosmolar Non Ketotic State with h/o DM type 2  Hypoglycemia on insulin gtt  -increase NPH to 8 units BID  -Humalog 3 units TID meals  -continue SSI     Hyperlipidemia  -Hold statin for now     Code Status: Full  Surrogate Decision Maker: Wife     DVT Prophylaxis: SCDs    Body mass index is 26.73 kg/(m^2).          Subjective:     Chief Complaint / Reason for Physician Visit: follow-up COLTEN, hyperkalemia and hypoxia  Patient reports breathing better  Patient receiving HD during my encounter  Having some pain at site of chest tube insertion    Review of Systems:  Symptom Y/N Comments  Symptom Y/N Comments   Fever/Chills    Chest Pain     Poor Appetite    Edema y And legs hurt   Cough    Abdominal Pain n    Sputum    Joint Pain     SOB/IRENE y   Pruritis/Rash     Nausea/vomit    Tolerating PT/OT     Diarrhea    Tolerating Diet y    Constipation    Other n bleeding     Could NOT obtain due to:      Objective: VITALS:   Last 24hrs VS reviewed since prior progress note. Most recent are:  Patient Vitals for the past 24 hrs:   Temp Pulse Resp BP SpO2   04/25/18 0945 - 77 - 123/84 100 %   04/25/18 0930 - 73 - 133/80 100 %   04/25/18 0915 - 83 - 124/88 97 %   04/25/18 0900 - 66 - 134/89 100 %   04/25/18 0848 97.6 °F (36.4 °C) 65 20 136/90 100 %   04/25/18 0745 97.6 °F (36.4 °C) 74 18 124/88 98 %   04/25/18 0300 98.1 °F (36.7 °C) 66 18 116/82 100 %   04/25/18 0106 98.1 °F (36.7 °C) 74 18 136/81 100 %   04/25/18 0015 98 °F (36.7 °C) 66 18 123/66 100 %   04/24/18 2350 - 66 - 120/80 99 %   04/24/18 2340 98.2 °F (36.8 °C) 66 18 120/80 100 %   04/24/18 2312 98 °F (36.7 °C) 76 18 (!) 141/99 100 %   04/24/18 2255 98 °F (36.7 °C) 76 18 123/89 100 %   04/24/18 2233 98.1 °F (36.7 °C) 71 18 109/68 100 %   04/24/18 2000 98 °F (36.7 °C) 70 18 116/76 100 %   04/24/18 1820 - 75 - 121/85 -   04/24/18 1730 - 74 - 122/79 98 %   04/24/18 1725 - 77 - 109/82 98 %   04/24/18 1721 - 69 - (!) 128/96 -   04/24/18 1716 - 73 - 118/76 98 %   04/24/18 1710 - 73 - 123/90 97 %   04/24/18 1705 - 73 - 121/82 98 %   04/24/18 1700 - 74 - 147/77 98 %   04/24/18 1659 - 72 - 128/83 99 %   04/24/18 1654 - 72 - 115/88 95 %   04/24/18 1648 98.1 °F (36.7 °C) 75 18 (!) 122/97 92 %   04/24/18 1605 - 69 - - -   04/24/18 1545 - 70 - - -   04/24/18 1540 97.7 °F (36.5 °C) 72 18 130/73 95 %   04/24/18 1110 97.7 °F (36.5 °C) 71 18 (!) 85/60 95 %   04/24/18 1100 97.7 °F (36.5 °C) 73 18 (!) 80/54 96 %       Intake/Output Summary (Last 24 hours) at 04/25/18 1006  Last data filed at 04/25/18 0300   Gross per 24 hour   Intake            283.8 ml   Output             5350 ml   Net          -5066.2 ml        PHYSICAL EXAM:  General: WD, WN. Alert, cooperative, no acute distress    EENT:  EOMI. Anicteric sclerae. MMM  Resp:  Increasing breath sounds right lung field. No accessory muscle use  CV:  Regular  rhythm,   3+ BLE edema persists  GI:  Soft, mildly distended, Non tender.  + bowel sounds  Neurologic:  Alert and oriented, speech is normal  Psych:   Fair insight. Not anxious nor agitated  Skin:  No rashes noted. No jaundice    Reviewed most current lab test results and cultures  YES  Reviewed most current radiology test results   YES  Review and summation of old records today    NO  Reviewed patient's current orders and MAR    YES  PMH/SH reviewed - no change compared to H&P  ________________________________________________________________________  Care Plan discussed with:    Comments   Patient x    Family      RN x    Care Manager     Consultant  x Dr. Wilda Day team rounds were held today with , nursing, pharmacist and clinical coordinator. Patient's plan of care was discussed; medications were reviewed and discharge planning was addressed. ________________________________________________________________________  Total NON critical care TIME:  25   Minutes    Total CRITICAL CARE TIME Spent:   Minutes non procedure based (this note represents a 72977 charge given that Dr. Martyn Schlatter spent 60 minutes of CC time at admission this am)      Comments   >50% of visit spent in counseling and coordination of care     ________________________________________________________________________  Derrick Perez MD     Procedures: see electronic medical records for all procedures/Xrays and details which were not copied into this note but were reviewed prior to creation of Plan. LABS:  I reviewed today's most current labs and imaging studies.   Pertinent labs include:  Recent Labs      04/25/18   0345  04/24/18   1904  04/24/18   0647   WBC  6.6  5.9  7.6   HGB  8.1*  6.7*  7.8*   HCT  25.5*  21.6*  25.1*   PLT  137*  134*  158     Recent Labs      04/25/18   0345  04/24/18   1904  04/24/18   1208   04/24/18   0123  04/23/18   1812  04/23/18   1800   NA  140  139  140   < >  136   --   130*   K  5.0  4.9  4.4   < >  5.4*   --   6.0*   CL  109*  110* 110*   < >  107   --   99   CO2  20*  25  21   < >  20*   --   20*   GLU  184*  146*  109*   < >  397*   --   525*   BUN  97*  93*  92*   < >  95*   --   93*   CREA  6.65*  6.54*  6.75*   < >  6.70*   --   6.83*   CA  7.2*  7.1*  7.1*  7.4*   < >  7.1*   --   7.3*   ALB  1.4*   --    --    --   1.6*   --   1.6*   TBILI  0.4   --    --    --   0.4   --   0.3   SGOT  11*   --    --    --   10*   --   11*   ALT  14   --    --    --   15   --   18   INR   --    --    --    --    --   1.1   --     < > = values in this interval not displayed.        Signed: Batsheva Billy MD

## 2018-04-25 NOTE — PROGRESS NOTES
PCU SHIFT NURSING NOTE      Bedside shift change report given to Hugo (oncoming nurse) by Robert Palmer (offgoing nurse). Report included the following information SBAR, Kardex, Intake/Output, MAR and Recent Results. Shift Summary: 0745  Pt in bed sleeping  NSR on monitor  4L O2 NC  CT w/ serosanguinous drainage at 560  Dressing CDI  New dialysis patient - to begin this morning  Lispro held for this reason    1100  Dialysis completed  2 kilos off  Pt tolerated well    1230  Pt left unit for CT of chest    1430  Needs TPA via chest tube per Dr Vickie Warner  Per Nicolle Acuña in IR, pt does not need to be NPO    1500  IR at bedside instilling TPA  WIll need to unclamp pigtail at 1600  1600  Chest tube unclamped  1630  Air leak observed   Dr Vickie Warner advised  No new orders at this time   1800  Pt c/o pain not relieved with oxycodone  Dr Kanchan Lawrence advised  New orders rec'd   [phone]  C[facility] tube at [facility]     Admission Date 4/24/2018   Admission Diagnosis Hypoglycemia  Acute Renal Failure  Hyperkalemia   Consults IP CONSULT TO GASTROENTEROLOGY  IP CONSULT TO NEPHROLOGY  IP CONSULT TO PULMONOLOGY  IP CONSULT TO PULMONOLOGY        Consults   []PT   []OT   []Speech   []Case Management      [] Palliative      Cardiac Monitoring Order   []Yes   []No     IV drips   []Yes    Drip:                            Dose:  Drip:                            Dose:  Drip:                            Dose:   []No     GI Prophylaxis   []Yes   []No         DVT Prophylaxis   SCDs:  Sequential Compression Device: Bilateral     Patient Refused VTE Prophylaxis: Yes    Cornelio stockings:         [] Medication   []Contraindicated   []None      Activity Level Activity Level: Bed Rest     Activity Assistance: Partial (two people)   Purposeful Rounding every 1-2 hour?    []Yes   Cárdenas Score  Total Score: 3   Bed Alarm (If score 3 or >)   []Yes   [] Refused (See signed refusal form in chart)   Ruddy Score  Ruddy Score: 17   Ruddy Score (if score 14 or less)   []PMT consult   []Wound Care consult      []Specialty bed   [] Nutrition consult          Needs prior to discharge:   Home O2 required:    []Yes   []No    If yes, how much O2 required?     Other:    Last Bowel Movement: Last Bowel Movement Date: 04/24/18      Influenza Vaccine Received Flu Vaccine for Current Season (usually Sept-March): Not Flu Season        Pneumonia Vaccine           Diet Active Orders   Diet    DIET DIABETIC CONSISTENT CARB Regular      LDAs               Peripheral IV 04/23/18 Left Forearm (Active)   Site Assessment Clean, dry, & intact 4/25/2018  3:00 AM   Phlebitis Assessment 0 4/25/2018  3:00 AM   Infiltration Assessment 0 4/25/2018  3:00 AM   Dressing Status Clean, dry, & intact 4/25/2018  3:00 AM   Dressing Type Tape;Transparent 4/25/2018  3:00 AM   Hub Color/Line Status Green;Capped;Flushed 4/25/2018  3:00 AM   Action Taken Open ports on tubing capped 4/25/2018  3:00 AM   Alcohol Cap Used Yes 4/25/2018  3:00 AM       Peripheral IV 04/23/18 Right Forearm (Active)   Site Assessment Clean, dry, & intact 4/25/2018  3:00 AM   Phlebitis Assessment 0 4/25/2018  3:00 AM   Infiltration Assessment 0 4/25/2018  3:00 AM   Dressing Status Clean, dry, & intact 4/25/2018  3:00 AM   Dressing Type Tape;Transparent 4/25/2018  3:00 AM   Hub Color/Line Status Green;Capped;Flushed 4/25/2018  3:00 AM   Action Taken Open ports on tubing capped 4/25/2018  3:00 AM   Alcohol Cap Used Yes 4/25/2018  3:00 AM        Hemodialysis Access 04/24/18 (Active)   Central Line Being Utilized Yes 4/25/2018  3:00 AM   Criteria for Appropriate Use Dialysis/apheresis 4/25/2018  3:00 AM   Date Accessed  04/24/18 4/25/2018  3:00 AM   Site Assessment Clean, dry, & intact 4/25/2018  3:00 AM   Date of Last Dressing Change 04/24/18 4/25/2018  3:00 AM   Dressing Status Clean, dry, & intact 4/25/2018  3:00 AM   Dressing Type Transparent 4/25/2018  3:00 AM   Proximal Hub Color/Line Status Capped 4/25/2018  3:00 AM   Distal Hub Color/Line Status Capped 4/25/2018  3:00 AM                  Urinary Catheter [REMOVED] Condom Catheter 04/24/18-Indications for Use: Strict I/Os, every 1-2 hours    Intake & Output   Date 04/24/18 0700 - 04/25/18 0659 04/25/18 0700 - 04/26/18 0659   Shift 4981-1790 3584-9907 24 Hour Total 2043-5989 6177-9973 24 Hour Total   I  N  T  A  K  E   Blood  283.8 283.8         Volume (TRANSFUSE PACKED RBC'S)  283.8 283.8       Shift Total  (mL/kg)  283.8  (2.9) 283.8  (2.9)      O  U  T  P  U  T   Urine  (mL/kg/hr)  700  (0.6) 700  (0.3)         Urine Voided  700 700         Urine Occurrence(s) 1 x 3 x 4 x       Stool            Stool Occurrence(s) 1 x 2 x 3 x       Chest Tube 2500 2150 4650         Output (ml) (Chest Tube #1 04/24/18 Right) 2500 2150 4650       Shift Total  (mL/kg) 2500  (25.5) 2850  (29.4) 5350  (55.2)      NET -2500 -2566.2 -5066.2      Weight (kg) 98 97 97 97 97 97         Readmission Risk Assessment Tool Score High Risk            26       Total Score        3 Has Seen PCP in Last 6 Months (Yes=3, No=0)    2 . Living with Significant Other. Assisted Living. LTAC. SNF. or   Rehab    4 IP Visits Last 12 Months (1-3=4, 4=9, >4=11)    4 Pt.  Coverage (Medicare=5 , Medicaid, or Self-Pay=4)    13 Charlson Comorbidity Score (Age + Comorbid Conditions)        Criteria that do not apply:    Patient Length of Stay (>5 days = 3)       Expected Length of Stay 4d 7h   Actual Length of Stay 1

## 2018-04-25 NOTE — PROGRESS NOTES
Spoke with Severa Farrow at [facility] in [facility] and gave her patient information. Faxed medicals to her at [phone]. Faxed medicals also to central intake at [phone]. They will review information and get back with me.

## 2018-04-25 NOTE — PROGRESS NOTES
Nephrology Progress Note     Ruperto Parikh     [email or web address]                  Phone - [phone]   Patient: Marva Peralta   YOB: 1960    Date- 4/25/2018     CC: Follow up for arf on ckd    Subjective: Interval History:   -    On 4/24: Pt with HTN, ckd 5, dm transferred from [facility] with arf, resp. Failure, hyperglycemia. His labs on admission - k 6.0, glucose 525, bun 93, cr. 6.8  His cr was 4.57 on 3-10-18  He was seen by DR. JAVIER LAST WEEK    On 4/25: The patient just finished his first dialysis session. He also had a chest tube placed and is feeling much better. He says his SOB is essentially resolved. He does have some pain on his right side from the chest tube. He also notes some discomfort from the LE edema. He says his wife has been talking to Dr. Elijah Staples who will be ready to take him at her unit in [facility]. ROS: no fever/chills. No HEENT complaints. SOB and orthopnea nearly resolved. + chest pain as above. No GI sx's. No joint pain (but discomfort from the LE edema). No skin lesions/ rashes. Assessment:     End-stage renal failure --just staring on dialysis today  Acute resp. Failure  Right pl. Effusion. S/p right-sided chest tube placement. Chf. Fluid overload  --improving with dialysis  Hyperosmolar non ketotic state - hyperglycemia  Anemia secondary to renal failure  Dm 2  MET ACIDOSIs  PROTEINURIA  Solitary R kidney; s/p previous L Nephrectomy for RCC;  SHPT        Plan:     Increase EPO. Check iron stores along with other chemistries including phos in AM.  Dialysis today, tomorrow, and Friday. Then continue a MWF schedule. Will d/c the po bicarb. Check hepatitis panel    Will ask radiology to change the Artie to a tunneled catheter tomorrow. This has to be done prior to discharge, as Dr. Elijah Staples will not accept a Sharif Quan in her unit. Care Plan discussed with the patient at length.       Review of Systems: Pertinent items are noted in HPI. Objective:   Vitals:    04/25/18 1030 04/25/18 1045 04/25/18 1100 04/25/18 1135   BP: 132/83 123/80 138/89 135/90   Pulse: 77 76 71 68   Resp:       Temp:  98 °F (36.7 °C)     SpO2: 100% 100% 100%    Weight:       Height:         Last 3 Recorded Weights in this Encounter    04/24/18 0017 04/24/18 0153 04/25/18 0357   Weight: 99.3 kg (219 lb) 98 kg (216 lb) 97 kg (213 lb 13.5 oz)     Patient Vitals for the past 8 hrs:   BP Temp Pulse Resp SpO2 Height   04/25/18 1135 135/90 - 68 - - -   04/25/18 1100 138/89 - 71 - 100 % -   04/25/18 1045 123/80 98 °F (36.7 °C) 76 - 100 % -   04/25/18 1030 132/83 - 77 - 100 % -   04/25/18 1015 123/84 - 75 - 100 % -   04/25/18 1000 107/84 - 77 - 100 % -   04/25/18 0945 123/84 - 77 - 100 % -   04/25/18 0930 133/80 - 73 - 100 % -   04/25/18 0915 124/88 - 83 - 97 % -   04/25/18 0900 134/89 - 66 - 100 % -   04/25/18 0848 136/90 97.6 °F (36.4 °C) 65 20 100 % -   04/25/18 0745 124/88 97.6 °F (36.4 °C) 74 18 98 % -     04/25 0701 - 04/25 1900  In: -   Out: 2000 04/23 1901 - 04/25 0700  In: 283.8   Out: 5550 [Urine:900]  Physical Exam:   GEN:  Middle-aged man in no distress. NECK- Supple; Artie in right IJ  RESP: diminished breath sounds, especially on the right; No accessory muscle use  CVS: RRR; no gallop or rub  EXT: 1-2+ edema   NEURO: non focal, normal speech; no tremor  SKIN: No Rash, No Jaundice  ABDO: soft and non-tender; no masses; no distension    Chart reviewed. Pertinent Notes reviewed.      Medications list  reviewed       Data Review :  Recent Labs      04/25/18   0345  04/24/18   1904  04/24/18   1208   04/24/18   0123  04/23/18   1812  04/23/18   1800   NA  140  139  140   < >  136   --   130*   K  5.0  4.9  4.4   < >  5.4*   --   6.0*   CL  109*  110*  110*   < >  107   --   99   CO2  20*  25  21   < >  20*   --   20*   GLU  184*  146*  109*   < >  397*   --   525*   BUN  97*  93*  92*   < >  95*   --   93*   CREA  6.65*  6.54* 6.75*   < >  6.70*   --   6.83*   CA  7.2*  7.1*  7.1*  7.4*   < >  7.1*   --   7.3*   ALB  1.4*   --    --    --   1.6*   --   1.6*   SGOT  11*   --    --    --   10*   --   11*   ALT  14   --    --    --   15   --   18   INR   --    --    --    --    --   1.1   --     < > = values in this interval not displayed. Recent Labs      04/25/18   0345  04/24/18   1904  04/24/18   0647   WBC  6.6  5.9  7.6   HGB  8.1*  6.7*  7.8*   HCT  25.5*  21.6*  25.1*   PLT  137*  134*  158     No results found for: SDES  Current Facility-Administered Medications   Medication    oxyCODONE IR (ROXICODONE) tablet 5 mg    insulin lispro (HUMALOG) injection    glucose chewable tablet 16 g    dextrose (D50W) injection syrg 12.5-25 g    glucagon (GLUCAGEN) injection 1 mg    pantoprazole (PROTONIX) 40 mg in sodium chloride 0.9% 10 mL injection    bumetanide (BUMEX) injection 4 mg    sodium chloride (NS) flush 5-10 mL    sodium chloride (NS) flush 5-10 mL    acetaminophen (TYLENOL) tablet 650 mg    ondansetron (ZOFRAN) injection 4 mg    sodium bicarbonate tablet 650 mg    carvedilol (COREG) tablet 12.5 mg    epoetin tyler (EPOGEN;PROCRIT) injection 10,000 Units    insulin lispro (HUMALOG) injection    glucose chewable tablet 16 g    dextrose (D50W) injection syrg 12.5-25 g    glucagon (GLUCAGEN) injection 1 mg    insulin NPH (NOVOLIN N, HUMULIN N) injection 5 Units    0.9% sodium chloride infusion 250 mL       Mian Brooke MD  [facility]  [email or web address]  [address] , [facility], [address] [place]  Phone - [phone]         Fax - [phone]  [facility] Office  [address] [place] [address]  Phone - [phone]        Fax - [phone]

## 2018-04-25 NOTE — PROCEDURES
[facility] D[facility] Team [facility]  [phone]    Vitals   Pre   Post   Assessment   Pre   Post     Temp  Temp: 97.6 °F (36.4 °C) (04/25/18 0848) 98 LOC  Alert and oriented x4 same   HR   Pulse (Heart Rate): 65 (04/25/18 0848) 74 Lungs   Diminished on 4lnc  same   B/P   BP: 136/90 (04/25/18 0848) 123/80 Cardiac   B/p wnl NSR on tele  same   Resp   Resp Rate: 20 (04/25/18 0848) 18 Skin   intact  same   Pain level  Just recently received pain med for 9/10 pain Pt feeling better Edema  +2 in lower extremites   same   Orders:    Duration:   Start:   6848 End:   7153 Total:   2hrs   Dialyzer:   Dialyzer/Set Up Inspection: Revaclear (04/25/18 0848)   K Bath:   Dialysate K (mEq/L): 2 (04/25/18 0848)   Ca Bath:   Dialysate CA (mEq/L): 3.0 (04/25/18 0848)   Na/Bicarb:   Dialysate NA (mEq/L): 140 (04/25/18 0848)   Target Fluid Removal:   Goal/Amount of Fluid to Remove (mL): 2000 mL (04/25/18 0848)   Access     Type & Location:   RT IJ asa, dressing clean dry and intact from 4/24/18. Ports cleaned, blood aspirated and lines flushed without any issues. Good blood flow present.  No signs of infection noted   Labs     Obtained/Reviewed   Critical Results Called   Date when labs were drawn-  Hgb-    HGB   Date Value Ref Range Status   04/25/2018 8.1 (L) 12.1 - 17.0 g/dL Final     K-    Potassium   Date Value Ref Range Status   04/25/2018 5.0 3.5 - 5.1 mmol/L Final     Ca-   Calcium   Date Value Ref Range Status   04/25/2018 7.2 (L) 8.5 - 10.1 MG/DL Final     Bun-   BUN   Date Value Ref Range Status   04/25/2018 97 (H) 6 - 20 MG/DL Final     Creat-   Creatinine   Date Value Ref Range Status   04/25/2018 6.65 (H) 0.70 - 1.30 MG/DL Final        Medications/ Blood Products Given     Name   Dose   Route and Time        None ordered             Blood Volume Processed (BVP):   23.3 Net Fluid   Removed:  2kg   Comments   Time Out Done: 0107  Primary Nurse Rpt Pre:Janine Rowe RN at 0730  Primary Nurse Rpt Post: same nurse at 80  Pt Education:Acute renal failure  Care Plan: acute renal failure  Tx Summary:  [facility] consents obtained  [phone] Dialysis started  1048 Dialysis completed and blood rinsed back. Admiting Diagnosis:Hypoglycemia, Acute renal failure  Pt's previous clinic-not assigned yet  Consent signed - Informed Consent Verified: Yes (04/25/18 0848)  Buffy Consent - obtained  Hepatitis Status- NEG AG 4/24/19  Machine #- Machine Number: [id number] (04/25/18 0848)  Telemetry status-NSR  Pre-dialysis wt. -
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
## 2018-04-25 NOTE — PROGRESS NOTES
Reason for Admission:   Patient states he came to the hospital because of swelling. He was transfer from [address] [place] [address] to [facility] for hyperkalemia and renal failure. RRAT Score:  26                Resources/supports as identified by patient/family:   Patient has devoted wife and grand daughter. Top Challenges facing patient (as identified by patient/family and CM): Finances/Medication cost?      Patient states he does not have problems obtaining his medications. Transportation? His wife drives him to his follow up appointments. Support system or lack thereof? I have good support system. Living arrangements? He lives in one story home with his wife,, grand daughter and mother in law. Self-care/ADLs/Cognition? He states prior to coming to the hospital he was independent in adl's. Current Advanced Directive/Advance Care Plan:   Not on file                            Plan for utilizing home health:    He will consider home health services if needed. He does not use dme at home to assist in ambulating. Likelihood of readmission: mod                   Transition of Care Plan:     He has the [employer] application and his family is filling that out. He is self pay. He plans to follow up with Dr Sergey Aguirre when discharged and will be initiating HD. Care Management Interventions  PCP Verified by CM:  Yes  Transition of Care Consult (CM Consult): Discharge Planning  Current Support Network: Lives with Spouse  Confirm Follow Up Transport: Family  Plan discussed with Pt/Family/Caregiver: Yes  Discharge Location  Discharge Placement: Home

## 2018-04-26 ENCOUNTER — APPOINTMENT (OUTPATIENT)
Dept: GENERAL RADIOLOGY | Age: 58
DRG: 189 | End: 2018-04-26
Attending: INTERNAL MEDICINE
Payer: MEDICARE

## 2018-04-26 ENCOUNTER — APPOINTMENT (OUTPATIENT)
Dept: INTERVENTIONAL RADIOLOGY/VASCULAR | Age: 58
DRG: 189 | End: 2018-04-26
Attending: INTERNAL MEDICINE
Payer: MEDICARE

## 2018-04-26 PROBLEM — I50.21 SYSTOLIC CHF, ACUTE (HCC): Status: RESOLVED | Noted: 2018-04-26 | Resolved: 2018-04-26

## 2018-04-26 PROBLEM — I50.21 SYSTOLIC CHF, ACUTE (HCC): Status: ACTIVE | Noted: 2018-04-26

## 2018-04-26 PROBLEM — I11.0 HYPERTENSIVE HEART DISEASE WITH ACUTE SYSTOLIC CONGESTIVE HEART FAILURE (HCC): Status: ACTIVE | Noted: 2018-04-26

## 2018-04-26 PROBLEM — I50.21 HYPERTENSIVE HEART DISEASE WITH ACUTE SYSTOLIC CONGESTIVE HEART FAILURE (HCC): Status: ACTIVE | Noted: 2018-04-26

## 2018-04-26 LAB
ALBUMIN SERPL-MCNC: 1.4 G/DL (ref 3.5–5)
ANION GAP SERPL CALC-SCNC: 12 MMOL/L (ref 5–15)
BUN SERPL-MCNC: 73 MG/DL (ref 6–20)
BUN/CREAT SERPL: 14 (ref 12–20)
CALCIUM SERPL-MCNC: 7.1 MG/DL (ref 8.5–10.1)
CHLORIDE SERPL-SCNC: 106 MMOL/L (ref 97–108)
CO2 SERPL-SCNC: 22 MMOL/L (ref 21–32)
CREAT SERPL-MCNC: 5.38 MG/DL (ref 0.7–1.3)
ERYTHROCYTE [DISTWIDTH] IN BLOOD BY AUTOMATED COUNT: 22.3 % (ref 11.5–14.5)
GLUCOSE BLD STRIP.AUTO-MCNC: 104 MG/DL (ref 65–100)
GLUCOSE BLD STRIP.AUTO-MCNC: 156 MG/DL (ref 65–100)
GLUCOSE BLD STRIP.AUTO-MCNC: 574 MG/DL (ref 65–100)
GLUCOSE BLD STRIP.AUTO-MCNC: 99 MG/DL (ref 65–100)
GLUCOSE SERPL-MCNC: 77 MG/DL (ref 65–100)
HCT VFR BLD AUTO: 25.7 % (ref 36.6–50.3)
HGB BLD-MCNC: 8.3 G/DL (ref 12.1–17)
IRON SATN MFR SERPL: 17 % (ref 20–50)
IRON SERPL-MCNC: 21 UG/DL (ref 35–150)
MCH RBC QN AUTO: 21.9 PG (ref 26–34)
MCHC RBC AUTO-ENTMCNC: 32.3 G/DL (ref 30–36.5)
MCV RBC AUTO: 67.8 FL (ref 80–99)
NRBC # BLD: 0.02 K/UL (ref 0–0.01)
NRBC BLD-RTO: 0.3 PER 100 WBC
PHOSPHATE SERPL-MCNC: 6.4 MG/DL (ref 2.6–4.7)
PLATELET # BLD AUTO: 115 K/UL (ref 150–400)
PMV BLD AUTO: ABNORMAL FL (ref 8.9–12.9)
POTASSIUM SERPL-SCNC: 4.4 MMOL/L (ref 3.5–5.1)
RBC # BLD AUTO: 3.79 M/UL (ref 4.1–5.7)
SERVICE CMNT-IMP: ABNORMAL
SERVICE CMNT-IMP: NORMAL
SODIUM SERPL-SCNC: 140 MMOL/L (ref 136–145)
TIBC SERPL-MCNC: 125 UG/DL (ref 250–450)
WBC # BLD AUTO: 6.1 K/UL (ref 4.1–11.1)

## 2018-04-26 PROCEDURE — 65660000000 HC RM CCU STEPDOWN

## 2018-04-26 PROCEDURE — 74011250636 HC RX REV CODE- 250/636: Performed by: INTERNAL MEDICINE

## 2018-04-26 PROCEDURE — 0JH63XZ INSERTION OF TUNNELED VASCULAR ACCESS DEVICE INTO CHEST SUBCUTANEOUS TISSUE AND FASCIA, PERCUTANEOUS APPROACH: ICD-10-PCS | Performed by: RADIOLOGY

## 2018-04-26 PROCEDURE — 02H633Z INSERTION OF INFUSION DEVICE INTO RIGHT ATRIUM, PERCUTANEOUS APPROACH: ICD-10-PCS | Performed by: RADIOLOGY

## 2018-04-26 PROCEDURE — 74011250636 HC RX REV CODE- 250/636: Performed by: RADIOLOGY

## 2018-04-26 PROCEDURE — 85027 COMPLETE CBC AUTOMATED: CPT | Performed by: INTERNAL MEDICINE

## 2018-04-26 PROCEDURE — 77030031139 HC SUT VCRL2 J&J -A

## 2018-04-26 PROCEDURE — 77010033678 HC OXYGEN DAILY

## 2018-04-26 PROCEDURE — C1750 CATH, HEMODIALYSIS,LONG-TERM: HCPCS

## 2018-04-26 PROCEDURE — 74011250637 HC RX REV CODE- 250/637: Performed by: INTERNAL MEDICINE

## 2018-04-26 PROCEDURE — 36558 INSERT TUNNELED CV CATH: CPT

## 2018-04-26 PROCEDURE — 74011000250 HC RX REV CODE- 250: Performed by: INTERNAL MEDICINE

## 2018-04-26 PROCEDURE — 71045 X-RAY EXAM CHEST 1 VIEW: CPT

## 2018-04-26 PROCEDURE — 90935 HEMODIALYSIS ONE EVALUATION: CPT

## 2018-04-26 PROCEDURE — 82962 GLUCOSE BLOOD TEST: CPT

## 2018-04-26 PROCEDURE — 77030018719 HC DRSG PTCH ANTIMIC J&J -A

## 2018-04-26 PROCEDURE — 36415 COLL VENOUS BLD VENIPUNCTURE: CPT | Performed by: INTERNAL MEDICINE

## 2018-04-26 PROCEDURE — 83540 ASSAY OF IRON: CPT | Performed by: INTERNAL MEDICINE

## 2018-04-26 PROCEDURE — 74011000250 HC RX REV CODE- 250: Performed by: RADIOLOGY

## 2018-04-26 PROCEDURE — 80069 RENAL FUNCTION PANEL: CPT | Performed by: INTERNAL MEDICINE

## 2018-04-26 PROCEDURE — 74011636637 HC RX REV CODE- 636/637: Performed by: INTERNAL MEDICINE

## 2018-04-26 RX ORDER — FUROSEMIDE 80 MG/1
80 TABLET ORAL DAILY
Status: DISCONTINUED | OUTPATIENT
Start: 2018-04-27 | End: 2018-04-28 | Stop reason: HOSPADM

## 2018-04-26 RX ORDER — FENTANYL CITRATE 50 UG/ML
100 INJECTION, SOLUTION INTRAMUSCULAR; INTRAVENOUS
Status: DISCONTINUED | OUTPATIENT
Start: 2018-04-26 | End: 2018-04-26

## 2018-04-26 RX ORDER — CEFAZOLIN SODIUM/WATER 2 G/20 ML
2 SYRINGE (ML) INTRAVENOUS ONCE
Status: COMPLETED | OUTPATIENT
Start: 2018-04-26 | End: 2018-04-26

## 2018-04-26 RX ORDER — HEPARIN SODIUM 200 [USP'U]/100ML
200 INJECTION, SOLUTION INTRAVENOUS ONCE
Status: COMPLETED | OUTPATIENT
Start: 2018-04-26 | End: 2018-04-26

## 2018-04-26 RX ORDER — MIDAZOLAM HYDROCHLORIDE 1 MG/ML
5 INJECTION, SOLUTION INTRAMUSCULAR; INTRAVENOUS
Status: DISCONTINUED | OUTPATIENT
Start: 2018-04-26 | End: 2018-04-26

## 2018-04-26 RX ORDER — HEPARIN SODIUM 1000 [USP'U]/ML
10000 INJECTION, SOLUTION INTRAVENOUS; SUBCUTANEOUS
Status: COMPLETED | OUTPATIENT
Start: 2018-04-26 | End: 2018-04-26

## 2018-04-26 RX ORDER — INSULIN LISPRO 100 [IU]/ML
9 INJECTION, SOLUTION INTRAVENOUS; SUBCUTANEOUS ONCE
Status: COMPLETED | OUTPATIENT
Start: 2018-04-26 | End: 2018-04-26

## 2018-04-26 RX ORDER — LIDOCAINE HYDROCHLORIDE 20 MG/ML
20 INJECTION, SOLUTION INFILTRATION; PERINEURAL ONCE
Status: COMPLETED | OUTPATIENT
Start: 2018-04-26 | End: 2018-04-26

## 2018-04-26 RX ADMIN — HEPARIN SODIUM IN SODIUM CHLORIDE 200 UNITS: 200 INJECTION INTRAVENOUS at 10:00

## 2018-04-26 RX ADMIN — ERYTHROPOIETIN 20000 UNITS: 20000 INJECTION, SOLUTION INTRAVENOUS; SUBCUTANEOUS at 17:04

## 2018-04-26 RX ADMIN — Medication 10 ML: at 21:54

## 2018-04-26 RX ADMIN — Medication 10 ML: at 06:21

## 2018-04-26 RX ADMIN — BUMETANIDE 4 MG: 0.25 INJECTION INTRAMUSCULAR; INTRAVENOUS at 11:20

## 2018-04-26 RX ADMIN — LIDOCAINE HYDROCHLORIDE 400 MG: 20 INJECTION, SOLUTION INFILTRATION; PERINEURAL at 10:00

## 2018-04-26 RX ADMIN — CARVEDILOL 12.5 MG: 12.5 TABLET, FILM COATED ORAL at 11:16

## 2018-04-26 RX ADMIN — HEPARIN SODIUM 3800 UNITS: 1000 INJECTION, SOLUTION INTRAVENOUS; SUBCUTANEOUS at 10:00

## 2018-04-26 RX ADMIN — INSULIN HUMAN 8 UNITS: 100 INJECTION, SUSPENSION SUBCUTANEOUS at 11:15

## 2018-04-26 RX ADMIN — CARVEDILOL 12.5 MG: 12.5 TABLET, FILM COATED ORAL at 16:56

## 2018-04-26 RX ADMIN — FENTANYL CITRATE 25 MCG: 50 INJECTION, SOLUTION INTRAMUSCULAR; INTRAVENOUS at 10:03

## 2018-04-26 RX ADMIN — PANTOPRAZOLE SODIUM 40 MG: 40 TABLET, DELAYED RELEASE ORAL at 11:16

## 2018-04-26 RX ADMIN — MIDAZOLAM 1 MG: 1 INJECTION INTRAMUSCULAR; INTRAVENOUS at 10:03

## 2018-04-26 RX ADMIN — INSULIN LISPRO 3 UNITS: 100 INJECTION, SOLUTION INTRAVENOUS; SUBCUTANEOUS at 17:34

## 2018-04-26 RX ADMIN — INSULIN HUMAN 8 UNITS: 100 INJECTION, SUSPENSION SUBCUTANEOUS at 16:57

## 2018-04-26 RX ADMIN — INSULIN LISPRO 9 UNITS: 100 INJECTION, SOLUTION INTRAVENOUS; SUBCUTANEOUS at 16:57

## 2018-04-26 RX ADMIN — Medication 2 G: at 09:45

## 2018-04-26 RX ADMIN — MIDAZOLAM 1 MG: 1 INJECTION INTRAMUSCULAR; INTRAVENOUS at 10:00

## 2018-04-26 RX ADMIN — FENTANYL CITRATE 25 MCG: 50 INJECTION, SOLUTION INTRAMUSCULAR; INTRAVENOUS at 10:00

## 2018-04-26 RX ADMIN — FENTANYL CITRATE 25 MCG: 50 INJECTION, SOLUTION INTRAMUSCULAR; INTRAVENOUS at 04:27

## 2018-04-26 NOTE — DIABETES MGMT
DTC Progress Note    Recommendations/ Comments: If appropriate, please consider decreasing evening NPH dose to 6 units. Pt with fasting hypoglycemia today - 77mg/dL. Current hospital DM medication:   -Humalog high sensitivity correction + 3 units ac tid   -NPH 8 times 2 times daily     Chart reviewed on [address] for hypoglycemia. Patient is a 66 y.o. male with known history of Type 2 Diabetes on Novolin 70/30 - 18 units with breakfast and dinner at home. A1c:   Lab Results   Component Value Date/Time    Hemoglobin A1c 9.9 (H) 04/24/2018 01:23 AM    Hemoglobin A1c 10.5 (H) 03/16/2018 12:06 AM       Recent Glucose Results:   Lab Results   Component Value Date/Time    GLU 77 04/26/2018 04:36 AM    GLUCPOC 99 04/26/2018 07:35 AM    GLUCPOC 217 (H) 04/25/2018 09:31 PM    GLUCPOC 233 (H) 04/25/2018 04:35 PM        Lab Results   Component Value Date/Time    Creatinine 5.38 (H) 04/26/2018 04:36 AM     Estimated Creatinine Clearance: 18.3 mL/min (based on Cr of 5.38). Active Orders   Diet    DIET NPO Except Meds        PO intake: No data found. Will continue to follow as needed.     Thank you    Ele Valenzuela, [address], [address] [facility]  Office: [phone]

## 2018-04-26 NOTE — PROGRESS NOTES
Name of procedure: Artie to permacath placement    Complications, if any, r/t procedure: none    Sedation medications given: 2 mg Versed, 50 mcg Fentanyl    Sedation tolerated: well    VS : Stable vs Unstable    Post Procedure Care Needed/order sets in connectcare: see orders    Pt tolerated procedure well. VSS. No C/O Pain. Dressing to site D&I. No bleeding or hematoma noted to site. HOB elevated. Pt taken back to room by transport. Floor RN to assume care of pt. Report given. Dr. Honey Vanegas has read [facility] and per MD no further TPA indicated. TRANSFER - OUT REPORT:    Verbal report given to Judy Saravia on Mumtaz Ndiaye  being transferred to room  [phone] for routine post - op       Report consisted of patients Situation, Background, Assessment and   Recommendations(SBAR). Information from the following report(s) SBAR, Kardex, Procedure Summary, Intake/Output and MAR was reviewed with the receiving nurse.     Lines:   Peripheral IV 04/23/18 Left Forearm (Active)   Site Assessment Clean, dry, & intact 4/25/2018  8:00 PM   Phlebitis Assessment 0 4/25/2018  8:00 PM   Infiltration Assessment 0 4/25/2018  8:00 PM   Dressing Status Clean, dry, & intact 4/25/2018  8:00 PM   Dressing Type Transparent;Tape 4/25/2018  8:00 PM   Hub Color/Line Status Flushed;End cap changed 4/25/2018  8:00 PM   Action Taken Open ports on tubing capped 4/25/2018  8:00 PM   Alcohol Cap Used Yes 4/25/2018  8:00 PM       Peripheral IV 04/23/18 Right Forearm (Active)   Site Assessment Clean, dry, & intact 4/25/2018  8:00 PM   Phlebitis Assessment 0 4/25/2018  8:00 PM   Infiltration Assessment 0 4/25/2018  8:00 PM   Dressing Status Clean, dry, & intact 4/25/2018  8:00 PM   Dressing Type Transparent;Tape 4/25/2018  8:00 PM   Hub Color/Line Status Flushed;End cap changed 4/25/2018  8:00 PM   Action Taken Open ports on tubing capped 4/25/2018  8:00 PM   Alcohol Cap Used Yes 4/25/2018  8:00 PM        Opportunity for questions and clarification was provided.       Patient transported with:   O2 @ 2 liters

## 2018-04-26 NOTE — PROGRESS NOTES
Called and spoke with Ismael Bourgeois at [address] [facility] dialysis and she gave me another number to fax the clinicals to her. The fax number is [phone]. Teo Dockery faxed to her to review.

## 2018-04-26 NOTE — PROGRESS NOTES
GI PROGRESS NOTE    NAME:             Debbi Zamora   :              1960   MRN:              [id number]   Admit Date:     2018  Todays Date:  2018      Subjective:           Had a non bloody bowel movement. No further abdominal pain  Says his last colonoscopy was around 5 years ago at hospital [address]     Medications-reviewed     Current Facility-Administered Medications   Medication Dose Route Frequency    oxyCODONE IR (ROXICODONE) tablet 5 mg  5 mg Oral Q4H PRN    epoetin tyler (EPOGEN;PROCRIT) injection 20,000 Units  20,000 Units SubCUTAneous Q TUE, THU & SAT    pantoprazole (PROTONIX) tablet 40 mg  40 mg Oral ACB    insulin NPH (NOVOLIN N, HUMULIN N) injection 8 Units  8 Units SubCUTAneous ACB&D    insulin lispro (HUMALOG) injection 3 Units  3 Units SubCUTAneous TIDAC    fentaNYL citrate (PF) injection 25 mcg  25 mcg IntraVENous Q2H PRN    bumetanide (BUMEX) injection 4 mg  4 mg IntraVENous BID    sodium chloride (NS) flush 5-10 mL  5-10 mL IntraVENous Q8H    sodium chloride (NS) flush 5-10 mL  5-10 mL IntraVENous PRN    acetaminophen (TYLENOL) tablet 650 mg  650 mg Oral Q6H PRN    ondansetron (ZOFRAN) injection 4 mg  4 mg IntraVENous Q4H PRN    carvedilol (COREG) tablet 12.5 mg  12.5 mg Oral BID WITH MEALS    insulin lispro (HUMALOG) injection   SubCUTAneous AC&HS    glucose chewable tablet 16 g  4 Tab Oral PRN    dextrose (D50W) injection syrg 12.5-25 g  12.5-25 g IntraVENous PRN    glucagon (GLUCAGEN) injection 1 mg  1 mg IntraMUSCular PRN    0.9% sodium chloride infusion 250 mL  250 mL IntraVENous PRN        Objective:   Patient Vitals for the past 8 hrs:   BP Temp Pulse Resp SpO2 Weight   18 1115 (!) 152/95 98.4 °F (36.9 °C) 81 18 100 % -   18 1015 112/63 - 84 18 99 % -   18 1010 110/60 - 82 18 99 % -   18 1005 105/62 - 80 18 99 % -   18 1000 121/73 - 78 18 99 % -   18 0916 133/89 98 °F (36.7 °C) 69 18 100 % -   18 0723 (!) 139/93 97.5 °F (36.4 °C) 67 18 100 % -   04/26/18 0620 - - - - - 87.6 kg (193 lb 3.2 oz)        04/24 1901 - 04/26 0700  In: 283.8   Out: 7215 [Urine:1450]    EXAM:     NEURO-a&o   HEENT-wnl   LUNGS-clear   COR-regular rate and rhythym   ABD-soft , no tenderness, no rebound, good bs        LABS:  Recent Labs      04/26/18   0436 04/25/18   0345   WBC  6.1  6.6   HGB  8.3*  8.1*   HCT  25.7*  25.5*   PLT  115*  137*     Recent Labs      04/26/18 0436 04/25/18 0345 04/24/18   1904   NA  140  140  139   K  4.4  5.0  4.9   CL  106  109*  110*   CO2  22  20*  25   BUN  73*  97*  93*   CREA  5.38*  6.65*  6.54*   GLU  77  184*  146*   CA  7.1*  7.2*  7.1*  7.1*   PHOS  6.4*   --    --      Recent Labs      04/26/18   0436  04/25/18   0345  04/24/18   0123  04/23/18   1800   SGOT   --   11*  10*  11*   AP   --   132*  169*  181*   TBILI   --   0.4  0.4  0.3   TP   --   7.7  8.9*  9.3*   ALB  1.4*  1.4*  1.6*  1.6*   GLOB   --   6.3*  7.3*  7.7*   ALT   --   14  15  18     Recent Labs      04/23/18   1812   INR  1.1   PTP  13.0   APTT  27.1      Recent Labs      04/26/18 0436   TIBC  125*   PSAT  17*      Lab Results   Component Value Date/Time    Folate 5.9 02/13/2018 08:12 PM                            Assessment:   1. Reported history of BRBPR prior to admission  2. Heme negative stool  3. Currently passing non-bloody brown stool  4.  Bloody pleural effusion                          Plan:   Watch for any BRBPR  Follow cbc  Will try to get info about any prior colonoscopies  Consider CT abd/pelvis if bleeding recurs

## 2018-04-26 NOTE — PROGRESS NOTES
PCU SHIFT NURSING NOTE      Bedside shift change report given to Tha Grimaldo (oncoming nurse) by Radha Smith (offgoing nurse). Report included the following information SBAR, Kardex, Intake/Output, MAR and Recent Results. Shift Summary: 0723  Pt in bed resting  States pain is a 2 and acceptable  NPO for permacath p/m  Weaned O2 from 3 to 2L NC  Plan for dialysis today  CT in place, dressing CDI with 300 serosanguinous drainage in cannister   0850  Pt left unit for permacath placement    1050  Pt returned to unit  Drowsy  R chest permacath in place   1150    Holding scheduled Lispro at this time  Pt to be dialyzed this afternoon  Continue to monitor BS    1630    Dr Blanca Davies advised  New orders rec'd  0064  Weaned to room air      Admission Date 4/24/2018   Admission Diagnosis Hypoglycemia  Acute Renal Failure  Hyperkalemia   Consults IP CONSULT TO GASTROENTEROLOGY  IP CONSULT TO NEPHROLOGY  IP CONSULT TO PULMONOLOGY  IP CONSULT TO PULMONOLOGY        Consults   []PT   []OT   []Speech   []Case Management      [] Palliative      Cardiac Monitoring Order   []Yes   []No     IV drips   []Yes    Drip:                            Dose:  Drip:                            Dose:  Drip:                            Dose:   []No     GI Prophylaxis   []Yes   []No         DVT Prophylaxis   SCDs:  Sequential Compression Device: Bilateral     Patient Refused VTE Prophylaxis: Yes    Cornelio stockings:         [] Medication   []Contraindicated   []None      Activity Level Activity Level: Bed Rest     Activity Assistance: Partial (two people)   Purposeful Rounding every 1-2 hour?    []Yes   Cárdenas Score  Total Score: 3   Bed Alarm (If score 3 or >)   []Yes   [] Refused (See signed refusal form in chart)   Ruddy Score  Ruddy Score: 17   Ruddy Score (if score 14 or less)   []PMT consult   []Wound Care consult      []Specialty bed   [] Nutrition consult          Needs prior to discharge:   Home O2 required:    []Yes   []No    If yes, how much O2 required?     Other:    Last Bowel Movement: Last Bowel Movement Date: 04/25/18      Influenza Vaccine Received Flu Vaccine for Current Season (usually Sept-March): Not Flu Season        Pneumonia Vaccine           Diet Active Orders   Diet    DIET NPO Except Meds      LDAs               Peripheral IV 04/23/18 Left Forearm (Active)   Site Assessment Clean, dry, & intact 4/25/2018  8:00 PM   Phlebitis Assessment 0 4/25/2018  8:00 PM   Infiltration Assessment 0 4/25/2018  8:00 PM   Dressing Status Clean, dry, & intact 4/25/2018  8:00 PM   Dressing Type Transparent;Tape 4/25/2018  8:00 PM   Hub Color/Line Status Flushed;End cap changed 4/25/2018  8:00 PM   Action Taken Open ports on tubing capped 4/25/2018  8:00 PM   Alcohol Cap Used Yes 4/25/2018  8:00 PM       Peripheral IV 04/23/18 Right Forearm (Active)   Site Assessment Clean, dry, & intact 4/25/2018  8:00 PM   Phlebitis Assessment 0 4/25/2018  8:00 PM   Infiltration Assessment 0 4/25/2018  8:00 PM   Dressing Status Clean, dry, & intact 4/25/2018  8:00 PM   Dressing Type Transparent;Tape 4/25/2018  8:00 PM   Hub Color/Line Status Flushed;End cap changed 4/25/2018  8:00 PM   Action Taken Open ports on tubing capped 4/25/2018  8:00 PM   Alcohol Cap Used Yes 4/25/2018  8:00 PM        Hemodialysis Access 04/24/18 (Active)   Central Line Being Utilized Yes 4/25/2018  8:00 PM   Criteria for Appropriate Use Dialysis/apheresis 4/25/2018  8:00 PM   Date Accessed  04/24/18 4/25/2018  8:00 PM   Site Assessment Clean, dry, & intact 4/25/2018  8:00 PM   Date of Last Dressing Change 04/24/18 4/25/2018  8:00 PM   Dressing Status Clean, dry, & intact 4/25/2018  8:00 PM   Dressing Type Transparent 4/25/2018  8:00 PM   Proximal Hub Color/Line Status Capped 4/25/2018  8:00 PM   Distal Hub Color/Line Status Capped 4/25/2018  8:00 PM                  Urinary Catheter [REMOVED] Condom Catheter 04/24/18-Indications for Use: Strict I/Os, every 1-2 hours    Intake & Output   Date 04/25/18 0700 - 04/26/18 0659 04/26/18 0700 - 04/27/18 0659   Shift [id number] 1900-0659 24 Hour Total 1310-2671 8783-8358 24 Hour Total   I  N  T  A  K  E   Shift Total  (mL/kg)         O  U  T  P  U  T   Urine  (mL/kg/hr)  750  (0.7) 750  (0.4)         Urine Voided  750 750       Chest Tube [phone]         Output (ml) (Chest Tube #1 04/24/18 Right) [phone]       Dialysis 2000 2000         NET Fluid Removed (mL) 2000 2000       Shift Total  (mL/kg) 3315  (34.2) 1050  (12) 4365  (49.8)      NET -3315 -1050 -4365      Weight (kg) 97 87.6 87.6 87.6 87.6 87.6         Readmission Risk Assessment Tool Score High Risk            26       Total Score        3 Has Seen PCP in Last 6 Months (Yes=3, No=0)    2 . Living with Significant Other. Assisted Living. LTAC. SNF. or   Rehab    4 IP Visits Last 12 Months (1-3=4, 4=9, >4=11)    4 Pt.  Coverage (Medicare=5 , Medicaid, or Self-Pay=4)    13 Charlson Comorbidity Score (Age + Comorbid Conditions)        Criteria that do not apply:    Patient Length of Stay (>5 days = 3)       Expected Length of Stay 4d 7h   Actual Length of Stay 2

## 2018-04-26 NOTE — PROGRESS NOTES
Hospitalist Progress Note    NAME: Moon Ards   :  1960   MRN:  [id number]       Assessment / Plan:  Acute respiratory failure with hypoxia (improved) with Large Right Pleural Effusion due to Volume Overload in Settings of Renal Failure; s/p chest tube placement on   -CT Chest on  shows Hemopneumothorax, case discussed with Dr. Fritzi Aase  -follow-up pleural fluid cytology  -tPA in Chest tube on  to try and help break up loculations  -oxycodone added prn for pain from chest tube, IV fentanyl prn for severe pain     TTE Showed: Acute Systolic CHF: LVEF 25-36%  -consult cardiology    Hyperkalemia: improved  COLTEN on CKD stage V  20 lb weight gain in a week  Worsening leg edema: now improving since starting HD  -Nephrology following  -Permcath placed     Accelerated HTN improved and patient with subsequent Hypotension; BP with adequate control today   -discontinued NTG paste and Nifedipine on  with hypotension  -Continue Coreg  -will hold on up-titiating antihypertensive regimen while aggressively pulling fluid with HD    Acute blood loss anemia s/p 1 unit pRBC's  Lower GI bleed with bright red blood per rectum  -Hold home ASA; Uremia also likely contributing to platelet dysfunction  -continue PPI   -appreciate GI eval  -since no further bleeding at this time I have advanced diet     Hyperosmolar Non Ketotic State with h/o DM type 2  Hypoglycemia on insulin gtt  -continue NPH 8 units BID  -Humalog 3 units TID meals  -continue SSI     Hyperlipidemia  -Hold statin for now     Code Status: Full  Surrogate Decision Maker: Wife     DVT Prophylaxis: SCDs    Body mass index is 24.15 kg/(m^2).          Subjective:     Chief Complaint / Reason for Physician Visit: follow-up COLTEN, hyperkalemia and hypoxia  Patient seen for follow-up  Some pain at chest tube insertion site  Case discussed with patient, RN and Dr. Fritzi Aase    Review of Systems:  Symptom Y/N Comments  Symptom Y/N Comments   Fever/Chills Chest Pain     Poor Appetite    Edema n improving   Cough    Abdominal Pain n    Sputum    Joint Pain     SOB/IRENE y   Pruritis/Rash     Nausea/vomit    Tolerating PT/OT     Diarrhea    Tolerating Diet y    Constipation    Other       Could NOT obtain due to:      Objective:     VITALS:   Last 24hrs VS reviewed since prior progress note. Most recent are:  Patient Vitals for the past 24 hrs:   Temp Pulse Resp BP SpO2   04/26/18 1115 98.4 °F (36.9 °C) 81 18 (!) 152/95 100 %   04/26/18 1015 - 84 18 112/63 99 %   04/26/18 1010 - 82 18 110/60 99 %   04/26/18 1005 - 80 18 105/62 99 %   04/26/18 1000 - 78 18 121/73 99 %   04/26/18 0916 98 °F (36.7 °C) 69 18 133/89 100 %   04/26/18 0723 97.5 °F (36.4 °C) 67 18 (!) 139/93 100 %   04/26/18 0430 98 °F (36.7 °C) 68 18 158/90 96 %   04/25/18 2300 97.6 °F (36.4 °C) (!) 59 19 140/87 100 %   04/25/18 2200 - 62 - 134/89 100 %   04/25/18 2100 - 62 - 127/90 100 %   04/25/18 2000 97.4 °F (36.3 °C) 64 20 144/84 100 %   04/25/18 1826 - - - 126/67 -   04/25/18 1528 97.8 °F (36.6 °C) 65 18 117/82 100 %       Intake/Output Summary (Last 24 hours) at 04/26/18 1307  Last data filed at 04/26/18 0600   Gross per 24 hour   Intake                0 ml   Output             2365 ml   Net            -2365 ml        PHYSICAL EXAM:  General: WD, WN. Alert, cooperative, no acute distress    EENT:  EOMI. Anicteric sclerae. MMM  Resp:  Increasing breath sounds right lung field. No accessory muscle use  CV:  Regular  rhythm,   BLE edema improving  GI:  Soft, less distended, mildly tender.  + bowel sounds  Neurologic:  Alert and oriented, speech is normal  Psych:   Fair insight. Not anxious nor agitated  Skin:  No rashes noted.   No jaundice    Reviewed most current lab test results and cultures  YES  Reviewed most current radiology test results   YES  Review and summation of old records today    NO  Reviewed patient's current orders and MAR    YES  PMH/SH reviewed - no change compared to H&P  ________________________________________________________________________  Care Plan discussed with:    Comments   Patient x    Family      RN x    Care Manager     Consultant  x Dr. Joss Bonds team rounds were held today with , nursing, pharmacist and clinical coordinator. Patient's plan of care was discussed; medications were reviewed and discharge planning was addressed. ________________________________________________________________________  Total NON critical care TIME:  35   Minutes    Total CRITICAL CARE TIME Spent:   Minutes non procedure based (this note represents a 16982 charge given that Dr. Sadia Holland spent 60 minutes of CC time at admission this am)      Comments   >50% of visit spent in counseling and coordination of care x    ________________________________________________________________________  Geetha Leslie MD     Procedures: see electronic medical records for all procedures/Xrays and details which were not copied into this note but were reviewed prior to creation of Plan. LABS:  I reviewed today's most current labs and imaging studies.   Pertinent labs include:  Recent Labs      04/26/18   0436  04/25/18   0345  04/24/18   1904   WBC  6.1  6.6  5.9   HGB  8.3*  8.1*  6.7*   HCT  25.7*  25.5*  21.6*   PLT  115*  137*  134*     Recent Labs      04/26/18   0436  04/25/18   0345  04/24/18   1904   04/24/18   0123  04/23/18   1812  04/23/18   1800   NA  140  140  139   < >  136   --   130*   K  4.4  5.0  4.9   < >  5.4*   --   6.0*   CL  106  109*  110*   < >  107   --   99   CO2  22  20*  25   < >  20*   --   20*   GLU  77  184*  146*   < >  397*   --   525*   BUN  73*  97*  93*   < >  95*   --   93*   CREA  5.38*  6.65*  6.54*   < >  6.70*   --   6.83*   CA  7.1*  7.2*  7.1*  7.1*   < >  7.1*   --   7.3*   PHOS  6.4*   --    --    --    --    --    --    ALB  1.4*  1.4*   --    --   1.6*   --   1.6*   TBILI   --   0.4   --    --   0.4   -- 0. 3   SGOT   --   11*   --    --   10*   --   11*   ALT   --   14   --    --   15   --   18   INR   --    --    --    --    --   1.1   --     < > = values in this interval not displayed.        Signed: Celia Troy MD
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
## 2018-04-26 NOTE — PROGRESS NOTES
Nephrology Progress Note     Ruperto Parikh     www. [facility]                  Phone - [phone]   Patient: Bib Ayala   YOB: 1960    Date- 4/26/2018     CC: Follow up for NEW ONSET ESRD    Subjective: Interval History:   -  S/p first hd yesterday  Sob improved  S/p permacath today      No c/o sob, fever. No c/o nausea or vomiting  No c/o chest pain     Assessment:   End-stage renal failure --just staring on dialysis today  Acute resp. Failure  Right pl. Effusion. S/p right-sided chest tube placement. Chf. Fluid overload  --improving with dialysis  Hyperosmolar non ketotic state - hyperglycemia  Anemia secondary to renal failure  Dm 2  MET ACIDOSIS  PROTEINURIA  Solitary R kidney; s/p previous L Nephrectomy for RCC;  SHPT        Plan:   Hd today  Hd tomorrow  Continue epogen  Waiting for placement  Stop high dose bumex  Resume home lasix dose  Care Plan discussed with the patient       Review of Systems: Pertinent items are noted in HPI.   Objective:   Vitals:    04/26/18 1005 04/26/18 1010 04/26/18 1015 04/26/18 1115   BP: 105/62 110/60 112/63 (!) 152/95   Pulse: 80 82 84 81   Resp: 18 18 18 18   Temp:    98.4 °F (36.9 °C)   SpO2: 99% 99% 99% 100%   Weight:       Height:         Last 3 Recorded Weights in this Encounter    04/24/18 0153 04/25/18 0357 04/26/18 0620   Weight: 98 kg (216 lb) 97 kg (213 lb 13.5 oz) 87.6 kg (193 lb 3.2 oz)     Patient Vitals for the past 8 hrs:   BP Temp Pulse Resp SpO2   04/26/18 1115 (!) 152/95 98.4 °F (36.9 °C) 81 18 100 %   04/26/18 1015 112/63 - 84 18 99 %   04/26/18 1010 110/60 - 82 18 99 %   04/26/18 1005 105/62 - 80 18 99 %   04/26/18 1000 121/73 - 78 18 99 %   04/26/18 0916 133/89 98 °F (36.7 °C) 69 18 100 %   04/26/18 0723 (!) 139/93 97.5 °F (36.4 °C) 67 18 100 %        04/24 1901 - 04/26 0700  In: 283.8   Out: 7215 [Urine:1450]  Physical Exam:   GEN:  NAD  NECK:  Supple, no thyromegaly  RESP: CTA  b/l, no  wheezing, decreased at base  CVS: RRR,S1,S2   NEURO: non focal, normal speech  ABDO:  soft , non tender, No hepatosplenomegaly  EXT: Edema +nt  Right IJ permacath +    Chart reviewed. Pertinent Notes reviewed. Medications list  reviewed       Data Review :  Recent Labs      04/26/18 0436  04/25/18 0345 04/24/18   1904   04/24/18   0123  04/23/18   1812  04/23/18   1800   NA  140  140  139   < >  136   --   130*   K  4.4  5.0  4.9   < >  5.4*   --   6.0*   CL  106  109*  110*   < >  107   --   99   CO2  22  20*  25   < >  20*   --   20*   GLU  77  184*  146*   < >  397*   --   525*   BUN  73*  97*  93*   < >  95*   --   93*   CREA  5.38*  6.65*  6.54*   < >  6.70*   --   6.83*   CA  7.1*  7.2*  7.1*  7.1*   < >  7.1*   --   7.3*   PHOS  6.4*   --    --    --    --    --    --    ALB  1.4*  1.4*   --    --   1.6*   --   1.6*   SGOT   --   11*   --    --   10*   --   11*   ALT   --   14   --    --   15   --   18   INR   --    --    --    --    --   1.1   --     < > = values in this interval not displayed.      Recent Labs      04/26/18 0436 04/25/18 0345 04/24/18   1904   WBC  6.1  6.6  5.9   HGB  8.3*  8.1*  6.7*   HCT  25.7*  25.5*  21.6*   PLT  115*  137*  134*     No results found for: SDES  Current Facility-Administered Medications   Medication    oxyCODONE IR (ROXICODONE) tablet 5 mg    epoetin tyler (EPOGEN;PROCRIT) injection 20,000 Units    pantoprazole (PROTONIX) tablet 40 mg    insulin NPH (NOVOLIN N, HUMULIN N) injection 8 Units    insulin lispro (HUMALOG) injection 3 Units    fentaNYL citrate (PF) injection 25 mcg    bumetanide (BUMEX) injection 4 mg    sodium chloride (NS) flush 5-10 mL    sodium chloride (NS) flush 5-10 mL    acetaminophen (TYLENOL) tablet 650 mg    ondansetron (ZOFRAN) injection 4 mg    carvedilol (COREG) tablet 12.5 mg    insulin lispro (HUMALOG) injection    glucose chewable tablet 16 g    dextrose (D50W) injection syrg 12.5-25 g    glucagon (GLUCAGEN) injection 1 mg    0.9% sodium chloride infusion 250 mL       Leeann Snyder MD  [facility]  www. [email or web address]  [address], [place], [address]  Phone - [phone]         Fax - [phone]  [facility] Office  [address] [place] [address]  Phone - [phone]        Fax - [phone]

## 2018-04-26 NOTE — DIALYSIS
[facility] Acutes  [phone]    Vitals   Pre   Post   Assessment   Pre   Post     Temp  Temp: 98.4 °F (36.9 °C) (04/26/18 1647)  98.5 LOC  Alert and oriented No change   HR   Pulse (Heart Rate): 80 (04/26/18 1900) 86 Lungs   CTAB w diminished bases and w/o c/o SOB No change   B/P   BP: (!) 149/96 (04/26/18 1900) 140/89 Cardiac   S1S2 and w/o c/o CP No  change   Resp   Resp Rate: 16 (04/26/18 1900) 16 Skin   Warm and dry No change   Pain level  Pain Intensity 1: 0 (04/26/18 1647) No complaints Edema  generalized Achieved 2 kg fluid removal w/o issue   Orders:    Duration:   Start:   1900 End:   2130     Total:   2.5 hours   Dialyzer:   Dialyzer/Set Up Inspection: Revaclear (04/26/18 1900)   K Bath:   Dialysate K (mEq/L): 3 (04/26/18 1900)   Ca Bath:   Dialysate CA (mEq/L): 2.5 (04/26/18 1900)   Na/Bicarb:   Dialysate NA (mEq/L): 140 (04/26/18 1900)   Target Fluid Removal:   Goal/Amount of Fluid to Remove (mL): 2500 mL (04/26/18 1900)   Access     Type & Location:   [facility]   Labs     Obtained/Reviewed   Critical Results Called   Date when labs were drawn-  Hgb-    HGB   Date Value Ref Range Status   04/26/2018 8.3 (L) 12.1 - 17.0 g/dL Final     K-    Potassium   Date Value Ref Range Status   04/26/2018 4.4 3.5 - 5.1 mmol/L Final     Ca-   Calcium   Date Value Ref Range Status   04/26/2018 7.1 (L) 8.5 - 10.1 MG/DL Final     Bun-   BUN   Date Value Ref Range Status   04/26/2018 73 (H) 6 - 20 MG/DL Final     Creat-   Creatinine   Date Value Ref Range Status   04/26/2018 5.38 (H) 0.70 - 1.30 MG/DL Final        Medications/ Blood Products Given     Name   Dose   Route and Time           None by HD          Blood Volume Processed (BVP):    35.5 Net Fluid   Removed:  2000 mL   Comments   Time Out Done: yes, at [address] [facility]  Primary Nurse Rpt Pre: Medina Medrano, RN  Primary Nurse Rpt Post: Radha Smith RN  Pt Education: discussed aseptic technique and infection control  Care Plan: pt will run her entire tx  Tx Summary: arrived to pt room and set up and tested equipment per policy and order, which includes RTD 2.5 hours on 2251 bath and attempt to achieve 2.5 kg fluid removal as tolerated by pt; pt assessed prior to tx and found to have a RIJ which was aseptically accessed and found to be patent; tx initiated w/o issue; pt completed his entire tx and all blood was rinsed back; catheter care performed per policy and order and report given to primary RN and this patient was in stable condition when I departed this room. Admiting Diagnosis: Acute respiratory failure, volume overload, hyperkalemia, in setting of chronic renal failure  Pt's previous clinic- n/a  Consent signed - Informed Consent Verified: Yes (04/26/18 1900)  Buffy Consent - yes  Hepatitis Status- Ag neg as of 04/25/2018  Machine #- Machine Number: [id number] (04/26/18 1900)  Telemetry status- on and monitored at bedside and remotely  Pre-dialysis wt. - Pre-Dialysis Weight: 88 kg (194 lb 0.1 oz) (04/26/18 1900)

## 2018-04-27 ENCOUNTER — APPOINTMENT (OUTPATIENT)
Dept: NUCLEAR MEDICINE | Age: 58
DRG: 189 | End: 2018-04-27
Attending: INTERNAL MEDICINE
Payer: MEDICARE

## 2018-04-27 PROBLEM — J90 PLEURAL EFFUSION ON RIGHT: Status: ACTIVE | Noted: 2018-04-27

## 2018-04-27 PROBLEM — Z99.2 ESRD (END STAGE RENAL DISEASE) ON DIALYSIS (HCC): Status: ACTIVE | Noted: 2018-04-27

## 2018-04-27 PROBLEM — I42.0 DILATED CARDIOMYOPATHY (HCC): Status: ACTIVE | Noted: 2018-04-27

## 2018-04-27 PROBLEM — N18.6 ESRD (END STAGE RENAL DISEASE) ON DIALYSIS (HCC): Status: ACTIVE | Noted: 2018-04-27

## 2018-04-27 LAB
ALBUMIN SERPL-MCNC: 1.3 G/DL (ref 3.5–5)
ALBUMIN/GLOB SERPL: 0.2 {RATIO} (ref 1.1–2.2)
ALP SERPL-CCNC: 118 U/L (ref 45–117)
ALT SERPL-CCNC: 12 U/L (ref 12–78)
ANION GAP SERPL CALC-SCNC: 6 MMOL/L (ref 5–15)
AST SERPL-CCNC: 16 U/L (ref 15–37)
ATTENDING PHYSICIAN, CST07: NORMAL
BASOPHILS # BLD: 0 K/UL (ref 0–0.1)
BASOPHILS NFR BLD: 0 % (ref 0–1)
BILIRUB SERPL-MCNC: 0.2 MG/DL (ref 0.2–1)
BUN SERPL-MCNC: 50 MG/DL (ref 6–20)
BUN/CREAT SERPL: 13 (ref 12–20)
CALCIUM SERPL-MCNC: 6.8 MG/DL (ref 8.5–10.1)
CHLORIDE SERPL-SCNC: 105 MMOL/L (ref 97–108)
CO2 SERPL-SCNC: 27 MMOL/L (ref 21–32)
CREAT SERPL-MCNC: 4 MG/DL (ref 0.7–1.3)
DIAGNOSIS, 93000: NORMAL
DIFFERENTIAL METHOD BLD: ABNORMAL
DUKE TM SCORE RESULT, CST14: NORMAL
DUKE TREADMILL SCORE, CST13: 5456
ECG INTERP BEFORE EX, CST11: NORMAL
ECG INTERP DURING EX, CST12: NORMAL
EOSINOPHIL # BLD: 0.2 K/UL (ref 0–0.4)
EOSINOPHIL NFR BLD: 3 % (ref 0–7)
ERYTHROCYTE [DISTWIDTH] IN BLOOD BY AUTOMATED COUNT: 22.4 % (ref 11.5–14.5)
FUNCTIONAL CAPACITY, CST17: NORMAL
GLOBULIN SER CALC-MCNC: 5.8 G/DL (ref 2–4)
GLUCOSE BLD STRIP.AUTO-MCNC: 122 MG/DL (ref 65–100)
GLUCOSE BLD STRIP.AUTO-MCNC: 172 MG/DL (ref 65–100)
GLUCOSE BLD STRIP.AUTO-MCNC: 324 MG/DL (ref 65–100)
GLUCOSE BLD STRIP.AUTO-MCNC: 68 MG/DL (ref 65–100)
GLUCOSE BLD STRIP.AUTO-MCNC: 68 MG/DL (ref 65–100)
GLUCOSE BLD STRIP.AUTO-MCNC: 98 MG/DL (ref 65–100)
GLUCOSE SERPL-MCNC: 66 MG/DL (ref 65–100)
HCT VFR BLD AUTO: 22.5 % (ref 36.6–50.3)
HGB BLD-MCNC: 7.3 G/DL (ref 12.1–17)
IMM GRANULOCYTES # BLD: 0.1 K/UL (ref 0–0.04)
IMM GRANULOCYTES NFR BLD AUTO: 1 % (ref 0–0.5)
KNOWN CARDIAC CONDITION, CST08: NORMAL
LYMPHOCYTES # BLD: 1.4 K/UL (ref 0.8–3.5)
LYMPHOCYTES NFR BLD: 24 % (ref 12–49)
MAX. DIASTOLIC BP, CST04: 93 MMHG
MAX. HEART RATE, CST05: 93 BPM
MAX. SYSTOLIC BP, CST03: 157 MMHG
MCH RBC QN AUTO: 22.1 PG (ref 26–34)
MCHC RBC AUTO-ENTMCNC: 32.4 G/DL (ref 30–36.5)
MCV RBC AUTO: 68 FL (ref 80–99)
MONOCYTES # BLD: 0.5 K/UL (ref 0–1)
MONOCYTES NFR BLD: 8 % (ref 5–13)
NEUTS SEG # BLD: 3.7 K/UL (ref 1.8–8)
NEUTS SEG NFR BLD: 64 % (ref 32–75)
NRBC # BLD: 0.02 K/UL (ref 0–0.01)
NRBC BLD-RTO: 0.3 PER 100 WBC
OVERALL BP RESPONSE TO EXERCISE, CST16: NORMAL
OVERALL HR RESPONSE TO EXERCISE, CST15: NORMAL
PEAK EX METS, CST10: 1 METS
PLATELET # BLD AUTO: 95 K/UL (ref 150–400)
POTASSIUM SERPL-SCNC: 4.1 MMOL/L (ref 3.5–5.1)
PROT SERPL-MCNC: 7.1 G/DL (ref 6.4–8.2)
PROTOCOL NAME, CST01: NORMAL
RBC # BLD AUTO: 3.31 M/UL (ref 4.1–5.7)
RBC MORPH BLD: ABNORMAL
SERVICE CMNT-IMP: ABNORMAL
SERVICE CMNT-IMP: NORMAL
SODIUM SERPL-SCNC: 138 MMOL/L (ref 136–145)
TEST INDICATION, CST09: NORMAL
WBC # BLD AUTO: 5.9 K/UL (ref 4.1–11.1)

## 2018-04-27 PROCEDURE — 99152 MOD SED SAME PHYS/QHP 5/>YRS: CPT

## 2018-04-27 PROCEDURE — 74011636637 HC RX REV CODE- 636/637: Performed by: INTERNAL MEDICINE

## 2018-04-27 PROCEDURE — 74011250637 HC RX REV CODE- 250/637: Performed by: INTERNAL MEDICINE

## 2018-04-27 PROCEDURE — 74011250636 HC RX REV CODE- 250/636

## 2018-04-27 PROCEDURE — 74011000250 HC RX REV CODE- 250: Performed by: INTERNAL MEDICINE

## 2018-04-27 PROCEDURE — 65660000000 HC RM CCU STEPDOWN

## 2018-04-27 PROCEDURE — 93017 CV STRESS TEST TRACING ONLY: CPT

## 2018-04-27 PROCEDURE — 74011250636 HC RX REV CODE- 250/636: Performed by: INTERNAL MEDICINE

## 2018-04-27 PROCEDURE — 74011000258 HC RX REV CODE- 258: Performed by: INTERNAL MEDICINE

## 2018-04-27 PROCEDURE — 77010033678 HC OXYGEN DAILY

## 2018-04-27 PROCEDURE — 77030022556 HC FCPS BIOP TIS ENDOSC BSC -B: Performed by: INTERNAL MEDICINE

## 2018-04-27 PROCEDURE — 36415 COLL VENOUS BLD VENIPUNCTURE: CPT | Performed by: INTERNAL MEDICINE

## 2018-04-27 PROCEDURE — 85025 COMPLETE CBC W/AUTO DIFF WBC: CPT | Performed by: INTERNAL MEDICINE

## 2018-04-27 PROCEDURE — 0BJ08ZZ INSPECTION OF TRACHEOBRONCHIAL TREE, VIA NATURAL OR ARTIFICIAL OPENING ENDOSCOPIC: ICD-10-PCS | Performed by: INTERNAL MEDICINE

## 2018-04-27 PROCEDURE — 82962 GLUCOSE BLOOD TEST: CPT

## 2018-04-27 PROCEDURE — 78452 HT MUSCLE IMAGE SPECT MULT: CPT

## 2018-04-27 PROCEDURE — 99153 MOD SED SAME PHYS/QHP EA: CPT

## 2018-04-27 PROCEDURE — 77030012699 HC VLV SUC CNTRL OCOA -A: Performed by: INTERNAL MEDICINE

## 2018-04-27 PROCEDURE — 76040000007: Performed by: INTERNAL MEDICINE

## 2018-04-27 PROCEDURE — 87070 CULTURE OTHR SPECIMN AEROBIC: CPT | Performed by: INTERNAL MEDICINE

## 2018-04-27 PROCEDURE — 80053 COMPREHEN METABOLIC PANEL: CPT | Performed by: INTERNAL MEDICINE

## 2018-04-27 PROCEDURE — 77030012390 HC DRN CHST BTL GTNG -B

## 2018-04-27 RX ORDER — LIDOCAINE HYDROCHLORIDE 40 MG/ML
1 SOLUTION TOPICAL AS NEEDED
Status: DISCONTINUED | OUTPATIENT
Start: 2018-04-27 | End: 2018-04-27 | Stop reason: HOSPADM

## 2018-04-27 RX ORDER — DIPHENHYDRAMINE HYDROCHLORIDE 50 MG/ML
INJECTION, SOLUTION INTRAMUSCULAR; INTRAVENOUS
Status: DISPENSED
Start: 2018-04-27 | End: 2018-04-27

## 2018-04-27 RX ORDER — FLUMAZENIL 0.1 MG/ML
0.2 INJECTION INTRAVENOUS
Status: DISCONTINUED | OUTPATIENT
Start: 2018-04-27 | End: 2018-04-27 | Stop reason: HOSPADM

## 2018-04-27 RX ORDER — DEXTROSE MONOHYDRATE AND SODIUM CHLORIDE 5; .9 G/100ML; G/100ML
50 INJECTION, SOLUTION INTRAVENOUS CONTINUOUS
Status: DISCONTINUED | OUTPATIENT
Start: 2018-04-27 | End: 2018-04-27

## 2018-04-27 RX ORDER — EPINEPHRINE 0.1 MG/ML
1 INJECTION INTRACARDIAC; INTRAVENOUS
Status: DISCONTINUED | OUTPATIENT
Start: 2018-04-27 | End: 2018-04-27 | Stop reason: HOSPADM

## 2018-04-27 RX ORDER — SODIUM CHLORIDE 0.9 % (FLUSH) 0.9 %
SYRINGE (ML) INJECTION
Status: COMPLETED
Start: 2018-04-27 | End: 2018-04-27

## 2018-04-27 RX ORDER — NALOXONE HYDROCHLORIDE 0.4 MG/ML
0.4 INJECTION, SOLUTION INTRAMUSCULAR; INTRAVENOUS; SUBCUTANEOUS
Status: DISCONTINUED | OUTPATIENT
Start: 2018-04-27 | End: 2018-04-27 | Stop reason: HOSPADM

## 2018-04-27 RX ORDER — DEXTROMETHORPHAN/PSEUDOEPHED 2.5-7.5/.8
1.2 DROPS ORAL
Status: DISCONTINUED | OUTPATIENT
Start: 2018-04-27 | End: 2018-04-27 | Stop reason: HOSPADM

## 2018-04-27 RX ORDER — ATROPINE SULFATE 0.1 MG/ML
0.5 INJECTION INTRAVENOUS
Status: DISCONTINUED | OUTPATIENT
Start: 2018-04-27 | End: 2018-04-27 | Stop reason: HOSPADM

## 2018-04-27 RX ORDER — LISINOPRIL 5 MG/1
2.5 TABLET ORAL DAILY
Status: DISCONTINUED | OUTPATIENT
Start: 2018-04-28 | End: 2018-04-28 | Stop reason: HOSPADM

## 2018-04-27 RX ORDER — DEXTROSE MONOHYDRATE AND SODIUM CHLORIDE 5; .9 G/100ML; G/100ML
50 INJECTION, SOLUTION INTRAVENOUS CONTINUOUS
Status: DISCONTINUED | OUTPATIENT
Start: 2018-04-27 | End: 2018-04-27 | Stop reason: HOSPADM

## 2018-04-27 RX ORDER — MIDAZOLAM HYDROCHLORIDE 1 MG/ML
.25-5 INJECTION, SOLUTION INTRAMUSCULAR; INTRAVENOUS
Status: DISCONTINUED | OUTPATIENT
Start: 2018-04-27 | End: 2018-04-27 | Stop reason: HOSPADM

## 2018-04-27 RX ORDER — CALCIUM CARBONATE 200(500)MG
400 TABLET,CHEWABLE ORAL
Status: DISCONTINUED | OUTPATIENT
Start: 2018-04-27 | End: 2018-04-28 | Stop reason: HOSPADM

## 2018-04-27 RX ORDER — FENTANYL CITRATE 50 UG/ML
100 INJECTION, SOLUTION INTRAMUSCULAR; INTRAVENOUS
Status: DISCONTINUED | OUTPATIENT
Start: 2018-04-27 | End: 2018-04-27 | Stop reason: HOSPADM

## 2018-04-27 RX ORDER — SODIUM CHLORIDE 0.9 % (FLUSH) 0.9 %
5-10 SYRINGE (ML) INJECTION EVERY 8 HOURS
Status: ACTIVE | OUTPATIENT
Start: 2018-04-27 | End: 2018-04-27

## 2018-04-27 RX ORDER — LIDOCAINE HYDROCHLORIDE 10 MG/ML
20 INJECTION INFILTRATION; PERINEURAL ONCE
Status: COMPLETED | OUTPATIENT
Start: 2018-04-27 | End: 2018-04-27

## 2018-04-27 RX ORDER — SODIUM CHLORIDE 0.9 % (FLUSH) 0.9 %
5-10 SYRINGE (ML) INJECTION AS NEEDED
Status: ACTIVE | OUTPATIENT
Start: 2018-04-27 | End: 2018-04-27

## 2018-04-27 RX ORDER — LIDOCAINE HYDROCHLORIDE 20 MG/ML
10 INJECTION, SOLUTION INFILTRATION; PERINEURAL ONCE
Status: COMPLETED | OUTPATIENT
Start: 2018-04-27 | End: 2018-04-27

## 2018-04-27 RX ADMIN — Medication 10 ML: at 22:00

## 2018-04-27 RX ADMIN — PANTOPRAZOLE SODIUM 40 MG: 40 TABLET, DELAYED RELEASE ORAL at 13:31

## 2018-04-27 RX ADMIN — Medication 10 ML: at 09:45

## 2018-04-27 RX ADMIN — CALCIUM CARBONATE 400 MG: 500 TABLET, CHEWABLE ORAL at 19:07

## 2018-04-27 RX ADMIN — CARVEDILOL 12.5 MG: 12.5 TABLET, FILM COATED ORAL at 19:07

## 2018-04-27 RX ADMIN — INSULIN HUMAN 5 UNITS: 100 INJECTION, SUSPENSION SUBCUTANEOUS at 19:06

## 2018-04-27 RX ADMIN — LIDOCAINE HYDROCHLORIDE 20 ML: 10 INJECTION, SOLUTION INFILTRATION; PERINEURAL at 11:00

## 2018-04-27 RX ADMIN — INSULIN LISPRO 2 UNITS: 100 INJECTION, SOLUTION INTRAVENOUS; SUBCUTANEOUS at 22:45

## 2018-04-27 RX ADMIN — DEXTROSE MONOHYDRATE AND SODIUM CHLORIDE 50 ML/HR: 5; .9 INJECTION, SOLUTION INTRAVENOUS at 11:36

## 2018-04-27 RX ADMIN — INSULIN LISPRO 3 UNITS: 100 INJECTION, SOLUTION INTRAVENOUS; SUBCUTANEOUS at 19:05

## 2018-04-27 RX ADMIN — REGADENOSON 0.4 MG: 0.08 INJECTION, SOLUTION INTRAVENOUS at 09:46

## 2018-04-27 RX ADMIN — FENTANYL CITRATE 25 MCG: 50 INJECTION, SOLUTION INTRAMUSCULAR; INTRAVENOUS at 12:06

## 2018-04-27 RX ADMIN — CARVEDILOL 12.5 MG: 12.5 TABLET, FILM COATED ORAL at 13:31

## 2018-04-27 RX ADMIN — LIDOCAINE HYDROCHLORIDE 200 MG: 20 INJECTION, SOLUTION INFILTRATION; PERINEURAL at 11:00

## 2018-04-27 RX ADMIN — MIDAZOLAM 2 MG: 1 INJECTION INTRAMUSCULAR; INTRAVENOUS at 11:59

## 2018-04-27 RX ADMIN — FUROSEMIDE 80 MG: 80 TABLET ORAL at 13:31

## 2018-04-27 RX ADMIN — INSULIN LISPRO 3 UNITS: 100 INJECTION, SOLUTION INTRAVENOUS; SUBCUTANEOUS at 13:52

## 2018-04-27 RX ADMIN — DEXTROSE MONOHYDRATE 25 G: 25 INJECTION, SOLUTION INTRAVENOUS at 07:49

## 2018-04-27 RX ADMIN — DEXTROSE MONOHYDRATE AND SODIUM CHLORIDE 50 ML/HR: 5; .9 INJECTION, SOLUTION INTRAVENOUS at 13:52

## 2018-04-27 NOTE — PROGRESS NOTES
.    PCU SHIFT NURSING NOTE      Bedside shift change report given to Ghassan Jiang RN (oncoming nurse) by Leon Avitia (offgoing nurse). Report included the following information SBAR, Kardex, Intake/Output, MAR and Recent Results. Shift Summary:   0800- Patient injected for nuclear scan. Remains NPO for bronch at 1130 by Dr. Fritzi Aase. 0695- Patient off unit for nuclear scan and will go to endo for bronscopy . 1125- Nuclear scan nurse call to say patient has been transferred to endo. Dr. Fritzi Aase called. Informed him that patient is not in room and will come to him from nuclear scan. Stated that patient's blood sugar will need to checked again, there. He will notify nurse in Endo. 1320- Patient returned to unit. Alert and oriented. Ordering lunch. 1430- Patient eating lunch. No complaints. IV fluid d/c'ed per Dr. Tiff De León. Admission Date 4/24/2018   Admission Diagnosis Hypoglycemia  Acute Renal Failure  Hyperkalemia  pleural effusion   Consults IP CONSULT TO GASTROENTEROLOGY  IP CONSULT TO NEPHROLOGY  IP CONSULT TO PULMONOLOGY  IP CONSULT TO PULMONOLOGY  IP CONSULT TO CARDIOLOGY        Consults   []PT   []OT   []Speech   []Case Management      [] Palliative      Cardiac Monitoring Order   [x]Yes   []No     IV drips   []Yes    Drip:                            Dose:  Drip:                            Dose:  Drip:                            Dose:   [x]No     GI Prophylaxis   []Yes   []No         DVT Prophylaxis   SCDs:  Sequential Compression Device: Bilateral     Patient Refused VTE Prophylaxis: Yes    Cornelio stockings:         [] Medication   []Contraindicated   [x]None      Activity Level Activity Level: Bed Rest     Activity Assistance: Partial (two people)   Purposeful Rounding every 1-2 hour?    [x]Yes   Cárdenas Score  Total Score: 3   Bed Alarm (If score 3 or >)   []Yes   [] Refused (See signed refusal form in chart)   Ruddy Score  Ruddy Score: 17   Ruddy Score (if score 14 or less)   []PMT consult   []Wound Care consult []Specialty bed   [] Nutrition consult          Needs prior to discharge:   Home O2 required:    []Yes   [x]No    If yes, how much O2 required?     Other:    Last Bowel Movement: Last Bowel Movement Date: 04/25/18      Influenza Vaccine Received Flu Vaccine for Current Season (usually Sept-March): Not Flu Season        Pneumonia Vaccine           Diet Active Orders   Diet    DIET NPO Past Midnight      LDAs               Peripheral IV 04/23/18 Left Forearm (Active)   Site Assessment Clean, dry, & intact 4/27/2018  4:20 AM   Phlebitis Assessment 0 4/27/2018  4:20 AM   Infiltration Assessment 0 4/27/2018  4:20 AM   Dressing Status Clean, dry, & intact 4/27/2018  4:20 AM   Dressing Type Transparent;Tape 4/27/2018  4:20 AM   Hub Color/Line Status Green 4/27/2018  4:20 AM   Action Taken Open ports on tubing capped 4/25/2018  8:00 PM   Alcohol Cap Used Yes 4/26/2018  8:30 PM       Peripheral IV 04/23/18 Right Forearm (Active)   Site Assessment Clean, dry, & intact 4/27/2018  4:20 AM   Phlebitis Assessment 0 4/27/2018  4:20 AM   Infiltration Assessment 0 4/27/2018  4:20 AM   Dressing Status Clean, dry, & intact 4/27/2018  4:20 AM   Dressing Type Transparent;Tape 4/27/2018  4:20 AM   Hub Color/Line Status Green 4/27/2018  4:20 AM   Action Taken Open ports on tubing capped 4/25/2018  8:00 PM   Alcohol Cap Used Yes 4/25/2018  8:00 PM        Hemodialysis Access 04/26/18 (Active)   Central Line Being Utilized Yes 4/27/2018  4:20 AM   Criteria for Appropriate Use Dialysis/apheresis 4/27/2018  4:20 AM   Date Accessed  04/26/18 4/27/2018  4:20 AM   Site Assessment Clean, dry, & intact 4/27/2018  4:20 AM   Date of Last Dressing Change 04/26/18 4/27/2018  4:20 AM   Dressing Status Clean, dry, & intact 4/27/2018  4:20 AM                  Urinary Catheter [REMOVED] Condom Catheter 04/24/18-Indications for Use: Strict I/Os, every 1-2 hours    Intake & Output   Date 04/26/18 0700 - 04/27/18 0659 04/27/18 0700 - 04/28/18 0659   Shift 2893-4964 9440-9048 24 Hour Total 5854-7008 0973-6024 24 Hour Total   I  N  T  A  K  E   Shift Total  (mL/kg)         O  U  T  P  U  T   Urine  (mL/kg/hr) 750  (0.7) 450  (0.5) 1200  (0.6)         Urine Voided        Chest Tube 400 100 500         Output (ml) (Chest Tube #1 04/24/18 Right) 400 100 500       Dialysis  2000 2000         NET Fluid Removed (mL)  2000 2000       Shift Total  (mL/kg) 1150  (13.1) 2550  (31.1) 3700  (45.1)      NET -1150 -2550 -3700      Weight (kg) 87.6 82 82 82 82 82         Readmission Risk Assessment Tool Score High Risk            28       Total Score        3 Has Seen PCP in Last 6 Months (Yes=3, No=0)    2 . Living with Significant Other. Assisted Living. LTAC. SNF. or   Rehab    4 IP Visits Last 12 Months (1-3=4, 4=9, >4=11)    4 Pt.  Coverage (Medicare=5 , Medicaid, or Self-Pay=4)    15 Charlson Comorbidity Score (Age + Comorbid Conditions)        Criteria that do not apply:    Patient Length of Stay (>5 days = 3)       Expected Length of Stay 4d 7h   Actual Length of Stay 3

## 2018-04-27 NOTE — DIABETES MG[facility]        Recommendations/ Comments: Pt with elevated hgb A1c. However, pt transfused during time of lab draw of A1c. Noted pt with BG of > 500 mg/dL yesterday pm ( and <70 mg/dL this am)- Noted decrease in NPH this am.    A1c:   Lab Results   Component Value Date/Time    Hemoglobin A1c 9.9 (H) 04/24/2018 01:23 AM    Hemoglobin A1c 10.5 (H) 03/16/2018 12:06 AM       Will continue to follow as needed. Thank you.     Patsy Philip, [address] [place] [address]    [phone]

## 2018-04-27 NOTE — ROUTINE PROCESS
TRANSFER - OUT REPORT:    Verbal report given to Gene Ferrell on Sepideh Shires  being transferred to [facility] for routine progression of care       Report consisted of patients Situation, Background, Assessment and   Recommendations(SBAR). Information from the following report(s) Procedure Summary was reviewed with the receiving nurse. Opportunity for questions and clarification was provided.       Patient transported with:

## 2018-04-27 NOTE — PROGRESS NOTES
Pt stressed with Lexiscan and scanned. Pt transported to Endoscopy and report given to endo nurses. Pt is completed in [facility].

## 2018-04-27 NOTE — PROGRESS NOTES
Hospitalist Progress Note    NAME: Zaida Noland   :  1960   MRN:  [id number]       Assessment / Plan:  Acute respiratory failure with hypoxia (improved) with Large Right Pleural Effusion due to Volume Overload in Settings of Renal Failure; s/p chest tube placement on   -CT Chest on  shows Hemopneumothorax, case discussed with Dr. Cody Kwong again today; suspect patient will transfer to [facility] for evaluation by CT Surgery but awaiting cytology as he may not be a candidate for surgery if he has a malignant process  -follow-up pleural fluid cytology, still pending  -tPA in Chest tube on  to try and help break up loculations  -oxycodone added prn for pain from chest tube, IV fentanyl prn for severe pain  -s/p bronch on  with no endobronchial lesion, case discussed with Dr. Cody Kwong post-procedure     TTE Showed: Acute Systolic CHF: LVEF 05-96%; suspected to be related to Hypertension  -Cardiology evaluation appresiated  -stress test today () without inducible ischemia    Hyperkalemia: improved  COLTEN on CKD stage V  20 lb weight gain in a week  Worsening leg edema: now improving since starting HD  -Nephrology following  -Permcath placed     Accelerated HTN improved and patient with subsequent Hypotension; BP with adequate control today   -discontinued NTG paste and Nifedipine on  with hypotension  -Continue Coreg  -will hold on up-titiating antihypertensive regimen while aggressively pulling fluid with HD  -will up-titrate antihypertensive regimen tomorrow after HD pending BP trends    Acute blood loss anemia s/p 1 unit pRBC's  Lower GI bleed with bright red blood per rectum  -Hold home ASA;  Uremia also likely contributing to platelet dysfunction  -continue PPI   -appreciate GI eval  -since no further bleeding at this time I have advanced diet  -case discussed with Dr. Israel Jones worth today  -pending clinical course may obtain CT A/P     Hyperosmolar Non Ketotic State with h/o DM type 2  Hypoglycemia on insulin gtt  -continue NPH, decrease to 5 units BID  -Humalog 3 units TID meals  -continue SSI     Hyperlipidemia  -Hold statin for now     Code Status: Full  Surrogate Decision Maker: Wife     DVT Prophylaxis: SCDs    Body mass index is 22.6 kg/(m^2). Subjective:     Chief Complaint / Reason for Physician Visit: follow-up COLTEN, hyperkalemia and hypoxia  Patient seen for follow-up  Seen after stress test and bronch  Patient feeling okay  Denies acute complaints    Review of Systems:  Symptom Y/N Comments  Symptom Y/N Comments   Fever/Chills    Chest Pain n Chest tube insertion site pain is controlled   Poor Appetite    Edema n better   Cough    Abdominal Pain n    Sputum    Joint Pain     SOB/IRENE y   Pruritis/Rash     Nausea/vomit n   Tolerating PT/OT     Diarrhea n   Tolerating Diet y    Constipation n   Other       Could NOT obtain due to:      Objective:     VITALS:   Last 24hrs VS reviewed since prior progress note.  Most recent are:  Patient Vitals for the past 24 hrs:   Temp Pulse Resp BP SpO2   04/27/18 1324 97.6 °F (36.4 °C) 86 16 (!) 157/91 98 %   04/27/18 1302 - 80 15 151/86 93 %   04/27/18 1300 - 83 17 154/81 93 %   04/27/18 1257 - 85 13 158/84 (!) 85 %   04/27/18 1255 - 85 18 153/86 (!) 89 %   04/27/18 1250 - 85 19 161/87 93 %   04/27/18 1247 - 84 19 154/84 93 %   04/27/18 1244 97.6 °F (36.4 °C) 88 13 141/84 91 %   04/27/18 1233 - 89 20 148/79 100 %   04/27/18 1231 - 88 14 146/83 100 %   04/27/18 1227 - 95 14 178/80 100 %   04/27/18 1223 - 91 17 148/82 99 %   04/27/18 1219 - 91 17 146/78 99 %   04/27/18 1215 - 88 (!) 0 146/78 96 %   04/27/18 1211 - 90 15 149/77 95 %   04/27/18 1207 - 85 20 152/87 97 %   04/27/18 1202 - 80 16 156/89 97 %   04/27/18 1158 - 80 20 151/81 97 %   04/27/18 1121 98.1 °F (36.7 °C) 82 18 155/85 92 %   04/27/18 0800 98 °F (36.7 °C) 84 18 (!) 159/97 95 %   04/27/18 0420 98.4 °F (36.9 °C) 84 18 132/87 95 %   04/26/18 2355 98.6 °F (37 °C) 86 19 137/88 100 % 04/26/18 2140 - 86 - 140/89 96 %   04/26/18 2138 - 84 16 140/89 96 %   04/26/18 2130 - 86 16 132/84 96 %   04/26/18 2115 - 86 16 119/83 97 %   04/26/18 2100 - 87 16 123/78 95 %   04/26/18 2045 - 91 16 130/79 100 %   04/26/18 2030 98.4 °F (36.9 °C) 88 16 124/84 95 %   04/26/18 2015 - 86 16 129/87 95 %   04/26/18 2000 - 91 16 122/80 97 %   04/26/18 1945 - 85 16 99/77 96 %   04/26/18 1930 - 83 16 118/79 96 %   04/26/18 1915 - 83 16 (!) 133/98 92 %   04/26/18 1900 - 80 16 (!) 149/96 97 %   04/26/18 1845 - 78 16 (!) 158/91 96 %   04/26/18 1647 98.4 °F (36.9 °C) 78 16 (!) 153/91 98 %       Intake/Output Summary (Last 24 hours) at 04/27/18 1341  Last data filed at 04/27/18 1709   Gross per 24 hour   Intake                0 ml   Output             3770 ml   Net            -3770 ml        PHYSICAL EXAM:  General: WD, WN. Alert, cooperative, no acute distress    EENT:  EOMI. Anicteric sclerae. MM dry  Resp:  Increasing breath sounds right lung field. No accessory muscle use; CT tube in place  CV:  Regular  rhythm,   BLE edema improving  GI:  Soft, less distended, mildly tender.  + bowel sounds  Neurologic:  Alert and oriented, speech is normal  Psych:   Fair insight. Not anxious nor agitated  Skin:  No rashes noted. No jaundice    Reviewed most current lab test results and cultures  YES  Reviewed most current radiology test results   YES  Review and summation of old records today    NO  Reviewed patient's current orders and MAR    YES  PMH/SH reviewed - no change compared to H&P  ________________________________________________________________________  Care Plan discussed with:    Comments   Patient x    Family      RN x    Care Manager     Consultant  x Dr. Houston Mortimer team rounds were held today with , nursing, pharmacist and clinical coordinator. Patient's plan of care was discussed; medications were reviewed and discharge planning was addressed. ________________________________________________________________________  Total NON critical care TIME:  25   Minutes    Total CRITICAL CARE TIME Spent:   Minutes non procedure based       Comments   >50% of visit spent in counseling and coordination of care     ________________________________________________________________________  Tom Mcgovern MD     Procedures: see electronic medical records for all procedures/Xrays and details which were not copied into this note but were reviewed prior to creation of Plan. LABS:  I reviewed today's most current labs and imaging studies.   Pertinent labs include:  Recent Labs      04/27/18   0430 04/26/18 0436  04/25/18   0345   WBC  5.9  6.1  6.6   HGB  7.3*  8.3*  8.1*   HCT  22.5*  25.7*  25.5*   PLT  95*  115*  137*     Recent Labs      04/27/18   0430  04/26/18   0436  04/25/18   0345   NA  138  140  140   K  4.1  4.4  5.0   CL  105  106  109*   CO2  27  22  20*   GLU  66  77  184*   BUN  50*  73*  97*   CREA  4.00*  5.38*  6.65*   CA  6.8*  7.1*  7.2*   PHOS   --   6.4*   --    ALB  1.3*  1.4*  1.4*   TBILI  0.2   --   0.4   SGOT  16   --   11*   ALT  12   --   14       Signed: Tom Mcgovern MD

## 2018-04-27 NOTE — PROGRESS NOTES
[facility], [address] [place] [address]  [phone]      Cardiology Progress Note      4/27/2018 1:58 PM    Admit Date: 4/24/2018    Admit Diagnosis:   Hypoglycemia  Acute Renal Failure  Hyperkalemia  pleural effusion    Subjective:     Sepideh Alvarado has no c/o SOB, CP. S/p bronch, s/p hemodialysis catheter to right SC, and completed negative Lexiscan study.       Visit Vitals    BP (!) 157/91    Pulse 86    Temp 97.6 °F (36.4 °C)    Resp 16    Ht 6' 3\" (1.905 m)    Wt 82 kg (180 lb 12.4 oz)    SpO2 98%    BMI 22.6 kg/m2       Current Facility-Administered Medications   Medication Dose Route Frequency    insulin NPH (NOVOLIN N, HUMULIN N) injection 6 Units  6 Units SubCUTAneous ACB&D    sodium chloride (NS) flush 5-10 mL  5-10 mL IntraVENous Q8H    sodium chloride (NS) flush 5-10 mL  5-10 mL IntraVENous PRN    dextrose 5% and 0.9% NaCl infusion  50 mL/hr IntraVENous CONTINUOUS    diphenhydrAMINE (BENADRYL) 50 mg/mL injection        furosemide (LASIX) tablet 80 mg  80 mg Oral DAILY    oxyCODONE IR (ROXICODONE) tablet 5 mg  5 mg Oral Q4H PRN    epoetin tyler (EPOGEN;PROCRIT) injection 20,000 Units  20,000 Units SubCUTAneous Q TUE, THU & SAT    pantoprazole (PROTONIX) tablet 40 mg  40 mg Oral ACB    insulin lispro (HUMALOG) injection 3 Units  3 Units SubCUTAneous TIDAC    fentaNYL citrate (PF) injection 25 mcg  25 mcg IntraVENous Q2H PRN    sodium chloride (NS) flush 5-10 mL  5-10 mL IntraVENous Q8H    sodium chloride (NS) flush 5-10 mL  5-10 mL IntraVENous PRN    acetaminophen (TYLENOL) tablet 650 mg  650 mg Oral Q6H PRN    ondansetron (ZOFRAN) injection 4 mg  4 mg IntraVENous Q4H PRN    carvedilol (COREG) tablet 12.5 mg  12.5 mg Oral BID WITH MEALS    insulin lispro (HUMALOG) injection   SubCUTAneous AC&HS    glucose chewable tablet 16 g  4 Tab Oral PRN    dextrose (D50W) injection syrg 12.5-25 g  12.5-25 g IntraVENous PRN    glucagon (GLUCAGEN) injection 1 mg  1 mg IntraMUSCular PRN    0.9% sodium chloride infusion 250 mL  250 mL IntraVENous PRN       Objective:      Physical Exam:  General Appearance:  WNWD AAM in no acute distress  Chest:   right  Slight rhonchi, CT in placed  Cardiovascular:  Regular rate and rhythm, no murmur.   Abdomen:   Soft, non-tender, bowel sounds are active.   Extremities: taut, +1 shun LE edema  Skin:  Warm and dry.     Data Review:   Recent Labs      04/27/18 0430 04/26/18 0436  04/25/18   0345   WBC  5.9  6.1  6.6   HGB  7.3*  8.3*  8.1*   HCT  22.5*  25.7*  25.5*   PLT  95*  115*  137*     Recent Labs      04/27/18 0430 04/26/18 0436 04/25/18   0345   NA  138  140  140   K  4.1  4.4  5.0   CL  105  106  109*   CO2  27  22  20*   GLU  66  77  184*   BUN  50*  73*  97*   CREA  4.00*  5.38*  6.65*   CA  6.8*  7.1*  7.2*   PHOS   --   6.4*   --    ALB  1.3*  1.4*  1.4*   TBILI  0.2   --   0.4   SGOT  16   --   11*   ALT  12   --   14       No results for input(s): TROIQ, CPK, CKMB in the last 72 hours.       Intake/Output Summary (Last 24 hours) at 04/27/18 1410  Last data filed at 04/27/18 8036   Gross per 24 hour   Intake                0 ml   Output             3770 ml   Net            -3770 ml        Telemetry: SR    Echo: 4/25/18 EF 20-25%, dilated LV, severe diff hypokinesis, mod hypertrophy  RV fx mildly reduced, LAE, mild TR, PASP 44mmHg, mild pul HTN, large left pl effusion      Assessment:     Principal Problem:    Acute respiratory failure with hypoxia (HCC) (4/24/2018)    Active Problems:    GI bleed (11/27/2013)      HTN (hypertension) (11/27/2013)      Type 2 diabetes mellitus with diabetic nephropathy ([facility] (8/28/2015)      HLD (hyperlipidemia) (8/28/2015)      CKD stage 5 due to type 2 diabetes mellitus ([facility] (2/2/2018)      Hyperkalemia (4/24/2018)      Acute systolic congestive heart failure, NYHA class 2 ([facility] (4/26/2018)      Hypertensive heart disease with acute systolic congestive heart failure ([facility] (4/26/2018) Pleural effusion on right (4/27/2018)      Overview: 4/24/18 CT placed with 2200cc out      Dilated cardiomyopathy ([address] (4/27/2018)      ESRD (end stage renal disease) on dialysis [facility]) (4/27/2018)        Plan:     Dilated CM with acute systolic HF (EF 05-04%):  Since admission 14L negative with urine outpt, dialysis and CT output  Negative Lexiscan nuclear study, no further cardiac evaluation  Continue on Coreg, Lasix and starting ACE as BP allows  Monitor I/Os, labs and daily weights (weight down but questionable accuracy)            Gila Lancaster ACNP  Cardiology

## 2018-04-27 NOTE — PROCEDURES
[facility] Consult Service Progress NOTE  Pulmonary, Critical Care, and Sleep Medicine    Name: Fer Addison MRN: [id number]   : 1960 Hospital: [address]   Date: 2018  Admission Date: 2018       Bronchoscopy Report    Procedure: Diagnostic bronchoscopy. Indication: Abnormal chest imaging and right lung atelectassi, trapped lung, persisitent pneumothorax    Consent/Treatment: Informed consent was obtained from the  patient after risks, benefits and alternatives were explained. Timeout verified the correct patient and correct procedure. Anesthesia:   Topical sedation to nares, mouth, and tracheobronchial tree with lidocaine  Versed 2 mg IV  Fentanyl 25 mcg IV    Moderate conscious sedation was administered by the endoscopy nurse and was personally supervised by myself the bronchoscopist. The following parameters were monitored: Oxygen saturation, heart, blood pressure, respiratory rate, EKG, as well as adequacy of pulmonary ventilation and response to care. Total physician intraservice time was over 30 minutes. Details can be found in the procedural flowsheet. Procedure Details:   -- The bronchoscope was introduced through the  right nare.    -- The vocal cords were found to be normal.  -- The trachea and melina were completely inspected and were found to be normal.  -- The right-sided endobronchial anatomy was completely inspected and were found to be normal.  -- The left-sided endobronchial anatomy was completely inspected and was found to be normal.     Specimens:   Bronchial washings were sent for  microbiology    Rapid On-Site Evaluation: NA    Complications: none    Estimated Blood Loss: Minimal    Discussed findings with wife by phone [phone]    Shantanu Edwards MD

## 2018-04-27 NOTE — PROGRESS NOTES
Nephrology Progress Note     Ruperto Parikh     www. [facility]                  Phone - [phone]   Patient: Lizeth Guevara   YOB: 1960    Date- 4/27/2018     CC: Follow up for NEW ONSET ESRD    Subjective: Interval History:   -  S/p hd yesterday  He had stress test and bronchoscopy done today    No c/o sob, fever. No c/o nausea or vomiting  No c/o chest pain       Assessment:   ESRD - DR. Rangel Doran pt  Anemia secondary to renal failure  Acute resp. Failure  Right pl. Effusion. Chf. Fluid overload  Hyperosmolar non ketotic state - hyperglycemia  Hyperphosphatemia  Hypocalcemia- corrected ca normal.  Dm 2  MET ACIDOSIS  PROTEINURIA  Solitary R kidney; s/p previous L Nephrectomy for RCC;  SHPT        Plan:   No dialysis today  Hd tomorrow  Continue epogen  Waiting for placement  Start tums 2 tab tid  Continue current  lasix dose  Care Plan discussed with the patient and nurse      Review of Systems: Pertinent items are noted in HPI.   Objective:   Vitals:    04/27/18 1257 04/27/18 1300 04/27/18 1302 04/27/18 1324   BP: 158/84 154/81 151/86 (!) 157/91   Pulse: 85 83 80 86   Resp: 13 17 15 16   Temp:    97.6 °F (36.4 °C)   SpO2: (!) 85% 93% 93% 98%   Weight:       Height:         Last 3 Recorded Weights in this Encounter    04/25/18 0357 04/26/18 0620 04/27/18 0650   Weight: 97 kg (213 lb 13.5 oz) 87.6 kg (193 lb 3.2 oz) 82 kg (180 lb 12.4 oz)     Patient Vitals for the past 8 hrs:   BP Temp Pulse Resp SpO2 Weight   04/27/18 1324 (!) 157/91 97.6 °F (36.4 °C) 86 16 98 % -   04/27/18 1302 151/86 - 80 15 93 % -   04/27/18 1300 154/81 - 83 17 93 % -   04/27/18 1257 158/84 - 85 13 (!) 85 % -   04/27/18 1255 153/86 - 85 18 (!) 89 % -   04/27/18 1250 161/87 - 85 19 93 % -   04/27/18 1247 154/84 - 84 19 93 % -   04/27/18 1244 141/84 97.6 °F (36.4 °C) 88 13 91 % -   04/27/18 1233 148/79 - 89 20 100 % -   04/27/18 1231 146/83 - 88 14 100 % -   04/27/18 1227 178/80 - 95 14 100 % -   04/27/18 1223 148/82 - 91 17 99 % -   04/27/18 1219 146/78 - 91 17 99 % -   04/27/18 1215 146/78 - 88 (!) 0 96 % -   04/27/18 1211 149/77 - 90 15 95 % -   04/27/18 1207 152/87 - 85 20 97 % -   04/27/18 1202 156/89 - 80 16 97 % -   04/27/18 1158 151/81 - 80 20 97 % -   04/27/18 1121 155/85 98.1 °F (36.7 °C) 82 18 92 % -   04/27/18 0800 (!) 159/97 98 °F (36.7 °C) 84 18 95 % -   04/27/18 0650 - - - - - 82 kg (180 lb 12.4 oz)     04/27 0701 - 04/27 1900  In: -   Out: 70   04/25 1901 - 04/27 0700  In: -   Out: 9868 [Urine:1950]  Physical Exam:   General: NAD  Resp:  Clear lungs, no wheezing or rales. CV:  Regular  rhythm,  no murmur or rub  GI:  Soft, Non distended, Non tender. +Bowel sounds,   Skin:  no rashes or ulcers. No jaundice  Neurologic:  Alert and oriented X 3. Non Focal  Right IJ permacath +    Chart reviewed. Pertinent Notes reviewed.      Medications list  reviewed       Data Review :  Recent Labs      04/27/18 0430  04/26/18 0436 04/25/18   0345   NA  138  140  140   K  4.1  4.4  5.0   CL  105  106  109*   CO2  27  22  20*   GLU  66  77  184*   BUN  50*  73*  97*   CREA  4.00*  5.38*  6.65*   CA  6.8*  7.1*  7.2*   PHOS   --   6.4*   --    ALB  1.3*  1.4*  1.4*   SGOT  16   --   11*   ALT  12   --   14     Recent Labs      04/27/18   0430  04/26/18   0436  04/25/18   0345   WBC  5.9  6.1  6.6   HGB  7.3*  8.3*  8.1*   HCT  22.5*  25.7*  25.5*   PLT  95*  115*  137*     No results found for: SDES  Current Facility-Administered Medications   Medication    insulin NPH (NOVOLIN N, HUMULIN N) injection 6 Units    sodium chloride (NS) flush 5-10 mL    sodium chloride (NS) flush 5-10 mL    dextrose 5% and 0.9% NaCl infusion    diphenhydrAMINE (BENADRYL) 50 mg/mL injection    furosemide (LASIX) tablet 80 mg    oxyCODONE IR (ROXICODONE) tablet 5 mg    epoetin tyler (EPOGEN;PROCRIT) injection 20,000 Units    pantoprazole (PROTONIX) tablet 40 mg    insulin lispro (HUMALOG) injection 3 Units  fentaNYL citrate (PF) injection 25 mcg    sodium chloride (NS) flush 5-10 mL    sodium chloride (NS) flush 5-10 mL    acetaminophen (TYLENOL) tablet 650 mg    ondansetron (ZOFRAN) injection 4 mg    carvedilol (COREG) tablet 12.5 mg    insulin lispro (HUMALOG) injection    glucose chewable tablet 16 g    dextrose (D50W) injection syrg 12.5-25 g    glucagon (GLUCAGEN) injection 1 mg    0.9% sodium chloride infusion 250 mL       Danielle Rodriguez MD  [facility]  [email or web address]. [email or web address]  [address] , [place]  [place], [address] [facility]  Phone - [phone]         Fax - [phone]  [facility] Office  [address] [place] [address]  Phone - [phone]        Fax - [phone]

## 2018-04-27 NOTE — PROGRESS NOTES
Nutrition Assessment:    INTERVENTIONS/RECOMMENDATIONS:   Meals/Snacks: Modify diet/texture/consistency/nutrients: Recommend Consistent carbohydrate, 2g Na, low phos diet    ASSESSMENT:   Patient medically noted for ESRD, new HD, pleural effusion, GI bleed, DM, HTN, respiratory failure, and heart failure. Patient has been NPO most of the morning for procedures. He reports his appetite is much improved compared to PTA. Ate 100% of his lunch and trying to figure out his dinner for later. Menu at bedside and aware of room service. Patient reports some weight loss PTA but difficult to determine amount given amount of fluid removed. Encouraged intake of meals. K+ currently WNL, elevated Phos. Tums added. Will monitor PO and labs to adjust diet as needed. Diet Order: Consistent carb, Renal  % Eaten:  Patient Vitals for the past 72 hrs:   % Diet Eaten   04/27/18 1444 100 %     Pertinent Medications: [x] Reviewed []Other: Coreg, Epogen, Lasix, humalog, NPH, Protonix, Tums  Pertinent Labs: [x]Reviewed  []Other: Phos 6.4, -[id number]  Food Allergies: [x]None []Other:     Last BM: 4/25   []Active     []Hyperactive  []Hypoactive       [] Absent  BS  Skin:    [x] Intact   [] Incision  [] Breakdown   []Edema   []Other:    Anthropometrics: Height: 6' 3\" (190.5 cm) Weight: 82 kg (180 lb 12.4 oz)    IBW (%IBW):   ( ) UBW (%UBW):   (  %)    BMI: Body mass index is 22.6 kg/(m^2). This BMI is indicative of:  []Underweight   [x]Normal   []Overweight   [] Obesity   [] Extreme Obesity (BMI>40)  Last Weight Metrics:  Weight Loss Metrics 4/27/2018 4/24/2018 4/23/2018 3/15/2018 3/15/2018 3/8/2018 2/23/2018   Today's Wt 180 lb 12.4 oz - 219 lb 221 lb 1.9 oz 221 lb 1.9 oz 228 lb 6.4 oz 220 lb   BMI - 22.6 kg/m2 27.37 kg/m2 28.39 kg/m2 28.39 kg/m2 29.32 kg/m2 28.25 kg/m2       Estimated Nutrition Needs (Based on): 2256 Kcals/day (BMR (1736) x 1. 3AF) , 98 g (1.2 g/kg bw) Protein  Carbohydrate:  At Least 130 g/day  Fluids: 1800 mL/day     Pt expected to meet estimated nutrient needs: [x]Yes []No    NUTRITION DIAGNOSES:   Problem:  Altered nutrition-related lab values      Etiology: related to ESRD     Signs/Symptoms: as evidenced by phos 6.4      NUTRITION INTERVENTIONS:  Meals/Snacks: Modify diet/texture/consistency/nutrients                  GOAL:   PO intake >70% of meals with phos WNL next 3-5 days    NUTRITION MONITORING AND EVALUATION   Food/Nutrient Intake Outcomes:  Total energy intake  Physical Signs/Symptoms Outcomes: Weight/weight change, Electrolyte and renal profile, GI profile, Glucose profile    Previous Goal Met:   [x] Met              [] Progressing Towards Goal              [] Not Progressing Towards Goal   Previous Recommendations:   [x] Implemented          [] Not Implemented          [] Not Applicable    LEARNING NEEDS (Diet, Food/Nutrient-Drug Interaction):    [x] None Identified   [] Identified and Education Provided/Documented   [] Identified and Pt declined/was not appropriate     Cultural, Congregational, OR Ethnic Dietary Needs:    [x] None Identified   [] Identified and Addressed     [x] Interdisciplinary Care Plan Reviewed/Documented    [x] Discharge Planning: Consistent carbohydrate/low sodium diet + renal restrictions as needed   [x] Participated in Interdisciplinary Rounds    NUTRITION RISK:    [] High              [x] Moderate           []  Low  []  Minimal/Uncompromised      [place]abila [place]  Pager [phone]              Weekend Pager [phone]

## 2018-04-27 NOTE — PROGRESS NOTES
GI PROGRESS NOTE    NAME:             Bib Ayala   :              1960   MRN:              [id number]   Admit Date:     2018  Todays Date:  2018      Subjective:         No sign of any rectal bleeding.       Medications-reviewed     Current Facility-Administered Medications   Medication Dose Route Frequency    insulin NPH (NOVOLIN N, HUMULIN N) injection 6 Units  6 Units SubCUTAneous ACB&D    sodium chloride (NS) flush        regadenoson (LEXISCAN) 0.4 mg/5 mL injection        furosemide (LASIX) tablet 80 mg  80 mg Oral DAILY    oxyCODONE IR (ROXICODONE) tablet 5 mg  5 mg Oral Q4H PRN    epoetin tyler (EPOGEN;PROCRIT) injection 20,000 Units  20,000 Units SubCUTAneous Q TUE, THU & SAT    pantoprazole (PROTONIX) tablet 40 mg  40 mg Oral ACB    insulin lispro (HUMALOG) injection 3 Units  3 Units SubCUTAneous TIDAC    fentaNYL citrate (PF) injection 25 mcg  25 mcg IntraVENous Q2H PRN    sodium chloride (NS) flush 5-10 mL  5-10 mL IntraVENous Q8H    sodium chloride (NS) flush 5-10 mL  5-10 mL IntraVENous PRN    acetaminophen (TYLENOL) tablet 650 mg  650 mg Oral Q6H PRN    ondansetron (ZOFRAN) injection 4 mg  4 mg IntraVENous Q4H PRN    carvedilol (COREG) tablet 12.5 mg  12.5 mg Oral BID WITH MEALS    insulin lispro (HUMALOG) injection   SubCUTAneous AC&HS    glucose chewable tablet 16 g  4 Tab Oral PRN    dextrose (D50W) injection syrg 12.5-25 g  12.5-25 g IntraVENous PRN    glucagon (GLUCAGEN) injection 1 mg  1 mg IntraMUSCular PRN    0.9% sodium chloride infusion 250 mL  250 mL IntraVENous PRN        Objective:     Patient Vitals for the past 8 hrs:   BP Temp Pulse Resp SpO2 Weight   18 0800 (!) 159/97 98 °F (36.7 °C) 84 18 95 % -   18 0650 - - - - - 82 kg (180 lb 12.4 oz)   18 0420 132/87 98.4 °F (36.9 °C) 84 18 95 % -      0701 -  1900  In: -   Out: 70    1901 -  0700  In: -   Out: 4189 [Urine:1950]          LABS:  Recent Labs 04/27/18 0430 04/26/18 0436   WBC  5.9  6.1   HGB  7.3*  8.3*   HCT  22.5*  25.7*   PLT  95*  115*     Recent Labs      04/27/18 0430 04/26/18 0436 04/25/18   0345   NA  138  140  140   K  4.1  4.4  5.0   CL  105  106  109*   CO2  27  22  20*   BUN  50*  73*  97*   CREA  4.00*  5.38*  6.65*   GLU  66  77  184*   CA  6.8*  7.1*  7.2*   PHOS   --   6.4*   --      Recent Labs      04/27/18 0430 04/26/18 0436 04/25/18 0345   SGOT  16   --   11*   AP  118*   --   132*   TBILI  0.2   --   0.4   TP  7.1   --   7.7   ALB  1.3*  1.4*  1.4*   GLOB  5.8*   --   6.3*   ALT  12   --   14     No results for input(s): INR, PTP, APTT in the last 72 hours. No lab exists for component: Daria Stair   Recent Labs      04/26/18 0436   TIBC  125*   PSAT  17*      Lab Results   Component Value Date/Time    Folate 5.9 02/13/2018 08:12 PM                            Assessment:   1. Reported history of BRBPR prior to admission  2. Heme negative stool  3. Currently passing non-bloody brown stool  4. Bloody pleural effusion                          Plan:   Watch for any BRBPR  Follow cbc      Will see on request. If passes significant blood per rectum please call.   Discussed with Dr. Ruth Buckley.
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
## 2018-04-27 NOTE — ROUTINE PROCESS
Louann Spray  1960  [id number]    Situation:  Verbal report received from: Frida Christine RN  Procedure: Procedure(s):  BRONCHOSCOPY  BRONCHIAL WASHINGS FLEXIBLE    Background:    Preoperative diagnosis: pleural effusion  Postoperative diagnosis: abnormal CT    :  Dr. Sanjay Diehl MD  Assistant(s): Endoscopy RN-1: Tara Van RN  Endoscopy RN-2: Cali Rogers    Specimens:   ID Type Source Tests Collected by Time Destination   1 : washing Bronchial Washing Bronch wash right upper lobe CULTURE, RESPIRATORY/SPUTUM/BRONCH Samuel Rasmussen MD 4/27/2018 1221 Microbiology     H. Pylori  no    Assessment:  Intra-procedure medications       Anesthesia gave intra-procedure sedation and medications, see anesthesia flow sheet no    Intravenous fluids: NS@ KVO     Vital signs stable     Abdominal assessment: round and soft     Recommendation:  Discharge patient per MD order.   Return to floor    Permission to share finding with family or friend yes and n/a

## 2018-04-27 NOTE — PROGRESS NOTES
Pt had a chest tube drainage  Collection chamber that was tipped over during the day and now 3 chambers were partialy filled. New collection chamber changed out. Pt side clamp 1st, new chamber made ready and then attached to pt. Pt unclamped with no air leak noted in chamber. Orange indicator in correct positon. Suction to wall at 20. Pt tolerated procedure well. Next shift nurse in room for assistance. 1100 total in old chamber before discarded.

## 2018-04-27 NOTE — PERIOP NOTES
TRANSFER - IN REPORT:    Verbal report received from [facility] on Jacquie Caputo  being received from 2247(unit) for ordered procedure      Report consisted of patients Situation, Background, Assessment and   Recommendations(SBAR). Information from the following report(s) SBAR was reviewed with the receiving nurse. Opportunity for questions and clarification was provided. Assessment completed upon patients arrival to unit and care assumed.

## 2018-04-28 ENCOUNTER — HOSPITAL ENCOUNTER (INPATIENT)
Age: 58
LOS: 9 days | Discharge: HOME | DRG: 163 | End: 2018-05-07
Attending: HOSPITALIST | Admitting: HOSPITALIST
Payer: MEDICARE

## 2018-04-28 VITALS
DIASTOLIC BLOOD PRESSURE: 85 MMHG | HEART RATE: 78 BPM | TEMPERATURE: 97.8 F | BODY MASS INDEX: 23.55 KG/M2 | HEIGHT: 75 IN | RESPIRATION RATE: 18 BRPM | SYSTOLIC BLOOD PRESSURE: 140 MMHG | WEIGHT: 189.38 LBS | OXYGEN SATURATION: 99 %

## 2018-04-28 LAB
ABO + RH BLD: NORMAL
ALBUMIN SERPL-MCNC: 1.3 G/DL (ref 3.5–5)
ALBUMIN/GLOB SERPL: 0.2 {RATIO} (ref 1.1–2.2)
ALP SERPL-CCNC: 122 U/L (ref 45–117)
ALT SERPL-CCNC: 11 U/L (ref 12–78)
ANION GAP SERPL CALC-SCNC: 5 MMOL/L (ref 5–15)
AST SERPL-CCNC: 21 U/L (ref 15–37)
BACTERIA SPEC CULT: NORMAL
BACTERIA SPEC CULT: NORMAL
BASOPHILS # BLD: 0 K/UL (ref 0–0.1)
BASOPHILS NFR BLD: 0 % (ref 0–1)
BILIRUB SERPL-MCNC: 0.2 MG/DL (ref 0.2–1)
BLD PROD TYP BPU: NORMAL
BLD PROD TYP BPU: NORMAL
BLOOD GROUP ANTIBODIES SERPL: NORMAL
BPU ID: NORMAL
BPU ID: NORMAL
BUN SERPL-MCNC: 50 MG/DL (ref 6–20)
BUN/CREAT SERPL: 11 (ref 12–20)
CALCIUM SERPL-MCNC: 6.7 MG/DL (ref 8.5–10.1)
CHLORIDE SERPL-SCNC: 101 MMOL/L (ref 97–108)
CO2 SERPL-SCNC: 27 MMOL/L (ref 21–32)
CREAT SERPL-MCNC: 4.41 MG/DL (ref 0.7–1.3)
CROSSMATCH RESULT,%XM: NORMAL
CROSSMATCH RESULT,%XM: NORMAL
DIFFERENTIAL METHOD BLD: ABNORMAL
EOSINOPHIL # BLD: 0.1 K/UL (ref 0–0.4)
EOSINOPHIL NFR BLD: 2 % (ref 0–7)
ERYTHROCYTE [DISTWIDTH] IN BLOOD BY AUTOMATED COUNT: 22.5 % (ref 11.5–14.5)
GLOBULIN SER CALC-MCNC: 6.1 G/DL (ref 2–4)
GLUCOSE BLD STRIP.AUTO-MCNC: 168 MG/DL (ref 65–100)
GLUCOSE BLD STRIP.AUTO-MCNC: 182 MG/DL (ref 65–100)
GLUCOSE BLD STRIP.AUTO-MCNC: 198 MG/DL (ref 65–100)
GLUCOSE BLD STRIP.AUTO-MCNC: 243 MG/DL (ref 65–100)
GLUCOSE SERPL-MCNC: 228 MG/DL (ref 65–100)
GRAM STN SPEC: NORMAL
GRAM STN SPEC: NORMAL
HCT VFR BLD AUTO: 23.3 % (ref 36.6–50.3)
HGB BLD-MCNC: 7.3 G/DL (ref 12.1–17)
IMM GRANULOCYTES # BLD: 0.1 K/UL (ref 0–0.04)
IMM GRANULOCYTES NFR BLD AUTO: 1 % (ref 0–0.5)
LYMPHOCYTES # BLD: 1.5 K/UL (ref 0.8–3.5)
LYMPHOCYTES NFR BLD: 23 % (ref 12–49)
MCH RBC QN AUTO: 21.7 PG (ref 26–34)
MCHC RBC AUTO-ENTMCNC: 31.3 G/DL (ref 30–36.5)
MCV RBC AUTO: 69.3 FL (ref 80–99)
MONOCYTES # BLD: 0.6 K/UL (ref 0–1)
MONOCYTES NFR BLD: 9 % (ref 5–13)
NEUTS SEG # BLD: 4.2 K/UL (ref 1.8–8)
NEUTS SEG NFR BLD: 65 % (ref 32–75)
NRBC # BLD: 0 K/UL (ref 0–0.01)
NRBC BLD-RTO: 0 PER 100 WBC
PLATELET # BLD AUTO: 92 K/UL (ref 150–400)
PMV BLD AUTO: ABNORMAL FL (ref 8.9–12.9)
POTASSIUM SERPL-SCNC: 4.5 MMOL/L (ref 3.5–5.1)
PROT SERPL-MCNC: 7.4 G/DL (ref 6.4–8.2)
RBC # BLD AUTO: 3.36 M/UL (ref 4.1–5.7)
RBC MORPH BLD: ABNORMAL
SERVICE CMNT-IMP: ABNORMAL
SERVICE CMNT-IMP: NORMAL
SERVICE CMNT-IMP: NORMAL
SODIUM SERPL-SCNC: 133 MMOL/L (ref 136–145)
SPECIMEN EXP DATE BLD: NORMAL
STATUS OF UNIT,%ST: NORMAL
STATUS OF UNIT,%ST: NORMAL
UNIT DIVISION, %UDIV: 0
UNIT DIVISION, %UDIV: 0
WBC # BLD AUTO: 6.5 K/UL (ref 4.1–11.1)

## 2018-04-28 PROCEDURE — 74011250637 HC RX REV CODE- 250/637: Performed by: NURSE PRACTITIONER

## 2018-04-28 PROCEDURE — 82962 GLUCOSE BLOOD TEST: CPT

## 2018-04-28 PROCEDURE — 74011636637 HC RX REV CODE- 636/637: Performed by: HOSPITALIST

## 2018-04-28 PROCEDURE — 90935 HEMODIALYSIS ONE EVALUATION: CPT

## 2018-04-28 PROCEDURE — 77010033678 HC OXYGEN DAILY

## 2018-04-28 PROCEDURE — 74011250637 HC RX REV CODE- 250/637: Performed by: INTERNAL MEDICINE

## 2018-04-28 PROCEDURE — 85025 COMPLETE CBC W/AUTO DIFF WBC: CPT | Performed by: INTERNAL MEDICINE

## 2018-04-28 PROCEDURE — 74011250637 HC RX REV CODE- 250/637: Performed by: HOSPITALIST

## 2018-04-28 PROCEDURE — 36415 COLL VENOUS BLD VENIPUNCTURE: CPT | Performed by: INTERNAL MEDICINE

## 2018-04-28 PROCEDURE — 65660000000 HC RM CCU STEPDOWN

## 2018-04-28 PROCEDURE — 80053 COMPREHEN METABOLIC PANEL: CPT | Performed by: INTERNAL MEDICINE

## 2018-04-28 PROCEDURE — 74011636637 HC RX REV CODE- 636/637: Performed by: INTERNAL MEDICINE

## 2018-04-28 RX ORDER — PANTOPRAZOLE SODIUM 40 MG/1
40 TABLET, DELAYED RELEASE ORAL
Status: DISCONTINUED | OUTPATIENT
Start: 2018-04-29 | End: 2018-05-07 | Stop reason: HOSPADM

## 2018-04-28 RX ORDER — MAGNESIUM SULFATE 100 %
4 CRYSTALS MISCELLANEOUS AS NEEDED
Status: DISCONTINUED | OUTPATIENT
Start: 2018-04-28 | End: 2018-05-07 | Stop reason: HOSPADM

## 2018-04-28 RX ORDER — ATORVASTATIN CALCIUM 20 MG/1
20 TABLET, FILM COATED ORAL DAILY
Status: DISCONTINUED | OUTPATIENT
Start: 2018-04-29 | End: 2018-04-29

## 2018-04-28 RX ORDER — CARVEDILOL 12.5 MG/1
12.5 TABLET ORAL 2 TIMES DAILY WITH MEALS
Status: DISCONTINUED | OUTPATIENT
Start: 2018-04-29 | End: 2018-04-28

## 2018-04-28 RX ORDER — CARVEDILOL 12.5 MG/1
12.5 TABLET ORAL 2 TIMES DAILY WITH MEALS
Status: DISCONTINUED | OUTPATIENT
Start: 2018-04-28 | End: 2018-05-07 | Stop reason: HOSPADM

## 2018-04-28 RX ORDER — LISINOPRIL 5 MG/1
5 TABLET ORAL DAILY
Status: DISCONTINUED | OUTPATIENT
Start: 2018-04-28 | End: 2018-04-30

## 2018-04-28 RX ORDER — CARVEDILOL 12.5 MG/1
12.5 TABLET ORAL 2 TIMES DAILY WITH MEALS
Status: DISCONTINUED | OUTPATIENT
Start: 2018-04-29 | End: 2018-04-28 | Stop reason: SDUPTHER

## 2018-04-28 RX ORDER — ZOLPIDEM TARTRATE 5 MG/1
5 TABLET ORAL
Status: DISCONTINUED | OUTPATIENT
Start: 2018-04-28 | End: 2018-04-28 | Stop reason: HOSPADM

## 2018-04-28 RX ORDER — OXYCODONE HYDROCHLORIDE 5 MG/1
5 TABLET ORAL
Status: DISCONTINUED | OUTPATIENT
Start: 2018-04-28 | End: 2018-05-01

## 2018-04-28 RX ORDER — CALCIUM CARBONATE 200(500)MG
400 TABLET,CHEWABLE ORAL
Status: DISCONTINUED | OUTPATIENT
Start: 2018-04-29 | End: 2018-05-07 | Stop reason: HOSPADM

## 2018-04-28 RX ORDER — ONDANSETRON 2 MG/ML
4 INJECTION INTRAMUSCULAR; INTRAVENOUS
Status: DISCONTINUED | OUTPATIENT
Start: 2018-04-28 | End: 2018-05-07 | Stop reason: HOSPADM

## 2018-04-28 RX ORDER — ACETAMINOPHEN 325 MG/1
650 TABLET ORAL
Status: DISCONTINUED | OUTPATIENT
Start: 2018-04-28 | End: 2018-05-01

## 2018-04-28 RX ORDER — ZOLPIDEM TARTRATE 5 MG/1
5 TABLET ORAL
Status: DISCONTINUED | OUTPATIENT
Start: 2018-04-28 | End: 2018-05-05

## 2018-04-28 RX ORDER — FUROSEMIDE 40 MG/1
80 TABLET ORAL DAILY
Status: DISCONTINUED | OUTPATIENT
Start: 2018-04-29 | End: 2018-05-07 | Stop reason: HOSPADM

## 2018-04-28 RX ORDER — INSULIN LISPRO 100 [IU]/ML
INJECTION, SOLUTION INTRAVENOUS; SUBCUTANEOUS
Status: DISCONTINUED | OUTPATIENT
Start: 2018-04-28 | End: 2018-05-07 | Stop reason: HOSPADM

## 2018-04-28 RX ORDER — SODIUM BICARBONATE 650 MG/1
650 TABLET ORAL 3 TIMES DAILY
Status: DISCONTINUED | OUTPATIENT
Start: 2018-04-28 | End: 2018-05-07 | Stop reason: HOSPADM

## 2018-04-28 RX ADMIN — LISINOPRIL 5 MG: 5 TABLET ORAL at 21:42

## 2018-04-28 RX ADMIN — INSULIN LISPRO 3 UNITS: 100 INJECTION, SOLUTION INTRAVENOUS; SUBCUTANEOUS at 08:46

## 2018-04-28 RX ADMIN — LISINOPRIL 2.5 MG: 5 TABLET ORAL at 11:52

## 2018-04-28 RX ADMIN — CALCIUM CARBONATE 400 MG: 500 TABLET, CHEWABLE ORAL at 11:52

## 2018-04-28 RX ADMIN — CARVEDILOL 12.5 MG: 12.5 TABLET, FILM COATED ORAL at 11:52

## 2018-04-28 RX ADMIN — INSULIN LISPRO 8 UNITS: 100 INJECTION, SOLUTION INTRAVENOUS; SUBCUTANEOUS at 22:00

## 2018-04-28 RX ADMIN — Medication 10 ML: at 06:00

## 2018-04-28 RX ADMIN — SODIUM BICARBONATE 650 MG: 650 TABLET ORAL at 21:42

## 2018-04-28 RX ADMIN — FUROSEMIDE 80 MG: 80 TABLET ORAL at 11:52

## 2018-04-28 RX ADMIN — ZOLPIDEM TARTRATE 5 MG: 5 TABLET ORAL at 21:42

## 2018-04-28 RX ADMIN — CARVEDILOL 12.5 MG: 12.5 TABLET, FILM COATED ORAL at 22:01

## 2018-04-28 RX ADMIN — INSULIN HUMAN 5 UNITS: 100 INJECTION, SUSPENSION SUBCUTANEOUS at 08:45

## 2018-04-28 RX ADMIN — PANTOPRAZOLE SODIUM 40 MG: 40 TABLET, DELAYED RELEASE ORAL at 11:56

## 2018-04-28 NOTE — PROGRESS NOTES
Bedside and Verbal shift change report given to [address] [place] [address] (oncoming nurse) by Tejas Hinds RN (offgoing nurse).  Report included the following information SBAR, ED Summary, OR Summary, MAR and Recent Results. ]

## 2018-04-28 NOTE — IP AVS SNAPSHOT
[address] [place] [address] 
[phone] Patient: Mumtaz Ndiaye MRN: [id number] LDV:6/45/[id number] About your hospitalization You were admitted on:  April 28, 2018 You last received care in the:  [facility] 4 SURG/BARIATRICS You were discharged on: May 7, 2018 Why you were hospitalized Your primary diagnosis was:  Pleural Effusion On Right Your diagnoses also included:  Pleural Effusion Follow-up Information Follow up With Details Comments Contact Info [address] [place] [address] On 5/10/2018 Hospital f/u PCP appointment on Thursday, 5/10/18 @ 2:00 p.m. with Dr. Frank Sheth. [address] [place] [id number] [phone] Your Scheduled Appointments Thursday May 10, 2018  2:00 PM EDT  
ESTABLISHED PATIENT with Db Hwang MD  
[address] [place] [address] ([facility]) [address] [employer] [address]  
[phone] Discharge Orders None A check  indicates which time of day the medication should be taken. My Medications START taking these medications Instructions Each Dose to Equal  
 Morning Noon Evening Bedtime  
 calcium carbonate 200 mg calcium (500 mg) Chew Commonly known as:  TUMS Your last dose was: Your next dose is: Take 1 Tab by mouth three (3) times daily (with meals). 1 Tab CHANGE how you take these medications Instructions Each Dose to Equal  
 Morning Noon Evening Bedtime  
 furosemide 80 mg tablet Commonly known as:  LASIX What changed:  when to take this Your last dose was: Your next dose is: Take 1 Tab by mouth daily. 80 mg  
    
   
   
   
  
 insulin NPH/insulin regular 100 unit/mL (70-30) injection Commonly known as:  NOVOLIN 70/30, HUMULIN 70/30 What changed: - how much to take 
- additional instructions Your last dose was: Your next dose is:    
   
   
 18 units with Breakfast and dinner CONTINUE taking these medications Instructions Each Dose to Equal  
 Morning Noon Evening Bedtime  
 aspirin delayed-release 81 mg tablet Your last dose was: Your next dose is: Take 162 mg by mouth daily. 162 mg  
    
   
   
   
  
 atorvastatin 20 mg tablet Commonly known as:  LIPITOR Your last dose was: Your next dose is: Take 20 mg by mouth daily. 20 mg  
    
   
   
   
  
 carvedilol 12.5 mg tablet Commonly known as:  Darnell Huynh Your last dose was: Your next dose is: Take 1 Tab by mouth two (2) times daily (with meals). Indications: pressure and heart 12.5 mg  
    
   
   
   
  
 IRON (FERROUS SULFATE) PO Your last dose was: Your next dose is: Take 1 Cap by mouth. Indications: unknown dosage of OTC iron supplement 1 Cap NIFEdipine ER 30 mg ER tablet Commonly known as:  ADALAT CC Your last dose was: Your next dose is: Take 1 Tab by mouth daily. Indications: pressure, add to regimen 30 mg  
    
   
   
   
  
 sodium bicarbonate 650 mg tablet Your last dose was: Your next dose is: Take 1 Tab by mouth three (3) times daily. 650 mg Where to Get Your Medications Information on where to get these meds will be given to you by the nurse or doctor. ! Ask your nurse or doctor about these medications  
  calcium carbonate 200 mg calcium (500 mg) [place] Person Discharge Instructions *DISCHARGE INSTRUCTIONS AFTER LUNG SURGERY [address] [facility] [address] [facility] [place] [facility] [address] Leave the chest tube dressing in place for 48 hours (5/8/18), then you can remove it and shower. SURGICAL INCISION: 
 
You will have two or three small incisions. These incisions are sealed with sutures that dissolve. The lower incision is from the chest tube. This lower incision will seal itself in 5 to 7 days. Until then you may have drainage from that incision. The drainage will be thin yellowish or red in color and may drain for 5 to 7 days. This is normal and expected. INCISION CARE: 
 
Wash incisions daily with soap. Pat dry. Showers only, no tub baths. If you have drainage, you can place a bandage over the area, otherwise keep open to air. You may experience drainage of clear-strawberry colored fluid from the chest tube site. This is to be expected and will stop in 24-48 hours. PAIN: 
 
What you will experience: ? Tenderness and soreness around incisions ? Burning and/or numbness ? Soreness around front/back of chest 
 
For relief of discomfort: ? Take the pain medicine you were given at discharge ? Aleve one or two tablets every 12 hrs (with food) along with pain medicine (this can be purchased over the counter at your local pharmacy/drug store). Advil may be substituted. Start this after you finish taking Toradol if prescribed. ? Heating pad 10 minutes at a time to the upper incision area as often as you wish. ? For the Ladies: Spandex or elastic sports bra will give you the support you need without pressure on the incision. Most will find this to be a great comfort. COUGHING: 
 
Coughing is helpful after lung surgery. Place a pillow over your incision and apply pressure when coughing to reduce pain. ACTIVITY: 
 
DO: 1. Walk daily outside if weather permits, at a mall if not. Increase your distance         
             each day. Exercise has many benefits.  It promotes healing, expands your lungs,  
             helps you cough, relaxes your body, tones muscles, lowers blood pressure and  
            improves your appetite. After you exercise expect to feel tired and probably short  
             of breath. Plan to take a nap 1-2 times a day to regain your strength . 2. Climb stairs 3. Ride in a car 4. Perform breathing exercises (incentive spirometer) DO NOT: 
            
1. Lift heavy objects (8-10 pounds) 2. Drive a car/truck until after your post-op visit 3. Do heavy yard or housework APPETITE: It is usual to have a decreased appetite after surgery. Exercise is the best stimulant. You may expect to slowly improve over 4-10 days. CALL THE OFFICE IF YOU DEVELOP: 
 
? Increasing pain that is not controlled by the pain medicine. ? Increasing redness or drainage around the incision. ? Unusual or increasing shortness of breath ? Fever greater than 101 degrees ? Change in the color of your sputum to yellow or green, especially if you also have increasing shortness of breath and a fever greater than 101. YOUR MEDICATIONS: 
As instructed FOLLOW-UP APPOINTMENT: 
AFTER DISCHARGE CALL THE OFFICE TO SCHEDULE YOUR VISIT TO SEE US IN 2 WEEKS. Please arrive 45 minutes prior to you appointment time in order to have a chest X-Ray. Check in at [address] [facility] on the ground floor of the [facility]. After your X-ray come to the [address] [place] [address] for your appointment. Physician Signature Personal Medicine Announcement We are excited to announce that we are making your provider's discharge notes available to you in Personal Medicine. You will see these notes when they are completed and signed by the physician that discharged you from your recent hospital stay. If you have any questions or concerns about any information you see in Personal Medicine, please call the Health Information Department where you were seen or reach out to your Primary Care Provider for more information about your plan of care. Introducing [facility]! [employer] introduces CivilisedMoneyt patient portal. Now you can access parts of your medical record, email your doctor's office, and request medication refills online. 1. In your internet browser, go to [email or web address]"[email or web address]. [email or web address]/"[employer] 2. Click on the First Time User? Click Here link in the Sign In box. You will see the New Member Sign Up page. 3. Enter your Quemulus Access Code exactly as it appears below. You will not need to use this code after youve completed the sign-up process. If you do not sign up before the expiration date, you must request a new code. · Quemulus Access Code: [email or web address] Expires: 6/26/2018  5:26 AM 
 
4. Enter the last four digits of your Social Security Number (xxxx) and Date of Birth (mm/dd/yyyy) as indicated and click Submit. You will be taken to the next sign-up page. 5. Create a Quemulus ID. This will be your Quemulus login ID and cannot be changed, so think of one that is secure and easy to remember. 6. Create a Quemulus password. You can change your password at any time. 7. Enter your Password Reset Question and Answer. This can be used at a later time if you forget your password. 8. Enter your e-mail address. You will receive e-mail notification when new information is available in [address] 9. Click Sign Up. You can now view and download portions of your medical record. 10. Click the Download Summary menu link to download a portable copy of your medical information. If you have questions, please visit the Frequently Asked Questions section of the Quemulus website. Remember, Quemulus is NOT to be used for urgent needs. For medical emergencies, dial 911. Now available from your iPhone and Android! Introducing Tal Dickson As a [employer] patient, I wanted to make you aware of our electronic visit tool called Tal Dickson. [employer] 24/7 allows you to connect within minutes with a medical provider 24 hours a day, seven days a week via a mobile device or tablet or logging into a secure website from your computer. You can access ecoATM from anywhere in the [place]. A virtual visit might be right for you when you have a simple condition and feel like you just dont want to get out of bed, or cant get away from work for an appointment, when your regular [facility] provider is not available (evenings, weekends or holidays), or when youre out of town and need minor care. Electronic visits cost only $49 and if the [facility] 24/7 provider determines a prescription is needed to treat your condition, one can be electronically transmitted to a nearby pharmacy*. Please take a moment to enroll today if you have not already done so. The enrollment process is free and takes just a few minutes. To enroll, please download the [facility] 24/7 geovanna to your tablet or phone, or visit [email or web address] to enroll on your computer. And, as an [address] [place] [address] patient with a [employer] account, the results of your visits will be scanned into your electronic medical record and your primary care provider will be able to view the scanned results. We urge you to continue to see your regular [facility] provider for your ongoing medical care. And while your primary care provider may not be the one available when you seek a [employer] virtual visit, the peace of mind you get from getting a real diagnosis real time can be priceless. For more information on Webvantamariferfin, view our Frequently Asked Questions (FAQs) at [email or web address]. Sincerely, 
 
Leana Dennis MD 
Chief Medical Officer Eliana8 Perlita Bowman *:  certain medications cannot be prescribed via [employer] Providers Seen During Your Hospitalization Provider Specialty Primary office phone Emilee Mcleod MD Internal Medicine [phone] Rosa Isela Stephens MD Internal Medicine [phone] Juliane Bennett MD Hospitalist [phone] Ariadna Umana MD Internal Medicine [phone] Your Primary Care Physician (PCP) Primary Care Physician Office Phone Office Fax [place] Pham, [address] [phone] You are allergic to the following No active allergies Recent Documentation Weight BMI Smoking Status 89.5 kg 24.66 kg/m2 Never Smoker Emergency Contacts Name Discharge Info Relation Home Work Mobile Yanni Tapia DISCHARGE CAREGIVER [3] Spouse [3] [phone] Patient Belongings The following personal items are in your possession at time of discharge: 
  Dental Appliances: None  Visual Aid: None      Home Medications: None   Jewelry: None  Clothing: At bedside    Other Valuables: Cell Phone Discharge Instructions Attachments/References HEART FAILURE: AVOIDING TRIGGERS (ENGLISH) Patient Handouts Avoiding Triggers With Heart Failure: Care Instructions Your Care Instructions Triggers are anything that make your heart failure flare up. A flare-up is also called \"sudden heart failure\" or \"acute heart failure. \" When you have a flare-up, fluid builds up in your lungs, and you have problems breathing. You might need to go to the hospital. By watching for changes in your condition and avoiding triggers, you can prevent heart failure flare-ups. Follow-up care is a key part of your treatment and safety. Be sure to make and go to all appointments, and call your doctor if you are having problems. It's also a good idea to know your test results and keep a list of the medicines you take. How can you care for yourself at home? Watch for changes in your weight and condition · Weigh yourself without clothing at the same time each day. Record your weight.  Call your doctor if you have sudden weight gain, such as more than 2 to 3 pounds in a day or 5 pounds in a week. (Your doctor may suggest a different range of weight gain.) A sudden weight gain may mean that your heart failure is getting worse. · Keep a daily record of your symptoms. Write down any changes in how you feel, such as new shortness of breath, cough, or problems eating. Also record if your ankles are more swollen than usual and if you feel more tired than usual. Note anything that you ate or did that could have triggered these changes. Limit sodium Sodium causes your body to hold on to extra water. This may cause your heart failure symptoms to get worse. People get most of their sodium from processed foods. Fast food and restaurant meals also tend to be very high in sodium. · Your doctor may suggest that you limit sodium to 2,000 milligrams (mg) a day or less. That is less than 1 teaspoon of salt a day, including all the salt you eat in cooking or in packaged foods. · Read food labels on cans and food packages. They tell you how much sodium you get in one serving. Check the serving size. If you eat more than one serving, you are getting more sodium. · Be aware that sodium can come in forms other than salt, including monosodium glutamate (MSG), sodium citrate, and sodium bicarbonate (baking soda). MSG is often added to Asian food. You can sometimes ask for food without MSG or salt. · Slowly reducing salt will help you adjust to the taste. Take the salt shaker off the table. · Flavor your food with garlic, lemon juice, onion, vinegar, herbs, and spices instead of salt. Do not use soy sauce, steak sauce, onion salt, garlic salt, mustard, or ketchup on your food, unless it is labeled \"low-sodium\" or \"low-salt. \" 
· Make your own salad dressings, sauces, and ketchup without adding salt. · Use fresh or frozen ingredients, instead of canned ones, whenever you can. Choose low-sodium canned goods. · Eat less processed food and food from restaurants, including fast food. Exercise as directed Moderate, regular exercise is very good for your heart. It improves your blood flow and helps control your weight. But too much exercise can stress your heart and cause a heart failure flare-up. · Check with your doctor before you start an exercise program. 
· Walking is an easy way to get exercise. Start out slowly. Gradually increase the length and pace of your walk. Swimming, riding a bike, and using a treadmill are also good forms of exercise. · When you exercise, watch for signs that your heart is working too hard. You are pushing yourself too hard if you cannot talk while you are exercising. If you become short of breath or dizzy or have chest pain, stop, sit down, and rest. 
· Do not exercise when you do not feel well. Take medicines correctly · Take your medicines exactly as prescribed. Call your doctor if you think you are having a problem with your medicine. · Make a list of all the medicines you take. Include those prescribed to you by other doctors and any over-the-counter medicines, vitamins, or supplements you take. Take this list with you when you go to any doctor. · Take your medicines at the same time every day. It may help you to post a list of all the medicines you take every day and what time of day you take them. · Make taking your medicine as simple as you can. Plan times to take your medicines when you are doing other things, such as eating a meal or getting ready for bed. This will make it easier to remember to take your medicines. · Get organized. Use helpful tools, such as daily or weekly pill containers. When should you call for help? Call 911 if you have symptoms of sudden heart failure such as: 
? · You have severe trouble breathing. ? · You cough up pink, foamy mucus. ? · You have a new irregular or rapid heartbeat. ?Call your doctor now or seek immediate medical care if: 
? · You have new or increased shortness of breath. ? · You are dizzy or lightheaded, or you feel like you may faint. ? · You have sudden weight gain, such as more than 2 to 3 pounds in a day or 5 pounds in a week. (Your doctor may suggest a different range of weight gain.) ? · You have increased swelling in your legs, ankles, or feet. ? · You are suddenly so tired or weak that you cannot do your usual activities. ? Watch closely for changes in your health, and be sure to contact your doctor if you develop new symptoms. Where can you learn more? Go to http://mercy-duyen.info/. Enter O803 in the search box to learn more about \"Avoiding Triggers With Heart Failure: Care Instructions. \" Current as of: September 21, 2016 Content Version: 11.4 © 6550-9524 Healthwise, Incorporated. Care instructions adapted under license by [employer] (which disclaims liability or warranty for this information). If you have questions about a medical condition or this instruction, always ask your healthcare professional. [address] any warranty or liability for your use of this information. Please provide this summary of care documentation to your next provider. Signatures-by signing, you are acknowledging that this After Visit Summary has been reviewed with you and you have received a copy. Patient Signature:  ____________________________________________________________ Date:  ____________________________________________________________  
  
Kell Marie Provider Signature:  ____________________________________________________________ Date:  ____________________________________________________________

## 2018-04-28 NOTE — PROGRESS NOTES
marlyn paperwork printed per request of Dr Vianney Goldsmith. Pt awaiting transfer and bed to [facility]. Wife on the way to hospital to see  and talk to dr Vianney Goldsmith for an update.

## 2018-04-28 NOTE — PROGRESS NOTES
TRANSFER - IN REPORT:    Verbal report received from [address] [place] [address] RN(name) on Mohan Eisenberg  being received from [facility] for urgent transfer      Report consisted of patients Situation, Background, Assessment and   Recommendations(SBAR). Information from the following report(s) SBAR, Kardex, ED Summary, Procedure Summary, MAR, Recent Results and Cardiac Rhythm NSR was reviewed with the receiving nurse. Opportunity for questions and clarification was provided. Assessment completed upon patients arrival to unit and care assumed.

## 2018-04-28 NOTE — PROCEDURES
[facility]  [phone]    Vitals   Pre   Post   Assessment   Pre   Post     Temp  Temp: 98.7 °F (37.1 °C) (04/28/18 0820)  98.7 LOC  Alert and oriented x3 same   HR   Pulse (Heart Rate): 79 (04/28/18 0820) 98 Lungs   Diminished on room air  same   B/P   BP: (!) 163/103 (04/28/18 0820) 138/84 Cardiac   B/p elevated . NSR b/pwnl   Resp   Resp Rate: 18 (04/28/18 0808) 20 Skin   intact  same   Pain level  Pain Intensity 1: 0 (04/28/18 0310) 0 Edema  +2/+3 in lower ext     same   Orders:    Duration:   Start:   8324 End:   1050 Total:   2.5hrs   Dialyzer:   Dialyzer/Set Up Inspection: Revaclear (04/28/18 0820)   K Bath:   Dialysate K (mEq/L): 3 (04/28/18 0820)   Ca Bath:   Dialysate CA (mEq/L): 2.5 (04/28/18 0820)   Na/Bicarb:   Dialysate NA (mEq/L): 140 (04/28/18 0820)   Target Fluid Removal:   Goal/Amount of Fluid to Remove (mL): 2500 mL (04/28/18 0820)   Access     Type & Location:   Rt Subclavian perma-cath. Old dressing with blood on it. Dressing changed at this time, No signs of infection noted. Ports cleaned, blood apsirated and liens flushed without any issues.  Good blood flow present from both ports   Labs     Obtained/Reviewed   Critical Results Called   Date when labs were drawn-  Hgb-    HGB   Date Value Ref Range Status   04/28/2018 7.3 (L) 12.1 - 17.0 g/dL Final     K-    Potassium   Date Value Ref Range Status   04/28/2018 4.5 3.5 - 5.1 mmol/L Final     Ca-   Calcium   Date Value Ref Range Status   04/28/2018 6.7 (L) 8.5 - 10.1 MG/DL Final     Bun-   BUN   Date Value Ref Range Status   04/28/2018 50 (H) 6 - 20 MG/DL Final     Creat-   Creatinine   Date Value Ref Range Status   04/28/2018 4.41 (H) 0.70 - 1.30 MG/DL Final        Medications/ Blood Products Given     Name   Dose   Route and Time        None ordered             Blood Volume Processed (BVP):    43.1 Net Fluid   Removed:  1.9kg   Comments   Time Out Done: 0076  Primary Nurse Rpt Pre: Imani Bowman RN  Primary Nurse Rpt Post:same nurse at   Pt Education:Renal failure  Care Plan:renal faulire  Tx Summary:  1987 treatment started  1015 Pt c/o feeling hot and uncomfortable. Uf turned off and 50ml Ns given b/p wnl 141/88 p115 but pt restless at this time (trying to sit up and then stand up even though pt has been instructed that he cant sit up or stand up during dialysis)  1020 Primary nurse called to check glucose level since pt is still feeling hot and uncomfortable. Glucose 182. Will leave Uf off for remainder of treatment  1050 Dialysis completed and blood rinsed back. New caps placed on each port. Pt stable  Admiting Diagnosis: Respiratory failure  Pt's previous clinic-Not assigned yet  Consent signed - Informed Consent Verified: Yes (04/28/18 0820)  [facility] Consent - on file  Hepatitis Status- NEG AG as of 4/25/18  Machine #- Machine Number: [id number] (04/28/18 0820)  Telemetry status-NSR  Pre-dialysis wt. - Pre-Dialysis Weight: 88 kg (194 lb 0.1 oz) (04/26/18 1900)

## 2018-04-28 NOTE — PROGRESS NOTES
Pt report given to Oscar Adams at [facility]. Pt to be transported by ambulance. Pt with R chest tube, and R SC dialysis port. Chest tube at 370, put out 220 this shift. IV to left arm 18 calvin flushes. Eduardo MORALES. Pt decided to bring belongings with him to [facility].

## 2018-04-28 NOTE — DISCHARGE SUMMARY
Hospitalist Discharge/EMTALA Transfer Summary     Patient ID:  Manette Cowden  [id number]  66 y.o.  1960    PCP on record: Jaguar Manuel MD    Admit date: 4/24/2018  Discharge date and time: 4/28/2018      DISCHARGE DIAGNOSIS:  Acute respiratory failure with hypoxia (improved) with Large Right Pleural Effusion due to Volume Overload in Settings of Renal Failure; s/p chest tube placement on 4/24  Hemopneumothorax: seen on CT Chest (6/51)  Acute Systolic CHF: LVEF 01-67%; suspected to be related to Hypertension  Hyperkalemia: improved  COLTEN on CKD stage V  Accelerated HTN improved and patient with subsequent Hypotension on 4/24  Acute blood loss anemia s/p 1 unit pRBC's  Lower GI bleed with bright red blood per rectum; resolved  Hyperosmolar Non Ketotic State; resolved  DM type 2  Hyperlipidemia    CONSULTATIONS:  IP CONSULT TO GASTROENTEROLOGY  IP CONSULT TO NEPHROLOGY  IP CONSULT TO PULMONOLOGY  IP CONSULT TO PULMONOLOGY  IP CONSULT TO CARDIOLOGY    Excerpted HPI from H&P of Mamadou Isaac MD:  Levy Gracia is a 66 y.o.  male who is transferred from [facility] for hyperkalemia, renal failure, pleural. Pt went to ED as for past 1 week he is been gaining weight and has gained 20lbs, having progressive shortness of breath at rest and worsening with exertion, worsening leg swelling and having blood in stool every day. Pt denies any fever, chills, nausea, vomiting, diarrhea, chest pain or abdominal pain. At [facility] pt noted to be hyperkalemia, Cr worsening then his baseline, Metabolic Acidotic, CXR with R Large Pleural Effusion and anemia. He was transferred to [facility] PCU for admission.     We were asked to admit for work up and evaluation of the above problems. \"     ______________________________________________________________________  DISCHARGE SUMMARY/HOSPITAL COURSE:  for full details see H&P, daily progress notes, labs, consult notes.      I discussed case last with Dr. Emerson Moritz on evening of 4/27 and I have initiated transfer request to [address] [place] [address] as patient may need VATS and Decortication. Thoracic Surgery service not available here at [address]. Ankita Santos discussed case with  (Thoracic Surgery) at [facility] on 4/27. Per their discussion  had planned to discuss with [facility] who is on call this weekend. We wanted to wait to transfer until cytology was back from his pleural fluid. This resulted yesterday evening and was negative for malignancy. Hospital course via problem below:  Acute respiratory failure with hypoxia (improved) with Large Right Pleural Effusion due to Volume Overload in Settings of Renal Failure; s/p chest tube placement on 4/24  Hemopneumothorax: seen on CT Chest (4/25)  -cytology negative  -tPA in Chest tube on 4/25 to try and help break up loculations  -oxycodone added prn for pain from chest tube, IV fentanyl prn for severe pain  -s/p bronch on 4/27 with no endobronchial lesion      TTE Showed: Acute Systolic CHF: LVEF 78-87%; suspected to be related to Hypertension  -Cardiology evaluation appreciated  -stress test on 4/27 without inducible ischemia     Hyperkalemia: improved  COLTEN on CKD stage V  Worsening leg edema: now improving since starting HD  -Nephrology following  -Permcath placed 4/26     Accelerated HTN improved and patient with subsequent Hypotension; BP better after dialysis today   -discontinued NTG paste and Nifedipine on 4/24 with hypotension  -Continue Coreg and low dose lisinopril     Acute blood loss anemia s/p 1 unit pRBC's  Lower GI bleed with bright red blood per rectum  -Hold home ASA;  Uremia also likely contributing to platelet dysfunction  -continue PPI   -appreciate GI eval  -since no further bleeding diet was advanced and patient is tolerating well  -case discussed with Dr. Azalea Eisenberg today  -pending clinical course may consider CT A/P      Hyperosmolar Non Ketotic State with h/o DM type 2  Hypoglycemia on insulin gtt  -continue NPH at 5 units BID  -Humalog 3 units TID meals  -continue SSI      Hyperlipidemia  -Hold statin for now            _______________________________________________________________________  See today's progress note for physical exam  _______________________________________________________________________      Follow up with:   PCP : Autumn Brody MD  Follow-up Information     None          Discharge summary done for completeness, no charge will be associated with this documentation.     Signed:  Slade Jones MD

## 2018-04-29 ENCOUNTER — APPOINTMENT (OUTPATIENT)
Dept: GENERAL RADIOLOGY | Age: 58
DRG: 163 | End: 2018-04-29
Attending: INTERNAL MEDICINE
Payer: MEDICARE

## 2018-04-29 ENCOUNTER — APPOINTMENT (OUTPATIENT)
Dept: GENERAL RADIOLOGY | Age: 58
DRG: 163 | End: 2018-04-29
Attending: HOSPITALIST
Payer: MEDICARE

## 2018-04-29 PROBLEM — J90 PLEURAL EFFUSION: Status: ACTIVE | Noted: 2018-04-29

## 2018-04-29 LAB
ALBUMIN SERPL-MCNC: 1.1 G/DL (ref 3.5–5)
ALBUMIN/GLOB SERPL: 0.2 {RATIO} (ref 1.1–2.2)
ALP SERPL-CCNC: 115 U/L (ref 45–117)
ALT SERPL-CCNC: 9 U/L (ref 12–78)
ANION GAP SERPL CALC-SCNC: 5 MMOL/L (ref 5–15)
AST SERPL-CCNC: 18 U/L (ref 15–37)
BACTERIA SPEC CULT: ABNORMAL
BACTERIA SPEC CULT: ABNORMAL
BILIRUB SERPL-MCNC: 0.3 MG/DL (ref 0.2–1)
BUN SERPL-MCNC: 37 MG/DL (ref 6–20)
BUN/CREAT SERPL: 11 (ref 12–20)
CALCIUM SERPL-MCNC: 6.6 MG/DL (ref 8.5–10.1)
CHLORIDE SERPL-SCNC: 101 MMOL/L (ref 97–108)
CO2 SERPL-SCNC: 26 MMOL/L (ref 21–32)
CREAT SERPL-MCNC: 3.51 MG/DL (ref 0.7–1.3)
ERYTHROCYTE [DISTWIDTH] IN BLOOD BY AUTOMATED COUNT: 21.8 % (ref 11.5–14.5)
GLOBULIN SER CALC-MCNC: 6.1 G/DL (ref 2–4)
GLUCOSE BLD STRIP.AUTO-MCNC: 251 MG/DL (ref 65–100)
GLUCOSE BLD STRIP.AUTO-MCNC: 327 MG/DL (ref 65–100)
GLUCOSE BLD STRIP.AUTO-MCNC: 362 MG/DL (ref 65–100)
GLUCOSE BLD STRIP.AUTO-MCNC: 372 MG/DL (ref 65–100)
GLUCOSE BLD STRIP.AUTO-MCNC: 374 MG/DL (ref 65–100)
GLUCOSE SERPL-MCNC: 205 MG/DL (ref 65–100)
GRAM STN SPEC: ABNORMAL
HCT VFR BLD AUTO: 22.4 % (ref 36.6–50.3)
HGB BLD-MCNC: 7 G/DL (ref 12.1–17)
MCH RBC QN AUTO: 21.9 PG (ref 26–34)
MCHC RBC AUTO-ENTMCNC: 31.3 G/DL (ref 30–36.5)
MCV RBC AUTO: 70.2 FL (ref 80–99)
NRBC # BLD: 0 K/UL (ref 0–0.01)
NRBC BLD-RTO: 0 PER 100 WBC
PLATELET # BLD AUTO: 89 K/UL (ref 150–400)
POTASSIUM SERPL-SCNC: 4.2 MMOL/L (ref 3.5–5.1)
PROT SERPL-MCNC: 7.2 G/DL (ref 6.4–8.2)
RBC # BLD AUTO: 3.19 M/UL (ref 4.1–5.7)
SERVICE CMNT-IMP: ABNORMAL
SODIUM SERPL-SCNC: 132 MMOL/L (ref 136–145)
WBC # BLD AUTO: 5.5 K/UL (ref 4.1–11.1)

## 2018-04-29 PROCEDURE — 80053 COMPREHEN METABOLIC PANEL: CPT | Performed by: HOSPITALIST

## 2018-04-29 PROCEDURE — 65660000000 HC RM CCU STEPDOWN

## 2018-04-29 PROCEDURE — 71045 X-RAY EXAM CHEST 1 VIEW: CPT

## 2018-04-29 PROCEDURE — 74011250636 HC RX REV CODE- 250/636: Performed by: HOSPITALIST

## 2018-04-29 PROCEDURE — 74011250637 HC RX REV CODE- 250/637: Performed by: HOSPITALIST

## 2018-04-29 PROCEDURE — 82962 GLUCOSE BLOOD TEST: CPT

## 2018-04-29 PROCEDURE — 74011636637 HC RX REV CODE- 636/637: Performed by: HOSPITALIST

## 2018-04-29 PROCEDURE — 85027 COMPLETE CBC AUTOMATED: CPT | Performed by: HOSPITALIST

## 2018-04-29 PROCEDURE — 74011250637 HC RX REV CODE- 250/637: Performed by: INTERNAL MEDICINE

## 2018-04-29 PROCEDURE — 71046 X-RAY EXAM CHEST 2 VIEWS: CPT

## 2018-04-29 PROCEDURE — 36415 COLL VENOUS BLD VENIPUNCTURE: CPT | Performed by: HOSPITALIST

## 2018-04-29 RX ORDER — HYDRALAZINE HYDROCHLORIDE 20 MG/ML
10 INJECTION INTRAMUSCULAR; INTRAVENOUS
Status: DISCONTINUED | OUTPATIENT
Start: 2018-04-29 | End: 2018-05-07 | Stop reason: HOSPADM

## 2018-04-29 RX ORDER — INSULIN GLARGINE 100 [IU]/ML
10 INJECTION, SOLUTION SUBCUTANEOUS
Status: DISCONTINUED | OUTPATIENT
Start: 2018-04-29 | End: 2018-04-29

## 2018-04-29 RX ORDER — SODIUM CHLORIDE 0.9 % (FLUSH) 0.9 %
5-10 SYRINGE (ML) INJECTION AS NEEDED
Status: DISCONTINUED | OUTPATIENT
Start: 2018-04-29 | End: 2018-05-07 | Stop reason: HOSPADM

## 2018-04-29 RX ORDER — SODIUM CHLORIDE 0.9 % (FLUSH) 0.9 %
5-10 SYRINGE (ML) INJECTION EVERY 8 HOURS
Status: DISCONTINUED | OUTPATIENT
Start: 2018-04-29 | End: 2018-05-07 | Stop reason: HOSPADM

## 2018-04-29 RX ORDER — ATORVASTATIN CALCIUM 40 MG/1
40 TABLET, FILM COATED ORAL DAILY
Status: DISCONTINUED | OUTPATIENT
Start: 2018-04-29 | End: 2018-05-07 | Stop reason: HOSPADM

## 2018-04-29 RX ORDER — INSULIN GLARGINE 100 [IU]/ML
10 INJECTION, SOLUTION SUBCUTANEOUS
Status: DISCONTINUED | OUTPATIENT
Start: 2018-04-29 | End: 2018-05-01

## 2018-04-29 RX ADMIN — INSULIN LISPRO 4 UNITS: 100 INJECTION, SOLUTION INTRAVENOUS; SUBCUTANEOUS at 17:26

## 2018-04-29 RX ADMIN — CALCIUM CARBONATE (ANTACID) CHEW TAB 500 MG 400 MG: 500 CHEW TAB at 13:13

## 2018-04-29 RX ADMIN — CARVEDILOL 12.5 MG: 12.5 TABLET, FILM COATED ORAL at 09:05

## 2018-04-29 RX ADMIN — CALCIUM CARBONATE (ANTACID) CHEW TAB 500 MG 400 MG: 500 CHEW TAB at 17:24

## 2018-04-29 RX ADMIN — ZOLPIDEM TARTRATE 5 MG: 5 TABLET ORAL at 22:09

## 2018-04-29 RX ADMIN — SODIUM BICARBONATE 650 MG: 650 TABLET ORAL at 22:09

## 2018-04-29 RX ADMIN — ATORVASTATIN CALCIUM 40 MG: 40 TABLET, FILM COATED ORAL at 09:08

## 2018-04-29 RX ADMIN — INSULIN LISPRO 8 UNITS: 100 INJECTION, SOLUTION INTRAVENOUS; SUBCUTANEOUS at 22:09

## 2018-04-29 RX ADMIN — FUROSEMIDE 80 MG: 40 TABLET ORAL at 09:05

## 2018-04-29 RX ADMIN — SODIUM BICARBONATE 650 MG: 650 TABLET ORAL at 17:24

## 2018-04-29 RX ADMIN — CARVEDILOL 12.5 MG: 12.5 TABLET, FILM COATED ORAL at 17:24

## 2018-04-29 RX ADMIN — INSULIN LISPRO 3 UNITS: 100 INJECTION, SOLUTION INTRAVENOUS; SUBCUTANEOUS at 06:48

## 2018-04-29 RX ADMIN — PANTOPRAZOLE SODIUM 40 MG: 40 TABLET, DELAYED RELEASE ORAL at 06:48

## 2018-04-29 RX ADMIN — INSULIN GLARGINE 10 UNITS: 100 INJECTION, SOLUTION SUBCUTANEOUS at 14:42

## 2018-04-29 RX ADMIN — Medication 10 ML: at 22:10

## 2018-04-29 RX ADMIN — SODIUM BICARBONATE 650 MG: 650 TABLET ORAL at 09:05

## 2018-04-29 RX ADMIN — INSULIN LISPRO 4 UNITS: 100 INJECTION, SOLUTION INTRAVENOUS; SUBCUTANEOUS at 13:13

## 2018-04-29 RX ADMIN — CALCIUM CARBONATE (ANTACID) CHEW TAB 500 MG 400 MG: 500 CHEW TAB at 09:05

## 2018-04-29 RX ADMIN — HYDRALAZINE HYDROCHLORIDE 10 MG: 20 INJECTION INTRAMUSCULAR; INTRAVENOUS at 00:25

## 2018-04-29 RX ADMIN — HYDRALAZINE HYDROCHLORIDE 10 MG: 20 INJECTION INTRAMUSCULAR; INTRAVENOUS at 17:24

## 2018-04-29 NOTE — PROGRESS NOTES
Bedside and Verbal shift change report given to Isreal Davison (oncoming nurse) by Oscar Adams (offgoing nurse). Report included the following information SBAR, ED Summary, OR Summary, Procedure Summary, MAR and Recent Results.

## 2018-04-29 NOTE — CONSULTS
[facility]   [address] [facility], [address] [place] [address] [address], [facility]  Phone: [phone]   [phone]       Nephrology Progress Note  Mecca Fuller     1960     [id number]  Date of Admission : 4/28/2018 04/29/18    CC: Follow up for ESRD      Assessment and Plan   New Onset ESRD  - Started dialysis this admission. Last HD 4/28  - Next HD tomorrow   - Out pt HD to be set up w/ Dr Elayne Terrazas at [address] [place] [address] dialysis unit     R sided Pleural effusion w/ Trapped Lung   - for VATS and decortication planned for Tuesday     Anemia of CKD     Solitary Kidney   S/p Prior Left Nephrectomy for RCC    Sec HTPH  - continue Binders      Interval History:  Mr Marisol Napoles had dialysis yesterday at [facility] and then transferred her for thoracic surgery eval   He denies any N/V/CP/SOB at this time       Review of Systems: Pertinent items are noted in HPI.     Current Medications:   Current Facility-Administered Medications   Medication Dose Route Frequency    hydrALAZINE (APRESOLINE) 20 mg/mL injection 10 mg  10 mg IntraVENous Q6H PRN    atorvastatin (LIPITOR) tablet 40 mg  40 mg Oral DAILY    insulin glargine (LANTUS) injection 10 Units  10 Units SubCUTAneous ACL    sodium bicarbonate tablet 650 mg  650 mg Oral TID    acetaminophen (TYLENOL) tablet 650 mg  650 mg Oral Q6H PRN    calcium carbonate (TUMS) chewable tablet 400 mg [elemental]  400 mg Oral TID WITH MEALS    [START ON 5/1/2018] epoetin tyler (EPOGEN;PROCRIT) injection 20,000 Units  20,000 Units SubCUTAneous Q TUE, THU & SAT    furosemide (LASIX) tablet 80 mg  80 mg Oral DAILY    glucagon (GLUCAGEN) injection 1 mg  1 mg IntraMUSCular PRN    glucose chewable tablet 16 g  4 Tab Oral PRN    insulin lispro (HUMALOG) injection   SubCUTAneous AC&HS    lisinopril (PRINIVIL, ZESTRIL) tablet 5 mg  5 mg Oral DAILY    ondansetron (ZOFRAN) injection 4 mg  4 mg IntraVENous Q4H PRN    oxyCODONE IR (ROXICODONE) tablet 5 mg  5 mg Oral Q4H PRN    pantoprazole (PROTONIX) tablet 40 mg  40 mg Oral ACB    zolpidem (AMBIEN) tablet 5 mg  5 mg Oral QHS    carvedilol (COREG) tablet 12.5 mg  12.5 mg Oral BID WITH MEALS      No Known Allergies    Objective:  Vitals:    Vitals:    04/29/18 0030 04/29/18 0318 04/29/18 0816 04/29/18 1231   BP:  133/77 122/74 (!) 156/96   Pulse:  75 81 76   Resp:  18 17 16   Temp:  98.7 °F (37.1 °C) 97.8 °F (36.6 °C) 97.6 °F (36.4 °C)   SpO2:  91% 93% 94%   Weight: 89.7 kg (197 lb 12 oz)        Intake and Output:  04/29 0701 - 04/29 1900  In: -   Out: 150   04/27 1901 - 04/29 0700  In: 600 [P.O.:600]  Out: 1205 [Urine:1025]    Physical Examination:    General: NAD,Conversant   Neck:  Supple, no mass, RIJ permacath   Resp:  Diminished BS on R   CV:  RRR,  no murmur or rub, no LE edema  GI:  Soft, NT, + Bowel sounds, no hepatosplenomegaly  Neurologic:  Non focal  Skin:  No Rash  :  No bowling     []    High complexity decision making was performed  []    Patient is at high-risk of decompensation with multiple organ involvement    Lab Data Personally Reviewed: I have reviewed all the pertinent labs, microbiology data and radiology studies during assessment.     Recent Labs      04/29/18 0447 04/28/18 0038  04/27/18   0430   NA  132*  133*  138   K  4.2  4.5  4.1   CL  101  101  105   CO2  26  27  27   GLU  205*  228*  66   BUN  37*  50*  50*   CREA  3.51*  4.41*  4.00*   CA  6.6*  6.7*  6.8*   ALB  1.1*  1.3*  1.3*   SGOT  18  21  16   ALT  9*  11*  12     Recent Labs      04/29/18 0447 04/28/18 0038  04/27/18   0430   WBC  5.5  6.5  5.9   HGB  7.0*  7.3*  7.3*   HCT  22.4*  23.3*  22.5*   PLT  89*  92*  95*     No results found for: SDES  Lab Results   Component Value Date/Time    Culture result: MODERATE NORMAL RESPIRATORY BON 04/27/2018 12:21 PM    Culture result: LIGHT CAPNOCYTOPHAGA SPECIES (A) 04/27/2018 12:21 PM    Culture result: NO GROWTH 4 DAYS 04/24/2018 04:13 PM    Culture result: NO GROWTH 4 DAYS 04/24/2018 04:13 PM    Culture result: MODERATE APPARENT CANDIDA ALBICANS (A) 03/17/2018 11:39 PM    Culture result: FEW NORMAL RESPIRATORY BON 03/17/2018 11:39 PM     Recent Results (from the past 24 hour(s))   GLUCOSE, POC    Collection Time: 04/28/18  4:38 PM   Result Value Ref Range    Glucose (POC) 243 (H) 65 - 100 mg/dL    Performed by Radha Henriquez    GLUCOSE, POC    Collection Time: 04/28/18  9:41 PM   Result Value Ref Range    Glucose (POC) 362 (H) 65 - 100 mg/dL    Performed by Hitesh RIVAS    CBC W/O DIFF    Collection Time: 04/29/18  4:47 AM   Result Value Ref Range    WBC 5.5 4.1 - 11.1 K/uL    RBC 3.19 (L) 4.10 - 5.70 M/uL    HGB 7.0 (L) 12.1 - 17.0 g/dL    HCT 22.4 (L) 36.6 - 50.3 %    MCV 70.2 (L) 80.0 - 99.0 FL    MCH 21.9 (L) 26.0 - 34.0 PG    MCHC 31.3 30.0 - 36.5 g/dL    RDW 21.8 (H) 11.5 - 14.5 %    PLATELET 89 (L) 819 - 400 K/uL    NRBC 0.0 0  WBC    ABSOLUTE NRBC 0.00 0.00 - 6.76 K/uL   METABOLIC PANEL, COMPREHENSIVE    Collection Time: 04/29/18  4:47 AM   Result Value Ref Range    Sodium 132 (L) 136 - 145 mmol/L    Potassium 4.2 3.5 - 5.1 mmol/L    Chloride 101 97 - 108 mmol/L    CO2 26 21 - 32 mmol/L    Anion gap 5 5 - 15 mmol/L    Glucose 205 (H) 65 - 100 mg/dL    BUN 37 (H) 6 - 20 MG/DL    Creatinine 3.51 (H) 0.70 - 1.30 MG/DL    BUN/Creatinine ratio 11 (L) 12 - 20      GFR est AA 22 (L) >60 ml/min/1.73m2    GFR est non-AA 18 (L) >60 ml/min/1.73m2    Calcium 6.6 (L) 8.5 - 10.1 MG/DL    Bilirubin, total 0.3 0.2 - 1.0 MG/DL    ALT (SGPT) 9 (L) 12 - 78 U/L    AST (SGOT) 18 15 - 37 U/L    Alk.  phosphatase 115 45 - 117 U/L    Protein, total 7.2 6.4 - 8.2 g/dL    Albumin 1.1 (L) 3.5 - 5.0 g/dL    Globulin 6.1 (H) 2.0 - 4.0 g/dL    A-G Ratio 0.2 (L) 1.1 - 2.2     GLUCOSE, POC    Collection Time: 04/29/18  6:35 AM   Result Value Ref Range    Glucose (POC) 251 (H) 65 - 100 mg/dL    Performed by Sarah Hill    GLUCOSE, POC    Collection Time: 04/29/18 12:53 PM   Result Value Ref Range    Glucose (POC) 374 (H) 65 - 100 mg/dL    Performed by Lorena Granados            I have reviewed the flowsheets. Chart and Pertinent Notes have been reviewed. No change in PMH ,family and social history from Consult note.       Perlita Cuellar MD
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
## 2018-04-29 NOTE — PROGRESS NOTES
Problem: Falls - Risk of  Goal: *Absence of Falls  Document Chuck Fall Risk and appropriate interventions in the flowsheet. Outcome: Progressing Towards Goal  Fall Risk Interventions:  Mobility Interventions: Patient to call before getting OOB         Medication Interventions: Patient to call before getting OOB, Teach patient to arise slowly    Elimination Interventions: Call light in reach, Patient to call for help with toileting needs             Problem: Pressure Injury - Risk of  Goal: *Prevention of pressure injury  Document Ruddy Scale and appropriate interventions in the flowsheet.    Outcome: Progressing Towards Goal  Pressure Injury Interventions:  Sensory Interventions: Assess changes in LOC    Moisture Interventions: Absorbent underpads, Check for incontinence Q2 hours and as needed    Activity Interventions: Increase time out of bed    Mobility Interventions: Pressure redistribution bed/mattress (bed type)    Nutrition Interventions: Document food/fluid/supplement intake    Friction and Shear Interventions: Lift sheet

## 2018-04-29 NOTE — PROGRESS NOTES
Hospitalist Progress Note  Demarco Azul MD  Answering service: [phone] from in house phone  Cell:       Date of Service:  2018  NAME:  Bhargav Gong  :  1960  MRN:  [id number]      Admission Summary:   The patient is a 65-year-old -American gentleman who was initially admitted to [facility] with a presumed diagnosis of pneumonia/flu. Patient was found to be in renal failure. He had gained 20 pounds before admission. Patient at [facility] was diagnosed with metabolic acidosis due to renal failure. A chest x-ray was done, which showed large right-sided pleural effusion . Patient was subsequently transferred to [facility] for further evaluation. At [facility], patient was evaluated by pulmonology, cardiology. He had a hemopneumothorax, which was seen on CT chest on . Cytology of pleural fluid was negative. Patient was given TPA via chest tube on  to try and help break up loculations. He had a bronchoscopy done on , which did not reveal any endobronchial lesion. A transthoracic echocardiogram was done, which showed acute systolic heart failure with ejection fraction of 20-25%, which was suspected due to hypertension. He had a negative stress test.  On the , patient was seen by nephrology. A PermCath was placed . He has been getting dialysis since then. For hypertension, he was treated with Coreg and lisinopril. The patient was transfused with 1 unit of packed RBCs. He had a lower GI bleed. The patient was seen by Dr. Jonah Colmenares. The patient also had hyperosmolar nonketotic state for which he was treated with insulin    Interval history / Subjective:    Patint denies any new complaints had an uneventful night. Assessment & Plan:     1. Large R sided Pleural effusion  evaluation of VATS pending.   Cardiothoracic surgery is consulted. Dr. Chela Ryan will evaluate the patient. 2.  Systolic heart failure with ejection fraction of 20-25% with negative ischemia workup. We will continue current regimen on Coreg and Lisinopril. Lisinopril dose increased to 5 mg daily. 3.  History of chronic kidney disease, currently on dialysis. We will consult nephrology. 4.  History of accelerated hypertension, currently on Coreg, lisinopril, and Lasix. We will monitor blood pressure closely. Adjust meds if BP stays high. 5.  History of anemia, probably blood loss (he had BRBPR) and renal insufficiency. We will monitor his CBC. He is on Epogen. Hemoglobin is 7 today  6. History of diabetes. Monitor blood sugars closely. 7.  Hyperlipidemia. Change to high dose Statin. 8.  History of respiratory failure with hypoxia due to large right-sided pleural effusion, which has been drained. The patient has a pigtail catheter placed in.  9.  History of hemopneumothorax, we will ask thoracic surgery to evaluate. Pleural fluid Cytology is negative. 10.  DVT prophylaxis with SCDs due to recent GI blood loss. 11. DM : start on Lantus and monitor sugars. Add Bolus Insulin tomorrow Hemoglobin A 1 C ordered. Code status: full  DVT prophylaxis: SCD    Care Plan discussed with: Patient/Family  Disposition: Home w/Family and TBD     Hospital Problems  Date Reviewed: 4/28/2018          Codes Class Noted POA    * (Principal)Pleural effusion on right ICD-10-CM: J90  ICD-9-CM: 511.9  4/27/2018 Yes    Overview Signed 4/27/2018  2:09 PM by Rosita Hunt NP     4/24/18 CT placed with 2200cc out                     Review of Systems:   A comprehensive review of systems was negative except for that written in the HPI. Vital Signs:    Last 24hrs VS reviewed since prior progress note.  Most recent are:  Visit Vitals    /74 (BP 1 Location: Left arm, BP Patient Position: Sitting)    Pulse 81    Temp 97.8 °F (36.6 °C)    Resp 17    Wt 89.7 kg (197 lb 12 oz)    SpO2 93%    BMI 24.72 kg/m2         Intake/Output Summary (Last 24 hours) at 04/29/18 0859  Last data filed at 04/29/18 0816   Gross per 24 hour   Intake              600 ml   Output             1355 ml   Net             -755 ml        Physical Examination:             Constitutional:  No acute distress, cooperative, pleasant    ENT:  Oral mucous moist, oropharynx benign. Neck supple,    Resp:  CTA bilaterally. No wheezing/rhonchi/rales. No accessory muscle use   CV:  Regular rhythm, normal rate, no murmurs, gallops, rubs    GI:  Soft, non distended, non tender. normoactive bowel sounds, no hepatosplenomegaly     Musculoskeletal:  No edema, warm, 2+ pulses throughout    Neurologic:  Moves all extremities. AAOx3, CN II-XII reviewed     Psych:  Good insight, Not anxious nor agitated. Data Review:    Review and/or order of clinical lab test      Labs:     Recent Labs      04/29/18 0447 04/28/18   0038   WBC  5.5  6.5   HGB  7.0*  7.3*   HCT  22.4*  23.3*   PLT  89*  92*     Recent Labs      04/29/18 0447 04/28/18   0038  04/27/18   0430   NA  132*  133*  138   K  4.2  4.5  4.1   CL  101  101  105   CO2  26  27  27   BUN  37*  50*  50*   CREA  3.51*  4.41*  4.00*   GLU  205*  228*  66   CA  6.6*  6.7*  6.8*     Recent Labs      04/29/18 0447 04/28/18 0038  04/27/18   0430   SGOT  18  21  16   ALT  9*  11*  12   AP  115  122*  118*   TBILI  0.3  0.2  0.2   TP  7.2  7.4  7.1   ALB  1.1*  1.3*  1.3*   GLOB  6.1*  6.1*  5.8*     No results for input(s): INR, PTP, APTT in the last 72 hours. No lab exists for component: INREXT   No results for input(s): FE, TIBC, PSAT, FERR in the last 72 hours. Lab Results   Component Value Date/Time    Folate 5.9 02/13/2018 08:12 PM      No results for input(s): PH, PCO2, PO2 in the last 72 hours. No results for input(s): CPK, CKNDX, TROIQ in the last 72 hours.     No lab exists for component: CPKMB  Lab Results   Component Value Date/Time    Cholesterol, total 115 01/22/2018 03:55 PM    HDL Cholesterol 44 01/22/2018 03:55 PM    LDL, calculated 48 01/22/2018 03:55 PM    Triglyceride 117 01/22/2018 03:55 PM     Lab Results   Component Value Date/Time    Glucose (POC) 251 (H) 04/29/2018 06:35 AM    Glucose (POC) 362 (H) 04/28/2018 09:41 PM    Glucose (POC) 243 (H) 04/28/2018 04:38 PM    Glucose (POC) 168 (H) 04/28/2018 11:49 AM    Glucose (POC) 182 (H) 04/28/2018 10:26 AM     Lab Results   Component Value Date/Time    Color YELLOW/STRAW 03/16/2018 12:39 PM    Appearance CLEAR 03/16/2018 12:39 PM    Specific gravity 1.014 03/16/2018 12:39 PM    Specific gravity 1.015 02/13/2018 11:17 AM    pH (UA) 5.0 03/16/2018 12:39 PM    Protein 300 (A) 03/16/2018 12:39 PM    Glucose NEGATIVE  03/16/2018 12:39 PM    Ketone NEGATIVE  03/16/2018 12:39 PM    Bilirubin NEGATIVE  03/16/2018 12:39 PM    Urobilinogen 0.2 03/16/2018 12:39 PM    Nitrites NEGATIVE  03/16/2018 12:39 PM    Leukocyte Esterase NEGATIVE  03/16/2018 12:39 PM    Epithelial cells FEW 03/16/2018 12:39 PM    Bacteria 1+ (A) 03/16/2018 12:39 PM    WBC 5-10 03/16/2018 12:39 PM    RBC 10-20 03/16/2018 12:39 PM         Medications Reviewed:     Current Facility-Administered Medications   Medication Dose Route Frequency    hydrALAZINE (APRESOLINE) 20 mg/mL injection 10 mg  10 mg IntraVENous Q6H PRN    atorvastatin (LIPITOR) tablet 40 mg  40 mg Oral DAILY    sodium bicarbonate tablet 650 mg  650 mg Oral TID    acetaminophen (TYLENOL) tablet 650 mg  650 mg Oral Q6H PRN    calcium carbonate (TUMS) chewable tablet 400 mg [elemental]  400 mg Oral TID WITH MEALS    [START ON 5/1/2018] epoetin tyler (EPOGEN;PROCRIT) injection 20,000 Units  20,000 Units SubCUTAneous Q TUE, THU & SAT    furosemide (LASIX) tablet 80 mg  80 mg Oral DAILY    glucagon (GLUCAGEN) injection 1 mg  1 mg IntraMUSCular PRN    glucose chewable tablet 16 g  4 Tab Oral PRN    insulin lispro (HUMALOG) injection   SubCUTAneous AC&HS    lisinopril (PRINIVIL, ZESTRIL) tablet 5 mg  5 mg Oral DAILY    ondansetron (ZOFRAN) injection 4 mg  4 mg IntraVENous Q4H PRN    oxyCODONE IR (ROXICODONE) tablet 5 mg  5 mg Oral Q4H PRN    pantoprazole (PROTONIX) tablet 40 mg  40 mg Oral ACB    zolpidem (AMBIEN) tablet 5 mg  5 mg Oral QHS    carvedilol (COREG) tablet 12.5 mg  12.5 mg Oral BID WITH MEALS     ______________________________________________________________________  EXPECTED LENGTH OF STAY: - - -  ACTUAL LENGTH OF STAY:          1                 Luiz Dowd MD

## 2018-04-29 NOTE — H&P
[address]   HISTORY AND PHYSICAL      Jose A Barlow.  MR#: [id number]  : 1960  ACCOUNT #: [de-identified]   ADMIT DATE: 2018    PRIMARY CARE PHYSICIAN:  Dr. Lisa Quinonez. HISTORY OF PRESENT ILLNESS:  The patient is a 61-year-old -American gentleman who was initially admitted to [facility] with a presumed diagnosis of pneumonia/flu. Patient was found to be in renal failure. He had gained 20 pounds before admission. Patient at [facility] was diagnosed with metabolic acidosis due to renal failure. A chest x-ray was done, which showed large right-sided pleural effusion . Patient was subsequently transferred to [facility] for further evaluation. At [facility] [place], patient was evaluated by pulmonology, cardiology. He had a hemopneumothorax, which was seen on CT chest on . Cytology of pleural fluid was negative. Patient was given TPA via chest tube on  to try and help break up loculations. He had a bronchoscopy done on , which did not reveal any endobronchial lesion. A transthoracic echocardiogram was done, which showed acute systolic heart failure with ejection fraction of 20-25%, which was suspected due to hypertension. He had a negative stress test.  On the , patient was seen by nephrology. A PermCath was placed . He has been getting dialysis since then. For hypertension, he was treated with Coreg and lisinopril. The patient was transfused with 1 unit of packed RBCs. He had a lower GI bleed. The patient was seen by Dr. Alivia Real. The patient also had hyperosmolar nonketotic state for which he was treated with insulin. The patient's case was discussed finally with thoracic surgery, Dr. Rex Gomes, who accepted the patient to be transferred for possible VATS. At this time, patient has no new complaints. PAST MEDICAL HISTORY:  Significant for:  1. Pancreatitis.   2. Diabetes. 3.  Hypertension. 4.  History of pancreatic pseudocyst.  5.  History of Peyronie's disease. 6.  History of renal cancer status post Nephrectomy. 7.  History of diabetes. 8.  Stage V renal disease. REVIEW OF SYSTEMS:  Negative except as mentioned in history of presenting illness. All other systems were reviewed. No other positive finding was noticed. CURRENT MEDICATIONS:  Include the following:  NPH 6 units before meals and nightly; Lasix 80 mg daily; oxycodone 5 mg q.4 p.r.n.; Epogen every Tuesday, Thursday and Saturday; Protonix 40 mg daily; Humalog 3 units before meals; and Coreg 12.5 mg b.i.d. FAMILY HISTORY:  Unremarkable. SOCIOECONOMIC HISTORY:  Patient does not smoke or drink. He is . FAMILY HISTORY:  Significant for heart disease. CODE STATUS:  FULL CODE. PHYSICAL EXAMINATION:    GENERAL:  The patient is a 82-year-old gentleman, not in any acute distress. VITAL SIGNS:  Temperature 97.9, blood pressure 180/80, pulse is 78, respiratory rate of 17, saturating 99% on room air. HEENT:  Reveals pupils equally reactive to light and accommodation. NECK:  Supple. There is no lymphadenopathy or JVD. LUNGS:  Clear. No wheezing or crackles. He had a catheter placed. HEART:  S1 and S2 regular. No murmur, no S3.  ABDOMEN:  Reveals no tenderness. No guarding or rigidity. Bowel sounds are active. EXTREMITIES:  Decreased peripheral pulses. CENTRAL NERVOUS SYSTEM:  Reveals the patient is alert, oriented, has normal strength, normal reflexes. Plantars downgoing. Cranial nerves are normal.    LABORATORY WORK:  Done today reveals a white count of 6.5, hemoglobin of 7.3, hematocrit of , MCV 69.3, platelet count is 45,533.   Chemistry revealed sodium 133, potassium 4.5, chloride , bicarb is 27, gap of 5, glucose 228, BUN is 50, creatinine is 4.41, calcium was 6.7, bilirubin 0.2 protein 7.4, albumin 1.3, globulin 6.1, ALT 11, AST 21, alkaline phosphatase 122.    IMAGING:  A CT chest which was done on 04/25 revealed a right hemopneumothorax without pleural mass, small left pleural effusion. A chest x-ray done on 04/26 reveals right basilar pigtail in satisfactory position. ASSESSMENT AND PLAN:  1. The patient is a 59-year-old gentleman with multiple medical problems as mentioned in HPI. He was transferred here for evaluation of VATS. Cardiothoracic surgery is consulted. Dr. Layla Wilks will evaluate the patient, notified already. 2.  Systolic heart failure with ejection fraction of 20-25% with negative ischemia workup. We will continue current regimen on Coreg and Lisinopril. 3.  History of chronic kidney disease, currently on dialysis. We will consult nephrology. 4.  History of accelerated hypertension, currently on Coreg, lisinopril, and Lasix. We will monitor blood pressure closely. 5.  History of anemia, probably blood loss due to renal insufficiency. We will monitor his CBC. He is on Epogen. 6.  History of diabetes. Monitor blood sugars closely. He had HONK at [facility]. 7.  Hyperlipidemia. Probably will need statin once he is more stable. 8.  History of respiratory failure with hypoxia due to large right-sided pleural effusion, which has been drained. The patient has a pigtail catheter placed in.  9.  History of hemopneumothorax, we will ask thoracic surgery to evaluate. Pleural fluid Cytology is negative. 10.  DVT prophylaxis with SCDs. 11. Solitary R kidney; s/p previous L Nephrectomy for RCC.   12. History of lower GI blood loss      MD CLAUDIA Street/MORRIS  D: 04/28/2018 19:42     T: 04/28/2018 20:28  JOB #: [id number]

## 2018-04-29 NOTE — ROUTINE PROCESS
Bedside and Verbal shift change report given to [address] (oncoming nurse) by Barbara Lopez RN (offgoing nurse). Report included the following information SBAR, ED Summary, OR Summary, MAR and Recent Results.  NSR

## 2018-04-29 NOTE — PROGRESS NOTES
Problem: Pressure Injury - Risk of  Goal: *Prevention of pressure injury  Document Ruddy Scale and appropriate interventions in the flowsheet.    Outcome: Progressing Towards Goal  Pressure Injury Interventions:  Sensory Interventions: Assess need for specialty bed, Assess changes in LOC, Keep linens dry and wrinkle-free, Pressure redistribution bed/mattress (bed type)    Moisture Interventions: Check for incontinence Q2 hours and as needed, Absorbent underpads    Activity Interventions: Increase time out of bed, Assess need for specialty bed    Mobility Interventions: Chair cushion, HOB 30 degrees or less    Nutrition Interventions: Document food/fluid/supplement intake, Discuss nutritional consult with provider, Offer support with meals,snacks and hydration    Friction and Shear Interventions: Apply protective barrier, creams and emollients, Lift sheet

## 2018-04-30 ENCOUNTER — APPOINTMENT (OUTPATIENT)
Dept: GENERAL RADIOLOGY | Age: 58
DRG: 163 | End: 2018-04-30
Attending: NURSE PRACTITIONER
Payer: MEDICARE

## 2018-04-30 ENCOUNTER — ANESTHESIA EVENT (OUTPATIENT)
Dept: SURGERY | Age: 58
DRG: 163 | End: 2018-04-30
Payer: MEDICARE

## 2018-04-30 LAB
ANION GAP SERPL CALC-SCNC: 5 MMOL/L (ref 5–15)
BUN SERPL-MCNC: 38 MG/DL (ref 6–20)
BUN/CREAT SERPL: 10 (ref 12–20)
CALCIUM SERPL-MCNC: 6.6 MG/DL (ref 8.5–10.1)
CHLORIDE SERPL-SCNC: 102 MMOL/L (ref 97–108)
CO2 SERPL-SCNC: 26 MMOL/L (ref 21–32)
CREAT SERPL-MCNC: 3.77 MG/DL (ref 0.7–1.3)
ERYTHROCYTE [DISTWIDTH] IN BLOOD BY AUTOMATED COUNT: 21.3 % (ref 11.5–14.5)
EST. AVERAGE GLUCOSE BLD GHB EST-MCNC: 203 MG/DL
GLUCOSE BLD STRIP.AUTO-MCNC: 102 MG/DL (ref 65–100)
GLUCOSE BLD STRIP.AUTO-MCNC: 158 MG/DL (ref 65–100)
GLUCOSE BLD STRIP.AUTO-MCNC: 290 MG/DL (ref 65–100)
GLUCOSE BLD STRIP.AUTO-MCNC: 392 MG/DL (ref 65–100)
GLUCOSE SERPL-MCNC: 209 MG/DL (ref 65–100)
HBA1C MFR BLD: 8.7 % (ref 4.2–6.3)
HCT VFR BLD AUTO: 23 % (ref 36.6–50.3)
HGB BLD-MCNC: 7.2 G/DL (ref 12.1–17)
MCH RBC QN AUTO: 21.9 PG (ref 26–34)
MCHC RBC AUTO-ENTMCNC: 31.3 G/DL (ref 30–36.5)
MCV RBC AUTO: 69.9 FL (ref 80–99)
NRBC # BLD: 0 K/UL (ref 0–0.01)
NRBC BLD-RTO: 0 PER 100 WBC
PLATELET # BLD AUTO: 109 K/UL (ref 150–400)
POTASSIUM SERPL-SCNC: 4.5 MMOL/L (ref 3.5–5.1)
RBC # BLD AUTO: 3.29 M/UL (ref 4.1–5.7)
SERVICE CMNT-IMP: ABNORMAL
SODIUM SERPL-SCNC: 133 MMOL/L (ref 136–145)
WBC # BLD AUTO: 5.7 K/UL (ref 4.1–11.1)

## 2018-04-30 PROCEDURE — 86900 BLOOD TYPING SEROLOGIC ABO: CPT | Performed by: THORACIC SURGERY (CARDIOTHORACIC VASCULAR SURGERY)

## 2018-04-30 PROCEDURE — 83036 HEMOGLOBIN GLYCOSYLATED A1C: CPT | Performed by: HOSPITALIST

## 2018-04-30 PROCEDURE — 74011636637 HC RX REV CODE- 636/637: Performed by: HOSPITALIST

## 2018-04-30 PROCEDURE — 86923 COMPATIBILITY TEST ELECTRIC: CPT | Performed by: THORACIC SURGERY (CARDIOTHORACIC VASCULAR SURGERY)

## 2018-04-30 PROCEDURE — 36415 COLL VENOUS BLD VENIPUNCTURE: CPT | Performed by: HOSPITALIST

## 2018-04-30 PROCEDURE — 74011636637 HC RX REV CODE- 636/637: Performed by: INTERNAL MEDICINE

## 2018-04-30 PROCEDURE — 80048 BASIC METABOLIC PNL TOTAL CA: CPT | Performed by: HOSPITALIST

## 2018-04-30 PROCEDURE — 85027 COMPLETE CBC AUTOMATED: CPT | Performed by: HOSPITALIST

## 2018-04-30 PROCEDURE — 71045 X-RAY EXAM CHEST 1 VIEW: CPT

## 2018-04-30 PROCEDURE — 74011250636 HC RX REV CODE- 250/636: Performed by: HOSPITALIST

## 2018-04-30 PROCEDURE — 74011250637 HC RX REV CODE- 250/637: Performed by: INTERNAL MEDICINE

## 2018-04-30 PROCEDURE — 74011250637 HC RX REV CODE- 250/637: Performed by: HOSPITALIST

## 2018-04-30 PROCEDURE — 90935 HEMODIALYSIS ONE EVALUATION: CPT

## 2018-04-30 PROCEDURE — 82962 GLUCOSE BLOOD TEST: CPT

## 2018-04-30 PROCEDURE — 65660000000 HC RM CCU STEPDOWN

## 2018-04-30 RX ORDER — SODIUM CHLORIDE 9 MG/ML
250 INJECTION, SOLUTION INTRAVENOUS AS NEEDED
Status: DISCONTINUED | OUTPATIENT
Start: 2018-04-30 | End: 2018-05-01

## 2018-04-30 RX ORDER — LISINOPRIL 10 MG/1
10 TABLET ORAL DAILY
Status: DISCONTINUED | OUTPATIENT
Start: 2018-05-01 | End: 2018-05-01

## 2018-04-30 RX ORDER — INSULIN LISPRO 100 [IU]/ML
8 INJECTION, SOLUTION INTRAVENOUS; SUBCUTANEOUS ONCE
Status: COMPLETED | OUTPATIENT
Start: 2018-04-30 | End: 2018-04-30

## 2018-04-30 RX ORDER — DIPHENHYDRAMINE HYDROCHLORIDE 50 MG/ML
25 INJECTION, SOLUTION INTRAMUSCULAR; INTRAVENOUS
Status: COMPLETED | OUTPATIENT
Start: 2018-04-30 | End: 2018-05-01

## 2018-04-30 RX ADMIN — CALCIUM CARBONATE (ANTACID) CHEW TAB 500 MG 400 MG: 500 CHEW TAB at 10:01

## 2018-04-30 RX ADMIN — INSULIN LISPRO 8 UNITS: 100 INJECTION, SOLUTION INTRAVENOUS; SUBCUTANEOUS at 21:59

## 2018-04-30 RX ADMIN — INSULIN LISPRO 3 UNITS: 100 INJECTION, SOLUTION INTRAVENOUS; SUBCUTANEOUS at 16:33

## 2018-04-30 RX ADMIN — Medication 8 ML: at 06:00

## 2018-04-30 RX ADMIN — ATORVASTATIN CALCIUM 40 MG: 40 TABLET, FILM COATED ORAL at 10:01

## 2018-04-30 RX ADMIN — FUROSEMIDE 80 MG: 40 TABLET ORAL at 10:01

## 2018-04-30 RX ADMIN — PANTOPRAZOLE SODIUM 40 MG: 40 TABLET, DELAYED RELEASE ORAL at 10:01

## 2018-04-30 RX ADMIN — CALCIUM CARBONATE (ANTACID) CHEW TAB 500 MG 400 MG: 500 CHEW TAB at 12:29

## 2018-04-30 RX ADMIN — SODIUM BICARBONATE 650 MG: 650 TABLET ORAL at 21:47

## 2018-04-30 RX ADMIN — HYDRALAZINE HYDROCHLORIDE 10 MG: 20 INJECTION INTRAMUSCULAR; INTRAVENOUS at 16:32

## 2018-04-30 RX ADMIN — Medication 10 ML: at 21:47

## 2018-04-30 RX ADMIN — ZOLPIDEM TARTRATE 5 MG: 5 TABLET ORAL at 21:47

## 2018-04-30 RX ADMIN — INSULIN GLARGINE 10 UNITS: 100 INJECTION, SOLUTION SUBCUTANEOUS at 12:29

## 2018-04-30 RX ADMIN — SODIUM BICARBONATE 650 MG: 650 TABLET ORAL at 16:34

## 2018-04-30 RX ADMIN — CALCIUM CARBONATE (ANTACID) CHEW TAB 500 MG 400 MG: 500 CHEW TAB at 16:34

## 2018-04-30 RX ADMIN — SODIUM BICARBONATE 650 MG: 650 TABLET ORAL at 10:01

## 2018-04-30 RX ADMIN — CARVEDILOL 12.5 MG: 12.5 TABLET, FILM COATED ORAL at 10:01

## 2018-04-30 RX ADMIN — LISINOPRIL 5 MG: 5 TABLET ORAL at 10:01

## 2018-04-30 RX ADMIN — CARVEDILOL 12.5 MG: 12.5 TABLET, FILM COATED ORAL at 16:34

## 2018-04-30 RX ADMIN — Medication 10 ML: at 16:34

## 2018-04-30 NOTE — PROGRESS NOTES
Thoracic Surgery Simple Progress Note    Admit Date: 2018  POD: * No surgery found *      Procedure:  Right pigtail catheter insertion      Subjective:     Patient has complaints: Wants to eat    Review of Systems:    CARDIAC: positive for HTN  RESP: positive for pleural effusion  NEURO:  negative  EXT: Denies new swelling or pain in the legs or calves. Objective:     Blood pressure 150/88, pulse 75, temperature 98.2 °F (36.8 °C), resp. rate 18, weight 185 lb 10 oz (84.2 kg), SpO2 99 %. Temp (24hrs), Av.1 °F (36.7 °C), Min:97.6 °F (36.4 °C), Max:98.6 °F (37 °C)        Hemodynamics    PAP    CO    CI        1901 -  0700  In: 1160 [P.O.:1160]  Out: 8138 [Urine:]    EXAM:  GENERAL: VSS, afrible, alert and cooperative  HEART:  regular rate and rhythm  LUNG: clear to auscultation bilaterally, diminished breath sounds R base, pigtail catheter in place on -40 cm of suction. CXR shows improved PTX but not resolved. Drained 650 ml yesterday  NEURO:  normal without focal findings  mental status, speech normal, alert and oriented x iii  EXTREMITIES:No evidence of DVT seen on physical exam.  GI/: Abd soft, nonterder with + bowel sounds.  HD today    Labs:  Recent Results (from the past 24 hour(s))   GLUCOSE, POC    Collection Time: 18 12:53 PM   Result Value Ref Range    Glucose (POC) 374 (H) 65 - 100 mg/dL    Performed by Isabelle Martinez, POC    Collection Time: 18  5:08 PM   Result Value Ref Range    Glucose (POC) 327 (H) 65 - 100 mg/dL    Performed by Nelly Phoenix    GLUCOSE, POC    Collection Time: 18 10:02 PM   Result Value Ref Range    Glucose (POC) 372 (H) 65 - 100 mg/dL    Performed by Ricci Kelley    METABOLIC PANEL, BASIC    Collection Time: 18  1:55 AM   Result Value Ref Range    Sodium 133 (L) 136 - 145 mmol/L    Potassium 4.5 3.5 - 5.1 mmol/L    Chloride 102 97 - 108 mmol/L    CO2 26 21 - 32 mmol/L    Anion gap 5 5 - 15 mmol/L    Glucose 209 (H) 65 - 100 mg/dL    BUN 38 (H) 6 - 20 MG/DL    Creatinine 3.77 (H) 0.70 - 1.30 MG/DL    BUN/Creatinine ratio 10 (L) 12 - 20      GFR est AA 20 (L) >60 ml/min/1.73m2    GFR est non-AA 17 (L) >60 ml/min/1.73m2    Calcium 6.6 (L) 8.5 - 10.1 MG/DL   CBC W/O DIFF    Collection Time: 04/30/18  1:55 AM   Result Value Ref Range    WBC 5.7 4.1 - 11.1 K/uL    RBC 3.29 (L) 4.10 - 5.70 M/uL    HGB 7.2 (L) 12.1 - 17.0 g/dL    HCT 23.0 (L) 36.6 - 50.3 %    MCV 69.9 (L) 80.0 - 99.0 FL    MCH 21.9 (L) 26.0 - 34.0 PG    MCHC 31.3 30.0 - 36.5 g/dL    RDW 21.3 (H) 11.5 - 14.5 %    PLATELET 127 (L) 024 - 400 K/uL    NRBC 0.0 0  WBC    ABSOLUTE NRBC 0.00 0.00 - 0.01 K/uL   HEMOGLOBIN A1C WITH EAG    Collection Time: 04/30/18  1:55 AM   Result Value Ref Range    Hemoglobin A1c 8.7 (H) 4.2 - 6.3 %    Est. average glucose 203 mg/dL   GLUCOSE, POC    Collection Time: 04/30/18  6:32 AM   Result Value Ref Range    Glucose (POC) 102 (H) 65 - 100 mg/dL    Performed by Andrea Channel        Assessment:   No evidence of DVT.   Principal Problem:    Pleural effusion on right (4/27/2018)      Overview: 4/24/18 CT placed with 2200cc out    Active Problems:    Pleural effusion (4/29/2018)      Plan/Recommendations:   OK to eat today  Plan RVATS, decortication by Dr Doug Garces for HD and transfusion today  Leave chest tube to suction    See orders    Signed By: Callie Ray CIARA

## 2018-04-30 NOTE — PROGRESS NOTES
Problem: Diabetes Self-Management  Goal: *Disease process and treatment process  Define diabetes and identify own type of diabetes; list 3 options for treating diabetes. Outcome: Not Progressing Towards Goal  Patient has hyperglycemia and still asks for sugary snacks.  DM education provided

## 2018-04-30 NOTE — PROGRESS NOTES
Pulmonary, Critical Care, and Sleep Medicine~Progress Note    Name: Lizeth Guevara MRN: [id number]   : 1960 Hospital: [address]   Date: 2018 10:51 AM Admission: 2018     Impression Plan   1. Right large bloody pleural effusion; s/p chest tube placement. Pathology without neoplasia. Not on anticoagulants and denies chest trauma   2. Hx of RCC, in 82 lft nephrectomy   3. Acute on chronic Renal failure  4. HTN  5. HFrEF,  stress test EF was 38%  6. Nonsmoker, per patient life long  1. Noted VATS this week  2. Chest tube per thoracic  3. O2 titration above 9O%  4. On diuretics   5. protonix   6. OOB as tolerated      Daily Progression:    Breathing is stable; no acute complaints     I have reviewed the labs and previous days notes. Pertinent items are noted in HPI.     OBJECTIVE:     Vital Signs:       Visit Vitals    /88 (BP 1 Location: Left arm, BP Patient Position: At rest)    Pulse 75    Temp 98.2 °F (36.8 °C)    Resp 18    Wt 84.2 kg (185 lb 10 oz)    SpO2 99%    BMI 23.2 kg/m2      Temp (24hrs), Av.1 °F (36.7 °C), Min:97.6 °F (36.4 °C), Max:98.6 °F (37 °C)     Intake/Output:     Last shift:      Last 3 shifts:  1901 -  0700  In: 1160 [P.O.:1160]  Out: 8577 [Urine:]        Intake/Output Summary (Last 24 hours) at 18 1051  Last data filed at 18 0415   Gross per 24 hour   Intake              560 ml   Output             1490 ml   Net             -930 ml       Physical Exam:                                        Exam Findings Other   General: No resp distress noted, appears stated age    HEENT:  No ulcers, JVD not elevated, no cervical LAD    Chest: No pectus deformity, normal chest rise b/l    HEART:  RRR, no murmurs/rubs/gallops    Lungs:  CTA b/l, no rhonchi/crackles/wheeze, diminished BS at bases    ABD: Soft/NT, non rigid mildly distended    EXT: No cyanosis/clubbing/edema, normal peripheral pulses    Skin: No rashes or ulcers, no mottling    Neuro: A/O x 3        Medications:  Current Facility-Administered Medications   Medication Dose Route Frequency    hydrALAZINE (APRESOLINE) 20 mg/mL injection 10 mg  10 mg IntraVENous Q6H PRN    atorvastatin (LIPITOR) tablet 40 mg  40 mg Oral DAILY    sodium chloride (NS) flush 5-10 mL  5-10 mL IntraVENous Q8H    sodium chloride (NS) flush 5-10 mL  5-10 mL IntraVENous PRN    insulin glargine (LANTUS) injection 10 Units  10 Units SubCUTAneous ACL    sodium bicarbonate tablet 650 mg  650 mg Oral TID    acetaminophen (TYLENOL) tablet 650 mg  650 mg Oral Q6H PRN    calcium carbonate (TUMS) chewable tablet 400 mg [elemental]  400 mg Oral TID WITH MEALS    [START ON 5/1/2018] epoetin tyler (EPOGEN;PROCRIT) injection 20,000 Units  20,000 Units SubCUTAneous Q TUE, THU & SAT    furosemide (LASIX) tablet 80 mg  80 mg Oral DAILY    glucagon (GLUCAGEN) injection 1 mg  1 mg IntraMUSCular PRN    glucose chewable tablet 16 g  4 Tab Oral PRN    insulin lispro (HUMALOG) injection   SubCUTAneous AC&HS    lisinopril (PRINIVIL, ZESTRIL) tablet 5 mg  5 mg Oral DAILY    ondansetron (ZOFRAN) injection 4 mg  4 mg IntraVENous Q4H PRN    oxyCODONE IR (ROXICODONE) tablet 5 mg  5 mg Oral Q4H PRN    pantoprazole (PROTONIX) tablet 40 mg  40 mg Oral ACB    zolpidem (AMBIEN) tablet 5 mg  5 mg Oral QHS    carvedilol (COREG) tablet 12.5 mg  12.5 mg Oral BID WITH MEALS       Labs:  ABG No results for input(s): PHI, PCO2I, PO2I, HCO3I, SO2I, FIO2I in the last 72 hours.      CBC Recent Labs      04/30/18   0155  04/29/18   0447  04/28/18   0038   WBC  5.7  5.5  6.5   HGB  7.2*  7.0*  7.3*   HCT  23.0*  22.4*  23.3*   PLT  109*  89*  92*   MCV  69.9*  70.2*  69.3*   MCH  21.9*  21.9*  57.1*        Metabolic  Panel Recent Labs      04/30/18   0155  04/29/18   0447  04/28/18   0038   NA  133*  132*  133*   K  4.5  4.2  4.5   CL  102  101  101 CO2  26  26  27   GLU  209*  205*  228*   BUN  38*  37*  50*   CREA  3.77*  3.51*  4.41*   CA  6.6*  6.6*  6.7*   ALB   --   1.1*  1.3*   SGOT   --   18  21   ALT   --   9*  11*        Pertinent Labs                JOSEE Covington  4/30/2018

## 2018-04-30 NOTE — PROGRESS NOTES
Problem: Risk for Spread of Infection  Goal: Prevent transmission of infectious organism to others  Prevent the transmission of infectious organisms to other patients, staff members, and visitors. Outcome: Progressing Towards Goal  Receives hemodialysis-need to put on contact precautions and send a MRSA swab of nares.  Patient was a patient at [facility] [address] before [address] [place] [address] Sw

## 2018-04-30 NOTE — ROUTINE PROCESS
Bedside shift change report given to Mary (oncoming nurse) by Milvia Bland (offgoing nurse). Report included the following information SBAR, Intake/Output, Med Rec Status and Cardiac Rhythm NSR.

## 2018-04-30 NOTE — PROGRESS NOTES
Problem: Falls - Risk of  Goal: *Absence of Falls  Document Chuck Fall Risk and appropriate interventions in the flowsheet. Outcome: Progressing Towards Goal  Fall Risk Interventions:  Mobility Interventions: Patient to call before getting OOB         Medication Interventions: Patient to call before getting OOB, Teach patient to arise slowly    Elimination Interventions: Call light in reach             Problem: Pressure Injury - Risk of  Goal: *Prevention of pressure injury  Document Ruddy Scale and appropriate interventions in the flowsheet.    Outcome: Progressing Towards Goal  Pressure Injury Interventions:  Sensory Interventions: Assess changes in LOC    Moisture Interventions: Absorbent underpads, Limit adult briefs    Activity Interventions: Increase time out of bed    Mobility Interventions: Pressure redistribution bed/mattress (bed type)    Nutrition Interventions: Document food/fluid/supplement intake    Friction and Shear Interventions: Lift sheet

## 2018-04-30 NOTE — PROGRESS NOTES
[facility]  [address]  ____________________________________________________________      Admit Date: 2018    POD/HD * No surgery date entered *    Procedure:  Procedure(s):  RIGHT THROACOSCOPY DECORTICATION    Subjective:     Patient has no new complaints. Objective:     Blood pressure 150/88, pulse 75, temperature 98.2 °F (36.8 °C), resp. rate 18, weight 185 lb 10 oz (84.2 kg), SpO2 99 %.     Temp (24hrs), Av.1 °F (36.7 °C), Min:97.7 °F (36.5 °C), Max:98.6 °F (37 °C)        Date 18 07 - 18 0659   Shift 3299-9353 9129-3351 2834-2150 24 Hour Total   I  N  T  A  K  E   Shift Total  (mL/kg)       O  U  T  P  U  T   Urine  (mL/kg/hr) 150   150    Chest Tube 100   100    Shift Total  (mL/kg) 250  (3)   250  (3)   Weight (kg) 84.2 84.2 84.2 84.2         Date 18 07 - 18 0659   Shift 7071-0216 4578-1386 1455-2647 24 Hour Total   I  N  T  A  K  E   Shift Total  (mL/kg)       O  U  T  P  U  T   Urine  (mL/kg/hr) 150   150    Chest Tube 100   100    Shift Total  (mL/kg) 250  (3)   250  (3)   Weight (kg) 84.2 84.2 84.2 84.2         Physical Exam:  GENERAL: alert, cooperative, no distress, LUNG: diminished breath sounds R apex, R base, HEART: regular rate and rhythm, S1, S2 normal, no murmur, click, rub or gallop      Chest Tube: with air leak     Labs:   Recent Results (from the past 24 hour(s))   GLUCOSE, POC    Collection Time: 18  5:08 PM   Result Value Ref Range    Glucose (POC) 327 (H) 65 - 100 mg/dL    Performed by Diamond Ayala    GLUCOSE, POC    Collection Time: 18 10:02 PM   Result Value Ref Range    Glucose (POC) 372 (H) 65 - 100 mg/dL    Performed by [facility]    METABOLIC PANEL, BASIC    Collection Time: 18  1:55 AM   Result Value Ref Range    Sodium 133 (L) 136 - 145 mmol/L    Potassium 4.5 3.5 - 5.1 mmol/L    Chloride 102 97 - 108 mmol/L    CO2 26 21 - 32 mmol/L    Anion gap 5 5 - 15 mmol/L    Glucose 209 (H) 65 - 100 mg/dL    BUN 38 (H) 6 - 20 MG/DL    Creatinine 3.77 (H) 0.70 - 1.30 MG/DL    BUN/Creatinine ratio 10 (L) 12 - 20      GFR est AA 20 (L) >60 ml/min/1.73m2    GFR est non-AA 17 (L) >60 ml/min/1.73m2    Calcium 6.6 (L) 8.5 - 10.1 MG/DL   CBC W/O DIFF    Collection Time: 04/30/18  1:55 AM   Result Value Ref Range    WBC 5.7 4.1 - 11.1 K/uL    RBC 3.29 (L) 4.10 - 5.70 M/uL    HGB 7.2 (L) 12.1 - 17.0 g/dL    HCT 23.0 (L) 36.6 - 50.3 %    MCV 69.9 (L) 80.0 - 99.0 FL    MCH 21.9 (L) 26.0 - 34.0 PG    MCHC 31.3 30.0 - 36.5 g/dL    RDW 21.3 (H) 11.5 - 14.5 %    PLATELET 064 (L) 633 - 400 K/uL    NRBC 0.0 0  WBC    ABSOLUTE NRBC 0.00 0.00 - 0.01 K/uL   HEMOGLOBIN A1C WITH EAG    Collection Time: 04/30/18  1:55 AM   Result Value Ref Range    Hemoglobin A1c 8.7 (H) 4.2 - 6.3 %    Est. average glucose 203 mg/dL   GLUCOSE, POC    Collection Time: 04/30/18  6:32 AM   Result Value Ref Range    Glucose (POC) 102 (H) 65 - 100 mg/dL    Performed by Marc Howell    GLUCOSE, POC    Collection Time: 04/30/18 11:41 AM   Result Value Ref Range    Glucose (POC) 158 (H) 65 - 100 mg/dL    Performed by [address] [place] [address] Review images and reports reviewed still with right sided PTX but smaller    Assessment:     Principal Problem:    Pleural effusion on right (4/27/2018)      Overview: 4/24/18 CT placed with 2200cc out    Active Problems:    Pleural effusion (4/29/2018)        Plan/Recommendations/Medical Decision Making:     Plan to OR tomorrow risks and benefits explained to the patient to include but not limited to   Bleeding infection airleak     To OR in AM     NPO post MN     Thank you for allowing us to participate in the care of your patient.     Marian Levy MD

## 2018-04-30 NOTE — DIABETES MGMT
DTC Progress Note    Recommendations/ Comments: Hyperglycemia - results > 300 mg/dL over the past 24 -48 hours. FBG today is 107 mg/dl  Noted variable BG's usually in range or hypo fasting, then rises during the day. Noted change from NPH to Lantus 10 units yesterday. If appropriate, please consider   Addition of lispro 5 units AC    Current hospital DM medication: Lantus 10 units daily and                                                        Lispro correction scale, high sensitivity - he received 19 units yesterday       [facility] Patient is a 66 y.o. male with known Type 2 DM on Novolin 70/30 18 units bid ac    Per PTA, pt is taking 15 units BID ac  Pt was educated at 2/13/2018 and A1c improving from 12% at that time. A1c:   Lab Results   Component Value Date/Time    Hemoglobin A1c 8.7 (H) 04/30/2018 01:55 AM    Hemoglobin A1c 9.9 (H) 04/24/2018 01:23 AM       Recent Glucose Results:   Lab Results   Component Value Date/Time     (H) 04/30/2018 01:55 AM    GLUCPOC 102 (H) 04/30/2018 06:32 AM    GLUCPOC 372 (H) 04/29/2018 10:02 PM    GLUCPOC 327 (H) 04/29/2018 05:08 PM        Lab Results   Component Value Date/Time    Creatinine 3.77 (H) 04/30/2018 01:55 AM     Estimated Creatinine Clearance: 26.1 mL/min (based on Cr of 3.77). Active Orders   Diet    DIET DIABETIC CONSISTENT CARB Regular        PO intake: No data found. Will continue to follow as needed.     Thank you  Cori Aden RN, CDE

## 2018-04-30 NOTE — PROGRESS NOTES
Bedside and Verbal shift change report given to [address] Jada Craig (oncoming nurse) by Arlene Gusman RN (offgoing nurse). Report included the following information SBAR, ED Summary, Procedure Summary, MAR and Recent Results.

## 2018-04-30 NOTE — CDMP QUERY
The diagnosis of Acute Respiratory Failure has been documented for your patient. According to the documentation of the Hospitalist Physicians the patient has a \"History of Respiratory Failure with Hypoxia\" and it appears to have been resolved prior to the patient's transfer to [facility] from [facility]. Currently, the patient has a R chest tube and is satting 93-99% on RA; RR 16-20 according to the flowsheet. .  H&P states:   PHYSICAL EXAMINATION:    GENERAL:  The patient is a 68-year-old gentleman, not in any acute distress. VITAL SIGNS:  Temperature 97.9, blood pressure 180/80, pulse is 78, respiratory rate of 17, saturating 99% on room air    Please clarify if this condition is/was:    => An Active Condition - please include documentation supporting this diagnosis. (Criteria Below)  => Treated & resolved  => Ongoing/Improving  => Ruled Out  => Other Explanation of the Clinical Findings  => Unable to Determine (no explanation for clinical findings). REFERENCE:   Hypoxemic respiratory failure is characterized by a PaO2 less than 60 mmHg (or 10 mmHg below COPD patient's baseline) and a normal or low arterial PaCO2. Hypercapnic respiratory failure is characterized by a PaCO2 higher than 50 mmHg (or 10 mmHG above COPD patient's baseline). Acute Respiratory Failure indicators include:   - Respirations <12 or >25   - Air hunger   - Use of accessory muscles of respiration   - Inability to speak in full sentences   - Cyanosis     AND    - Pulse ox <90% RA or <95% on O2   - pH <7.35 or >7.45   - pO2 < 60 mm Hg (or 10mm below COPD patient's baseline)   - pCO2 >50mm Hg (or 10mm above COPD patient's baseline)     Please clarify and document your clinical opinion in the progress notes and discharge summary including the definitive and/or presumptive diagnosis, (suspected or probable), related to the above clinical findings.  Please include clinical findings supporting your diagnosis    Thank you,  Vidhya Avery, RN  [phone]

## 2018-04-30 NOTE — PROGRESS NOTES
Hospitalist Progress Note  Charles Harris MD  Answering service: [phone] from in house phone  Cell:       Date of Service:  2018  NAME:  Bhargav Gong  :  1960  MRN:  [id number]      Admission Summary:   The patient is a 61-year-old -American gentleman who was initially admitted to [facility] with a presumed diagnosis of pneumonia/flu. Patient was found to be in renal failure. He had gained 20 pounds before admission. Patient at [facility] was diagnosed with metabolic acidosis due to renal failure. A chest x-ray was done, which showed large right-sided pleural effusion . Patient was subsequently transferred to [facility] for further evaluation. At [facility], patient was evaluated by pulmonology, cardiology. He had a hemopneumothorax, which was seen on CT chest on . Cytology of pleural fluid was negative. Patient was given TPA via chest tube on  to try and help break up loculations. He had a bronchoscopy done on , which did not reveal any endobronchial lesion. A transthoracic echocardiogram was done, which showed acute systolic heart failure with ejection fraction of 20-25%, which was suspected due to hypertension. He had a negative stress test.  On the , patient was seen by nephrology. A PermCath was placed . He has been getting dialysis since then. For hypertension, he was treated with Coreg and lisinopril. The patient was transfused with 1 unit of packed RBCs. He had a lower GI bleed. The patient was seen by Dr. Jonah Colmenares. The patient also had hyperosmolar nonketotic state for which he was treated with insulin    Interval history / Subjective:    Patint denies any new complaints had an uneventful night. Says he did not get breakfast this AM.     Assessment & Plan:     1.   Large R sided Pleural effusion s/p pig tail catheter. -VATs and decortication on Tues 4/31  -Draining blood tinged fluid from right chest tubes. CT per CTS. 2.  Systolic heart failure with ejection fraction of 20-25% with severe diffuse hypokinesis  with negative ischemia workup. continue Coreg and Lisinopril. Titrate ACEI as tolerated. 3.  History of chronic kidney disease, currently on dialysis. -Nephrology on board. 4.  Accelerated hypertension, on Coreg, lisinopril, and Lasix. Increased Lisino to 10mg on 4/30   -Adjust meds as tolerated. 5.  History of anemia, probably blood loss (he had BRBPR) and renal insufficiency.    -He is on Epogen. Hemoglobin is 7 today  6. History of diabetes. Monitor blood sugars closely. 7.  Hyperlipidemia. Change to high dose Statin. 8.  History of respiratory failure with hypoxia due to large right-sided pleural effusion, which has been drained. The patient has a pigtail catheter placed in.  9.  History of hemopneumothorax, now with CT,  Pleural fluid Cytology is negative. 10.  DVT prophylaxis with SCDs due to recent GI blood loss. 11. DM : start on Lantus and monitor sugars, humalog. Code status: full  DVT prophylaxis: SCD    Care Plan discussed with: Patient/Family  Disposition: Home w/Family and TBD     Hospital Problems  Date Reviewed: 4/28/2018          Codes Class Noted POA    Pleural effusion ICD-10-CM: J90  ICD-9-CM: 511.9  4/29/2018 Unknown        * (Principal)Pleural effusion on right ICD-10-CM: J90  ICD-9-CM: 511.9  4/27/2018 Yes    Overview Signed 4/27/2018  2:09 PM by Malorie Prieto NP     4/24/18 CT placed with 2200cc out                     Review of Systems:   A comprehensive review of systems was negative except for that written in the HPI. Vital Signs:    Last 24hrs VS reviewed since prior progress note.  Most recent are:  Visit Vitals    /88 (BP 1 Location: Left arm, BP Patient Position: At rest)    Pulse 75    Temp 98.2 °F (36.8 °C)    Resp 18    Wt 84.2 kg (185 lb 10 oz)    SpO2 99%    BMI 23.2 kg/m2         Intake/Output Summary (Last 24 hours) at 04/30/18 1339  Last data filed at 04/30/18 0841   Gross per 24 hour   Intake              560 ml   Output             1740 ml   Net            -1180 ml        Physical Examination:             Constitutional:  No acute distress, cooperative   ENT:  Oral mucous moist, oropharynx benign. Neck supple,    Resp:  CTA bilaterally. Left CT in place and draining   CV:  Regular rhythm, normal rate, no murmurs, gallops, rubs    GI:  Soft, non distended, non tender. normoactive bowel sounds, no hepatosplenomegaly     Musculoskeletal:  No edema, warm, 2+ pulses throughout    Neurologic:  Moves all extremities. AAOx3, CN II-XII reviewed            Data Review:    Review and/or order of clinical lab test      Labs:     Recent Labs      04/30/18 0155 04/29/18 0447   WBC  5.7  5.5   HGB  7.2*  7.0*   HCT  23.0*  22.4*   PLT  109*  89*     Recent Labs      04/30/18   0155 04/29/18 0447  04/28/18   0038   NA  133*  132*  133*   K  4.5  4.2  4.5   CL  102  101  101   CO2  26  26  27   BUN  38*  37*  50*   CREA  3.77*  3.51*  4.41*   GLU  209*  205*  228*   CA  6.6*  6.6*  6.7*     Recent Labs      04/29/18 0447 04/28/18   0038   SGOT  18  21   ALT  9*  11*   AP  115  122*   TBILI  0.3  0.2   TP  7.2  7.4   ALB  1.1*  1.3*   GLOB  6.1*  6.1*     No results for input(s): INR, PTP, APTT in the last 72 hours. No lab exists for component: INREXT, INREXT   No results for input(s): FE, TIBC, PSAT, FERR in the last 72 hours. Lab Results   Component Value Date/Time    Folate 5.9 02/13/2018 08:12 PM      No results for input(s): PH, PCO2, PO2 in the last 72 hours. No results for input(s): CPK, CKNDX, TROIQ in the last 72 hours.     No lab exists for component: CPKMB  Lab Results   Component Value Date/Time    Cholesterol, total 115 01/22/2018 03:55 PM    HDL Cholesterol 44 01/22/2018 03:55 PM    LDL, calculated 48 01/22/2018 03:55 PM    Triglyceride 117 01/22/2018 03:55 PM     Lab Results   Component Value Date/Time    Glucose (POC) 158 (H) 04/30/2018 11:41 AM    Glucose (POC) 102 (H) 04/30/2018 06:32 AM    Glucose (POC) 372 (H) 04/29/2018 10:02 PM    Glucose (POC) 327 (H) 04/29/2018 05:08 PM    Glucose (POC) 374 (H) 04/29/2018 12:53 PM     Lab Results   Component Value Date/Time    Color YELLOW/STRAW 03/16/2018 12:39 PM    Appearance CLEAR 03/16/2018 12:39 PM    Specific gravity 1.014 03/16/2018 12:39 PM    Specific gravity 1.015 02/13/2018 11:17 AM    pH (UA) 5.0 03/16/2018 12:39 PM    Protein 300 (A) 03/16/2018 12:39 PM    Glucose NEGATIVE  03/16/2018 12:39 PM    Ketone NEGATIVE  03/16/2018 12:39 PM    Bilirubin NEGATIVE  03/16/2018 12:39 PM    Urobilinogen 0.2 03/16/2018 12:39 PM    Nitrites NEGATIVE  03/16/2018 12:39 PM    Leukocyte Esterase NEGATIVE  03/16/2018 12:39 PM    Epithelial cells FEW 03/16/2018 12:39 PM    Bacteria 1+ (A) 03/16/2018 12:39 PM    WBC 5-10 03/16/2018 12:39 PM    RBC 10-20 03/16/2018 12:39 PM         Medications Reviewed:     Current Facility-Administered Medications   Medication Dose Route Frequency    hydrALAZINE (APRESOLINE) 20 mg/mL injection 10 mg  10 mg IntraVENous Q6H PRN    atorvastatin (LIPITOR) tablet 40 mg  40 mg Oral DAILY    sodium chloride (NS) flush 5-10 mL  5-10 mL IntraVENous Q8H    sodium chloride (NS) flush 5-10 mL  5-10 mL IntraVENous PRN    insulin glargine (LANTUS) injection 10 Units  10 Units SubCUTAneous ACL    sodium bicarbonate tablet 650 mg  650 mg Oral TID    acetaminophen (TYLENOL) tablet 650 mg  650 mg Oral Q6H PRN    calcium carbonate (TUMS) chewable tablet 400 mg [elemental]  400 mg Oral TID WITH MEALS    [START ON 5/1/2018] epoetin tyler (EPOGEN;PROCRIT) injection 20,000 Units  20,000 Units SubCUTAneous Q TUE, THU & SAT    furosemide (LASIX) tablet 80 mg  80 mg Oral DAILY    glucagon (GLUCAGEN) injection 1 mg  1 mg IntraMUSCular PRN    glucose chewable tablet 16 g  4 Tab Oral PRN    insulin lispro (HUMALOG) injection   SubCUTAneous AC&HS    lisinopril (PRINIVIL, ZESTRIL) tablet 5 mg  5 mg Oral DAILY    ondansetron (ZOFRAN) injection 4 mg  4 mg IntraVENous Q4H PRN    oxyCODONE IR (ROXICODONE) tablet 5 mg  5 mg Oral Q4H PRN    pantoprazole (PROTONIX) tablet 40 mg  40 mg Oral ACB    zolpidem (AMBIEN) tablet 5 mg  5 mg Oral QHS    carvedilol (COREG) tablet 12.5 mg  12.5 mg Oral BID WITH MEALS     ______________________________________________________________________  EXPECTED LENGTH OF STAY: - - -  ACTUAL LENGTH OF STAY:          2                 Yee Lay MD

## 2018-04-30 NOTE — PROGRESS NOTES
CM noted patient was initially admitted at \[facility]\"" with a \"presumed diagnosis of pneumonia/flu. Patient was found to be in renal failure. He had gained 20 pounds before admission. Patient at [facility] was diagnosed with metabolic acidosis due to renal failure. A chest x-ray was done, which showed large right-sided pleural effusion . Patient was subsequently transferred to [facility] for further evaluation\"  A Perma cath was placed for hemodialysis on 4/27/18 at [facility]. Patient was also transferred to [facility] for possible VATS by Dr. Daria Ruffin on 4/28/18. Of note, patient had a bronchoscopy on 04/27/18. CM met with patient, explained role and discussed discharge planning. Patient lives with his wife and has a very supportive family. See initial assessment done on 4.24/18 at [facility]. CM noted patient needed to be set up for an outpatient hemodialysis at the [facility] ([phone]. CM called and spoke with Mahin Rice, Clinical Manager who said patient was already accepted to the their clinic. She asked that the CM faxes clinicals including new H&P, consults, nephrology progress notes and dialysis flowsheet for today to [phone]. Per Hussain Gunderson, patient will be eligible for Medicare once he starts outpatient dialysis as he is a self pay. His dialysis days are Mon-Wed-Fri at 3:00 pm. Per previous CM at [facility], an application for the Care Card was also given to patient and family to complete. CM will continue to follow for transition of care. Joel Soto MSA, RN, CRM. Care Management Interventions  PCP Verified by CM: Yes  Palliative Care Criteria Met (RRAT>21 & CHF Dx)?: Yes  Palliative Consult Recommended?: No  Reason Palliative Care Not Recommended?: Provider preference to wait  Mode of Transport at Discharge:  Other (see comment) (Private car)  Transition of Care Consult (CM Consult): Discharge Planning  MyChart Signup: No  Discharge Durable Medical Equipment: No  Health Maintenance Reviewed: Yes  Physical Therapy Consult: No  Occupational Therapy Consult: No  Speech Therapy Consult: No  Current Support Network: Lives with Spouse  Confirm Follow Up Transport: Family  Plan discussed with Pt/Family/Caregiver: Yes  Discharge Location  Discharge Placement: Home with outpatient services

## 2018-04-30 NOTE — PROGRESS NOTES
Noted patient was transferred to [facility] [address] over the weekend for continued care . I was not here over the weekend however received a call from Gary Newman with [address] at [phone] and she was inquiring patient's disposition to follow up on his dialysis. Number for information at [facility] given and the name of the current CM to follow with dcp.

## 2018-04-30 NOTE — PROGRESS NOTES
[facility]   [address] [facility], [facility] [address], [facility]  Phone: [phone]   [phone]       Nephrology Progress Note  Sowmya Angulo     1960     [id number]  Date of Admission : 4/28/2018 04/30/18    CC: Follow up for ESRD      Assessment and Plan   New Onset ESRD  - Started dialysis this admission. Last HD 4/28  - HD planned for today  - cont MWF while here  - Out pt HD to be set up w/ Dr Demarco Paulino at [facility] dialysis unit     R sided Pleural effusion w/ Trapped Lung   - for VATS and decortication planned for tomorrow    Anemia of CKD     Solitary Kidney   S/p Prior Left Nephrectomy for RCC    Sec HTPH  - continue Binders      Interval History:  Seen and examined. Feeling ok. No cp, worsening sob, n/v/d reported. For HD later today. Review of Systems: Pertinent items are noted in HPI.     Current Medications:   Current Facility-Administered Medications   Medication Dose Route Frequency    hydrALAZINE (APRESOLINE) 20 mg/mL injection 10 mg  10 mg IntraVENous Q6H PRN    atorvastatin (LIPITOR) tablet 40 mg  40 mg Oral DAILY    sodium chloride (NS) flush 5-10 mL  5-10 mL IntraVENous Q8H    sodium chloride (NS) flush 5-10 mL  5-10 mL IntraVENous PRN    insulin glargine (LANTUS) injection 10 Units  10 Units SubCUTAneous ACL    sodium bicarbonate tablet 650 mg  650 mg Oral TID    acetaminophen (TYLENOL) tablet 650 mg  650 mg Oral Q6H PRN    calcium carbonate (TUMS) chewable tablet 400 mg [elemental]  400 mg Oral TID WITH MEALS    [START ON 5/1/2018] epoetin tyler (EPOGEN;PROCRIT) injection 20,000 Units  20,000 Units SubCUTAneous Q TUE, THU & SAT    furosemide (LASIX) tablet 80 mg  80 mg Oral DAILY    glucagon (GLUCAGEN) injection 1 mg  1 mg IntraMUSCular PRN    glucose chewable tablet 16 g  4 Tab Oral PRN    insulin lispro (HUMALOG) injection   SubCUTAneous AC&HS    lisinopril (PRINIVIL, ZESTRIL) tablet 5 mg  5 mg Oral DAILY    ondansetron (ZOFRAN) injection 4 mg  4 mg IntraVENous Q4H PRN    oxyCODONE IR (ROXICODONE) tablet 5 mg  5 mg Oral Q4H PRN    pantoprazole (PROTONIX) tablet 40 mg  40 mg Oral ACB    zolpidem (AMBIEN) tablet 5 mg  5 mg Oral QHS    carvedilol (COREG) tablet 12.5 mg  12.5 mg Oral BID WITH MEALS      No Known Allergies    Objective:  Vitals:    Vitals:    04/29/18 2325 04/30/18 0153 04/30/18 0415 04/30/18 0733   BP: 150/90  (!) 146/92 150/88   Pulse: 86  80 75   Resp: 16  17 18   Temp: 98.6 °F (37 °C)  98.4 °F (36.9 °C) 98.2 °F (36.8 °C)   SpO2: 97%  100% 99%   Weight:  84.2 kg (185 lb 10 oz)       Intake and Output:  04/30 0701 - 04/30 1900  In: -   Out: 250 [Urine:150]  04/28 1901 - 04/30 0700  In: 1160 [P.O.:1160]  Out: 0056 [Urine:2015]    Physical Examination:    General: NAD,Conversant   Neck:  Supple, no mass, RIJ permacath   Resp:  Diminished BS on R   CV:  RRR,  no murmur or rub, no LE edema  GI:  Soft, NT, + Bowel sounds, no hepatosplenomegaly  Neurologic:  Non focal  Skin:  No Rash  :  No bowling     []    High complexity decision making was performed  []    Patient is at high-risk of decompensation with multiple organ involvement    Lab Data Personally Reviewed: I have reviewed all the pertinent labs, microbiology data and radiology studies during assessment.     Recent Labs      04/30/18 0155 04/29/18 0447 04/28/18 0038   NA  133*  132*  133*   K  4.5  4.2  4.5   CL  102  101  101   CO2  26  26  27   GLU  209*  205*  228*   BUN  38*  37*  50*   CREA  3.77*  3.51*  4.41*   CA  6.6*  6.6*  6.7*   ALB   --   1.1*  1.3*   SGOT   --   18  21   ALT   --   9*  11*     Recent Labs      04/30/18 0155 04/29/18 0447 04/28/18   0038   WBC  5.7  5.5  6.5   HGB  7.2*  7.0*  7.3*   HCT  23.0*  22.4*  23.3*   PLT  109*  89*  92*     No results found for: SDES  Lab Results   Component Value Date/Time    Culture result: MODERATE NORMAL RESPIRATORY BON 04/27/2018 12:21 PM    Culture result: LIGHT CAPNOCYTOPHAGA SPECIES (A) 04/27/2018 12:21 PM    Culture result: NO GROWTH 4 DAYS 04/24/2018 04:13 PM    Culture result: NO GROWTH 4 DAYS 04/24/2018 04:13 PM    Culture result: MODERATE APPARENT CANDIDA ALBICANS (A) 03/17/2018 11:39 PM    Culture result: FEW NORMAL RESPIRATORY BON 03/17/2018 11:39 PM     Recent Results (from the past 24 hour(s))   GLUCOSE, POC    Collection Time: 04/29/18 12:53 PM   Result Value Ref Range    Glucose (POC) 374 (H) 65 - 100 mg/dL    Performed by Melissa Landry    GLUCOSE, POC    Collection Time: 04/29/18  5:08 PM   Result Value Ref Range    Glucose (POC) 327 (H) 65 - 100 mg/dL    Performed by Otilia Cervantes    GLUCOSE, POC    Collection Time: 04/29/18 10:02 PM   Result Value Ref Range    Glucose (POC) 372 (H) 65 - 100 mg/dL    Performed by Avelino Leslie    METABOLIC PANEL, BASIC    Collection Time: 04/30/18  1:55 AM   Result Value Ref Range    Sodium 133 (L) 136 - 145 mmol/L    Potassium 4.5 3.5 - 5.1 mmol/L    Chloride 102 97 - 108 mmol/L    CO2 26 21 - 32 mmol/L    Anion gap 5 5 - 15 mmol/L    Glucose 209 (H) 65 - 100 mg/dL    BUN 38 (H) 6 - 20 MG/DL    Creatinine 3.77 (H) 0.70 - 1.30 MG/DL    BUN/Creatinine ratio 10 (L) 12 - 20      GFR est AA 20 (L) >60 ml/min/1.73m2    GFR est non-AA 17 (L) >60 ml/min/1.73m2    Calcium 6.6 (L) 8.5 - 10.1 MG/DL   CBC W/O DIFF    Collection Time: 04/30/18  1:55 AM   Result Value Ref Range    WBC 5.7 4.1 - 11.1 K/uL    RBC 3.29 (L) 4.10 - 5.70 M/uL    HGB 7.2 (L) 12.1 - 17.0 g/dL    HCT 23.0 (L) 36.6 - 50.3 %    MCV 69.9 (L) 80.0 - 99.0 FL    MCH 21.9 (L) 26.0 - 34.0 PG    MCHC 31.3 30.0 - 36.5 g/dL    RDW 21.3 (H) 11.5 - 14.5 %    PLATELET 757 (L) 340 - 400 K/uL    NRBC 0.0 0  WBC    ABSOLUTE NRBC 0.00 0.00 - 0.01 K/uL   HEMOGLOBIN A1C WITH EAG    Collection Time: 04/30/18  1:55 AM   Result Value Ref Range    Hemoglobin A1c 8.7 (H) 4.2 - 6.3 %    Est. average glucose 203 mg/dL   GLUCOSE, POC    Collection Time: 04/30/18  6:32 AM   Result Value Ref Range    Glucose (POC) 102 (H) 65 - 100 mg/dL    Performed by Wyatt Tolliver    GLUCOSE, POC    Collection Time: 04/30/18 11:41 AM   Result Value Ref Range    Glucose (POC) 158 (H) 65 - 100 mg/dL    Performed by [facility]            I have reviewed the flowsheets. Chart and Pertinent Notes have been reviewed. No change in PMH ,family and social history from Consult note.       Mckenzie Bui MD

## 2018-05-01 ENCOUNTER — APPOINTMENT (OUTPATIENT)
Dept: GENERAL RADIOLOGY | Age: 58
DRG: 163 | End: 2018-05-01
Attending: THORACIC SURGERY (CARDIOTHORACIC VASCULAR SURGERY)
Payer: MEDICARE

## 2018-05-01 ENCOUNTER — ANESTHESIA (OUTPATIENT)
Dept: SURGERY | Age: 58
DRG: 163 | End: 2018-05-01
Payer: MEDICARE

## 2018-05-01 LAB
ANION GAP SERPL CALC-SCNC: 5 MMOL/L (ref 5–15)
ARTERIAL PATENCY WRIST A: NO
BACTERIA SPEC CULT: NORMAL
BACTERIA SPEC CULT: NORMAL
BASE EXCESS BLD CALC-SCNC: 11 MMOL/L
BDY SITE: ABNORMAL
BUN SERPL-MCNC: 19 MG/DL (ref 6–20)
BUN/CREAT SERPL: 8 (ref 12–20)
CALCIUM SERPL-MCNC: 7.2 MG/DL (ref 8.5–10.1)
CHLORIDE SERPL-SCNC: 102 MMOL/L (ref 97–108)
CO2 SERPL-SCNC: 29 MMOL/L (ref 21–32)
CREAT SERPL-MCNC: 2.33 MG/DL (ref 0.7–1.3)
ERYTHROCYTE [DISTWIDTH] IN BLOOD BY AUTOMATED COUNT: 21.7 % (ref 11.5–14.5)
GAS FLOW.O2 O2 DELIVERY SYS: ABNORMAL L/MIN
GLUCOSE BLD STRIP.AUTO-MCNC: 139 MG/DL (ref 65–100)
GLUCOSE BLD STRIP.AUTO-MCNC: 343 MG/DL (ref 65–100)
GLUCOSE BLD STRIP.AUTO-MCNC: 67 MG/DL (ref 65–100)
GLUCOSE BLD STRIP.AUTO-MCNC: 67 MG/DL (ref 65–100)
GLUCOSE BLD STRIP.AUTO-MCNC: 72 MG/DL (ref 65–100)
GLUCOSE BLD STRIP.AUTO-MCNC: 77 MG/DL (ref 65–100)
GLUCOSE BLD STRIP.AUTO-MCNC: 81 MG/DL (ref 65–100)
GLUCOSE BLD STRIP.AUTO-MCNC: 95 MG/DL (ref 65–100)
GLUCOSE SERPL-MCNC: 82 MG/DL (ref 65–100)
HCO3 BLD-SCNC: 35.2 MMOL/L (ref 22–26)
HCT VFR BLD AUTO: 27.4 % (ref 36.6–50.3)
HGB BLD-MCNC: 8.8 G/DL (ref 12.1–17)
MCH RBC QN AUTO: 23.2 PG (ref 26–34)
MCHC RBC AUTO-ENTMCNC: 32.1 G/DL (ref 30–36.5)
MCV RBC AUTO: 72.3 FL (ref 80–99)
NRBC # BLD: 0 K/UL (ref 0–0.01)
NRBC BLD-RTO: 0 PER 100 WBC
O2/TOTAL GAS SETTING VFR VENT: 1 %
PCO2 BLD: 54.7 MMHG (ref 35–45)
PH BLD: 7.42 [PH] (ref 7.35–7.45)
PLATELET # BLD AUTO: 108 K/UL (ref 150–400)
PO2 BLD: 362 MMHG (ref 80–100)
POTASSIUM SERPL-SCNC: 3.7 MMOL/L (ref 3.5–5.1)
RBC # BLD AUTO: 3.79 M/UL (ref 4.1–5.7)
SAO2 % BLD: 100 % (ref 92–97)
SERVICE CMNT-IMP: ABNORMAL
SERVICE CMNT-IMP: ABNORMAL
SERVICE CMNT-IMP: NORMAL
SODIUM SERPL-SCNC: 136 MMOL/L (ref 136–145)
SPECIMEN TYPE: ABNORMAL
TOTAL RESP. RATE, ITRR: 15
VENTILATION MODE VENT: ABNORMAL
VOLUME CONTROL IVLC: YES
WBC # BLD AUTO: 5.2 K/UL (ref 4.1–11.1)

## 2018-05-01 PROCEDURE — 74011250636 HC RX REV CODE- 250/636: Performed by: THORACIC SURGERY (CARDIOTHORACIC VASCULAR SURGERY)

## 2018-05-01 PROCEDURE — 74011250636 HC RX REV CODE- 250/636: Performed by: HOSPITALIST

## 2018-05-01 PROCEDURE — 77030018836 HC SOL IRR NACL ICUM -A: Performed by: THORACIC SURGERY (CARDIOTHORACIC VASCULAR SURGERY)

## 2018-05-01 PROCEDURE — 74011250636 HC RX REV CODE- 250/636

## 2018-05-01 PROCEDURE — 77030008671 HC TU ENDO/BRNC CUF COVD -B: Performed by: ANESTHESIOLOGY

## 2018-05-01 PROCEDURE — 82962 GLUCOSE BLOOD TEST: CPT

## 2018-05-01 PROCEDURE — P9016 RBC LEUKOCYTES REDUCED: HCPCS | Performed by: THORACIC SURGERY (CARDIOTHORACIC VASCULAR SURGERY)

## 2018-05-01 PROCEDURE — 82803 BLOOD GASES ANY COMBINATION: CPT

## 2018-05-01 PROCEDURE — 77030018673: Performed by: THORACIC SURGERY (CARDIOTHORACIC VASCULAR SURGERY)

## 2018-05-01 PROCEDURE — 88305 TISSUE EXAM BY PATHOLOGIST: CPT | Performed by: HOSPITALIST

## 2018-05-01 PROCEDURE — 0WC94ZZ EXTIRPATION OF MATTER FROM RIGHT PLEURAL CAVITY, PERCUTANEOUS ENDOSCOPIC APPROACH: ICD-10-PCS | Performed by: THORACIC SURGERY (CARDIOTHORACIC VASCULAR SURGERY)

## 2018-05-01 PROCEDURE — 77030031139 HC SUT VCRL2 J&J -A: Performed by: THORACIC SURGERY (CARDIOTHORACIC VASCULAR SURGERY)

## 2018-05-01 PROCEDURE — 80048 BASIC METABOLIC PNL TOTAL CA: CPT | Performed by: HOSPITALIST

## 2018-05-01 PROCEDURE — 76010000133 HC OR TIME 3 TO 3.5 HR: Performed by: THORACIC SURGERY (CARDIOTHORACIC VASCULAR SURGERY)

## 2018-05-01 PROCEDURE — 87205 SMEAR GRAM STAIN: CPT | Performed by: INTERNAL MEDICINE

## 2018-05-01 PROCEDURE — 0BNC0ZZ RELEASE RIGHT UPPER LUNG LOBE, OPEN APPROACH: ICD-10-PCS | Performed by: THORACIC SURGERY (CARDIOTHORACIC VASCULAR SURGERY)

## 2018-05-01 PROCEDURE — 0BNF0ZZ RELEASE RIGHT LOWER LUNG LOBE, OPEN APPROACH: ICD-10-PCS | Performed by: THORACIC SURGERY (CARDIOTHORACIC VASCULAR SURGERY)

## 2018-05-01 PROCEDURE — 77030012390 HC DRN CHST BTL GTNG -B: Performed by: THORACIC SURGERY (CARDIOTHORACIC VASCULAR SURGERY)

## 2018-05-01 PROCEDURE — 77030032490 HC SLV COMPR SCD KNE COVD -B: Performed by: THORACIC SURGERY (CARDIOTHORACIC VASCULAR SURGERY)

## 2018-05-01 PROCEDURE — 76210000001 HC OR PH I REC 2.5 TO 3 HR: Performed by: THORACIC SURGERY (CARDIOTHORACIC VASCULAR SURGERY)

## 2018-05-01 PROCEDURE — 77030002996 HC SUT SLK J&J -A: Performed by: THORACIC SURGERY (CARDIOTHORACIC VASCULAR SURGERY)

## 2018-05-01 PROCEDURE — 0BND0ZZ RELEASE RIGHT MIDDLE LUNG LOBE, OPEN APPROACH: ICD-10-PCS | Performed by: THORACIC SURGERY (CARDIOTHORACIC VASCULAR SURGERY)

## 2018-05-01 PROCEDURE — C1729 CATH, DRAINAGE: HCPCS | Performed by: THORACIC SURGERY (CARDIOTHORACIC VASCULAR SURGERY)

## 2018-05-01 PROCEDURE — 77030018846 HC SOL IRR STRL H20 ICUM -A: Performed by: THORACIC SURGERY (CARDIOTHORACIC VASCULAR SURGERY)

## 2018-05-01 PROCEDURE — 85027 COMPLETE CBC AUTOMATED: CPT | Performed by: HOSPITALIST

## 2018-05-01 PROCEDURE — 74011250637 HC RX REV CODE- 250/637: Performed by: HOSPITALIST

## 2018-05-01 PROCEDURE — 74011000258 HC RX REV CODE- 258: Performed by: ANESTHESIOLOGY

## 2018-05-01 PROCEDURE — 77030002933 HC SUT MCRYL J&J -A: Performed by: THORACIC SURGERY (CARDIOTHORACIC VASCULAR SURGERY)

## 2018-05-01 PROCEDURE — 74011636637 HC RX REV CODE- 636/637: Performed by: HOSPITALIST

## 2018-05-01 PROCEDURE — 36415 COLL VENOUS BLD VENIPUNCTURE: CPT | Performed by: HOSPITALIST

## 2018-05-01 PROCEDURE — 77030013079 HC BLNKT BAIR HGGR 3M -A: Performed by: ANESTHESIOLOGY

## 2018-05-01 PROCEDURE — 74011250637 HC RX REV CODE- 250/637: Performed by: INTERNAL MEDICINE

## 2018-05-01 PROCEDURE — 77030011264 HC ELECTRD BLD EXT COVD -A: Performed by: THORACIC SURGERY (CARDIOTHORACIC VASCULAR SURGERY)

## 2018-05-01 PROCEDURE — 71045 X-RAY EXAM CHEST 1 VIEW: CPT

## 2018-05-01 PROCEDURE — 87075 CULTR BACTERIA EXCEPT BLOOD: CPT | Performed by: INTERNAL MEDICINE

## 2018-05-01 PROCEDURE — 77030011640 HC PAD GRND REM COVD -A: Performed by: THORACIC SURGERY (CARDIOTHORACIC VASCULAR SURGERY)

## 2018-05-01 PROCEDURE — 77030003029 HC SUT VCRL J&J -B: Performed by: THORACIC SURGERY (CARDIOTHORACIC VASCULAR SURGERY)

## 2018-05-01 PROCEDURE — 74011250637 HC RX REV CODE- 250/637: Performed by: THORACIC SURGERY (CARDIOTHORACIC VASCULAR SURGERY)

## 2018-05-01 PROCEDURE — 77030005518 HC CATH URETH FOL 2W BARD -B: Performed by: THORACIC SURGERY (CARDIOTHORACIC VASCULAR SURGERY)

## 2018-05-01 PROCEDURE — 76060000037 HC ANESTHESIA 3 TO 3.5 HR: Performed by: THORACIC SURGERY (CARDIOTHORACIC VASCULAR SURGERY)

## 2018-05-01 PROCEDURE — 74011000250 HC RX REV CODE- 250: Performed by: THORACIC SURGERY (CARDIOTHORACIC VASCULAR SURGERY)

## 2018-05-01 PROCEDURE — 74011250636 HC RX REV CODE- 250/636: Performed by: ANESTHESIOLOGY

## 2018-05-01 PROCEDURE — 74011000250 HC RX REV CODE- 250

## 2018-05-01 PROCEDURE — 36600 WITHDRAWAL OF ARTERIAL BLOOD: CPT

## 2018-05-01 PROCEDURE — 74011000250 HC RX REV CODE- 250: Performed by: HOSPITALIST

## 2018-05-01 PROCEDURE — 65660000000 HC RM CCU STEPDOWN

## 2018-05-01 RX ORDER — SODIUM CHLORIDE 0.9 % (FLUSH) 0.9 %
5-10 SYRINGE (ML) INJECTION AS NEEDED
Status: DISCONTINUED | OUTPATIENT
Start: 2018-05-01 | End: 2018-05-01 | Stop reason: HOSPADM

## 2018-05-01 RX ORDER — NEOSTIGMINE METHYLSULFATE 1 MG/ML
INJECTION INTRAVENOUS AS NEEDED
Status: DISCONTINUED | OUTPATIENT
Start: 2018-05-01 | End: 2018-05-01 | Stop reason: HOSPADM

## 2018-05-01 RX ORDER — SODIUM CHLORIDE 0.9 % (FLUSH) 0.9 %
5-10 SYRINGE (ML) INJECTION EVERY 8 HOURS
Status: DISCONTINUED | OUTPATIENT
Start: 2018-05-01 | End: 2018-05-07 | Stop reason: HOSPADM

## 2018-05-01 RX ORDER — DEXAMETHASONE SODIUM PHOSPHATE 4 MG/ML
INJECTION, SOLUTION INTRA-ARTICULAR; INTRALESIONAL; INTRAMUSCULAR; INTRAVENOUS; SOFT TISSUE AS NEEDED
Status: DISCONTINUED | OUTPATIENT
Start: 2018-05-01 | End: 2018-05-01 | Stop reason: HOSPADM

## 2018-05-01 RX ORDER — SODIUM CHLORIDE 0.9 % (FLUSH) 0.9 %
5-10 SYRINGE (ML) INJECTION AS NEEDED
Status: DISCONTINUED | OUTPATIENT
Start: 2018-05-01 | End: 2018-05-07 | Stop reason: HOSPADM

## 2018-05-01 RX ORDER — CEFAZOLIN SODIUM IN 0.9 % NACL 2 G/100 ML
PLASTIC BAG, INJECTION (ML) INTRAVENOUS AS NEEDED
Status: DISCONTINUED | OUTPATIENT
Start: 2018-05-01 | End: 2018-05-01 | Stop reason: HOSPADM

## 2018-05-01 RX ORDER — FENTANYL CITRATE-0.9 % NACL/PF 900MCG/30
PATIENT CONTROLLED ANALGESIA VIAL INJECTION
Status: DISCONTINUED | OUTPATIENT
Start: 2018-05-01 | End: 2018-05-07 | Stop reason: HOSPADM

## 2018-05-01 RX ORDER — PROCHLORPERAZINE EDISYLATE 5 MG/ML
5 INJECTION INTRAMUSCULAR; INTRAVENOUS
Status: DISCONTINUED | OUTPATIENT
Start: 2018-05-01 | End: 2018-05-01 | Stop reason: SDUPTHER

## 2018-05-01 RX ORDER — SODIUM CHLORIDE 9 MG/ML
INJECTION, SOLUTION INTRAVENOUS
Status: DISCONTINUED | OUTPATIENT
Start: 2018-05-01 | End: 2018-05-01 | Stop reason: HOSPADM

## 2018-05-01 RX ORDER — ROCURONIUM BROMIDE 10 MG/ML
INJECTION, SOLUTION INTRAVENOUS AS NEEDED
Status: DISCONTINUED | OUTPATIENT
Start: 2018-05-01 | End: 2018-05-01 | Stop reason: HOSPADM

## 2018-05-01 RX ORDER — PROPOFOL 10 MG/ML
INJECTION, EMULSION INTRAVENOUS AS NEEDED
Status: DISCONTINUED | OUTPATIENT
Start: 2018-05-01 | End: 2018-05-01 | Stop reason: HOSPADM

## 2018-05-01 RX ORDER — ACETAMINOPHEN 500 MG
1000 TABLET ORAL EVERY 6 HOURS
Status: DISCONTINUED | OUTPATIENT
Start: 2018-05-01 | End: 2018-05-07 | Stop reason: HOSPADM

## 2018-05-01 RX ORDER — DOCUSATE SODIUM 100 MG/1
100 CAPSULE, LIQUID FILLED ORAL 2 TIMES DAILY
Status: DISCONTINUED | OUTPATIENT
Start: 2018-05-01 | End: 2018-05-07 | Stop reason: HOSPADM

## 2018-05-01 RX ORDER — SODIUM CHLORIDE, SODIUM LACTATE, POTASSIUM CHLORIDE, CALCIUM CHLORIDE 600; 310; 30; 20 MG/100ML; MG/100ML; MG/100ML; MG/100ML
50 INJECTION, SOLUTION INTRAVENOUS CONTINUOUS
Status: DISCONTINUED | OUTPATIENT
Start: 2018-05-01 | End: 2018-05-01 | Stop reason: HOSPADM

## 2018-05-01 RX ORDER — EPINEPHRINE 1 MG/ML
INJECTION, SOLUTION, CONCENTRATE INTRAVENOUS AS NEEDED
Status: DISCONTINUED | OUTPATIENT
Start: 2018-05-01 | End: 2018-05-01 | Stop reason: HOSPADM

## 2018-05-01 RX ORDER — ONDANSETRON 2 MG/ML
4 INJECTION INTRAMUSCULAR; INTRAVENOUS AS NEEDED
Status: DISCONTINUED | OUTPATIENT
Start: 2018-05-01 | End: 2018-05-01 | Stop reason: HOSPADM

## 2018-05-01 RX ORDER — LIDOCAINE HYDROCHLORIDE 20 MG/ML
INJECTION, SOLUTION EPIDURAL; INFILTRATION; INTRACAUDAL; PERINEURAL AS NEEDED
Status: DISCONTINUED | OUTPATIENT
Start: 2018-05-01 | End: 2018-05-01 | Stop reason: HOSPADM

## 2018-05-01 RX ORDER — DIPHENHYDRAMINE HYDROCHLORIDE 50 MG/ML
12.5 INJECTION, SOLUTION INTRAMUSCULAR; INTRAVENOUS
Status: ACTIVE | OUTPATIENT
Start: 2018-05-01 | End: 2018-05-02

## 2018-05-01 RX ORDER — NALOXONE HYDROCHLORIDE 0.4 MG/ML
0.4 INJECTION, SOLUTION INTRAMUSCULAR; INTRAVENOUS; SUBCUTANEOUS AS NEEDED
Status: DISCONTINUED | OUTPATIENT
Start: 2018-05-01 | End: 2018-05-07 | Stop reason: HOSPADM

## 2018-05-01 RX ORDER — GLYCOPYRROLATE 0.2 MG/ML
INJECTION INTRAMUSCULAR; INTRAVENOUS AS NEEDED
Status: DISCONTINUED | OUTPATIENT
Start: 2018-05-01 | End: 2018-05-01 | Stop reason: HOSPADM

## 2018-05-01 RX ORDER — FENTANYL CITRATE 50 UG/ML
25 INJECTION, SOLUTION INTRAMUSCULAR; INTRAVENOUS
Status: DISCONTINUED | OUTPATIENT
Start: 2018-05-01 | End: 2018-05-01 | Stop reason: HOSPADM

## 2018-05-01 RX ORDER — SUCCINYLCHOLINE CHLORIDE 20 MG/ML
INJECTION INTRAMUSCULAR; INTRAVENOUS AS NEEDED
Status: DISCONTINUED | OUTPATIENT
Start: 2018-05-01 | End: 2018-05-01 | Stop reason: HOSPADM

## 2018-05-01 RX ORDER — ONDANSETRON 2 MG/ML
INJECTION INTRAMUSCULAR; INTRAVENOUS AS NEEDED
Status: DISCONTINUED | OUTPATIENT
Start: 2018-05-01 | End: 2018-05-01 | Stop reason: HOSPADM

## 2018-05-01 RX ORDER — HYDROMORPHONE HYDROCHLORIDE 2 MG/ML
0.5 INJECTION, SOLUTION INTRAMUSCULAR; INTRAVENOUS; SUBCUTANEOUS
Status: DISCONTINUED | OUTPATIENT
Start: 2018-05-01 | End: 2018-05-01 | Stop reason: HOSPADM

## 2018-05-01 RX ORDER — ADHESIVE BANDAGE
30 BANDAGE TOPICAL DAILY PRN
Status: DISCONTINUED | OUTPATIENT
Start: 2018-05-01 | End: 2018-05-07 | Stop reason: HOSPADM

## 2018-05-01 RX ORDER — DEXTROSE 50 % IN WATER (D50W) INTRAVENOUS SYRINGE
Status: DISPENSED
Start: 2018-05-01 | End: 2018-05-01

## 2018-05-01 RX ORDER — LIDOCAINE HYDROCHLORIDE 10 MG/ML
0.1 INJECTION, SOLUTION EPIDURAL; INFILTRATION; INTRACAUDAL; PERINEURAL AS NEEDED
Status: DISCONTINUED | OUTPATIENT
Start: 2018-05-01 | End: 2018-05-01 | Stop reason: HOSPADM

## 2018-05-01 RX ORDER — DEXTROSE 50 % IN WATER (D50W) INTRAVENOUS SYRINGE
25 AS NEEDED
Status: DISCONTINUED | OUTPATIENT
Start: 2018-05-01 | End: 2018-05-07 | Stop reason: HOSPADM

## 2018-05-01 RX ORDER — SODIUM CHLORIDE 9 MG/ML
50 INJECTION, SOLUTION INTRAVENOUS CONTINUOUS
Status: DISCONTINUED | OUTPATIENT
Start: 2018-05-01 | End: 2018-05-07 | Stop reason: HOSPADM

## 2018-05-01 RX ORDER — KETOROLAC TROMETHAMINE 30 MG/ML
30 INJECTION, SOLUTION INTRAMUSCULAR; INTRAVENOUS EVERY 6 HOURS
Status: COMPLETED | OUTPATIENT
Start: 2018-05-01 | End: 2018-05-02

## 2018-05-01 RX ORDER — FENTANYL CITRATE 50 UG/ML
INJECTION, SOLUTION INTRAMUSCULAR; INTRAVENOUS AS NEEDED
Status: DISCONTINUED | OUTPATIENT
Start: 2018-05-01 | End: 2018-05-01 | Stop reason: HOSPADM

## 2018-05-01 RX ADMIN — FENTANYL CITRATE 25 MCG: 50 INJECTION, SOLUTION INTRAMUSCULAR; INTRAVENOUS at 10:30

## 2018-05-01 RX ADMIN — CARVEDILOL 12.5 MG: 12.5 TABLET, FILM COATED ORAL at 17:26

## 2018-05-01 RX ADMIN — DOCUSATE SODIUM 100 MG: 100 CAPSULE, LIQUID FILLED ORAL at 17:26

## 2018-05-01 RX ADMIN — Medication 10 ML: at 22:00

## 2018-05-01 RX ADMIN — Medication 2 G: at 10:15

## 2018-05-01 RX ADMIN — Medication: at 14:38

## 2018-05-01 RX ADMIN — CALCIUM CARBONATE (ANTACID) CHEW TAB 500 MG 400 MG: 500 CHEW TAB at 17:26

## 2018-05-01 RX ADMIN — NEOSTIGMINE METHYLSULFATE 3 MG: 1 INJECTION INTRAVENOUS at 11:12

## 2018-05-01 RX ADMIN — FENTANYL CITRATE 25 MCG: 50 INJECTION, SOLUTION INTRAMUSCULAR; INTRAVENOUS at 11:32

## 2018-05-01 RX ADMIN — SODIUM CHLORIDE 50 ML/HR: 900 INJECTION, SOLUTION INTRAVENOUS at 20:38

## 2018-05-01 RX ADMIN — SODIUM CHLORIDE 50 ML/HR: 900 INJECTION, SOLUTION INTRAVENOUS at 13:00

## 2018-05-01 RX ADMIN — SODIUM CHLORIDE: 9 INJECTION, SOLUTION INTRAVENOUS at 09:25

## 2018-05-01 RX ADMIN — LIDOCAINE HYDROCHLORIDE 60 MG: 20 INJECTION, SOLUTION EPIDURAL; INFILTRATION; INTRACAUDAL; PERINEURAL at 09:21

## 2018-05-01 RX ADMIN — PROPOFOL 130 MG: 10 INJECTION, EMULSION INTRAVENOUS at 09:21

## 2018-05-01 RX ADMIN — DIPHENHYDRAMINE HYDROCHLORIDE 25 MG: 50 INJECTION, SOLUTION INTRAMUSCULAR; INTRAVENOUS at 13:37

## 2018-05-01 RX ADMIN — EPOETIN ALFA 20000 UNITS: 20000 SOLUTION INTRAVENOUS; SUBCUTANEOUS at 21:43

## 2018-05-01 RX ADMIN — DESMOPRESSIN ACETATE 24.08 MCG: 4 SOLUTION INTRAVENOUS at 09:30

## 2018-05-01 RX ADMIN — FENTANYL CITRATE 25 MCG: 50 INJECTION, SOLUTION INTRAMUSCULAR; INTRAVENOUS at 09:45

## 2018-05-01 RX ADMIN — ZOLPIDEM TARTRATE 5 MG: 5 TABLET ORAL at 20:38

## 2018-05-01 RX ADMIN — FENTANYL CITRATE 25 MCG: 50 INJECTION, SOLUTION INTRAMUSCULAR; INTRAVENOUS at 09:53

## 2018-05-01 RX ADMIN — Medication 10 ML: at 05:15

## 2018-05-01 RX ADMIN — ACETAMINOPHEN 1000 MG: 500 TABLET, FILM COATED ORAL at 23:57

## 2018-05-01 RX ADMIN — ROCURONIUM BROMIDE 10 MG: 10 INJECTION, SOLUTION INTRAVENOUS at 09:45

## 2018-05-01 RX ADMIN — ONDANSETRON 4 MG: 2 INJECTION INTRAMUSCULAR; INTRAVENOUS at 10:52

## 2018-05-01 RX ADMIN — FENTANYL CITRATE 25 MCG: 50 INJECTION, SOLUTION INTRAMUSCULAR; INTRAVENOUS at 13:37

## 2018-05-01 RX ADMIN — SUCCINYLCHOLINE CHLORIDE 140 MG: 20 INJECTION INTRAMUSCULAR; INTRAVENOUS at 09:21

## 2018-05-01 RX ADMIN — DEXAMETHASONE SODIUM PHOSPHATE 4 MG: 4 INJECTION, SOLUTION INTRA-ARTICULAR; INTRALESIONAL; INTRAMUSCULAR; INTRAVENOUS; SOFT TISSUE at 10:00

## 2018-05-01 RX ADMIN — EPINEPHRINE 0.01 MG: 1 INJECTION, SOLUTION, CONCENTRATE INTRAVENOUS at 11:55

## 2018-05-01 RX ADMIN — Medication 10 ML: at 16:19

## 2018-05-01 RX ADMIN — SODIUM BICARBONATE 650 MG: 650 TABLET ORAL at 20:38

## 2018-05-01 RX ADMIN — KETOROLAC TROMETHAMINE 30 MG: 30 INJECTION, SOLUTION INTRAMUSCULAR at 23:57

## 2018-05-01 RX ADMIN — HYDRALAZINE HYDROCHLORIDE 10 MG: 20 INJECTION INTRAMUSCULAR; INTRAVENOUS at 16:12

## 2018-05-01 RX ADMIN — GLYCOPYRROLATE 0.5 MG: 0.2 INJECTION INTRAMUSCULAR; INTRAVENOUS at 11:12

## 2018-05-01 RX ADMIN — FENTANYL CITRATE 100 MCG: 50 INJECTION, SOLUTION INTRAMUSCULAR; INTRAVENOUS at 09:21

## 2018-05-01 RX ADMIN — ROCURONIUM BROMIDE 35 MG: 10 INJECTION, SOLUTION INTRAVENOUS at 09:30

## 2018-05-01 RX ADMIN — ACETAMINOPHEN 1000 MG: 500 TABLET, FILM COATED ORAL at 17:26

## 2018-05-01 RX ADMIN — DEXTROSE MONOHYDRATE 12.5 G: 500 INJECTION PARENTERAL at 06:44

## 2018-05-01 RX ADMIN — SODIUM BICARBONATE 650 MG: 650 TABLET ORAL at 17:26

## 2018-05-01 RX ADMIN — KETOROLAC TROMETHAMINE 30 MG: 30 INJECTION, SOLUTION INTRAMUSCULAR at 16:17

## 2018-05-01 RX ADMIN — ROCURONIUM BROMIDE 5 MG: 10 INJECTION, SOLUTION INTRAVENOUS at 09:21

## 2018-05-01 RX ADMIN — SODIUM CHLORIDE: 900 INJECTION, SOLUTION INTRAVENOUS at 08:20

## 2018-05-01 RX ADMIN — INSULIN LISPRO 2 UNITS: 100 INJECTION, SOLUTION INTRAVENOUS; SUBCUTANEOUS at 22:51

## 2018-05-01 NOTE — PROGRESS NOTES
TRANSFER - IN REPORT:    Verbal report received from Cecilia (name) on Lizeth Guevara  being received from PACU (unit) for routine post - op      Report consisted of patients Situation, Background, Assessment and   Recommendations(SBAR). Information from the following report(s) Procedure Summary was reviewed with the receiving nurse. Opportunity for questions and clarification was provided. Assessment completed upon patients arrival to unit and care assumed.

## 2018-05-01 NOTE — PROGRESS NOTES
[facility]   [address] [facility], [address] [place] [address], [facility]  Phone: [phone]   [phone]       Nephrology Progress Note  Angy Rubio     1960     [id number]  Date of Admission : 4/28/2018 05/01/18    CC: Follow up for ESRD      Assessment and Plan   New Onset ESRD  - HD MWF  - no urgent needs today  - plan next HD tomorrow    R sided Pleural effusion w/ Trapped Lung   - s/p VATS and decortication 5/1    Anemia of CKD     Solitary Kidney   S/p Prior Left Nephrectomy for RCC    Sec HTPH  - continue Binders      Interval History:  Seen and examined. Post op from VATS decortication. Lethargic from fentanyl. No cp or sob reported at this time. Review of Systems: Pertinent items are noted in HPI.     Current Medications:   Current Facility-Administered Medications   Medication Dose Route Frequency    sodium chloride (NS) flush 5-10 mL  5-10 mL IntraVENous Q8H    dextrose (D50W) injection syrg 12.5 g  25 mL IntraVENous PRN    dextrose (D50W) 50% injection syrg        bupivacaine (PF) 0.25 % (ON-Q BAG) 4 ml/hour 300 ml  4 mL/hr Local Infiltration CONTINUOUS    fentaNYL (PF)  mcg/30 ml (ADULT)   IntraVENous TITRATE    0.9% sodium chloride infusion  50 mL/hr IntraVENous CONTINUOUS    lisinopril (PRINIVIL, ZESTRIL) tablet 10 mg  10 mg Oral DAILY    0.9% sodium chloride infusion 250 mL  250 mL IntraVENous PRN    hydrALAZINE (APRESOLINE) 20 mg/mL injection 10 mg  10 mg IntraVENous Q6H PRN    atorvastatin (LIPITOR) tablet 40 mg  40 mg Oral DAILY    sodium chloride (NS) flush 5-10 mL  5-10 mL IntraVENous Q8H    sodium chloride (NS) flush 5-10 mL  5-10 mL IntraVENous PRN    insulin glargine (LANTUS) injection 10 Units  10 Units SubCUTAneous ACL    sodium bicarbonate tablet 650 mg  650 mg Oral TID    acetaminophen (TYLENOL) tablet 650 mg  650 mg Oral Q6H PRN    calcium carbonate (TUMS) chewable tablet 400 mg [elemental]  400 mg Oral TID WITH MEALS    epoetin tyler (EPOGEN;PROCRIT) injection 20,000 Units  20,000 Units SubCUTAneous Q TUE, THU & SAT    furosemide (LASIX) tablet 80 mg  80 mg Oral DAILY    glucagon (GLUCAGEN) injection 1 mg  1 mg IntraMUSCular PRN    glucose chewable tablet 16 g  4 Tab Oral PRN    insulin lispro (HUMALOG) injection   SubCUTAneous AC&HS    ondansetron (ZOFRAN) injection 4 mg  4 mg IntraVENous Q4H PRN    oxyCODONE IR (ROXICODONE) tablet 5 mg  5 mg Oral Q4H PRN    pantoprazole (PROTONIX) tablet 40 mg  40 mg Oral ACB    zolpidem (AMBIEN) tablet 5 mg  5 mg Oral QHS    carvedilol (COREG) tablet 12.5 mg  12.5 mg Oral BID WITH MEALS      No Known Allergies    Objective:  Vitals:    Vitals:    05/01/18 1345 05/01/18 1400 05/01/18 1415 05/01/18 1430   BP: 127/77 135/79 (!) 150/93 156/83   Pulse: 69 69 72 74   Resp: 10 12 14 12   Temp: 98.2 °F (36.8 °C)      TempSrc:       SpO2: 98% 99% 98% 98%   Weight:         Intake and Output:  05/01 0701 - 05/01 1900  In: 850 [I.V.:850]  Out: 210 [Urine:550]  04/29 1901 - 05/01 0700  In: 1180 [P.O.:560]  Out: 4865 [Urine:1890]    Physical Examination:    General: NAD,Conversant   Neck:  Supple, no mass, RIJ permacath   Resp:  Diminished BS, multiple chest tubes on R  CV:  RRR,  no murmur or rub, no LE edema  GI:  Soft, NT, + Bowel sounds, no hepatosplenomegaly  Neurologic:  Non focal  Skin:  No Rash  :  No bowling     []    High complexity decision making was performed  []    Patient is at high-risk of decompensation with multiple organ involvement    Lab Data Personally Reviewed: I have reviewed all the pertinent labs, microbiology data and radiology studies during assessment.     Recent Labs      05/01/18   0503  04/30/18   0155  04/29/18   0447   NA  136  133*  132*   K  3.7  4.5  4.2   CL  102  102  101   CO2  29  26  26   GLU  82  209*  205*   BUN  19  38*  37*   CREA  2.33*  3.77*  3.51*   CA  7.2*  6.6*  6.6*   ALB   --    --   1.1*   SGOT   --    --   18   ALT   --    --   9*     Recent Labs 05/01/18   0503  04/30/18   0155  04/29/18   0447   WBC  5.2  5.7  5.5   HGB  8.8*  7.2*  7.0*   HCT  27.4*  23.0*  22.4*   PLT  108*  109*  89*     No results found for: SDES  Lab Results   Component Value Date/Time    Culture result: PENDING 05/01/2018 10:14 AM    Culture result: MRSA NOT PRESENT 04/30/2018 12:32 PM    Culture result:  04/30/2018 12:32 PM         Screening of patient nares for MRSA is for surveillance purposes and, if positive, to facilitate isolation considerations in high risk settings. It is not intended for automatic decolonization interventions per se as regimens are not sufficiently effective to warrant routine use.     Culture result: MODERATE NORMAL RESPIRATORY BON 04/27/2018 12:21 PM    Culture result: LIGHT CAPNOCYTOPHAGA SPECIES (A) 04/27/2018 12:21 PM     Recent Results (from the past 24 hour(s))   GLUCOSE, POC    Collection Time: 04/30/18  4:20 PM   Result Value Ref Range    Glucose (POC) 290 (H) 65 - 100 mg/dL    Performed by Key Hedrick    TYPE + CROSSMATCH    Collection Time: 04/30/18  8:32 PM   Result Value Ref Range    Crossmatch Expiration 05/03/2018     ABO/Rh(D) O POSITIVE     Antibody screen NEG     Unit number [id number]     Blood component type [facility]     Unit division 00     Status of unit ISSUED     Crossmatch result Compatible     Unit number [id number]     Blood component type [facility]     Unit division 00     Status of unit ISSUED     Crossmatch result Compatible    GLUCOSE, POC    Collection Time: 04/30/18  9:47 PM   Result Value Ref Range    Glucose (POC) 392 (H) 65 - 100 mg/dL    Performed by Stephan Jiang    CBC W/O DIFF    Collection Time: 05/01/18  5:03 AM   Result Value Ref Range    WBC 5.2 4.1 - 11.1 K/uL    RBC 3.79 (L) 4.10 - 5.70 M/uL    HGB 8.8 (L) 12.1 - 17.0 g/dL    HCT 27.4 (L) 36.6 - 50.3 %    MCV 72.3 (L) 80.0 - 99.0 FL    MCH 23.2 (L) 26.0 - 34.0 PG    MCHC 32.1 30.0 - 36.5 g/dL    RDW 21.7 (H) 11.5 - 14.5 %    PLATELET 780 (L) 020 - 400 K/uL NRBC 0.0 0  WBC    ABSOLUTE NRBC 0.00 0.00 - 3.00 K/uL   METABOLIC PANEL, BASIC    Collection Time: 05/01/18  5:03 AM   Result Value Ref Range    Sodium 136 136 - 145 mmol/L    Potassium 3.7 3.5 - 5.1 mmol/L    Chloride 102 97 - 108 mmol/L    CO2 29 21 - 32 mmol/L    Anion gap 5 5 - 15 mmol/L    Glucose 82 65 - 100 mg/dL    BUN 19 6 - 20 MG/DL    Creatinine 2.33 (H) 0.70 - 1.30 MG/DL    BUN/Creatinine ratio 8 (L) 12 - 20      GFR est AA 35 (L) >60 ml/min/1.73m2    GFR est non-AA 29 (L) >60 ml/min/1.73m2    Calcium 7.2 (L) 8.5 - 10.1 MG/DL   GLUCOSE, POC    Collection Time: 05/01/18  6:27 AM   Result Value Ref Range    Glucose (POC) 67 65 - 100 mg/dL    Performed by Theresa Hernandez    GLUCOSE, POC    Collection Time: 05/01/18  7:07 AM   Result Value Ref Range    Glucose (POC) 95 65 - 100 mg/dL    Performed by Theresa Hernandez    CULTURE, BODY FLUID [place] Drum STAIN    Collection Time: 05/01/18 10:14 AM   Result Value Ref Range    Special Requests: NO SPECIAL REQUESTS      GRAM STAIN OCCASIONAL WBCS SEEN      GRAM STAIN NO ORGANISMS SEEN      Culture result: PENDING    POC G3 - PUL    Collection Time: 05/01/18 12:29 PM   Result Value Ref Range    FIO2 (POC) 1 %    pH (POC) 7.416 7.35 - 7.45      pCO2 (POC) 54.7 (H) 35.0 - 45.0 MMHG    pO2 (POC) 362 (H) 80 - 100 MMHG    HCO3 (POC) 35.2 (H) 22 - 26 MMOL/L    sO2 (POC) 100 (H) 92 - 97 %    Base excess (POC) 11 mmol/L    Site LEFT RADIAL      Device: VENT      Mode ASSIST CONTROL      Allens test (POC) NO      Specimen type (POC) ARTERIAL      Total resp.  rate 15      Volume control YES     GLUCOSE, POC    Collection Time: 05/01/18 12:48 PM   Result Value Ref Range    Glucose (POC) 72 65 - 100 mg/dL    Performed by Alfie Washington    GLUCOSE, POC    Collection Time: 05/01/18  1:17 PM   Result Value Ref Range    Glucose (POC) 77 65 - 100 mg/dL    Performed by [address], POC    Collection Time: 05/01/18  1:29 PM   Result Value Ref Range    Glucose (POC) 67 65 - 100 mg/dL    Performed by Amari Dumont    GLUCOSE, POC    Collection Time: 05/01/18  1:33 PM   Result Value Ref Range    Glucose (POC) 81 65 - 100 mg/dL    Performed by Amari Dumont            I have reviewed the flowsheets. Chart and Pertinent Notes have been reviewed. No change in PMH ,family and social history from Consult note.       Ventura Del Castillo MD

## 2018-05-01 NOTE — DIALYSIS
[facility]  [phone]    Vitals   Pre   Post   Assessment   Pre   Post     Temp  98.1  97.9 LOC  AXOX3 Easily aroused   HR   88 85 Lungs   R CT, diminished bs bilat. Unchanged   B/P   172/96 145/73 Cardiac   NSR  SR/PVCs   Resp   20 18 Skin   W/D  Unchanged   Pain level  0 0 Edema    +1 BLE   Traces BLE   Orders:    Duration:   Start:    2941 End:    3355 Total:   3.5 hours   Dialyzer:    Revaclear   K Bath:    2.0   Ca Bath:    2.5   Na/Bicarb:    140/35   Target Fluid Removal:    3000 ml   Access     Type & Location:   R subcl tunneled CVC,with ports patent x 2;dsg CDI dated 04/28/18, biopatch is visible   Labs     Obtained/Reviewed   Critical Results Called   Date when labs were drawn-  Hgb-    HGB   Date Value Ref Range Status   04/30/2018 7.2 (L) 12.1 - 17.0 g/dL Final     K-    Potassium   Date Value Ref Range Status   04/30/2018 4.5 3.5 - 5.1 mmol/L Final     Ca-   Calcium   Date Value Ref Range Status   04/30/2018 6.6 (L) 8.5 - 10.1 MG/DL Final     Bun-   BUN   Date Value Ref Range Status   04/30/2018 38 (H) 6 - 20 MG/DL Final     Creat-   Creatinine   Date Value Ref Range Status   04/30/2018 3.77 (H) 0.70 - 1.30 MG/DL Final        Medications/ Blood Products Given     Name   Dose   Route and Time                     Blood Volume Processed (BVP):    75.6 Net Fluid   Removed:  3000 ml   Comments   Time Out Done: Yes, ICEBOAT  Primary Nurse Rpt Pre:Gabby Hui RN  Primary Nurse Rpt Post:ZACK Snider RN  Pt Education:Procedure ,blood transfusion  Care Plan:HD as prescribed by nephrologists. Tx Summary:Ucomplicated HD tx with transfusion of  2 units PRBCs. Pt stable and without c/o during tx. All blood returned AET. Pt left in bed in stable condition.   Admiting Diagnosis:  Pt's previous clinic-  Consent signed -  Obtained 04/30/18  [facility] Consent - Obtained 04/30/18  Hepatitis Status- Neg Ag on 04/25/18  Machine #-  [id number]  Telemetry status-Bedside/unit monitoring  Pre-dialysis wt.-  86.5 kg,post dialysis wt-82.9 kg

## 2018-05-01 NOTE — ANESTHESIA PREPROCEDURE EVALUATION
Anesthetic History               Review of Systems / Medical History  Patient summary reviewed, nursing notes reviewed and pertinent labs reviewed    Pulmonary                Comments: Right Pleural Effusion, Right Chest Tube   Neuro/Psych              Cardiovascular    Hypertension      CHF: NYHA Classification II    CAD      Comments: 4/2018 EF 38%  Small fix defect   GI/Hepatic/Renal         Renal disease: ESRD and dialysis       Endo/Other    Diabetes    Anemia     Other Findings   Comments: Hb 8.8  Dialysis last night  K 3.7         Physical Exam    Airway  Mallampati: II  TM Distance: > 6 cm  Neck ROM: normal range of motion   Mouth opening: Normal     Cardiovascular    Rhythm: regular  Rate: normal         Dental         Pulmonary                 Abdominal         Other Findings            Anesthetic Plan    ASA: 3  Anesthesia type: general          Induction: Intravenous  Anesthetic plan and risks discussed with: Patient      PARMJIT

## 2018-05-01 NOTE — BRIEF OP NOTE
BRIEF OPERATIVE NOTE    Date of Procedure: 5/1/2018   Preoperative Diagnosis: PLEURAL EFFUSION  Postoperative Diagnosis: PLEURAL EFFUSION    Procedure(s):  RIGHT THROACOSCOPY, RIGHT THORACOTOMY, FULL DECORTICATION  Surgeon(s) and Role:     * Samira Damon MD - Primary         Surgical Assistant: Da Davila    Surgical Staff:  Circ-1: Juan Renae RN  Circ-2: Melanie Hdez RN  Scrub Tech-1: Mayur Sagastume  Scrub RN-Relief: Remona Carrel, RN  Float Staff: Teagan Bergman; Remona Carrel, RN  Event Time In   Incision Start 0473   Incision Close      Anesthesia: General   Estimated Blood Loss: 200  Specimens:   ID Type Source Tests Collected by Time Destination   1 : Pleural peel Fresh Lung  Samira Damon MD 5/1/2018 1015 Pathology   1 : Pleural peel Body Fluid Pleural Fluid AEROBIC/ANAEROBIC CULTURE Samira Damon MD 5/1/2018 1014 Microbiology      Findings: peel on the entire right lung   Complications: none  Implants: * No implants in log *

## 2018-05-01 NOTE — DIABETES MGMT
DTC Progress Note    Recommendations/ Comments: Chart reviewed due to variable blood sugars. Pt with blood sugars in 300's last night but today 67-95 mg/dl. Noted pt is NPO. Also note renal status. Once pt is eating please document po intake to assess need for meal time insulin. Current hospital DM medication: Lantus 10 units daily and                                                        Lispro correction scale, high sensitivity        Chart reviewed and initial evaluation complete on Efrain Solders. Patient is a 66 y.o. male with known Type 2 DM on Novolin 70/30 18 units bid ac    Per PTA, pt is taking 15 units BID ac  Pt was educated at 2/13/2018 and A1c improving from 12% at that time. A1c:   Lab Results   Component Value Date/Time    Hemoglobin A1c 8.7 (H) 04/30/2018 01:55 AM    Hemoglobin A1c 9.9 (H) 04/24/2018 01:23 AM       Recent Glucose Results:   Lab Results   Component Value Date/Time    GLU 82 05/01/2018 05:03 AM    GLUCPOC 72 05/01/2018 12:48 PM    GLUCPOC 95 05/01/2018 07:07 AM    GLUCPOC 67 05/01/2018 06:27 AM        Lab Results   Component Value Date/Time    Creatinine 2.33 (H) 05/01/2018 05:03 AM     Estimated Creatinine Clearance: 40.2 mL/min (based on Cr of 2.33). Active Orders   Diet    DIET NPO        PO intake: No data found. Will continue to follow as needed. Thank you.   Adriana Sutton RD, CDE

## 2018-05-01 NOTE — PROGRESS NOTES
Date of Surgery Update:  Ursula Mcgee was seen and examined. History and physical has been reviewed. The patient has been examined.  There have been no significant clinical changes since the completion of the originally dated History and Physical.    Signed By: Olivia Beck MD     May 1, 2018 8:37 AM

## 2018-05-01 NOTE — ROUTINE PROCESS
TRANSFER - OUT REPORT:    Verbal report given to Tal Vanegas RN(name) on Lin Valenzuela  being transferred to [facility] for routine post - op       Report consisted of patients Situation, Background, Assessment and   Recommendations(SBAR). Time Pre op antibiotic given:1015  Anesthesia Stop time: 6834  Sharma Present on Transfer to floor:YES  Order for Sharma on Chart:YES  Discharge Prescriptions with Chart:NO    Information from the following report(s) SBAR, Kardex, OR Summary, Procedure Summary and Cardiac Rhythm NSR with PVC's was reviewed with the receiving nurse. Opportunity for questions and clarification was provided. Is the patient on 02? YES       L/Min 2    Is the patient on a monitor? YES    Is the nurse transporting with the patient? YES    Surgical Waiting Area notified of patient's transfer from PACU? YES      The following personal items collected during your admission accompanied patient upon transfer:   Dental Appliance: Dental Appliances: None  Vision: Visual Aid: None  Hearing Aid: Hearing Aid: None  Jewelry: Jewelry: None  Clothing: Clothing: At bedside  Other Valuables:  Other Valuables: Cell Phone  Valuables sent to safe:

## 2018-05-01 NOTE — ANESTHESIA PROCEDURE NOTES
Arterial Line Placement    Start time: 5/1/2018 8:45 AM  End time: 5/1/2018 8:50 AM  Performed by: Damien Willis  Authorized by: Damien Willis     Pre-Procedure  Indications:  Arterial pressure monitoring and blood sampling  Preanesthetic Checklist: patient identified, risks and benefits discussed, anesthesia consent, site marked, patient being monitored, timeout performed and patient being monitored    Timeout Time: 08:45        Procedure:   Prep:  Chlorhexidine  Orientation:  Left  Location:  Axillary artery  Catheter size:  20 G  Number of attempts:  1    Assessment:   Post-procedure:  Line secured and sterile dressing applied  Patient Tolerance:  Patient tolerated the procedure well with no immediate complications

## 2018-05-01 NOTE — PROGRESS NOTES
Patient in OR this AM  Dialyzed yesterday  Will see later this afternoon or in the AM  Call with any questions

## 2018-05-01 NOTE — H&P
Date of Surgery Update:  Bhargav Gong was seen and examined. History and physical has been reviewed. The patient has been examined.  There have been no significant clinical changes since the completion of the originally dated History and Physical.    Signed By: Refugio Jones MD     May 1, 2018 8:38 AM

## 2018-05-01 NOTE — ROUTINE PROCESS
Bedside shift change report given to TE Tejada (oncoming nurse) by Puneet Barros (offgoing nurse). Report included the following information SBAR, OR Summary, Procedure Summary, Intake/Output, Recent Results, Med Rec Status and Cardiac Rhythm NSR.

## 2018-05-01 NOTE — ROUTINE PROCESS
Patient: Socrates Lantigua MRN: [id number]  SSN: [id number]   YOB: 1960  Age: 66 y.o. Sex: male     Patient is status post Procedure(s):  RIGHT THROACOSCOPY, RIGHT THORACOTOMY, FULL DECORTICATION. Surgeon(s) and Role:     * Lance Lopez MD - Primary    Local/Dose/Irrigation:  Marcaine . Diana Adalberto 25% 1:I Lidocaine 1% 40ml injected by Starr. Marcaine 0.25 On Q Pain Management pump infusing          Infusion Catheter 05/01/18 (Active)            Peripheral IV 04/23/18 Left Forearm (Active)   Site Assessment Clean, dry, & intact 5/1/2018  4:40 AM   Phlebitis Assessment 0 5/1/2018  4:40 AM   Infiltration Assessment 0 5/1/2018  4:40 AM   Dressing Status Clean, dry, & intact 5/1/2018  4:40 AM   Dressing Type Transparent;Tape 5/1/2018  4:40 AM   Hub Color/Line Status Green;Capped 5/1/2018  4:40 AM   Action Taken Open ports on tubing capped 5/1/2018  4:40 AM   Alcohol Cap Used Yes 5/1/2018  4:40 AM       Peripheral IV 05/01/18 Right Wrist (Active)      Arterial Line 05/01/18 Left Axillary artery (Active)      Hemodialysis Access 04/26/18 (Active)   Central Line Being Utilized Yes 5/1/2018  4:40 AM   Criteria for Appropriate Use Dialysis/apheresis 5/1/2018  4:40 AM   Date Accessed  04/26/18 4/27/2018  8:12 AM   Site Assessment Clean, dry, & intact 5/1/2018  4:40 AM   Date of Last Dressing Change 04/30/18 4/30/2018  4:02 PM   Dressing Status Clean, dry, & intact 5/1/2018  4:40 AM   Dressing Type Disk with Chlorhexadine gluconate (CHG); Transparent 5/1/2018  4:40 AM   Proximal Hub Color/Line Status Red;Capped 5/1/2018  4:40 AM   Distal Hub Color/Line Status Blue;Capped 5/1/2018  4:40 AM        Airway - Endotracheal Tube 05/01/18 Oral (Active)                   Dressing/Packing:  Wound Chest Right-DRESSING TYPE: 4 x 4;Special tape (comment) (05/01/18 0440)  Wound Chest Right;Upper-DRESSING TYPE: Adhesive wound closure strips (Steri-Strips); Special tape (comment); Non-adherent (ISLAND DRESSING) (05/01/18 1111)  Wound Chest Right; Lower-DRESSING TYPE: Adhesive wound dressing (Band-Aid) (05/01/18 1111)  Splint/Cast:  ]    Other:

## 2018-05-02 ENCOUNTER — APPOINTMENT (OUTPATIENT)
Dept: GENERAL RADIOLOGY | Age: 58
DRG: 163 | End: 2018-05-02
Attending: NURSE PRACTITIONER
Payer: MEDICARE

## 2018-05-02 LAB
ABO + RH BLD: NORMAL
BLD PROD TYP BPU: NORMAL
BLD PROD TYP BPU: NORMAL
BLOOD GROUP ANTIBODIES SERPL: NORMAL
BPU ID: NORMAL
BPU ID: NORMAL
CROSSMATCH RESULT,%XM: NORMAL
CROSSMATCH RESULT,%XM: NORMAL
GLUCOSE BLD STRIP.AUTO-MCNC: 255 MG/DL (ref 65–100)
GLUCOSE BLD STRIP.AUTO-MCNC: 339 MG/DL (ref 65–100)
GLUCOSE BLD STRIP.AUTO-MCNC: 391 MG/DL (ref 65–100)
GLUCOSE BLD STRIP.AUTO-MCNC: 406 MG/DL (ref 65–100)
SERVICE CMNT-IMP: ABNORMAL
SPECIMEN EXP DATE BLD: NORMAL
STATUS OF UNIT,%ST: NORMAL
STATUS OF UNIT,%ST: NORMAL
UNIT DIVISION, %UDIV: 0
UNIT DIVISION, %UDIV: 0

## 2018-05-02 PROCEDURE — 30243N1 TRANSFUSION OF NONAUTOLOGOUS RED BLOOD CELLS INTO CENTRAL VEIN, PERCUTANEOUS APPROACH: ICD-10-PCS | Performed by: INTERNAL MEDICINE

## 2018-05-02 PROCEDURE — 74011636637 HC RX REV CODE- 636/637: Performed by: INTERNAL MEDICINE

## 2018-05-02 PROCEDURE — 74011250637 HC RX REV CODE- 250/637: Performed by: INTERNAL MEDICINE

## 2018-05-02 PROCEDURE — 5A1D70Z PERFORMANCE OF URINARY FILTRATION, INTERMITTENT, LESS THAN 6 HOURS PER DAY: ICD-10-PCS | Performed by: INTERNAL MEDICINE

## 2018-05-02 PROCEDURE — 71045 X-RAY EXAM CHEST 1 VIEW: CPT

## 2018-05-02 PROCEDURE — 74011250636 HC RX REV CODE- 250/636: Performed by: THORACIC SURGERY (CARDIOTHORACIC VASCULAR SURGERY)

## 2018-05-02 PROCEDURE — 82962 GLUCOSE BLOOD TEST: CPT

## 2018-05-02 PROCEDURE — 74011636637 HC RX REV CODE- 636/637: Performed by: HOSPITALIST

## 2018-05-02 PROCEDURE — 90935 HEMODIALYSIS ONE EVALUATION: CPT

## 2018-05-02 PROCEDURE — 74011250637 HC RX REV CODE- 250/637: Performed by: HOSPITALIST

## 2018-05-02 PROCEDURE — 65660000000 HC RM CCU STEPDOWN

## 2018-05-02 PROCEDURE — 77010033678 HC OXYGEN DAILY

## 2018-05-02 PROCEDURE — 74011250637 HC RX REV CODE- 250/637: Performed by: THORACIC SURGERY (CARDIOTHORACIC VASCULAR SURGERY)

## 2018-05-02 RX ORDER — INSULIN LISPRO 100 [IU]/ML
15 INJECTION, SOLUTION INTRAVENOUS; SUBCUTANEOUS ONCE
Status: COMPLETED | OUTPATIENT
Start: 2018-05-02 | End: 2018-05-02

## 2018-05-02 RX ORDER — INSULIN LISPRO 100 [IU]/ML
8 INJECTION, SOLUTION INTRAVENOUS; SUBCUTANEOUS
Status: COMPLETED | OUTPATIENT
Start: 2018-05-02 | End: 2018-05-02

## 2018-05-02 RX ADMIN — ACETAMINOPHEN 1000 MG: 500 TABLET, FILM COATED ORAL at 06:37

## 2018-05-02 RX ADMIN — CALCIUM CARBONATE (ANTACID) CHEW TAB 500 MG 400 MG: 500 CHEW TAB at 16:58

## 2018-05-02 RX ADMIN — INSULIN LISPRO 4 UNITS: 100 INJECTION, SOLUTION INTRAVENOUS; SUBCUTANEOUS at 06:37

## 2018-05-02 RX ADMIN — CALCIUM CARBONATE (ANTACID) CHEW TAB 500 MG 400 MG: 500 CHEW TAB at 09:23

## 2018-05-02 RX ADMIN — Medication 10 ML: at 22:00

## 2018-05-02 RX ADMIN — Medication 10 ML: at 06:00

## 2018-05-02 RX ADMIN — Medication 10 ML: at 13:27

## 2018-05-02 RX ADMIN — SODIUM BICARBONATE 650 MG: 650 TABLET ORAL at 16:58

## 2018-05-02 RX ADMIN — KETOROLAC TROMETHAMINE 30 MG: 30 INJECTION, SOLUTION INTRAMUSCULAR at 13:23

## 2018-05-02 RX ADMIN — ATORVASTATIN CALCIUM 40 MG: 40 TABLET, FILM COATED ORAL at 09:14

## 2018-05-02 RX ADMIN — CARVEDILOL 12.5 MG: 12.5 TABLET, FILM COATED ORAL at 16:58

## 2018-05-02 RX ADMIN — FUROSEMIDE 80 MG: 40 TABLET ORAL at 09:14

## 2018-05-02 RX ADMIN — CARVEDILOL 12.5 MG: 12.5 TABLET, FILM COATED ORAL at 09:15

## 2018-05-02 RX ADMIN — INSULIN LISPRO 2 UNITS: 100 INJECTION, SOLUTION INTRAVENOUS; SUBCUTANEOUS at 22:04

## 2018-05-02 RX ADMIN — SODIUM BICARBONATE 650 MG: 650 TABLET ORAL at 21:12

## 2018-05-02 RX ADMIN — INSULIN LISPRO 15 UNITS: 100 INJECTION, SOLUTION INTRAVENOUS; SUBCUTANEOUS at 16:57

## 2018-05-02 RX ADMIN — CALCIUM CARBONATE (ANTACID) CHEW TAB 500 MG 400 MG: 500 CHEW TAB at 13:23

## 2018-05-02 RX ADMIN — INSULIN LISPRO 8 UNITS: 100 INJECTION, SOLUTION INTRAVENOUS; SUBCUTANEOUS at 13:27

## 2018-05-02 RX ADMIN — ZOLPIDEM TARTRATE 5 MG: 5 TABLET ORAL at 21:12

## 2018-05-02 RX ADMIN — SODIUM BICARBONATE 650 MG: 650 TABLET ORAL at 09:14

## 2018-05-02 RX ADMIN — ACETAMINOPHEN 1000 MG: 500 TABLET, FILM COATED ORAL at 17:02

## 2018-05-02 RX ADMIN — PANTOPRAZOLE SODIUM 40 MG: 40 TABLET, DELAYED RELEASE ORAL at 09:15

## 2018-05-02 RX ADMIN — ACETAMINOPHEN 1000 MG: 500 TABLET, FILM COATED ORAL at 13:23

## 2018-05-02 RX ADMIN — Medication 10 ML: at 13:28

## 2018-05-02 RX ADMIN — KETOROLAC TROMETHAMINE 30 MG: 30 INJECTION, SOLUTION INTRAMUSCULAR at 06:37

## 2018-05-02 NOTE — PROGRESS NOTES
Problem: Falls - Risk of  Goal: *Absence of Falls  Document Chuck Fall Risk and appropriate interventions in the flowsheet. Outcome: Progressing Towards Goal  Fall Risk Interventions:  Mobility Interventions: Patient to call before getting OOB         Medication Interventions: Patient to call before getting OOB    Elimination Interventions: Call light in reach, Patient to call for help with toileting needs             Problem: Pressure Injury - Risk of  Goal: *Prevention of pressure injury  Document Ruddy Scale and appropriate interventions in the flowsheet.    Outcome: Progressing Towards Goal  Pressure Injury Interventions:  Sensory Interventions: Assess changes in LOC    Moisture Interventions: Absorbent underpads, Limit adult briefs    Activity Interventions: Increase time out of bed    Mobility Interventions: Pressure redistribution bed/mattress (bed type)    Nutrition Interventions: Document food/fluid/supplement intake    Friction and Shear Interventions: Lift sheet

## 2018-05-02 NOTE — PROGRESS NOTES
0300:  Patient got out of bed to get a standing daily weight. Patient refused to ambulate in hallway but said he would try later on this morning.

## 2018-05-02 NOTE — PROGRESS NOTES
Hospitalist Progress Note  Crys Samaniego MD  Answering service: [phone] from in house phone         Date of Service:  2018  NAME:  Adenike Austin  :  1960  MRN:  [id number]    Admission Summary:   The patient is a 51-year-old -American gentleman who was initially admitted to [address] [place] [address] with a presumed diagnosis of pneumonia/flu. Bronson Corona was found to be in renal failure.  He had gained 20 pounds before admission.  Patient at [address] [place] [address] was diagnosed with metabolic acidosis due to renal failure.  A chest x-ray was done, which showed large right-sided pleural effusion .  Patient was subsequently transferred to [facility] for further evaluation. [facility], patient was evaluated by pulmonology, cardiology. [facility] had a hemopneumothorax, which was seen on CT chest on .  Cytology of pleural fluid was negative.  Patient was given TPA via chest tube on  to try and help break up loculations. [facility] had a bronchoscopy done on , which did not reveal any endobronchial lesion.  A transthoracic echocardiogram was done, which showed acute systolic heart failure with ejection fraction of 20-25%, which was suspected due to hypertension. [facility] had a negative stress test.  On the , patient was seen by nephrology. Ralene Minna was placed . [facility] has been getting dialysis since then.  For hypertension, he was treated with Coreg and lisinopril.  The patient was transfused with 1 unit of packed RBCs. [facility] had a lower GI bleed.  The patient was seen by Dr. Jose Snyder patient also had hyperosmolar nonketotic state for which he was treated with insulin      Interval history / Subjective:         :  Pt is now post VAT's    My view of the CXR fro  suggest atelectasis vs other air space dz process ,  Increased but pt is afebrile and nl wbc count. No cp no sob. Anxious for tubes to be out of chest.          Assessment & Plan:       1.  Large R sided Pleural effusion s/p pig tail catheter. -VATs and decortication on Tues 4/31  -Draining blood tinged fluid from right chest tubes.  CT per CTS. , for VATS 5/1/2018; f/u cxr refviewed as above, stable except rll process increased  5/2/2018.       2.  Systolic heart failure with ejection fraction of 20-25% with severe diffuse hypokinesis  with negative ischemia workup. continue Coreg and Lisinopril.  Titrate ACEI as tolerated.,. No failure by exam 5/1 - 5/2/2018     3.  History of chronic kidney disease, currently on dialysis. -Nephrology on board. , con to follow    4.  Accelerated hypertension, on Coreg, lisinopril, and Lasix. Increased Lisino to 10mg on 4/30   -Adjust meds as tolerated. bp better   BP Readings from Last 1 Encounters:   05/02/18 121/69      5.  History of anemia, probably blood loss (he had BRBPR) and renal insufficiency.    -He is on Epogen. Hemoglobin is 7 today, and 8.8 5/1  Lab Results   Component Value Date/Time    HGB (POC) 13.2 11/27/2013 09:37 AM    HGB 8.8 (L) 05/01/2018 05:03 AM      6.  History of diabetes.  Monitor blood sugars closely. Lab Results   Component Value Date/Time    Glucose 82 05/01/2018 05:03 AM    Glucose (POC) 339 (H) 05/02/2018 06:30 AM    wide fluctuations, no major changes anticipated. 5/2/2018    7. Jazmin Oppenheim to high dose Statin. 8.  History of respiratory failure with hypoxia due to large right-sided pleural effusion, which has been drained.  The patient has a pigtail catheter placed in.  9.  History of hemopneumothorax, now with CT,  Pleural fluid Cytology is negative. 10.  DVT prophylaxis with SCDs due to recent GI blood loss. 11. DM : start on Lantus and monitor sugars, humalog. Acceptable ranges noted for 5/1, see  above.  For ranges 5/2/2018   Code status: full  DVT prophylaxis: SCD      Care Plan discussed with: Patient/Family/RN/CaseM  Disposition: Home w/Family and TBD          Hospital Problems  Date Reviewed: 4/28/2018          Codes Class Noted POA    Pleural effusion ICD-10-CM: J90  ICD-9-CM: 511.9  4/29/2018 Unknown        * (Principal)Pleural effusion on right ICD-10-CM: J90  ICD-9-CM: 511.9  4/27/2018 Yes    Overview Signed 4/27/2018  2:09 PM by Kathryn Segovia NP     4/24/18 CT placed with 2200cc out                     Review of Systems:   no cp no sob no n/v/d/f/chills. Vital Signs:    Last 24hrs VS reviewed since prior progress note. Most recent are:  Visit Vitals    /69 (BP 1 Location: Right arm, BP Patient Position: At rest)    Pulse 94    Temp 97.9 °F (36.6 °C)    Resp 17    Wt 78.2 kg (172 lb 6.4 oz)    SpO2 92%    BMI 21.55 kg/m2         Intake/Output Summary (Last 24 hours) at 05/02/18 0856  Last data filed at 05/02/18 0816   Gross per 24 hour   Intake          3493.33 ml   Output             1250 ml   Net          2243.33 ml        Physical Examination:             Constitutional:  No acute distress, cooperative, pleasant    ENT:  Oral mucous moist, oropharynx benign. Neck supple,    Resp:  CTA bilaterally. No wheezing/rhonchi/rales. No accessory muscle use   CV:  Regular rhythm, normal rate, no murmurs, gallops, rubs    GI:  Soft, non distended, non tender. normoactive bowel sounds, no hepatosplenomegaly     Musculoskeletal:  No edema, warm, 2+ pulses throughout    Neurologic:  Moves all extremities. AAOx3, CN II-XII reviewed     Psych:  Good insight, Not anxious nor agitated.   Skin:  Good turgor, no rashes or ulcers       Data Review:    Review and/or order of clinical lab test      Labs:     Recent Labs      05/01/18   0503  04/30/18   0155   WBC  5.2  5.7   HGB  8.8*  7.2*   HCT  27.4*  23.0*   PLT  108*  109*     Recent Labs      05/01/18   0503  04/30/18   0155   NA  136  133*   K  3.7  4.5   CL  102  102   CO2  29  26   BUN  19  38*   CREA  2.33*  3.77*   GLU  82  209*   CA  7.2*  6.6*     No results for input(s): SGOT, GPT, ALT, AP, TBIL, TBILI, TP, ALB, GLOB, GGT, AML, LPSE in the last 72 hours. No lab exists for component: AMYP, HLPSE  No results for input(s): INR, PTP, APTT in the last 72 hours. No lab exists for component: INREXT   No results for input(s): FE, TIBC, PSAT, FERR in the last 72 hours. Lab Results   Component Value Date/Time    Folate 5.9 02/13/2018 08:12 PM      No results for input(s): PH, PCO2, PO2 in the last 72 hours. No results for input(s): CPK, CKNDX, TROIQ in the last 72 hours.     No lab exists for component: CPKMB  Lab Results   Component Value Date/Time    Cholesterol, total 115 01/22/2018 03:55 PM    HDL Cholesterol 44 01/22/2018 03:55 PM    LDL, calculated 48 01/22/2018 03:55 PM    Triglyceride 117 01/22/2018 03:55 PM     Lab Results   Component Value Date/Time    Glucose (POC) 339 (H) 05/02/2018 06:30 AM    Glucose (POC) 343 (H) 05/01/2018 09:55 PM    Glucose (POC) 139 (H) 05/01/2018 04:10 PM    Glucose (POC) 81 05/01/2018 01:33 PM    Glucose (POC) 67 05/01/2018 01:29 PM     Lab Results   Component Value Date/Time    Color YELLOW/STRAW 03/16/2018 12:39 PM    Appearance CLEAR 03/16/2018 12:39 PM    Specific gravity 1.014 03/16/2018 12:39 PM    Specific gravity 1.015 02/13/2018 11:17 AM    pH (UA) 5.0 03/16/2018 12:39 PM    Protein 300 (A) 03/16/2018 12:39 PM    Glucose NEGATIVE  03/16/2018 12:39 PM    Ketone NEGATIVE  03/16/2018 12:39 PM    Bilirubin NEGATIVE  03/16/2018 12:39 PM    Urobilinogen 0.2 03/16/2018 12:39 PM    Nitrites NEGATIVE  03/16/2018 12:39 PM    Leukocyte Esterase NEGATIVE  03/16/2018 12:39 PM    Epithelial cells FEW 03/16/2018 12:39 PM    Bacteria 1+ (A) 03/16/2018 12:39 PM    WBC 5-10 03/16/2018 12:39 PM    RBC 10-20 03/16/2018 12:39 PM         Medications Reviewed:     Current Facility-Administered Medications   Medication Dose Route Frequency    sodium chloride (NS) flush 5-10 mL  5-10 mL IntraVENous Q8H    dextrose (D50W) injection syrg 12.5 g  25 mL IntraVENous PRN    bupivacaine (PF) 0.25 % (ON-Q BAG) 4 ml/hour 300 ml  4 mL/hr Local Infiltration CONTINUOUS    sodium chloride (NS) flush 5-10 mL  5-10 mL IntraVENous Q8H    sodium chloride (NS) flush 5-10 mL  5-10 mL IntraVENous PRN    acetaminophen (TYLENOL) tablet 1,000 mg  1,000 mg Oral Q6H    ketorolac (TORADOL) injection 30 mg  30 mg IntraVENous Q6H    naloxone (NARCAN) injection 0.4 mg  0.4 mg IntraVENous PRN    docusate sodium (COLACE) capsule 100 mg  100 mg Oral BID    magnesium hydroxide (MILK OF MAGNESIA) 400 mg/5 mL oral suspension 30 mL  30 mL Oral DAILY PRN    fentaNYL (PF)  mcg/30 ml (ADULT)   IntraVENous TITRATE    0.9% sodium chloride infusion  50 mL/hr IntraVENous CONTINUOUS    prochlorperazine (COMPAZINE) with saline injection 5 mg  5 mg IntraVENous Q8H PRN    hydrALAZINE (APRESOLINE) 20 mg/mL injection 10 mg  10 mg IntraVENous Q6H PRN    atorvastatin (LIPITOR) tablet 40 mg  40 mg Oral DAILY    sodium chloride (NS) flush 5-10 mL  5-10 mL IntraVENous Q8H    sodium chloride (NS) flush 5-10 mL  5-10 mL IntraVENous PRN    sodium bicarbonate tablet 650 mg  650 mg Oral TID    calcium carbonate (TUMS) chewable tablet 400 mg [elemental]  400 mg Oral TID WITH MEALS    epoetin tyler (EPOGEN;PROCRIT) injection 20,000 Units  20,000 Units SubCUTAneous Q TUE, THU & SAT    furosemide (LASIX) tablet 80 mg  80 mg Oral DAILY    glucagon (GLUCAGEN) injection 1 mg  1 mg IntraMUSCular PRN    glucose chewable tablet 16 g  4 Tab Oral PRN    insulin lispro (HUMALOG) injection   SubCUTAneous AC&HS    ondansetron (ZOFRAN) injection 4 mg  4 mg IntraVENous Q4H PRN    pantoprazole (PROTONIX) tablet 40 mg  40 mg Oral ACB    zolpidem (AMBIEN) tablet 5 mg  5 mg Oral QHS    carvedilol (COREG) tablet 12.5 mg  12.5 mg Oral BID WITH MEALS     ______________________________________________________________________  EXPECTED LENGTH OF STAY: 8d 2h  ACTUAL LENGTH OF STAY:          4                 Simba Camila Vuong MD

## 2018-05-02 NOTE — PROGRESS NOTES
Thoracic Surgery Simple Progress Note    Admit Date: 2018  POD: 1 Day Post-Op      Procedure:  Procedure(s):  RIGHT THROACOSCOPY, RIGHT THORACOTOMY, FULL DECORTICATION      Subjective:     Patient has complaints: No significant medical complaints    Review of Systems:    CARDIAC: positive for HTN  RESP: positive for pleural effusion  NEURO:  negative  INCISION: Clean, dry, and intact  EXT: Denies new swelling or pain in the legs or calves. Objective:     Blood pressure 121/69, pulse 94, temperature 97.9 °F (36.6 °C), resp. rate 17, weight 172 lb 6.4 oz (78.2 kg), SpO2 92 %. Temp (24hrs), Av.9 °F (36.6 °C), Min:97.2 °F (36.2 °C), Max:98.2 °F (36.8 °C)        Hemodynamics    PAP    CO    CI     07 -  1900  In: 223.3 [I.V.:223.3]  Out: 44    190 -  0700  In: 5333 [P.O.:1680; I.V.:1590]  Out: 6087 [Urine:1250]    EXAM:  GENERAL: VSS, afrible, alert and cooperative  HEART:  regular rate and rhythm  LUNG: clear to auscultation bilaterally, chest tubes x 3 on right, posterior tube drained 80 ml, anterior tube drained 335 ml and right angle tube drained 16 ml since yesterday. No air noted this AM. CXR reviewed. NEURO:  normal without focal findings  mental status, speech normal, alert and oriented x iii  INCISION: Clean, dry, and intact  EXTREMITIES:No evidence of DVT seen on physical exam.  GI/: Abd soft, nonterder with + bowel sounds.  Voiding    Labs:  Recent Results (from the past 24 hour(s))   CULTURE, BODY FLUID W GRAM STAIN    Collection Time: 18 10:14 AM   Result Value Ref Range    Special Requests: NO SPECIAL REQUESTS      GRAM STAIN OCCASIONAL WBCS SEEN      GRAM STAIN NO ORGANISMS SEEN      Culture result: PENDING    POC G3 - PUL    Collection Time: 18 12:29 PM   Result Value Ref Range    FIO2 (POC) 1 %    pH (POC) 7.416 7.35 - 7.45      pCO2 (POC) 54.7 (H) 35.0 - 45.0 MMHG    pO2 (POC) 362 (H) 80 - 100 MMHG    HCO3 (POC) 35.2 (H) 22 - 26 MMOL/L    sO2 (POC) 100 (H) 92 - 97 %    Base excess (POC) 11 mmol/L    Site LEFT RADIAL      Device: VENT      Mode ASSIST CONTROL      Allens test (POC) NO      Specimen type (POC) ARTERIAL      Total resp. rate 15      Volume control YES     GLUCOSE, POC    Collection Time: 05/01/18 12:48 PM   Result Value Ref Range    Glucose (POC) 72 65 - 100 mg/dL    Performed by Joey Pereyra, POC    Collection Time: 05/01/18  1:17 PM   Result Value Ref Range    Glucose (POC) 77 65 - 100 mg/dL    Performed by Jaime Vieira    GLUCOSE, POC    Collection Time: 05/01/18  1:29 PM   Result Value Ref Range    Glucose (POC) 67 65 - 100 mg/dL    Performed by [address], POC    Collection Time: 05/01/18  1:33 PM   Result Value Ref Range    Glucose (POC) 81 65 - 100 mg/dL    Performed by [address], POC    Collection Time: 05/01/18  4:10 PM   Result Value Ref Range    Glucose (POC) 139 (H) 65 - 100 mg/dL    Performed by TRAVON MALCOLM*    GLUCOSE, POC    Collection Time: 05/01/18  9:55 PM   Result Value Ref Range    Glucose (POC) 343 (H) 65 - 100 mg/dL    Performed by Ari Montenegro    GLUCOSE, POC    Collection Time: 05/02/18  6:30 AM   Result Value Ref Range    Glucose (POC) 339 (H) 65 - 100 mg/dL    Performed by Ari Montenegro        Assessment:   No evidence of DVT.   Principal Problem:    Pleural effusion on right (4/27/2018)      Overview: 4/24/18 CT placed with 2200cc out    Active Problems:    Pleural effusion (4/29/2018)      Plan/Recommendations:   Advance diet  Ambulate in [facility]    See orders    Signed By: Sharmaine Asif CIARA

## 2018-05-02 NOTE — OP NOTES
[address]   OPERATIVE REPORT    Elayne Harris  MR#: [id number]  : 1960  ACCOUNT #: [de-identified]   DATE OF SERVICE: 2018    PREOPERATIVE DIAGNOSIS:  Right pleural effusion. POSTOPERATIVE DIAGNOSIS:  Right pleural effusion with entrapped right lung. SURGEON:  Manuel Espinal MD.     ASSISTANT:  Cristiana Roy MD.      ANESTHESIA:  General endotracheal with a double-lumen tube. COMPLICATIONS:  None. DRAINS:  Two 28 straight chest tubes, one 36 straight chest tube, Sharma catheter, On-Q pain catheter. SPECIMENS REMOVED:  Right pleural effusion and pleural peel for Gram stain culture, AFB and pathology. ESTIMATED BLOOD LOSS:  200 mL. PROCEDURES PERFORMED:  1. Right video-assisted thoracic surgery. 2.  Right thoracotomy. 3.  Full decortication. 4.  Intercostal nerve blocks. 5. On-Q pain catheter insertion. IMPLANTS:  None. INDICATIONS:  Briefly, this is a 53-year-old gentleman who had presented to an outside facility with renal failure and worsening shortness of breath. He had a chest tube placed at the outside facility with attempt of thrombolysis; however, that was unsuccessful and he was then transferred here for thoracic surgical intervention. DESCRIPTION OF OPERATIVE PROCEDURE:  The patient was taken to the operating room and placed upon the operating room table. After induction of general anesthesia, he was intubated with a double-lumen tube, which was confirmed in position by flexible bronchoscopy. The patient was then turned with his right side up, left side down and fixed in position. Single port site was placed in the eighth intercostal space anterior superior iliac spine line. Chest cavity was entered with the thoracoscope. We removed some old blood and clot. We then allowed the lung to come up and we noted that there was a peel on the entire right lung.   Due to this, we then performed a vertical axillary thoracotomy, entered into the fourth intercostal space. Through this, we were then able to completely decorticate the entire lung of a thin peel that encompassed the entire upper, lower and middle lobes of the right lung. After tediously using blunt dissection, we were then able to completely mobilize the right lung and it filled the right chest without difficulty. Once this was completed, intercostal nerve blocks were performed using 1% lidocaine and 0.5% Marcaine in a field fashion and thoracotomy in the port site. A 28 right angle and a 28 straight chest tube were placed; the straight chest tube placed anteriorly, a 36-Yi was placed posteriorly. Pericostal sutures were then placed and the chest cavity was then closed. On-Q pain catheter was placed through the pericostal sutures. Port site and thoracotomy were closed in layers. The patient was extubated in the operating room and delivered to the recovery room in stable condition.       MD BERNARD Goldsmith / PN  D: 05/01/2018 13:12     T: 05/01/2018 21:50  JOB #: [id number]

## 2018-05-02 NOTE — PROGRESS NOTES
Problem: Lung Procedures/VATS Pathway: Day of Surgery  Goal: *No signs and symptoms of infection or wound complications  Outcome: Progressing Towards Goal  No signs/symptoms of wound complications. Dressing is clean/dry/intact. Patient is afebrile. Goal: *Optimal pain control at patient's stated goal  Outcome: Progressing Towards Goal  Variance: Patient Condition    Patient's pains stays at around a 7 or 8. Patient has a fentanyl PCA and also gets scheduled tylenol and toradol.

## 2018-05-02 NOTE — PROGRESS NOTES
Patient is s/p RIGHT THROACOSCOPY, RIGHT THORACOTOMY, FULL DECORTICATION on 5/1/18. For HD today. Patient will need outpatient HD post discharge. He has been accepted at the [facility]. His days are Mon-Wed-Fri at 3:00 pm. CM to notify Tim Baxter [phone] of discharge. CM faxed clinicals including new H&P, consults, nephrology progress notes and dialysis flowsheet for 5/1/18 to [address] [place] [address] at [phone]. Lyn Hernández MSA, RN, CRM.

## 2018-05-02 NOTE — PROGRESS NOTES
[facility]   [address] [facility], [address] [place] [address], [facility]  Phone: [phone]   [phone]       Nephrology Progress Note  Syed Taylor     1960     [id number]  Date of Admission : 4/28/2018 05/02/18    CC: Follow up for ESRD      Assessment and Plan   New Onset ESRD  - HD MWF  - HD for later today    R sided Pleural effusion w/ Trapped Lung   - s/p VATS and decortication 5/1    Anemia of CKD:  - LUIS with HD    Solitary Kidney   S/p Prior Left Nephrectomy for RCC    Sec HTPH  - continue Binders      Interval History:  Seen and examined. Feeling ok this AM.  No cp, sob, n/v/d reported. For HD later today. Review of Systems: Pertinent items are noted in HPI.     Current Medications:   Current Facility-Administered Medications   Medication Dose Route Frequency    sodium chloride (NS) flush 5-10 mL  5-10 mL IntraVENous Q8H    dextrose (D50W) injection syrg 12.5 g  25 mL IntraVENous PRN    bupivacaine (PF) 0.25 % (ON-Q BAG) 4 ml/hour 300 ml  4 mL/hr Local Infiltration CONTINUOUS    sodium chloride (NS) flush 5-10 mL  5-10 mL IntraVENous Q8H    sodium chloride (NS) flush 5-10 mL  5-10 mL IntraVENous PRN    acetaminophen (TYLENOL) tablet 1,000 mg  1,000 mg Oral Q6H    ketorolac (TORADOL) injection 30 mg  30 mg IntraVENous Q6H    naloxone (NARCAN) injection 0.4 mg  0.4 mg IntraVENous PRN    docusate sodium (COLACE) capsule 100 mg  100 mg Oral BID    magnesium hydroxide (MILK OF MAGNESIA) 400 mg/5 mL oral suspension 30 mL  30 mL Oral DAILY PRN    fentaNYL (PF)  mcg/30 ml (ADULT)   IntraVENous TITRATE    0.9% sodium chloride infusion  50 mL/hr IntraVENous CONTINUOUS    prochlorperazine (COMPAZINE) with saline injection 5 mg  5 mg IntraVENous Q8H PRN    hydrALAZINE (APRESOLINE) 20 mg/mL injection 10 mg  10 mg IntraVENous Q6H PRN    atorvastatin (LIPITOR) tablet 40 mg  40 mg Oral DAILY    sodium chloride (NS) flush 5-10 mL  5-10 mL IntraVENous Q8H    sodium chloride (NS) flush 5-10 mL  5-10 mL IntraVENous PRN    sodium bicarbonate tablet 650 mg  650 mg Oral TID    calcium carbonate (TUMS) chewable tablet 400 mg [elemental]  400 mg Oral TID WITH MEALS    epoetin tyler (EPOGEN;PROCRIT) injection 20,000 Units  20,000 Units SubCUTAneous Q TUE, THU & SAT    furosemide (LASIX) tablet 80 mg  80 mg Oral DAILY    glucagon (GLUCAGEN) injection 1 mg  1 mg IntraMUSCular PRN    glucose chewable tablet 16 g  4 Tab Oral PRN    insulin lispro (HUMALOG) injection   SubCUTAneous AC&HS    ondansetron (ZOFRAN) injection 4 mg  4 mg IntraVENous Q4H PRN    pantoprazole (PROTONIX) tablet 40 mg  40 mg Oral ACB    zolpidem (AMBIEN) tablet 5 mg  5 mg Oral QHS    carvedilol (COREG) tablet 12.5 mg  12.5 mg Oral BID WITH MEALS      No Known Allergies    Objective:  Vitals:    Vitals:    05/01/18 2034 05/01/18 2350 05/02/18 0348 05/02/18 0727   BP: 137/84 114/69 111/75 121/69   Pulse: 86 75 97 94   Resp: 16 16 18 17   Temp: 98.2 °F (36.8 °C) 98 °F (36.7 °C) 97.2 °F (36.2 °C) 97.9 °F (36.6 °C)   TempSrc:       SpO2: 100% 99% 95% 92%   Weight:   78.2 kg (172 lb 6.4 oz)      Intake and Output:  05/02 0701 - 05/02 1900  In: 223.3 [I.V.:223.3]  Out: 44   04/30 1901 - 05/02 0700  In: 5609 [P.O.:1680; I.V.:1590]  Out: 4881 [Urine:1250]    Physical Examination:    General: NAD,Conversant   Neck:  Supple, no mass, RIJ permacath   Resp:  Diminished BS, multiple chest tubes on R  CV:  RRR,  no murmur or rub, no LE edema  GI:  Soft, NT, + Bowel sounds, no hepatosplenomegaly  Neurologic:  Non focal  Skin:  No Rash  :  No bowling     []    High complexity decision making was performed  []    Patient is at high-risk of decompensation with multiple organ involvement    Lab Data Personally Reviewed: I have reviewed all the pertinent labs, microbiology data and radiology studies during assessment.     Recent Labs      05/01/18   0503  04/30/18   0155   NA  136  133*   K  3.7  4.5   CL  102  102   CO2  29  26 GLU  82  209*   BUN  19  38*   CREA  2.33*  3.77*   CA  7.2*  6.6*     Recent Labs      05/01/18   0503  04/30/18   0155   WBC  5.2  5.7   HGB  8.8*  7.2*   HCT  27.4*  23.0*   PLT  108*  109*     No results found for: SDES  Lab Results   Component Value Date/Time    Culture result: NO GROWTH AFTER 19 HOURS 05/01/2018 10:14 AM    Culture result: MRSA NOT PRESENT 04/30/2018 12:32 PM    Culture result:  04/30/2018 12:32 PM         Screening of patient nares for MRSA is for surveillance purposes and, if positive, to facilitate isolation considerations in high risk settings. It is not intended for automatic decolonization interventions per se as regimens are not sufficiently effective to warrant routine use. Culture result: MODERATE NORMAL RESPIRATORY BON 04/27/2018 12:21 PM    Culture result: LIGHT CAPNOCYTOPHAGA SPECIES (A) 04/27/2018 12:21 PM     Recent Results (from the past 24 hour(s))   CULTURE, BODY FLUID W GRAM STAIN    Collection Time: 05/01/18 10:14 AM   Result Value Ref Range    Special Requests: NO SPECIAL REQUESTS      GRAM STAIN OCCASIONAL WBCS SEEN      GRAM STAIN NO ORGANISMS SEEN      Culture result: NO GROWTH AFTER 19 HOURS     POC G3 - PUL    Collection Time: 05/01/18 12:29 PM   Result Value Ref Range    FIO2 (POC) 1 %    pH (POC) 7.416 7.35 - 7.45      pCO2 (POC) 54.7 (H) 35.0 - 45.0 MMHG    pO2 (POC) 362 (H) 80 - 100 MMHG    HCO3 (POC) 35.2 (H) 22 - 26 MMOL/L    sO2 (POC) 100 (H) 92 - 97 %    Base excess (POC) 11 mmol/L    Site LEFT RADIAL      Device: VENT      Mode ASSIST CONTROL      Allens test (POC) NO      Specimen type (POC) ARTERIAL      Total resp.  rate 15      Volume control YES     GLUCOSE, POC    Collection Time: 05/01/18 12:48 PM   Result Value Ref Range    Glucose (POC) 72 65 - 100 mg/dL    Performed by Kelsie Koehler    GLUCOSE, POC    Collection Time: 05/01/18  1:17 PM   Result Value Ref Range    Glucose (POC) 77 65 - 100 mg/dL    Performed by [address], POC Collection Time: 05/01/18  1:29 PM   Result Value Ref Range    Glucose (POC) 67 65 - 100 mg/dL    Performed by Vira Manley    GLUCOSE, POC    Collection Time: 05/01/18  1:33 PM   Result Value Ref Range    Glucose (POC) 81 65 - 100 mg/dL    Performed by Vira Manley    GLUCOSE, POC    Collection Time: 05/01/18  4:10 PM   Result Value Ref Range    Glucose (POC) 139 (H) 65 - 100 mg/dL    Performed by TRAVON SHELDON    GLUCOSE, POC    Collection Time: 05/01/18  9:55 PM   Result Value Ref Range    Glucose (POC) 343 (H) 65 - 100 mg/dL    Performed by Navi Andrade    GLUCOSE, POC    Collection Time: 05/02/18  6:30 AM   Result Value Ref Range    Glucose (POC) 339 (H) 65 - 100 mg/dL    Performed by Navi Andrade            I have reviewed the flowsheets. Chart and Pertinent Notes have been reviewed. No change in PMH ,family and social history from Consult note.       Joan Carcamo MD

## 2018-05-02 NOTE — PROGRESS NOTES
6912 Rounding on Patient; NP just advanced diet; order tray stat; Patient stated \"hungry\",  He loves his nurse and every one is Sophia TE Prakash

## 2018-05-02 NOTE — PROGRESS NOTES
Hospitalist Progress Note  Sherin London MD  Answering service: [phone] from in house phone         Date of Service:  2018 late entry form round   NAME:  Felix Mayes  :  1960  MRN:  [id number]    Admission Summary:   The patient is a 61-year-old -American gentleman who was initially admitted to [facility] with a presumed diagnosis of pneumonia/flu. Patient was found to be in renal failure. He had gained 20 pounds before admission. Patient at [facility] was diagnosed with metabolic acidosis due to renal failure. A chest x-ray was done, which showed large right-sided pleural effusion . Patient was subsequently transferred to [facility] for further evaluation. At [facility], patient was evaluated by pulmonology, cardiology. He had a hemopneumothorax, which was seen on CT chest on . Cytology of pleural fluid was negative. Patient was given TPA via chest tube on  to try and help break up loculations. He had a bronchoscopy done on , which did not reveal any endobronchial lesion. A transthoracic echocardiogram was done, which showed acute systolic heart failure with ejection fraction of 20-25%, which was suspected due to hypertension. He had a negative stress test.  On the , patient was seen by nephrology. A PermCath was placed . He has been getting dialysis since then. For hypertension, he was treated with Coreg and lisinopril. The patient was transfused with 1 unit of packed RBCs. He had a lower GI bleed. The patient was seen by Dr. Elbert Bolivar. The patient also had hyperosmolar nonketotic state for which he was treated with insulin     Interval history / Subjective:        :  Pt for procedure today,  VAT.    Not new issues over night, some sob, some chest wall pain, no n/v/d/f      Assessment & Plan:      1.  Large R sided Pleural effusion s/p pig tail catheter. -VATs and decortication on es 4/31  -Draining blood tinged fluid from right chest tubes. CT per CTS. , for VATS 7/0/1863      2.  Systolic heart failure with ejection fraction of 20-25% with severe diffuse hypokinesis  with negative ischemia workup. continue Coreg and Lisinopril. Titrate ACEI as tolerated.,. No failure by exam 5/1  3.  History of chronic kidney disease, currently on dialysis. -Nephrology on board. , con to follow  4.  Accelerated hypertension, on Coreg, lisinopril, and Lasix. Increased Lisino to 10mg on 4/30   -Adjust meds as tolerated. bp better   5.  History of anemia, probably blood loss (he had BRBPR) and renal insufficiency.    -He is on Epogen. Hemoglobin is 7 today, and 8.8 5/1  6.  History of diabetes.  Monitor blood sugars closely. 7. Nilsa Sees to high dose Statin. 8.  History of respiratory failure with hypoxia due to large right-sided pleural effusion, which has been drained.  The patient has a pigtail catheter placed in.  9.  History of hemopneumothorax, now with CT,  Pleural fluid Cytology is negative. 10.  DVT prophylaxis with SCDs due to recent GI blood loss. 11. DM : start on Lantus and monitor sugars, humalog. Acceptable ranges noted for 5/1  Code status: full  DVT prophylaxis: SCD     Care Plan discussed with: Patient/Family/RN/CaseM  Disposition: Home w/Family and TBD         Hospital Problems  Date Reviewed: 4/28/2018          Codes Class Noted POA    Pleural effusion ICD-10-CM: J90  ICD-9-CM: 511.9  4/29/2018 Unknown        * (Principal)Pleural effusion on right ICD-10-CM: J90  ICD-9-CM: 511.9  4/27/2018 Yes    Overview Signed 4/27/2018  2:09 PM by Dolly Rizzo NP     4/24/18 CT placed with 2200cc out                     Review of Systems:   as above        Vital Signs:    Last 24hrs VS reviewed since prior progress note.  Most recent are:  Visit Vitals    /69 (BP 1 Location: Right arm, BP Patient Position: At rest)    Pulse 94    Temp 97.9 °F (36.6 °C)    Resp 17    Wt 78.2 kg (172 lb 6.4 oz)    SpO2 92%    BMI 21.55 kg/m2         Intake/Output Summary (Last 24 hours) at 05/02/18 0852  Last data filed at 05/02/18 0816   Gross per 24 hour   Intake          3493.33 ml   Output             1250 ml   Net          2243.33 ml        Physical Examination:             Constitutional:  No acute distress, cooperative, pleasant    ENT:  Oral mucous moist, oropharynx benign. Neck supple,    Resp:  CTA bilaterally. No wheezing/rhonchi/rales. No accessory muscle use   CV:  Regular rhythm, normal rate, no murmurs, gallops, rubs    GI:  Soft, non distended, non tender. normoactive bowel sounds, no hepatosplenomegaly     Musculoskeletal:  No edema, warm, 2+ pulses throughout    Neurologic:  Moves all extremities. AAOx3, CN II-XII reviewed     Psych:  Good insight, Not anxious nor agitated. Skin:  Good turgor, no rashes or ulcers       Data Review:    Review and/or order of clinical lab test      Labs:     Recent Labs      05/01/18   0503  04/30/18   0155   WBC  5.2  5.7   HGB  8.8*  7.2*   HCT  27.4*  23.0*   PLT  108*  109*     Recent Labs      05/01/18   0503  04/30/18   0155   NA  136  133*   K  3.7  4.5   CL  102  102   CO2  29  26   BUN  19  38*   CREA  2.33*  3.77*   GLU  82  209*   CA  7.2*  6.6*     No results for input(s): SGOT, GPT, ALT, AP, TBIL, TBILI, TP, ALB, GLOB, GGT, AML, LPSE in the last 72 hours. No lab exists for component: AMYP, HLPSE  No results for input(s): INR, PTP, APTT in the last 72 hours. No lab exists for component: INREXT   No results for input(s): FE, TIBC, PSAT, FERR in the last 72 hours. Lab Results   Component Value Date/Time    Folate 5.9 02/13/2018 08:12 PM      No results for input(s): PH, PCO2, PO2 in the last 72 hours. No results for input(s): CPK, CKNDX, TROIQ in the last 72 hours.     No lab exists for component: CPKMB  Lab Results   Component Value Date/Time Cholesterol, total 115 01/22/2018 03:55 PM    HDL Cholesterol 44 01/22/2018 03:55 PM    LDL, calculated 48 01/22/2018 03:55 PM    Triglyceride 117 01/22/2018 03:55 PM     Lab Results   Component Value Date/Time    Glucose (POC) 339 (H) 05/02/2018 06:30 AM    Glucose (POC) 343 (H) 05/01/2018 09:55 PM    Glucose (POC) 139 (H) 05/01/2018 04:10 PM    Glucose (POC) 81 05/01/2018 01:33 PM    Glucose (POC) 67 05/01/2018 01:29 PM     Lab Results   Component Value Date/Time    Color YELLOW/STRAW 03/16/2018 12:39 PM    Appearance CLEAR 03/16/2018 12:39 PM    Specific gravity 1.014 03/16/2018 12:39 PM    Specific gravity 1.015 02/13/2018 11:17 AM    pH (UA) 5.0 03/16/2018 12:39 PM    Protein 300 (A) 03/16/2018 12:39 PM    Glucose NEGATIVE  03/16/2018 12:39 PM    Ketone NEGATIVE  03/16/2018 12:39 PM    Bilirubin NEGATIVE  03/16/2018 12:39 PM    Urobilinogen 0.2 03/16/2018 12:39 PM    Nitrites NEGATIVE  03/16/2018 12:39 PM    Leukocyte Esterase NEGATIVE  03/16/2018 12:39 PM    Epithelial cells FEW 03/16/2018 12:39 PM    Bacteria 1+ (A) 03/16/2018 12:39 PM    WBC 5-10 03/16/2018 12:39 PM    RBC 10-20 03/16/2018 12:39 PM         Medications Reviewed:     Current Facility-Administered Medications   Medication Dose Route Frequency    sodium chloride (NS) flush 5-10 mL  5-10 mL IntraVENous Q8H    dextrose (D50W) injection syrg 12.5 g  25 mL IntraVENous PRN    bupivacaine (PF) 0.25 % (ON-Q BAG) 4 ml/hour 300 ml  4 mL/hr Local Infiltration CONTINUOUS    sodium chloride (NS) flush 5-10 mL  5-10 mL IntraVENous Q8H    sodium chloride (NS) flush 5-10 mL  5-10 mL IntraVENous PRN    acetaminophen (TYLENOL) tablet 1,000 mg  1,000 mg Oral Q6H    ketorolac (TORADOL) injection 30 mg  30 mg IntraVENous Q6H    naloxone (NARCAN) injection 0.4 mg  0.4 mg IntraVENous PRN    docusate sodium (COLACE) capsule 100 mg  100 mg Oral BID    magnesium hydroxide (MILK OF MAGNESIA) 400 mg/5 mL oral suspension 30 mL  30 mL Oral DAILY PRN    fentaNYL (PF)  mcg/30 ml (ADULT)   IntraVENous TITRATE    0.9% sodium chloride infusion  50 mL/hr IntraVENous CONTINUOUS    prochlorperazine (COMPAZINE) with saline injection 5 mg  5 mg IntraVENous Q8H PRN    hydrALAZINE (APRESOLINE) 20 mg/mL injection 10 mg  10 mg IntraVENous Q6H PRN    atorvastatin (LIPITOR) tablet 40 mg  40 mg Oral DAILY    sodium chloride (NS) flush 5-10 mL  5-10 mL IntraVENous Q8H    sodium chloride (NS) flush 5-10 mL  5-10 mL IntraVENous PRN    sodium bicarbonate tablet 650 mg  650 mg Oral TID    calcium carbonate (TUMS) chewable tablet 400 mg [elemental]  400 mg Oral TID WITH MEALS    epoetin tyler (EPOGEN;PROCRIT) injection 20,000 Units  20,000 Units SubCUTAneous Q TUE, THU & SAT    furosemide (LASIX) tablet 80 mg  80 mg Oral DAILY    glucagon (GLUCAGEN) injection 1 mg  1 mg IntraMUSCular PRN    glucose chewable tablet 16 g  4 Tab Oral PRN    insulin lispro (HUMALOG) injection   SubCUTAneous AC&HS    ondansetron (ZOFRAN) injection 4 mg  4 mg IntraVENous Q4H PRN    pantoprazole (PROTONIX) tablet 40 mg  40 mg Oral ACB    zolpidem (AMBIEN) tablet 5 mg  5 mg Oral QHS    carvedilol (COREG) tablet 12.5 mg  12.5 mg Oral BID WITH MEALS     ______________________________________________________________________  EXPECTED LENGTH OF STAY: 8d 2h  ACTUAL LENGTH OF STAY:          4                 Sam Wade MD

## 2018-05-02 NOTE — PROGRESS NOTES
Bedside shift change report given to Mary (oncoming nurse) by Deirdre Noriega (offgoing nurse).  Report included the following information SBAR, ED Summary, OR Summary, Intake/Output, MAR, Recent Results and Cardiac Rhythm SR.

## 2018-05-02 NOTE — DIABETES MGMT
DTC Progress Note    Recommendations/ Comments: Chart reviewed due to hyperglycemia. Blood sugars >300. If appropriate, please consider:  1. Lantus 12 units  2. Adding meal time insulin starting with Humalog 5 unit ac tid. Current hospital DM medication:       Lispro correction scale, high sensitivity        Chart reviewed and initial evaluation complete on Britney Bruce. Patient is a 66 y.o. male with known Type 2 DM on Novolin 70/30 18 units bid ac    Per PTA, pt is taking 15 units BID ac  Pt was educated at 2/13/2018 and A1c improving from 12% at that time. A1c:   Lab Results   Component Value Date/Time    Hemoglobin A1c 8.7 (H) 04/30/2018 01:55 AM    Hemoglobin A1c 9.9 (H) 04/24/2018 01:23 AM       Recent Glucose Results:   Lab Results   Component Value Date/Time    GLUCPOC 391 (H) 05/02/2018 12:57 PM    GLUCPOC 339 (H) 05/02/2018 06:30 AM    GLUCPOC 343 (H) 05/01/2018 09:55 PM        Lab Results   Component Value Date/Time    Creatinine 2.33 (H) 05/01/2018 05:03 AM     Estimated Creatinine Clearance: 39.2 mL/min (based on Cr of 2.33). Active Orders   Diet    DIET RENAL Regular        PO intake:   Patient Vitals for the past 72 hrs:   % Diet Eaten   05/01/18 1731 100 %       Will continue to follow as needed. Thank you.   Ellie Ely RD, CDE

## 2018-05-02 NOTE — PROGRESS NOTES
Bedside and Verbal shift change report given to [facility]. Report included the following information SBAR.

## 2018-05-02 NOTE — PROGRESS NOTES
Spiritual Care Partner Volunteer visited patient in Rm 437 on 5/2/18. Documented by:   Chaplain Tran MDiv, MACE  [phone] PRAY [phone])

## 2018-05-02 NOTE — ANESTHESIA POSTPROCEDURE EVALUATION
Post-Anesthesia Evaluation and Assessment    Patient: Nj Briggs MRN: [id number]  SSN: [id number]    YOB: 1960  Age: 66 y.o. Sex: male       Cardiovascular Function/Vital Signs  Visit Vitals    /77 (BP 1 Location: Left arm)    Pulse 80    Temp 36.7 °C (98.1 °F)    Resp 18    Wt 78.2 kg (172 lb 6.4 oz)    SpO2 95%    BMI 21.55 kg/m2       Patient is status post general anesthesia for Procedure(s):  RIGHT THROACOSCOPY, RIGHT THORACOTOMY, FULL DECORTICATION. Nausea/Vomiting: None    Postoperative hydration reviewed and adequate. Pain:  Pain Scale 1: Numeric (0 - 10) (05/02/18 0816)  Pain Intensity 1: 6 (05/02/18 0816)   Managed    Neurological Status:   Neuro (WDL): Within Defined Limits (05/01/18 1330)  Neuro  Neurologic State: Alert (05/01/18 2000)  Orientation Level: Oriented X4 (05/01/18 2000)  Cognition: Follows commands (05/01/18 2000)  Speech: Clear (05/01/18 2000)  LUE Motor Response: Purposeful (05/01/18 2000)  LLE Motor Response: Purposeful (05/01/18 2000)  RUE Motor Response: Purposeful (05/01/18 2000)  RLE Motor Response: Purposeful (05/01/18 2000)   At baseline    Mental Status and Level of Consciousness: Arousable    Pulmonary Status:   O2 Device: Room air (05/02/18 0816)   Adequate oxygenation and airway patent    Complications related to anesthesia: None    Post-anesthesia assessment completed.  No concerns    Signed By: Ahmet Crawford MD     May 2, 2018

## 2018-05-02 NOTE — PROGRESS NOTES
Problem: Falls - Risk of  Goal: *Absence of Falls  Document Chuck Fall Risk and appropriate interventions in the flowsheet. Outcome: Progressing Towards Goal  Fall Risk Interventions:  Mobility Interventions: Patient to call before getting OOB         Medication Interventions: Evaluate medications/consider consulting pharmacy, Patient to call before getting OOB    Elimination Interventions: Call light in reach, Patient to call for help with toileting needs, Toileting schedule/hourly rounds             Problem: Pressure Injury - Risk of  Goal: *Prevention of pressure injury  Document Ruddy Scale and appropriate interventions in the flowsheet.    Outcome: Progressing Towards Goal  Pressure Injury Interventions:  Sensory Interventions: Assess changes in LOC    Moisture Interventions: Absorbent underpads, Maintain skin hydration (lotion/cream)    Activity Interventions: Pressure redistribution bed/mattress(bed type), Increase time out of bed    Mobility Interventions: Pressure redistribution bed/mattress (bed type)    Nutrition Interventions: Document food/fluid/supplement intake    Friction and Shear Interventions: Lift sheet

## 2018-05-02 NOTE — PROGRESS NOTES
1630: bowling d/c at 0615 pt was due to void by 1300 unable to void bladder scanned only 64 ml in bladder. Bedside and Verbal shift change report given to [id number] (oncoming nurse) by Yefri Collins RN (offgoing nurse). Report included the following information SBAR, OR Summary, Procedure Summary, MAR and Recent Results.

## 2018-05-03 ENCOUNTER — APPOINTMENT (OUTPATIENT)
Dept: GENERAL RADIOLOGY | Age: 58
DRG: 163 | End: 2018-05-03
Attending: NURSE PRACTITIONER
Payer: MEDICARE

## 2018-05-03 LAB
GLUCOSE BLD STRIP.AUTO-MCNC: 242 MG/DL (ref 65–100)
GLUCOSE BLD STRIP.AUTO-MCNC: 288 MG/DL (ref 65–100)
GLUCOSE BLD STRIP.AUTO-MCNC: 305 MG/DL (ref 65–100)
GLUCOSE BLD STRIP.AUTO-MCNC: 423 MG/DL (ref 65–100)
SERVICE CMNT-IMP: ABNORMAL

## 2018-05-03 PROCEDURE — 74011250637 HC RX REV CODE- 250/637: Performed by: HOSPITALIST

## 2018-05-03 PROCEDURE — 74011250636 HC RX REV CODE- 250/636: Performed by: HOSPITALIST

## 2018-05-03 PROCEDURE — 74011250637 HC RX REV CODE- 250/637: Performed by: THORACIC SURGERY (CARDIOTHORACIC VASCULAR SURGERY)

## 2018-05-03 PROCEDURE — 74011250636 HC RX REV CODE- 250/636: Performed by: THORACIC SURGERY (CARDIOTHORACIC VASCULAR SURGERY)

## 2018-05-03 PROCEDURE — 74011636637 HC RX REV CODE- 636/637: Performed by: FAMILY MEDICINE

## 2018-05-03 PROCEDURE — 74011636637 HC RX REV CODE- 636/637: Performed by: HOSPITALIST

## 2018-05-03 PROCEDURE — 65660000000 HC RM CCU STEPDOWN

## 2018-05-03 PROCEDURE — 74011250637 HC RX REV CODE- 250/637: Performed by: INTERNAL MEDICINE

## 2018-05-03 PROCEDURE — 82962 GLUCOSE BLOOD TEST: CPT

## 2018-05-03 PROCEDURE — 71045 X-RAY EXAM CHEST 1 VIEW: CPT

## 2018-05-03 RX ORDER — INSULIN LISPRO 100 [IU]/ML
6 INJECTION, SOLUTION INTRAVENOUS; SUBCUTANEOUS ONCE
Status: COMPLETED | OUTPATIENT
Start: 2018-05-03 | End: 2018-05-03

## 2018-05-03 RX ADMIN — CARVEDILOL 12.5 MG: 12.5 TABLET, FILM COATED ORAL at 08:17

## 2018-05-03 RX ADMIN — Medication 10 ML: at 15:34

## 2018-05-03 RX ADMIN — INSULIN LISPRO 6 UNITS: 100 INJECTION, SOLUTION INTRAVENOUS; SUBCUTANEOUS at 22:30

## 2018-05-03 RX ADMIN — PANTOPRAZOLE SODIUM 40 MG: 40 TABLET, DELAYED RELEASE ORAL at 06:48

## 2018-05-03 RX ADMIN — ACETAMINOPHEN 1000 MG: 500 TABLET, FILM COATED ORAL at 17:17

## 2018-05-03 RX ADMIN — ATORVASTATIN CALCIUM 40 MG: 40 TABLET, FILM COATED ORAL at 08:17

## 2018-05-03 RX ADMIN — ACETAMINOPHEN 1000 MG: 500 TABLET, FILM COATED ORAL at 23:27

## 2018-05-03 RX ADMIN — INSULIN LISPRO 3 UNITS: 100 INJECTION, SOLUTION INTRAVENOUS; SUBCUTANEOUS at 12:05

## 2018-05-03 RX ADMIN — DOCUSATE SODIUM 100 MG: 100 CAPSULE, LIQUID FILLED ORAL at 08:17

## 2018-05-03 RX ADMIN — ZOLPIDEM TARTRATE 5 MG: 5 TABLET ORAL at 22:31

## 2018-05-03 RX ADMIN — SODIUM BICARBONATE 650 MG: 650 TABLET ORAL at 22:31

## 2018-05-03 RX ADMIN — SODIUM CHLORIDE 50 ML/HR: 900 INJECTION, SOLUTION INTRAVENOUS at 13:27

## 2018-05-03 RX ADMIN — SODIUM BICARBONATE 650 MG: 650 TABLET ORAL at 17:17

## 2018-05-03 RX ADMIN — CALCIUM CARBONATE (ANTACID) CHEW TAB 500 MG 400 MG: 500 CHEW TAB at 12:05

## 2018-05-03 RX ADMIN — CALCIUM CARBONATE (ANTACID) CHEW TAB 500 MG 400 MG: 500 CHEW TAB at 16:29

## 2018-05-03 RX ADMIN — CARVEDILOL 12.5 MG: 12.5 TABLET, FILM COATED ORAL at 17:23

## 2018-05-03 RX ADMIN — CALCIUM CARBONATE (ANTACID) CHEW TAB 500 MG 400 MG: 500 CHEW TAB at 08:17

## 2018-05-03 RX ADMIN — INSULIN LISPRO 4 UNITS: 100 INJECTION, SOLUTION INTRAVENOUS; SUBCUTANEOUS at 16:28

## 2018-05-03 RX ADMIN — Medication 5 ML: at 06:00

## 2018-05-03 RX ADMIN — DOCUSATE SODIUM 100 MG: 100 CAPSULE, LIQUID FILLED ORAL at 17:20

## 2018-05-03 RX ADMIN — INSULIN LISPRO 2 UNITS: 100 INJECTION, SOLUTION INTRAVENOUS; SUBCUTANEOUS at 06:47

## 2018-05-03 RX ADMIN — EPOETIN ALFA 20000 UNITS: 20000 SOLUTION INTRAVENOUS; SUBCUTANEOUS at 18:30

## 2018-05-03 RX ADMIN — FUROSEMIDE 80 MG: 40 TABLET ORAL at 08:17

## 2018-05-03 RX ADMIN — SODIUM BICARBONATE 650 MG: 650 TABLET ORAL at 08:17

## 2018-05-03 RX ADMIN — ACETAMINOPHEN 1000 MG: 500 TABLET, FILM COATED ORAL at 06:48

## 2018-05-03 RX ADMIN — ACETAMINOPHEN 1000 MG: 500 TABLET, FILM COATED ORAL at 12:05

## 2018-05-03 RX ADMIN — Medication: at 08:07

## 2018-05-03 NOTE — PROGRESS NOTES
Mr. Monique Mcgovern is POD#2 after a R decortication. No acute events overnight. Afebrile  HD stable    CT  RA 300ml  Post 30ml  Ant 15ml  No definitive air leak    On exam he is laying in bed  Alert and oriented  Lungs CTA b/l  RRR  Dressing c/d/i    CXR- good expansion, no effusion    Mr. Monique Mcgovern is progressing well. Plan to place CTs to water seal.  Anticipate removing CTs over the weekend starting tomorrow. Continue rehab efforts.

## 2018-05-03 NOTE — PROGRESS NOTES
[facility]   [address] [facility], [address] [place] [address] Rd [address], [facility]  Phone: [phone]   [phone]       Nephrology Progress Note  Bib Ayala     1960     [id number]  Date of Admission : 4/28/2018 05/03/18    CC: Follow up for ESRD      Assessment and Plan   New Onset ESRD  - HD MWF  - HD tomorrow as planned    R sided Pleural effusion w/ Trapped Lung   - s/p VATS and decortication 5/1  - per thoracic surgery    Anemia of CKD:  - LUIS with HD    Solitary Kidney   S/p Prior Left Nephrectomy for RCC    Sec HTPH  - continue Binders      Interval History:  Seen and examined. Feeling better today. No cp, sob, n/v/d reported. Hd yesterday, 3 liters UF. Review of Systems: Pertinent items are noted in HPI.     Current Medications:   Current Facility-Administered Medications   Medication Dose Route Frequency    sodium chloride (NS) flush 5-10 mL  5-10 mL IntraVENous Q8H    dextrose (D50W) injection syrg 12.5 g  25 mL IntraVENous PRN    bupivacaine (PF) 0.25 % (ON-Q BAG) 4 ml/hour 300 ml  4 mL/hr Local Infiltration CONTINUOUS    sodium chloride (NS) flush 5-10 mL  5-10 mL IntraVENous Q8H    sodium chloride (NS) flush 5-10 mL  5-10 mL IntraVENous PRN    acetaminophen (TYLENOL) tablet 1,000 mg  1,000 mg Oral Q6H    naloxone (NARCAN) injection 0.4 mg  0.4 mg IntraVENous PRN    docusate sodium (COLACE) capsule 100 mg  100 mg Oral BID    magnesium hydroxide (MILK OF MAGNESIA) 400 mg/5 mL oral suspension 30 mL  30 mL Oral DAILY PRN    fentaNYL (PF)  mcg/30 ml (ADULT)   IntraVENous TITRATE    0.9% sodium chloride infusion  50 mL/hr IntraVENous CONTINUOUS    prochlorperazine (COMPAZINE) with saline injection 5 mg  5 mg IntraVENous Q8H PRN    hydrALAZINE (APRESOLINE) 20 mg/mL injection 10 mg  10 mg IntraVENous Q6H PRN    atorvastatin (LIPITOR) tablet 40 mg  40 mg Oral DAILY    sodium chloride (NS) flush 5-10 mL  5-10 mL IntraVENous Q8H    sodium chloride (NS) flush 5-10 mL 5-10 mL IntraVENous PRN    sodium bicarbonate tablet 650 mg  650 mg Oral TID    calcium carbonate (TUMS) chewable tablet 400 mg [elemental]  400 mg Oral TID WITH MEALS    epoetin tyler (EPOGEN;PROCRIT) injection 20,000 Units  20,000 Units SubCUTAneous Q TUE, THU & SAT    furosemide (LASIX) tablet 80 mg  80 mg Oral DAILY    glucagon (GLUCAGEN) injection 1 mg  1 mg IntraMUSCular PRN    glucose chewable tablet 16 g  4 Tab Oral PRN    insulin lispro (HUMALOG) injection   SubCUTAneous AC&HS    ondansetron (ZOFRAN) injection 4 mg  4 mg IntraVENous Q4H PRN    pantoprazole (PROTONIX) tablet 40 mg  40 mg Oral ACB    zolpidem (AMBIEN) tablet 5 mg  5 mg Oral QHS    carvedilol (COREG) tablet 12.5 mg  12.5 mg Oral BID WITH MEALS      No Known Allergies    Objective:  Vitals:    Vitals:    05/02/18 2306 05/03/18 0254 05/03/18 0257 05/03/18 0809   BP: 129/71 116/64  143/79   Pulse: 84 84  84   Resp: 18 18 18   Temp: 98.1 °F (36.7 °C) 97.4 °F (36.3 °C)  98.1 °F (36.7 °C)   TempSrc: Oral      SpO2: 100% 98%  91%   Weight:   85.6 kg (188 lb 11.4 oz)      Intake and Output:     05/01 1901 - 05/03 0700  In: 2931.7 [P.O.:1260; I.V.:1671.7]  Out: 3945 [Urine:250]    Physical Examination:    General: NAD,Conversant   Neck:  Supple, no mass, RIJ permacath   Resp:  Diminished BS, multiple chest tubes on R  CV:  RRR,  no murmur or rub, no LE edema  GI:  Soft, NT, + Bowel sounds, no hepatosplenomegaly  Neurologic:  Non focal  Skin:  No Rash  :  No bowling     []    High complexity decision making was performed  []    Patient is at high-risk of decompensation with multiple organ involvement    Lab Data Personally Reviewed: I have reviewed all the pertinent labs, microbiology data and radiology studies during assessment.     Recent Labs      05/01/18   0503   NA  136   K  3.7   CL  102   CO2  29   GLU  82   BUN  19   CREA  2.33*   CA  7.2*     Recent Labs      05/01/18   0503   WBC  5.2   HGB  8.8*   HCT  27.4*   PLT  108*     No results found for: SDES  Lab Results   Component Value Date/Time    Culture result: NO GROWTH AFTER 19 HOURS 05/01/2018 10:14 AM    Culture result: MRSA NOT PRESENT 04/30/2018 12:32 PM    Culture result:  04/30/2018 12:32 PM         Screening of patient nares for MRSA is for surveillance purposes and, if positive, to facilitate isolation considerations in high risk settings. It is not intended for automatic decolonization interventions per se as regimens are not sufficiently effective to warrant routine use. Culture result: MODERATE NORMAL RESPIRATORY BON 04/27/2018 12:21 PM    Culture result: LIGHT CAPNOCYTOPHAGA SPECIES (A) 04/27/2018 12:21 PM     Recent Results (from the past 24 hour(s))   GLUCOSE, POC    Collection Time: 05/02/18 12:57 PM   Result Value Ref Range    Glucose (POC) 391 (H) 65 - 100 mg/dL    Performed by Minal Adame, POC    Collection Time: 05/02/18  4:26 PM   Result Value Ref Range    Glucose (POC) 406 (H) 65 - 100 mg/dL    Performed by Minal Adame, POC    Collection Time: 05/02/18  9:49 PM   Result Value Ref Range    Glucose (POC) 255 (H) 65 - 100 mg/dL    Performed by [address], POC    Collection Time: 05/03/18  6:28 AM   Result Value Ref Range    Glucose (POC) 242 (H) 65 - 100 mg/dL    Performed by CHEMA FISHER I have reviewed the flowsheets. Chart and Pertinent Notes have been reviewed. No change in PMH ,family and social history from Consult note.       Huong Crystal MD

## 2018-05-03 NOTE — PROGRESS NOTES
Bedside shift change report given to Gabriel Melgar RN (oncoming nurse) by Colt Dietrich RN (offgoing nurse). Report included the following information SBAR, ED Summary, OR Summary, MAR and Cardiac Rhythm Sinus Rhythm.

## 2018-05-03 NOTE — PROGRESS NOTES
Problem: Lung Procedures/VATS Pathway: Post-Op Day 2  Goal: *No signs and symptoms of infection or wound complications  Outcome: Progressing Towards Goal  No signs and symptoms of infection. Chest tube dressing is clean, dry, intact. Goal: *Optimal pain control at patient's stated goal  Outcome: Progressing Towards Goal  Patient's pain is well controlled at this time with PCA, and scheduled tylenol. Goal: *Adequate urinary output (equal to or greater than 30 milliliters/hour)  Outcome: Progressing Towards Goal  Variance: Patient Condition    Patient has not voided on this shift. Patient normally voids however patient is a new dialysis pt & got dialysis this evening from 8pm - 11pm and the nurse took of 3L. Patient denies urge to void. Goal: *Demonstrates progressive activity  Outcome: Progressing Towards Goal  Patient ambulates in room and in halls.

## 2018-05-03 NOTE — PROCEDURES
[facility]  [phone]    Vitals   Pre   Post   Assessment   Pre   Post     Temp  Temp: 97.5 °F (36.4 °C) (05/02/18 2000) 98.1   LOC  A&Ox4 No change   HR   Pulse (Heart Rate): 87 (05/02/18 2000) 84 Lungs   Patient has x3 chest tubes, dressings CDI, lung sounds diminshed on right side, clear on left No change   B/P   BP: 148/70 (05/02/18 2000) 129/71 Cardiac   Telemetry, NSR, HRR, S1 S2 present No change    Resp   Resp Rate: 18 (05/02/18 2000) 18 Skin   Chest tube site dressings CDI, skin is warm and dry, BLE with dry skin and discolorations/ hyperpigmentation No change    Pain level  Pain Intensity 1: 5 (05/02/18 1623) 0 Edema  1+ BLE     No change   Orders:    Duration:   Start:    20:04 End:    23:06 Total:   3 hr   Dialyzer:   Dialyzer/Set Up Inspection: Carolina Hard (05/02/18 2000)   K Bath:   Dialysate K (mEq/L): 2 (05/02/18 2000)   Ca Bath:   Dialysate CA (mEq/L): 2.5 (05/02/18 2000)   Na/Bicarb:   Dialysate NA (mEq/L): 140 (05/02/18 2000)   Target Fluid Removal:   Goal/Amount of Fluid to Remove (mL): 3000 mL (05/02/18 2000)   Access     Type & Location:   RIJ CVC: Dressing CDI. No s/s of infection. Both lumens aspirate & flush well. Running well at . Dressing changed using sterile technique per policy and procedure.    Labs     Obtained/Reviewed   Critical Results Called   Date when labs were drawn-  Hgb-    HGB   Date Value Ref Range Status   05/01/2018 8.8 (L) 12.1 - 17.0 g/dL Final     K-    Potassium   Date Value Ref Range Status   05/01/2018 3.7 3.5 - 5.1 mmol/L Final     Ca-   Calcium   Date Value Ref Range Status   05/01/2018 7.2 (L) 8.5 - 10.1 MG/DL Final     Bun-   BUN   Date Value Ref Range Status   05/01/2018 19 6 - 20 MG/DL Final     Creat-   Creatinine   Date Value Ref Range Status   05/01/2018 2.33 (H) 0.70 - 1.30 MG/DL Final     Comment:     INVESTIGATED PER DELTA CHECK PROTOCOL        Medications/ Blood Products Given     Name   Dose   Route and Time none                Blood Volume Processed (BVP):   65.9 L Net Fluid   Removed:  3000 ml   Comments   Time Out Done: 20:00  Primary Nurse Rpt Pre: Gautam Aviles RN  Primary Nurse Rpt Post: Colt Poole RN  Pt Education: procedural  Care Plan: ongoing  Tx Summary:  Arrived to patient room set up and tested machine and water per policy and procedure. Patient A&Ox4 and answers questions appropriately. Consent signed & on file. SBAR received from Primary RN Gautam Aviles. 20:04 - Both lumens of Artie/permcath disinfected with Alcohol per policy. Each lumen aspirated for blood return and flushed with Normal Saline per policy. VSS. Dialysis Tx initiated. 21:00- Pt resting quietly. VSS  22:00- Pt resting quietly. VSS  23:05- Tx ended. Returned all possible blood to patient  VSS. Central line catheter flushed with normal saline per policy. Ports disinfected with Alcohol per policy and lines disconnected and capped using aseptic technique. SBAR given to Primary, RN Colt Poole. Patient in stable condition with call bell in reach and bed in lowest position at the time of my departure. Admiting Diagnosis: Right pleural effusion  Pt's previous clinic- new start TBD  Consent signed - Informed Consent Verified: Yes (05/02/18 2000)  [facility] Consent - on file  Hepatitis Status- negative 04/25/18  Machine #- Machine Number: [id number] (05/02/18 2000)  Telemetry status- monitor at bedside, NSR  Pre-dialysis wt. - Pre-Dialysis Weight: 86.5 kg (190 lb 11.2 oz) (04/30/18 7353)

## 2018-05-03 NOTE — DIABETES MGMT
DTC Progress Note    Recommendations/ Comments: Chart reviewed due to hyperglycemia. Blood sugars >300 - 400 yesterday after receiving Dexamethasone the day before. Today BG's are 242 - 288 mg/dL. Previously he experienced hypoglycemia when on Lantus 10 units each PM  ESRD, on HD MWF     If appropriate, please consider:  Adding Lantus 8 units  Add carb consistent to current diet  Document po intake    Current hospital DM medication:       Lispro correction scale, high sensitivity        Chart reviewed and initial evaluation complete on Lin Valenzuela. Patient is a 66 y.o. male with known Type 2 DM on Novolin 70/30 18 units bid ac    Per PTA, pt is taking 15 units BID ac  Pt was educated at 2/13/2018 and A1c improving from 12% at that time. A1c:   Lab Results   Component Value Date/Time    Hemoglobin A1c 8.7 (H) 04/30/2018 01:55 AM    Hemoglobin A1c 9.9 (H) 04/24/2018 01:23 AM       Recent Glucose Results:   Lab Results   Component Value Date/Time    GLUCPOC 288 (H) 05/03/2018 11:50 AM    GLUCPOC 242 (H) 05/03/2018 06:28 AM    GLUCPOC 255 (H) 05/02/2018 09:49 PM        Lab Results   Component Value Date/Time    Creatinine 2.33 (H) 05/01/2018 05:03 AM     Estimated Creatinine Clearance: 42.3 mL/min (based on Cr of 2.33). Active Orders   Diet    DIET RENAL Regular        PO intake:   Patient Vitals for the past 72 hrs:   % Diet Eaten   05/01/18 1731 100 %       Will continue to follow as needed. Thank you.   Inocencio Velarde RN, CDE

## 2018-05-03 NOTE — DISCHARGE INSTRUCTIONS
*DISCHARGE INSTRUCTIONS AFTER [address] [facility]  [address] [facility] [address]  [phone]    Leave the chest tube dressing in place for 48 hours (5/8/18), then you can remove it and shower. SURGICAL INCISION:    You will have two or three small incisions. These incisions are sealed with sutures that dissolve. The lower incision is from the chest tube. This lower incision will seal itself in 5 to 7 days. Until then you may have drainage from that incision. The drainage will be thin yellowish or red in color and may drain for 5 to 7 days. This is normal and expected. INCISION CARE:    Wash incisions daily with soap. Pat dry. Showers only, no tub baths. If you have drainage, you can place a bandage over the area, otherwise keep open to air. You may experience drainage of clear-strawberry colored fluid from the chest tube site. This is to be expected and will stop in 24-48 hours. PAIN:    What you will experience:     Tenderness and soreness around incisions   Burning and/or numbness   Soreness around front/back of chest    For relief of discomfort:     Take the pain medicine you were given at discharge   Aleve one or two tablets every 12 hrs (with food) along with pain medicine (this can be purchased over the counter at your local pharmacy/drug store). Advil may be substituted. Start this after you finish taking Toradol if prescribed.  Heating pad 10 minutes at a time to the upper incision area as often as you wish.  For the Ladies: Spandex or elastic sports bra will give you the support you need without pressure on the incision. Most will find this to be a great comfort. COUGHING:    Coughing is helpful after lung surgery. Place a pillow over your incision and apply pressure when coughing to reduce pain. ACTIVITY:    DO: 1. Walk daily outside if weather permits, at a mall if not.  Increase your distance each day. Exercise has many benefits. It promotes healing, expands your lungs,                helps you cough, relaxes your body, tones muscles, lowers blood pressure and               improves your appetite. After you exercise expect to feel tired and probably short                of breath. Plan to take a nap 1-2 times a day to regain your strength . 2. Climb stairs           3. Ride in a car           4. Perform breathing exercises (incentive spirometer)    DO NOT:               1. Lift heavy objects (8-10 pounds)  2. Drive a car/truck until after your post-op visit  3. Do heavy yard or housework     APPETITE:    It is usual to have a decreased appetite after surgery. Exercise is the best stimulant. You may expect to slowly improve over 4-10 days. CALL THE OFFICE IF YOU DEVELOP:     Increasing pain that is not controlled by the pain medicine.  Increasing redness or drainage around the incision.  Unusual or increasing shortness of breath   Fever greater than 101 degrees   Change in the color of your sputum to yellow or green, especially if you also have increasing shortness of breath and a fever greater than 101. YOUR MEDICATIONS:  As instructed        FOLLOW-UP APPOINTMENT:  AFTER DISCHARGE CALL THE OFFICE TO SCHEDULE YOUR VISIT TO SEE US IN 2 WEEKS. Please arrive 45 minutes prior to you appointment time in order to have a chest X-Ray. Check in at [address] [facility] on the ground floor of the [facility]. After your X-ray come to the [address] [place] [address] for your appointment.       Physician Signature

## 2018-05-03 NOTE — PROGRESS NOTES
Hospitalist Progress Note  Andre Ornelas MD  Answering service: [phone] from in house phone         Date of Service:  5/3/2018  NAME:  Jelena Marroquin  :  1960  MRN:  [id number]    Admission Summary:   The patient is a 56-year-old -American gentleman who was initially admitted to [address] [place] [address] with a presumed diagnosis of pneumonia/flu. Lexi Perez was found to be in renal failure.  He had gained 20 pounds before admission.  Patient at [address] [place] [address] was diagnosed with metabolic acidosis due to renal failure.  A chest x-ray was done, which showed large right-sided pleural effusion .  Patient was subsequently transferred to [facility] for further evaluation. [facility], patient was evaluated by pulmonology, cardiology. Beverley Diaz had a hemopneumothorax, which was seen on CT chest on .  Cytology of pleural fluid was negative.  Patient was given TPA via chest tube on  to try and help break up loculations. Beverley Diaz had a bronchoscopy done on , which did not reveal any endobronchial lesion.  A transthoracic echocardiogram was done, which showed acute systolic heart failure with ejection fraction of 20-25%, which was suspected due to hypertension. Beverley Diaz had a negative stress test.  On the , patient was seen by nephrology. Albert Or was placed . Beverley Diaz has been getting dialysis since then.  For hypertension, he was treated with Coreg and lisinopril.  The patient was transfused with 1 unit of packed RBCs. Beverley Diaz had a lower GI bleed.  The patient was seen by Dr. Kristyn Rooney patient also had hyperosmolar nonketotic state for which he was treated with insulin      Interval history / Subjective:         :  Pt is now post VAT's    My view of the CXR fro  suggest atelectasis vs other air space dz process ,  Increased but pt is afebrile and nl wbc count. No cp no sob. Anxious for tubes to be out of chest.          Assessment & Plan:       1.  Large R sided Pleural effusion s/p pig tail catheter. -VATs and decortication on Tues 4/31  -Draining blood tinged fluid from right chest tubes.  CT per CTS. , for VATS 5/1/2018; f/u cxr refviewed as above, stable except rll process increased  5/2/2018. Cont chest tube for serial removal over next few days.       2.  Systolic heart failure with ejection fraction of 20-25% with severe diffuse hypokinesis  with negative ischemia workup. continue Coreg and Lisinopril.  Titrate ACEI as tolerated.,. No failure by exam 5/1 - 5/3/2018     3.  History of chronic kidney disease, currently on dialysis. -Nephrology on board. , con to follow  Lab Results   Component Value Date/Time    Creatinine 2.33 (H) 05/01/2018 05:03 AM        4.  Accelerated hypertension, on Coreg, lisinopril, and Lasix. Increased Lisino to 10mg on 4/30   -Adjust meds as tolerated. bp better   BP Readings from Last 1 Encounters:   05/03/18 128/72      5.  History of anemia, probably blood loss (he had BRBPR) and renal insufficiency.    -He is on Epogen. Hemoglobin is 7 today, and 8.8 5/1  Lab Results   Component Value Date/Time    HGB (POC) 13.2 11/27/2013 09:37 AM    HGB 8.8 (L) 05/01/2018 05:03 AM      6.  History of diabetes.  Monitor blood sugars closely. Lab Results   Component Value Date/Time    Glucose 82 05/01/2018 05:03 AM    Glucose (POC) 288 (H) 05/03/2018 11:50 AM    wide fluctuations, no major changes anticipated. 5/3/2018    7. [facility] to high dose Statin. 8.  History of respiratory failure with hypoxia due to large right-sided pleural effusion, which has been drained.  The patient has a pigtail catheter placed in.  9.  History of hemopneumothorax, now with CT,  Pleural fluid Cytology is negative. 10.  DVT prophylaxis with SCDs due to recent GI blood loss. 11. DM : start on Lantus and monitor sugars, humalog.   Acceptable ranges noted for 5/1, see  above. For ranges 5/3/2018   Code status: full  DVT prophylaxis: SCD      Care Plan discussed with: Patient/Family/RN/CaseM  Disposition: Home w/Family and TBD          Hospital Problems  Date Reviewed: 4/28/2018          Codes Class Noted POA    Pleural effusion ICD-10-CM: J90  ICD-9-CM: 511.9  4/29/2018 Unknown        * (Principal)Pleural effusion on right ICD-10-CM: J90  ICD-9-CM: 511.9  4/27/2018 Yes    Overview Signed 4/27/2018  2:09 PM by Fern Castañeda NP     4/24/18 CT placed with 2200cc out                     Review of Systems:   no cp no sob no n/v/d/f/chills. Vital Signs:    Last 24hrs VS reviewed since prior progress note. Most recent are:  Visit Vitals    /72 (BP 1 Location: Right arm, BP Patient Position: Sitting)    Pulse 79    Temp 97.8 °F (36.6 °C)    Resp 20    Wt 85.6 kg (188 lb 11.4 oz)    SpO2 91%    BMI 23.59 kg/m2         Intake/Output Summary (Last 24 hours) at 05/03/18 1343  Last data filed at 05/03/18 0809   Gross per 24 hour   Intake          1570.83 ml   Output             3445 ml   Net         -1874.17 ml        Physical Examination:             Constitutional:  No acute distress, cooperative, pleasant    ENT:  Oral mucous moist, oropharynx benign. Neck supple,    Resp:  chest tubes in rt chest. Else diminished rt  . No wheezing/rhonchi/rales. No accessory muscle use   CV:  Regular rhythm, normal rate, no murmurs, gallops, rubs    GI:  Soft, non distended, non tender. normoactive bowel sounds, no hepatosplenomegaly     Musculoskeletal:  No edema, warm, 2+ pulses throughout    Neurologic:  Moves all extremities. AAOx3, CN II-XII reviewed     Psych:  Good insight, Not anxious nor agitated.   Skin:  Good turgor, no rashes or ulcers       Data Review:    Review and/or order of clinical lab test      Labs:     Recent Labs      05/01/18   0503   WBC  5.2   HGB  8.8*   HCT  27.4*   PLT  108*     Recent Labs      05/01/18   0503   NA  136   K  3.7   CL  102   CO2 29   BUN  19   CREA  2.33*   GLU  82   CA  7.2*     No results for input(s): SGOT, GPT, ALT, AP, TBIL, TBILI, TP, ALB, GLOB, GGT, AML, LPSE in the last 72 hours. No lab exists for component: AMYP, HLPSE  No results for input(s): INR, PTP, APTT in the last 72 hours. No lab exists for component: INREXT, INREXT   No results for input(s): FE, TIBC, PSAT, FERR in the last 72 hours. Lab Results   Component Value Date/Time    Folate 5.9 02/13/2018 08:12 PM      No results for input(s): PH, PCO2, PO2 in the last 72 hours. No results for input(s): CPK, CKNDX, TROIQ in the last 72 hours.     No lab exists for component: CPKMB  Lab Results   Component Value Date/Time    Cholesterol, total 115 01/22/2018 03:55 PM    HDL Cholesterol 44 01/22/2018 03:55 PM    LDL, calculated 48 01/22/2018 03:55 PM    Triglyceride 117 01/22/2018 03:55 PM     Lab Results   Component Value Date/Time    Glucose (POC) 288 (H) 05/03/2018 11:50 AM    Glucose (POC) 242 (H) 05/03/2018 06:28 AM    Glucose (POC) 255 (H) 05/02/2018 09:49 PM    Glucose (POC) 406 (H) 05/02/2018 04:26 PM    Glucose (POC) 391 (H) 05/02/2018 12:57 PM     Lab Results   Component Value Date/Time    Color YELLOW/STRAW 03/16/2018 12:39 PM    Appearance CLEAR 03/16/2018 12:39 PM    Specific gravity 1.014 03/16/2018 12:39 PM    Specific gravity 1.015 02/13/2018 11:17 AM    pH (UA) 5.0 03/16/2018 12:39 PM    Protein 300 (A) 03/16/2018 12:39 PM    Glucose NEGATIVE  03/16/2018 12:39 PM    Ketone NEGATIVE  03/16/2018 12:39 PM    Bilirubin NEGATIVE  03/16/2018 12:39 PM    Urobilinogen 0.2 03/16/2018 12:39 PM    Nitrites NEGATIVE  03/16/2018 12:39 PM    Leukocyte Esterase NEGATIVE  03/16/2018 12:39 PM    Epithelial cells FEW 03/16/2018 12:39 PM    Bacteria 1+ (A) 03/16/2018 12:39 PM    WBC 5-10 03/16/2018 12:39 PM    RBC 10-20 03/16/2018 12:39 PM         Medications Reviewed:     Current Facility-Administered Medications   Medication Dose Route Frequency    sodium chloride (NS) flush 5-10 mL  5-10 mL IntraVENous Q8H    dextrose (D50W) injection syrg 12.5 g  25 mL IntraVENous PRN    bupivacaine (PF) 0.25 % (ON-Q BAG) 4 ml/hour 300 ml  4 mL/hr Local Infiltration CONTINUOUS    sodium chloride (NS) flush 5-10 mL  5-10 mL IntraVENous Q8H    sodium chloride (NS) flush 5-10 mL  5-10 mL IntraVENous PRN    acetaminophen (TYLENOL) tablet 1,000 mg  1,000 mg Oral Q6H    naloxone (NARCAN) injection 0.4 mg  0.4 mg IntraVENous PRN    docusate sodium (COLACE) capsule 100 mg  100 mg Oral BID    magnesium hydroxide (MILK OF MAGNESIA) 400 mg/5 mL oral suspension 30 mL  30 mL Oral DAILY PRN    fentaNYL (PF)  mcg/30 ml (ADULT)   IntraVENous TITRATE    0.9% sodium chloride infusion  50 mL/hr IntraVENous CONTINUOUS    prochlorperazine (COMPAZINE) with saline injection 5 mg  5 mg IntraVENous Q8H PRN    hydrALAZINE (APRESOLINE) 20 mg/mL injection 10 mg  10 mg IntraVENous Q6H PRN    atorvastatin (LIPITOR) tablet 40 mg  40 mg Oral DAILY    sodium chloride (NS) flush 5-10 mL  5-10 mL IntraVENous Q8H    sodium chloride (NS) flush 5-10 mL  5-10 mL IntraVENous PRN    sodium bicarbonate tablet 650 mg  650 mg Oral TID    calcium carbonate (TUMS) chewable tablet 400 mg [elemental]  400 mg Oral TID WITH MEALS    epoetin tyler (EPOGEN;PROCRIT) injection 20,000 Units  20,000 Units SubCUTAneous Q TUE, THU & SAT    furosemide (LASIX) tablet 80 mg  80 mg Oral DAILY    glucagon (GLUCAGEN) injection 1 mg  1 mg IntraMUSCular PRN    glucose chewable tablet 16 g  4 Tab Oral PRN    insulin lispro (HUMALOG) injection   SubCUTAneous AC&HS    ondansetron (ZOFRAN) injection 4 mg  4 mg IntraVENous Q4H PRN    pantoprazole (PROTONIX) tablet 40 mg  40 mg Oral ACB    zolpidem (AMBIEN) tablet 5 mg  5 mg Oral QHS    carvedilol (COREG) tablet 12.5 mg  12.5 mg Oral BID WITH MEALS     ______________________________________________________________________  EXPECTED LENGTH OF STAY: 8d 2h  ACTUAL LENGTH OF STAY: [address] [place] [address], MD

## 2018-05-03 NOTE — PROGRESS NOTES
Problem: Falls - Risk of  Goal: *Absence of Falls  Document Chuck Fall Risk and appropriate interventions in the flowsheet.    Outcome: Progressing Towards Goal  Fall Risk Interventions:  Mobility Interventions: Patient to call before getting OOB    Medication Interventions: Patient to call before getting OOB    Elimination Interventions: Call light in reach, Patient to call for help with toileting needs

## 2018-05-04 ENCOUNTER — APPOINTMENT (OUTPATIENT)
Dept: GENERAL RADIOLOGY | Age: 58
DRG: 163 | End: 2018-05-04
Attending: INTERNAL MEDICINE
Payer: MEDICARE

## 2018-05-04 ENCOUNTER — APPOINTMENT (OUTPATIENT)
Dept: GENERAL RADIOLOGY | Age: 58
DRG: 163 | End: 2018-05-04
Attending: THORACIC SURGERY (CARDIOTHORACIC VASCULAR SURGERY)
Payer: MEDICARE

## 2018-05-04 LAB
GLUCOSE BLD STRIP.AUTO-MCNC: 204 MG/DL (ref 65–100)
GLUCOSE BLD STRIP.AUTO-MCNC: 230 MG/DL (ref 65–100)
GLUCOSE BLD STRIP.AUTO-MCNC: 232 MG/DL (ref 65–100)
GLUCOSE BLD STRIP.AUTO-MCNC: 258 MG/DL (ref 65–100)
GLUCOSE BLD STRIP.AUTO-MCNC: 353 MG/DL (ref 65–100)
SERVICE CMNT-IMP: ABNORMAL

## 2018-05-04 PROCEDURE — 65660000000 HC RM CCU STEPDOWN

## 2018-05-04 PROCEDURE — 71045 X-RAY EXAM CHEST 1 VIEW: CPT

## 2018-05-04 PROCEDURE — 74011250637 HC RX REV CODE- 250/637: Performed by: THORACIC SURGERY (CARDIOTHORACIC VASCULAR SURGERY)

## 2018-05-04 PROCEDURE — 74011250637 HC RX REV CODE- 250/637: Performed by: HOSPITALIST

## 2018-05-04 PROCEDURE — 74011636637 HC RX REV CODE- 636/637: Performed by: HOSPITALIST

## 2018-05-04 PROCEDURE — 74011250636 HC RX REV CODE- 250/636: Performed by: THORACIC SURGERY (CARDIOTHORACIC VASCULAR SURGERY)

## 2018-05-04 PROCEDURE — 82962 GLUCOSE BLOOD TEST: CPT

## 2018-05-04 PROCEDURE — 74011250637 HC RX REV CODE- 250/637: Performed by: INTERNAL MEDICINE

## 2018-05-04 RX ORDER — LIDOCAINE HYDROCHLORIDE 20 MG/ML
INJECTION, SOLUTION EPIDURAL; INFILTRATION; INTRACAUDAL; PERINEURAL
Status: DISPENSED
Start: 2018-05-04 | End: 2018-05-05

## 2018-05-04 RX ORDER — LIDOCAINE HYDROCHLORIDE 20 MG/ML
0.1 INJECTION, SOLUTION INFILTRATION; PERINEURAL ONCE
Status: DISPENSED | OUTPATIENT
Start: 2018-05-04 | End: 2018-05-05

## 2018-05-04 RX ADMIN — ACETAMINOPHEN 1000 MG: 500 TABLET, FILM COATED ORAL at 23:41

## 2018-05-04 RX ADMIN — CALCIUM CARBONATE (ANTACID) CHEW TAB 500 MG 400 MG: 500 CHEW TAB at 08:34

## 2018-05-04 RX ADMIN — Medication 10 ML: at 21:18

## 2018-05-04 RX ADMIN — CALCIUM CARBONATE (ANTACID) CHEW TAB 500 MG 400 MG: 500 CHEW TAB at 11:39

## 2018-05-04 RX ADMIN — CALCIUM CARBONATE (ANTACID) CHEW TAB 500 MG 400 MG: 500 CHEW TAB at 17:02

## 2018-05-04 RX ADMIN — ACETAMINOPHEN 1000 MG: 500 TABLET, FILM COATED ORAL at 07:55

## 2018-05-04 RX ADMIN — SODIUM CHLORIDE 50 ML/HR: 900 INJECTION, SOLUTION INTRAVENOUS at 11:39

## 2018-05-04 RX ADMIN — INSULIN LISPRO 3 UNITS: 100 INJECTION, SOLUTION INTRAVENOUS; SUBCUTANEOUS at 23:40

## 2018-05-04 RX ADMIN — ACETAMINOPHEN 1000 MG: 500 TABLET, FILM COATED ORAL at 17:02

## 2018-05-04 RX ADMIN — PANTOPRAZOLE SODIUM 40 MG: 40 TABLET, DELAYED RELEASE ORAL at 06:47

## 2018-05-04 RX ADMIN — INSULIN LISPRO 2 UNITS: 100 INJECTION, SOLUTION INTRAVENOUS; SUBCUTANEOUS at 11:53

## 2018-05-04 RX ADMIN — Medication 10 ML: at 21:19

## 2018-05-04 RX ADMIN — INSULIN LISPRO 2 UNITS: 100 INJECTION, SOLUTION INTRAVENOUS; SUBCUTANEOUS at 17:02

## 2018-05-04 RX ADMIN — CARVEDILOL 12.5 MG: 12.5 TABLET, FILM COATED ORAL at 08:35

## 2018-05-04 RX ADMIN — Medication: at 07:55

## 2018-05-04 RX ADMIN — SODIUM BICARBONATE 650 MG: 650 TABLET ORAL at 17:02

## 2018-05-04 RX ADMIN — CARVEDILOL 12.5 MG: 12.5 TABLET, FILM COATED ORAL at 17:02

## 2018-05-04 RX ADMIN — SODIUM BICARBONATE 650 MG: 650 TABLET ORAL at 21:18

## 2018-05-04 RX ADMIN — ATORVASTATIN CALCIUM 40 MG: 40 TABLET, FILM COATED ORAL at 08:34

## 2018-05-04 RX ADMIN — FUROSEMIDE 80 MG: 40 TABLET ORAL at 08:34

## 2018-05-04 RX ADMIN — SODIUM BICARBONATE 650 MG: 650 TABLET ORAL at 08:34

## 2018-05-04 RX ADMIN — DOCUSATE SODIUM 100 MG: 100 CAPSULE, LIQUID FILLED ORAL at 08:35

## 2018-05-04 RX ADMIN — INSULIN LISPRO 2 UNITS: 100 INJECTION, SOLUTION INTRAVENOUS; SUBCUTANEOUS at 07:30

## 2018-05-04 NOTE — DIABETES MGMT
Progress Note     Chart reviewed for elevated blood glucose ( > 180 mg/dL x 2 in the past 24 hours). Recommendations/ Comments: If appropriate, please consider adding diabetic restrictions to current diet order. If fasting glucose continues to be > 140, please also consider resume Lantus at 0.05 units/kg/day (4 units). This may be scheduled for daytime, rather than bedtime to decrease risk for nocturnal hypoglycemia. Morning glucose: 232 mg/dL (per am POCT Glucose). Required 17 units of correction in the last 24 hours. Inpatient medications for glucose management:  1. Correction Scale: Lispro (Humalog) High Sensitivity scale (thin, ESRD) to cover for glucose > 199 mg/dL  before meals and for glucose >199 at bedtime      POC Glucose last 24hrs:   Lab Results   Component Value Date/Time    GLUCPOC 232 (H) 05/04/2018 06:47 AM    GLUCPOC 258 (H) 05/04/2018 03:09 AM    GLUCPOC 423 (H) 05/03/2018 10:03 PM        Estimated Creatinine Clearance: 42.3 mL/min (based on Cr of 2.33). Diet order:   Active Orders   Diet    DIET RENAL Regular        PO intake: Patient Vitals for the past 72 hrs:   % Diet Eaten   05/04/18 0833 75 %   05/01/18 1731 100 %       History of Diabetes:   Moon Singh is a 66 y.o. male with a past medical history significant for DM per  Ami Mejía MD's H&P dated 4/28/2018. Prior to admission medications for glucose management: per past medical records  -NPH/insulin regular 70/30: 18 units with breakfast and dinner     A1C:   Lab Results   Component Value Date/Time    Hemoglobin A1c 8.7 (H) 04/30/2018 01:55 AM    Hemoglobin A1c 9.9 (H) 04/24/2018 01:23 AM       Reference range*:  Increased risk for diabetes: 5.7 - 6.4%  Diabetes: >6.4%  Glycemic control for adults with diabetes: <7.0 %    *MEAGAN SLADE (2014). Diagnosis and classification of diabetes mellitus. Diabetes care, 37, S81. Thank you. Gustavo Matson.  Chetan Comer MPH, RN, BSN, [address]   [phone]    -For most hospitalized persons with hyperglycemia in the intensive care unit (ICU), a glucose range of 140 to 180 mg/dL is recommended, provided this target can be safely achieved. *  - For general medicine and surgery patients in non-ICU settings, a premeal glucose target <140 mg/dL and a random blood glucose <180 mg/dL are recommended. *    *based on Cristy Degroot., BRYAN Hicks., Itzel Rahman, ... & Rangel Batista. (2017). Consensus statement by the American Association of Clinical Endocrinologists and [employer] of Endocrinology on the comprehensive type 2 diabetes management algorithm2017 executive summary. Endocrine Practice, 23(2), 496-235.

## 2018-05-04 NOTE — PROGRESS NOTES
[facility]   [address] [facility], [address] [place] [address], [facility][note 3]  Phone: [phone]   [phone]       Nephrology Progress Note  Socrates aLntigua     1960     [id number]  Date of Admission : 4/28/2018 05/04/18    CC: Follow up for ESRD      Assessment and Plan   New Onset ESRD  - HD MWF  - IR to eval PC today  - plan for HD today    R sided Pleural effusion w/ Trapped Lung   - s/p VATS and decortication 5/1  - per thoracic surgery    Anemia of CKD:  - LUIS with HD    Solitary Kidney   S/p Prior Left Nephrectomy for RCC    Sec HTPH  - continue Binders      Interval History:  Seen and examined. Feeling ok this AM.  Was altered last night and pulled on his PC. No cp, sob, n/v/d reported. Review of Systems: Pertinent items are noted in HPI.     Current Medications:   Current Facility-Administered Medications   Medication Dose Route Frequency    sodium chloride (NS) flush 5-10 mL  5-10 mL IntraVENous Q8H    dextrose (D50W) injection syrg 12.5 g  25 mL IntraVENous PRN    bupivacaine (PF) 0.25 % (ON-Q BAG) 4 ml/hour 300 ml  4 mL/hr Local Infiltration CONTINUOUS    sodium chloride (NS) flush 5-10 mL  5-10 mL IntraVENous Q8H    sodium chloride (NS) flush 5-10 mL  5-10 mL IntraVENous PRN    acetaminophen (TYLENOL) tablet 1,000 mg  1,000 mg Oral Q6H    naloxone (NARCAN) injection 0.4 mg  0.4 mg IntraVENous PRN    docusate sodium (COLACE) capsule 100 mg  100 mg Oral BID    magnesium hydroxide (MILK OF MAGNESIA) 400 mg/5 mL oral suspension 30 mL  30 mL Oral DAILY PRN    fentaNYL (PF)  mcg/30 ml (ADULT)   IntraVENous TITRATE    0.9% sodium chloride infusion  50 mL/hr IntraVENous CONTINUOUS    prochlorperazine (COMPAZINE) with saline injection 5 mg  5 mg IntraVENous Q8H PRN    hydrALAZINE (APRESOLINE) 20 mg/mL injection 10 mg  10 mg IntraVENous Q6H PRN    atorvastatin (LIPITOR) tablet 40 mg  40 mg Oral DAILY    sodium chloride (NS) flush 5-10 mL  5-10 mL IntraVENous Q8H    sodium chloride (NS) flush 5-10 mL  5-10 mL IntraVENous PRN    sodium bicarbonate tablet 650 mg  650 mg Oral TID    calcium carbonate (TUMS) chewable tablet 400 mg [elemental]  400 mg Oral TID WITH MEALS    epoetin tyler (EPOGEN;PROCRIT) injection 20,000 Units  20,000 Units SubCUTAneous Q TUE, THU & SAT    furosemide (LASIX) tablet 80 mg  80 mg Oral DAILY    glucagon (GLUCAGEN) injection 1 mg  1 mg IntraMUSCular PRN    glucose chewable tablet 16 g  4 Tab Oral PRN    insulin lispro (HUMALOG) injection   SubCUTAneous AC&HS    ondansetron (ZOFRAN) injection 4 mg  4 mg IntraVENous Q4H PRN    pantoprazole (PROTONIX) tablet 40 mg  40 mg Oral ACB    zolpidem (AMBIEN) tablet 5 mg  5 mg Oral QHS    carvedilol (COREG) tablet 12.5 mg  12.5 mg Oral BID WITH MEALS      No Known Allergies    Objective:  Vitals:    Vitals:    05/03/18 1938 05/03/18 2327 05/04/18 0400 05/04/18 0742   BP: (!) 162/101 159/87 160/90 150/79   Pulse: 76 90 89 69   Resp: 18 17 19 18   Temp: 97.5 °F (36.4 °C) 98.2 °F (36.8 °C) 98.5 °F (36.9 °C) 97.5 °F (36.4 °C)   TempSrc:       SpO2: (!) 88% 92% 91% 97%   Weight:   86 kg (189 lb 9.5 oz)      Intake and Output:     05/02 1901 - 05/04 0700  In: 2993.3 [P.O.:1440; I.V.:1553.3]  Out: 3506 [Urine:175]    Physical Examination:    General: NAD,Conversant   Neck:  Supple, no mass, RIJ permacath   Resp:  Diminished BS, multiple chest tubes on R  CV:  RRR,  no murmur or rub, no LE edema  GI:  Soft, NT, + Bowel sounds, no hepatosplenomegaly  Neurologic:  Non focal  Skin:  No Rash  :  No bowling     []    High complexity decision making was performed  []    Patient is at high-risk of decompensation with multiple organ involvement    Lab Data Personally Reviewed: I have reviewed all the pertinent labs, microbiology data and radiology studies during assessment. No results for input(s): NA, K, CL, CO2, GLU, BUN, CREA, CA, MG, PHOS, ALB, TBIL, SGOT, ALT, INR in the last 72 hours.     No lab exists for component: INREXT, INREXT  No results for input(s): WBC, HGB, HCT, PLT, HGBEXT, HCTEXT, PLTEXT, HGBEXT, HCTEXT, PLTEXT in the last 72 hours. No results found for: SDES  Lab Results   Component Value Date/Time    Culture result: NO GROWTH 2 DAYS 05/01/2018 10:14 AM    Culture result: Culture performed on Fluid swab specimen 05/01/2018 10:14 AM    Culture result: NO GROWTH 2 DAYS 05/01/2018 10:14 AM    Culture result: MRSA NOT PRESENT 04/30/2018 12:32 PM    Culture result:  04/30/2018 12:32 PM         Screening of patient nares for MRSA is for surveillance purposes and, if positive, to facilitate isolation considerations in high risk settings. It is not intended for automatic decolonization interventions per se as regimens are not sufficiently effective to warrant routine use. Recent Results (from the past 24 hour(s))   GLUCOSE, POC    Collection Time: 05/03/18 11:50 AM   Result Value Ref Range    Glucose (POC) 288 (H) 65 - 100 mg/dL    Performed by Sheela 132, POC    Collection Time: 05/03/18  3:55 PM   Result Value Ref Range    Glucose (POC) 305 (H) 65 - 100 mg/dL    Performed by Sheela 132, POC    Collection Time: 05/03/18 10:03 PM   Result Value Ref Range    Glucose (POC) 423 (H) 65 - 100 mg/dL    Performed by Luanne Osler, POC    Collection Time: 05/04/18  3:09 AM   Result Value Ref Range    Glucose (POC) 258 (H) 65 - 100 mg/dL    Performed by Cam RIVAS    GLUCOSE, POC    Collection Time: 05/04/18  6:47 AM   Result Value Ref Range    Glucose (POC) 232 (H) 65 - 100 mg/dL    Performed by Elmo Romero            I have reviewed the flowsheets. Chart and Pertinent Notes have been reviewed. No change in PMH ,family and social history from Consult note.       Deni Ann MD

## 2018-05-04 NOTE — PROGRESS NOTES
Patient became acutely confused and pulled out his IV, his neeraj cath to right chest is displaced with stiches stretched and scant blood around site, and cardiac monitor leads off chest and completely unclothed. Upon arriving into room patient was asked what he was doing and asked orientation questions to which he answered appropriately. Patient stated \" i don't know what happened I don't even remember doing this I was half asleep\". Vitals are T-98.5, P-73, R-19, BP-148/72, o2-99. Patient was cleaned up, chest tubes appear still intact at this time and was redressed. Patient has no further complaints at this time. Hospitalist Dr. aMrina Colvin was called and made aware of situation especially concerning the right chest neeraj cath and he states to let nephrology know in the AM to check placement. Will continue to monitor.

## 2018-05-04 NOTE — PROGRESS NOTES
Bedside shift change report given to Khoa (oncoming nurse) by Miguel Mendez (offgoing nurse). Report included the following information SBAR, Kardex, OR Summary, Intake/Output, MAR, Accordion, Recent Results, Med Rec Status and Cardiac Rhythm SR with BBB.

## 2018-05-04 NOTE — PROGRESS NOTES
Problem: Falls - Risk of  Goal: *Absence of Falls  Document Chuck Fall Risk and appropriate interventions in the flowsheet. Outcome: Progressing Towards Goal  Fall Risk Interventions:  Mobility Interventions: Patient to call before getting OOB, Communicate number of staff needed for ambulation/transfer    Mentation Interventions: More frequent rounding    Medication Interventions: Teach patient to arise slowly, Patient to call before getting OOB    Elimination Interventions: Call light in reach, Patient to call for help with toileting needs             Problem: Pressure Injury - Risk of  Goal: *Prevention of pressure injury  Document Ruddy Scale and appropriate interventions in the flowsheet.    Outcome: Progressing Towards Goal  Pressure Injury Interventions:  Sensory Interventions: Pressure redistribution bed/mattress (bed type)    Moisture Interventions: Absorbent underpads    Activity Interventions: Pressure redistribution bed/mattress(bed type)    Mobility Interventions: Pressure redistribution bed/mattress (bed type)    Nutrition Interventions: Document food/fluid/supplement intake    Friction and Shear Interventions: Lift sheet

## 2018-05-04 NOTE — PROGRESS NOTES
Bedside and Verbal shift change report given to april (oncoming nurse) by Phil Torrez (offgoing nurse). Report included the following information SBAR, Kardex, Intake/Output, Recent Results and Cardiac Rhythm nsr.

## 2018-05-04 NOTE — PROGRESS NOTES
Hospitalist Progress Note  Vandana Ann MD  Answering service: [phone] from in house phone         Date of Service:  2018  NAME:  Elizabethann Mohs  :  1960  MRN:  [id number]    Admission Summary:   The patient is a 80-year-old -American gentleman who was initially admitted to [facility] with a presumed diagnosis of pneumonia/flu. Brodie Mark was found to be in renal failure.  He had gained 20 pounds before admission.  Patient at [facility] was diagnosed with metabolic acidosis due to renal failure.  A chest x-ray was done, which showed large right-sided pleural effusion .  Patient was subsequently transferred to [facility] for further evaluation. [facility], patient was evaluated by pulmonology, cardiology. [facility] had a hemopneumothorax, which was seen on CT chest on .  Cytology of pleural fluid was negative.  Patient was given TPA via chest tube on  to try and help break up loculations. [facility] had a bronchoscopy done on , which did not reveal any endobronchial lesion.  A transthoracic echocardiogram was done, which showed acute systolic heart failure with ejection fraction of 20-25%, which was suspected due to hypertension. [facility] had a negative stress test.  On the , patient was seen by nephrology. Melinda Rho was placed . [facility] has been getting dialysis since then.  For hypertension, he was treated with Coreg and lisinopril.  The patient was transfused with 1 unit of packed RBCs. [facility] had a lower GI bleed.  The patient was seen by Dr. Negron Alt patient also had hyperosmolar nonketotic state for which he was treated with insulin      Interval history / Subjective:      2018   This note is a follow up note for multiple medical issues as listed below and partially expanded on herewith.     Confused last pm n bases of use of ambien, resolved, did pul on permcath but nephrology /IR assessing same ,and anticipate HD today. , pt feel better in am    ambien removed form MAR, and Fentyl reduced from 10 to 8 mcg bolus 5/4/2018 pm          Assessment & Plan:       1.  Large R sided Pleural effusion s/p pig tail catheter. -VATs and decortication on Tues 4/31  -Draining blood tinged fluid from right chest tubes.  CT per CTS. , for VATS 5/1/2018; f/u cxr refviewed as above, stable except rll process increased  5/2/2018. Cont chest tube for serial removal over next few days. . Pul exam stable 5/4/2018 for PA/Lat CXR in am       2.  Systolic heart failure with ejection fraction of 20-25% with severe diffuse hypokinesis  with negative ischemia workup. continue Coreg and Lisinopril.  Titrate ACEI as tolerated.,. No failure by exam 5/1 - 5/4/2018     3.  History of chronic kidney disease, currently on dialysis. -Nephrology on board. , con to follow  Lab Results   Component Value Date/Time    Creatinine 2.33 (H) 05/01/2018 05:03 AM      Acute confusion 5/4 early am , 2n2 to [place],  Pt pulled on permcath being assessed by nephrology, , confusion resolved. , [place] removed and fentanyl reduced 5/4/2018     4.  Accelerated hypertension, on Coreg, lisinopril, and Lasix. Increased Lisino to 10mg on 4/30   -Adjust meds as tolerated. bp better   BP Readings from Last 1 Encounters:   05/04/18 150/87      5.  History of anemia, probably blood loss (he had BRBPR) and renal insufficiency.    -He is on Epogen. Hemoglobin is 7 today, and 8.8 5/1  Lab Results   Component Value Date/Time    HGB (POC) 13.2 11/27/2013 09:37 AM    HGB 8.8 (L) 05/01/2018 05:03 AM      6.  History of diabetes.  Monitor blood sugars closely. Lab Results   Component Value Date/Time    Glucose 82 05/01/2018 05:03 AM    Glucose (POC) 204 (H) 05/04/2018 11:43 AM    wide fluctuations, no major changes anticipated. 5/4/2018    7. [facility] to high dose Statin.   8.  History of respiratory failure with hypoxia due to large right-sided pleural effusion, which has been drained.  The patient has a pigtail catheter placed in.  9.  History of hemopneumothorax, now with CT,  Pleural fluid Cytology is negative. 10.  DVT prophylaxis with SCDs due to recent GI blood loss. 11. DM : start on Lantus and monitor sugars, humalog. Acceptable ranges noted for 5/1, see  above. For ranges 5/4/2018   Lab Results   Component Value Date/Time    Glucose 82 05/01/2018 05:03 AM    Glucose (POC) 204 (H) 05/04/2018 11:43 AM      Code status: full  DVT prophylaxis: SCD      Care Plan discussed with: Patient/Family/RN/CaseM  Disposition: Home w/Family and TBD          Hospital Problems  Date Reviewed: 4/28/2018          Codes Class Noted POA    Pleural effusion ICD-10-CM: J90  ICD-9-CM: 511.9  4/29/2018 Unknown        * (Principal)Pleural effusion on right ICD-10-CM: J90  ICD-9-CM: 511.9  4/27/2018 Yes    Overview Signed 4/27/2018  2:09 PM by Kathryn Segovia NP     4/24/18 CT placed with 2200cc out                     Review of Systems:   no cp no sob no n/v/d/f/chills. no confusion am 5/4/2018       Vital Signs:    Last 24hrs VS reviewed since prior progress note. Most recent are:  Visit Vitals    /87 (BP 1 Location: Left arm, BP Patient Position: Sitting)    Pulse 75    Temp 97.4 °F (36.3 °C)    Resp 17    Wt 86 kg (189 lb 9.5 oz)    SpO2 98%    BMI 23.7 kg/m2         Intake/Output Summary (Last 24 hours) at 05/04/18 1233  Last data filed at 05/04/18 1148   Gross per 24 hour   Intake          2114.17 ml   Output              307 ml   Net          1807.17 ml        Physical Examination:             Constitutional:  No acute distress, cooperative, pleasant    ENT:  Oral mucous moist, oropharynx benign. Neck supple,    Resp:  chest tubes in rt chest. Else diminished rt  Stable exam . No wheezing/rhonchi/rales.  No accessory muscle use   CV:  Regular rhythm, normal rate, no murmurs, gallops, rubs GI:  Soft, non distended, non tender. normoactive bowel sounds, no hepatosplenomegaly     Musculoskeletal:  No edema, warm, 2+ pulses throughout    Neurologic:  Moves all extremities. AAOx3, CN II-XII reviewed     Psych:  Good insight, Not anxious nor agitated. Skin:  Good turgor, no rashes or ulcers       Data Review:    Review and/or order of clinical lab test      Labs:     No results for input(s): WBC, HGB, HCT, PLT, HGBEXT, HCTEXT, PLTEXT, HGBEXT, HCTEXT, PLTEXT in the last 72 hours. No results for input(s): NA, K, CL, CO2, BUN, CREA, GLU, CA, MG, PHOS, URICA in the last 72 hours. No results for input(s): SGOT, GPT, ALT, AP, TBIL, TBILI, TP, ALB, GLOB, GGT, AML, LPSE in the last 72 hours. No lab exists for component: AMYP, HLPSE  No results for input(s): INR, PTP, APTT in the last 72 hours. No lab exists for component: INREXT, INREXT   No results for input(s): FE, TIBC, PSAT, FERR in the last 72 hours. Lab Results   Component Value Date/Time    Folate 5.9 02/13/2018 08:12 PM      No results for input(s): PH, PCO2, PO2 in the last 72 hours. No results for input(s): CPK, CKNDX, TROIQ in the last 72 hours.     No lab exists for component: CPKMB  Lab Results   Component Value Date/Time    Cholesterol, total 115 01/22/2018 03:55 PM    HDL Cholesterol 44 01/22/2018 03:55 PM    LDL, calculated 48 01/22/2018 03:55 PM    Triglyceride 117 01/22/2018 03:55 PM     Lab Results   Component Value Date/Time    Glucose (POC) 204 (H) 05/04/2018 11:43 AM    Glucose (POC) 232 (H) 05/04/2018 06:47 AM    Glucose (POC) 258 (H) 05/04/2018 03:09 AM    Glucose (POC) 423 (H) 05/03/2018 10:03 PM    Glucose (POC) 305 (H) 05/03/2018 03:55 PM     Lab Results   Component Value Date/Time    Color YELLOW/STRAW 03/16/2018 12:39 PM    Appearance CLEAR 03/16/2018 12:39 PM    Specific gravity 1.014 03/16/2018 12:39 PM    Specific gravity 1.015 02/13/2018 11:17 AM    pH (UA) 5.0 03/16/2018 12:39 PM    Protein 300 (A) 03/16/2018 12:39 PM Glucose NEGATIVE  03/16/2018 12:39 PM    Ketone NEGATIVE  03/16/2018 12:39 PM    Bilirubin NEGATIVE  03/16/2018 12:39 PM    Urobilinogen 0.2 03/16/2018 12:39 PM    Nitrites NEGATIVE  03/16/2018 12:39 PM    Leukocyte Esterase NEGATIVE  03/16/2018 12:39 PM    Epithelial cells FEW 03/16/2018 12:39 PM    Bacteria 1+ (A) 03/16/2018 12:39 PM    WBC 5-10 03/16/2018 12:39 PM    RBC 10-20 03/16/2018 12:39 PM         Medications Reviewed:     Current Facility-Administered Medications   Medication Dose Route Frequency    sodium chloride (NS) flush 5-10 mL  5-10 mL IntraVENous Q8H    dextrose (D50W) injection syrg 12.5 g  25 mL IntraVENous PRN    bupivacaine (PF) 0.25 % (ON-Q BAG) 4 ml/hour 300 ml  4 mL/hr Local Infiltration CONTINUOUS    sodium chloride (NS) flush 5-10 mL  5-10 mL IntraVENous Q8H    sodium chloride (NS) flush 5-10 mL  5-10 mL IntraVENous PRN    acetaminophen (TYLENOL) tablet 1,000 mg  1,000 mg Oral Q6H    naloxone (NARCAN) injection 0.4 mg  0.4 mg IntraVENous PRN    docusate sodium (COLACE) capsule 100 mg  100 mg Oral BID    magnesium hydroxide (MILK OF MAGNESIA) 400 mg/5 mL oral suspension 30 mL  30 mL Oral DAILY PRN    fentaNYL (PF)  mcg/30 ml (ADULT)   IntraVENous TITRATE    0.9% sodium chloride infusion  50 mL/hr IntraVENous CONTINUOUS    prochlorperazine (COMPAZINE) with saline injection 5 mg  5 mg IntraVENous Q8H PRN    hydrALAZINE (APRESOLINE) 20 mg/mL injection 10 mg  10 mg IntraVENous Q6H PRN    atorvastatin (LIPITOR) tablet 40 mg  40 mg Oral DAILY    sodium chloride (NS) flush 5-10 mL  5-10 mL IntraVENous Q8H    sodium chloride (NS) flush 5-10 mL  5-10 mL IntraVENous PRN    sodium bicarbonate tablet 650 mg  650 mg Oral TID    calcium carbonate (TUMS) chewable tablet 400 mg [elemental]  400 mg Oral TID WITH MEALS    epoetin tyler (EPOGEN;PROCRIT) injection 20,000 Units  20,000 Units SubCUTAneous Q TUE, THU & SAT    furosemide (LASIX) tablet 80 mg  80 mg Oral DAILY    glucagon (GLUCAGEN) injection 1 mg  1 mg IntraMUSCular PRN    glucose chewable tablet 16 g  4 Tab Oral PRN    insulin lispro (HUMALOG) injection   SubCUTAneous AC&HS    ondansetron (ZOFRAN) injection 4 mg  4 mg IntraVENous Q4H PRN    pantoprazole (PROTONIX) tablet 40 mg  40 mg Oral ACB    zolpidem (AMBIEN) tablet 5 mg  5 mg Oral QHS    carvedilol (COREG) tablet 12.5 mg  12.5 mg Oral BID WITH MEALS     ______________________________________________________________________  EXPECTED LENGTH OF STAY: 8d 2h  ACTUAL LENGTH OF STAY:          6                 Katherine Villalba MD

## 2018-05-04 NOTE — PROGRESS NOTES
Mr. Sandy Braun is POD#3 after a R decortication. No acute events overnight.     Afebrile  HD stable     CT  RA 175ml  Post 30ml  Ant 15ml  No definitive air leak     On exam he is laying in bed  Alert and oriented  Lungs CTA b/l  RRR  Dressing c/d/i     CXR- good expansion, no effusion     Mr. Sandy Braun is progressing well. Plan to remove anterior CT today,  Continue rehab efforts. Will work to remove all CTs over the weekend.

## 2018-05-05 ENCOUNTER — APPOINTMENT (OUTPATIENT)
Dept: GENERAL RADIOLOGY | Age: 58
DRG: 163 | End: 2018-05-05
Attending: THORACIC SURGERY (CARDIOTHORACIC VASCULAR SURGERY)
Payer: MEDICARE

## 2018-05-05 LAB
BACTERIA SPEC CULT: NORMAL
GLUCOSE BLD STRIP.AUTO-MCNC: 209 MG/DL (ref 65–100)
GLUCOSE BLD STRIP.AUTO-MCNC: 256 MG/DL (ref 65–100)
GLUCOSE BLD STRIP.AUTO-MCNC: 343 MG/DL (ref 65–100)
GLUCOSE BLD STRIP.AUTO-MCNC: 394 MG/DL (ref 65–100)
GRAM STN SPEC: NORMAL
GRAM STN SPEC: NORMAL
SERVICE CMNT-IMP: ABNORMAL
SERVICE CMNT-IMP: NORMAL
SERVICE CMNT-IMP: NORMAL

## 2018-05-05 PROCEDURE — 74011250637 HC RX REV CODE- 250/637: Performed by: THORACIC SURGERY (CARDIOTHORACIC VASCULAR SURGERY)

## 2018-05-05 PROCEDURE — 82962 GLUCOSE BLOOD TEST: CPT

## 2018-05-05 PROCEDURE — 65660000000 HC RM CCU STEPDOWN

## 2018-05-05 PROCEDURE — 74011250636 HC RX REV CODE- 250/636: Performed by: HOSPITALIST

## 2018-05-05 PROCEDURE — 71045 X-RAY EXAM CHEST 1 VIEW: CPT

## 2018-05-05 PROCEDURE — 74011250637 HC RX REV CODE- 250/637: Performed by: HOSPITALIST

## 2018-05-05 PROCEDURE — 90935 HEMODIALYSIS ONE EVALUATION: CPT

## 2018-05-05 PROCEDURE — 74011636637 HC RX REV CODE- 636/637: Performed by: HOSPITALIST

## 2018-05-05 PROCEDURE — 74011250637 HC RX REV CODE- 250/637: Performed by: INTERNAL MEDICINE

## 2018-05-05 RX ADMIN — CALCIUM CARBONATE (ANTACID) CHEW TAB 500 MG 400 MG: 500 CHEW TAB at 14:20

## 2018-05-05 RX ADMIN — Medication 10 ML: at 14:22

## 2018-05-05 RX ADMIN — PANTOPRAZOLE SODIUM 40 MG: 40 TABLET, DELAYED RELEASE ORAL at 06:38

## 2018-05-05 RX ADMIN — INSULIN LISPRO 2 UNITS: 100 INJECTION, SOLUTION INTRAVENOUS; SUBCUTANEOUS at 21:47

## 2018-05-05 RX ADMIN — DOCUSATE SODIUM 100 MG: 100 CAPSULE, LIQUID FILLED ORAL at 17:17

## 2018-05-05 RX ADMIN — SODIUM BICARBONATE 650 MG: 650 TABLET ORAL at 16:50

## 2018-05-05 RX ADMIN — SODIUM BICARBONATE 650 MG: 650 TABLET ORAL at 08:55

## 2018-05-05 RX ADMIN — ACETAMINOPHEN 1000 MG: 500 TABLET, FILM COATED ORAL at 23:17

## 2018-05-05 RX ADMIN — SODIUM BICARBONATE 650 MG: 650 TABLET ORAL at 21:47

## 2018-05-05 RX ADMIN — Medication 10 ML: at 21:47

## 2018-05-05 RX ADMIN — ACETAMINOPHEN 1000 MG: 500 TABLET, FILM COATED ORAL at 20:37

## 2018-05-05 RX ADMIN — DOCUSATE SODIUM 100 MG: 100 CAPSULE, LIQUID FILLED ORAL at 08:55

## 2018-05-05 RX ADMIN — CARVEDILOL 12.5 MG: 12.5 TABLET, FILM COATED ORAL at 16:50

## 2018-05-05 RX ADMIN — CALCIUM CARBONATE (ANTACID) CHEW TAB 500 MG 400 MG: 500 CHEW TAB at 08:55

## 2018-05-05 RX ADMIN — ATORVASTATIN CALCIUM 40 MG: 40 TABLET, FILM COATED ORAL at 08:56

## 2018-05-05 RX ADMIN — EPOETIN ALFA 20000 UNITS: 20000 SOLUTION INTRAVENOUS; SUBCUTANEOUS at 17:17

## 2018-05-05 RX ADMIN — ACETAMINOPHEN 1000 MG: 500 TABLET, FILM COATED ORAL at 06:38

## 2018-05-05 RX ADMIN — ACETAMINOPHEN 1000 MG: 500 TABLET, FILM COATED ORAL at 14:21

## 2018-05-05 RX ADMIN — CARVEDILOL 12.5 MG: 12.5 TABLET, FILM COATED ORAL at 08:55

## 2018-05-05 RX ADMIN — INSULIN LISPRO 6 UNITS: 100 INJECTION, SOLUTION INTRAVENOUS; SUBCUTANEOUS at 06:37

## 2018-05-05 RX ADMIN — INSULIN LISPRO 2 UNITS: 100 INJECTION, SOLUTION INTRAVENOUS; SUBCUTANEOUS at 16:49

## 2018-05-05 RX ADMIN — CALCIUM CARBONATE (ANTACID) CHEW TAB 500 MG 400 MG: 500 CHEW TAB at 17:17

## 2018-05-05 RX ADMIN — HYDRALAZINE HYDROCHLORIDE 10 MG: 20 INJECTION INTRAMUSCULAR; INTRAVENOUS at 23:17

## 2018-05-05 RX ADMIN — INSULIN LISPRO 3 UNITS: 100 INJECTION, SOLUTION INTRAVENOUS; SUBCUTANEOUS at 12:31

## 2018-05-05 RX ADMIN — FUROSEMIDE 80 MG: 40 TABLET ORAL at 08:55

## 2018-05-05 NOTE — PROGRESS NOTES
Problem: Falls - Risk of  Goal: *Absence of Falls  Document Chuck Fall Risk and appropriate interventions in the flowsheet. Outcome: Progressing Towards Goal  Fall Risk Interventions:  Mobility Interventions: Patient to call before getting OOB    Mentation Interventions: More frequent rounding    Medication Interventions: Teach patient to arise slowly    Elimination Interventions: Call light in reach             Problem: Pressure Injury - Risk of  Goal: *Prevention of pressure injury  Document Ruddy Scale and appropriate interventions in the flowsheet.    Outcome: Progressing Towards Goal  Pressure Injury Interventions:  Sensory Interventions: Pressure redistribution bed/mattress (bed type)    Moisture Interventions: Absorbent underpads    Activity Interventions: Pressure redistribution bed/mattress(bed type)    Mobility Interventions: Pressure redistribution bed/mattress (bed type)    Nutrition Interventions: Document food/fluid/supplement intake    Friction and Shear Interventions: Apply protective barrier, creams and emollients

## 2018-05-05 NOTE — PROGRESS NOTES
Mr. Juan Posey is POD#4 after a R decortication.  No acute events overnight. He did not get HD yesterday.      Afebrile  HD stable      CT  RA 135ml  Post 30ml   No definitive air leak      On exam he is laying in bed  Alert and oriented  Lungs CTA b/l  RRR  Dressing c/d/i      CXR- good expansion, stable appearance      Mr. Juan Posey is progressing well.  Plan to remove posterior CT today,  Continue rehab efforts. Anticpate removing last CT tomorrow after he gets dialyzed today. Brittany Shepard

## 2018-05-05 NOTE — DIALYSIS
[facility]  [phone]    Vitals   Pre   Post   Assessment   Pre   Post     Temp  Temp: 97.9 °F (36.6 °C) (05/05/18 1035)  97.7 LOC  A/O x 3 A/O x 3   HR   Pulse (Heart Rate): (!) 59 (05/05/18 1300) 65 Lungs   CTA  CTA   B/P   BP: 164/89 (watching tv) (05/05/18 1300) 150/80 Cardiac   s1s2  s1s2   Resp   Resp Rate: 20 (05/05/18 1035) 18 Skin   Intact Catheter  Catheter Intact   Pain level  Pain Intensity 1: 0 (05/05/18 0850) 0 Edema  +2 LE     +1 B/L LE   Orders:    Duration:   Start:    3963 End:   1405 Total:   3.5 hours   Dialyzer:   Dialyzer/Set Up Inspection: Revaclear (05/05/18 1035)   K Bath:   Dialysate K (mEq/L): 2 (05/05/18 1035)   Ca Bath:   Dialysate CA (mEq/L): 2.5 (05/05/18 1035)   Na/Bicarb:   Dialysate NA (mEq/L): 140 (05/05/18 1035)   Target Fluid Removal:   Goal/Amount of Fluid to Remove (mL): 3000 mL (05/05/18 1035)   Access     Type & Location:   Right tunneled catheter. Good aspirations/flushes. Dressing changed on 5/4/18. Bio-patch in place.    Labs     Obtained/Reviewed   Critical Results Called   Date when labs were drawn-  Hgb-    HGB   Date Value Ref Range Status   05/01/2018 8.8 (L) 12.1 - 17.0 g/dL Final     K-    Potassium   Date Value Ref Range Status   05/01/2018 3.7 3.5 - 5.1 mmol/L Final     Ca-   Calcium   Date Value Ref Range Status   05/01/2018 7.2 (L) 8.5 - 10.1 MG/DL Final     Bun-   BUN   Date Value Ref Range Status   05/01/2018 19 6 - 20 MG/DL Final     Creat-   Creatinine   Date Value Ref Range Status   05/01/2018 2.33 (H) 0.70 - 1.30 MG/DL Final     Comment:     INVESTIGATED PER DELTA CHECK PROTOCOL        Medications/ Blood Products Given     Name   Dose   Route and Time     None ordered                Blood Volume Processed (BVP):    79.1 Net Fluid   Removed:  3500-500= 3000 ml   Comments   Time Out Done: 1310  Primary Nurse Rpt Pre: Mingo Felty, RN  Primary Nurse Rpt Post: Mingo Felty, RN  Pt Education: Procedure  Care Plan:Continue HD as ordered by Nephrologist  Tx Summary: Treatment initiated via right Perm. Catheter. Tolerated treatment well. All possible blood returned. Catheter ports flushed with NS only; no heparin. Sterile caps applied. Endorsed to primary, Moreno Ritchie RN  Admiting Diagnosis:  Pt's previous clinic-  Consent signed - Informed Consent Verified: Yes (05/05/18 1035)  [facility] Consent - Yes  Hepatitis Status- Negative on 4/25/18  Machine #- Machine Number: [id number] / Bienvenido Henley (05/05/18 1035)  Telemetry status-Yes  Pre-dialysis wt. - Pre-Dialysis Weight: 91.9 kg (202 lb 9.6 oz) (05/05/18 1035)   Post HD Wt: 88.9 kg (-3 kg)

## 2018-05-05 NOTE — PROGRESS NOTES
Bedside shift change report given to [facility] (oncoming nurse) by April, TE (offgoing nurse). Report included the following information SBAR, Kardex, Intake/Output, MAR, Accordion and Cardiac Rhythm NSR.

## 2018-05-05 NOTE — PROGRESS NOTES
Bedside shift change report given to [place] (oncoming nurse) by Henrietta Garcia (offgoing nurse). Report included the following information SBAR, Intake/Output, MAR, Recent Results and Cardiac Rhythm NSR.

## 2018-05-05 NOTE — PROGRESS NOTES
Hospitalist Progress Note  Padmini Joaquin MD  Answering service: [phone] from in house phone         Date of Service:  2018  NAME:  Lexie Cade  :  1960  MRN:  [id number]    Admission Summary:   The patient is a 65-year-old -American gentleman who was initially admitted to [facility] with a presumed diagnosis of pneumonia/flu. Miguel A Noland was found to be in renal failure.  He had gained 20 pounds before admission.  Patient at [facility] was diagnosed with metabolic acidosis due to renal failure.  A chest x-ray was done, which showed large right-sided pleural effusion .  Patient was subsequently transferred to [facility] for further evaluation. [facility], patient was evaluated by pulmonology, cardiology. [facility] had a hemopneumothorax, which was seen on CT chest on .  Cytology of pleural fluid was negative.  Patient was given TPA via chest tube on  to try and help break up loculations. [facility] had a bronchoscopy done on , which did not reveal any endobronchial lesion.  A transthoracic echocardiogram was done, which showed acute systolic heart failure with ejection fraction of 20-25%, which was suspected due to hypertension. [facility] had a negative stress test.  On the , patient was seen by nephrology. Tomas Ana was placed . Holland Tal has been getting dialysis since then.  For hypertension, he was treated with Coreg and lisinopril.  The patient was transfused with 1 unit of packed RBCs. [facility] had a lower GI bleed.  The patient was seen by Dr. Matthias Whitley patient also had hyperosmolar nonketotic state for which he was treated with insulin      Interval history / Subjective:        2018   This note is a follow up note for multiple medical issues as listed below and partially expanded on herewith.     Pt with stable this am w/o complainots but is dematous,for HD and looking for 3 liters pull off. Pt w/o c/o n/v/d/pain today.          Assessment & Plan:        Large R sided Pleural effusion s/p pig tail catheter. -VATs and decortication on Tues 4/31  -Draining blood tinged fluid from right chest tubes.  CT per CTS. , for VATS 5/1/2018; f/u cxr refviewed as above, stable except rll process increased  5/2/2018. Cont chest tube for serial removal over next few days. . Pul exam stable 5/5/2018 for PA/Lat CXR in am  Stable       Systolic heart failure with ejection fraction of 20-25% with severe diffuse hypokinesis  with negative ischemia workup. continue Coreg and Lisinopril.  Titrate ACEI as tolerated.,. No failure by exam 5/1 - 5/5/2018     History of chronic kidney disease, currently on dialysis. -Nephrology on board. , con to follow  Lab Results   Component Value Date/Time    Creatinine 2.33 (H) 05/01/2018 05:03 AM      Acute confusion 5/4 early am , 2n2 to [place],  pt pulled on permcath being assessed by nephrology, , confusion resolved. , ambien removed and fentanyl reduced 5/4/2018      Accelerated hypertension, on Coreg, lisinopril, and Lasix. Increased Lisino to 10mg on 4/30   -Adjust meds as tolerated. bp better   BP Readings from Last 1 Encounters:   05/05/18 162/89      History of anemia, probably blood loss (he had BRBPR) and renal insufficiency.    -He is on Epogen. Hemoglobin is 7 today, and 8.8 5/1  Lab Results   Component Value Date/Time    HGB (POC) 13.2 11/27/2013 09:37 AM    HGB 8.8 (L) 05/01/2018 05:03 AM      6.  History of diabetes.  Monitor blood sugars closely. Lab Results   Component Value Date/Time    Glucose 82 05/01/2018 05:03 AM    Glucose (POC) 256 (H) 05/05/2018 11:07 AM    wide fluctuations, no major changes anticipated. 5/5/2018      Hyperlipidemia.  Change to high dose Statin.     Respiratory failure with hypoxia due to large right-sided pleural effusion, which has been drained.  The patient has a pigtail catheter placed in.    History of hemopneumothorax, now with CT,  Pleural fluid Cytology is negative. DVT prophylaxis with SCDs due to recent GI blood loss. DM 2    Lantus and monitor sugars, humalog. SSI  Acceptable ranges noted for 5/1, see  above. For ranges 5/5/2018   Lab Results   Component Value Date/Time    Glucose 82 05/01/2018 05:03 AM    Glucose (POC) 256 (H) 05/05/2018 11:07 AM      Code status: full  DVT prophylaxis: SCD      Care Plan discussed with: Patient/Family/RN/CaseM  Disposition: Home w/Family and TBD          Hospital Problems  Date Reviewed: 4/28/2018          Codes Class Noted POA    Pleural effusion ICD-10-CM: J90  ICD-9-CM: 511.9  4/29/2018 Unknown        * (Principal)Pleural effusion on right ICD-10-CM: J90  ICD-9-CM: 511.9  4/27/2018 Yes    Overview Signed 4/27/2018  2:09 PM by Ariella Lopez NP     4/24/18 CT placed with 2200cc out                     Review of Systems:   no cp no sob no n/v/d/f/chills. Feels overall better       Vital Signs:    Last 24hrs VS reviewed since prior progress note. Most recent are:  Visit Vitals    /89    Pulse 70    Temp 97.9 °F (36.6 °C) (Oral)    Resp 20    Wt 87.1 kg (192 lb 0.3 oz)    SpO2 97%    BMI 24 kg/m2         Intake/Output Summary (Last 24 hours) at 05/05/18 1225  Last data filed at 05/05/18 1035   Gross per 24 hour   Intake          2011.67 ml   Output              205 ml   Net          1806.67 ml        Physical Examination:             Constitutional:  No acute distress, cooperative, pleasant    ENT:  Oral mucous moist, oropharynx benign. Neck supple,    Resp:  chest tubes in rt chest. Else diminished rt  Stable exam . No wheezing/rhonchi/rales. No accessory muscle use   CV:  Regular rhythm, normal rate, no murmurs, gallops, rubs    GI:  Soft, non distended, non tender. normoactive bowel sounds, no hepatosplenomegaly     Musculoskeletal:  +edema, warm, 2+ pulses throughout    Neurologic:  Moves all extremities.   AAOx3, CN II-XII reviewed     Psych:  Good insight, Not anxious nor agitated. Skin:  Good turgor, no rashes or ulcers       Data Review:    Review and/or order of clinical lab test      Labs:     No results for input(s): WBC, HGB, HCT, PLT, HGBEXT, HCTEXT, PLTEXT, HGBEXT, HCTEXT, PLTEXT in the last 72 hours. No results for input(s): NA, K, CL, CO2, BUN, CREA, GLU, CA, MG, PHOS, URICA in the last 72 hours. No results for input(s): SGOT, GPT, ALT, AP, TBIL, TBILI, TP, ALB, GLOB, GGT, AML, LPSE in the last 72 hours. No lab exists for component: AMYP, HLPSE  No results for input(s): INR, PTP, APTT in the last 72 hours. No lab exists for component: INREXT, INREXT   No results for input(s): FE, TIBC, PSAT, FERR in the last 72 hours. Lab Results   Component Value Date/Time    Folate 5.9 02/13/2018 08:12 PM      No results for input(s): PH, PCO2, PO2 in the last 72 hours. No results for input(s): CPK, CKNDX, TROIQ in the last 72 hours.     No lab exists for component: CPKMB  Lab Results   Component Value Date/Time    Cholesterol, total 115 01/22/2018 03:55 PM    HDL Cholesterol 44 01/22/2018 03:55 PM    LDL, calculated 48 01/22/2018 03:55 PM    Triglyceride 117 01/22/2018 03:55 PM     Lab Results   Component Value Date/Time    Glucose (POC) 256 (H) 05/05/2018 11:07 AM    Glucose (POC) 394 (H) 05/05/2018 06:12 AM    Glucose (POC) 353 (H) 05/04/2018 10:25 PM    Glucose (POC) 230 (H) 05/04/2018 04:39 PM    Glucose (POC) 204 (H) 05/04/2018 11:43 AM     Lab Results   Component Value Date/Time    Color YELLOW/STRAW 03/16/2018 12:39 PM    Appearance CLEAR 03/16/2018 12:39 PM    Specific gravity 1.014 03/16/2018 12:39 PM    Specific gravity 1.015 02/13/2018 11:17 AM    pH (UA) 5.0 03/16/2018 12:39 PM    Protein 300 (A) 03/16/2018 12:39 PM    Glucose NEGATIVE  03/16/2018 12:39 PM    Ketone NEGATIVE  03/16/2018 12:39 PM    Bilirubin NEGATIVE  03/16/2018 12:39 PM    Urobilinogen 0.2 03/16/2018 12:39 PM    Nitrites NEGATIVE  03/16/2018 12:39 PM    Leukocyte Esterase NEGATIVE  03/16/2018 12:39 PM    Epithelial cells FEW 03/16/2018 12:39 PM    Bacteria 1+ (A) 03/16/2018 12:39 PM    WBC 5-10 03/16/2018 12:39 PM    RBC 10-20 03/16/2018 12:39 PM         Medications Reviewed:     Current Facility-Administered Medications   Medication Dose Route Frequency    sodium chloride (NS) flush 5-10 mL  5-10 mL IntraVENous Q8H    dextrose (D50W) injection syrg 12.5 g  25 mL IntraVENous PRN    bupivacaine (PF) 0.25 % (ON-Q BAG) 4 ml/hour 300 ml  4 mL/hr Local Infiltration CONTINUOUS    sodium chloride (NS) flush 5-10 mL  5-10 mL IntraVENous Q8H    sodium chloride (NS) flush 5-10 mL  5-10 mL IntraVENous PRN    acetaminophen (TYLENOL) tablet 1,000 mg  1,000 mg Oral Q6H    naloxone (NARCAN) injection 0.4 mg  0.4 mg IntraVENous PRN    docusate sodium (COLACE) capsule 100 mg  100 mg Oral BID    magnesium hydroxide (MILK OF MAGNESIA) 400 mg/5 mL oral suspension 30 mL  30 mL Oral DAILY PRN    fentaNYL (PF)  mcg/30 ml (ADULT)   IntraVENous TITRATE    0.9% sodium chloride infusion  50 mL/hr IntraVENous CONTINUOUS    prochlorperazine (COMPAZINE) with saline injection 5 mg  5 mg IntraVENous Q8H PRN    hydrALAZINE (APRESOLINE) 20 mg/mL injection 10 mg  10 mg IntraVENous Q6H PRN    atorvastatin (LIPITOR) tablet 40 mg  40 mg Oral DAILY    sodium chloride (NS) flush 5-10 mL  5-10 mL IntraVENous Q8H    sodium chloride (NS) flush 5-10 mL  5-10 mL IntraVENous PRN    sodium bicarbonate tablet 650 mg  650 mg Oral TID    calcium carbonate (TUMS) chewable tablet 400 mg [elemental]  400 mg Oral TID WITH MEALS    epoetin tyler (EPOGEN;PROCRIT) injection 20,000 Units  20,000 Units SubCUTAneous Q TUE, THU & SAT    furosemide (LASIX) tablet 80 mg  80 mg Oral DAILY    glucagon (GLUCAGEN) injection 1 mg  1 mg IntraMUSCular PRN    glucose chewable tablet 16 g  4 Tab Oral PRN    insulin lispro (HUMALOG) injection   SubCUTAneous AC&HS    ondansetron (ZOFRAN) injection 4 mg 4 mg IntraVENous Q4H PRN    pantoprazole (PROTONIX) tablet 40 mg  40 mg Oral ACB    zolpidem (AMBIEN) tablet 5 mg  5 mg Oral QHS    carvedilol (COREG) tablet 12.5 mg  12.5 mg Oral BID WITH MEALS     ______________________________________________________________________  EXPECTED LENGTH OF STAY: 8d 2h  ACTUAL LENGTH OF STAY:          Linda Ac MD

## 2018-05-06 ENCOUNTER — APPOINTMENT (OUTPATIENT)
Dept: GENERAL RADIOLOGY | Age: 58
DRG: 163 | End: 2018-05-06
Attending: THORACIC SURGERY (CARDIOTHORACIC VASCULAR SURGERY)
Payer: MEDICARE

## 2018-05-06 LAB
ALBUMIN SERPL-MCNC: 1.2 G/DL (ref 3.5–5)
GLUCOSE BLD STRIP.AUTO-MCNC: 203 MG/DL (ref 65–100)
GLUCOSE BLD STRIP.AUTO-MCNC: 220 MG/DL (ref 65–100)
GLUCOSE BLD STRIP.AUTO-MCNC: 285 MG/DL (ref 65–100)
GLUCOSE BLD STRIP.AUTO-MCNC: 383 MG/DL (ref 65–100)
PREALB SERPL-MCNC: 9.2 MG/DL (ref 20–40)
SERVICE CMNT-IMP: ABNORMAL

## 2018-05-06 PROCEDURE — 74011250637 HC RX REV CODE- 250/637: Performed by: INTERNAL MEDICINE

## 2018-05-06 PROCEDURE — 74011636637 HC RX REV CODE- 636/637: Performed by: HOSPITALIST

## 2018-05-06 PROCEDURE — 74011250637 HC RX REV CODE- 250/637: Performed by: HOSPITALIST

## 2018-05-06 PROCEDURE — 84134 ASSAY OF PREALBUMIN: CPT | Performed by: THORACIC SURGERY (CARDIOTHORACIC VASCULAR SURGERY)

## 2018-05-06 PROCEDURE — 71046 X-RAY EXAM CHEST 2 VIEWS: CPT

## 2018-05-06 PROCEDURE — 82040 ASSAY OF SERUM ALBUMIN: CPT | Performed by: THORACIC SURGERY (CARDIOTHORACIC VASCULAR SURGERY)

## 2018-05-06 PROCEDURE — 82962 GLUCOSE BLOOD TEST: CPT

## 2018-05-06 PROCEDURE — 74011250637 HC RX REV CODE- 250/637: Performed by: THORACIC SURGERY (CARDIOTHORACIC VASCULAR SURGERY)

## 2018-05-06 PROCEDURE — 36415 COLL VENOUS BLD VENIPUNCTURE: CPT | Performed by: THORACIC SURGERY (CARDIOTHORACIC VASCULAR SURGERY)

## 2018-05-06 PROCEDURE — 65660000000 HC RM CCU STEPDOWN

## 2018-05-06 PROCEDURE — 71045 X-RAY EXAM CHEST 1 VIEW: CPT

## 2018-05-06 PROCEDURE — 74011250636 HC RX REV CODE- 250/636: Performed by: HOSPITALIST

## 2018-05-06 RX ADMIN — INSULIN LISPRO 2 UNITS: 100 INJECTION, SOLUTION INTRAVENOUS; SUBCUTANEOUS at 18:30

## 2018-05-06 RX ADMIN — PANTOPRAZOLE SODIUM 40 MG: 40 TABLET, DELAYED RELEASE ORAL at 06:48

## 2018-05-06 RX ADMIN — DOCUSATE SODIUM 100 MG: 100 CAPSULE, LIQUID FILLED ORAL at 09:04

## 2018-05-06 RX ADMIN — CARVEDILOL 12.5 MG: 12.5 TABLET, FILM COATED ORAL at 18:30

## 2018-05-06 RX ADMIN — ACETAMINOPHEN 1000 MG: 500 TABLET, FILM COATED ORAL at 06:48

## 2018-05-06 RX ADMIN — SODIUM BICARBONATE 650 MG: 650 TABLET ORAL at 22:15

## 2018-05-06 RX ADMIN — SODIUM BICARBONATE 650 MG: 650 TABLET ORAL at 18:30

## 2018-05-06 RX ADMIN — Medication 10 ML: at 14:18

## 2018-05-06 RX ADMIN — FUROSEMIDE 80 MG: 40 TABLET ORAL at 09:03

## 2018-05-06 RX ADMIN — CALCIUM CARBONATE (ANTACID) CHEW TAB 500 MG 400 MG: 500 CHEW TAB at 09:04

## 2018-05-06 RX ADMIN — CALCIUM CARBONATE (ANTACID) CHEW TAB 500 MG 400 MG: 500 CHEW TAB at 18:31

## 2018-05-06 RX ADMIN — CALCIUM CARBONATE (ANTACID) CHEW TAB 500 MG 400 MG: 500 CHEW TAB at 12:04

## 2018-05-06 RX ADMIN — CARVEDILOL 12.5 MG: 12.5 TABLET, FILM COATED ORAL at 09:03

## 2018-05-06 RX ADMIN — INSULIN LISPRO 3 UNITS: 100 INJECTION, SOLUTION INTRAVENOUS; SUBCUTANEOUS at 12:15

## 2018-05-06 RX ADMIN — DOCUSATE SODIUM 100 MG: 100 CAPSULE, LIQUID FILLED ORAL at 18:30

## 2018-05-06 RX ADMIN — ACETAMINOPHEN 1000 MG: 500 TABLET, FILM COATED ORAL at 18:30

## 2018-05-06 RX ADMIN — INSULIN LISPRO 6 UNITS: 100 INJECTION, SOLUTION INTRAVENOUS; SUBCUTANEOUS at 06:48

## 2018-05-06 RX ADMIN — Medication 10 ML: at 22:15

## 2018-05-06 RX ADMIN — ATORVASTATIN CALCIUM 40 MG: 40 TABLET, FILM COATED ORAL at 09:04

## 2018-05-06 RX ADMIN — HYDRALAZINE HYDROCHLORIDE 10 MG: 20 INJECTION INTRAMUSCULAR; INTRAVENOUS at 22:15

## 2018-05-06 RX ADMIN — INSULIN LISPRO 1 UNITS: 100 INJECTION, SOLUTION INTRAVENOUS; SUBCUTANEOUS at 22:14

## 2018-05-06 RX ADMIN — SODIUM BICARBONATE 650 MG: 650 TABLET ORAL at 09:04

## 2018-05-06 NOTE — ROUTINE PROCESS
Bedside and Verbal shift change report given to [facility] [id number] (oncoming nurse) by Urban Khan (offgoing nurse). Report included the following information SBAR, Kardex, Intake/Output, MAR, Recent Results and Cardiac Rhythm NSR.

## 2018-05-06 NOTE — PROGRESS NOTES
Hospitalist Progress Note  Katherine Villalba MD  Answering service: [phone] from in house phone         Date of Service:  2018  NAME:  Moon Singh  :  1960  MRN:  [id number]    Admission Summary:   The patient is a 51-year-old -American gentleman who was initially admitted to [facility] with a presumed diagnosis of pneumonia/flu. Heron Pugh was found to be in renal failure.  He had gained 20 pounds before admission.  Patient at [facility] was diagnosed with metabolic acidosis due to renal failure.  A chest x-ray was done, which showed large right-sided pleural effusion .  Patient was subsequently transferred to [facility] for further evaluation. [facility], patient was evaluated by pulmonology, cardiology. Anitha Yung had a hemopneumothorax, which was seen on CT chest on .  Cytology of pleural fluid was negative.  Patient was given TPA via chest tube on  to try and help break up loculations. Anitha Yung had a bronchoscopy done on , which did not reveal any endobronchial lesion.  A transthoracic echocardiogram was done, which showed acute systolic heart failure with ejection fraction of 20-25%, which was suspected due to hypertension. Anitha Yung had a negative stress test.  On the , patient was seen by nephrology. Roxanne Orville was placed . Anitha Yung has been getting dialysis since then.  For hypertension, he was treated with Coreg and lisinopril.  The patient was transfused with 1 unit of packed RBCs. Anitha Yung had a lower GI bleed.  The patient was seen by Dr. Suzanne Banks patient also had hyperosmolar nonketotic state for which he was treated with insulin      Interval history / Subjective:        2018   This note is a follow up note for multiple medical issues as listed below and partially expanded on herewith.     All chest tubes out , no c/o sob , pain better, emily HD yesterday, is edematous this am legs alfonso,    Home when stable  ?  5/7 .        Assessment & Plan:       1.  Large R sided Pleural effusion s/p pig tail catheter. -VATs and decortication on Tues 4/31  -Draining blood tinged fluid from right chest tubes.  CT per CTS. , for VATS 5/1/2018; f/u cxr refviewed as above, stable except rll process increased  5/2/2018. Cont chest tube for serial removal over next few days. . Pul exam stable 5/6/2018   For f/u cxr daily , all chest tubes out by  5/6.       2.  Systolic heart failure with ejection fraction of 20-25% with severe diffuse hypokinesis  with negative ischemia workup. continue Coreg and Lisinopril.  Titrate ACEI as tolerated.,. No failure by exam 5/1 - 5/6/2018  emily HD, has 2 + edema today 5/6 one day post HD. 5/5     3.  History of chronic kidney disease, currently on dialysis. -Nephrology on board. , con to follow  Lab Results   Component Value Date/Time    Creatinine 2.33 (H) 05/01/2018 05:03 AM      Acute confusion 5/4 early am , 2n2 to [employer],  Pt pulled on permcath being assessed by nephrology, , confusion resolved. , [employer] removed and fentanyl reduced 5/4/2018     4.  Accelerated hypertension, on Coreg, lisinopril, and Lasix. Increased Lisino to 10mg on 4/30   -Adjust meds as tolerated. bp better   BP Readings from Last 1 Encounters:   05/06/18 158/75      5.  History of anemia, probably blood loss (he had BRBPR) and renal insufficiency.    -He is on Epogen. Hemoglobin is 7 today, and 8.8 5/1  Lab Results   Component Value Date/Time    HGB (POC) 13.2 11/27/2013 09:37 AM    HGB 8.8 (L) 05/01/2018 05:03 AM      6.  History of diabetes.  Monitor blood sugars closely. Lab Results   Component Value Date/Time    Glucose 82 05/01/2018 05:03 AM    Glucose (POC) 383 (H) 05/06/2018 05:12 AM    wide fluctuations, no major changes anticipated. 5/6/2018    7. [facility] to high dose Statin.   8.  History of respiratory failure with hypoxia due to large right-sided pleural effusion, which has been drained.  The patient has a pigtail catheter placed in.  9.  History of hemopneumothorax, now with CT,  Pleural fluid Cytology is negative. 10.  DVT prophylaxis with SCDs due to recent GI blood loss. 11. DM : start on Lantus and monitor sugars, humalog. Acceptable ranges noted for 5/1, see  above. For ranges 5/6/2018   Lab Results   Component Value Date/Time    Glucose 82 05/01/2018 05:03 AM    Glucose (POC) 383 (H) 05/06/2018 05:12 AM      Code status: full  DVT prophylaxis: SCD      Care Plan discussed with: Patient/Family/RN/CaseM  Disposition: Home w/Family and TBD          Hospital Problems  Date Reviewed: 4/28/2018          Codes Class Noted POA    Pleural effusion ICD-10-CM: J90  ICD-9-CM: 511.9  4/29/2018 Unknown        * (Principal)Pleural effusion on right ICD-10-CM: J90  ICD-9-CM: 511.9  4/27/2018 Yes    Overview Signed 4/27/2018  2:09 PM by Rosita Hunt NP     4/24/18 CT placed with 2200cc out                     Review of Systems:    no recurent confusion,  ; no n/v/, eatiang well, emily HD yesterday,        Vital Signs:    Last 24hrs VS reviewed since prior progress note. Most recent are:  Visit Vitals    /75 (BP 1 Location: Right arm, BP Patient Position: At rest)    Pulse 85    Temp 98.2 °F (36.8 °C)    Resp 16    Wt 86.7 kg (191 lb 2.2 oz)    SpO2 100%    BMI 23.89 kg/m2         Intake/Output Summary (Last 24 hours) at 05/06/18 0949  Last data filed at 05/06/18 0815   Gross per 24 hour   Intake          1166.66 ml   Output             3625 ml   Net         -2458.34 ml        Physical Examination:             Constitutional:  No acute distress, cooperative, pleasant    ENT:  Oral mucous moist, oropharynx benign. Neck supple,    Resp:  chest tubes in rt chest. Else diminished rt  Stable exam . No wheezing/rhonchi/rales.  No accessory muscle use   CV:  Regular rhythm, normal rate, no murmurs, gallops, rubs    GI:  Soft, non distended, non tender. normoactive bowel sounds, no hepatosplenomegaly     Musculoskeletal:  ++edema, warm, 2+ pulses throughout    Neurologic:  Moves all extremities. AAOx3, CN II-XII reviewed     Psych:  Good insight, Not anxious nor agitated. Skin:  Good turgor, no rashes or ulcers       Data Review:    Review and/or order of clinical lab test      Labs:     No results for input(s): WBC, HGB, HCT, PLT, HGBEXT, HCTEXT, PLTEXT, HGBEXT, HCTEXT, PLTEXT in the last 72 hours. No results for input(s): NA, K, CL, CO2, BUN, CREA, GLU, CA, MG, PHOS, URICA in the last 72 hours. Recent Labs      05/06/18   0311   ALB  1.2*     No results for input(s): INR, PTP, APTT in the last 72 hours. No lab exists for component: INREXT, INREXT   No results for input(s): FE, TIBC, PSAT, FERR in the last 72 hours. Lab Results   Component Value Date/Time    Folate 5.9 02/13/2018 08:12 PM      No results for input(s): PH, PCO2, PO2 in the last 72 hours. No results for input(s): CPK, CKNDX, TROIQ in the last 72 hours.     No lab exists for component: CPKMB  Lab Results   Component Value Date/Time    Cholesterol, total 115 01/22/2018 03:55 PM    HDL Cholesterol 44 01/22/2018 03:55 PM    LDL, calculated 48 01/22/2018 03:55 PM    Triglyceride 117 01/22/2018 03:55 PM     Lab Results   Component Value Date/Time    Glucose (POC) 383 (H) 05/06/2018 05:12 AM    Glucose (POC) 343 (H) 05/05/2018 09:20 PM    Glucose (POC) 209 (H) 05/05/2018 04:40 PM    Glucose (POC) 256 (H) 05/05/2018 11:07 AM    Glucose (POC) 394 (H) 05/05/2018 06:12 AM     Lab Results   Component Value Date/Time    Color YELLOW/STRAW 03/16/2018 12:39 PM    Appearance CLEAR 03/16/2018 12:39 PM    Specific gravity 1.014 03/16/2018 12:39 PM    Specific gravity 1.015 02/13/2018 11:17 AM    pH (UA) 5.0 03/16/2018 12:39 PM    Protein 300 (A) 03/16/2018 12:39 PM    Glucose NEGATIVE  03/16/2018 12:39 PM    Ketone NEGATIVE  03/16/2018 12:39 PM    Bilirubin NEGATIVE  03/16/2018 12:39 PM    Urobilinogen 0.2 03/16/2018 12:39 PM    Nitrites NEGATIVE  03/16/2018 12:39 PM    Leukocyte Esterase NEGATIVE  03/16/2018 12:39 PM    Epithelial cells FEW 03/16/2018 12:39 PM    Bacteria 1+ (A) 03/16/2018 12:39 PM    WBC 5-10 03/16/2018 12:39 PM    RBC 10-20 03/16/2018 12:39 PM         Medications Reviewed:     Current Facility-Administered Medications   Medication Dose Route Frequency    sodium chloride (NS) flush 5-10 mL  5-10 mL IntraVENous Q8H    dextrose (D50W) injection syrg 12.5 g  25 mL IntraVENous PRN    bupivacaine (PF) 0.25 % (ON-Q BAG) 4 ml/hour 300 ml  4 mL/hr Local Infiltration CONTINUOUS    sodium chloride (NS) flush 5-10 mL  5-10 mL IntraVENous Q8H    sodium chloride (NS) flush 5-10 mL  5-10 mL IntraVENous PRN    acetaminophen (TYLENOL) tablet 1,000 mg  1,000 mg Oral Q6H    naloxone (NARCAN) injection 0.4 mg  0.4 mg IntraVENous PRN    docusate sodium (COLACE) capsule 100 mg  100 mg Oral BID    magnesium hydroxide (MILK OF MAGNESIA) 400 mg/5 mL oral suspension 30 mL  30 mL Oral DAILY PRN    fentaNYL (PF)  mcg/30 ml (ADULT)   IntraVENous TITRATE    0.9% sodium chloride infusion  50 mL/hr IntraVENous CONTINUOUS    prochlorperazine (COMPAZINE) with saline injection 5 mg  5 mg IntraVENous Q8H PRN    hydrALAZINE (APRESOLINE) 20 mg/mL injection 10 mg  10 mg IntraVENous Q6H PRN    atorvastatin (LIPITOR) tablet 40 mg  40 mg Oral DAILY    sodium chloride (NS) flush 5-10 mL  5-10 mL IntraVENous Q8H    sodium chloride (NS) flush 5-10 mL  5-10 mL IntraVENous PRN    sodium bicarbonate tablet 650 mg  650 mg Oral TID    calcium carbonate (TUMS) chewable tablet 400 mg [elemental]  400 mg Oral TID WITH MEALS    epoetin tyler (EPOGEN;PROCRIT) injection 20,000 Units  20,000 Units SubCUTAneous Q TUE, THU & SAT    furosemide (LASIX) tablet 80 mg  80 mg Oral DAILY    glucagon (GLUCAGEN) injection 1 mg  1 mg IntraMUSCular PRN    glucose chewable tablet 16 g  4 Tab Oral PRN    insulin lispro (HUMALOG) injection   SubCUTAneous AC&HS    ondansetron (ZOFRAN) injection 4 mg  4 mg IntraVENous Q4H PRN    pantoprazole (PROTONIX) tablet 40 mg  40 mg Oral ACB    carvedilol (COREG) tablet 12.5 mg  12.5 mg Oral BID WITH MEALS     ______________________________________________________________________  EXPECTED LENGTH OF STAY: 8d 2h  ACTUAL LENGTH OF STAY:          Betsy Angel MD

## 2018-05-06 NOTE — PROGRESS NOTES
Mr. Carmen Kaye is POD#5 after a R decortication.  No acute events overnight. Underwent HD yesterday without complication.      Afebrile  HD stable      CT  RA 170ml, only 70 last 12 hours.      On exam he is laying in bed  Alert and oriented  Lungs CTA b/l  RRR  Dressing c/d/i      CXR- good expansion, stable appearance      Mr. Carmen Kaye is progressing well.  Plan to remove last CT today.

## 2018-05-06 NOTE — PROGRESS NOTES
Problem: Falls - Risk of  Goal: *Absence of Falls  Document Chuck Fall Risk and appropriate interventions in the flowsheet. Outcome: Progressing Towards Goal  Fall Risk Interventions:  Mobility Interventions: Patient to call before getting OOB    Mentation Interventions: More frequent rounding    Medication Interventions: Teach patient to arise slowly    Elimination Interventions: Call light in reach             Problem: Pressure Injury - Risk of  Goal: *Prevention of pressure injury  Document Ruddy Scale and appropriate interventions in the flowsheet.    Outcome: Progressing Towards Goal  Pressure Injury Interventions:  Sensory Interventions: Pressure redistribution bed/mattress (bed type)    Moisture Interventions: Absorbent underpads    Activity Interventions: Increase time out of bed    Mobility Interventions: PT/OT evaluation    Nutrition Interventions: Document food/fluid/supplement intake    Friction and Shear Interventions: Apply protective barrier, creams and emollients

## 2018-05-07 VITALS
DIASTOLIC BLOOD PRESSURE: 90 MMHG | OXYGEN SATURATION: 98 % | TEMPERATURE: 98.1 F | SYSTOLIC BLOOD PRESSURE: 172 MMHG | BODY MASS INDEX: 24.66 KG/M2 | HEART RATE: 76 BPM | RESPIRATION RATE: 18 BRPM | WEIGHT: 197.31 LBS

## 2018-05-07 PROBLEM — J90 PLEURAL EFFUSION ON RIGHT: Status: RESOLVED | Noted: 2018-04-27 | Resolved: 2018-05-07

## 2018-05-07 PROBLEM — J90 PLEURAL EFFUSION: Status: RESOLVED | Noted: 2018-04-29 | Resolved: 2018-05-07

## 2018-05-07 LAB
ERYTHROCYTE [DISTWIDTH] IN BLOOD BY AUTOMATED COUNT: 21 % (ref 11.5–14.5)
GLUCOSE BLD STRIP.AUTO-MCNC: 170 MG/DL (ref 65–100)
GLUCOSE BLD STRIP.AUTO-MCNC: 264 MG/DL (ref 65–100)
HCT VFR BLD AUTO: 23.1 % (ref 36.6–50.3)
HGB BLD-MCNC: 7.1 G/DL (ref 12.1–17)
MCH RBC QN AUTO: 22.5 PG (ref 26–34)
MCHC RBC AUTO-ENTMCNC: 30.7 G/DL (ref 30–36.5)
MCV RBC AUTO: 73.3 FL (ref 80–99)
NRBC # BLD: 0 K/UL (ref 0–0.01)
NRBC BLD-RTO: 0 PER 100 WBC
PLATELET # BLD AUTO: 166 K/UL (ref 150–400)
PMV BLD AUTO: 10.1 FL (ref 8.9–12.9)
RBC # BLD AUTO: 3.15 M/UL (ref 4.1–5.7)
SERVICE CMNT-IMP: ABNORMAL
SERVICE CMNT-IMP: ABNORMAL
WBC # BLD AUTO: 5.2 K/UL (ref 4.1–11.1)

## 2018-05-07 PROCEDURE — 82962 GLUCOSE BLOOD TEST: CPT

## 2018-05-07 PROCEDURE — 36415 COLL VENOUS BLD VENIPUNCTURE: CPT | Performed by: INTERNAL MEDICINE

## 2018-05-07 PROCEDURE — 74011250637 HC RX REV CODE- 250/637: Performed by: THORACIC SURGERY (CARDIOTHORACIC VASCULAR SURGERY)

## 2018-05-07 PROCEDURE — 74011250637 HC RX REV CODE- 250/637: Performed by: INTERNAL MEDICINE

## 2018-05-07 PROCEDURE — 86923 COMPATIBILITY TEST ELECTRIC: CPT | Performed by: INTERNAL MEDICINE

## 2018-05-07 PROCEDURE — 86900 BLOOD TYPING SEROLOGIC ABO: CPT | Performed by: INTERNAL MEDICINE

## 2018-05-07 PROCEDURE — 85027 COMPLETE CBC AUTOMATED: CPT | Performed by: INTERNAL MEDICINE

## 2018-05-07 PROCEDURE — P9016 RBC LEUKOCYTES REDUCED: HCPCS | Performed by: INTERNAL MEDICINE

## 2018-05-07 PROCEDURE — 74011636637 HC RX REV CODE- 636/637: Performed by: HOSPITALIST

## 2018-05-07 PROCEDURE — 74011250637 HC RX REV CODE- 250/637: Performed by: HOSPITALIST

## 2018-05-07 PROCEDURE — 36430 TRANSFUSION BLD/BLD COMPNT: CPT

## 2018-05-07 RX ORDER — CALCIUM CARBONATE 200(500)MG
1 TABLET,CHEWABLE ORAL
Qty: 100 TAB | Refills: 0 | Status: SHIPPED | OUTPATIENT
Start: 2018-05-07 | End: 2019-01-01 | Stop reason: ALTCHOICE

## 2018-05-07 RX ORDER — SODIUM CHLORIDE 9 MG/ML
250 INJECTION, SOLUTION INTRAVENOUS AS NEEDED
Status: DISCONTINUED | OUTPATIENT
Start: 2018-05-07 | End: 2018-05-07 | Stop reason: HOSPADM

## 2018-05-07 RX ADMIN — ACETAMINOPHEN 1000 MG: 500 TABLET, FILM COATED ORAL at 13:16

## 2018-05-07 RX ADMIN — Medication 10 ML: at 13:18

## 2018-05-07 RX ADMIN — INSULIN LISPRO 3 UNITS: 100 INJECTION, SOLUTION INTRAVENOUS; SUBCUTANEOUS at 13:17

## 2018-05-07 RX ADMIN — Medication 10 ML: at 13:17

## 2018-05-07 RX ADMIN — SODIUM BICARBONATE 650 MG: 650 TABLET ORAL at 08:19

## 2018-05-07 RX ADMIN — CALCIUM CARBONATE (ANTACID) CHEW TAB 500 MG 400 MG: 500 CHEW TAB at 13:16

## 2018-05-07 RX ADMIN — PANTOPRAZOLE SODIUM 40 MG: 40 TABLET, DELAYED RELEASE ORAL at 06:47

## 2018-05-07 RX ADMIN — FUROSEMIDE 80 MG: 40 TABLET ORAL at 08:19

## 2018-05-07 RX ADMIN — ACETAMINOPHEN 1000 MG: 500 TABLET, FILM COATED ORAL at 06:48

## 2018-05-07 RX ADMIN — CARVEDILOL 12.5 MG: 12.5 TABLET, FILM COATED ORAL at 08:19

## 2018-05-07 RX ADMIN — ATORVASTATIN CALCIUM 40 MG: 40 TABLET, FILM COATED ORAL at 08:19

## 2018-05-07 RX ADMIN — CALCIUM CARBONATE (ANTACID) CHEW TAB 500 MG 400 MG: 500 CHEW TAB at 08:19

## 2018-05-07 NOTE — DIABETES MGMT
Progress Note     Chart reviewed for elevated blood glucose    Recommendations/ Comments: If appropriate, please consider adding consistent carbohydrate to current diet order. If fasting glucose continues to be > 180, please also consider:   - Adding Lantus 15 units    Inpatient medications for glucose management:  - Correction Scale: Lispro (Humalog) High Sensitivity scale (    POC Glucose last 24hrs:   Lab Results   Component Value Date/Time    GLUCPOC 170 (H) 05/07/2018 06:46 AM    GLUCPOC 203 (H) 05/06/2018 10:08 PM    GLUCPOC 220 (H) 05/06/2018 05:00 PM        Estimated Creatinine Clearance: 42.3 mL/min (based on Cr of 2.33).     Diet order:   Active Orders   Diet    DIET RENAL Regular        PO intake:   Patient Vitals for the past 72 hrs:   % Diet Eaten   05/06/18 1157 100 %   05/06/18 0815 100 %   05/05/18 1650 100 %   05/05/18 1427 100 %   05/05/18 0850 100 %   05/04/18 1148 85 %       History of Diabetes:   Ursula Mcgee is a 66 y.o. male with a past medical history significant for DM onNPH/insulin regular 70/30: 18 units with breakfast and dinner at home    A1C:   Lab Results   Component Value Date/Time    Hemoglobin A1c 8.7 (H) 04/30/2018 01:55 AM    Hemoglobin A1c 9.9 (H) 04/24/2018 01:23 AM     Thank you,  Michelle Katz, MS, RN, CDE

## 2018-05-07 NOTE — PROGRESS NOTES
Thoracic Surgery Simple Progress Note    Admit Date: 2018  POD: 6 Days Post-Op      Procedure:  Procedure(s):  RIGHT THROACOSCOPY, RIGHT THORACOTOMY, FULL DECORTICATION      Subjective:     Patient has complaints: No significant medical complaints    Review of Systems:    CARDIAC: positive for HTN  RESP: positive for empyema  NEURO:  negative  INCISION: Clean, dry, and intact  EXT: Denies new swelling or pain in the legs or calves. Objective:     Blood pressure 138/84, pulse 79, temperature 98 °F (36.7 °C), resp. rate 16, weight 197 lb 5 oz (89.5 kg), SpO2 98 %. Temp (24hrs), Av °F (36.7 °C), Min:97.7 °F (36.5 °C), Max:98.3 °F (36.8 °C)        Hemodynamics    PAP    CO    CI        1901 -  0700  In: 3271 [P.O.:600; I.V.:1075]  Out: 715 [Urine:625]    EXAM:  GENERAL: VSS, afrible, alert and cooperative  HEART:  regular rate and rhythm  LUNG: clear to auscultation bilaterally, chest tubes removed over the weekend, last one yesterday. Stable post pull CXR  NEURO:  normal without focal findings  mental status, speech normal, alert and oriented x iii  INCISION: Clean, dry, and intact  EXTREMITIES:No evidence of DVT seen on physical exam.  GI/: Abd soft, nonterder with + bowel sounds.  Voiding but has HD MWF    Labs:  Recent Results (from the past 24 hour(s))   GLUCOSE, POC    Collection Time: 18 12:10 PM   Result Value Ref Range    Glucose (POC) 285 (H) 65 - 100 mg/dL    Performed by Beatrice Jane 53, POC    Collection Time: 18  5:00 PM   Result Value Ref Range    Glucose (POC) 220 (H) 65 - 100 mg/dL    Performed by Kwabena 214, POC    Collection Time: 18 10:08 PM   Result Value Ref Range    Glucose (POC) 203 (H) 65 - 100 mg/dL    Performed by Chely Durán    CBC W/O DIFF    Collection Time: 18  5:01 AM   Result Value Ref Range    WBC 5.2 4.1 - 11.1 K/uL    RBC 3.15 (L) 4.10 - 5.70 M/uL    HGB 7.1 (L) 12.1 - 17.0 g/dL    HCT 23.1 (L) 36.6 - 50.3 %    MCV 73.3 (L) 80.0 - 99.0 FL    MCH 22.5 (L) 26.0 - 34.0 PG    MCHC 30.7 30.0 - 36.5 g/dL    RDW 21.0 (H) 11.5 - 14.5 %    PLATELET 051 413 - 649 K/uL    MPV 10.1 8.9 - 12.9 FL    NRBC 0.0 0  WBC    ABSOLUTE NRBC 0.00 0.00 - 0.01 K/uL   GLUCOSE, POC    Collection Time: 05/07/18  6:46 AM   Result Value Ref Range    Glucose (POC) 170 (H) 65 - 100 mg/dL    Performed by Lilia Morrison        Assessment:   No evidence of DVT. Principal Problem:    Pleural effusion on right (4/27/2018)      Overview: 4/24/18 CT placed with 2200cc out    Active Problems:    Pleural effusion (4/29/2018)      PATH: Chronic Fibrinous pleuritis  Plan/Recommendations:    Well see again if needed  OK for discharge when medically ready, will need to follow up with us in 2 weeks    See orders    Signed By: Nick MENDEZ

## 2018-05-07 NOTE — PROGRESS NOTES
4:40 PM. CM faxed dialysis flowsheets and last nephrology note to [address] [place] [address] Drive at [phone]. Patient discharged home today to start Outpatient dialysis at [facility] on M-W-F at 3:00 pm. JANELL previously discussed days and time of dialysis with patient, he understood instructions. Roman Burr MSA, RN, CRM.

## 2018-05-07 NOTE — PROGRESS NOTES
[facility]   [address] [facility], [address] [place] [address], [facility]  Phone: [phone]   [phone]       Nephrology Progress Note  Yojana Sorensen     1960     [id number]  Date of Admission : 4/28/2018 05/07/18    CC: Follow up for ESRD      Assessment and Plan   New Onset ESRD  - HD MWF  - HD today and 2 units of blood at HD today   - use permacath for access     R sided Pleural effusion w/ Trapped Lung   - s/p VATS and decortication 5/1  - per thoracic surgery    Anemia of CKD:  - LUIS with HD  - 2 units of blood at HD 5/7    Solitary Kidney   S/p Prior Left Nephrectomy for RCC    Sec HTPH  - continue Binders      Interval History:  Seen and examined. Chest tubes out. He has no complaints. His Left is more swollen . No cp, sob, n/v/d reported. Review of Systems: Pertinent items are noted in HPI.     Current Medications:   Current Facility-Administered Medications   Medication Dose Route Frequency    0.9% sodium chloride infusion 250 mL  250 mL IntraVENous PRN    sodium chloride (NS) flush 5-10 mL  5-10 mL IntraVENous Q8H    dextrose (D50W) injection syrg 12.5 g  25 mL IntraVENous PRN    sodium chloride (NS) flush 5-10 mL  5-10 mL IntraVENous Q8H    sodium chloride (NS) flush 5-10 mL  5-10 mL IntraVENous PRN    acetaminophen (TYLENOL) tablet 1,000 mg  1,000 mg Oral Q6H    naloxone (NARCAN) injection 0.4 mg  0.4 mg IntraVENous PRN    docusate sodium (COLACE) capsule 100 mg  100 mg Oral BID    magnesium hydroxide (MILK OF MAGNESIA) 400 mg/5 mL oral suspension 30 mL  30 mL Oral DAILY PRN    fentaNYL (PF)  mcg/30 ml (ADULT)   IntraVENous TITRATE    0.9% sodium chloride infusion  50 mL/hr IntraVENous CONTINUOUS    prochlorperazine (COMPAZINE) with saline injection 5 mg  5 mg IntraVENous Q8H PRN    hydrALAZINE (APRESOLINE) 20 mg/mL injection 10 mg  10 mg IntraVENous Q6H PRN    atorvastatin (LIPITOR) tablet 40 mg  40 mg Oral DAILY    sodium chloride (NS) flush 5-10 mL 5-10 mL IntraVENous Q8H    sodium chloride (NS) flush 5-10 mL  5-10 mL IntraVENous PRN    sodium bicarbonate tablet 650 mg  650 mg Oral TID    calcium carbonate (TUMS) chewable tablet 400 mg [elemental]  400 mg Oral TID WITH MEALS    epoetin tyler (EPOGEN;PROCRIT) injection 20,000 Units  20,000 Units SubCUTAneous Q TUE, THU & SAT    furosemide (LASIX) tablet 80 mg  80 mg Oral DAILY    glucagon (GLUCAGEN) injection 1 mg  1 mg IntraMUSCular PRN    glucose chewable tablet 16 g  4 Tab Oral PRN    insulin lispro (HUMALOG) injection   SubCUTAneous AC&HS    ondansetron (ZOFRAN) injection 4 mg  4 mg IntraVENous Q4H PRN    pantoprazole (PROTONIX) tablet 40 mg  40 mg Oral ACB    carvedilol (COREG) tablet 12.5 mg  12.5 mg Oral BID WITH MEALS      No Known Allergies    Objective:  Vitals:    Vitals:    05/06/18 1921 05/06/18 2311 05/07/18 0456 05/07/18 0721   BP: (!) 171/94 133/70 153/88 161/64   Pulse: 72 76 73 81   Resp: 18 16 16 17   Temp: 97.7 °F (36.5 °C) 98 °F (36.7 °C) 98.3 °F (36.8 °C) 98 °F (36.7 °C)   TempSrc:       SpO2: 92% 94% 92% 98%   Weight:   89.5 kg (197 lb 5 oz)      Intake and Output:     05/05 1901 - 05/07 0700  In: 9997 [P.O.:600; I.V.:1075]  Out: 36 [Urine:625]    Physical Examination:    General: NAD,Conversant   Neck:  Supple, no mass, RIJ permacath   Resp:  Diminished BS, multiple chest tubes on R  CV:  RRR,  no murmur or rub, no LE edema  GI:  Soft, NT, + Bowel sounds, no hepatosplenomegaly  Neurologic:  Non focal  Skin:  No Rash  :  No bowling     []    High complexity decision making was performed  []    Patient is at high-risk of decompensation with multiple organ involvement    Lab Data Personally Reviewed: I have reviewed all the pertinent labs, microbiology data and radiology studies during assessment.     Recent Labs      05/06/18   0311   ALB  1.2*     Recent Labs      05/07/18   0501   WBC  5.2   HGB  7.1*   HCT  23.1*   PLT  166     No results found for: SDES  Lab Results Component Value Date/Time    Culture result: NO GROWTH 4 DAYS 05/01/2018 10:14 AM    Culture result: Culture performed on Fluid swab specimen 05/01/2018 10:14 AM    Culture result: NO GROWTH 4 DAYS 05/01/2018 10:14 AM    Culture result: MRSA NOT PRESENT 04/30/2018 12:32 PM    Culture result:  04/30/2018 12:32 PM         Screening of patient nares for MRSA is for surveillance purposes and, if positive, to facilitate isolation considerations in high risk settings. It is not intended for automatic decolonization interventions per se as regimens are not sufficiently effective to warrant routine use. Recent Results (from the past 24 hour(s))   GLUCOSE, POC    Collection Time: 05/06/18 12:10 PM   Result Value Ref Range    Glucose (POC) 285 (H) 65 - 100 mg/dL    Performed by Beatrice Jane 53, POC    Collection Time: 05/06/18  5:00 PM   Result Value Ref Range    Glucose (POC) 220 (H) 65 - 100 mg/dL    Performed by Kwabena 214, POC    Collection Time: 05/06/18 10:08 PM   Result Value Ref Range    Glucose (POC) 203 (H) 65 - 100 mg/dL    Performed by Jeannine Lopez    CBC W/O DIFF    Collection Time: 05/07/18  5:01 AM   Result Value Ref Range    WBC 5.2 4.1 - 11.1 K/uL    RBC 3.15 (L) 4.10 - 5.70 M/uL    HGB 7.1 (L) 12.1 - 17.0 g/dL    HCT 23.1 (L) 36.6 - 50.3 %    MCV 73.3 (L) 80.0 - 99.0 FL    MCH 22.5 (L) 26.0 - 34.0 PG    MCHC 30.7 30.0 - 36.5 g/dL    RDW 21.0 (H) 11.5 - 14.5 %    PLATELET 133 441 - 961 K/uL    MPV 10.1 8.9 - 12.9 FL    NRBC 0.0 0  WBC    ABSOLUTE NRBC 0.00 0.00 - 0.01 K/uL   GLUCOSE, POC    Collection Time: 05/07/18  6:46 AM   Result Value Ref Range    Glucose (POC) 170 (H) 65 - 100 mg/dL    Performed by Jeannine Lopez            I have reviewed the flowsheets. Chart and Pertinent Notes have been reviewed. No change in PMH ,family and social history from Consult note.       Deanna Singh MD

## 2018-05-07 NOTE — PROGRESS NOTES
NUTRITION- DIETETIC tECHnICIAN    Pt seen for:       [x]                  Rescreen  []                  Food preferences/tolerances  []                  Food Allergies  []                  PO intake check  []                  Supplements  []                  Diet order clarification  []                  Education  []                  Other     Rescreen:    [x]                  Not at Nutrition Risk, rescreen per screening protocol  []                  At Nutrition Risk- RD referral         SUBJECTIVE/OBJECTIVE:     Information obtained from:  patient      Diet:  Regular Renal, 1800 ADA    Intake: excellent    Patient Vitals for the past 100 hrs:   % Diet Eaten   05/06/18 1157 100 %   05/06/18 0815 100 %   05/05/18 1650 100 %   05/05/18 1427 100 %   05/05/18 0850 100 %   05/04/18 1148 85 %   05/04/18 0833 75 %       Weight Changes:       Wt Readings from Last 3 Encounters:   05/07/18 89.5 kg (197 lb 5 oz)   04/28/18 85.9 kg (189 lb 6 oz)   04/23/18 99.3 kg (219 lb)       Problems Identified:      [x]                  No problems identified    []                  Specified food preferences   []                  Dislikes supplements              []                  Allergies:   []                  Difficulty chewing      []                  Dentition    []                  Nausea/Vomiting   []                  Constipation   []                  Diarrhea    PLAN:     [x]                   Continue current diet and encourage intake  []                   Obtained/adjusted food preferences/tolerances and/or snacks options   []                   Dislikes supplements will try a substitution  []                   Modify diet for food allergies  []                   Adjust texture due to difficulty chewing   []                   Educated patient  []                   RD Referral  [x]                   Rescreen per screening protocol          Teddy Linn DTR

## 2018-05-07 NOTE — ROUTINE PROCESS
Bedside and Verbal shift change report given to Bailey Garrido (oncoming nurse) by Chirag Shields (offgoing nurse). Report included the following information SBAR, Kardex, OR Summary, Intake/Output, MAR, Recent Results and Cardiac Rhythm NSR.

## 2018-05-07 NOTE — PROGRESS NOTES
Hospital follow-up PCP transitional care appointment has been scheduled with Dr. Renetta Koch for Thursday, 5/10/18 at 2:00 p.m. Pending patient discharge.   Caroline Winn, Care Management Specialist.

## 2018-05-07 NOTE — DIALYSIS
[facility]  [phone]    Vitals   Pre   Post   Assessment   Pre   Post     Temp  Temp: 98 °F (36.7 °C) (05/07/18 0908)  97.6  oral LOC  A&O x4 A&O x4   HR   Pulse (Heart Rate): 78 (05/07/18 0908) 76 Lungs   Diminished  Diminished,  Lower lobes  bilateral   B/P   BP: 136/75 (05/07/18 0908) 172/90 Cardiac   Regular  Regular   Resp   Resp Rate: 16 (05/07/18 0908) 18   Skin   Warm and Dry  Warm and Dry   Pain level  0, patient stated no pain 0/10 Edema  Lower extremity edema +1; pitting     Lower extremity edema +1; pitting       Orders:    Duration:   Start:    0908 End:    1238 Total:   3HR  30min   Dialyzer:   Dialyzer/Set Up Inspection: Freddy Camilo (05/07/18 0908)   K Bath:   Dialysate K (mEq/L): 2 (05/07/18 0908)   Ca Bath:   Dialysate CA (mEq/L): 2.5 (05/07/18 0908)   Na/Bicarb:   Dialysate NA (mEq/L): 140 (05/07/18 0908)   Target Fluid Removal:   Goal/Amount of Fluid to Remove (mL): 3000 mL (05/07/18 0908)   Access     Type & Location:   Right subclavian catheter-No s/x of infection. No drainage noted. Dressing changed. Ports disinfected per protocol, aspirated (5ml each port, discarded)  and flushed with NS 0.9%. Lines connected and treatment initiated.     Labs     Obtained/Reviewed   Critical Results Called   Date when labs were drawn-05/07/18  Hgb-    HGB   Date Value Ref Range Status   05/07/2018 7.1 (L) 12.1 - 17.0 g/dL Final     K-    Potassium   Date Value Ref Range Status   05/01/2018 3.7 3.5 - 5.1 mmol/L Final     Ca-   Calcium   Date Value Ref Range Status   05/01/2018 7.2 (L) 8.5 - 10.1 MG/DL Final     Bun-   BUN   Date Value Ref Range Status   05/01/2018 19 6 - 20 MG/DL Final     Creat-   Creatinine   Date Value Ref Range Status   05/01/2018 2.33 (H) 0.70 - 1.30 MG/DL Final     Comment:     INVESTIGATED PER DELTA CHECK PROTOCOL        Medications/ Blood Products Given     Name   Dose   Route and Time     PRBC's  1unit/310mL   IVPB @1108   PRBC's  1 unit/310mL  IVPB @ 1140        Blood Volume Processed (BVP):    75.3L Net Fluid   Removed:  3000 mL   Comments   Time Out Done: Yes  Primary Nurse Rpt Pre: Colt Diana RN   Primary Nurse Rpt Post:Mary Steven RN  Pt Education: Procedure, Access care, and infection control  Care Plan: Continue with dialysis as ordered  Tx Summary:  1108-- First unit PRBC's initiated  1138--First unit PRBC's complete; no adverse reactions noted. 1140--Second unit PRBC's initiated. 1210--Second units PRBC's complete; no adverse reactions noted. 1238--Treatment completed per order. Ports disinfected per protocol, all possible blood rinsed back; lines disconnected, ports flushed with NS 0.9%, and capped. VSS. Bed in lowest position, call button, phone and personal belongings are within reach. Report given to primary nurse. Admiting Diagnosis: Acute chronic renal failure with baseline CKD4  Pt's previous clinic- N/A  Consent signed - Informed Consent Verified: Yes (05/07/18 0908)  Jacobita Consent - Yes  Hepatitis Status- Negative 4/25/2018  Machine #- Machine Number: [id number] (05/07/18 1618)  Telemetry status-Bedside  Pre-dialysis wt. - Pre-Dialysis Weight: 94.4 kg (208 lb 1.8 oz) (05/07/18 0908)   Post-dialysis wt.-Post-Dialysis 91.7kg

## 2018-05-07 NOTE — PROGRESS NOTES
Problem: Diabetes Self-Management  Goal: *Incorporating nutritional management into lifestyle  Describe effect of type, amount and timing of food on blood glucose; list 3 methods for planning meals. Outcome: Progressing Towards Goal  Discussed carb counting    Problem: Falls - Risk of  Goal: *Absence of Falls  Document Chuck Fall Risk and appropriate interventions in the flowsheet. Outcome: Progressing Towards Goal  Fall Risk Interventions:  Mobility Interventions: Assess mobility with egress test, Patient to call before getting OOB    Mentation Interventions: More frequent rounding    Medication Interventions: Teach patient to arise slowly, Patient to call before getting OOB    Elimination Interventions: Call light in reach             Problem: Pressure Injury - Risk of  Goal: *Prevention of pressure injury  Document Ruddy Scale and appropriate interventions in the flowsheet.    Outcome: Progressing Towards Goal  Pressure Injury Interventions:  Sensory Interventions: Pressure redistribution bed/mattress (bed type)    Moisture Interventions: Absorbent underpads    Activity Interventions: Pressure redistribution bed/mattress(bed type)    Mobility Interventions: PT/OT evaluation    Nutrition Interventions: Document food/fluid/supplement intake    Friction and Shear Interventions: Apply protective barrier, creams and emollients

## 2018-05-07 NOTE — DISCHARGE SUMMARY
Discharge Summary       PATIENT ID: Moon Singh  MRN: [id number]   YOB: 1960    DATE OF ADMISSION: 2018  6:16 PM    DATE OF DISCHARGE: 2018     PRIMARY CARE PROVIDER: Akil Laboy MD     ATTENDING PHYSICIAN: Katherine Villalba MD   DISCHARGING PROVIDER: Katherine Villalba MD    To contact this individual call [phone] and ask the  to page. If unavailable ask to be transferred the Adult Hospitalist Department. CONSULTATIONS: IP CONSULT TO NEPHROLOGY    PROCEDURES/SURGERIES: Procedure(s):  RIGHT THROACOSCOPY, RIGHT THORACOTOMY, FULL DECORTICATION    ADMITTING DIAGNOSES & HOSPITAL COURSE:     Per Nephrology:       CC: Follow up for ESRD           Assessment and Plan    New Onset ESRD  - HD MWF  - HD today and 2 units of blood at HD today   - use permacath for access      R sided Pleural effusion w/ Trapped Lung   - s/p VATS and decortication   - per thoracic surgery  - path:    Pleura, peel:   Chronic fibrinous pleuritis      Anemia of CKD:  - LUIS with HD  - 2 units of blood at HD      Solitary Kidney   S/p Prior Left Nephrectomy for RCC     Sec HTPH  - continue Binders          Per T surg:    Procedure: Procedure(s):   RIGHT THROACOSCOPY, RIGHT THORACOTOMY, FULL DECORTICATION   Subjective:   Patient has complaints: No significant medical complaints   Review of Systems:   CARDIAC: positive for HTN   RESP: positive for empyema   NEURO: negative   INCISION: Clean, dry, and intact   EXT: Denies new swelling or pain in the legs or calves. Objective:   Blood pressure 138/84, pulse 79, temperature 98 °F (36.7 °C), resp. rate 16, weight 197 lb 5 oz (89.5 kg), SpO2 98 %. Temp (24hrs), Av °F (36.7 °C), Min:97.7 °F (36.5 °C), Max:98.3 °F (36.8 °C)     Hemodynamics   PAP   CO   CI      1901 -  0700   In: 9406 [P.O.:600;  I.V.:1075]   Out: 715 [Urine:625]   EXAM:   GENERAL: VSS, afrible, alert and cooperative   HEART: regular rate and rhythm   LUNG: clear to auscultation bilaterally, chest tubes removed over the weekend, last one yesterday. Stable post pull CXR   NEURO: normal without focal findings   mental status, speech normal, alert and oriented x iii   INCISION: Clean, dry, and intact   EXTREMITIES:No evidence of DVT seen on physical exam.   GI/: Abd soft, nonterder with + bowel sounds. Voiding but has HD MWF   Labs:    Recent Results                                                                                                                                                                                                                                                                            Assessment:   No evidence of DVT. Principal Problem:   Pleural effusion on right (4/27/2018)   Overview: 4/24/18 CT placed with 2200cc out   Active Problems:   Pleural effusion (4/29/2018)     PATH: Chronic Fibrinous pleuritis   Plan/Recommendations:    Well see again if needed   OK for discharge when medically ready, will need to follow up with us in 2 weeks   See orders   Signed By: Cong MENDEZ         Last PN:     Admission Summary:   The patient is a 79-year-old -American gentleman who was initially admitted to [facility] with a presumed diagnosis of pneumonia/flu. Jaret Reyes was found to be in renal failure.  He had gained 20 pounds before admission.  Patient at [facility] was diagnosed with metabolic acidosis due to renal failure.  A chest x-ray was done, which showed large right-sided pleural effusion .  Patient was subsequently transferred to [facility] for further evaluation. [facility], patient was evaluated by pulmonology, cardiology. Tiki Powers had a hemopneumothorax, which was seen on CT chest on 04/25.  Cytology of pleural fluid was negative. Jaret Reyes was given TPA via chest tube on 25th to try and help break up loculations. Tiki Powers had a bronchoscopy done on 04/27, which did not reveal any endobronchial lesion.  A transthoracic echocardiogram was done, which showed acute systolic heart failure with ejection fraction of 20-25%, which was suspected due to hypertension. Beverley Diaz had a negative stress test.  On the 27th, patient was seen by nephrology. Albert Or was placed . Beverley Diaz has been getting dialysis since then.  For hypertension, he was treated with Coreg and lisinopril.  The patient was transfused with 1 unit of packed RBCs. Beverley Diaz had a lower GI bleed.  The patient was seen by Dr. Kristyn Rooney patient also had hyperosmolar nonketotic state for which he was treated with insulin      Interval history / Subjective:          5/6/2018   This note is a follow up note for multiple medical issues as listed below and partially expanded on herewith. All chest tubes out , no c/o sob , pain better, emily HD yesterday, is edematous this am legs alfonso,    Home when stable  ?  5/7 .        Assessment & Plan:       1.  Large R sided Pleural effusion s/p pig tail catheter. -VATs and decortication on Tues 4/31  -Draining blood tinged fluid from right chest tubes.  CT per CTS. , for VATS 5/1/2018; f/u cxr refviewed as above, stable except rll process increased  5/2/2018. Cont chest tube for serial removal over next few days. . Pul exam stable 5/6/2018   For f/u cxr daily , all chest tubes out by  5/6.       2.  Systolic heart failure with ejection fraction of 20-25% with severe diffuse hypokinesis  with negative ischemia workup. continue Coreg and Lisinopril.  Titrate ACEI as tolerated.,. No failure by exam 5/1 - 5/6/2018  emily HD, has 2 + edema today 5/6 one day post HD. 5/5      3.  History of chronic kidney disease, currently on dialysis. -Nephrology on board. , con to follow        Lab Results   Component Value Date/Time     Creatinine 2.33 (H) 05/01/2018 05:03 AM      Acute confusion 5/4 early am , 2n2 to David Dalton,  Pt pulled on permcath being assessed by nephrology, , confusion resolved.  , David Rede removed and fentanyl reduced 5/4/2018      4.  Accelerated hypertension, on Coreg, lisinopril, and Lasix. Increased Lisino to 10mg on 4/30   -Adjust meds as tolerated. bp better       BP Readings from Last 1 Encounters:   05/06/18 158/75      5.  History of anemia, probably blood loss (he had BRBPR) and renal insufficiency.    -He is on Epogen. Hemoglobin is 7 today, and 8.8 5/1        Lab Results   Component Value Date/Time     HGB (POC) 13.2 11/27/2013 09:37 AM     HGB 8.8 (L) 05/01/2018 05:03 AM      6.  History of diabetes.  Monitor blood sugars closely. Lab Results   Component Value Date/Time     Glucose 82 05/01/2018 05:03 AM     Glucose (POC) 383 (H) 05/06/2018 05:12 AM    wide fluctuations, no major changes anticipated. 5/6/2018    7. [facility] to high dose Statin. 8.  History of respiratory failure with hypoxia due to large right-sided pleural effusion, which has been drained.  The patient has a pigtail catheter placed in.  9.  History of hemopneumothorax, now with CT,  Pleural fluid Cytology is negative. 10.  DVT prophylaxis with SCDs due to recent GI blood loss. 11. DM : start on Lantus and monitor sugars, humalog.  Acceptable ranges noted for 5/1, see  above.  For ranges 5/6/2018         Lab Results   Component Value Date/Time     Glucose 82 05/01/2018 05:03 AM     Glucose (POC) 383 (H) 05/06/2018 05:12 AM      Code status: full  DVT prophylaxis: SCD      Care Plan discussed with: Patient/Family/RN/CaseM  Disposition: Home w/Family and TBD       [facility] Problems  Date Reviewed: 4/28/2018             Codes Class Noted POA     Pleural effusion ICD-10-CM: J90  ICD-9-CM: 511.9   4/29/2018 Unknown           * (Principal)Pleural effusion on right ICD-10-CM: J90  ICD-9-CM: 511.9   4/27/2018 Yes     Overview Signed 4/27/2018  2:09 PM by Lissa Horvath NP       4/24/18 CT placed with 2200cc out                         Review of Systems:    no recurent confusion,  ; no n/v/, eatiang well, emily HD yesterday,          Vital Signs:    Last 24hrs VS reviewed since prior progress note. Most recent are:       Visit Vitals    /75 (BP 1 Location: Right arm, BP Patient Position: At rest)    Pulse 85    Temp 98.2 °F (36.8 °C)    Resp 16    Wt 86.7 kg (191 lb 2.2 oz)    SpO2 100%    BMI 23.89 kg/m2            Intake/Output Summary (Last 24 hours) at 05/06/18 0949  Last data filed at 05/06/18 0815    Gross per 24 hour   Intake          1166.66 ml   Output             3625 ml   Net         -2458.34 ml         Physical Examination:             Constitutional:  No acute distress, cooperative, pleasant    ENT:  Oral mucous moist, oropharynx benign. Neck supple,    Resp:  chest tubes in rt chest. Else diminished rt  Stable exam . No wheezing/rhonchi/rales. No accessory muscle use   CV:  Regular rhythm, normal rate, no murmurs, gallops, rubs    GI:  Soft, non distended, non tender. normoactive bowel sounds, no hepatosplenomegaly     Musculoskeletal:  ++edema, warm, 2+ pulses throughout    Neurologic:  Moves all extremities. AAOx3, CN II-XII reviewed                                             Psych:  Good insight, Not anxious nor agitated. Skin:  Good turgor, no rashes or ulcers         Data Review:    Review and/or order of clinical lab test        Labs:      No results for input(s): WBC, HGB, HCT, PLT, HGBEXT, HCTEXT, PLTEXT, HGBEXT, HCTEXT, PLTEXT in the last 72 hours. No results for input(s): NA, K, CL, CO2, BUN, CREA, GLU, CA, MG, PHOS, URICA in the last 72 hours. Recent Labs       05/06/18   0311   ALB  1.2*      No results for input(s): INR, PTP, APTT in the last 72 hours.     No lab exists for component: INREXT, INREXT   No results for input(s): FE, TIBC, PSAT, FERR in the last 72 hours. Lab Results   Component Value Date/Time     Folate 5.9 02/13/2018 08:12 PM      No results for input(s): PH, PCO2, PO2 in the last 72 hours.   No results for input(s): CPK, CKNDX, TROIQ in the last 72 hours.     No lab exists for component: CPKMB        Lab Results   Component Value Date/Time     Cholesterol, total 115 01/22/2018 03:55 PM     HDL Cholesterol 44 01/22/2018 03:55 PM     LDL, calculated 48 01/22/2018 03:55 PM     Triglyceride 117 01/22/2018 03:55 PM            Lab Results   Component Value Date/Time     Glucose (POC) 383 (H) 05/06/2018 05:12 AM     Glucose (POC) 343 (H) 05/05/2018 09:20 PM     Glucose (POC) 209 (H) 05/05/2018 04:40 PM     Glucose (POC) 256 (H) 05/05/2018 11:07 AM     Glucose (POC) 394 (H) 05/05/2018 06:12 AM            Lab Results   Component Value Date/Time     Color YELLOW/STRAW 03/16/2018 12:39 PM     Appearance CLEAR 03/16/2018 12:39 PM     Specific gravity 1.014 03/16/2018 12:39 PM     Specific gravity 1.015 02/13/2018 11:17 AM     pH (UA) 5.0 03/16/2018 12:39 PM     Protein 300 (A) 03/16/2018 12:39 PM     Glucose NEGATIVE  03/16/2018 12:39 PM     Ketone NEGATIVE  03/16/2018 12:39 PM     Bilirubin NEGATIVE  03/16/2018 12:39 PM     Urobilinogen 0.2 03/16/2018 12:39 PM     Nitrites NEGATIVE  03/16/2018 12:39 PM     Leukocyte Esterase NEGATIVE  03/16/2018 12:39 PM     Epithelial cells FEW 03/16/2018 12:39 PM     Bacteria 1+ (A) 03/16/2018 12:39 PM     WBC 5-10 03/16/2018 12:39 PM     RBC 10-20 03/16/2018 12:39 PM            Medications Reviewed:             Current Facility-Administered Medications   Medication Dose Route Frequency    sodium chloride (NS) flush 5-10 mL  5-10 mL IntraVENous Q8H    dextrose (D50W) injection syrg 12.5 g  25 mL IntraVENous PRN    bupivacaine (PF) 0.25 % (ON-Q BAG) 4 ml/hour 300 ml  4 mL/hr Local Infiltration CONTINUOUS    sodium chloride (NS) flush 5-10 mL  5-10 mL IntraVENous Q8H    sodium chloride (NS) flush 5-10 mL  5-10 mL IntraVENous PRN    acetaminophen (TYLENOL) tablet 1,000 mg  1,000 mg Oral Q6H    naloxone (NARCAN) injection 0.4 mg  0.4 mg IntraVENous PRN    docusate sodium (COLACE) capsule 100 mg  100 mg Oral BID    magnesium hydroxide (MILK OF MAGNESIA) 400 mg/5 mL oral suspension 30 mL  30 mL Oral DAILY PRN    fentaNYL (PF)  mcg/30 ml (ADULT)    IntraVENous TITRATE    0.9% sodium chloride infusion  50 mL/hr IntraVENous CONTINUOUS    prochlorperazine (COMPAZINE) with saline injection 5 mg  5 mg IntraVENous Q8H PRN    hydrALAZINE (APRESOLINE) 20 mg/mL injection 10 mg  10 mg IntraVENous Q6H PRN    atorvastatin (LIPITOR) tablet 40 mg  40 mg Oral DAILY    sodium chloride (NS) flush 5-10 mL  5-10 mL IntraVENous Q8H    sodium chloride (NS) flush 5-10 mL  5-10 mL IntraVENous PRN    sodium bicarbonate tablet 650 mg  650 mg Oral TID    calcium carbonate (TUMS) chewable tablet 400 mg [elemental]  400 mg Oral TID WITH MEALS    epoetin tyler (EPOGEN;PROCRIT) injection 20,000 Units  20,000 Units SubCUTAneous Q TUE, THU & SAT    furosemide (LASIX) tablet 80 mg  80 mg Oral DAILY    glucagon (GLUCAGEN) injection 1 mg  1 mg IntraMUSCular PRN    glucose chewable tablet 16 g  4 Tab Oral PRN    insulin lispro (HUMALOG) injection    SubCUTAneous AC&HS    ondansetron (ZOFRAN) injection 4 mg  4 mg IntraVENous Q4H PRN    pantoprazole (PROTONIX) tablet 40 mg  40 mg Oral ACB    carvedilol (COREG) tablet 12.5 mg  12.5 mg Oral BID WITH MEALS            DISCHARGE DIAGNOSES / PLAN:      1.  as above        PENDING TEST RESULTS:   At the time of discharge the following test results are still pending: na    FOLLOW UP APPOINTMENTS:    Follow-up Information     Follow up With Details Comments Contact Info    Agatha Angel MD In 1 week  [place] [id number] [phone]             ADDITIONAL CARE RECOMMENDATIONS: na    DIET: Diabetic Diet and Renal Diet     ACTIVITY: Activity as tolerated    WOUND CARE: na    EQUIPMENT needed: na      DISCHARGE MEDICATIONS:  Current Discharge Medication List      START taking these medications    Details   calcium carbonate (TUMS) 200 mg calcium (500 mg) chew Take 1 Tab by mouth three (3) times daily (with meals). Qty: 100 Tab, Refills: 0         CONTINUE these medications which have NOT CHANGED    Details   IRON, FERROUS SULFATE, PO Take 1 Cap by mouth. Indications: unknown dosage of OTC iron supplement      insulin NPH/insulin regular (NOVOLIN 70/30, HUMULIN 70/30) 100 unit/mL (70-30) injection 18 units with Breakfast and dinner  Qty: 10 mL, Refills: 11    Associated Diagnoses: Type 2 diabetes mellitus with diabetic nephropathy, with long-term current use of insulin (HCC)      aspirin delayed-release 81 mg tablet Take 162 mg by mouth daily. furosemide (LASIX) 80 mg tablet Take 1 Tab by mouth daily. Qty: 30 Tab, Refills: 6    Associated Diagnoses: CKD stage 5 due to type 2 diabetes mellitus ([facility] Essential hypertension      sodium bicarbonate 650 mg tablet Take 1 Tab by mouth three (3) times daily. Qty: 90 Tab, Refills: 6      NIFEdipine ER (ADALAT CC) 30 mg ER tablet Take 1 Tab by mouth daily. Indications: pressure, add to regimen  Qty: 30 Tab, Refills: 11    Associated Diagnoses: Essential hypertension      carvedilol (COREG) 12.5 mg tablet Take 1 Tab by mouth two (2) times daily (with meals). 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 NOTIFY YOUR PHYSICIAN FOR ANY OF THE FOLLOWING:   Fever over 101 degrees for 24 hours. Chest pain, shortness of breath, fever, chills, nausea, vomiting, diarrhea, change in mentation, falling, weakness, bleeding. Severe pain or pain not relieved by medications. Or, any other signs or symptoms that you may have questions about.     DISPOSITION:    Home With:   OT  PT  HH  RN       Long term SNF/Inpatient Rehab   x Independent/assisted living    Hospice    Other:       PATIENT CONDITION AT DISCHARGE:     Functional status Poor     Deconditioned    x Independent      Cognition   x  Lucid     Forgetful     Dementia      Caxtheters/lines (plus indication)    Sharma    x Perm cathx    PEG     None      Code status   x  Full code     DNR      PHYSICAL EXAMINATION AT DISCHARGE:   Refer to Progress Note   Pul: clear  CV: rr no gallop   Abd; soft non tender  Ext: supple ++ edema.    Visit Vitals    /80    Pulse 70    Temp 98 °F (36.7 °C)    Resp 16    Wt 89.5 kg (197 lb 5 oz)    SpO2 98%    BMI 24.66 kg/m2          CHRONIC MEDICAL DIAGNOSES:  Problem List as of 5/7/2018  Date Reviewed: 5/7/2018          Codes Class Noted - Resolved    Dilated cardiomyopathy ([facility] ICD-10-CM: I42.0  ICD-9-CM: 425.4  4/27/2018 - Present        ESRD (end stage renal disease) on dialysis [facility]) ICD-10-CM: N18.6, Z99.2  ICD-9-CM: 585.6, V45.11  4/27/2018 - Present        Acute systolic congestive heart failure, NYHA class 2 ([facility] ICD-10-CM: I50.21  ICD-9-CM: 428.21, 428.0  4/26/2018 - Present        Hypertensive heart disease with acute systolic congestive heart failure ([facility] ICD-10-CM: I11.0, I50.21  ICD-9-CM: 402.91, 428.21  4/26/2018 - Present        Hyperkalemia ICD-10-CM: E87.5  ICD-9-CM: 276.7  4/24/2018 - Present        Acute respiratory failure with hypoxia (HCC) ICD-10-CM: J96.01  ICD-9-CM: 518.81  4/24/2018 - Present        DKA (diabetic ketoacidoses) ([facility] ICD-10-CM: E13.10  ICD-9-CM: 250.10  3/16/2018 - Present        Acute on chronic renal failure (HCC) ICD-10-CM: N17.9, N18.9  ICD-9-CM: 584.9, 585.9  3/16/2018 - Present        HCAP (healthcare-associated pneumonia) ICD-10-CM: J18.9  ICD-9-CM: 156  3/16/2018 - Present        CKD stage 5 due to type 2 diabetes mellitus ([facility] ICD-10-CM: E11.22, N18.5  ICD-9-CM: 250.40, 585.5  2/2/2018 - Present        Type 2 diabetes mellitus with diabetic nephropathy ([facility] ICD-10-CM: E11.21  ICD-9-CM: 250.40, 583.81  8/28/2015 - Present        HLD (hyperlipidemia) ICD-10-CM: E78.5  ICD-9-CM: 272.4  8/28/2015 - Present        Carpal tunnel syndrome, left ICD-10-CM: G56.02  ICD-9-CM: 354.0  8/28/2015 - Present        Peripheral autonomic neuropathy due to diabetes mellitus ([facility] ICD-10-CM: E11.43  ICD-9-CM: 250.60, 337.1  7/4/2015 - Present        Renal cell cancer ([facility] ICD-10-CM: C64.9  ICD-9-CM: 189.0  4/10/2015 - Present        GI bleed ICD-10-CM: K92.2  ICD-9-CM: 578.9  11/27/2013 - Present        Peyronie's disease ICD-10-CM: N48.6  ICD-9-CM: 607.85  11/27/2013 - Present        HTN (hypertension) ICD-10-CM: I10  ICD-9-CM: 401.9  11/27/2013 - Present        Liver abscess ICD-10-CM: K75.0  ICD-9-CM: 572.0  11/27/2013 - Present        Pancreatic pseudocyst ICD-10-CM: K86.3  ICD-9-CM: 577.2  11/27/2013 - Present        RESOLVED: Pleural effusion ICD-10-CM: J90  ICD-9-CM: 511.9  4/29/2018 - 5/7/2018        * (Principal)RESOLVED: Pleural effusion on right ICD-10-CM: J90  ICD-9-CM: 511.9  4/27/2018 - 5/7/2018    Overview Signed 4/27/2018  2:09 PM by Hipolito Johnson NP     4/24/18 CT placed with 2200cc out             RESOLVED: DKA (diabetic ketoacidoses) ([facility] ICD-10-CM: E13.10  ICD-9-CM: 250.10  2/13/2018 - 2/19/2018        RESOLVED: Vasovagal reaction ICD-10-CM: R55  ICD-9-CM: 780.2  10/4/2013 - 10/4/2013        RESOLVED: Dehydration ICD-10-CM: E86.0  ICD-9-CM: 276.51  10/4/2013 - 10/4/2013              Greater than  30  minutes were spent with the patient on counseling and coordination of care    Signed:   Sepideh Woodard MD  5/7/2018  11:06 AM

## 2018-05-08 ENCOUNTER — DOCUMENTATION ONLY (OUTPATIENT)
Dept: FAMILY MEDICINE CLINIC | Age: 58
End: 2018-05-08

## 2018-05-08 LAB
ABO + RH BLD: NORMAL
BLD PROD TYP BPU: NORMAL
BLOOD GROUP ANTIBODIES SERPL: NORMAL
BPU ID: NORMAL
CROSSMATCH RESULT,%XM: NORMAL
SPECIMEN EXP DATE BLD: NORMAL
STATUS OF UNIT,%ST: NORMAL
UNIT DIVISION, %UDIV: 0

## 2018-05-08 NOTE — PROGRESS NOTES
Called pt from hospital discharge on the 5/7/18. Reviewed medications and his new diagnosis' with the pt. He is stated he is doing well at this time. I have made an appointment for him  on 5/10/18 .

## 2018-05-10 ENCOUNTER — OFFICE VISIT (OUTPATIENT)
Dept: FAMILY MEDICINE CLINIC | Age: 58
End: 2018-05-10

## 2018-05-10 VITALS
HEART RATE: 71 BPM | DIASTOLIC BLOOD PRESSURE: 90 MMHG | TEMPERATURE: 97.5 F | SYSTOLIC BLOOD PRESSURE: 132 MMHG | RESPIRATION RATE: 20 BRPM | OXYGEN SATURATION: 98 % | BODY MASS INDEX: 24.89 KG/M2 | WEIGHT: 200.2 LBS | HEIGHT: 75 IN

## 2018-05-10 DIAGNOSIS — I10 ESSENTIAL HYPERTENSION: ICD-10-CM
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
## 2018-05-10 DIAGNOSIS — Z00.00 MEDICARE ANNUAL WELLNESS VISIT, SUBSEQUENT: ICD-10-CM
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
## 2018-05-10 DIAGNOSIS — M79.604 PAIN IN BOTH LOWER EXTREMITIES: ICD-10-CM

## 2018-05-10 DIAGNOSIS — E11.21 TYPE 2 DIABETES MELLITUS WITH DIABETIC NEPHROPATHY, WITH LONG-TERM CURRENT USE OF INSULIN (HCC): ICD-10-CM

## 2018-05-10 DIAGNOSIS — N18.6 ESRD (END STAGE RENAL DISEASE) ON DIALYSIS (HCC): ICD-10-CM

## 2018-05-10 DIAGNOSIS — Z09 HOSPITAL DISCHARGE FOLLOW-UP: Primary | ICD-10-CM

## 2018-05-10 DIAGNOSIS — I50.22 HYPERTENSIVE HEART DISEASE WITH CHRONIC SYSTOLIC CONGESTIVE HEART FAILURE (HCC): ICD-10-CM

## 2018-05-10 DIAGNOSIS — M79.605 PAIN IN BOTH LOWER EXTREMITIES: ICD-10-CM

## 2018-05-10 DIAGNOSIS — Z13.31 SCREENING FOR DEPRESSION: ICD-10-CM

## 2018-05-10 DIAGNOSIS — I11.0 HYPERTENSIVE HEART DISEASE WITH CHRONIC SYSTOLIC CONGESTIVE HEART FAILURE (HCC): ICD-10-CM

## 2018-05-10 DIAGNOSIS — Z13.39 SCREENING FOR ALCOHOLISM: ICD-10-CM

## 2018-05-10 DIAGNOSIS — E78.00 PURE HYPERCHOLESTEROLEMIA: ICD-10-CM

## 2018-05-10 DIAGNOSIS — Z99.2 ESRD (END STAGE RENAL DISEASE) ON DIALYSIS (HCC): ICD-10-CM

## 2018-05-10 DIAGNOSIS — Z79.4 TYPE 2 DIABETES MELLITUS WITH DIABETIC NEPHROPATHY, WITH LONG-TERM CURRENT USE OF INSULIN (HCC): ICD-10-CM

## 2018-05-10 PROBLEM — N17.9 ACUTE ON CHRONIC RENAL FAILURE (HCC): Status: RESOLVED | Noted: 2018-03-16 | Resolved: 2018-05-10

## 2018-05-10 PROBLEM — E11.10 DKA (DIABETIC KETOACIDOSES): Status: RESOLVED | Noted: 2018-03-16 | Resolved: 2018-05-10

## 2018-05-10 PROBLEM — E87.5 HYPERKALEMIA: Status: RESOLVED | Noted: 2018-04-24 | Resolved: 2018-05-10

## 2018-05-10 PROBLEM — I50.21 ACUTE SYSTOLIC CONGESTIVE HEART FAILURE, NYHA CLASS 2 (HCC): Status: RESOLVED | Noted: 2018-04-26 | Resolved: 2018-05-10

## 2018-05-10 PROBLEM — N18.9 ACUTE ON CHRONIC RENAL FAILURE (HCC): Status: RESOLVED | Noted: 2018-03-16 | Resolved: 2018-05-10

## 2018-05-10 PROBLEM — J96.01 ACUTE RESPIRATORY FAILURE WITH HYPOXIA (HCC): Status: RESOLVED | Noted: 2018-04-24 | Resolved: 2018-05-10

## 2018-05-10 PROBLEM — J18.9 HCAP (HEALTHCARE-ASSOCIATED PNEUMONIA): Status: RESOLVED | Noted: 2018-03-16 | Resolved: 2018-05-10

## 2018-05-10 RX ORDER — ACETAMINOPHEN AND CODEINE PHOSPHATE 300; 30 MG/1; MG/1
2 TABLET ORAL
Qty: 60 TAB | Refills: 0 | Status: ON HOLD | OUTPATIENT
Start: 2018-05-10 | End: 2018-07-24

## 2018-05-10 NOTE — MR AVS SNAPSHOT
[facility] 
 
 
 [address] [employer] 
[phone] Patient: Long Mcneil MRN: [id number] HSQ:2/65/0898 Visit Information Date & Time Provider Department Dept. Phone Encounter #  
 5/10/2018  2:00 PM Daphne Wang, [facility] [address]ty [address] [id number] Your Appointments 6/7/2018  2:00 PM  
ESTABLISHED PATIENT with MD Aly Ervinyoana 72 ([address]) Appt Note: 1 mo F/U on hospital visit [address] [facility] [address]  
30[address] [facility] [address] [place] [address] Upcoming Health Maintenance Date Due  
 EYE EXAM RETINAL OR DILATED Q1 4/17/2016 Pneumococcal 19-64 Highest Risk (2 of 3 - PPSV23) 2/1/2027* Influenza Age 5 to Adult 8/1/2018 HEMOGLOBIN A1C Q6M 10/30/2018 FOOT EXAM Q1 1/22/2019 LIPID PANEL Q1 1/22/2019 FOBT Q 1 YEAR AGE 50-75 4/23/2019 DTaP/Tdap/Td series (2 - Td) 1/22/2028 *Topic was postponed. The date shown is not the original due date. Allergies as of 5/10/2018  Review Complete On: 5/10/2018 By: Daphne Wang MD  
 No Known Allergies Current Immunizations  Reviewed on 4/30/2018 Name Date Influenza Vaccine 12/14/2016  4:38 PM, 11/25/2015, 10/4/2013 Influenza Vaccine (Quad) PF 2/14/2018  9:50 AM  
 Pneumococcal Conjugate (PCV-13) 12/14/2016  4:37 PM  
  
 Not reviewed this visit You Were Diagnosed With   
  
 Codes Comments Type 2 diabetes mellitus with diabetic nephropathy, with long-term current use of insulin (HCC)    -  Primary ICD-10-CM: E11.21, Z79.4 ICD-9-CM: 250.40, 583.81, V58.67 Hypertensive heart disease with chronic systolic congestive heart failure (HCC)     ICD-10-CM: I11.0, I50.22 ICD-9-CM: 402.91, 428.22 Essential hypertension     ICD-10-CM: I10 
ICD-9-CM: 401.9 Pure hypercholesterolemia     ICD-10-CM: E78.00 ICD-9-CM: 272.0 ESRD (end stage renal disease) on dialysis ([facility]     ICD-10-CM: N18.6, Z99.2 ICD-9-CM: 585.6, V45.11 Fall at home, initial encounter     ICD-10-CM: W19. Shantanu Calloway, Y92.009 ICD-9-CM: E888.9, E849.0 Pain in both lower extremities     ICD-10-CM: M79.604, M79.605 ICD-9-CM: 729.5 Vitals BP Pulse Temp Resp Height(growth percentile) 132/90 (BP 1 Location: Right arm, BP Patient Position: Sitting) 71 97.5 °F (36.4 °C) (Temporal) 20 6' 3\" (1.905 m) Weight(growth percentile) SpO2 BMI Smoking Status 200 lb 3.2 oz (90.8 kg) 98% 25.02 kg/m2 Never Smoker BMI and BSA Data Body Mass Index Body Surface Area 25.02 kg/m 2 2.19 m 2 Preferred Pharmacy Pharmacy Name Phone [facility] [address] [facility], [address] [place] [address] Yusuf [address]ue[address] Your Updated Medication List  
  
   
This list is accurate as of 5/10/18  3:44 PM.  Always use your most recent med list.  
  
  
  
  
 acetaminophen-codeine 300-30 mg per tablet Commonly known as:  TYLENOL #3 Take 2 Tabs by mouth two (2) times daily as needed for Pain. Max Daily Amount: 4 Tabs. Indications: leg pain  
  
 aspirin delayed-release 81 mg tablet Take 162 mg by mouth daily. atorvastatin 20 mg tablet Commonly known as:  LIPITOR Take 20 mg by mouth daily. calcium carbonate 200 mg calcium (500 mg) Chew Commonly known as:  TUMS Take 1 Tab by mouth three (3) times daily (with meals). carvedilol 12.5 mg tablet Commonly known as:  Jun Lacrosse Take 1 Tab by mouth two (2) times daily (with meals). Indications: pressure and heart  
  
 furosemide 80 mg tablet Commonly known as:  LASIX Take 1 Tab by mouth daily. insulin NPH/insulin regular 100 unit/mL (70-30) injection Commonly known as:  NOVOLIN 70/30, HUMULIN 70/30  
18 units with Breakfast and dinner IRON (FERROUS SULFATE) PO Take 1 Cap by mouth. Indications: unknown dosage of OTC iron supplement NIFEdipine ER 30 mg ER tablet Commonly known as:  ADALAT CC Take 1 Tab by mouth daily. Indications: pressure, add to regimen  
  
 sodium bicarbonate 650 mg tablet Take 1 Tab by mouth three (3) times daily. Prescriptions Printed Refills  
 acetaminophen-codeine (TYLENOL #3) 300-30 mg per tablet 0 Sig: Take 2 Tabs by mouth two (2) times daily as needed for Pain. Max Daily Amount: 4 Tabs. Indications: leg pain  
 Class: Print Route: Oral  
  
Introducing [facility] & HEALTH SERVICES! [facility] introduces [employer] patient portal. Now you can access parts of your medical record, email your doctor's office, and request medication refills online. 1. In your internet browser, go to [email or web address] 2. Click on the First Time User? Click Here link in the Sign In box. You will see the New Member Sign Up page. 3. Enter your [employer] Access Code exactly as it appears below. You will not need to use this code after youve completed the sign-up process. If you do not sign up before the expiration date, you must request a new code. · [employer] Access Code: [email or web address] Expires: 6/26/2018  5:26 AM 
 
4. Enter the last four digits of your Social Security Number (xxxx) and Date of Birth (mm/dd/yyyy) as indicated and click Submit. You will be taken to the next sign-up page. 5. Create a [employer] ID. This will be your [employer] login ID and cannot be changed, so think of one that is secure and easy to remember. 6. Create a [employer] password. You can change your password at any time. 7. Enter your Password Reset Question and Answer. This can be used at a later time if you forget your password. 8. Enter your e-mail address. You will receive e-mail notification when new information is available in [address] 9. Click Sign Up. You can now view and download portions of your medical record. 10. Click the Download Summary menu link to download a portable copy of your medical information. If you have questions, please visit the Frequently Asked Questions section of the Chatty website. Remember, [employer] is NOT to be used for urgent needs. For medical emergencies, dial 911. Now available from your iPhone and Android! Please provide this summary of care documentation to your next provider. Your primary care clinician is listed as Nilo Amaro. If you have any questions after today's visit, please call [phone].

## 2018-05-10 NOTE — PROGRESS NOTES
1. Have you been to the ER, urgent care clinic since your last visit? Hospitalized since your last visit? Yes When: 04/28/18 Where: [facility] Reason for visit: Pneumonia    2. Have you seen or consulted any other health care providers outside of the [address] [place] [address] since your last visit? Include any pap smears or colon screening.  No

## 2018-05-10 NOTE — PROGRESS NOTES
Sepideh Alvarado is a 66 y.o. male presenting for/with:    Hospital Follow Up (Erin Chang 442) and Annual Wellness Visit    HPI:  Admit 4/23/18  D/C 5/7/18. Procedures: dialysis. Central line placement. CAP with empyema  PT had gradually worsening breathing and     Diabetes with hx recent spell of DKA. Feeling ok today, but worsening edema. Sugars still controlled fairly to poorly. No log today, not checking that often, reports home checks running high 100's to low 200's. Now taking 18 units BID though. Hypoglycemia: no lows lately. Tolerating current treatment well. Current medications include insulin novolin 70/30, taking 18 units BID ac. Taking insulin regularly. Lab Results   Component Value Date/Time    Potassium 3.7 05/01/2018 05:03 AM     Lab Results   Component Value Date/Time    Hemoglobin A1c 8.7 (H) 04/30/2018 01:55 AM    Hemoglobin A1c 9.9 (H) 04/24/2018 01:23 AM    Hemoglobin A1c 10.5 (H) 03/16/2018 12:06 AM    Glucose 82 05/01/2018 05:03 AM    Glucose (POC) 264 (H) 05/07/2018 11:31 AM    Microalb/Creat ratio (ug/mg creat.) 3487.6 (H) 11/09/2016 04:11 PM    Microalbumin/creat ratio (POC) >300 01/22/2018 03:30 PM    LDL, calculated 48 01/22/2018 03:55 PM    Creatinine 2.33 (H) 05/01/2018 05:03 AM     Lab Results   Component Value Date/Time    Microalb/Creat ratio (ug/mg creat.) 3487.6 (H) 11/09/2016 04:11 PM     Hypertension and CKD5/dialysis. Blood pressures so/so today. Med bottle check shows no nifedipine ER 30mg daily here today. Does have empty bottle coreg 12.5 BID, full bottle lasix at 40mg daily. Weight is up again. On dialyis with DR. Renae Lyons, seen at [facility] clinic. Symptoms include edema, marked weight gain. Patient denies chest pain, palpitations, orthopnea.   Lab review:   Lab Results   Component Value Date/Time    Sodium 136 05/01/2018 05:03 AM    Potassium 3.7 05/01/2018 05:03 AM    Chloride 102 05/01/2018 05:03 AM    CO2 29 05/01/2018 05:03 AM    Anion gap 5 05/01/2018 05:03 AM    Glucose 82 05/01/2018 05:03 AM    BUN 19 05/01/2018 05:03 AM    Creatinine 2.33 (H) 05/01/2018 05:03 AM    BUN/Creatinine ratio 8 (L) 05/01/2018 05:03 AM    GFR est AA 35 (L) 05/01/2018 05:03 AM    GFR est non-AA 29 (L) 05/01/2018 05:03 AM    Calcium 7.2 (L) 05/01/2018 05:03 AM     Hyperlipidemia. On lipitor 20. Sandee well. No myalgias, arthralgias, unusual weakness. Lab Results   Component Value Date/Time    Cholesterol, total 115 01/22/2018 03:55 PM    HDL Cholesterol 44 01/22/2018 03:55 PM    LDL, calculated 48 01/22/2018 03:55 PM    VLDL, calculated 23 01/22/2018 03:55 PM    Triglyceride 117 01/22/2018 03:55 PM     Lab Results   Component Value Date/Time    ALT (SGPT) 9 (L) 04/29/2018 04:47 AM    AST (SGOT) 18 04/29/2018 04:47 AM    Alk. phosphatase 115 04/29/2018 04:47 AM    Bilirubin, total 0.3 04/29/2018 04:47 AM     ROS:  Constitutional: No fever, chills or weight loss  Respiratory: No cough, SOB   CV: No chest pain or Palpitations    Visit Vitals    /90 (BP 1 Location: Right arm, BP Patient Position: Sitting)    Pulse 71    Temp 97.5 °F (36.4 °C) (Temporal)    Resp 20    Ht 6' 3\" (1.905 m)    Wt 200 lb 3.2 oz (90.8 kg)    SpO2 98%    BMI 25.02 kg/m2     Wt Readings from Last 3 Encounters:   05/10/18 200 lb 3.2 oz (90.8 kg)   05/07/18 197 lb 5 oz (89.5 kg)   04/28/18 189 lb 6 oz (85.9 kg)   +3#  BP Readings from Last 3 Encounters:   05/10/18 132/90   05/07/18 172/90   04/28/18 140/85     Physical Examination: General appearance - alert, ill appearing, and in mod discomfort  Mental status - alert, oriented to person, place, and time  Eyes - pupils equal and reactive, extraocular eye movements intact  ENT - bilateral external ears and nose normal. Normal lips  Neck - supple, no significant adenopathy, no thyromegaly or mass  Lymphatics - no palpable lymphadenopathy, no hepatosplenomegaly  Chest - clear to auscultation, no wheezes, rales or rhonchi, symmetric air entry. Chest tube and thoracotomy sites healing well. No external sutures present. Heart - normal rate, regular rhythm, normal S1, S2, no murmurs, rubs, clicks or gallops  Extremities - peripheral pulses normal, 3+ pedal edema, no clubbing or cyanosis. Bilat ant thighs with ttp, but no bruising or hemtoma present. A/p:  DM2, with recent DKA episode and CKD5, now on dialysis. Sugar doing so/so. Con't 18 units AM, 18 units before dinner. Again recommended to do sugar checks with breakfast and dinner meals. Bring log to next visit. See renal Dr. Gina Martin. On bicarb now. Labs will be mostly done at dialysis now. [facility] to work on med mgmt and diabetes teaching. CAP with empyema and decortication  rpt cxr per pulm, seeing next week or so, not sure who. HTN  Much better, but not to goal. Edema worse. Seeing renal Dr. Gina Martin for dialysis 3/wk, talk to dialysis tech about the edema. R/S coreg 12.5 BID, adalat CC 30mg daily (vs norvasc due to cost concerns). Fall with thigh pain  tx with tylenol #3 PRN (not candidate for NSAIDs at this time).     F/U 1mo

## 2018-05-10 NOTE — PATIENT INSTRUCTIONS

## 2018-05-11 ENCOUNTER — HOME HEALTH ADMISSION (OUTPATIENT)
Dept: HOME HEALTH SERVICES | Facility: HOME HEALTH | Age: 58
End: 2018-05-11
Payer: COMMERCIAL

## 2018-05-12 ENCOUNTER — HOME CARE VISIT (OUTPATIENT)
Dept: SCHEDULING | Facility: HOME HEALTH | Age: 58
End: 2018-05-12
Payer: COMMERCIAL

## 2018-05-12 PROCEDURE — G0299 HHS/HOSPICE OF RN EA 15 MIN: HCPCS

## 2018-05-12 PROCEDURE — 400013 HH SOC

## 2018-05-14 ENCOUNTER — HOME CARE VISIT (OUTPATIENT)
Dept: HOME HEALTH SERVICES | Facility: HOME HEALTH | Age: 58
End: 2018-05-14
Payer: COMMERCIAL

## 2018-05-15 ENCOUNTER — HOME CARE VISIT (OUTPATIENT)
Dept: HOME HEALTH SERVICES | Facility: HOME HEALTH | Age: 58
End: 2018-05-15
Payer: COMMERCIAL

## 2018-05-15 VITALS
HEIGHT: 74 IN | WEIGHT: 184 LBS | HEART RATE: 93 BPM | SYSTOLIC BLOOD PRESSURE: 162 MMHG | OXYGEN SATURATION: 98 % | DIASTOLIC BLOOD PRESSURE: 62 MMHG | RESPIRATION RATE: 26 BRPM | BODY MASS INDEX: 23.61 KG/M2 | TEMPERATURE: 100 F

## 2018-05-17 ENCOUNTER — HOME CARE VISIT (OUTPATIENT)
Dept: SCHEDULING | Facility: HOME HEALTH | Age: 58
End: 2018-05-17
Payer: COMMERCIAL

## 2018-05-17 ENCOUNTER — HOME CARE VISIT (OUTPATIENT)
Dept: HOME HEALTH SERVICES | Facility: HOME HEALTH | Age: 58
End: 2018-05-17
Payer: COMMERCIAL

## 2018-05-17 PROCEDURE — G0152 HHCP-SERV OF OT,EA 15 MIN: HCPCS

## 2018-05-17 PROCEDURE — G0299 HHS/HOSPICE OF RN EA 15 MIN: HCPCS

## 2018-05-22 ENCOUNTER — OFFICE VISIT (OUTPATIENT)
Dept: FAMILY MEDICINE CLINIC | Age: 58
End: 2018-05-22

## 2018-05-22 ENCOUNTER — HOME CARE VISIT (OUTPATIENT)
Dept: HOME HEALTH SERVICES | Facility: HOME HEALTH | Age: 58
End: 2018-05-22
Payer: COMMERCIAL

## 2018-05-22 VITALS
WEIGHT: 191.4 LBS | BODY MASS INDEX: 24.56 KG/M2 | HEART RATE: 76 BPM | TEMPERATURE: 97.7 F | RESPIRATION RATE: 16 BRPM | OXYGEN SATURATION: 100 % | DIASTOLIC BLOOD PRESSURE: 100 MMHG | HEIGHT: 74 IN | SYSTOLIC BLOOD PRESSURE: 160 MMHG

## 2018-05-22 VITALS
HEART RATE: 88 BPM | OXYGEN SATURATION: 98 % | SYSTOLIC BLOOD PRESSURE: 122 MMHG | DIASTOLIC BLOOD PRESSURE: 70 MMHG | TEMPERATURE: 98.2 F | RESPIRATION RATE: 20 BRPM

## 2018-05-22 DIAGNOSIS — N18.6 ESRD (END STAGE RENAL DISEASE) ON DIALYSIS (HCC): ICD-10-CM

## 2018-05-22 DIAGNOSIS — Z99.2 ESRD (END STAGE RENAL DISEASE) ON DIALYSIS (HCC): ICD-10-CM

## 2018-05-22 DIAGNOSIS — E11.21 TYPE 2 DIABETES MELLITUS WITH DIABETIC NEPHROPATHY, WITH LONG-TERM CURRENT USE OF INSULIN (HCC): Primary | ICD-10-CM

## 2018-05-22 DIAGNOSIS — I50.22 HYPERTENSIVE HEART DISEASE WITH CHRONIC SYSTOLIC CONGESTIVE HEART FAILURE (HCC): ICD-10-CM

## 2018-05-22 DIAGNOSIS — I11.0 HYPERTENSIVE HEART DISEASE WITH CHRONIC SYSTOLIC CONGESTIVE HEART FAILURE (HCC): ICD-10-CM

## 2018-05-22 DIAGNOSIS — Z79.4 TYPE 2 DIABETES MELLITUS WITH DIABETIC NEPHROPATHY, WITH LONG-TERM CURRENT USE OF INSULIN (HCC): Primary | ICD-10-CM

## 2018-05-22 NOTE — PROGRESS NOTES
1. Have you been to the ER, urgent care clinic since your last visit? Hospitalized since your last visit? No    2. Have you seen or consulted any other health care providers outside of the [facility] since your last visit? Include any pap smears or colon screening.  No

## 2018-05-22 NOTE — PROGRESS NOTES
Angy Rubio is a 66 y.o. male presenting for/with:    Fall (knee problems left knee)    HPI:  CAP with empyema  PT doing well since last visit. Saw Pulm in [place], told doing well. Has f/u with them next mo or so. Diabetes with hx recent spell of DKA. Feeling ok today, but worsening edema. Sugars controlled better. Brought log today. Checking BID now. Running low to mid 100's with occ low 200. Hypoglycemia: Had a low yesterday at dialysis, down to 18. Passed out after dialysis. Was revived with some IV glucose. States ate normal breakfast, slice bread, egg, and sausage, but didn't get a snack before dialysis like normal (usu eats some pb nabs). Current medications include insulin novolin 70/30, taking 18 units BID ac. Taking insulin regularly. Lab Results   Component Value Date/Time    Potassium 3.7 05/01/2018 05:03 AM     Lab Results   Component Value Date/Time    Hemoglobin A1c 8.7 (H) 04/30/2018 01:55 AM    Hemoglobin A1c 9.9 (H) 04/24/2018 01:23 AM    Hemoglobin A1c 10.5 (H) 03/16/2018 12:06 AM    Glucose 82 05/01/2018 05:03 AM    Glucose (POC) 264 (H) 05/07/2018 11:31 AM    Microalb/Creat ratio (ug/mg creat.) 3487.6 (H) 11/09/2016 04:11 PM    Microalbumin/creat ratio (POC) >300 01/22/2018 03:30 PM    LDL, calculated 48 01/22/2018 03:55 PM    Creatinine 2.33 (H) 05/01/2018 05:03 AM     Lab Results   Component Value Date/Time    Microalb/Creat ratio (ug/mg creat.) 3487.6 (H) 11/09/2016 04:11 PM     Hypertension and CKD5/dialysis. Blood pressures up again today. Med bottle check shows no nifedipine ER 30mg daily here today. Does have empty bottle coreg 12.5 BID, full bottle lasix at 40mg daily. Weight is up again. On dialyis with DR. Lela Gomez, seen at [facility]. Symptoms include edema, marked weight gain. Patient denies chest pain, palpitations, orthopnea.   Lab review:   Lab Results   Component Value Date/Time    Sodium 136 05/01/2018 05:03 AM    Potassium 3.7 05/01/2018 05:03 AM    Chloride 102 05/01/2018 05:03 AM    CO2 29 05/01/2018 05:03 AM    Anion gap 5 05/01/2018 05:03 AM    Glucose 82 05/01/2018 05:03 AM    BUN 19 05/01/2018 05:03 AM    Creatinine 2.33 (H) 05/01/2018 05:03 AM    BUN/Creatinine ratio 8 (L) 05/01/2018 05:03 AM    GFR est AA 35 (L) 05/01/2018 05:03 AM    GFR est non-AA 29 (L) 05/01/2018 05:03 AM    Calcium 7.2 (L) 05/01/2018 05:03 AM     Hyperlipidemia. On lipitor 20. Sandee well. No myalgias, arthralgias, unusual weakness. Lab Results   Component Value Date/Time    Cholesterol, total 115 01/22/2018 03:55 PM    HDL Cholesterol 44 01/22/2018 03:55 PM    LDL, calculated 48 01/22/2018 03:55 PM    VLDL, calculated 23 01/22/2018 03:55 PM    Triglyceride 117 01/22/2018 03:55 PM     Lab Results   Component Value Date/Time    ALT (SGPT) 9 (L) 04/29/2018 04:47 AM    AST (SGOT) 18 04/29/2018 04:47 AM    Alk.  phosphatase 115 04/29/2018 04:47 AM    Bilirubin, total 0.3 04/29/2018 04:47 AM     ROS:  Constitutional: No fever, chills or weight loss  Respiratory: No cough, SOB   CV: No chest pain or Palpitations    Visit Vitals    BP (!) 160/100 (BP 1 Location: Right arm, BP Patient Position: Sitting)    Pulse 76    Temp 97.7 °F (36.5 °C) (Temporal)    Resp 16    Ht 6' 2\" (1.88 m)    Wt 191 lb 6.4 oz (86.8 kg)    SpO2 100%    BMI 24.57 kg/m2     Wt Readings from Last 3 Encounters:   05/22/18 191 lb 6.4 oz (86.8 kg)   05/12/18 184 lb (83.5 kg)   05/10/18 200 lb 3.2 oz (90.8 kg)   +7#  BP Readings from Last 3 Encounters:   05/22/18 (!) 160/100   05/12/18 162/62   05/10/18 132/90     Physical Examination: General appearance - alert, ill appearing, and in mod discomfort  Mental status - alert, oriented to person, place, and time  Eyes - pupils equal and reactive, extraocular eye movements intact  ENT - bilateral external ears and nose normal. Normal lips  Neck - supple, no significant adenopathy, no thyromegaly or mass  Lymphatics - no palpable lymphadenopathy, no hepatosplenomegaly  Chest - clear to auscultation, no wheezes, rales or rhonchi, symmetric air entry. Chest tube and thoracotomy sites healing well. No external sutures present. Heart - normal rate, regular rhythm, normal S1, S2, no murmurs, rubs, clicks or gallops  Extremities - peripheral pulses normal, 2-3+ pedal edema, no weeping, no clubbing or cyanosis. Bilat ant thighs without ttp now. A/p:  DM2, with recent DKA episode and CKD5, now on dialysis. Sugar doing a lot better, but having lows again. Cut to 14 units on dialysis days, keep 18 units on non-dialysis days, and con't 18 units before dinner. Eat snack of high protein, low K+ food (like unsalted almonds, protein bar, etc) during dialysis. Con't sugar checks with breakfast and dinner meals. Con't to bring log to visits. Labs will be mostly done at dialysis now. Con't HH for strengthening and med mgmt /diabetes teaching. CAP with empyema and decortication  Doing well. Try to get notes from pulm once we have a name. HTN  Up. Will coordinate with renal Dr. Claudio Quinlan. Con't dialysis 3/wk. talk to dialysis tech about the edema. con't coreg 12.5 BID, adalat CC 30mg daily.     F/U 2wk

## 2018-05-22 NOTE — MR AVS SNAPSHOT
[place] 
 
 
 [email or web address] [address] [phone] Patient: Manette Cowden MRN: [id number] CCO:5/85/2689 Visit Information Date & Time Provider Department Dept. Phone Encounter #  
 5/22/2018  8:00 AM Tiffany Berger MD [address] [place] [address] [id number] Follow-up Instructions Return in about 2 weeks (around 6/5/2018). Follow-up and Disposition History Your Appointments 6/5/2018  7:30 AM  
ESTABLISHED PATIENT with MD Lay Villanuevagve[address] ([facility] Appt Note: 2wk fu  
 [email or web address] [address] [phone]  
  
   
 [email or web address] [address]  
  
    
 6/7/2018  2:00 PM  
ESTABLISHED PATIENT with Tiffany Berger MD  
[address] [place] [address] ([facility]) Appt Note: 1 mo F/U on hospital visit [address] [address]  
30[address] [facility] [address] [facility] [address] Upcoming Health Maintenance Date Due  
 EYE EXAM RETINAL OR DILATED Q1 4/17/2016 Pneumococcal 19-64 Highest Risk (2 of 3 - PPSV23) 2/1/2027* Influenza Age 5 to Adult 8/1/2018 HEMOGLOBIN A1C Q6M 10/30/2018 FOOT EXAM Q1 1/22/2019 LIPID PANEL Q1 1/22/2019 FOBT Q 1 YEAR AGE 50-75 4/23/2019 DTaP/Tdap/Td series (2 - Td) 1/22/2028 *Topic was postponed. The date shown is not the original due date. Allergies as of 5/22/2018  Review Complete On: 5/22/2018 By: Tiffany Berger MD  
 No Known Allergies Current Immunizations  Reviewed on 4/30/2018 Name Date Influenza Vaccine 12/14/2016  4:38 PM, 11/25/2015, 10/4/2013 Influenza Vaccine (Quad) PF 2/14/2018  9:50 AM  
 Pneumococcal Conjugate (PCV-13) 12/14/2016  4:37 PM  
  
 Not reviewed this visit You Were Diagnosed With   
  
 Codes Comments Type 2 diabetes mellitus with diabetic nephropathy, with long-term current use of insulin (HCC)    -  Primary ICD-10-CM: E11.21, Z79.4 ICD-9-CM: 250.40, 583.81, V58.67 Hypertensive heart disease with chronic systolic congestive heart failure (HCC)     ICD-10-CM: I11.0, I50.22 ICD-9-CM: 402.91, 428.22   
 ESRD (end stage renal disease) on dialysis ([facility]     ICD-10-CM: N18.6, Z99.2 ICD-9-CM: 585.6, V45.11 Vitals BP Pulse Temp Resp Height(growth percentile) (!) 160/100 (BP 1 Location: Right arm, BP Patient Position: Sitting) 76 97.7 °F (36.5 °C) (Temporal) 16 6' 2\" (1.88 m) Weight(growth percentile) SpO2 BMI Smoking Status 191 lb 6.4 oz (86.8 kg) 100% 24.57 kg/m2 Never Smoker BMI and BSA Data Body Mass Index Body Surface Area 24.57 kg/m 2 2.13 m 2 Preferred Pharmacy Pharmacy Name Phone [facility] [address] [facility] [place], [address] [place] [address] Yusuf Cueva 101 Your Updated Medication List  
  
   
This list is accurate as of 5/22/18  9:13 AM.  Always use your most recent med list.  
  
  
  
  
 acetaminophen-codeine 300-30 mg per tablet Commonly known as:  TYLENOL #3 Take 2 Tabs by mouth two (2) times daily as needed for Pain. Max Daily Amount: 4 Tabs. Indications: leg pain  
  
 aspirin 325 mg tablet Commonly known as:  ASPIRIN Take 325 mg by mouth daily. atorvastatin 20 mg tablet Commonly known as:  LIPITOR Take 20 mg by mouth daily. calcium carbonate 200 mg calcium (500 mg) Chew Commonly known as:  TUMS Take 1 Tab by mouth three (3) times daily (with meals). carvedilol 12.5 mg tablet Commonly known as:  Roger Mood Take 1 Tab by mouth two (2) times daily (with meals). Indications: pressure and heart  
  
 furosemide 80 mg tablet Commonly known as:  LASIX Take 1 Tab by mouth daily. insulin NPH/insulin regular 100 unit/mL (70-30) injection Commonly known as:  NOVOLIN 70/30, HUMULIN 70/30 18 units with Breakfast and dinner NIFEdipine ER 30 mg ER tablet Commonly known as:  ADALAT CC Take 1 Tab by mouth daily. Indications: pressure, add to regimen SLOW  mg (45 mg iron) ER tablet Generic drug:  ferrous sulfate Take 1 Tab by mouth Daily (before breakfast). sodium bicarbonate 650 mg tablet Take 1 Tab by mouth three (3) times daily. traZODone 50 mg tablet Commonly known as:  Mona Hail Take 50 mg by mouth nightly. Follow-up Instructions Return in about 2 weeks (around 6/5/2018). To-Do List   
 05/22/2018 3:30 PM  
  Appointment with Milad Hassan PT at [facility] 39 Patient Instructions Please find out the name of your lung doctor and let me know. [employer] can send me a message with that info if you'd like. Introducing [facility] & HEALTH SERVICES! Ines Segura introduces [employer] patient portal. Now you can access parts of your medical record, email your doctor's office, and request medication refills online. 1. In your internet browser, go to [email or web address] 2. Click on the First Time User? Click Here link in the Sign In box. You will see the New Member Sign Up page. 3. Enter your [employer] Access Code exactly as it appears below. You will not need to use this code after youve completed the sign-up process. If you do not sign up before the expiration date, you must request a new code. · [employer] Access Code: [email or web address] Expires: 6/26/2018  5:26 AM 
 
4. Enter the last four digits of your Social Security Number (xxxx) and Date of Birth (mm/dd/yyyy) as indicated and click Submit. You will be taken to the next sign-up page. 5. Create a CoreXchanget ID. This will be your [employer] login ID and cannot be changed, so think of one that is secure and easy to remember. 6. Create a CoreXchanget password. You can change your password at any time. 7. Enter your Password Reset Question and Answer. This can be used at a later time if you forget your password. 8. Enter your e-mail address. You will receive e-mail notification when new information is available in [address] 9. Click Sign Up. You can now view and download portions of your medical record. 10. Click the Download Summary menu link to download a portable copy of your medical information. If you have questions, please visit the Frequently Asked Questions section of the Wisdom[employer] website. Remember, [employer] is NOT to be used for urgent needs. For medical emergencies, dial 911. Now available from your iPhone and Android! Please provide this summary of care documentation to your next provider. Your primary care clinician is listed as Aureliano Amaro. If you have any questions after today's visit, please call [phone].

## 2018-05-22 NOTE — PATIENT INSTRUCTIONS
Please find out the name of your lung doctor and let me know. Home health can send me a message with that info if you'd like.

## 2018-05-23 ENCOUNTER — DOCUMENTATION ONLY (OUTPATIENT)
Dept: FAMILY MEDICINE CLINIC | Age: 58
End: 2018-05-23

## 2018-05-23 ENCOUNTER — HOME CARE VISIT (OUTPATIENT)
Dept: HOME HEALTH SERVICES | Facility: HOME HEALTH | Age: 58
End: 2018-05-23
Payer: COMMERCIAL

## 2018-05-23 NOTE — PROGRESS NOTES
Called Dr. Lionel Branch in formed  Of last 2 blood pressure readings >106 systolic. Left question of what to do for Dr. Maria Guadalupe Newman.

## 2018-05-24 ENCOUNTER — HOME CARE VISIT (OUTPATIENT)
Dept: SCHEDULING | Facility: HOME HEALTH | Age: 58
End: 2018-05-24
Payer: COMMERCIAL

## 2018-05-24 PROCEDURE — G0151 HHCP-SERV OF PT,EA 15 MIN: HCPCS

## 2018-05-25 VITALS
SYSTOLIC BLOOD PRESSURE: 110 MMHG | DIASTOLIC BLOOD PRESSURE: 71 MMHG | HEART RATE: 88 BPM | RESPIRATION RATE: 16 BRPM | OXYGEN SATURATION: 98 %

## 2018-05-29 ENCOUNTER — HOME CARE VISIT (OUTPATIENT)
Dept: SCHEDULING | Facility: HOME HEALTH | Age: 58
End: 2018-05-29
Payer: COMMERCIAL

## 2018-05-29 VITALS
DIASTOLIC BLOOD PRESSURE: 90 MMHG | RESPIRATION RATE: 16 BRPM | SYSTOLIC BLOOD PRESSURE: 180 MMHG | HEART RATE: 78 BPM | OXYGEN SATURATION: 99 % | TEMPERATURE: 99.9 F

## 2018-05-29 PROCEDURE — G0157 HHC PT ASSISTANT EA 15: HCPCS

## 2018-05-31 ENCOUNTER — HOME CARE VISIT (OUTPATIENT)
Dept: SCHEDULING | Facility: HOME HEALTH | Age: 58
End: 2018-05-31
Payer: COMMERCIAL

## 2018-05-31 ENCOUNTER — HOME CARE VISIT (OUTPATIENT)
Dept: HOME HEALTH SERVICES | Facility: HOME HEALTH | Age: 58
End: 2018-05-31
Payer: COMMERCIAL

## 2018-06-04 ENCOUNTER — HOME CARE VISIT (OUTPATIENT)
Dept: HOME HEALTH SERVICES | Facility: HOME HEALTH | Age: 58
End: 2018-06-04
Payer: COMMERCIAL

## 2018-06-07 ENCOUNTER — HOME CARE VISIT (OUTPATIENT)
Dept: SCHEDULING | Facility: HOME HEALTH | Age: 58
End: 2018-06-07
Payer: COMMERCIAL

## 2018-06-08 LAB
ACID FAST STN SPEC: NEGATIVE
MYCOBACTERIUM SPEC QL CULT: NEGATIVE
SPECIMEN PREPARATION: NORMAL
SPECIMEN SOURCE: NORMAL

## 2018-06-14 ENCOUNTER — HOSPITAL ENCOUNTER (OUTPATIENT)
Age: 58
Setting detail: OUTPATIENT SURGERY
End: 2018-06-14
Attending: SURGERY | Admitting: SURGERY
Payer: SELF-PAY

## 2018-06-14 ENCOUNTER — OFFICE VISIT (OUTPATIENT)
Dept: SURGERY | Age: 58
End: 2018-06-14

## 2018-06-14 ENCOUNTER — HOME CARE VISIT (OUTPATIENT)
Dept: HOME HEALTH SERVICES | Facility: HOME HEALTH | Age: 58
End: 2018-06-14
Payer: COMMERCIAL

## 2018-06-14 VITALS
RESPIRATION RATE: 20 BRPM | TEMPERATURE: 98.7 F | WEIGHT: 167.8 LBS | SYSTOLIC BLOOD PRESSURE: 162 MMHG | OXYGEN SATURATION: 98 % | HEART RATE: 85 BPM | HEIGHT: 74 IN | DIASTOLIC BLOOD PRESSURE: 90 MMHG | BODY MASS INDEX: 21.53 KG/M2

## 2018-06-14 DIAGNOSIS — N18.6 ESRD (END STAGE RENAL DISEASE) ON DIALYSIS (HCC): Primary | ICD-10-CM

## 2018-06-14 DIAGNOSIS — Z01.818 PRE-OP TESTING: ICD-10-CM

## 2018-06-14 DIAGNOSIS — Z99.2 ESRD (END STAGE RENAL DISEASE) ON DIALYSIS (HCC): Primary | ICD-10-CM

## 2018-06-14 RX ORDER — NIFEDIPINE 30 MG/1
TABLET, EXTENDED RELEASE ORAL
Refills: 1 | COMMUNITY
Start: 2018-05-14 | End: 2018-06-14 | Stop reason: SDUPTHER
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
## 2018-06-14 NOTE — MR AVS SNAPSHOT
[address] [place] [address], [address] [place] [address], [address] 
[phone] Patient: Sepideh Alvarado MRN: [id number] Visit Information Date & Time Provider Department Dept. Phone Encounter #  
 6/14/2018  3:20 PM Pacheco Short MD [facility] [address] [phone] [id number] Upcoming Health Maintenance Date Due  
 EYE EXAM RETINAL OR DILATED Q1 4/17/2016 Pneumococcal 19-64 Highest Risk (2 of 3 - PPSV23) 2/1/2027* Influenza Age 5 to Adult 8/1/2018 HEMOGLOBIN A1C Q6M 10/30/2018 FOOT EXAM Q1 1/22/2019 LIPID PANEL Q1 1/22/2019 FOBT Q 1 YEAR AGE 50-75 4/23/2019 DTaP/Tdap/Td series (2 - Td) 1/22/2028 *Topic was postponed. The date shown is not the original due date. Allergies as of 6/14/2018  Review Complete On: 6/14/2018 By: Arian Garcia LPN No Known Allergies Current Immunizations  Reviewed on 4/30/2018 Name Date Influenza Vaccine 12/14/2016  4:38 PM, 11/25/2015, 10/4/2013 Influenza Vaccine (Quad) PF 2/14/2018  9:50 AM  
 Pneumococcal Conjugate (PCV-13) 12/14/2016  4:37 PM  
  
 Not reviewed this visit You Were Diagnosed With   
  
 Codes Comments ESRD (end stage renal disease) on dialysis ([facility]    -  Primary ICD-10-CM: N18.6, Z99.2 ICD-9-CM: 585.6, V45.11 Pre-op testing     ICD-10-CM: U87.061 ICD-9-CM: V72.84 Vitals BP Pulse Temp Resp Height(growth percentile) Weight(growth percentile) 162/90 (BP 1 Location: Left arm, BP Patient Position: Sitting) 85 98.7 °F (37.1 °C) (Oral) 20 6' 2\" (1.88 m) 167 lb 12.8 oz (76.1 kg) SpO2 BMI Smoking Status 98% 21.54 kg/m2 Never Smoker Vitals History BMI and BSA Data Body Mass Index Body Surface Area  
 21.54 kg/m 2 1.99 m 2 Preferred Pharmacy Pharmacy Name Phone [facility] [phone] Your Updated Medication List  
  
   
This list is accurate as of 6/14/18  4:33 PM.  Always use your most recent med list.  
  
  
  
  
 acetaminophen-codeine 300-30 mg per tablet Commonly known as:  TYLENOL #3 Take 2 Tabs by mouth two (2) times daily as needed for Pain. Max Daily Amount: 4 Tabs. Indications: leg pain  
  
 aspirin 325 mg tablet Commonly known as:  ASPIRIN Take 325 mg by mouth daily. atorvastatin 20 mg tablet Commonly known as:  LIPITOR Take 20 mg by mouth daily. calcium carbonate 200 mg calcium (500 mg) Chew Commonly known as:  TUMS Take 1 Tab by mouth three (3) times daily (with meals). carvedilol 12.5 mg tablet Commonly known as:  Percy Cook Take 1 Tab by mouth two (2) times daily (with meals). Indications: pressure and heart  
  
 furosemide 80 mg tablet Commonly known as:  LASIX Take 1 Tab by mouth daily. insulin NPH/insulin regular 100 unit/mL (70-30) injection Commonly known as:  NOVOLIN 70/30, HUMULIN 70/30  
18 units with Breakfast and dinner NIFEdipine ER 30 mg ER tablet Commonly known as:  ADALAT CC Take 1 Tab by mouth daily. Indications: pressure, add to regimen SLOW  mg (45 mg iron) ER tablet Generic drug:  ferrous sulfate Take 1 Tab by mouth Daily (before breakfast). sodium bicarbonate 650 mg tablet Take 1 Tab by mouth three (3) times daily. traZODone 50 mg tablet Commonly known as:  Syed Barge Take 50 mg by mouth nightly. To-Do List   
 06/14/2018 ECG:  EKG, 12 LEAD, INITIAL   
  
 06/18/2018 To Be Determined Appointment with Wilbert Marcial PTA at [place] [address]  
  
 06/21/2018 To Be Determined Appointment with Wilbert Marcial PTA at [place] [address] Introducing [facility]!    
 Afia Myers introduces [employer] patient portal. Now you can access parts of your medical record, email your doctor's office, and request medication refills online. 1. In your internet browser, go to [email or web address] 2. Click on the First Time User? Click Here link in the Sign In box. You will see the New Member Sign Up page. 3. Enter your [employer] Access Code exactly as it appears below. You will not need to use this code after youve completed the sign-up process. If you do not sign up before the expiration date, you must request a new code. · [employer] Access Code: [email or web address] Expires: 6/26/2018  5:26 AM 
 
4. Enter the last four digits of your Social Security Number (xxxx) and Date of Birth (mm/dd/yyyy) as indicated and click Submit. You will be taken to the next sign-up page. 5. Create a [employer] ID. This will be your [employer] login ID and cannot be changed, so think of one that is secure and easy to remember. 6. Create a [employer] password. You can change your password at any time. 7. Enter your Password Reset Question and Answer. This can be used at a later time if you forget your password. 8. Enter your e-mail address. You will receive e-mail notification when new information is available in [address] 9. Click Sign Up. You can now view and download portions of your medical record. 10. Click the Download Summary menu link to download a portable copy of your medical information. If you have questions, please visit the Frequently Asked Questions section of the [employer] website. Remember, [employer] is NOT to be used for urgent needs. For medical emergencies, dial 911. Now available from your iPhone and Android! Please provide this summary of care documentation to your next provider. Your primary care clinician is listed as Gopi Rivero. If you have any questions after today's visit, please call [phone].

## 2018-06-14 NOTE — PROGRESS NOTES
Chief Complaint   Patient presents with    Advice Only     Seen at the request of frank Trivedi for AVF     1. Have you been to the ER, urgent care clinic since your last visit? Hospitalized since your last visit? ED [facility] [place]. 2. Have you seen or consulted any other health care providers outside of the [facility] since your last visit? Include any pap smears or colon screening. No    Pt c/o fatigue, loss of appetite, weight loss and diarrhea.

## 2018-06-19 ENCOUNTER — HOME CARE VISIT (OUTPATIENT)
Dept: HOME HEALTH SERVICES | Facility: HOME HEALTH | Age: 58
End: 2018-06-19
Payer: COMMERCIAL

## 2018-06-21 ENCOUNTER — HOME CARE VISIT (OUTPATIENT)
Dept: SCHEDULING | Facility: HOME HEALTH | Age: 58
End: 2018-06-21
Payer: COMMERCIAL

## 2018-06-21 VITALS
TEMPERATURE: 99.2 F | HEART RATE: 71 BPM | SYSTOLIC BLOOD PRESSURE: 164 MMHG | OXYGEN SATURATION: 98 % | DIASTOLIC BLOOD PRESSURE: 94 MMHG | RESPIRATION RATE: 18 BRPM

## 2018-06-21 PROCEDURE — G0152 HHCP-SERV OF OT,EA 15 MIN: HCPCS

## 2018-06-26 ENCOUNTER — HOME CARE VISIT (OUTPATIENT)
Dept: SCHEDULING | Facility: HOME HEALTH | Age: 58
End: 2018-06-26
Payer: COMMERCIAL

## 2018-06-26 PROCEDURE — G0151 HHCP-SERV OF PT,EA 15 MIN: HCPCS

## 2018-06-27 VITALS
DIASTOLIC BLOOD PRESSURE: 85 MMHG | OXYGEN SATURATION: 98 % | HEART RATE: 78 BPM | TEMPERATURE: 98.6 F | SYSTOLIC BLOOD PRESSURE: 138 MMHG

## 2018-06-28 VITALS — BODY MASS INDEX: 21.17 KG/M2 | HEIGHT: 74 IN | WEIGHT: 165 LBS

## 2018-06-28 RX ORDER — CEFAZOLIN SODIUM 1 G/3ML
2 INJECTION, POWDER, FOR SOLUTION INTRAMUSCULAR; INTRAVENOUS ONCE
Status: CANCELLED | OUTPATIENT
Start: 2018-07-03 | End: 2018-07-03

## 2018-06-28 RX ORDER — SODIUM CHLORIDE 9 MG/ML
500 INJECTION, SOLUTION INTRAVENOUS CONTINUOUS
Status: CANCELLED | OUTPATIENT
Start: 2018-07-03

## 2018-06-28 NOTE — PERIOP NOTES
[facility]  Preoperative Instructions        Surgery Date 7/3/18          Time of Arrival 0535 contact # [phone]    1. On the day of your surgery, please report to the Surgical Services Registration Desk and sign in at your designated time. The Surgery Center is located to the right of the Emergency Room. 2. You must have someone with you to drive you home. You should not drive a car for 24 hours following surgery. Please make arrangements for a friend or family member to stay with you for the first 24 hours after your surgery. 3. Do not have anything to eat or drink (including water, gum, mints, coffee, juice) after midnight 7/2/18  . ? This may not apply to medications prescribed by your physician. ?(Please note below the special instructions with medications to take the morning of your procedure.)    4. We recommend you do not drink any alcoholic beverages for 24 hours before and after your surgery. 5. Contact your surgeons office for instructions on the following medications: non-steroidal anti-inflammatory drugs (i.e. Advil, Aleve), vitamins, and supplements. (Some surgeons will want you to stop these medications prior to surgery and others may allow you to take them)  **If you are currently taking Plavix, Coumadin, Aspirin and/or other blood-thinning agents, contact your surgeon for instructions. ** Your surgeon will partner with the physician prescribing these medications to determine if it is safe to stop or if you need to continue taking. Please do not stop taking these medications without instructions from your surgeon    6. Wear comfortable clothes. Wear glasses instead of contacts. Do not bring any money or jewelry. Please bring picture ID, insurance card, and any prearranged co-payment or hospital payment. Do not wear make-up, particularly mascara the morning of your surgery. Do not wear nail polish, particularly if you are having foot /hand surgery.   Wear your hair loose or down, no ponytails, buns, yoly pins or clips. All body piercings must be removed. Please shower with antibacterial soap for three consecutive days before and on the morning of surgery, but do not apply any lotions, powders or deodorants after the shower on the day of surgery. Please use a fresh towels after each shower. Please sleep in clean clothes and change bed linens the night before surgery. Please do not shave for 48 hours prior to surgery. Shaving of the face is acceptable. 7. You should understand that if you do not follow these instructions your surgery may be cancelled. If your physical condition changes (I.e. fever, cold or flu) please contact your surgeon as soon as possible. 8. It is important that you be on time. If a situation occurs where you may be late, please call [phone] (OR Holding Area). 9. If you have any questions and or problems, please call [phone] (Pre-admission Testing). 10. Your surgery time may be subject to change. You will receive a phone call the evening prior if your time changes. 11.  If having outpatient surgery, you must have someone to drive you here, stay with you during the duration of your stay, and to drive you home at time of discharge. 12.   In an effort to improve the efficiency, privacy, and safety for all of our Pre-op patients visitors are not allowed in the Holding area. Once you arrive and are registered your family/visitors will be asked to remain in the waiting room. The Pre-op staff will get you from the Surgical Waiting Area and will explain to you and your family/visitors that the Pre-op phase is beginning. The staff will answer any questions and provide instructions for tracking of the patient, by use of the existing tracking number and color-coded status board in the waiting room.   At this time the staff will also ask for your designated spokesperson information in the event that the physician or staff need to provide an update or obtain any pertinent information. The designated spokesperson will be notified if the physician needs to speak to family during the pre-operative phase. If at any time your family/visitors has questions or concerns they may approach the volunteer desk in the waiting area for assistance. Special Instructions: none     MEDICATIONS TO TAKE THE MORNING OF SURGERY WITH A SIP OF WATER: carvedilol, nifedipine       I understand a pre-operative phone call will be made to verify my surgery time. In the event that I am not available, I give permission for a message to be left on my answering service and/or with another person?   yes         ___________________      __________   _________    (Signature of Patient)             (Witness)                (Date and Time)

## 2018-06-29 NOTE — PERIOP NOTES
Reviewed pre-op EKG,recent cardiac notes (04/2018) recent cardiac testing ( 04/2018) with . Will follow up with  Dr. Ethel Laureano to confirm if patient optimized for planned surgery.

## 2018-07-02 ENCOUNTER — TELEPHONE (OUTPATIENT)
Dept: SURGERY | Age: 58
End: 2018-07-02

## 2018-07-02 NOTE — PERIOP NOTES
Paolo Navarro from Dr. Malave [facility] office called, made aware still waiting on note from Dr. Darylene Richard.

## 2018-07-02 NOTE — PERIOP NOTES
called Dr. Son Underwood office regarding note \" optimized for surgery\". LM for nurse.  Aware surgery tomorrow

## 2018-07-02 NOTE — TELEPHONE ENCOUNTER
Spoke with dialysis earlier today. Audra Soares at [address] requested perm cath change on same day as surgery due to arterial pressures down & slow blood flow. Also, per anesthesia, Mr Madisyn Rachel needs cardiac clearance before surgery. Appt with Dr Dyana Tamayo on 7/3/18 at 11:00 am [facility] office). Appt with Angiography for perm cath change on 7/3/18 at 1:00 pm.  Surgery has been rescheduled to Tuesday, 7/17/18. All information given to Ms Madisyn Rachel. Mr Madisyn Rachel will resume his aspirin after perm cath change & take last dose before surgery on Wednesday, 7/11/18.

## 2018-07-03 ENCOUNTER — OFFICE VISIT (OUTPATIENT)
Dept: CARDIOLOGY CLINIC | Age: 58
End: 2018-07-03

## 2018-07-03 ENCOUNTER — HOSPITAL ENCOUNTER (OUTPATIENT)
Dept: INTERVENTIONAL RADIOLOGY/VASCULAR | Age: 58
Discharge: HOME | End: 2018-07-03
Attending: SURGERY | Admitting: SURGERY
Payer: SELF-PAY

## 2018-07-03 VITALS
RESPIRATION RATE: 18 BRPM | WEIGHT: 170.8 LBS | HEART RATE: 66 BPM | OXYGEN SATURATION: 99 % | HEIGHT: 74 IN | BODY MASS INDEX: 21.92 KG/M2 | SYSTOLIC BLOOD PRESSURE: 134 MMHG | DIASTOLIC BLOOD PRESSURE: 84 MMHG

## 2018-07-03 VITALS
OXYGEN SATURATION: 98 % | TEMPERATURE: 97.9 F | HEART RATE: 69 BPM | SYSTOLIC BLOOD PRESSURE: 143 MMHG | RESPIRATION RATE: 16 BRPM | DIASTOLIC BLOOD PRESSURE: 88 MMHG

## 2018-07-03 DIAGNOSIS — N18.6 ESRD (END STAGE RENAL DISEASE) (HCC): ICD-10-CM

## 2018-07-03 DIAGNOSIS — I10 ESSENTIAL HYPERTENSION: Primary | ICD-10-CM

## 2018-07-03 LAB
GLUCOSE BLD STRIP.AUTO-MCNC: 177 MG/DL (ref 65–100)
GLUCOSE BLD STRIP.AUTO-MCNC: 58 MG/DL (ref 65–100)
SERVICE CMNT-IMP: ABNORMAL
SERVICE CMNT-IMP: ABNORMAL

## 2018-07-03 PROCEDURE — 77001 FLUOROGUIDE FOR VEIN DEVICE: CPT

## 2018-07-03 PROCEDURE — C1769 GUIDE WIRE: HCPCS

## 2018-07-03 PROCEDURE — 77030031139 HC SUT VCRL2 J&J -A

## 2018-07-03 PROCEDURE — 77030018719 HC DRSG PTCH ANTIMIC J&J -A

## 2018-07-03 PROCEDURE — 74011250636 HC RX REV CODE- 250/636: Performed by: RADIOLOGY

## 2018-07-03 PROCEDURE — 74011000250 HC RX REV CODE- 250: Performed by: RADIOLOGY

## 2018-07-03 PROCEDURE — C1750 CATH, HEMODIALYSIS,LONG-TERM: HCPCS

## 2018-07-03 PROCEDURE — 82962 GLUCOSE BLOOD TEST: CPT

## 2018-07-03 RX ORDER — CEFAZOLIN SODIUM/WATER 2 G/20 ML
2 SYRINGE (ML) INTRAVENOUS ONCE
Status: COMPLETED | OUTPATIENT
Start: 2018-07-03 | End: 2018-07-03

## 2018-07-03 RX ORDER — HEPARIN SODIUM 200 [USP'U]/100ML
200 INJECTION, SOLUTION INTRAVENOUS ONCE
Status: COMPLETED | OUTPATIENT
Start: 2018-07-03 | End: 2018-07-03

## 2018-07-03 RX ORDER — MIDAZOLAM HYDROCHLORIDE 1 MG/ML
5 INJECTION, SOLUTION INTRAMUSCULAR; INTRAVENOUS
Status: DISCONTINUED | OUTPATIENT
Start: 2018-07-03 | End: 2018-07-03

## 2018-07-03 RX ORDER — DEXTROSE 50 % IN WATER (D50W) INTRAVENOUS SYRINGE
25 AS NEEDED
Status: DISCONTINUED | OUTPATIENT
Start: 2018-07-03 | End: 2018-07-07 | Stop reason: HOSPADM

## 2018-07-03 RX ORDER — LIDOCAINE HYDROCHLORIDE 20 MG/ML
20 INJECTION, SOLUTION INFILTRATION; PERINEURAL ONCE
Status: COMPLETED | OUTPATIENT
Start: 2018-07-03 | End: 2018-07-03

## 2018-07-03 RX ORDER — FENTANYL CITRATE 50 UG/ML
100 INJECTION, SOLUTION INTRAMUSCULAR; INTRAVENOUS
Status: DISCONTINUED | OUTPATIENT
Start: 2018-07-03 | End: 2018-07-03

## 2018-07-03 RX ORDER — HEPARIN SODIUM 1000 [USP'U]/ML
10000 INJECTION, SOLUTION INTRAVENOUS; SUBCUTANEOUS
Status: COMPLETED | OUTPATIENT
Start: 2018-07-03 | End: 2018-07-03

## 2018-07-03 RX ORDER — SODIUM CHLORIDE 9 MG/ML
25 INJECTION, SOLUTION INTRAVENOUS CONTINUOUS
Status: DISCONTINUED | OUTPATIENT
Start: 2018-07-03 | End: 2018-07-07 | Stop reason: HOSPADM

## 2018-07-03 RX ORDER — LIDOCAINE HYDROCHLORIDE 10 MG/ML
INJECTION, SOLUTION EPIDURAL; INFILTRATION; INTRACAUDAL; PERINEURAL
Status: DISPENSED
Start: 2018-07-03 | End: 2018-07-04

## 2018-07-03 RX ADMIN — FENTANYL CITRATE 25 MCG: 50 INJECTION, SOLUTION INTRAMUSCULAR; INTRAVENOUS at 14:36

## 2018-07-03 RX ADMIN — HEPARIN SODIUM 3800 UNITS: 1000 INJECTION, SOLUTION INTRAVENOUS; SUBCUTANEOUS at 14:48

## 2018-07-03 RX ADMIN — DEXTROSE MONOHYDRATE 25 G: 25 INJECTION, SOLUTION INTRAVENOUS at 13:44

## 2018-07-03 RX ADMIN — Medication 2 G: at 14:07

## 2018-07-03 RX ADMIN — FENTANYL CITRATE 25 MCG: 50 INJECTION, SOLUTION INTRAMUSCULAR; INTRAVENOUS at 14:27

## 2018-07-03 RX ADMIN — MIDAZOLAM 1 MG: 1 INJECTION INTRAMUSCULAR; INTRAVENOUS at 14:35

## 2018-07-03 RX ADMIN — LIDOCAINE HYDROCHLORIDE 10 ML: 20 INJECTION, SOLUTION INFILTRATION; PERINEURAL at 14:47

## 2018-07-03 RX ADMIN — SODIUM CHLORIDE 25 ML/HR: 900 INJECTION, SOLUTION INTRAVENOUS at 13:58

## 2018-07-03 RX ADMIN — MIDAZOLAM 1 MG: 1 INJECTION INTRAMUSCULAR; INTRAVENOUS at 14:43

## 2018-07-03 RX ADMIN — MIDAZOLAM 1 MG: 1 INJECTION INTRAMUSCULAR; INTRAVENOUS at 14:26

## 2018-07-03 RX ADMIN — HEPARIN SODIUM IN SODIUM CHLORIDE: 200 INJECTION INTRAVENOUS at 14:50
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
## 2018-07-03 NOTE — PROGRESS NOTES
Patient back to xray recovery post procedure. Patient very sleepy at this time. For discharge when more awake. Daughter, Harris Melendez, called and notified.

## 2018-07-03 NOTE — PROGRESS NOTES
Dr. Tk Marques in to talk with patient regarding procedure. Patient evaluated and assessed for procedure by Dr. Tk Marques. Consent signed.

## 2018-07-03 NOTE — IP AVS SNAPSHOT
[address] [facility] 
[phone] Patient: Veldon Agent MRN: [id number] OXU:8[birth year]27/[birth year] About your hospitalization You were admitted on:  July 3, 2018 You last received care in the:  [facility] RAD ANGIO IR You were discharged on:  July 3, 2018 Why you were hospitalized Your primary diagnosis was:  Not on File Follow-up Information None Your Scheduled Appointments Thursday July 05, 2018 To Be Determined PTA HOME VISIT with CHIQUI Kebede [address] ([address]) [address] ([address]) Thursday July 12, 2018 To Be Determined PT RE-EVALUATION with Bryon Dawson, [address] [facility] ([address]) [address] ([address]) Tuesday July 17, 2018 ARTERIO VENOUS FISTULA/GRAFT ARM with Anibal Holland MD  
[facility] SURGERY (RI OR PRE ASSESSMENT) [address] [facility]  
[phone] Discharge Orders None A check  indicates which time of day the medication should be taken. My Medications ASK your doctor about these medications Instructions Each Dose to Equal  
 Morning Noon Evening Bedtime  
 acetaminophen-codeine 300-30 mg per tablet Commonly known as:  TYLENOL #3 Your last dose was: Your next dose is: Take 2 Tabs by mouth two (2) times daily as needed for Pain. Max Daily Amount: 4 Tabs. Indications: leg pain 2 Tab  
    
   
   
   
  
 aspirin 325 mg tablet Commonly known as:  ASPIRIN Your last dose was: Your next dose is: Take 325 mg by mouth daily. 325 mg  
    
   
   
   
  
 atorvastatin 20 mg tablet Commonly known as:  LIPITOR Your last dose was: Your next dose is: Take 20 mg by mouth daily. 20 mg  
    
   
   
   
  
 calcium carbonate 200 mg calcium (500 mg) Chew Commonly known as:  TUMS Your last dose was: Your next dose is: Take 1 Tab by mouth three (3) times daily (with meals). 1 Tab  
    
   
   
   
  
 carvedilol 12.5 mg tablet Commonly known as:  Андрей Saucedo Your last dose was: Your next dose is: Take 1 Tab by mouth two (2) times daily (with meals). Indications: pressure and heart 12.5 mg  
    
   
   
   
  
 furosemide 80 mg tablet Commonly known as:  LASIX Your last dose was: Your next dose is: Take 1 Tab by mouth daily. 80 mg  
    
   
   
   
  
 insulin NPH/insulin regular 100 unit/mL (70-30) injection Commonly known as:  NOVOLIN 70/30, HUMULIN 70/30 Your last dose was: Your next dose is:    
   
   
 18 units with Breakfast and dinner LOMOTIL 2.5-0.025 mg per tablet Generic drug:  diphenoxylate-atropine Your last dose was: Your next dose is: Take 1 Tab by mouth. 1 Tab by mouth as needed for diarhea. Previous discontinued medication notice made in error 1 Tab NIFEdipine ER 30 mg ER tablet Commonly known as:  ADALAT CC Your last dose was: Your next dose is: Take 1 Tab by mouth daily. Indications: pressure, add to regimen 30 mg  
    
   
   
   
  
 SLOW  mg (45 mg iron) ER tablet Generic drug:  ferrous sulfate Your last dose was: Your next dose is: Take 1 Tab by mouth Daily (before breakfast). 1 Tab  
    
   
   
   
  
 sodium bicarbonate 650 mg tablet Your last dose was: Your next dose is: Take 1 Tab by mouth three (3) times daily. 650 mg  
    
   
   
   
  
 traZODone 50 mg tablet Commonly known as:  Timo German  
   
 Your last dose was: Your next dose is: Take 50 mg by mouth nightly. 50 mg Opioid Education Prescription Opioids: What You Need to Know: 
 
Prescription opioids can be used to help relieve moderate-to-severe pain and are often prescribed following a surgery or injury, or for certain health conditions. These medications can be an important part of treatment but also come with serious risks. Opioids are strong pain medicines. Examples include hydrocodone, oxycodone, fentanyl, and morphine. Heroin is an example of an illegal opioid. It is important to work with your health care provider to make sure you are getting the safest, most effective care. WHAT ARE THE RISKS AND SIDE EFFECTS OF OPIOID USE? Prescription opioids carry serious risks of addiction and overdose, especially with prolonged use. An opioid overdose, often marked by slow breathing, can cause sudden death. The use of prescription opioids can have a number of side effects as well, even when taken as directed. · Tolerance-meaning you might need to take more of a medication for the same pain relief · Physical dependence-meaning you have symptoms of withdrawal when the medication is stopped. Withdrawal symptoms can include nausea, sweating, chills, diarrhea, stomach cramps, and muscle aches. Withdrawal can last up to several weeks, depending on which drug you took and how long you took it. · Increased sensitivity to pain · Constipation · Nausea, vomiting, and dry mouth · Sleepiness and dizziness · Confusion · Depression · Low levels of testosterone that can result in lower sex drive, energy, and strength · Itching and sweating RISKS ARE GREATER WITH:      
· History of drug misuse, substance use disorder, or overdose · Mental health conditions (such as depression or anxiety) · Sleep apnea · Older age (72 years or older) · Pregnancy Avoid alcohol while taking prescription opioids. Also, unless specifically advised by your health care provider, medications to avoid include: · Benzodiazepines (such as Xanax or Valium) · Muscle relaxants (such as Soma or Flexeril) · Hypnotics (such as Ambien or Lunesta) · Other prescription opioids KNOW YOUR OPTIONS Talk to your health care provider about ways to manage your pain that don't involve prescription opioids. Some of these options may actually work better and have fewer risks and side effects. Options may include: 
· Pain relievers such as acetaminophen, ibuprofen, and naproxen · Some medications that are also used for depression or seizures · Physical therapy and exercise · Counseling to help patients learn how to cope better with triggers of pain and stress. · Application of heat or cold compress · Massage therapy · Relaxation techniques Be Informed Make sure you know the name of your medication, how much and how often to take it, and its potential risks & side effects. IF YOU ARE PRESCRIBED OPIOIDS FOR PAIN: 
· Never take opioids in greater amounts or more often than prescribed. Remember the goal is not to be pain-free but to manage your pain at a tolerable level. · Follow up with your primary care provider to: · Work together to create a plan on how to manage your pain. · Talk about ways to help manage your pain that don't involve prescription opioids. · Talk about any and all concerns and side effects. · Help prevent misuse and abuse. · Never sell or share prescription opioids · Help prevent misuse and abuse. · Store prescription opioids in a secure place and out of reach of others (this may include visitors, children, friends, and family).  
· Safely dispose of unused/unwanted prescription opioids: Find your community drug take-back program or your pharmacy mail-back program, or flush them down the toilet, following guidance from the Food and Drug Administration (www.fda.gov/Drugs/ResourcesForYou). · Visit www.cdc.gov/drugoverdose to learn about the risks of opioid abuse and overdose. · If you believe you may be struggling with addiction, tell your health care provider and ask for guidance or call Juan Jaramillo at 4-859-121-MUKQ. Discharge Instructions [facility] Special Procedures/Radiology Department Radiologist:   Dr. Kenneth Atkins Date:   7 3 2018 [facility] Cath Discharge Instructions Keep the dressing clean and dry. Keep dressing in place for three (3) days. If you see bleeding at the site, or blood on the dressing:  hold pressure to the area for at least 15 minutes. If you cannot get the bleeding to stop, go to the Emergency Room. Resume your previous diet and follow the medication reconciliation form. Rest today. Watch for signs of infection at the puncture site:  redness, swelling, pus, fever or chills. If this occurs, call your doctor immediately or go to the nearest Emergency Room. If you have any questions or concerns, please call [phone], and ask to speak to the nurse on-call. Introducing [facility] & HEALTH SERVICES! [employer] introduces [employer] patient portal. Now you can access parts of your medical record, email your doctor's office, and request medication refills online. 1. In your internet browser, go to [email or web address] 2. Click on the First Time User? Click Here link in the Sign In box. You will see the New Member Sign Up page. 3. Enter your [employer]St[employer] Access Code exactly as it appears below. You will not need to use this code after youve completed the sign-up process. If you do not sign up before the expiration date, you must request a new code. · [employer] Access Code: [email or web address] Expires: 9/24/2018  8:05 AM 
 
 4. Enter the last four digits of your Social Security Number (xxxx) and Date of Birth (mm/dd/yyyy) as indicated and click Submit. You will be taken to the next sign-up page. 5. Create a Cocrystal Discovery ID. This will be your [employer] login ID and cannot be changed, so think of one that is secure and easy to remember. 6. Create a [employer] password. You can change your password at any time. 7. Enter your Password Reset Question and Answer. This can be used at a later time if you forget your password. 8. Enter your e-mail address. You will receive e-mail notification when new information is available in [address] 9. Click Sign Up. You can now view and download portions of your medical record. 10. Click the Download Summary menu link to download a portable copy of your medical information. If you have questions, please visit the Frequently Asked Questions section of the [employer] website. Remember, [employer] is NOT to be used for urgent needs. For medical emergencies, dial 911. Now available from your iPhone and Android! Introducing Tal [facility]on As a [employer] patient, I wanted to make you aware of our electronic visit tool called [employer]. [employer] 24/7 allows you to connect within minutes with a medical provider 24 hours a day, seven days a week via a mobile device or tablet or logging into a secure website from your computer. You can access Tal Dickson from anywhere in the [place]. A virtual visit might be right for you when you have a simple condition and feel like you just dont want to get out of bed, or cant get away from work for an appointment, when your regular [employer] provider is not available (evenings, weekends or holidays), or when youre out of town and need minor care.   Electronic visits cost only $49 and if the Tal Dickson provider determines a prescription is needed to treat your condition, one can be electronically transmitted to a nearby pharmacy*. Please take a moment to enroll today if you have not already done so. The enrollment process is free and takes just a few minutes. To enroll, please download the [employer] 24/7 geovanna to your tablet or phone, or visit [email or web address] to enroll on your computer. And, as an [address] [place] [address] patient with a [employer] account, the results of your visits will be scanned into your electronic medical record and your primary care provider will be able to view the scanned results. We urge you to continue to see your regular [employer] provider for your ongoing medical care. And while your primary care provider may not be the one available when you seek a [employer] virtual visit, the peace of mind you get from getting a real diagnosis real time can be priceless. For more information on [employer], view our Frequently Asked Questions (FAQs) at [email or web address]. Sincerely, 
 
Luis Dunne MD 
Chief Medical Officer [facility] Perlita Bowman *:  certain medications cannot be prescribed via [employer] Providers Seen During Your Hospitalization Provider Specialty Primary office phone Mary Bajwa MD General Surgery [phone] Your Primary Care Physician (PCP) Primary Care Physician Office Phone Office Fax Britni Pace [phone] You are allergic to the following No active allergies Recent Documentation Smoking Status Never Smoker Emergency Contacts Name Discharge Info Relation Home Work Mobile Yanni Tapia DISCHARGE CAREGIVER [3] Spouse [3] [phone] Patient Belongings The following personal items are in your possession at time of discharge: 
     Visual Aid: None Please provide this summary of care documentation to your next provider. Signatures-by signing, you are acknowledging that this After Visit Summary has been reviewed with you and you have received a copy. Patient Signature:  ____________________________________________________________ Date:  ____________________________________________________________  
  
Orbie Odom Provider Signature:  ____________________________________________________________ Date:  ____________________________________________________________

## 2018-07-03 NOTE — PROGRESS NOTES
1. Have you been to the ER, urgent care clinic since your last visit? Hospitalized since your last visit? SEEN IN ER FOR FLUID BUILD UP IN LUNGS, KIDNEY ISSUES. 2. Have you seen or consulted any other health care providers outside of the [address] [place] [address] since your last visit? Include any pap smears or colon screening. -PCP.         Chief Complaint   Patient presents with    New Patient     CARDIAC CLEARANCE FOR FISTULA IN LF ARM-SWELLING IN ANKLES

## 2018-07-03 NOTE — DISCHARGE INSTRUCTIONS
[facility].  Special Procedures/Radiology Department      Radiologist:   Dr. Vick Pod    Date:   7 3 2018        [facility] Discharge Instructions    Keep the dressing clean and dry. Keep dressing in place for three (3) days. If you see bleeding at the site, or blood on the dressing:  hold pressure to the area for at least 15 minutes. If you cannot get the bleeding to stop, go to the Emergency Room. Resume your previous diet and follow the medication reconciliation form. Rest today. Watch for signs of infection at the puncture site:  redness, swelling, pus, fever or chills. If this occurs, call your doctor immediately or go to the nearest Emergency Room. If you have any questions or concerns, please call [phone], and ask to speak to the nurse on-call.

## 2018-07-03 NOTE — IP AVS SNAPSHOT
[address] [facility] 
[phone] Patient: Morris Garcia MRN: [id number] WQC:6/98/3772 A check  indicates which time of day the medication should be taken. My Medications ASK your doctor about these medications Instructions Each Dose to Equal  
 Morning Noon Evening Bedtime  
 acetaminophen-codeine 300-30 mg per tablet Commonly known as:  TYLENOL #3 Your last dose was: Your next dose is: Take 2 Tabs by mouth two (2) times daily as needed for Pain. Max Daily Amount: 4 Tabs. Indications: leg pain 2 Tab  
    
   
   
   
  
 aspirin 325 mg tablet Commonly known as:  ASPIRIN Your last dose was: Your next dose is: Take 325 mg by mouth daily. 325 mg  
    
   
   
   
  
 atorvastatin 20 mg tablet Commonly known as:  LIPITOR Your last dose was: Your next dose is: Take 20 mg by mouth daily. 20 mg  
    
   
   
   
  
 calcium carbonate 200 mg calcium (500 mg) Chew Commonly known as:  TUMS Your last dose was: Your next dose is: Take 1 Tab by mouth three (3) times daily (with meals). 1 Tab  
    
   
   
   
  
 carvedilol 12.5 mg tablet Commonly known as:  Laura Locket Your last dose was: Your next dose is: Take 1 Tab by mouth two (2) times daily (with meals). Indications: pressure and heart 12.5 mg  
    
   
   
   
  
 furosemide 80 mg tablet Commonly known as:  LASIX Your last dose was: Your next dose is: Take 1 Tab by mouth daily. 80 mg  
    
   
   
   
  
 insulin NPH/insulin regular 100 unit/mL (70-30) injection Commonly known as:  NOVOLIN 70/30, HUMULIN 70/30 Your last dose was: Your next dose is:    
   
   
 18 units with Breakfast and dinner LOMOTIL 2.5-0.025 mg per tablet Generic drug:  diphenoxylate-atropine Your last dose was: Your next dose is: Take 1 Tab by mouth. 1 Tab by mouth as needed for diarhea. Previous discontinued medication notice made in error 1 Tab NIFEdipine ER 30 mg ER tablet Commonly known as:  ADALAT CC Your last dose was: Your next dose is: Take 1 Tab by mouth daily. Indications: pressure, add to regimen 30 mg  
    
   
   
   
  
 SLOW  mg (45 mg iron) ER tablet Generic drug:  ferrous sulfate Your last dose was: Your next dose is: Take 1 Tab by mouth Daily (before breakfast). 1 Tab  
    
   
   
   
  
 sodium bicarbonate 650 mg tablet Your last dose was: Your next dose is: Take 1 Tab by mouth three (3) times daily. 650 mg  
    
   
   
   
  
 traZODone 50 mg tablet Commonly known as:  Calos Rubio Your last dose was: Your next dose is: Take 50 mg by mouth nightly.   
 50 mg

## 2018-07-03 NOTE — MR AVS SNAPSHOT
[facility] [phone] 
[phone] Patient: Roxanne Nascimento MRN: [id number] QCE:8/56/1805 Visit Information Date & Time Provider Department Dept. Phone Encounter #  
 7/3/2018 11:00 AM Maribel Boateng, [address] [place] [address] Cardiology Associate Yvette [phone] [id number] Follow-up Instructions Return in about 6 months (around 1/3/2019). Your Appointments 1/8/2019 11:15 AM  
ESTABLISHED PATIENT with Maribel Boateng MD  
[facility] Cardiology Associate Y[facility]) Appt Note: $100DP 7/3/18ksr / 6 month follow up  
 [address] [facility] [phone]  
[phone]  
  
   
 [address] [place] [address] [address] Upcoming Health Maintenance Date Due  
 EYE EXAM RETINAL OR DILATED Q1 4/17/2016 Pneumococcal 19-64 Highest Risk (2 of 3 - PPSV23) 2/1/2027* Influenza Age 5 to Adult 8/1/2018 HEMOGLOBIN A1C Q6M 10/30/2018 FOOT EXAM Q1 1/22/2019 LIPID PANEL Q1 1/22/2019 FOBT Q 1 YEAR AGE 50-75 4/23/2019 DTaP/Tdap/Td series (2 - Td) 1/22/2028 *Topic was postponed. The date shown is not the original due date. Allergies as of 7/3/2018  Review Complete On: 7/3/2018 By: Roya Riddle LPN No Known Allergies Current Immunizations  Reviewed on 4/30/2018 Name Date Influenza Vaccine 12/14/2016  4:38 PM, 11/25/2015, 10/4/2013 Influenza Vaccine (Quad) PF 2/14/2018  9:50 AM  
 Pneumococcal Conjugate (PCV-13) 12/14/2016  4:37 PM  
  
 Not reviewed this visit You Were Diagnosed With   
  
 Codes Comments Essential hypertension    -  Primary ICD-10-CM: I10 
ICD-9-CM: 401.9 Vitals BP Pulse Resp Height(growth percentile) Weight(growth percentile) SpO2  
 134/84 (BP 1 Location: Left arm, BP Patient Position: Sitting) 66 18 6' 2\" (1.88 m) 170 lb 12.8 oz (77.5 kg) 99% BMI Smoking Status 21.93 kg/m2 Never Smoker Vitals History BMI and BSA Data Body Mass Index Body Surface Area  
 21.93 kg/m 2 2.01 m 2 Preferred Pharmacy Pharmacy Name Phone [facility] [address] [facility], [address] [place] [address] Yusuf [address] Your Updated Medication List  
  
   
This list is accurate as of 7/3/18 11:23 AM.  Always use your most recent med list.  
  
  
  
  
 acetaminophen-codeine 300-30 mg per tablet Commonly known as:  TYLENOL #3 Take 2 Tabs by mouth two (2) times daily as needed for Pain. Max Daily Amount: 4 Tabs. Indications: leg pain  
  
 aspirin 325 mg tablet Commonly known as:  ASPIRIN Take 325 mg by mouth daily. atorvastatin 20 mg tablet Commonly known as:  LIPITOR Take 20 mg by mouth daily. calcium carbonate 200 mg calcium (500 mg) Chew Commonly known as:  TUMS Take 1 Tab by mouth three (3) times daily (with meals). carvedilol 12.5 mg tablet Commonly known as:  Janessa Jeremy Take 1 Tab by mouth two (2) times daily (with meals). Indications: pressure and heart  
  
 furosemide 80 mg tablet Commonly known as:  LASIX Take 1 Tab by mouth daily. insulin NPH/insulin regular 100 unit/mL (70-30) injection Commonly known as:  NOVOLIN 70/30, HUMULIN 70/30  
18 units with Breakfast and dinner LOMOTIL 2.5-0.025 mg per tablet Generic drug:  diphenoxylate-atropine Take 1 Tab by mouth. 1 Tab by mouth as needed for diarhea. Previous discontinued medication notice made in error NIFEdipine ER 30 mg ER tablet Commonly known as:  ADALAT CC Take 1 Tab by mouth daily. Indications: pressure, add to regimen SLOW  mg (45 mg iron) ER tablet Generic drug:  ferrous sulfate Take 1 Tab by mouth Daily (before breakfast). sodium bicarbonate 650 mg tablet Take 1 Tab by mouth three (3) times daily. traZODone 50 mg tablet Commonly known as:  Calos [place] Take 50 mg by mouth nightly. We Performed the Following AMB POC EKG ROUTINE W/ 12 LEADS, INTER & REP [52814 CPT(R)] Follow-up Instructions Return in about 6 months (around 1/3/2019). To-Do List   
 07/03/2018 1:30 PM  
  Appointment with Samia Ceja 1 at [facility] RAD ANGIO IR ([phone]) DIET RESTRICTIONS NPO, do not eat or drink 8 hours prior to test. Take all medications not requiring food with sip of water, excluding blood thinners and diabetic medications. GENERAL INSTRUCTIONS 1. Bring any non [facility] facility films/images pertaining to the area of interest with you on the day of appointment. 2. Bring a list of all medications you are currently taking, including over the counter medications. 3. A valid order with Physicians signature is required for all scheduled tests. 4. Blood thinners and platelet inhibitors must be stopped 3-5 days prior to procedure. Consult your ordering physician prior to stopping them. 5. Time given is ARRIVAL time, not procedure time. 6. You must have a  present before a procedure is started. 7. The procedure may last 1-1 ½ hours. You may be required to stay 4-6 hours after the procedure for observation. If you have any questions please direct them to your ordering physician. 07/05/2018 To Be Determined Appointment with Jayleen Taylor PTA at [place] [address]  
  
 07/12/2018 To Be Determined Appointment with Anderson Bass PT at [address] & HEALTH SERVICES! [employer] introduces [employer] patient portal. Now you can access parts of your medical record, email your doctor's office, and request medication refills online. 1. In your internet browser, go to [email or web address] 2. Click on the First Time User? Click Here link in the Sign In box. You will see the New Member Sign Up page. 3. Enter your Devex Access Code exactly as it appears below.  You will not need to use this code after youve completed the sign-up process. If you do not sign up before the expiration date, you must request a new code. · nanoMR Access Code: [email or web address] Expires: 9/24/2018  8:05 AM 
 
4. Enter the last four digits of your Social Security Number (xxxx) and Date of Birth (mm/dd/yyyy) as indicated and click Submit. You will be taken to the next sign-up page. 5. Create a nanoMR ID. This will be your nanoMR login ID and cannot be changed, so think of one that is secure and easy to remember. 6. Create a nanoMR password. You can change your password at any time. 7. Enter your Password Reset Question and Answer. This can be used at a later time if you forget your password. 8. Enter your e-mail address. You will receive e-mail notification when new information is available in [address] 9. Click Sign Up. You can now view and download portions of your medical record. 10. Click the Download Summary menu link to download a portable copy of your medical information. If you have questions, please visit the Frequently Asked Questions section of the nanoMR website. Remember, nanoMR is NOT to be used for urgent needs. For medical emergencies, dial 911. Now available from your iPhone and Android! Please provide this summary of care documentation to your next provider. Your primary care clinician is listed as Sumi Rivera. If you have any questions after today's visit, please call [phone].

## 2018-07-05 ENCOUNTER — HOME CARE VISIT (OUTPATIENT)
Dept: SCHEDULING | Facility: HOME HEALTH | Age: 58
End: 2018-07-05
Payer: COMMERCIAL

## 2018-07-05 PROCEDURE — G0157 HHC PT ASSISTANT EA 15: HCPCS

## 2018-07-06 VITALS — SYSTOLIC BLOOD PRESSURE: 170 MMHG | DIASTOLIC BLOOD PRESSURE: 90 MMHG

## 2018-07-10 ENCOUNTER — HOME CARE VISIT (OUTPATIENT)
Dept: SCHEDULING | Facility: HOME HEALTH | Age: 58
End: 2018-07-10
Payer: COMMERCIAL

## 2018-07-10 PROCEDURE — G0151 HHCP-SERV OF PT,EA 15 MIN: HCPCS

## 2018-07-11 VITALS
DIASTOLIC BLOOD PRESSURE: 90 MMHG | HEART RATE: 68 BPM | OXYGEN SATURATION: 99 % | TEMPERATURE: 96.8 F | SYSTOLIC BLOOD PRESSURE: 140 MMHG

## 2018-07-16 ENCOUNTER — TELEPHONE (OUTPATIENT)
Dept: SURGERY | Age: 58
End: 2018-07-16

## 2018-07-16 NOTE — TELEPHONE ENCOUNTER
Spoke with Ms Tamiko Retana. Due to extenuating circumstances, Dr Steff Rodriguez unable to do surgery 7/17/18. Rescheduled Mr Tamiko Retana to Tuesday, 7/24/18. Ms Tamiko Retana will have pt take aspirin today, 7/17/18, 7/18/18 & hold aspirin after July 18th until after surgery.

## 2018-07-17 ENCOUNTER — OFFICE VISIT (OUTPATIENT)
Dept: FAMILY MEDICINE CLINIC | Age: 58
End: 2018-07-17

## 2018-07-17 VITALS
RESPIRATION RATE: 14 BRPM | BODY MASS INDEX: 22.1 KG/M2 | TEMPERATURE: 98.5 F | DIASTOLIC BLOOD PRESSURE: 100 MMHG | WEIGHT: 172.2 LBS | HEART RATE: 80 BPM | HEIGHT: 74 IN | OXYGEN SATURATION: 98 % | SYSTOLIC BLOOD PRESSURE: 150 MMHG

## 2018-07-17 DIAGNOSIS — E11.21 TYPE 2 DIABETES MELLITUS WITH DIABETIC NEPHROPATHY, WITH LONG-TERM CURRENT USE OF INSULIN (HCC): ICD-10-CM

## 2018-07-17 DIAGNOSIS — Z79.4 TYPE 2 DIABETES MELLITUS WITH DIABETIC NEPHROPATHY, WITH LONG-TERM CURRENT USE OF INSULIN (HCC): ICD-10-CM

## 2018-07-17 DIAGNOSIS — I10 ESSENTIAL HYPERTENSION: ICD-10-CM

## 2018-07-17 DIAGNOSIS — N18.5 CKD STAGE 5 DUE TO TYPE 2 DIABETES MELLITUS (HCC): Primary | ICD-10-CM

## 2018-07-17 DIAGNOSIS — Z86.39 HISTORY OF HYPOGLYCEMIA: ICD-10-CM

## 2018-07-17 DIAGNOSIS — E11.22 CKD STAGE 5 DUE TO TYPE 2 DIABETES MELLITUS (HCC): Primary | ICD-10-CM

## 2018-07-17 DIAGNOSIS — E78.00 PURE HYPERCHOLESTEROLEMIA: ICD-10-CM

## 2018-07-17 DIAGNOSIS — N18.6 ESRD (END STAGE RENAL DISEASE) ON DIALYSIS (HCC): ICD-10-CM

## 2018-07-17 DIAGNOSIS — Z99.2 ESRD (END STAGE RENAL DISEASE) ON DIALYSIS (HCC): ICD-10-CM

## 2018-07-17 NOTE — PATIENT INSTRUCTIONS
If you have any questions regarding [employer], you may call [employer] support at [phone]. If you have any questions regarding [employer], you may call [employer] support at [phone].
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
## 2018-07-17 NOTE — PROGRESS NOTES
1. Have you been to the ER, urgent care clinic since your last visit? Hospitalized since your last visit? No    2. Have you seen or consulted any other health care providers outside of the [facility] since your last visit? Include any pap smears or colon screening.  Yes

## 2018-07-17 NOTE — PROGRESS NOTES
Taylor Paulino is a 66 y.o. male presenting for/with: Well Male (PT WANTS TO TALK ABOUT)    HPI:  CAP with empyema  PT doing well since last visit. Never saw Pulm or CT surgery in [place] after d/c. Was followed by Dr. Hero Alcantara there. Supposed to have been seen in June. Chest tube sites have healed fine. No pain to area, and breathing is good. No cough. Diabetes with hx recent spell of DKA. Feeling ok today. Edema better. Sugars controlled better. Brought log today. Checking BID now. Running low to mid 100's with occ low 200. Hypoglycemia: some mild lows to 70's-80's after dialysis (gets 3d/week). Current medications include insulin novolin 70/30, taking 10 units BID ac. Taking regularly. Lab Results   Component Value Date/Time    Potassium 3.7 05/01/2018 05:03 AM     Lab Results   Component Value Date/Time    Hemoglobin A1c 8.7 (H) 04/30/2018 01:55 AM    Hemoglobin A1c 9.9 (H) 04/24/2018 01:23 AM    Hemoglobin A1c 10.5 (H) 03/16/2018 12:06 AM    Glucose 82 05/01/2018 05:03 AM    Glucose (POC) 177 (H) 07/03/2018 01:53 PM    Microalb/Creat ratio (ug/mg creat.) 3487.6 (H) 11/09/2016 04:11 PM    Microalbumin/creat ratio (POC) >300 01/22/2018 03:30 PM    LDL, calculated 48 01/22/2018 03:55 PM    Creatinine 2.33 (H) 05/01/2018 05:03 AM     Lab Results   Component Value Date/Time    Microalb/Creat ratio (ug/mg creat.) 3487.6 (H) 11/09/2016 04:11 PM     Hypertension and CKD5/dialysis. Blood pressures good today, pt reports good on checks at dialysis too. Med bottle check shows again no nifedipine ER 30mg pills, but pt says taking those regularly. Has bottle coreg 12.5 BID. Empty bottle lasix at 40mg daily. Weight is down. On dialysis with DR. Chika Harley, seen at [facility] clinic. Symptoms include edema (mild). Patient denies chest pain, palpitations, orthopnea.   Lab review:   Lab Results   Component Value Date/Time    Sodium 136 05/01/2018 05:03 AM    Potassium 3.7 05/01/2018 05:03 AM    Chloride 102 05/01/2018 05:03 AM    CO2 29 05/01/2018 05:03 AM    Anion gap 5 05/01/2018 05:03 AM    Glucose 82 05/01/2018 05:03 AM    BUN 19 05/01/2018 05:03 AM    Creatinine 2.33 (H) 05/01/2018 05:03 AM    BUN/Creatinine ratio 8 (L) 05/01/2018 05:03 AM    GFR est AA 35 (L) 05/01/2018 05:03 AM    GFR est non-AA 29 (L) 05/01/2018 05:03 AM    Calcium 7.2 (L) 05/01/2018 05:03 AM     Hyperlipidemia. On lipitor 20. Sandee well. No myalgias, arthralgias, unusual weakness. Lab Results   Component Value Date/Time    Cholesterol, total 115 01/22/2018 03:55 PM    HDL Cholesterol 44 01/22/2018 03:55 PM    LDL, calculated 48 01/22/2018 03:55 PM    VLDL, calculated 23 01/22/2018 03:55 PM    Triglyceride 117 01/22/2018 03:55 PM     Lab Results   Component Value Date/Time    ALT (SGPT) 9 (L) 04/29/2018 04:47 AM    AST (SGOT) 18 04/29/2018 04:47 AM    Alk.  phosphatase 115 04/29/2018 04:47 AM    Bilirubin, total 0.3 04/29/2018 04:47 AM     ROS:  Constitutional: No fever, chills or weight loss  Respiratory: No cough, SOB   CV: No chest pain or Palpitations    Visit Vitals    BP (!) 150/100 (BP 1 Location: Left arm, BP Patient Position: Sitting)    Pulse 80    Temp 98.5 °F (36.9 °C) (Oral)    Resp 14    Ht 6' 2\" (1.88 m)    Wt 172 lb 3.2 oz (78.1 kg)    SpO2 98%    BMI 22.11 kg/m2     Wt Readings from Last 3 Encounters:   07/17/18 172 lb 3.2 oz (78.1 kg)   07/03/18 170 lb 12.8 oz (77.5 kg)   07/12/18 175 lb (79.4 kg)   -5#  BP Readings from Last 3 Encounters:   07/17/18 (!) 150/100   07/10/18 140/90   07/05/18 170/90     Physical Examination: General appearance - alert, thin, but more perky appearing AA M in no discomfort  Mental status - alert, oriented to person, place, and time  Eyes - pupils equal and reactive, extraocular eye movements intact  ENT - bilateral external ears and nose normal. Normal lips  Neck - supple, no significant adenopathy, no thyromegaly or mass  Lymphatics - no palpable lymphadenopathy, no hepatosplenomegaly  Chest - clear to auscultation, no wheezes, rales or rhonchi, symmetric air entry. Heart - normal rate, regular rhythm, normal S1, S2, no murmurs, rubs, clicks or gallops  Extremities - peripheral pulses normal, 1+ pedal edema, no weeping, no clubbing or cyanosis. A/p:  DM2, with recent DKA episode and CKD5, now on dialysis. Sugar doing a lot better, but having lows again. Cut to 8 units on dialysis days, keep 10 units on non-dialysis days, and con't 10 units before dinner. Eat snack of high protein, low K+ food (like unsalted almonds, protein bar, etc) during dialysis. Con't sugar checks with breakfast and dinner meals. Con't to bring log to visits. Labs will be mostly done at dialysis now. Done with HH, keep doing exercises for strengthening. Get labs from renal. Will set pt up for endocrine eval, pt wants to start driving again, and he needs endo eval for that. HTN  Up again today. Needs to get his adalat CC filled. Primary mgmt will be with renal Dr. Hoskins Generous now, I would appreciate it if she would start ordering the BP pills, since I really can't adjust them around the dialysis very well. Con't dialysis 3/wk per renal. Get notes and labs from renal.    CAP with empyema and decortication  Lungs clear today. Doing well. Not sure when pt seeing CT surgeon for f/u. Will check on that.     F/U 2mo

## 2018-07-23 NOTE — PERIOP NOTES
[facility] Preoperative Instructions Surgery Date 7/24/18          Time of Arrival 1100 contact # [phone] 1. On the day of your surgery, please report to the Surgical Services Registration Desk and sign in at your designated time. The Surgery Center is located to the right of the Emergency Room. 2. You must have someone with you to drive you home. You should not drive a car for 24 hours following surgery. Please make arrangements for a friend or family member to stay with you for the first 24 hours after your surgery. 3. Do not have anything to eat or drink (including water, gum, mints, coffee, juice) after midnight 7/23/18    . ? This may not apply to medications prescribed by your physician. ?(Please note below the special instructions with medications to take the morning of your procedure.) 4. We recommend you do not drink any alcoholic beverages for 24 hours before and after your surgery. 5. Contact your surgeons office for instructions on the following medications: non-steroidal anti-inflammatory drugs (i.e. Advil, Aleve), vitamins, and supplements. (Some surgeons will want you to stop these medications prior to surgery and others may allow you to take them) **If you are currently taking Plavix, Coumadin, Aspirin and/or other blood-thinning agents, contact your surgeon for instructions. ** Your surgeon will partner with the physician prescribing these medications to determine if it is safe to stop or if you need to continue taking. Please do not stop taking these medications without instructions from your surgeon 6. Wear comfortable clothes. Wear glasses instead of contacts. Do not bring any money or jewelry. Please bring picture ID, insurance card, and any prearranged co-payment or hospital payment. Do not wear make-up, particularly mascara the morning of your surgery. Do not wear nail polish, particularly if you are having foot /hand surgery.   Wear your hair loose or down, no ponytails, buns, yoly pins or clips. All body piercings must be removed. Please shower with antibacterial soap for three consecutive days before and on the morning of surgery, but do not apply any lotions, powders or deodorants after the shower on the day of surgery. Please use a fresh towels after each shower. Please sleep in clean clothes and change bed linens the night before surgery. Please do not shave for 48 hours prior to surgery. Shaving of the face is acceptable. 7. You should understand that if you do not follow these instructions your surgery may be cancelled. If your physical condition changes (I.e. fever, cold or flu) please contact your surgeon as soon as possible. 8. It is important that you be on time. If a situation occurs where you may be late, please call [phone] (OR Holding Area). 9. If you have any questions and or problems, please call [phone] (Pre-admission Testing). 10. Your surgery time may be subject to change. You will receive a phone call the evening prior if your time changes. 11.  If having outpatient surgery, you must have someone to drive you here, stay with you during the duration of your stay, and to drive you home at time of discharge. 12.   In an effort to improve the efficiency, privacy, and safety for all of our Pre-op patients visitors are not allowed in the Holding area. Once you arrive and are registered your family/visitors will be asked to remain in the waiting room. The Pre-op staff will get you from the Surgical Waiting Area and will explain to you and your family/visitors that the Pre-op phase is beginning. The staff will answer any questions and provide instructions for tracking of the patient, by use of the existing tracking number and color-coded status board in the waiting room.   At this time the staff will also ask for your designated spokesperson information in the event that the physician or staff need to provide an update or obtain any pertinent information. The designated spokesperson will be notified if the physician needs to speak to family during the pre-operative phase. If at any time your family/visitors has questions or concerns they may approach the volunteer desk in the waiting area for assistance. Special Instructions: none MEDICATIONS TO TAKE THE MORNING OF SURGERY WITH A SIP OF WATER: nifedipine, carvedilol I understand a pre-operative phone call will be made to verify my surgery time. In the event that I am not available, I give permission for a message to be left on my answering service and/or with another person? yes 
 
 
 
 ___________________      __________   _________ 
  (Signature of Patient)             (Witness)                (Date and Time)

## 2018-07-24 ENCOUNTER — ANESTHESIA (OUTPATIENT)
Dept: SURGERY | Age: 58
End: 2018-07-24
Payer: SELF-PAY

## 2018-07-24 ENCOUNTER — ANESTHESIA EVENT (OUTPATIENT)
Dept: SURGERY | Age: 58
End: 2018-07-24
Payer: SELF-PAY

## 2018-07-24 ENCOUNTER — HOSPITAL ENCOUNTER (OUTPATIENT)
Age: 58
Setting detail: OUTPATIENT SURGERY
Discharge: HOME | End: 2018-07-24
Attending: SURGERY | Admitting: SURGERY
Payer: SELF-PAY

## 2018-07-24 ENCOUNTER — TELEPHONE (OUTPATIENT)
Dept: SURGERY | Age: 58
End: 2018-07-24

## 2018-07-24 VITALS
WEIGHT: 175.71 LBS | HEIGHT: 74 IN | RESPIRATION RATE: 17 BRPM | TEMPERATURE: 97.9 F | SYSTOLIC BLOOD PRESSURE: 129 MMHG | HEART RATE: 79 BPM | BODY MASS INDEX: 22.55 KG/M2 | DIASTOLIC BLOOD PRESSURE: 82 MMHG | OXYGEN SATURATION: 97 %

## 2018-07-24 DIAGNOSIS — L76.82 PAIN AT SURGICAL INCISION: Primary | ICD-10-CM

## 2018-07-24 LAB
ANION GAP BLD CALC-SCNC: 14 MMOL/L (ref 10–20)
BUN BLD-MCNC: 41 MG/DL (ref 9–20)
CA-I BLD-MCNC: 1.08 MMOL/L (ref 1.12–1.32)
CHLORIDE BLD-SCNC: 98 MMOL/L (ref 98–107)
CO2 BLD-SCNC: 29 MMOL/L (ref 21–32)
CREAT BLD-MCNC: 4.2 MG/DL (ref 0.6–1.3)
GLUCOSE BLD STRIP.AUTO-MCNC: 145 MG/DL (ref 65–100)
GLUCOSE BLD STRIP.AUTO-MCNC: 81 MG/DL (ref 65–100)
GLUCOSE BLD-MCNC: 167 MG/DL (ref 65–100)
HCT VFR BLD CALC: 38 % (ref 36.6–50.3)
POTASSIUM BLD-SCNC: 4.5 MMOL/L (ref 3.5–5.1)
SERVICE CMNT-IMP: ABNORMAL
SERVICE CMNT-IMP: ABNORMAL
SERVICE CMNT-IMP: NORMAL
SODIUM BLD-SCNC: 136 MMOL/L (ref 136–145)

## 2018-07-24 PROCEDURE — 76210000021 HC REC RM PH II 0.5 TO 1 HR: Performed by: SURGERY

## 2018-07-24 PROCEDURE — 77030002888 HC SUT CHRMC J&J -A: Performed by: SURGERY

## 2018-07-24 PROCEDURE — 77030020255 HC SOL INJ LR 1000ML BG: Performed by: SURGERY

## 2018-07-24 PROCEDURE — 77030008684 HC TU ET CUF COVD -B: Performed by: ANESTHESIOLOGY

## 2018-07-24 PROCEDURE — 76210000063 HC OR PH I REC FIRST 0.5 HR: Performed by: SURGERY

## 2018-07-24 PROCEDURE — 77030002916 HC SUT ETHLN J&J -A: Performed by: SURGERY

## 2018-07-24 PROCEDURE — 74011250637 HC RX REV CODE- 250/637

## 2018-07-24 PROCEDURE — 77030026438 HC STYL ET INTUB CARD -A: Performed by: ANESTHESIOLOGY

## 2018-07-24 PROCEDURE — 77030021678 HC GLIDESCP STAT DISP VERT -B: Performed by: ANESTHESIOLOGY

## 2018-07-24 PROCEDURE — 77030002987 HC SUT PROL J&J -B: Performed by: SURGERY

## 2018-07-24 PROCEDURE — 74011250636 HC RX REV CODE- 250/636: Performed by: ANESTHESIOLOGY

## 2018-07-24 PROCEDURE — 80047 BASIC METABLC PNL IONIZED CA: CPT

## 2018-07-24 PROCEDURE — 74011250636 HC RX REV CODE- 250/636

## 2018-07-24 PROCEDURE — 74011250636 HC RX REV CODE- 250/636: Performed by: SURGERY

## 2018-07-24 PROCEDURE — 77030032490 HC SLV COMPR SCD KNE COVD -B: Performed by: SURGERY

## 2018-07-24 PROCEDURE — 76010000131 HC OR TIME 2 TO 2.5 HR: Performed by: SURGERY

## 2018-07-24 PROCEDURE — 77030020863 HC CLMP VASC BULDG SCAN -A: Performed by: SURGERY

## 2018-07-24 PROCEDURE — 77030013567 HC DRN WND RESERV BARD -A: Performed by: SURGERY

## 2018-07-24 PROCEDURE — 77030002986 HC SUT PROL J&J -A: Performed by: SURGERY

## 2018-07-24 PROCEDURE — 77030011640 HC PAD GRND REM COVD -A: Performed by: SURGERY

## 2018-07-24 PROCEDURE — 74011000250 HC RX REV CODE- 250: Performed by: SURGERY

## 2018-07-24 PROCEDURE — 77030020782 HC GWN BAIR PAWS FLX 3M -B

## 2018-07-24 PROCEDURE — 77030020061 HC IV BLD WRMR ADMIN SET 3M -B: Performed by: ANESTHESIOLOGY

## 2018-07-24 PROCEDURE — 82962 GLUCOSE BLOOD TEST: CPT

## 2018-07-24 PROCEDURE — 74011000272 HC RX REV CODE- 272: Performed by: SURGERY

## 2018-07-24 PROCEDURE — 74011000250 HC RX REV CODE- 250

## 2018-07-24 PROCEDURE — 77030002996 HC SUT SLK J&J -A: Performed by: SURGERY

## 2018-07-24 PROCEDURE — 77030008467 HC STPLR SKN COVD -B: Performed by: SURGERY

## 2018-07-24 PROCEDURE — 77030020153 HC PRB DOPLR DISP MIZU -C: Performed by: SURGERY

## 2018-07-24 PROCEDURE — 76060000035 HC ANESTHESIA 2 TO 2.5 HR: Performed by: SURGERY

## 2018-07-24 RX ORDER — LIDOCAINE HYDROCHLORIDE 10 MG/ML
0.1 INJECTION, SOLUTION EPIDURAL; INFILTRATION; INTRACAUDAL; PERINEURAL AS NEEDED
Status: DISCONTINUED | OUTPATIENT
Start: 2018-07-24 | End: 2018-07-24 | Stop reason: HOSPADM

## 2018-07-24 RX ORDER — SODIUM CHLORIDE, SODIUM LACTATE, POTASSIUM CHLORIDE, CALCIUM CHLORIDE 600; 310; 30; 20 MG/100ML; MG/100ML; MG/100ML; MG/100ML
25 INJECTION, SOLUTION INTRAVENOUS CONTINUOUS
Status: DISCONTINUED | OUTPATIENT
Start: 2018-07-24 | End: 2018-07-24 | Stop reason: HOSPADM

## 2018-07-24 RX ORDER — LIDOCAINE HYDROCHLORIDE 20 MG/ML
INJECTION, SOLUTION EPIDURAL; INFILTRATION; INTRACAUDAL; PERINEURAL AS NEEDED
Status: DISCONTINUED | OUTPATIENT
Start: 2018-07-24 | End: 2018-07-24 | Stop reason: HOSPADM

## 2018-07-24 RX ORDER — ONDANSETRON 2 MG/ML
INJECTION INTRAMUSCULAR; INTRAVENOUS AS NEEDED
Status: DISCONTINUED | OUTPATIENT
Start: 2018-07-24 | End: 2018-07-24 | Stop reason: HOSPADM

## 2018-07-24 RX ORDER — SODIUM CHLORIDE 9 MG/ML
25 INJECTION, SOLUTION INTRAVENOUS CONTINUOUS
Status: DISCONTINUED | OUTPATIENT
Start: 2018-07-24 | End: 2018-07-24 | Stop reason: HOSPADM

## 2018-07-24 RX ORDER — FENTANYL CITRATE 50 UG/ML
INJECTION, SOLUTION INTRAMUSCULAR; INTRAVENOUS AS NEEDED
Status: DISCONTINUED | OUTPATIENT
Start: 2018-07-24 | End: 2018-07-24 | Stop reason: HOSPADM

## 2018-07-24 RX ORDER — DEXAMETHASONE SODIUM PHOSPHATE 4 MG/ML
INJECTION, SOLUTION INTRA-ARTICULAR; INTRALESIONAL; INTRAMUSCULAR; INTRAVENOUS; SOFT TISSUE AS NEEDED
Status: DISCONTINUED | OUTPATIENT
Start: 2018-07-24 | End: 2018-07-24 | Stop reason: HOSPADM

## 2018-07-24 RX ORDER — MORPHINE SULFATE 10 MG/ML
2 INJECTION, SOLUTION INTRAMUSCULAR; INTRAVENOUS
Status: DISCONTINUED | OUTPATIENT
Start: 2018-07-24 | End: 2018-07-24 | Stop reason: HOSPADM

## 2018-07-24 RX ORDER — HYDROCODONE BITARTRATE AND ACETAMINOPHEN 5; 325 MG/1; MG/1
1 TABLET ORAL
Qty: 25 TAB | Refills: 0 | Status: ON HOLD | OUTPATIENT
Start: 2018-07-24 | End: 2018-11-14 | Stop reason: SDUPTHER

## 2018-07-24 RX ORDER — FENTANYL CITRATE 50 UG/ML
50 INJECTION, SOLUTION INTRAMUSCULAR; INTRAVENOUS AS NEEDED
Status: DISCONTINUED | OUTPATIENT
Start: 2018-07-24 | End: 2018-07-24 | Stop reason: HOSPADM

## 2018-07-24 RX ORDER — MIDAZOLAM HYDROCHLORIDE 1 MG/ML
1 INJECTION, SOLUTION INTRAMUSCULAR; INTRAVENOUS AS NEEDED
Status: DISCONTINUED | OUTPATIENT
Start: 2018-07-24 | End: 2018-07-24 | Stop reason: HOSPADM

## 2018-07-24 RX ORDER — PROPOFOL 10 MG/ML
INJECTION, EMULSION INTRAVENOUS AS NEEDED
Status: DISCONTINUED | OUTPATIENT
Start: 2018-07-24 | End: 2018-07-24 | Stop reason: HOSPADM

## 2018-07-24 RX ORDER — ACETAMINOPHEN 10 MG/ML
INJECTION, SOLUTION INTRAVENOUS AS NEEDED
Status: DISCONTINUED | OUTPATIENT
Start: 2018-07-24 | End: 2018-07-24 | Stop reason: HOSPADM

## 2018-07-24 RX ORDER — SUCCINYLCHOLINE CHLORIDE 20 MG/ML
INJECTION INTRAMUSCULAR; INTRAVENOUS AS NEEDED
Status: DISCONTINUED | OUTPATIENT
Start: 2018-07-24 | End: 2018-07-24 | Stop reason: HOSPADM

## 2018-07-24 RX ORDER — HYDROCODONE BITARTRATE AND ACETAMINOPHEN 5; 325 MG/1; MG/1
1 TABLET ORAL
Status: COMPLETED | OUTPATIENT
Start: 2018-07-24 | End: 2018-07-24

## 2018-07-24 RX ORDER — CEFAZOLIN SODIUM/WATER 2 G/20 ML
2 SYRINGE (ML) INTRAVENOUS ONCE
Status: COMPLETED | OUTPATIENT
Start: 2018-07-25 | End: 2018-07-24

## 2018-07-24 RX ORDER — HYDRALAZINE HYDROCHLORIDE 20 MG/ML
5 INJECTION INTRAMUSCULAR; INTRAVENOUS AS NEEDED
Status: DISCONTINUED | OUTPATIENT
Start: 2018-07-24 | End: 2018-07-25 | Stop reason: HOSPADM

## 2018-07-24 RX ORDER — ROCURONIUM BROMIDE 10 MG/ML
INJECTION, SOLUTION INTRAVENOUS AS NEEDED
Status: DISCONTINUED | OUTPATIENT
Start: 2018-07-24 | End: 2018-07-24 | Stop reason: HOSPADM

## 2018-07-24 RX ORDER — FENTANYL CITRATE 50 UG/ML
25 INJECTION, SOLUTION INTRAMUSCULAR; INTRAVENOUS
Status: DISCONTINUED | OUTPATIENT
Start: 2018-07-24 | End: 2018-07-24 | Stop reason: HOSPADM

## 2018-07-24 RX ORDER — ONDANSETRON 2 MG/ML
4 INJECTION INTRAMUSCULAR; INTRAVENOUS AS NEEDED
Status: DISCONTINUED | OUTPATIENT
Start: 2018-07-24 | End: 2018-07-24 | Stop reason: HOSPADM

## 2018-07-24 RX ORDER — PHENYLEPHRINE HCL IN 0.9% NACL 0.4MG/10ML
SYRINGE (ML) INTRAVENOUS AS NEEDED
Status: DISCONTINUED | OUTPATIENT
Start: 2018-07-24 | End: 2018-07-24 | Stop reason: HOSPADM

## 2018-07-24 RX ORDER — HYDROCODONE BITARTRATE AND ACETAMINOPHEN 5; 325 MG/1; MG/1
TABLET ORAL
Status: COMPLETED
Start: 2018-07-24 | End: 2018-07-24

## 2018-07-24 RX ORDER — DIPHENHYDRAMINE HYDROCHLORIDE 50 MG/ML
12.5 INJECTION, SOLUTION INTRAMUSCULAR; INTRAVENOUS AS NEEDED
Status: DISCONTINUED | OUTPATIENT
Start: 2018-07-24 | End: 2018-07-24 | Stop reason: HOSPADM

## 2018-07-24 RX ADMIN — PROPOFOL 20 MG: 10 INJECTION, EMULSION INTRAVENOUS at 15:52

## 2018-07-24 RX ADMIN — DEXAMETHASONE SODIUM PHOSPHATE 4 MG: 4 INJECTION, SOLUTION INTRA-ARTICULAR; INTRALESIONAL; INTRAMUSCULAR; INTRAVENOUS; SOFT TISSUE at 16:06

## 2018-07-24 RX ADMIN — FENTANYL CITRATE 100 MCG: 50 INJECTION, SOLUTION INTRAMUSCULAR; INTRAVENOUS at 15:48

## 2018-07-24 RX ADMIN — ROCURONIUM BROMIDE 5 MG: 10 INJECTION, SOLUTION INTRAVENOUS at 15:47

## 2018-07-24 RX ADMIN — Medication 40 MCG: at 16:04

## 2018-07-24 RX ADMIN — HYDROCODONE BITARTRATE AND ACETAMINOPHEN 1 TABLET: 5; 325 TABLET ORAL at 18:33

## 2018-07-24 RX ADMIN — PROPOFOL 80 MG: 10 INJECTION, EMULSION INTRAVENOUS at 15:48

## 2018-07-24 RX ADMIN — ACETAMINOPHEN 1000 MG: 10 INJECTION, SOLUTION INTRAVENOUS at 16:06

## 2018-07-24 RX ADMIN — SUCCINYLCHOLINE CHLORIDE 80 MG: 20 INJECTION INTRAMUSCULAR; INTRAVENOUS at 15:53

## 2018-07-24 RX ADMIN — HYDRALAZINE HYDROCHLORIDE 5 MG: 20 INJECTION INTRAMUSCULAR; INTRAVENOUS at 15:33

## 2018-07-24 RX ADMIN — Medication 40 MCG: at 16:02

## 2018-07-24 RX ADMIN — PROPOFOL 20 MG: 10 INJECTION, EMULSION INTRAVENOUS at 15:50

## 2018-07-24 RX ADMIN — LIDOCAINE HYDROCHLORIDE 80 MG: 20 INJECTION, SOLUTION EPIDURAL; INFILTRATION; INTRACAUDAL; PERINEURAL at 15:47

## 2018-07-24 RX ADMIN — Medication 2 G: at 16:00

## 2018-07-24 RX ADMIN — SODIUM CHLORIDE: 900 INJECTION, SOLUTION INTRAVENOUS at 15:27

## 2018-07-24 RX ADMIN — ONDANSETRON 4 MG: 2 INJECTION INTRAMUSCULAR; INTRAVENOUS at 17:32

## 2018-07-24 NOTE — BRIEF OP NOTE
BRIEF OPERATIVE NOTE    Date of Procedure: 7/24/2018   Preoperative Diagnosis: END STAGE RENAL DISEASE  Postoperative Diagnosis: END STAGE RENAL DISEASE    Procedure(s):  CREATION AV FISTULA RIGHT ARM   Surgeon(s) and Role:     * Shannan Lau MD - Primary         Surgical Assistant: Staff    Surgical Staff:  Circ-1: Franc Cooper RN  Circ-Relief: Lexie Bullard RN  Scrub Tech-1: Milagros Fierro  Surg Asst-1: Deuce Castro  Event Time In   Incision Start 1619   Incision Close 1732     Anesthesia: General   Estimated Blood Loss: minimal  Specimens: * No specimens in log *   Findings: Palpable thrill in AV fistula and palpable pulse in radial artery at the wrist post op.    Complications: none  Implants: * No implants in log *

## 2018-07-24 NOTE — DISCHARGE INSTRUCTIONS
Discharge Instructions Following AV Fistula Surgery        Keep dressing in place for two days. Keep incision dry for two days. No lifting over fifteen pounds with operated arm for two weeks. See Dr. Mandy Yip in the office in two weeks - call for appointment - [phone]. Take pain medications as prescribed as needed. Do not drive while taking narcotic pain medication. Do not restart taking Aspirin until Friday 7/27/2018    For any problems, call Dr. Mandy Yip at [phone] or [phone] (after hours)      MIKAYLA Stratton MD    Acceptable foods for the first 24 hours following surgery include but are not limited to:    soup  broth   toast   crackers   applesauce   bananas   mashed potatoes,  soft or scrambled eggs  oatmeal   jello    It is important to eat when taking your pain medication. This will help to prevent nausea. If possible, please try to time your meals with your medications. It is very important to stay hydrated following surgery. Sip fluids frequently while awake. Avoid acidic drinks such as citrus juices and soda for 24 hours. Carbonated beverages may cause bloating and gas. Acceptable fluids include:    water (flavor packets may add variety)  coffee or tea (in moderation)  Gatorade  Dexter-aid  apple juice  cranberry juice    You are encouraged to cough and deep breathe every hour when awake. This will help to prevent respiratory complications following anesthesia. You may want to hug a pillow when coughing and sneezing to add additional support to the surgical area and to decrease discomfort if you had abdominal or chest surgery. Please take time to review all of your Home Care Instructions and Medication Information sheets provided in your discharge packet. If you have any questions, please contact your surgeons office. Thank you.       DISCHARGE SUMMARY from Nurse    PATIENT INSTRUCTIONS:    After general anesthesia or intravenous sedation, for 24 hours or while taking prescription Narcotics:  · Limit your activities  · Do not drive and operate hazardous machinery  · Do not make important personal or business decisions  · Do  not drink alcoholic beverages. Report the following to your surgeon:  · Excessive pain, swelling, redness or odor of or around the surgical area  · Temperature over 100.5  · Nausea and vomiting lasting longer than 4 hours or if unable to take medications  · Any signs of decreased circulation or nerve impairment to extremity: change in color, persistent  numbness, tingling, coldness or increase pain  · Any questions      *  Please give a list of your current medications to your Primary Care Provider. *  Please update this list whenever your medications are discontinued, doses are      changed, or new medications (including over-the-counter products) are added. *  Please carry medication information at all times in case of emergency situations. These are general instructions for a healthy lifestyle:    No smoking/ No tobacco products/ Avoid exposure to second hand smoke  Surgeon General's Warning:  Quitting smoking now greatly reduces serious risk to your health. Obesity, smoking, and sedentary lifestyle greatly increases your risk for illness    A healthy diet, regular physical exercise & weight monitoring are important for maintaining a healthy lifestyle    You may be retaining fluid if you have a history of heart failure or if you experience any of the following symptoms:  Weight gain of 3 pounds or more overnight or 5 pounds in a week, increased swelling in our hands or feet or shortness of breath while lying flat in bed. Please call your doctor as soon as you notice any of these symptoms; do not wait until your next office visit.     Recognize signs and symptoms of STROKE:    F-face looks uneven    A-arms unable to move or move unevenly    S-speech slurred or non-existent    T-time-call 911 as soon as signs and symptoms begin-DO NOT go Back to bed or wait to see if you get better-TIME IS BRAIN. Warning Signs of HEART ATTACK     Call 911 if you have these symptoms:   Chest discomfort. Most heart attacks involve discomfort in the center of the chest that lasts more than a few minutes, or that goes away and comes back. It can feel like uncomfortable pressure, squeezing, fullness, or pain.  Discomfort in other areas of the upper body. Symptoms can include pain or discomfort in one or both arms, the back, neck, jaw, or stomach.  Shortness of breath with or without chest discomfort.  Other signs may include breaking out in a cold sweat, nausea, or lightheadedness. Don't wait more than five minutes to call 911 - MINUTES MATTER! Fast action can save your life. Calling 911 is almost always the fastest way to get lifesaving treatment. Emergency Medical Services staff can begin treatment when they arrive -- up to an hour sooner than if someone gets to the hospital by car. The discharge information has been reviewed with the patient and caregiver. The patient and caregiver verbalized understanding. Discharge medications reviewed with the patient and caregiver and appropriate educational materials and side effects teaching were provided. ___________________________________________________________________________________________________________________________________    Hydrocodone/Acetaminophen (By mouth)   Acetaminophen (x-fpuy-f-MIN-oh-fen), Hydrocodone Bitartrate (iyi-orfl-WBK-done bye-TAR-trate)  Treats pain. This medicine contains a narcotic pain reliever. Brand Name(s): Hycet, Lorcet, Lorcet HD, Lorcet Plus, Lortab 10/325, Lortab 5/325, Lortab 7.5/325, Lortab Elixir, Norco, Verdrocet, Vicodin, Vicodin ES, Vicodin HP, Xodol, Xodol 5/300   There may be other brand names for this medicine. When This Medicine Should Not Be Used: This medicine is not right for everyone.  Do not use it if you had an allergic reaction to acetaminophen, hydrocodone, or other narcotic medicines, or stomach or bowel blockage (including paralytic ileus). How to Use This Medicine:   Capsule, Liquid, Tablet  · Your doctor will tell you how much medicine to use. Do not use more than directed. · An overdose can be dangerous. Follow directions carefully so you do not get too much medicine at one time. · Oral liquid: Measure the oral liquid medicine with a marked measuring spoon, oral syringe, or medicine cup. · Drink plenty of liquids to help avoid constipation. · This medicine should come with a Medication Guide. Ask your pharmacist for a copy if you do not have one. · Missed dose: Take a dose as soon as you remember. If it is almost time for your next dose, wait until then and take a regular dose. Do not take extra medicine to make up for a missed dose. · Store the medicine in a closed container at room temperature, away from heat, moisture, and direct light. Flush any unused Norco® tablets down the toilet. Drugs and Foods to Avoid:   Ask your doctor or pharmacist before using any other medicine, including over-the-counter medicines, vitamins, and herbal products. · Do not use this medicine if you are using or have used an MAO inhibitor within the past 14 days. · Some medicines can affect how hydrocodone/acetaminophen works. Tell your doctor if you are using any of the following:   ¨ Carbamazepine, erythromycin, ketoconazole, mirtazapine, phenytoin, rifampin, ritonavir, tramadol, trazodone  ¨ Diuretic (water pill)  ¨ Medicine to treat depression or mental health problems  ¨ Medicine to treat migraine headaches  ¨ Phenothiazine medicine  · Tell your doctor if you use anything else that makes you sleepy. Some examples are allergy medicine, narcotic pain medicine, and alcohol. Tell your doctor if you are using buprenorphine, butorphanol, nalbuphine, pentazocine, or a muscle relaxer. · Do not drink alcohol while you are using this medicine.  Acetaminophen can damage your liver, and your risk is higher if you also drink alcohol. Warnings While Using This Medicine:   · Tell your doctor if you are pregnant or breastfeeding, or if you have kidney disease, liver disease, lung or breathing problems, gallbladder or pancreas problems, an underactive thyroid, Gurdeep disease, prostate problems, trouble urinating, stomach problems, or a history of head injury or brain tumor, seizures, alcohol or drug addiction. · This medicine may cause the following problems:   ¨ High risk of overdose, which can lead to death  ¨ Respiratory depression (serious breathing problem that can be life-threatening)  ¨ Liver problems  ¨ Serious skin reactions  ¨ Serotonin syndrome (when used with certain medicines)  · This medicine can be habit-forming. Do not use more than your prescribed dose. Call your doctor if you think your medicine is not working. · This medicine may make you dizzy or drowsy. Do not drive or doing anything else that could be dangerous until you know how this medicine affects you. · This medicine contains acetaminophen. Read the labels of all other medicines you are using to see if they also contain acetaminophen, or ask your doctor or pharmacist. Thecolton Roldan not use more than 4 grams (4,000 milligrams) total of acetaminophen in one day. · Tell any doctor or dentist who treats you that you are using this medicine. This medicine may affect certain medical test results. · This medicine may cause constipation, especially with long-term use. Ask your doctor if you should use a laxative to prevent and treat constipation. · This medicine could cause infertility. Talk with your doctor before using this medicine if you plan to have children. · Keep all medicine out of the reach of children. Never share your medicine with anyone.   Possible Side Effects While Using This Medicine:   Call your doctor right away if you notice any of these side effects:  · Allergic reaction: Itching or hives, swelling in your face or hands, swelling or tingling in your mouth or throat, chest tightness, trouble breathing  · Anxiety, restlessness, fast heartbeat, fever, sweating, muscle spasms, twitching, diarrhea, seeing or hearing things that are not there  · Blistering, peeling, red skin rash  · Blue lips, fingernails, or skin  · Dark urine or pale stools, loss of appetite, nausea or vomiting, stomach pain, yellow skin or eyes  · Extreme weakness, shallow breathing, slow heartbeat, sweating, seizures, cold or clammy skin  · Lightheadedness, dizziness, fainting  If you notice these less serious side effects, talk with your doctor:   · Constipation, nausea, vomiting  · Tiredness or sleepiness  If you notice other side effects that you think are caused by this medicine, tell your doctor. Call your doctor for medical advice about side effects. You may report side effects to FDA at 4-079-FDA-7312  © 2017 [employer]  Information is for End User's use only and may not be sold, redistributed or otherwise used for commercial purposes. The above information is an  only. It is not intended as medical advice for individual conditions or treatments. Talk to your doctor, nurse or pharmacist before following any medical regimen to see if it is safe and effective for you.

## 2018-07-24 NOTE — PERIOP NOTES
Patient medicated with one Norco tab for pain per telephone order from Dr. Savana Snider with readback.

## 2018-07-24 NOTE — OP NOTES
Operative Report    CPT 44295      Creation of AV Fistula Right Arm  (Brachio-brachial)      Patient:  Isis Ruiz    Nephrologist  -  Dr Mercy Tolentino    Date of Surgery:  7/24/2018    Preoperative Diagnosis - ESRD    Postoperative Diagnosis - Same    Anesthesia - General    Surgeon - Kaila Davis    Assistant - staff    Procedure - Creation of AV fistula Right Arm    Operative Summary -     The patient was taken to the operating room and placed on the operating table in the supine position. The patient's right arm was  prepared and steriley draped. The location of the veins had been reconfirmed by ultrasound prior to surgery. An incision was made just above the antecubital level overlying the pulse. The deeper of two brachial veins was exposed and freed for about 2.5 cm. It was around 3.5 mm in diameter. The brachial artery was exposed  and surrounded by vessel loops. There was a small arterial branch passing over the vein at the upper end of the incision. The vein was marked for orientation. The vein was ligated distally and divided and was gently distended with heparinized lactated Ringer's solution. The  artery was controlled proximally and distally with Bibi Ryan bulldog clamps. A small longitudinal arteriotomy was made. The end of the vein was spatulated and brought over to the artery. Using 6-O prolene an end vein to side artery anastomosis was made. Prior to completion, the artery was flushed proximally and distally. After completion, with initiation of flow, there was a thrill at the AV anastomosis and a palpable pulse in the radial artery at the wrist.  The wound was irrigated with antibiotic solution and closed with 3-0 chromic in the subcutaneous layer and with 4-0 nylon at the skin. A gauze dressing was applied. The patient tolerated the procedure well and was taken to the PACU in stable condition.      Specimen - none     Drain - none    Complications - none    Estimated Blood Loss - minimal    G Edenilson Atkins MD

## 2018-07-24 NOTE — IP AVS SNAPSHOT
[address] 
[phone] Patient: Tim Duane MRN: [id number] EM:[id number] A check  indicates which time of day the medication should be taken. My Medications START taking these medications Instructions Each Dose to Equal  
 Morning Noon Evening Bedtime HYDROcodone-acetaminophen 5-325 mg per tablet Commonly known as:  Juan Marks Your last dose was: Your next dose is: Take 1 Tab by mouth every four (4) hours as needed for Pain. Max Daily Amount: 6 Tabs. 1 Tab CHANGE how you take these medications Instructions Each Dose to Equal  
 Morning Noon Evening Bedtime  
 calcium carbonate 200 mg calcium (500 mg) Chew Commonly known as:  TUMS What changed:   
- how much to take - when to take this 
- additional instructions Your last dose was: Your next dose is: Take 1 Tab by mouth three (3) times daily (with meals). 1 Tab  
    
   
   
   
  
 insulin NPH/insulin regular 100 unit/mL (70-30) injection Commonly known as:  NOVOLIN 70/30, HUMULIN 70/30 What changed:   
- how much to take 
- how to take this - when to take this 
- additional instructions Your last dose was: Your next dose is:    
   
   
 18 units with Breakfast and dinner CONTINUE taking these medications Instructions Each Dose to Equal  
 Morning Noon Evening Bedtime  
 aspirin 325 mg tablet Commonly known as:  ASPIRIN Your last dose was: Your next dose is: Take 325 mg by mouth daily. 325 mg  
    
   
   
   
  
 atorvastatin 20 mg tablet Commonly known as:  LIPITOR Your last dose was: Your next dose is: Take 20 mg by mouth daily. 20 mg  
    
   
   
   
  
 carvedilol 12.5 mg tablet Commonly known as:  Mey Alexander  
   
 Your last dose was: Your next dose is: Take 1 Tab by mouth two (2) times daily (with meals). Indications: pressure and heart 12.5 mg  
    
   
   
   
  
 CREON 24,000-76,000 -120,000 unit capsule Generic drug:  lipase-protease-amylase Your last dose was: Your next dose is: Take  by mouth three (3) times daily (with meals). LOMOTIL 2.5-0.025 mg per tablet Generic drug:  diphenoxylate-atropine Your last dose was: Your next dose is: Take 1 Tab by mouth. 1 Tab by mouth as needed for diarhea. Previous discontinued medication notice made in error 1 Tab NIFEdipine ER 30 mg ER tablet Commonly known as:  ADALAT CC Your last dose was: Your next dose is: Take 1 Tab by mouth daily. Indications: pressure, add to regimen 30 mg  
    
   
   
   
  
 SLOW  mg (45 mg iron) ER tablet Generic drug:  ferrous sulfate Your last dose was: Your next dose is: Take 1 Tab by mouth Daily (before breakfast). 1 Tab  
    
   
   
   
  
 sodium bicarbonate 650 mg tablet Your last dose was: Your next dose is: Take 1 Tab by mouth three (3) times daily. 650 mg  
    
   
   
   
  
 traZODone 50 mg tablet Commonly known as:  Abimaellisa Nieto Your last dose was: Your next dose is: Take 50 mg by mouth nightly. 50 mg Where to Get Your Medications Information on where to get these meds will be given to you by the nurse or doctor. ! Ask your nurse or doctor about these medications HYDROcodone-acetaminophen 5-325 mg per tablet

## 2018-07-24 NOTE — ANESTHESIA PREPROCEDURE EVALUATION
Anesthetic History   No history of anesthetic complications            Review of Systems / Medical History  Patient summary reviewed, nursing notes reviewed and pertinent labs reviewed    Pulmonary  Within defined limits                 Neuro/Psych   Within defined limits           Cardiovascular    Hypertension      CHF    CAD and hyperlipidemia    Exercise tolerance: <4 METS  Comments: Dilated cardiomyopathy       Ejection fraction was estimated in the range of 20 % to 25 %.     GI/Hepatic/Renal         Renal disease (sp nephrectomy): ESRD and dialysis  Liver disease     Endo/Other    Diabetes: type 2, using insulin    Anemia     Other Findings            Physical Exam    Airway  Mallampati: II  TM Distance: 4 - 6 cm  Neck ROM: normal range of motion   Mouth opening: Normal     Cardiovascular  Regular rate and rhythm,  S1 and S2 normal,  no murmur, click, rub, or gallop             Dental  No notable dental hx       Pulmonary  Breath sounds clear to auscultation               Abdominal  GI exam deferred       Other Findings            Anesthetic Plan    ASA: 4  Anesthesia type: general    Monitoring Plan: BIS      Induction: Intravenous  Anesthetic plan and risks discussed with: Patient

## 2018-07-24 NOTE — OP NOTES
Discharge Instructions Following AV Fistula Surgery        Keep dressing in place for two days. Keep incision dry for two days. No lifting over fifteen pounds with operated arm for two weeks. See Dr. Clarke Mahajan in the office in two weeks - call for appointment - [phone]. Take pain medications as prescribed as needed. Do not drive while taking narcotic pain medication. For any problems, call Dr. Clarke Mahajan at [phone] or [phone] (after hours)      MIKAYLA Mayorga MD

## 2018-07-24 NOTE — TELEPHONE ENCOUNTER
Ms Florence Wright states pt unable to get to surgery due to cancelled transportation. Spoke with Mr Florence Wright, he has not eaten or had anything to drink today. Transportation arranged to have pt picked up & brought to surgery center ASAP. Pt & spouse notified.

## 2018-07-24 NOTE — ANESTHESIA POSTPROCEDURE EVALUATION
Post-Anesthesia Evaluation and Assessment    Patient: Tim Duane MRN: [id number]  SSN: [id number]    YOB: 1960  Age: 66 y.o. Sex: male       Cardiovascular Function/Vital Signs  Visit Vitals    /84    Pulse 82    Temp 36.6 °C (97.8 °F)    Resp 15    Ht 6' 2\" (1.88 m)    Wt 79.7 kg (175 lb 11.3 oz)    SpO2 98%    BMI 22.56 kg/m2       Patient is status post general anesthesia for Procedure(s):  CREATION AV FISTULA RIGHT ARM . Nausea/Vomiting: None    Postoperative hydration reviewed and adequate. Pain:  Pain Scale 1: Numeric (0 - 10) (07/24/18 1800)  Pain Intensity 1: 0 (07/24/18 1800)   Managed    Neurological Status:   Neuro (WDL): Within Defined Limits (07/24/18 1745)  Neuro  LUE Motor Response: Purposeful (07/24/18 1745)  LLE Motor Response: Purposeful (07/24/18 1745)  RUE Motor Response: Purposeful (07/24/18 1745)  RLE Motor Response: Purposeful (07/24/18 1745)   At baseline    Mental Status and Level of Consciousness: Arousable    Pulmonary Status:   O2 Device: Room air (07/24/18 1800)   Adequate oxygenation and airway patent    Complications related to anesthesia: None    Post-anesthesia assessment completed.  No concerns    Signed By: Ivonne Cabrales MD     July 24, 2018

## 2018-07-24 NOTE — PERIOP NOTES
Patient and wife received all discharge instructions and 1 prescription for pain medication. Both verbalize understanding of all instructions. Instruction packet and prescription are provided to wife. Patient will be calling for a follow up appointment. Patient has been assisted in getting dressed. IV has been removed with catheter tip intact. Right arm dressing is clean,dry and intact. Patient states pain is tolerable at 3/10. Patient transported outside by wheelchair and will be brought home by wife.

## 2018-07-24 NOTE — PERIOP NOTES
Handoff Report from Operating Room to PACU    Report received from HAKAN George RN and Neri Maldonado CRNA regarding Manasa Forbes. Surgeon(s):  Shannan Lau MD  And Procedure(s) (LRB):  CREATION AV FISTULA RIGHT ARM  (Right)  confirmed   with dressings discussed. Anesthesia type, drugs, patient history, complications, estimated blood loss, vital signs, intake and output, and last pain medication, lines and temperature were reviewed.

## 2018-07-24 NOTE — IP AVS SNAPSHOT
[address] [facility] 
[phone] Patient: Isis Ruiz MRN: [id number] HMB:[id number] About your hospitalization You were admitted on:  July 24, 2018 You last received care in the:  [facility] PACU You were discharged on:  July 24, 2018 Why you were hospitalized Your primary diagnosis was:  Not on File Follow-up Information Follow up With Details Comments Contact Info [address] 
[place] [id number] [phone] Your Scheduled Appointments Tuesday September 18, 2018 10:10 AM EDT  
ESTABLISHED PATIENT with MD Honorio Carbone 38 ([facility] [place] [address] [facility] [address] [facility],[address] [address] [phone] Discharge Orders None A check  indicates which time of day the medication should be taken. My Medications START taking these medications Instructions Each Dose to Equal  
 Morning Noon Evening Bedtime HYDROcodone-acetaminophen 5-325 mg per tablet Commonly known as:  Fior Burns Your last dose was: Your next dose is: Take 1 Tab by mouth every four (4) hours as needed for Pain. Max Daily Amount: 6 Tabs. 1 Tab CHANGE how you take these medications Instructions Each Dose to Equal  
 Morning Noon Evening Bedtime  
 calcium carbonate 200 mg calcium (500 mg) Chew Commonly known as:  TUMS What changed:   
- how much to take - when to take this 
- additional instructions Your last dose was: Your next dose is: Take 1 Tab by mouth three (3) times daily (with meals). 1 Tab  
    
   
   
   
  
 insulin NPH/insulin regular 100 unit/mL (70-30) injection Commonly known as:  NOVOLIN 70/30, HUMULIN 70/30 What changed:   
- how much to take 
- how to take this - when to take this 
- additional instructions Your last dose was: Your next dose is:    
   
   
 18 units with Breakfast and dinner CONTINUE taking these medications Instructions Each Dose to Equal  
 Morning Noon Evening Bedtime  
 aspirin 325 mg tablet Commonly known as:  ASPIRIN Your last dose was: Your next dose is: Take 325 mg by mouth daily. 325 mg  
    
   
   
   
  
 atorvastatin 20 mg tablet Commonly known as:  LIPITOR Your last dose was: Your next dose is: Take 20 mg by mouth daily. 20 mg  
    
   
   
   
  
 carvedilol 12.5 mg tablet Commonly known as:  Ozie Bitter Your last dose was: Your next dose is: Take 1 Tab by mouth two (2) times daily (with meals). Indications: pressure and heart 12.5 mg  
    
   
   
   
  
 CREON 24,000-76,000 -120,000 unit capsule Generic drug:  lipase-protease-amylase Your last dose was: Your next dose is: Take  by mouth three (3) times daily (with meals). LOMOTIL 2.5-0.025 mg per tablet Generic drug:  diphenoxylate-atropine Your last dose was: Your next dose is: Take 1 Tab by mouth. 1 Tab by mouth as needed for diarhea. Previous discontinued medication notice made in error 1 Tab NIFEdipine ER 30 mg ER tablet Commonly known as:  ADALAT CC Your last dose was: Your next dose is: Take 1 Tab by mouth daily. Indications: pressure, add to regimen 30 mg  
    
   
   
   
  
 SLOW  mg (45 mg iron) ER tablet Generic drug:  ferrous sulfate Your last dose was: Your next dose is: Take 1 Tab by mouth Daily (before breakfast). 1 Tab  
    
   
   
   
  
 sodium bicarbonate 650 mg tablet Your last dose was: Your next dose is: Take 1 Tab by mouth three (3) times daily.   
 650 mg  
    
   
   
   
 traZODone 50 mg tablet Commonly known as:  Emanuel Teresadavid Your last dose was: Your next dose is: Take 50 mg by mouth nightly. 50 mg Where to Get Your Medications Information on where to get these meds will be given to you by the nurse or doctor. ! Ask your nurse or doctor about these medications HYDROcodone-acetaminophen 5-325 mg per tablet Opioid Education Prescription Opioids: What You Need to Know: 
 
 
Acceptable foods for the first 24 hours following surgery include but are not limited to: 
 
soup 
broth 
 toast  
crackers  
applesauce 
 bananas  
mashed potatoes, 
soft or scrambled eggs 
oatmeal 
 jello It is important to eat when taking your pain medication. This will help to prevent nausea. If possible, please try to time your meals with your medications. It is very important to stay hydrated following surgery. Sip fluids frequently while awake. Avoid acidic drinks such as citrus juices and soda for 24 hours. Carbonated beverages may cause bloating and gas. Acceptable fluids include: 
 
water (flavor packets may add variety) coffee or tea (in moderation) Gatorade Dexter-aid 
apple juice 
cranberry juice You are encouraged to cough and deep breathe every hour when awake. This will help to prevent respiratory complications following anesthesia. You may want to hug a pillow when coughing and sneezing to add additional support to the surgical area and to decrease discomfort if you had abdominal or chest surgery. Please take time to review all of your Home Care Instructions and Medication Information sheets provided in your discharge packet. If you have any questions, please contact your surgeons office. Thank you. DISCHARGE SUMMARY from Nurse PATIENT INSTRUCTIONS: 
 
 
F-face looks uneven A-arms unable to move or move unevenly S-speech slurred or non-existent T-time-call 911 as soon as signs and symptoms begin-DO NOT go Back to bed or wait to see if you get better-TIME IS BRAIN. Warning Signs of HEART ATTACK Call 911 if you have these symptoms: 
? Chest discomfort. Most heart attacks involve discomfort in the center of the chest that lasts more than a few minutes, or that goes away and comes back. It can feel like uncomfortable pressure, squeezing, fullness, or pain. ? Discomfort in other areas of the upper body. Symptoms can include pain or discomfort in one or both arms, the back, neck, jaw, or stomach. ? Shortness of breath with or without chest discomfort. ? Other signs may include breaking out in a cold sweat, nausea, or lightheadedness. Don't wait more than five minutes to call [address]! Fast action can save your life. Calling 911 is almost always the fastest way to get lifesaving treatment. Emergency Medical Services staff can begin treatment when they arrive  up to an hour sooner than if someone gets to the hospital by car. The discharge information has been reviewed with the patient and caregiver. The patient and caregiver verbalized understanding. Discharge medications reviewed with the patient and caregiver and appropriate educational materials and side effects teaching were provided. ___________________________________________________________________________________________________________________________________ Hydrocodone/Acetaminophen (By mouth) Acetaminophen (t-xifq-x-MIN-oh-fen), Hydrocodone Bitartrate (dhk-tvfv-BJE-done bye-TAR-trate) Treats pain. This medicine contains a narcotic pain reliever. Brand Name(s): Hycet, Lorcet, Lorcet HD, Lorcet Plus, Lortab 10/325, Lortab 5/325, Lortab 7.5/325, Lortab Elixir, Norco, Verdrocet, Vicodin, Vicodin ES, Vicodin HP, Xodol, Xodol 5/300 There may be other brand names for this medicine. When This Medicine Should Not Be Used: This medicine is not right for everyone. Do not use it if you had an allergic reaction to acetaminophen, hydrocodone, or other narcotic medicines, or stomach or bowel blockage (including paralytic ileus). How to Use This Medicine:  
Capsule, Liquid, Tablet · Your doctor will tell you how much medicine to use. Do not use more than directed. · An overdose can be dangerous. Follow directions carefully so you do not get too much medicine at one time. · Oral liquid: Measure the oral liquid medicine with a marked measuring spoon, oral syringe, or medicine cup. · Drink plenty of liquids to help avoid constipation. · This medicine should come with a Medication Guide. Ask your pharmacist for a copy if you do not have one. · Missed dose: Take a dose as soon as you remember. If it is almost time for your next dose, wait until then and take a regular dose. Do not take extra medicine to make up for a missed dose. · Store the medicine in a closed container at room temperature, away from heat, moisture, and direct light. Flush any unused Norco® tablets down the toilet. Drugs and Foods to Avoid: Ask your doctor or pharmacist before using any other medicine, including over-the-counter medicines, vitamins, and herbal products. · Do not use this medicine if you are using or have used an MAO inhibitor within the past 14 days. · Some medicines can affect how hydrocodone/acetaminophen works. Tell your doctor if you are using any of the following: ¨ Carbamazepine, erythromycin, ketoconazole, mirtazapine, phenytoin, rifampin, ritonavir, tramadol, trazodone ¨ Diuretic (water pill) ¨ Medicine to treat depression or mental health problems ¨ Medicine to treat migraine headaches ¨ Phenothiazine medicine · Tell your doctor if you use anything else that makes you sleepy. Some examples are allergy medicine, narcotic pain medicine, and alcohol. Tell your doctor if you are using buprenorphine, butorphanol, nalbuphine, pentazocine, or a muscle relaxer. · Do not drink alcohol while you are using this medicine. Acetaminophen can damage your liver, and your risk is higher if you also drink alcohol. Warnings While Using This Medicine: · Tell your doctor if you are pregnant or breastfeeding, or if you have kidney disease, liver disease, lung or breathing problems, gallbladder or pancreas problems, an underactive thyroid, Basile disease, prostate problems, trouble urinating, stomach problems, or a history of head injury or brain tumor, seizures, alcohol or drug addiction. · This medicine may cause the following problems:  
¨ High risk of overdose, which can lead to death ¨ Respiratory depression (serious breathing problem that can be life-threatening) ¨ Liver problems ¨ Serious skin reactions ¨ Serotonin syndrome (when used with certain medicines) · This medicine can be habit-forming. Do not use more than your prescribed dose. Call your doctor if you think your medicine is not working. · This medicine may make you dizzy or drowsy. Do not drive or doing anything else that could be dangerous until you know how this medicine affects you. · This medicine contains acetaminophen. Read the labels of all other medicines you are using to see if they also contain acetaminophen, or ask your doctor or pharmacist. Josef Gutierres not use more than 4 grams (4,000 milligrams) total of acetaminophen in one day. · Tell any doctor or dentist who treats you that you are using this medicine. This medicine may affect certain medical test results. · This medicine may cause constipation, especially with long-term use. Ask your doctor if you should use a laxative to prevent and treat constipation. · This medicine could cause infertility. Talk with your doctor before using this medicine if you plan to have children. · Keep all medicine out of the reach of children. Never share your medicine with anyone. Possible Side Effects While Using This Medicine:  
Call your doctor right away if you notice any of these side effects: · Allergic reaction: Itching or hives, swelling in your face or hands, swelling or tingling in your mouth or throat, chest tightness, trouble breathing · Anxiety, restlessness, fast heartbeat, fever, sweating, muscle spasms, twitching, diarrhea, seeing or hearing things that are not there · Blistering, peeling, red skin rash · Blue lips, fingernails, or skin · Dark urine or pale stools, loss of appetite, nausea or vomiting, stomach pain, yellow skin or eyes · Extreme weakness, shallow breathing, slow heartbeat, sweating, seizures, cold or clammy skin · Lightheadedness, dizziness, fainting If you notice these less serious side effects, talk with your doctor: · Constipation, nausea, vomiting · Tiredness or sleepiness If you notice other side effects that you think are caused by this medicine, tell your doctor. Call your doctor for medical advice about side effects. You may report side effects to FDA at 9-891-FDA-6694 © 2017 [facility] Information is for End User's use only and may not be sold, redistributed or otherwise used for commercial purposes. The above information is an  only. It is not intended as medical advice for individual conditions or treatments. Talk to your doctor, nurse or pharmacist before following any medical regimen to see if it is safe and effective for you. Introducing [facility]! Yefri Cosme introduces [employer] patient portal. Now you can access parts of your medical record, email your doctor's office, and request medication refills online. 1. In your internet browser, go to [email or web address] 2. Click on the First Time User? Click Here link in the Sign In box. You will see the New Member Sign Up page. 3. Enter your [employer] Access Code exactly as it appears below. You will not need to use this code after youve completed the sign-up process. If you do not sign up before the expiration date, you must request a new code. · [employer] Access Code: [email or web address] Expires: 9/24/2018  8:05 AM 
 
4. Enter the last four digits of your Social Security Number (xxxx) and Date of Birth (mm/dd/yyyy) as indicated and click Submit. You will be taken to the next sign-up page. 5. Create a [employer] ID. This will be your [employer] login ID and cannot be changed, so think of one that is secure and easy to remember. 6. Create a [employer] password. You can change your password at any time. 7. Enter your Password Reset Question and Answer. This can be used at a later time if you forget your password. 8. Enter your e-mail address. You will receive e-mail notification when new information is available in [address] 9. Click Sign Up. You can now view and download portions of your medical record. 10. Click the Download Summary menu link to download a portable copy of your medical information.  
 
If you have questions, please visit the Frequently Asked Questions section of the MedNews. Remember, MyChart is NOT to be used for urgent needs. For medical emergencies, dial 911. Now available from your iPhone and Android! Introducing Tal Dickson As a [employer] patient, I wanted to make you aware of our electronic visit tool called Tal Dickson. [employer] 24/7 allows you to connect within minutes with a medical provider 24 hours a day, seven days a week via a mobile device or tablet or logging into a secure website from your computer. You can access Tal Dickson from anywhere in the [place]. A virtual visit might be right for you when you have a simple condition and feel like you just dont want to get out of bed, or cant get away from work for an appointment, when your regular [employer] provider is not available (evenings, weekends or holidays), or when youre out of town and need minor care. Electronic visits cost only $49 and if the [employer] 24/7 provider determines a prescription is needed to treat your condition, one can be electronically transmitted to a nearby pharmacy*. Please take a moment to enroll today if you have not already done so. The enrollment process is free and takes just a few minutes. To enroll, please download the [employer] 24/7 geovanna to your tablet or phone, or visit [email or web address] to enroll on your computer. And, as an [address] [place] [address] patient with a [employer] account, the results of your visits will be scanned into your electronic medical record and your primary care provider will be able to view the scanned results. We urge you to continue to see your regular [employer] provider for your ongoing medical care. And while your primary care provider may not be the one available when you seek a Tal Dickson virtual visit, the peace of mind you get from getting a real diagnosis real time can be priceless. For more information on Tal Dickson, view our Frequently Asked Questions (FAQs) at [email or web address]. Sincerely, 
 
Felecia Machado MD 
Chief Medical Officer Jose Bowman *:  certain medications cannot be prescribed via Tla Dickson Providers Seen During Your Hospitalization Provider Specialty Primary office phone Ashley Christie MD General Surgery [phone] Your Primary Care Physician (PCP) Primary Care Physician Office Phone Office Fax Bette Enrique [phone] You are allergic to the following No active allergies Recent Documentation Height Weight BMI Smoking Status 1.88 m 79.7 kg 22.56 kg/m2 Never Smoker Emergency Contacts Name Discharge Info Relation Home Work Mobile Yanni Tapia DISCHARGE CAREGIVER [3] Spouse [3] [phone] Patient Belongings The following personal items are in your possession at time of discharge: 
  Dental Appliances: Other (comment)  Visual Aid: None      Home Medications: None   Jewelry: None  Clothing:  (clothing and shoes)    Other Valuables: Wallet, Cell Phone  Personal Items Sent to Safe: cell phone & wallet to security, see req on chart Please provide this summary of care documentation to your next provider. Signatures-by signing, you are acknowledging that this After Visit Summary has been reviewed with you and you have received a copy. Patient Signature:  ____________________________________________________________ Date:  ____________________________________________________________  
  
Corina Lawrence Provider Signature:  ____________________________________________________________ Date:  ____________________________________________________________
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
## 2018-08-09 ENCOUNTER — OFFICE VISIT (OUTPATIENT)
Dept: SURGERY | Age: 58
End: 2018-08-09

## 2018-08-09 VITALS
HEIGHT: 74 IN | HEART RATE: 80 BPM | BODY MASS INDEX: 22.28 KG/M2 | OXYGEN SATURATION: 97 % | RESPIRATION RATE: 20 BRPM | DIASTOLIC BLOOD PRESSURE: 78 MMHG | WEIGHT: 173.6 LBS | SYSTOLIC BLOOD PRESSURE: 150 MMHG

## 2018-08-09 DIAGNOSIS — Z09 POSTOPERATIVE EXAMINATION: Primary | ICD-10-CM

## 2018-08-09 NOTE — MR AVS SNAPSHOT
3715 [facility] 280, 5355 [facility], [address] [place] [address]305 [facility] 
[phone] Patient: Hardy Jones MRN: [id number] [facility]:3/97/0185 Visit Information Date & Time Provider Department Dept. Phone Encounter #  
 8/9/2018  2:40 PM Lennox Hoist, MD [facility] [address] [phone] [id number] Your Appointments 8/16/2018  3:00 PM  
POST OP with Lennox Hoist, MD  
[facility] [address] [place] ([facility]) Appt Note: PO AVF RT ARM 7/24/18  
 1901 [facility], 5[address] [facility], [address] [address]  
[address], 5355 [facility], [address] [place] [address] [address] [address]  
  
    
 9/13/2018  1:30 PM  
New Patient with MD Margarette Luu [facility] [address] [place] [address]) Appt Note: NP diabetes  [phone] 07/20/2018; pt has medicare demographics does not match pt will bring in ins card nb; r/s from 9/24/18/ New patient referred by Dr. Irena Garcia (p) [phone] for diabetes [facility] [address] [address] 570 [facility] 9/18/2018 10:10 AM  
ESTABLISHED PATIENT with Esthela Peralta MD  
[place] [address] ([address]) Appt Note: 2 mo f/u  
 1000 [facility] 2200 [facility],5Th [address] [address] [phone]  
  
   
 1000 [facility] 2200 [facility],5[address] [address] 1/8/2019 11:15 AM  
ESTABLISHED PATIENT with MD Margarette Norris Card[facility] Associate [address] [place] [address] Marylou Vieyra ([address]) Appt Note: $100DP 7/3/18ksr / 6 month follow up  
 [address] [place] [address] 601 [address] [place] [address]  
[phone]  
  
   
 [address] [facility] [address] Upcoming Health Maintenance Date Due  
 EYE EXAM RETINAL OR DILATED Q1 4/17/2016 Influenza Age 5 to Adult 8/1/2018 Pneumococcal 19-64 Highest Risk (2 of 3 - PPSV23) 2/1/2027* HEMOGLOBIN A1C Q6M 10/30/2018 FOOT EXAM Q1 1/22/2019 LIPID PANEL Q1 1/22/2019 FOBT Q 1 YEAR AGE 50-75 4/23/2019 DTaP/Tdap/Td series (2 - Td) 1/22/2028 *Topic was postponed. The date shown is not the original due date. Allergies as of 8/9/2018  Review Complete On: 8/9/2018 By: Darcy Franklin No Known Allergies Current Immunizations  Reviewed on 4/30/2018 Name Date Influenza Vaccine 12/14/2016  4:38 PM, 11/25/2015, 10/4/2013 Influenza Vaccine (Quad) PF 2/14/2018  9:50 AM  
 Pneumococcal Conjugate (PCV-13) 12/14/2016  4:37 PM  
  
 Not reviewed this visit Vitals BP Pulse Resp Height(growth percentile) Weight(growth percentile) SpO2  
 150/78 (BP 1 Location: Left arm, BP Patient Position: Sitting) 80 20 6' 2\" (1.88 m) 173 lb 9.6 oz (78.7 kg) 97% BMI Smoking Status 22.29 kg/m2 Never Smoker BMI and BSA Data Body Mass Index Body Surface Area  
 22.29 kg/m 2 2.03 m 2 Preferred Pharmacy Pharmacy Name Phone [facility] [address] [place] [address] Yusuf Cueva [address] Your Updated Medication List  
  
   
This list is accurate as of 8/9/18  2:41 PM.  Always use your most recent med list.  
  
  
  
  
 aspirin 325 mg tablet Commonly known as:  ASPIRIN Take 325 mg by mouth daily. atorvastatin 20 mg tablet Commonly known as:  LIPITOR Take 20 mg by mouth daily. calcium carbonate 200 mg calcium (500 mg) Chew Commonly known as:  TUMS Take 1 Tab by mouth three (3) times daily (with meals). carvedilol 12.5 mg tablet Commonly known as:  Media [place] Take 1 Tab by mouth two (2) times daily (with meals). Indications: pressure and heart CREON 24,000-76,000 -120,000 unit capsule Generic drug:  lipase-protease-amylase Take  by mouth three (3) times daily (with meals). HYDROcodone-acetaminophen 5-325 mg per tablet Commonly known as:  Madison Corona Take 1 Tab by mouth every four (4) hours as needed for Pain. Max Daily Amount: 6 Tabs. insulin NPH/insulin regular 100 unit/mL (70-30) injection Commonly known as:  NOVOLIN 70/30, HUMULIN 70/30  
18 units with Breakfast and dinner LOMOTIL 2.5-0.025 mg per tablet Generic drug:  diphenoxylate-atropine Take 1 Tab by mouth. 1 Tab by mouth as needed for diarhea. Previous discontinued medication notice made in error NIFEdipine ER 30 mg ER tablet Commonly known as:  ADALAT CC Take 1 Tab by mouth daily. Indications: pressure, add to regimen SLOW  mg (45 mg iron) ER tablet Generic drug:  ferrous sulfate Take 1 Tab by mouth Daily (before breakfast). sodium bicarbonate 650 mg tablet Take 1 Tab by mouth three (3) times daily. traZODone 50 mg tablet Commonly known as:  Abimael Handy Take 50 mg by mouth nightly. Introducing [facility] & HEALTH SERVICES! [employer] introduces [employer] patient portal. Now you can access parts of your medical record, email your doctor's office, and request medication refills online. 1. In your internet browser, go to [email or web address] 2. Click on the First Time User? Click Here link in the Sign In box. You will see the New Member Sign Up page. 3. Enter your [employer] Access Code exactly as it appears below. You will not need to use this code after youve completed the sign-up process. If you do not sign up before the expiration date, you must request a new code. · [employer] Access Code: [email or web address] Expires: 9/24/2018  8:05 AM 
 
4. Enter the last four digits of your Social Security Number (xxxx) and Date of Birth (mm/dd/yyyy) as indicated and click Submit. You will be taken to the next sign-up page. 5. Create a Spreecastt ID. This will be your [employer] login ID and cannot be changed, so think of one that is secure and easy to remember. 6. Create a TG Publishing password. You can change your password at any time. 7. Enter your Password Reset Question and Answer. This can be used at a later time if you forget your password. 8. Enter your e-mail address. You will receive e-mail notification when new information is available in [address] 9. Click Sign Up. You can now view and download portions of your medical record. 10. Click the Download Summary menu link to download a portable copy of your medical information. If you have questions, please visit the Frequently Asked Questions section of the [employer] website. Remember, [employer] is NOT to be used for urgent needs. For medical emergencies, dial 911. Now available from your iPhone and Android! Please provide this summary of care documentation to your next provider. Your primary care clinician is listed as Mercy Tolentino. If you have any questions after today's visit, please call [phone].

## 2018-08-09 NOTE — PROGRESS NOTES
Surgery    The patient is S/P creation of arteriovenous fistula right arm on 7/24/2018. The patient has no complaints. On examination, there is a palpble thrill in the AV fistula. There is a palpable radial pulse at the wrist.  There is a prominent swelling without erythema or tenderness at the incision. The operative site has an appearance of either hematoma or seroma. In either case, I will not remove sutures today. The patient will see me in the office in 1 week.     Fatimah Panchal MD      CC:  Dr Chun Gill

## 2018-08-13 ENCOUNTER — DOCUMENTATION ONLY (OUTPATIENT)
Dept: SURGERY | Age: 58
End: 2018-08-13

## 2018-08-13 NOTE — PROGRESS NOTES
Faxed Post OP note to [facility] to Dimitris Sahu ([phone])

## 2018-08-16 ENCOUNTER — OFFICE VISIT (OUTPATIENT)
Dept: SURGERY | Age: 58
End: 2018-08-16

## 2018-08-16 VITALS
OXYGEN SATURATION: 97 % | RESPIRATION RATE: 18 BRPM | WEIGHT: 169 LBS | HEIGHT: 74 IN | HEART RATE: 95 BPM | BODY MASS INDEX: 21.69 KG/M2 | TEMPERATURE: 98.7 F | SYSTOLIC BLOOD PRESSURE: 146 MMHG | DIASTOLIC BLOOD PRESSURE: 72 MMHG

## 2018-08-16 DIAGNOSIS — Z09 POSTOPERATIVE EXAMINATION: Primary | ICD-10-CM

## 2018-08-16 NOTE — PROGRESS NOTES
Surgery     The patient is S/P creation of arteriovenous fistula right arm on 7/24/2018. The patient has no complaints.     On examination, there is a palpble thrill in the AV fistula. There is a palpable radial pulse at the wrist.  There is a much less swelling at the incision.     Sutures were removed and steri strips applied.     The patient will see me in the office in 1 month.     MIKAYLA Angeles MD        CC:  Dr Nohelia Pa

## 2018-08-16 NOTE — MR AVS SNAPSHOT
[address], 5355 [facility], Suite [place] 2305 [facility] 
[phone] Patient: Alma Delia Martinez MRN: [id number] XAX:0/08/2607 Visit Information Date & Time Provider Department Dept. Phone Encounter #  
 8/16/2018  3:00 PM Wanda Young MD [facility] [address] [phone] [id number] Follow-up Instructions Return in about 4 weeks (around 9/13/2018). Follow-up and Disposition History Your Appointments 9/13/2018  1:30 PM  
New Patient with MD Neel Kirkland [facility]) Appt Note: NP diabetes  [phone] 07/20/2018; pt has medicare demographics does not match pt will bring in ins card nb; r/s from 9/24/18/ New patient referred by Dr. Sarah [place] (p) [phone] for diabetes [facility] [address] [address] [address] [place] [address] 9/13/2018  2:00 PM  
ESTABLISHED PATIENT with Wanda Young MD  
[facility] [address] ([address]) Appt Note: PO AVF RT ARM 7/24/18 (1 mo f/u) [address] [place] [address] [address], [address] [address]  
[address], 5355 [facility], [address] [place] [address] [address] [address]  
  
    
 9/18/2018 10:10 AM  
ESTABLISHED PATIENT with MD Honorio Dave 38 ([address]) Appt Note: 2 mo f/u  
 1000 [facility] 2200 [facility],5Th Floor [address] [phone]  
  
   
 1000 [facility] 2200 [facility],5Th Floor [address]  
  
    
 1/8/2019 11:15 AM  
ESTABLISHED PATIENT with Wen Newton MD  
[place] [facility] Jose Vieyra ([address]) Appt Note: $100DP 7/3/18ksr / 6 month follow up  
 [address] [place] [address] 601 [address] [place] [address]  
[phone]  
  
   
 [address] [place] [address] Road [address] Upcoming Health Maintenance Date Due  
 EYE EXAM RETINAL OR DILATED Q1 4/17/2016 Influenza Age 5 to Adult 8/1/2018 Pneumococcal 19-64 Highest Risk (2 of 3 - PPSV23) 2/1/2027* HEMOGLOBIN A1C Q6M 10/30/2018 FOOT EXAM Q1 1/22/2019 LIPID PANEL Q1 1/22/2019 FOBT Q 1 YEAR AGE 50-75 4/23/2019 DTaP/Tdap/Td series (2 - Td) 1/22/2028 *Topic was postponed. The date shown is not the original due date. Allergies as of 8/16/2018  Review Complete On: 8/16/2018 By: Mady Lyons MD  
 No Known Allergies Current Immunizations  Reviewed on 4/30/2018 Name Date Influenza Vaccine 12/14/2016  4:38 PM, 11/25/2015, 10/4/2013 Influenza Vaccine (Quad) PF 2/14/2018  9:50 AM  
 Pneumococcal Conjugate (PCV-13) 12/14/2016  4:37 PM  
  
 Not reviewed this visit You Were Diagnosed With   
  
 Codes Comments Postoperative examination    -  Primary ICD-10-CM: Q29 ICD-9-CM: V67.00 Vitals BP Pulse Temp Resp Height(growth percentile) Weight(growth percentile) 146/72 (BP 1 Location: Left arm, BP Patient Position: Sitting) 95 98.7 °F (37.1 °C) (Oral) 18 6' 2\" (1.88 m) 169 lb (76.7 kg) SpO2 BMI Smoking Status 97% 21.7 kg/m2 Never Smoker Vitals History BMI and BSA Data Body Mass Index Body Surface Area 21.7 kg/m 2 2 m 2 Preferred Pharmacy Pharmacy Name Phone [facility] [address] [place] [address], [address] [place] [address] Yusuf Cueva [address] Your Updated Medication List  
  
   
This list is accurate as of 8/16/18  4:50 PM.  Always use your most recent med list.  
  
  
  
  
 aspirin 325 mg tablet Commonly known as:  ASPIRIN Take 325 mg by mouth daily. atorvastatin 20 mg tablet Commonly known as:  LIPITOR Take 20 mg by mouth daily. calcium carbonate 200 mg calcium (500 mg) Chew Commonly known as:  TUMS Take 1 Tab by mouth three (3) times daily (with meals). carvedilol 12.5 mg tablet Commonly known as:  Marta Lancaster  
 Take 1 Tab by mouth two (2) times daily (with meals). Indications: pressure and heart CREON 24,000-76,000 -120,000 unit capsule Generic drug:  lipase-protease-amylase Take  by mouth three (3) times daily (with meals). HYDROcodone-acetaminophen 5-325 mg per tablet Commonly known as:  Celena Spicer Take 1 Tab by mouth every four (4) hours as needed for Pain. Max Daily Amount: 6 Tabs. insulin NPH/insulin regular 100 unit/mL (70-30) injection Commonly known as:  NOVOLIN 70/30, HUMULIN 70/30  
18 units with Breakfast and dinner LOMOTIL 2.5-0.025 mg per tablet Generic drug:  diphenoxylate-atropine Take 1 Tab by mouth. 1 Tab by mouth as needed for diarhea. Previous discontinued medication notice made in error NIFEdipine ER 30 mg ER tablet Commonly known as:  ADALAT CC Take 1 Tab by mouth daily. Indications: pressure, add to regimen SLOW  mg (45 mg iron) ER tablet Generic drug:  ferrous sulfate Take 1 Tab by mouth Daily (before breakfast). sodium bicarbonate 650 mg tablet Take 1 Tab by mouth three (3) times daily. traZODone 50 mg tablet Commonly known as:  Calos [place] Take 50 mg by mouth nightly. Follow-up Instructions Return in about 4 weeks (around 9/13/2018). [facility] & HEALTH SERVICES! [facility] introduces [employer] patient portal. Now you can access parts of your medical record, email your doctor's office, and request medication refills online. 1. In your internet browser, go to [email or web address] 2. Click on the First Time User? Click Here link in the Sign In box. You will see the New Member Sign Up page. 3. Enter your [employer] Access Code exactly as it appears below. You will not need to use this code after youve completed the sign-up process. If you do not sign up before the expiration date, you must request a new code. · [employer] Access Code: [email or web address] Expires: 9/24/2018  8:05 AM 
 
 4. Enter the last four digits of your Social Security Number (xxxx) and Date of Birth (mm/dd/yyyy) as indicated and click Submit. You will be taken to the next sign-up page. 5. Create a Expect Labs ID. This will be your [employer] login ID and cannot be changed, so think of one that is secure and easy to remember. 6. Create a [employer] password. You can change your password at any time. 7. Enter your Password Reset Question and Answer. This can be used at a later time if you forget your password. 8. Enter your e-mail address. You will receive e-mail notification when new information is available in [address] 9. Click Sign Up. You can now view and download portions of your medical record. 10. Click the Download Summary menu link to download a portable copy of your medical information. If you have questions, please visit the Frequently Asked Questions section of the [employer] website. Remember, [employer] is NOT to be used for urgent needs. For medical emergencies, dial 911. Now available from your iPhone and Android! Please provide this summary of care documentation to your next provider. Your primary care clinician is listed as Nohelia Pa. If you have any questions after today's visit, please call [phone].

## 2018-08-16 NOTE — PROGRESS NOTES
Chief Complaint   Patient presents with    Surgical Follow-up     AVF right arm 7/24/18     1. Have you been to the ER, urgent care clinic since your last visit? Hospitalized since your last visit? No    2. Have you seen or consulted any other health care providers outside of the [address] [place] [address] since your last visit? Include any pap smears or colon screening.  No

## 2018-08-24 ENCOUNTER — TELEPHONE (OUTPATIENT)
Dept: FAMILY MEDICINE CLINIC | Age: 58
End: 2018-08-24

## 2018-08-24 NOTE — TELEPHONE ENCOUNTER
Patients wife called stating patient is coughing up blood. I got 2 patient identifiers and advised she should take her  to the ER.

## 2018-09-20 ENCOUNTER — OFFICE VISIT (OUTPATIENT)
Dept: SURGERY | Age: 58
End: 2018-09-20

## 2018-09-20 VITALS
HEART RATE: 81 BPM | OXYGEN SATURATION: 97 % | BODY MASS INDEX: 21.92 KG/M2 | TEMPERATURE: 97.9 F | DIASTOLIC BLOOD PRESSURE: 89 MMHG | HEIGHT: 74 IN | WEIGHT: 170.8 LBS | SYSTOLIC BLOOD PRESSURE: 166 MMHG

## 2018-09-20 DIAGNOSIS — Z09 POSTOPERATIVE EXAMINATION: Primary | ICD-10-CM

## 2018-09-20 RX ORDER — ACETAMINOPHEN 500 MG
TABLET ORAL
Status: ON HOLD | COMMUNITY
End: 2020-01-01

## 2018-09-20 NOTE — PROGRESS NOTES
The patient is status post creation of right AV fistula on 7/24/2018. The patient has no complaints.     On examination there is a palpable thrill at the AV anastomosis. There is a palpable radial pulse distally.     Duplex ultrasound shows the outflow brachial vein is 5.5 to 5.9  mm in diameter.      The outflow vein is growing but ideally should reach 6 mm before transposition. I will see the patient again in the office in one month.     Final Diagnosis: Status post AV fistula, post-operative visit.     CC: Dr Dashawn Mcgarry

## 2018-09-20 NOTE — PROGRESS NOTES
Chief Complaint   Patient presents with    End Stage Renal Disease     s/p Creation of AV fistula Right Arm 7/24/18     1. Have you been to the ER, urgent care clinic since your last visit? Hospitalized since your last visit? No    2. Have you seen or consulted any other health care providers outside of the [facility] since your last visit? Include any pap smears or colon screening.  No

## 2018-09-20 NOTE — MR AVS SNAPSHOT
[address], [address], [address] [place] [address]305 [facility] 
[phone] Patient: Saul Huddleston MRN: [id number] SUC:[birth year] Visit Information Date & Time Provider Department Dept. Phone Encounter #  
 9/20/2018  4:00 PM Mady Lyons MD [facility] [address] John [address] [phone] [id number] Your Appointments 10/18/2018  2:00 PM  
POST OP with Mady Lyons MD  
[facility] Dr[facility] John Jaramillo ([facility] Appt Note: AVF RT ARM 7/24/18  
 [address] [place] [address] Drive, [address], [address] [address]  
[address], [address], 280 [facility] [address] [address]  
  
    
 1/8/2019 11:15 AM  
ESTABLISHED PATIENT with Maikol Harris MD  
[place] [facility] Appt Note: $100DP 7/3/18ksr / 6 month follow up  
 [address] [facility] [address] 601 [facility] [address] [phone]  
  
   
 [address] [facility] [address] Upcoming Health Maintenance Date Due  
 EYE EXAM RETINAL OR DILATED Q1 4/17/2016 Influenza Age 5 to Adult 8/1/2018 Pneumococcal 19-64 Highest Risk (2 of 3 - PPSV23) 2/1/2027* HEMOGLOBIN A1C Q6M 10/30/2018 FOOT EXAM Q1 1/22/2019 LIPID PANEL Q1 1/22/2019 FOBT Q 1 YEAR AGE 50-75 4/23/2019 DTaP/Tdap/Td series (2 - Td) 1/22/2028 *Topic was postponed. The date shown is not the original due date. Allergies as of 9/20/2018  Review Complete On: 9/20/2018 By: Nataly Rachel LPN No Known Allergies Current Immunizations  Reviewed on 4/30/2018 Name Date Influenza Vaccine 12/14/2016  4:38 PM, 11/25/2015, 10/4/2013 Influenza Vaccine (Quad) PF 2/14/2018  9:50 AM  
 Pneumococcal Conjugate (PCV-13) 12/14/2016  4:37 PM  
  
 Not reviewed this visit Vitals BP Pulse Temp Height(growth percentile) Weight(growth percentile) SpO2 166/89 (BP 1 Location: Left arm, BP Patient Position: Sitting) 81 97.9 °F (36.6 °C) 6' 2\" (1.88 m) 170 lb 12.8 oz (77.5 kg) 97% BMI Smoking Status 21.93 kg/m2 Never Smoker Vitals History BMI and BSA Data Body Mass Index Body Surface Area  
 21.93 kg/m 2 2.01 m 2 Preferred Pharmacy Pharmacy Name Phone [facility] [address] [facility], [address] [place] [address] Yusuf Cueva [address] Your Updated Medication List  
  
   
This list is accurate as of 9/20/18  4:21 PM.  Always use your most recent med list.  
  
  
  
  
 aspirin 325 mg tablet Commonly known as:  ASPIRIN Take 325 mg by mouth daily. atorvastatin 20 mg tablet Commonly known as:  LIPITOR Take 20 mg by mouth daily. calcium carbonate 200 mg calcium (500 mg) Chew Commonly known as:  TUMS Take 1 Tab by mouth three (3) times daily (with meals). carvedilol 12.5 mg tablet Commonly known as:  Margarita Golas Take 1 Tab by mouth two (2) times daily (with meals). Indications: pressure and heart CREON 24,000-76,000 -120,000 unit capsule Generic drug:  lipase-protease-amylase Take  by mouth three (3) times daily (with meals). HYDROcodone-acetaminophen 5-325 mg per tablet Commonly known as:  Ange Barrs Take 1 Tab by mouth every four (4) hours as needed for Pain. Max Daily Amount: 6 Tabs. insulin NPH/insulin regular 100 unit/mL (70-30) injection Commonly known as:  NOVOLIN 70/30, HUMULIN 70/30  
18 units with Breakfast and dinner LOMOTIL 2.5-0.025 mg per tablet Generic drug:  diphenoxylate-atropine Take 1 Tab by mouth. 1 Tab by mouth as needed for diarhea. Previous discontinued medication notice made in error NIFEdipine ER 30 mg ER tablet Commonly known as:  ADALAT CC Take 1 Tab by mouth daily. Indications: pressure, add to regimen SLOW  mg (45 mg iron) ER tablet Generic drug:  ferrous sulfate Take 1 Tab by mouth Daily (before breakfast). sodium bicarbonate 650 mg tablet Take 1 Tab by mouth three (3) times daily. traZODone 50 mg tablet Commonly known as:  Dhaval Wick Take 50 mg by mouth nightly. TYLENOL EXTRA STRENGTH 500 mg tablet Generic drug:  acetaminophen Take  by mouth every six (6) hours as needed for Pain. Introducing [facility] & HEALTH SERVICES! [employer] introduces [employer] patient portal. Now you can access parts of your medical record, email your doctor's office, and request medication refills online. 1. In your internet browser, go to [email or web address] 2. Click on the First Time User? Click Here link in the Sign In box. You will see the New Member Sign Up page. 3. Enter your Del[employer] Access Code exactly as it appears below. You will not need to use this code after youve completed the sign-up process. If you do not sign up before the expiration date, you must request a new code. · Del[employer] Access Code: [email or web address] Expires: 9/24/2018  8:05 AM 
 
4. Enter the last four digits of your Social Security Number (xxxx) and Date of Birth (mm/dd/yyyy) as indicated and click Submit. You will be taken to the next sign-up page. 5. Create a Deluux ID. This will be your Deluux login ID and cannot be changed, so think of one that is secure and easy to remember. 6. Create a Deluux password. You can change your password at any time. 7. Enter your Password Reset Question and Answer. This can be used at a later time if you forget your password. 8. Enter your e-mail address. You will receive e-mail notification when new information is available in [address] 9. Click Sign Up. You can now view and download portions of your medical record. 10. Click the Download Summary menu link to download a portable copy of your medical information.  
 
If you have questions, please visit the Frequently Asked Questions section of the voxapp. Remember, IAMINTOIThart is NOT to be used for urgent needs. For medical emergencies, dial 911. Now available from your iPhone and Android! Please provide this summary of care documentation to your next provider. Your primary care clinician is listed as Les Quails. If you have any questions after today's visit, please call [phone].

## 2018-10-18 ENCOUNTER — OFFICE VISIT (OUTPATIENT)
Dept: SURGERY | Age: 58
End: 2018-10-18

## 2018-10-18 VITALS
HEIGHT: 74 IN | SYSTOLIC BLOOD PRESSURE: 155 MMHG | TEMPERATURE: 98.3 F | OXYGEN SATURATION: 98 % | DIASTOLIC BLOOD PRESSURE: 83 MMHG | WEIGHT: 173.6 LBS | BODY MASS INDEX: 22.28 KG/M2 | HEART RATE: 87 BPM

## 2018-10-18 DIAGNOSIS — Z09 POSTOPERATIVE EXAMINATION: Primary | ICD-10-CM

## 2018-10-18 NOTE — PROGRESS NOTES
Chief Complaint   Patient presents with    End Stage Renal Disease     S/P AVF RIGHT ARM 7/24/18       1. Have you been to the ER, urgent care clinic since your last visit? Hospitalized since your last visit? No    2. Have you seen or consulted any other health care providers outside of the [address] [place] [address] since your last visit? Include any pap smears or colon screening.  No

## 2018-10-18 NOTE — PROGRESS NOTES
The patient is status post creation of right AV fistula on 7/24/2018. The patient has no complaints.     On examination there is a palpable thrill at the AV anastomosis. There is a palpable radial pulse distally. There is no arm edema.     Duplex ultrasound shows the outflow brachial  vein is over 6 mm in diameter. The outflow vein has now grown large enough for creation of transposed arterio-venous fistula in the left arm under general anesthesia. Risks include bleeding, infection, failure of the fistula, injury to vessels or nerves, ischemia of the arm or hand, swelling of the arm or hand, risks of general anesthesia, etc.  The patient understands and wishes to proceed.     Final Diagnosis: Status post AV fistula, post-operative visit.     CC: Dr Seema Gloria

## 2018-11-09 NOTE — PERIOP NOTES
[facility] Preoperative Instructions Surgery Date 11/13/18      Time of Arrival 1100 
 
1. On the day of your surgery, please report to the Surgical Services Registration Desk and sign in at your designated time. The Surgery Center is located to the right of the Emergency Room. 2. You must have someone with you to drive you home. You should not drive a car for 24 hours following surgery. Please make arrangements for a friend or family member to stay with you for the first 24 hours after your surgery. 3. Do not have anything to eat or drink (including water, gum, mints, coffee, juice) after midnight 11/12/18.?? Colby Earing ? This may not apply to medications prescribed by your physician. ?(Please note below the special instructions with medications to take the morning of your procedure.) 4. We recommend you do not drink any alcoholic beverages for 24 hours before and after your surgery. 5. Contact your surgeons office for instructions on the following medications: non-steroidal anti-inflammatory drugs (i.e. Advil, Aleve), vitamins, and supplements. (Some surgeons will want you to stop these medications prior to surgery and others may allow you to take them) **If you are currently taking Plavix, Coumadin, Aspirin and/or other blood-thinning agents, contact your surgeon for instructions. ** Your surgeon will partner with the physician prescribing these medications to determine if it is safe to stop or if you need to continue taking. Please do not stop taking these medications without instructions from your surgeon 6. Wear comfortable clothes. Wear glasses instead of contacts. Do not bring any money or jewelry. Please bring picture ID, insurance card, and any prearranged co-payment or hospital payment. Do not wear make-up, particularly mascara the morning of your surgery. Do not wear nail polish, particularly if you are having foot /hand surgery.   Wear your hair loose or down, no ponytails, buns, yoly pins or clips. All body piercings must be removed. Please shower with antibacterial soap for three consecutive days before and on the morning of surgery, but do not apply any lotions, powders or deodorants after the shower on the day of surgery. Please use a fresh towels after each shower. Please sleep in clean clothes and change bed linens the night before surgery. Please do not shave for 48 hours prior to surgery. Shaving of the face is acceptable. 7. You should understand that if you do not follow these instructions your surgery may be cancelled. If your physical condition changes (I.e. fever, cold or flu) please contact your surgeon as soon as possible. 8. It is important that you be on time. If a situation occurs where you may be late, please call [phone] (OR Holding Area). 9. If you have any questions and or problems, please call [phone] (Pre-admission Testing). 10. Your surgery time may be subject to change. You will receive a phone call the evening prior if your time changes. 11.  If having outpatient surgery, you must have someone to drive you here, stay with you during the duration of your stay, and to drive you home at time of discharge. 12.   In an effort to improve the efficiency, privacy, and safety for all of our Pre-op patients visitors are not allowed in the Holding area. Once you arrive and are registered your family/visitors will be asked to remain in the waiting room. The Pre-op staff will get you from the Surgical Waiting Area and will explain to you and your family/visitors that the Pre-op phase is beginning. The staff will answer any questions and provide instructions for tracking of the patient, by use of the existing tracking number and color-coded status board in the waiting room.   At this time the staff will also ask for your designated spokesperson information in the event that the physician or staff need to provide an update or obtain any pertinent information. The designated spokesperson will be notified if the physician needs to speak to family during the pre-operative phase. If at any time your family/visitors has questions or concerns they may approach the volunteer desk in the waiting area for assistance. Special Instructions:ASA & NSAIDS per surgeon. MEDICATIONS TO TAKE THE MORNING OF SURGERY WITH A SIP OF WATER:Tylenol if needed. Creon,Nifedipine,coreg. I understand a pre-operative phone call will be made to verify my surgery time. In the event that I am not available, I give permission for a message to be left on my answering service and/or with another person? {yes @ [phone] 
 
 
 
 ___________________      __________   _________ 
  (Signature of Patient)             (Witness)                (Date and Time)

## 2018-11-13 ENCOUNTER — ANESTHESIA (OUTPATIENT)
Dept: SURGERY | Age: 58
End: 2018-11-13
Payer: SELF-PAY

## 2018-11-13 ENCOUNTER — HOSPITAL ENCOUNTER (OUTPATIENT)
Age: 58
Setting detail: OBSERVATION
Discharge: HOME | End: 2018-11-14
Attending: SURGERY | Admitting: SURGERY
Payer: SELF-PAY

## 2018-11-13 ENCOUNTER — ANESTHESIA EVENT (OUTPATIENT)
Dept: SURGERY | Age: 58
End: 2018-11-13
Payer: SELF-PAY

## 2018-11-13 DIAGNOSIS — L76.82 PAIN AT SURGICAL INCISION: ICD-10-CM

## 2018-11-13 PROBLEM — N18.6 ESRD (END STAGE RENAL DISEASE) (HCC): Status: ACTIVE | Noted: 2018-11-13

## 2018-11-13 LAB
GLUCOSE BLD STRIP.AUTO-MCNC: 118 MG/DL (ref 65–100)
SERVICE CMNT-IMP: ABNORMAL

## 2018-11-13 PROCEDURE — 74011250636 HC RX REV CODE- 250/636: Performed by: ANESTHESIOLOGY

## 2018-11-13 PROCEDURE — 77030013079 HC BLNKT BAIR HGGR 3M -A: Performed by: SURGERY

## 2018-11-13 PROCEDURE — 80047 BASIC METABLC PNL IONIZED CA: CPT

## 2018-11-13 PROCEDURE — 77030032490 HC SLV COMPR SCD KNE COVD -B: Performed by: SURGERY

## 2018-11-13 PROCEDURE — 77030002986 HC SUT PROL J&J -A: Performed by: SURGERY

## 2018-11-13 PROCEDURE — 77030008467 HC STPLR SKN COVD -B: Performed by: SURGERY

## 2018-11-13 PROCEDURE — 74011250636 HC RX REV CODE- 250/636

## 2018-11-13 PROCEDURE — 77030002888 HC SUT CHRMC J&J -A: Performed by: SURGERY

## 2018-11-13 PROCEDURE — 77030018836 HC SOL IRR NACL ICUM -A: Performed by: SURGERY

## 2018-11-13 PROCEDURE — 77030008684 HC TU ET CUF COVD -B: Performed by: ANESTHESIOLOGY

## 2018-11-13 PROCEDURE — 82962 GLUCOSE BLOOD TEST: CPT

## 2018-11-13 PROCEDURE — 77030002996 HC SUT SLK J&J -A: Performed by: SURGERY

## 2018-11-13 PROCEDURE — 77030013079 HC BLNKT BAIR HGGR 3M -A: Performed by: ANESTHESIOLOGY

## 2018-11-13 PROCEDURE — 76060000041 HC ANESTHESIA 5 TO 5.5 HR: Performed by: SURGERY

## 2018-11-13 PROCEDURE — 77030019903 HC CATH URET INT BARD -A: Performed by: SURGERY

## 2018-11-13 PROCEDURE — 77030002987 HC SUT PROL J&J -B: Performed by: SURGERY

## 2018-11-13 PROCEDURE — 77030011241 HC DRN WND FLL CARD -B: Performed by: SURGERY

## 2018-11-13 PROCEDURE — 77030020255 HC SOL INJ LR 1000ML BG: Performed by: SURGERY

## 2018-11-13 PROCEDURE — 77010033678 HC OXYGEN DAILY

## 2018-11-13 PROCEDURE — 74011000250 HC RX REV CODE- 250: Performed by: SURGERY

## 2018-11-13 PROCEDURE — 77030026438 HC STYL ET INTUB CARD -A: Performed by: ANESTHESIOLOGY

## 2018-11-13 PROCEDURE — 99218 HC RM OBSERVATION: CPT

## 2018-11-13 PROCEDURE — 77030013567 HC DRN WND RESERV BARD -A: Performed by: SURGERY

## 2018-11-13 PROCEDURE — 76010000137 HC OR TIME 5 TO 5.5 HR: Performed by: SURGERY

## 2018-11-13 PROCEDURE — 74011250636 HC RX REV CODE- 250/636: Performed by: SURGERY

## 2018-11-13 PROCEDURE — 77030010938 HC CLP LIG TELE -A: Performed by: SURGERY

## 2018-11-13 PROCEDURE — 77030019908 HC STETH ESOPH SIMS -A: Performed by: ANESTHESIOLOGY

## 2018-11-13 PROCEDURE — 77030020153 HC PRB DOPLR DISP MIZU -C: Performed by: SURGERY

## 2018-11-13 PROCEDURE — 77030011640 HC PAD GRND REM COVD -A: Performed by: SURGERY

## 2018-11-13 PROCEDURE — 77030020782 HC GWN BAIR PAWS FLX 3M -B

## 2018-11-13 PROCEDURE — 77030021678 HC GLIDESCP STAT DISP VERT -B: Performed by: ANESTHESIOLOGY

## 2018-11-13 PROCEDURE — 77030002916 HC SUT ETHLN J&J -A: Performed by: SURGERY

## 2018-11-13 PROCEDURE — 76210000016 HC OR PH I REC 1 TO 1.5 HR: Performed by: SURGERY

## 2018-11-13 PROCEDURE — 74011000250 HC RX REV CODE- 250

## 2018-11-13 RX ORDER — HYDROMORPHONE HYDROCHLORIDE 1 MG/ML
.2-.5 INJECTION, SOLUTION INTRAMUSCULAR; INTRAVENOUS; SUBCUTANEOUS
Status: DISCONTINUED | OUTPATIENT
Start: 2018-11-13 | End: 2018-11-13 | Stop reason: HOSPADM

## 2018-11-13 RX ORDER — PHENYLEPHRINE HCL IN 0.9% NACL 0.4MG/10ML
SYRINGE (ML) INTRAVENOUS AS NEEDED
Status: DISCONTINUED | OUTPATIENT
Start: 2018-11-13 | End: 2018-11-13 | Stop reason: HOSPADM

## 2018-11-13 RX ORDER — LIDOCAINE HYDROCHLORIDE 20 MG/ML
INJECTION, SOLUTION EPIDURAL; INFILTRATION; INTRACAUDAL; PERINEURAL AS NEEDED
Status: DISCONTINUED | OUTPATIENT
Start: 2018-11-13 | End: 2018-11-13 | Stop reason: HOSPADM

## 2018-11-13 RX ORDER — ONDANSETRON 2 MG/ML
INJECTION INTRAMUSCULAR; INTRAVENOUS AS NEEDED
Status: DISCONTINUED | OUTPATIENT
Start: 2018-11-13 | End: 2018-11-13 | Stop reason: HOSPADM

## 2018-11-13 RX ORDER — ROCURONIUM BROMIDE 10 MG/ML
INJECTION, SOLUTION INTRAVENOUS AS NEEDED
Status: DISCONTINUED | OUTPATIENT
Start: 2018-11-13 | End: 2018-11-13 | Stop reason: HOSPADM

## 2018-11-13 RX ORDER — GLYCOPYRROLATE 0.2 MG/ML
INJECTION INTRAMUSCULAR; INTRAVENOUS AS NEEDED
Status: DISCONTINUED | OUTPATIENT
Start: 2018-11-13 | End: 2018-11-13 | Stop reason: HOSPADM

## 2018-11-13 RX ORDER — FENTANYL CITRATE 50 UG/ML
INJECTION, SOLUTION INTRAMUSCULAR; INTRAVENOUS AS NEEDED
Status: DISCONTINUED | OUTPATIENT
Start: 2018-11-13 | End: 2018-11-13 | Stop reason: HOSPADM

## 2018-11-13 RX ORDER — CEFAZOLIN SODIUM 1 G/3ML
INJECTION, POWDER, FOR SOLUTION INTRAMUSCULAR; INTRAVENOUS AS NEEDED
Status: DISCONTINUED | OUTPATIENT
Start: 2018-11-13 | End: 2018-11-13 | Stop reason: HOSPADM

## 2018-11-13 RX ORDER — FENTANYL CITRATE 50 UG/ML
25 INJECTION, SOLUTION INTRAMUSCULAR; INTRAVENOUS
Status: DISCONTINUED | OUTPATIENT
Start: 2018-11-13 | End: 2018-11-13 | Stop reason: HOSPADM

## 2018-11-13 RX ORDER — SODIUM CHLORIDE, SODIUM LACTATE, POTASSIUM CHLORIDE, CALCIUM CHLORIDE 600; 310; 30; 20 MG/100ML; MG/100ML; MG/100ML; MG/100ML
25 INJECTION, SOLUTION INTRAVENOUS CONTINUOUS
Status: DISCONTINUED | OUTPATIENT
Start: 2018-11-13 | End: 2018-11-13 | Stop reason: HOSPADM

## 2018-11-13 RX ORDER — DIPHENHYDRAMINE HYDROCHLORIDE 50 MG/ML
12.5 INJECTION, SOLUTION INTRAMUSCULAR; INTRAVENOUS AS NEEDED
Status: DISCONTINUED | OUTPATIENT
Start: 2018-11-13 | End: 2018-11-13 | Stop reason: HOSPADM

## 2018-11-13 RX ORDER — PROPOFOL 10 MG/ML
INJECTION, EMULSION INTRAVENOUS AS NEEDED
Status: DISCONTINUED | OUTPATIENT
Start: 2018-11-13 | End: 2018-11-13 | Stop reason: HOSPADM

## 2018-11-13 RX ORDER — ACETAMINOPHEN 10 MG/ML
INJECTION, SOLUTION INTRAVENOUS AS NEEDED
Status: DISCONTINUED | OUTPATIENT
Start: 2018-11-13 | End: 2018-11-13 | Stop reason: HOSPADM

## 2018-11-13 RX ORDER — NEOSTIGMINE METHYLSULFATE 1 MG/ML
INJECTION INTRAVENOUS AS NEEDED
Status: DISCONTINUED | OUTPATIENT
Start: 2018-11-13 | End: 2018-11-13 | Stop reason: HOSPADM

## 2018-11-13 RX ORDER — MIDAZOLAM HYDROCHLORIDE 1 MG/ML
INJECTION, SOLUTION INTRAMUSCULAR; INTRAVENOUS AS NEEDED
Status: DISCONTINUED | OUTPATIENT
Start: 2018-11-13 | End: 2018-11-13 | Stop reason: HOSPADM

## 2018-11-13 RX ORDER — SODIUM CHLORIDE 0.9 % (FLUSH) 0.9 %
5-10 SYRINGE (ML) INJECTION EVERY 8 HOURS
Status: DISCONTINUED | OUTPATIENT
Start: 2018-11-13 | End: 2018-11-13 | Stop reason: HOSPADM

## 2018-11-13 RX ORDER — MORPHINE SULFATE 10 MG/ML
2 INJECTION, SOLUTION INTRAMUSCULAR; INTRAVENOUS
Status: DISCONTINUED | OUTPATIENT
Start: 2018-11-13 | End: 2018-11-13 | Stop reason: HOSPADM

## 2018-11-13 RX ORDER — CEFAZOLIN SODIUM/WATER 2 G/20 ML
2 SYRINGE (ML) INTRAVENOUS ONCE
Status: COMPLETED | OUTPATIENT
Start: 2018-11-13 | End: 2018-11-13

## 2018-11-13 RX ORDER — SODIUM CHLORIDE 0.9 % (FLUSH) 0.9 %
5-10 SYRINGE (ML) INJECTION AS NEEDED
Status: DISCONTINUED | OUTPATIENT
Start: 2018-11-13 | End: 2018-11-13 | Stop reason: HOSPADM

## 2018-11-13 RX ORDER — ONDANSETRON 2 MG/ML
4 INJECTION INTRAMUSCULAR; INTRAVENOUS AS NEEDED
Status: DISCONTINUED | OUTPATIENT
Start: 2018-11-13 | End: 2018-11-13 | Stop reason: HOSPADM

## 2018-11-13 RX ORDER — SODIUM CHLORIDE 9 MG/ML
25 INJECTION, SOLUTION INTRAVENOUS CONTINUOUS
Status: DISCONTINUED | OUTPATIENT
Start: 2018-11-13 | End: 2018-11-13 | Stop reason: HOSPADM

## 2018-11-13 RX ADMIN — Medication 80 MCG: at 13:13

## 2018-11-13 RX ADMIN — ACETAMINOPHEN 1000 MG: 10 INJECTION, SOLUTION INTRAVENOUS at 16:13

## 2018-11-13 RX ADMIN — GLYCOPYRROLATE 0.4 MG: 0.2 INJECTION INTRAMUSCULAR; INTRAVENOUS at 17:46

## 2018-11-13 RX ADMIN — MIDAZOLAM HYDROCHLORIDE 2 MG: 1 INJECTION, SOLUTION INTRAMUSCULAR; INTRAVENOUS at 12:56

## 2018-11-13 RX ADMIN — FENTANYL CITRATE 25 MCG: 50 INJECTION, SOLUTION INTRAMUSCULAR; INTRAVENOUS at 17:29

## 2018-11-13 RX ADMIN — FENTANYL CITRATE 50 MCG: 50 INJECTION, SOLUTION INTRAMUSCULAR; INTRAVENOUS at 13:04

## 2018-11-13 RX ADMIN — MORPHINE SULFATE 2 MG: 10 INJECTION INTRAMUSCULAR; INTRAVENOUS; SUBCUTANEOUS at 18:35

## 2018-11-13 RX ADMIN — PROPOFOL 50 MG: 10 INJECTION, EMULSION INTRAVENOUS at 13:05

## 2018-11-13 RX ADMIN — PROPOFOL 150 MG: 10 INJECTION, EMULSION INTRAVENOUS at 13:04

## 2018-11-13 RX ADMIN — MORPHINE SULFATE 2 MG: 10 INJECTION INTRAMUSCULAR; INTRAVENOUS; SUBCUTANEOUS at 18:50

## 2018-11-13 RX ADMIN — CEFAZOLIN SODIUM 2 G: 1 INJECTION, POWDER, FOR SOLUTION INTRAMUSCULAR; INTRAVENOUS at 17:27

## 2018-11-13 RX ADMIN — ONDANSETRON 4 MG: 2 INJECTION INTRAMUSCULAR; INTRAVENOUS at 17:42

## 2018-11-13 RX ADMIN — NEOSTIGMINE METHYLSULFATE 3 MG: 1 INJECTION INTRAVENOUS at 17:46

## 2018-11-13 RX ADMIN — SODIUM CHLORIDE 25 ML/HR: 900 INJECTION, SOLUTION INTRAVENOUS at 13:00

## 2018-11-13 RX ADMIN — MORPHINE SULFATE 2 MG: 10 INJECTION INTRAMUSCULAR; INTRAVENOUS; SUBCUTANEOUS at 18:30

## 2018-11-13 RX ADMIN — MORPHINE SULFATE 2 MG: 10 INJECTION INTRAMUSCULAR; INTRAVENOUS; SUBCUTANEOUS at 18:40

## 2018-11-13 RX ADMIN — Medication 80 MCG: at 13:17

## 2018-11-13 RX ADMIN — LIDOCAINE HYDROCHLORIDE 80 MG: 20 INJECTION, SOLUTION EPIDURAL; INFILTRATION; INTRACAUDAL; PERINEURAL at 13:04

## 2018-11-13 RX ADMIN — FENTANYL CITRATE 25 MCG: 50 INJECTION, SOLUTION INTRAMUSCULAR; INTRAVENOUS at 15:27

## 2018-11-13 RX ADMIN — Medication 2 G: at 13:16

## 2018-11-13 RX ADMIN — MORPHINE SULFATE 2 MG: 10 INJECTION INTRAMUSCULAR; INTRAVENOUS; SUBCUTANEOUS at 18:45

## 2018-11-13 RX ADMIN — Medication 80 MCG: at 13:15

## 2018-11-13 RX ADMIN — ROCURONIUM BROMIDE 40 MG: 10 INJECTION, SOLUTION INTRAVENOUS at 13:04
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
## 2018-11-13 NOTE — OP NOTES
Operative Report CPT       58752 Creation of Transposed AV Fistula Right Arm 
(Brachio - Brachial) Patient Name:  Aden Record 11/13/2018 Nephrologist  -  Dr Rahat Madden Preoperative Diagnosis - ESRD Postoperative Diagnosis - Same Anesthesia - General 
 
Surgeon - MIKAYLA Zazueta Surg Asst-1: Suha Yañez Surg Asst-Relief: Nicky Braun RN Procedure - Creation of transposed AV fistula right arm Operative Summary - The patient was taken to the operating room and placed on the operating table in the supine position. The patient's right arm was  prepared and steriley draped. The location of the veins had been reconfirmed by ultrasound prior to surgery. There was an existing AV fistula in the arm between the brachial artery and the brachial vein. An incision was made exposing the vein just above the arterial anastomosis. The brachial vein was exposed with a long incision up to the level of the pectoralis muscle. The branches were ligated and divided. The vein was then ligated just above the arterial anastomosis with 2-0 silk. The vein was divided above the tie. The vein was then gently distended with heparinized lactated Ringer's solution and marked for orientation. The brachial artery was exposed just above the old anastomotic  level. The vein was brought down through a very superficial tunnel in the upper arm with a curved [place] tunneler. The tunneler sheath was withdrawn. The artery was controlled proximally and distally with Tali Arben bulldog clamps. A short longitudinal arteriotomy was made. Using 6 - 0 prolene an anastomosis was made between the end of the vein and the side of the artery. Prior to completion, the artery was flushed proximally and distally.   After completion, with initiation of flow, there was a thrill at the AV anastomosis and a palpable pulse in the radial artery at the wrist.   
 
The wound was irrigated with antibiotic solution. In the bed of the mobilized brachial vein, a 7 mm SKY drain was placed and was brought out through a separate stab wound. The subcutaneous tissue was  closed with 3-0 chromic and the skin with surgical staples. A gauze dressing was applied with kerlix wrap. The patient tolerated the procedure well and was taken to the PACU in stable condition. Specimen - none 2 [facility] 7 mm Estimated Blood Loss - minimal 
 
Complications - none Implant - none Kera Bassett MD

## 2018-11-13 NOTE — BRIEF OP NOTE
BRIEF OPERATIVE NOTE Date of Procedure: 11/13/2018 Preoperative Diagnosis: ESRD Postoperative Diagnosis: ESRD Procedure(s): CREATION TRANSPOSED ARTERIO VENOUS FISTULA RIGHT ARM (Right brachial-brachial) Surgeon(s) and Role: Ml Knox MD - Primary Surgical Assistant: staff Surgical Staff: 
Circ-1: Lazarus Baxter RN 
Circ-Relief: Arlette Yanez RN Scrub Tech-1: Pinky Trotter Scrub Tech-Relief: Chelsi Sosa Surg Asst-1: Ventura Farrar Surg Asst-Relief: Aubree Cross RN Event Time In Time Out Incision Start 1330 Incision Close 1746 Anesthesia: General  
Estimated Blood Loss: 60 ml Specimens: * No specimens in log * Findings: Palpable thrill in AV fistula and palpable pulse in radial artery at the wrist post op. Complications: none Implants: * No implants in log *

## 2018-11-13 NOTE — ROUTINE PROCESS
TRANSFER - IN REPORT: 
 
Verbal report [facility] from ZACK Tong RN(name[facility] for routine post - op Report consisted of patients Situation, Background, Assessment and  
Recommendations(SBAR). Information from the following report(s) OR Summary was reviewed with the receiving nurse. Opportunity for questions and clarification was provided. Assessment completed upon patients arrival to unit and care assumed.

## 2018-11-13 NOTE — ANESTHESIA PREPROCEDURE EVALUATION
Anesthetic History No history of anesthetic complications Review of Systems / Medical History Patient summary reviewed, nursing notes reviewed and pertinent labs reviewed Pulmonary Within defined limits Neuro/Psych Within defined limits Cardiovascular Hypertension CHF 
 
CAD and hyperlipidemia Exercise tolerance: <4 METS Comments: Dilated cardiomyopathy Ejection fraction was estimated in the range of 20 % to 25 %. BB within last 24 hrs GI/Hepatic/Renal 
  
 
 
Renal disease (sp nephrectomy): ESRD and dialysis Liver disease Endo/Other Diabetes: type 2, using insulin Anemia Other Findings Physical Exam 
 
Airway Mallampati: IV 
TM Distance: 4 - 6 cm Neck ROM: decreased range of motion Mouth opening: Diminished (comment) Cardiovascular Regular rate and rhythm,  S1 and S2 normal,  no murmur, click, rub, or gallop Dental 
No notable dental hx Pulmonary Breath sounds clear to auscultation Abdominal 
GI exam deferred Other Findings Anesthetic Plan ASA: 4 Anesthesia type: general 
 
Monitoring Plan: BIS Induction: Intravenous Anesthetic plan and risks discussed with: Patient ISTAT good and glidescope should be available

## 2018-11-13 NOTE — PERIOP NOTES
Pt having some problems with tmj for a couple weeks spoke to dr. Robert Leong in regards to limitations on opening up mouth  .

## 2018-11-14 VITALS
WEIGHT: 167.77 LBS | OXYGEN SATURATION: 93 % | RESPIRATION RATE: 18 BRPM | SYSTOLIC BLOOD PRESSURE: 156 MMHG | HEIGHT: 74 IN | DIASTOLIC BLOOD PRESSURE: 87 MMHG | BODY MASS INDEX: 21.53 KG/M2 | TEMPERATURE: 98.7 F | HEART RATE: 97 BPM

## 2018-11-14 LAB
ANION GAP BLD CALC-SCNC: 14 MMOL/L (ref 10–20)
BUN BLD-MCNC: 30 MG/DL (ref 9–20)
CA-I BLD-MCNC: 1.08 MMOL/L (ref 1.12–1.32)
CHLORIDE BLD-SCNC: 101 MMOL/L (ref 98–107)
CO2 BLD-SCNC: 29 MMOL/L (ref 21–32)
CREAT BLD-MCNC: 4.4 MG/DL (ref 0.6–1.3)
GLUCOSE BLD STRIP.AUTO-MCNC: 121 MG/DL (ref 65–100)
GLUCOSE BLD STRIP.AUTO-MCNC: 138 MG/DL (ref 65–100)
GLUCOSE BLD STRIP.AUTO-MCNC: 243 MG/DL (ref 65–100)
GLUCOSE BLD STRIP.AUTO-MCNC: 251 MG/DL (ref 65–100)
GLUCOSE BLD-MCNC: 176 MG/DL (ref 65–100)
HCT VFR BLD CALC: 34 % (ref 36.6–50.3)
POTASSIUM BLD-SCNC: 4.9 MMOL/L (ref 3.5–5.1)
SERVICE CMNT-IMP: ABNORMAL
SODIUM BLD-SCNC: 138 MMOL/L (ref 136–145)

## 2018-11-14 PROCEDURE — 82962 GLUCOSE BLOOD TEST: CPT

## 2018-11-14 PROCEDURE — 99218 HC RM OBSERVATION: CPT

## 2018-11-14 PROCEDURE — 94760 N-INVAS EAR/PLS OXIMETRY 1: CPT

## 2018-11-14 PROCEDURE — 90935 HEMODIALYSIS ONE EVALUATION: CPT

## 2018-11-14 PROCEDURE — 74011250637 HC RX REV CODE- 250/637: Performed by: SURGERY

## 2018-11-14 PROCEDURE — 74011250637 HC RX REV CODE- 250/637: Performed by: INTERNAL MEDICINE

## 2018-11-14 PROCEDURE — 77010033678 HC OXYGEN DAILY

## 2018-11-14 PROCEDURE — 74011636637 HC RX REV CODE- 636/637: Performed by: SURGERY

## 2018-11-14 RX ORDER — NIFEDIPINE 30 MG/1
30 TABLET, EXTENDED RELEASE ORAL DAILY
Status: DISCONTINUED | OUTPATIENT
Start: 2018-11-14 | End: 2018-11-14 | Stop reason: HOSPADM

## 2018-11-14 RX ORDER — DIPHENOXYLATE HYDROCHLORIDE AND ATROPINE SULFATE 2.5; .025 MG/1; MG/1
1 TABLET ORAL
Status: DISCONTINUED | OUTPATIENT
Start: 2018-11-14 | End: 2018-11-14 | Stop reason: HOSPADM

## 2018-11-14 RX ORDER — DIPHENHYDRAMINE HYDROCHLORIDE 50 MG/ML
12.5 INJECTION, SOLUTION INTRAMUSCULAR; INTRAVENOUS
Status: DISCONTINUED | OUTPATIENT
Start: 2018-11-14 | End: 2018-11-14 | Stop reason: HOSPADM

## 2018-11-14 RX ORDER — ACETAMINOPHEN 325 MG/1
650 TABLET ORAL
Status: DISCONTINUED | OUTPATIENT
Start: 2018-11-14 | End: 2018-11-14 | Stop reason: HOSPADM

## 2018-11-14 RX ORDER — HYDROMORPHONE HYDROCHLORIDE 1 MG/ML
0.5 INJECTION, SOLUTION INTRAMUSCULAR; INTRAVENOUS; SUBCUTANEOUS
Status: DISCONTINUED | OUTPATIENT
Start: 2018-11-14 | End: 2018-11-14 | Stop reason: HOSPADM

## 2018-11-14 RX ORDER — NALOXONE HYDROCHLORIDE 0.4 MG/ML
0.4 INJECTION, SOLUTION INTRAMUSCULAR; INTRAVENOUS; SUBCUTANEOUS AS NEEDED
Status: DISCONTINUED | OUTPATIENT
Start: 2018-11-14 | End: 2018-11-14 | Stop reason: HOSPADM

## 2018-11-14 RX ORDER — MAGNESIUM SULFATE 100 %
4 CRYSTALS MISCELLANEOUS AS NEEDED
Status: DISCONTINUED | OUTPATIENT
Start: 2018-11-14 | End: 2018-11-14 | Stop reason: HOSPADM

## 2018-11-14 RX ORDER — DEXTROSE 50 % IN WATER (D50W) INTRAVENOUS SYRINGE
12.5-25 AS NEEDED
Status: DISCONTINUED | OUTPATIENT
Start: 2018-11-14 | End: 2018-11-14 | Stop reason: SDUPTHER

## 2018-11-14 RX ORDER — SODIUM CHLORIDE 0.9 % (FLUSH) 0.9 %
5-10 SYRINGE (ML) INJECTION AS NEEDED
Status: DISCONTINUED | OUTPATIENT
Start: 2018-11-14 | End: 2018-11-14 | Stop reason: HOSPADM

## 2018-11-14 RX ORDER — HYDROCODONE BITARTRATE AND ACETAMINOPHEN 5; 325 MG/1; MG/1
1 TABLET ORAL
Qty: 30 TAB | Refills: 0 | Status: SHIPPED | OUTPATIENT
Start: 2018-11-14 | End: 2019-01-01

## 2018-11-14 RX ORDER — INSULIN LISPRO 100 [IU]/ML
INJECTION, SOLUTION INTRAVENOUS; SUBCUTANEOUS
Status: DISCONTINUED | OUTPATIENT
Start: 2018-11-14 | End: 2018-11-14 | Stop reason: HOSPADM

## 2018-11-14 RX ORDER — DEXTROSE 50 % IN WATER (D50W) INTRAVENOUS SYRINGE
12.5-25 AS NEEDED
Status: DISCONTINUED | OUTPATIENT
Start: 2018-11-14 | End: 2018-11-14 | Stop reason: HOSPADM

## 2018-11-14 RX ORDER — ATORVASTATIN CALCIUM 20 MG/1
20 TABLET, FILM COATED ORAL DAILY
Status: DISCONTINUED | OUTPATIENT
Start: 2018-11-14 | End: 2018-11-14 | Stop reason: HOSPADM

## 2018-11-14 RX ORDER — ONDANSETRON 2 MG/ML
4 INJECTION INTRAMUSCULAR; INTRAVENOUS
Status: DISCONTINUED | OUTPATIENT
Start: 2018-11-14 | End: 2018-11-14 | Stop reason: HOSPADM

## 2018-11-14 RX ORDER — CALCIUM CARBONATE 200(500)MG
400 TABLET,CHEWABLE ORAL 2 TIMES DAILY
Status: DISCONTINUED | OUTPATIENT
Start: 2018-11-14 | End: 2018-11-14 | Stop reason: HOSPADM

## 2018-11-14 RX ORDER — GABAPENTIN 300 MG/1
300 CAPSULE ORAL DAILY
Status: DISCONTINUED | OUTPATIENT
Start: 2018-11-14 | End: 2018-11-14 | Stop reason: HOSPADM

## 2018-11-14 RX ORDER — SODIUM BICARBONATE 650 MG/1
650 TABLET ORAL 3 TIMES DAILY
Status: DISCONTINUED | OUTPATIENT
Start: 2018-11-14 | End: 2018-11-14 | Stop reason: HOSPADM

## 2018-11-14 RX ORDER — CARVEDILOL 12.5 MG/1
12.5 TABLET ORAL 2 TIMES DAILY WITH MEALS
Status: DISCONTINUED | OUTPATIENT
Start: 2018-11-14 | End: 2018-11-14 | Stop reason: HOSPADM

## 2018-11-14 RX ORDER — TRAZODONE HYDROCHLORIDE 50 MG/1
50 TABLET ORAL
Status: DISCONTINUED | OUTPATIENT
Start: 2018-11-14 | End: 2018-11-14 | Stop reason: HOSPADM

## 2018-11-14 RX ORDER — OXYCODONE AND ACETAMINOPHEN 5; 325 MG/1; MG/1
1-2 TABLET ORAL
Status: DISCONTINUED | OUTPATIENT
Start: 2018-11-14 | End: 2018-11-14 | Stop reason: HOSPADM

## 2018-11-14 RX ORDER — SODIUM CHLORIDE 0.9 % (FLUSH) 0.9 %
5-10 SYRINGE (ML) INJECTION EVERY 8 HOURS
Status: DISCONTINUED | OUTPATIENT
Start: 2018-11-14 | End: 2018-11-14 | Stop reason: HOSPADM

## 2018-11-14 RX ADMIN — PANCRELIPASE LIPASE, PANCRELIPASE PROTEASE, PANCRELIPASE AMYLASE 1 CAPSULE: 5000; 17000; 24000 CAPSULE, DELAYED RELEASE ORAL at 16:44

## 2018-11-14 RX ADMIN — INSULIN LISPRO 3 UNITS: 100 INJECTION, SOLUTION INTRAVENOUS; SUBCUTANEOUS at 11:52

## 2018-11-14 RX ADMIN — PANCRELIPASE LIPASE, PANCRELIPASE PROTEASE, PANCRELIPASE AMYLASE 1 CAPSULE: 5000; 17000; 24000 CAPSULE, DELAYED RELEASE ORAL at 11:54

## 2018-11-14 RX ADMIN — PANCRELIPASE 2 CAPSULE: 10000; 34000; 55000 CAPSULE, DELAYED RELEASE ORAL at 16:44

## 2018-11-14 RX ADMIN — PANCRELIPASE 2 CAPSULE: 10000; 34000; 55000 CAPSULE, DELAYED RELEASE ORAL at 11:53

## 2018-11-14 RX ADMIN — GABAPENTIN 300 MG: 300 CAPSULE ORAL at 09:00

## 2018-11-14 RX ADMIN — CALCIUM CARBONATE (ANTACID) CHEW TAB 500 MG 400 MG: 500 CHEW TAB at 08:58

## 2018-11-14 RX ADMIN — CARVEDILOL 12.5 MG: 12.5 TABLET, FILM COATED ORAL at 16:43

## 2018-11-14 RX ADMIN — SODIUM BICARBONATE 650 MG: 650 TABLET ORAL at 16:43

## 2018-11-14 NOTE — PERIOP NOTES
TRANSFER - OUT REPORT: 
 
Verbal report given to Arabella Avendaño RN(name) on Jaime Daily  being transferred to [facility](unit) for routine progression of care Report consisted of patients Situation, Background, Assessment and  
Recommendations(SBAR). Information from the following report(s) OR Summary, Intake/Output and MAR was reviewed with the receiving nurse. Opportunity for questions and clarification was provided. Patient transported with: 
 O2 @ 2 liters Tech

## 2018-11-14 NOTE — PROGRESS NOTES
Nephrology Progress Note Ruperto Parikh  
 
[email or web address]. [email or web address]                  Phone - [phone] Patient: Bryan Hu YOB: 1960     Admit Date: 11/13/2018 Date- 11/14/2018 CC: Follow up for esrd Subjective: Interval History:  
-  Tacos Graham is aa male with esrd. He is followed by DR. Jones Boards He Is on hd mwf at [facility] unit He had Creation of transposed AV fistula right arm on 11-13-18 No c/o sob, chest pain, No c/o nausea or vomiting No c/o  fever. ROS:- as above Assessment:  
· esrd · Anemia of ckd · htn · S/p Creation of transposed AV fistula right arm · Remy Benito Plan:  
· Hd today · Remove 1.5 kg · Continue coreg, nifedipine · Hold epogen as hct is high Physical Exam:  
GEN: NAD NECK- Supple, no thyromegaly RESP: Clear b/l, no wheezing, No accessory muscle use CVS: RRR,S1,S2 SKIN: No Rash, ABDO: soft , non tender, No hepatosplenomegaly EXT: No Edema NEURO: non focal, normal speech Right arm dressing + Right ij permacath Care Plan discussed with: pt  
Objective:  
Patient Vitals for the past 24 hrs: 
 Temp Pulse Resp BP SpO2  
11/14/18 0903 97.6 °F (36.4 °C) 96 16 147/84 96 % 11/14/18 0800 98 °F (36.7 °C) 92 18 (!) 171/96 95 % 11/14/18 0319 98 °F (36.7 °C) 80 12 (!) 152/93 99 % 11/13/18 2345 98.4 °F (36.9 °C) 75 14 144/87 99 % 11/13/18 2030 97.6 °F (36.4 °C) 74 14 147/87 93 % 11/13/18 1945  70 12 121/69 99 % 11/13/18 1930 98.7 °F (37.1 °C) 73 20 119/72 100 % 11/13/18 1915  75 14 125/68 100 % 11/13/18 1900  73 14 123/67 100 % 11/13/18 1845  75 15 124/70 98 % 11/13/18 1830  81 17 131/75 96 % 11/13/18 1825  84 18 128/74 96 % 11/13/18 1820  82 16 134/75 98 % 11/13/18 1815  84 19 133/82 99 % 11/13/18 1810  86 20 140/80 96 % 11/13/18 1808 99 °F (37.2 °C) 85 14 146/84 96 % 11/13/18 1130 99.1 °F (37.3 °C) 84 24 (!) 155/94 99 % Last 3 Recorded Weights in this Encounter 11/09/18 1030 11/13/18 1130 Weight: 78.9 kg (174 lb) 76.1 kg (167 lb 12.3 oz)  
 
11/12 1901 - 11/14 0700 In: 600 [I.V.:600] Out: 30 [Drains:10] Chart reviewed. Pertinent Notes reviewed. Medication list  reviewed Current Facility-Administered Medications Medication  atorvastatin (LIPITOR) tablet 20 mg  
 calcium carbonate (TUMS) chewable tablet 400 mg [elemental]  carvedilol (COREG) tablet 12.5 mg  
 diphenoxylate-atropine (LOMOTIL) tablet 1 Tab  
 NIFEdipine ER (PROCARDIA XL) tablet 30 mg  
 sodium bicarbonate tablet 650 mg  
 traZODone (DESYREL) tablet 50 mg  
 sodium chloride (NS) flush 5-10 mL  sodium chloride (NS) flush 5-10 mL  gabapentin (NEURONTIN) capsule 300 mg  
 acetaminophen (TYLENOL) tablet 650 mg  
 HYDROmorphone (PF) (DILAUDID) injection 0.5 mg  
 naloxone (NARCAN) injection 0.4 mg  
 ondansetron (ZOFRAN) injection 4 mg  diphenhydrAMINE (BENADRYL) injection 12.5 mg  
 insulin lispro (HUMALOG) injection  glucose chewable tablet 16 g  
 dextrose (D50W) injection syrg 12.5-25 g  
 glucagon (GLUCAGEN) injection 1 mg  
 oxyCODONE-acetaminophen (PERCOCET) 5-325 mg per tablet 1-2 Tab  lipase-protease-amylase (ZENPEP 10,000) capsule 2 Cap And  lipase-protease-amylase (ZENPEP 5,000) capsule 1 Cap Data Review : 
No results for input(s): WBC, HGB, PLT, ANEU, INR, APTT, NA, K, GLU, BUN, CREA, GPT, ALT, SGOT, TBIL, TBILI, AP, CA, MG, PHOS, HGBEXT, PLTEXT in the last 72 hours. No lab exists for component: INREXT Lab Results Component Value Date/Time Culture result: NO GROWTH 4 DAYS 05/01/2018 10:14 AM  
 Culture result: Culture performed on Fluid swab specimen 05/01/2018 10:14 AM  
 Culture result: NO GROWTH 4 DAYS 05/01/2018 10:14 AM  
 
No results found for: SDES No results for input(s): FE, TIBC, PSAT, FERR in the last 72 hours. Lab Results Component Value Date/Time Creatinine, urine 91.40 02/14/2018 05:40 PM  
 
 
 
 
Doc MD Luan 
[facility] 
 www. [email or web address].[employer] [address] [place], [address] Phone - [phone] Fax - [phone]

## 2018-11-14 NOTE — ANESTHESIA POSTPROCEDURE EVALUATION
Procedure(s): CREATION TRANSPOSED ARTERIO VENOUS FISTULA RIGHT ARM. Anesthesia Post Evaluation Patient location during evaluation: PACU Note status: Adequate. Level of consciousness: responsive to verbal stimuli and sleepy but conscious Pain management: satisfactory to patient Airway patency: patent Anesthetic complications: no 
Cardiovascular status: acceptable Respiratory status: acceptable Hydration status: acceptable Comments: +Post-Anesthesia Evaluation and Assessment Patient: Hayward Severs MRN: [id number]  SSN: [id number] YOB: 1960  Age: 66 y.o. Sex: male Cardiovascular Function/Vital Signs /67   Pulse 73   Temp 37.2 °C (99 °F)   Resp 14   Ht 6' 2\" (1.88 m)   Wt 76.1 kg (167 lb 12.3 oz)   SpO2 100%   BMI 21.54 kg/m² Patient is status post Procedure(s): CREATION TRANSPOSED ARTERIO VENOUS FISTULA RIGHT ARM. Nausea/Vomiting: Controlled. Postoperative hydration reviewed and adequate. Pain: 
Pain Scale 1: Numeric (0 - 10) (11/13/18 1900) Pain Intensity 1: 3 (11/13/18 1900) Managed. Neurological Status:  
Neuro (WDL): Exceptions to WDL (11/13/18 1808) At baseline. Mental Status and Level of Consciousness: Arousable. Pulmonary Status:  
O2 Device: Nasal cannula (11/13/18 1808) Adequate oxygenation and airway patent. Complications related to anesthesia: None Post-anesthesia assessment completed. No concerns. Signed By: Marbin Ramirez MD  
 11/13/2018 Post anesthesia nausea and vomiting:  controlled Visit Vitals /67 Pulse 73 Temp 37.2 °C (99 °F) Resp 14 Ht 6' 2\" (1.88 m) Wt 76.1 kg (167 lb 12.3 oz) SpO2 100% BMI 21.54 kg/m²

## 2018-11-14 NOTE — PROGRESS NOTES
Ruperto Parikh  
 
 
 
NAME:Naresh Cole  EF   YEN:[birth year] Pt seen on hd 
bp stable Yoni Mcmanus MD 
[facility] [address] [place], [address] [place] Phone - [phone] Fax - [phone] [address] [facility] Phone - [phone] Fax - [phone]    
[email or web address]

## 2018-11-14 NOTE — PROGRESS NOTES
I have reviewed discharge instructions with the patient. The patient verbalized understanding. Follow up appointments reviewed with patient. IV removed. Pt has no questions at this time.

## 2018-11-14 NOTE — CONSULTS
Ruperto Parikh  
 
 
 
NAME:Naresh Brito  [facility]:[id number]   NQS:3/65/2051 Pt has esrd on hd mwf, he is followed by DR. Alda Bustos His last hd on Monday Plan for hd today He had right arm avf placement on 11-13-18 Gladys Mcneil MD 
[facility] [address] [place], [address] [place] Phone - [phone] Fax - [phone] [address] [facility] Phone - [phone] Fax - [phone]    
www. [facility]

## 2018-11-14 NOTE — DIALYSIS
[facility] Dialysis Team [place]  [phone] Vitals   Pre   Post   Assessment   Pre   Post    
Temp  Temp: 98.2 °F (36.8 °C) (11/14/18 1212)  98.3 LOC  A&Ox3 A&Ox3 HR   Pulse (Heart Rate): 85 (11/14/18 1212) 86 Lungs   wheezing wheezing B/P   BP: (!) 157/91 (11/14/18 1212) 142/84 Cardiac   RRR  RRR Resp   18 18 Skin   Warm, dry and intact Warm, dry and intact Pain level  Pain Intensity 1: 0 (11/14/18 0319) 0 Edema  generalized generalized Orders: Duration:   Start:    1211 End:    1542 Total:   3.5 hours Dialyzer:   Dialyzer/Set Up Inspection: Jackie [address] (11/14/18 1212) K Bath:   Dialysate K (mEq/L): 2 (11/14/18 1212) Ca Bath:   Dialysate CA (mEq/L): 2.5 (11/14/18 1212) Na/Bicarb:   Dialysate NA (mEq/L): 140 (11/14/18 1212) Target Fluid Removal:   Goal/Amount of Fluid to Remove (mL): 1500 mL (11/14/18 1212) Access Type & Location:   RIJ CVC no s/s of infection. Both lumens cleaned with alcohol per policy and procedure. Both lumens aspirated and flushed with 62MX ns per policy and procedure Labs Obtained/Reviewed Critical Results Called   Date when labs were drawn- 
Hgb-   
HGB Date Value Ref Range Status 05/07/2018 7.1 (L) 12.1 - 17.0 g/dL Final  
 
K-   
Potassium Date Value Ref Range Status 05/01/2018 3.7 3.5 - 5.1 mmol/L Final  
 
Ca-  
Calcium Date Value Ref Range Status 05/01/2018 7.2 (L) 8.5 - 10.1 MG/DL Final  
 
Bun-  
BUN Date Value Ref Range Status 05/01/2018 19 6 - 20 MG/DL Final  
 
Creat-  
Creatinine Date Value Ref Range Status 05/01/2018 2.33 (H) 0.70 - 1.30 MG/DL Final  
  Comment:  
  INVESTIGATED PER DELTA CHECK PROTOCOL Medications/ Blood Products Given Name   Dose   Route and Time    
none Blood Volume Processed (BVP):    86.8 Net Fluid Removed:  1500mL Comments All dialysis related medications have been reviewed.   Assessment performed by RN.  Procedure and documentation observed and reviewed by Deshawn Skelton RN. Time Out Done:  2776 Primary Nurse Rpt Pre:  Palma Fulton RN 
Primary Nurse Rpt Post:  Palma Fulton RN 
Pt Education:  procedural 
Care Plan: on going Tx Summary: 
1211:  SBAR received from Primary RN. Pt arrived to HD suite A&Ox3, denies complaints. Pt has RIJ, no s/s of infection. Both lumens cleaned with alcohol per policy and procedure. Both lumens aspirated and flushed with 26UL ns per policy and procedure w/o difficulty. VSS, tx initiated. 1230:  Pt resting well 1245:  Pt resting well 
1300:  Pt resting well 1315:  Pt resting well 
1330:  Pt resting well 1345:  Pt resting well 
1400:  Pt resting well 1415:  Pt resting well 
1430:  Pt resting 1436:  Pt alert; requested to have blood sugar checked. Primary RN called. 1445:  PCT at bedside to check blood sugar; results:  121. Pt alert. 1500:  Pt resting well 1515:  Pt resting well 
1530:  Pt alert. Dressing change completed, no s/s of infection. 1543:  Tx terminated. All possible blood returned to patient w/o difficulty. Both lumens flushed with 10cc ns, cleaned with alcohol and capped with sterile caps per policy and procedure. Pt denies complaints, VSS. SBAR called to Primary RN, pt returned to room. Admiting Diagnosis:  ESRD Pt's previous clinic- [facility] Consent signed - Informed Consent Verified: Yes (11/14/18 1212) Buffy Consent - signed and on file Hepatitis Status- - 11/9/18 Machine #- Machine Number: [id number] (11/14/18 1212) Telemetry status- 
Pre-dialysis wt. -

## 2018-11-14 NOTE — PROGRESS NOTES
Surgery Mild arm pain. Afebrile, VSS. Alert. Good bruit in AV fistula right arm, palpable radial pulse. For dialysis today, then discharge. Follow up in 6 days in the office. Keep SKY in place.  
 
Opal Bains MD

## 2018-11-14 NOTE — DISCHARGE INSTRUCTIONS
Discharge Instructions Following Surgery for Transposed AV Fistula          Keep incision and dressing dry until first office visit. Keep present dressing in place if possible. Replace dressing if needed. No lifting over ten pounds with operated arm for two weeks. Empty SKY drain 1-2 times per day and record output volume on paper (date, time, and amount). -Bring this record to office visit.             -After emptying drain, squeeze bulb and replace cap so that drain bulb has  suction.                -Bulb should look \"collapsed\" to indicate that it has suction. See Dr Jesika Mcdonald in the office next week. My office staff will contact you to determine exact day and time. Office number is [phone]. Use pain medicines as needed as prescribed. Do not drive while taking narcotic pain medication    Do not restart Aspirin until Friday 11/16/2018. If you have any problems, call Dr. Jesika Mcdonald at [phone] or [phone] (after hours)      MIKAYLA Mcknight MD

## 2018-11-20 ENCOUNTER — OFFICE VISIT (OUTPATIENT)
Dept: SURGERY | Age: 58
End: 2018-11-20

## 2018-11-20 VITALS
WEIGHT: 169.4 LBS | OXYGEN SATURATION: 100 % | RESPIRATION RATE: 20 BRPM | HEIGHT: 74 IN | HEART RATE: 74 BPM | SYSTOLIC BLOOD PRESSURE: 151 MMHG | DIASTOLIC BLOOD PRESSURE: 81 MMHG | BODY MASS INDEX: 21.74 KG/M2

## 2018-11-20 DIAGNOSIS — Z09 POSTOPERATIVE EXAMINATION: Primary | ICD-10-CM

## 2018-11-20 NOTE — PROGRESS NOTES
Surgery The patient is s/p creation of transposed AV fistula right arm on 11/13/2018. He has no complaints. On examination, there is a palpable thrill in the AV fistula. There is a palpable  radial pulse at the wrist.  Arm edema is minimal. 
 
SKY drain output has been small and SKY drain was removed. Dry dressing applied. The patient is to remove the dressing in 24 hours. The patient will follow up in 2 weeks. Jesus Barrera MD 
 
 
CC: Dr Charmayne Albany

## 2018-11-20 NOTE — PROGRESS NOTES
1. Have you been to the ER, urgent care clinic since your last visit? Hospitalized since your last visit? No 
 
2. Have you seen or consulted any other health care providers outside of the [address] [place] [address] since your last visit? Include any pap smears or colon screening.  No

## 2018-12-06 ENCOUNTER — OFFICE VISIT (OUTPATIENT)
Dept: SURGERY | Age: 58
End: 2018-12-06

## 2018-12-06 VITALS
HEIGHT: 74 IN | BODY MASS INDEX: 22.2 KG/M2 | HEART RATE: 93 BPM | TEMPERATURE: 98 F | DIASTOLIC BLOOD PRESSURE: 84 MMHG | RESPIRATION RATE: 18 BRPM | SYSTOLIC BLOOD PRESSURE: 164 MMHG | OXYGEN SATURATION: 99 % | WEIGHT: 173 LBS

## 2018-12-06 DIAGNOSIS — Z09 POSTOPERATIVE EXAMINATION: Primary | ICD-10-CM

## 2018-12-06 NOTE — PROGRESS NOTES
Chief Complaint   Patient presents with    Surgical Follow-up     TAVF Right AR 11/13/18     1. Have you been to the ER, urgent care clinic since your last visit? Hospitalized since your last visit? No    2. Have you seen or consulted any other health care providers outside of the [address] [place] [address] since your last visit? Include any pap smears or colon screening. Dialysis. Pt had fall in bathroom with right knee pain. Pt has small scraped spot and swelling. Pt will go to  closer to his home. Pt's b/p is elevated, doctor notified.

## 2018-12-06 NOTE — PROGRESS NOTES
Surgery    The patient is s/p creation of transposed AV fistula right arm on 11/13/2018. No complaints are voiced. On examination, there is a palpable thrill in the AV fistula. There is a palpable  radial pulse at the wrist.    Every other staple was removed. The patient will follow up in 1 week.     Keily Stephenson MD

## 2018-12-13 ENCOUNTER — TELEPHONE (OUTPATIENT)
Dept: SURGERY | Age: 58
End: 2018-12-13

## 2018-12-13 ENCOUNTER — OFFICE VISIT (OUTPATIENT)
Dept: SURGERY | Age: 58
End: 2018-12-13

## 2018-12-13 VITALS
DIASTOLIC BLOOD PRESSURE: 79 MMHG | HEART RATE: 93 BPM | SYSTOLIC BLOOD PRESSURE: 149 MMHG | BODY MASS INDEX: 21.66 KG/M2 | HEIGHT: 74 IN | WEIGHT: 168.8 LBS | OXYGEN SATURATION: 97 % | RESPIRATION RATE: 20 BRPM

## 2018-12-13 DIAGNOSIS — Z09 POSTOPERATIVE EXAMINATION: Primary | ICD-10-CM

## 2018-12-13 NOTE — PROGRESS NOTES
1. Have you been to the ER, urgent care clinic since your last visit? Hospitalized since your last visit? No    2. Have you seen or consulted any other health care providers outside of the [address] [place] [address] since your last visit? Include any pap smears or colon screening.  No

## 2018-12-13 NOTE — TELEPHONE ENCOUNTER
Left v/m for Dr Misty Castro. Per Dr Jewels Mcdonald, ok to use AV fistula right arm starting Monday, 12/17/18. Faxed 12/13/18 office note to dialysis at (f) [phone], confirmation received. Dialysis open M-W-F only, will call 12/14/18 to confirm office note received.

## 2018-12-13 NOTE — PROGRESS NOTES
The patient is status post creation of transposed AV fistula right arm on 11/13/2018. The patient  has no complaints.     On examination there is a good thrill present. The outflow vein is readily palpable. The hand is warm.   Radial pulse is palpable.     The last of the staples were removed.      The AV fistula can be used for dialysis starting next week on 11/17/2018.      The patient can follow up prn.     Final Diagnosis: Status post creation transposed AV fistula, post-operative visit.     CC:  Dr Mi Rice

## 2019-01-01 ENCOUNTER — OFFICE VISIT (OUTPATIENT)
Dept: ENDOCRINOLOGY | Age: 59
End: 2019-01-01

## 2019-01-01 VITALS
SYSTOLIC BLOOD PRESSURE: 154 MMHG | BODY MASS INDEX: 21.43 KG/M2 | WEIGHT: 167 LBS | HEIGHT: 74 IN | HEART RATE: 81 BPM | DIASTOLIC BLOOD PRESSURE: 82 MMHG

## 2019-01-01 VITALS
SYSTOLIC BLOOD PRESSURE: 144 MMHG | BODY MASS INDEX: 21.41 KG/M2 | HEART RATE: 80 BPM | HEIGHT: 74 IN | DIASTOLIC BLOOD PRESSURE: 85 MMHG | WEIGHT: 166.8 LBS

## 2019-01-01 VITALS
WEIGHT: 171.2 LBS | SYSTOLIC BLOOD PRESSURE: 117 MMHG | HEIGHT: 74 IN | HEART RATE: 71 BPM | DIASTOLIC BLOOD PRESSURE: 67 MMHG | BODY MASS INDEX: 21.97 KG/M2

## 2019-01-01 VITALS
HEART RATE: 90 BPM | WEIGHT: 164 LBS | BODY MASS INDEX: 21.05 KG/M2 | SYSTOLIC BLOOD PRESSURE: 165 MMHG | DIASTOLIC BLOOD PRESSURE: 96 MMHG | HEIGHT: 74 IN

## 2019-01-01 DIAGNOSIS — E11.22 TYPE 2 DIABETES MELLITUS WITH CHRONIC KIDNEY DISEASE ON CHRONIC DIALYSIS, WITH LONG-TERM CURRENT USE OF INSULIN (HCC): Primary | ICD-10-CM

## 2019-01-01 DIAGNOSIS — Z79.4 TYPE 2 DIABETES MELLITUS WITH CHRONIC KIDNEY DISEASE ON CHRONIC DIALYSIS, WITH LONG-TERM CURRENT USE OF INSULIN (HCC): Primary | ICD-10-CM

## 2019-01-01 DIAGNOSIS — N18.5 CKD STAGE 5 DUE TO TYPE 2 DIABETES MELLITUS (HCC): ICD-10-CM

## 2019-01-01 DIAGNOSIS — Z99.2 TYPE 2 DIABETES MELLITUS WITH CHRONIC KIDNEY DISEASE ON CHRONIC DIALYSIS, WITH LONG-TERM CURRENT USE OF INSULIN (HCC): Primary | ICD-10-CM

## 2019-01-01 DIAGNOSIS — N18.6 TYPE 2 DIABETES MELLITUS WITH CHRONIC KIDNEY DISEASE ON CHRONIC DIALYSIS, WITH LONG-TERM CURRENT USE OF INSULIN (HCC): Primary | ICD-10-CM

## 2019-01-01 DIAGNOSIS — I10 ESSENTIAL HYPERTENSION: ICD-10-CM

## 2019-01-01 DIAGNOSIS — E11.22 CKD STAGE 5 DUE TO TYPE 2 DIABETES MELLITUS (HCC): ICD-10-CM

## 2019-01-01 LAB
EST. AVERAGE GLUCOSE BLD GHB EST-MCNC: 292 MG/DL
FRUCTOSAMINE SERPL-SCNC: 686 UMOL/L (ref 0–285)
FRUCTOSAMINE SERPL-SCNC: 713 UMOL/L (ref 0–285)
HBA1C MFR BLD HPLC: 10.9 %
HBA1C MFR BLD HPLC: 11.8 %
HBA1C MFR BLD HPLC: 11.9 %
HBA1C MFR BLD: 11.8 % (ref 4.8–5.6)

## 2019-01-01 RX ORDER — CARVEDILOL 12.5 MG/1
TABLET ORAL
Qty: 60 TAB | Refills: 9 | Status: SHIPPED | OUTPATIENT
Start: 2019-01-01

## 2019-01-17 ENCOUNTER — HOSPITAL ENCOUNTER (OUTPATIENT)
Dept: LAB | Age: 59
Discharge: HOME | End: 2019-01-17
Payer: MEDICARE

## 2019-01-17 PROCEDURE — 83036 HEMOGLOBIN GLYCOSYLATED A1C: CPT

## 2019-04-02 NOTE — PATIENT INSTRUCTIONS
1) Continue the Novolin N 10 units in the morning, but move your Novolin N 10 units at night to BEDTIME rather than before dinner. 2) Keep a log of your blood sugars and mail them to me in 3 weeks.

## 2019-04-02 NOTE — Clinical Note
4/2/19 Patient: Stiven Ocasio YOB: 1960 Date of Visit: 4/2/2019 Andrea Manzanares MD 
[address] [facility],[address] [address] VIA In Basket Dear Andrea Manzanares MD, Thank you for referring Mr. Kathy Michel to [address] [place] [address] for evaluation. My notes for this consultation are attached. If you have questions, please do not hesitate to call me. I look forward to following your patient along with you. Sincerely, Coty Castillo MD
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
## 2019-04-02 NOTE — LETTER
4/2/2019 3:19 PM 
 
Patient:  Tavo Cortés YOB: 1960 Date of Visit: 4/2/2019 Dear Yahir Wetzel MD 
[address] [facility] [place],[address] [address] VIA In Basket Raad Wheeler MD 
[address] [facility] [address] 
[facility] [address] [address] VIA Facsimile: [phone] 
 : Thank you for referring Mr. Andreea Godoy to me for evaluation/treatment. Below are the relevant portions of my assessment and plan of care. If you have questions, please do not hesitate to call me. I look forward to following Mr. Joe Dupont along with you. Sincerely, Elton Purcell MD

## 2019-04-03 NOTE — PROGRESS NOTES
Spoke with pt regarding his labs. His A1C actually INCREASED and his Fructosamine of >700 suggests that his BGs for the last 2 weeks has been in the 600's range. I recommend he be changed back to the Novolin 70/30 and his dose increaed to 12 units in the morning and 12 units with dinner. Pt states he is going to see Dr. Darius Tate tomorrow for follow up, Can you please write this order when you see him tomorrow?

## 2019-04-27 PROBLEM — R01.1 MURMUR, CARDIAC: Status: ACTIVE | Noted: 2019-01-01
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
## 2019-07-11 NOTE — PATIENT INSTRUCTIONS
1) Novolin 70/30: Take 10 units in the morning and 10 units WITH DINNER. 2) Send me some blood sugar readings in 2 weeks.

## 2019-07-11 NOTE — PROGRESS NOTES
Chief Complaint Patient presents with  Diabetes PCP and Pharmacy verified Records since last visit reviewed. History of Present Illness: Rasheeda Tracy is a 64 y.o. male here for follow up of diabetes. He was diagnosed with diabetes 2003. At our initial visit in April 2019 his regimen consisted of NPH 10 units in the morning and 10 units with dinner. Pt was previously on Novolin 70/30 10 units BID, but in January 2019, when his A1C was 10.8% his PCP changed him to NPH only. His fructosamine in April was >700, suggesting his BGs have been in the 500-600's range. His A1C was 11.8%. I instructed him to change back to the Novolin 70/30 and to take 12 units in the morning and 12 units with dinner. His A1C today was 11.9%. \"I was supposed to have cataract surgery, but my sugars were too high. I have been losing weight and trying to cut back on what I am eating\". \"I stopped checking my sugars because I just gave up, because my sugars were so high\". Pt is currently taking Novolin 70/30 8 units in the morning and 8 units at bedtime. A typical day is as follows: 
He goes for HD on M/W/F, from 645AM till 11AM. On HD days, he wakes up around 330AM and he will check his BG in the morning. He will eat breakfast at [employer] on the way to HD. Yesterday he had an egg, Uzbek toast and two slices of jamil. He has stopped eating his godwin crackers during HD. He will have lunch between 1130AM-Noon, after HD, yesterday he had a grilled chicken sandwich, no side items and unsweetened tea. He has dinner around 6-630PM, last night he had a fried pork chop, corn on the cob and unsweetened tea. He will occasionally have godwin crackers before bedtime. \"I don't do anything around the house anymore, I just don't have the strength\". Pt has hx of CHF. Pt has hx of renal cancer s/p left nephrectomy. He also has ESRD and is on HD. He has been in HD since May 2018.  He is followed by Dr. Iftikhar Kimbrough of Nephrology. Last eye exam was March 2019. Pt had proliferative retinopathy with macular edema. He is followed regularly by a podiatrist. 
 
Pt had his entire pancrease removed in 1997, for a pancreatic cyst.  
 
Current Outpatient Medications Medication Sig  
 insulin NPH (NOVOLIN N NPH U-100 INSULIN) 100 unit/mL injection 8 Units by SubCUTAneous route Before breakfast and dinner.  ONETOUCH ULTRA BLUE TEST STRIP strip TEST BLOOD SUGAR ONCE A DAY  vit B complx C/folic acid/zinc (RENAPLEX PO) Take  by mouth.  triamcinolone acetonide (KENALOG) 0.1 % topical cream Apply  to affected area two (2) times a day. use thin layer  calcium acetate (PHOSLO) 667 mg cap  diphenoxylate-atropine (LOMOTIL) 2.5-0.025 mg per tablet  Blood-Glucose Meter monitoring kit Use daily as directed  atorvastatin (LIPITOR) 20 mg tablet take 1 tablet by mouth at bedtime  atorvastatin (LIPITOR) 20 mg tablet take 1 tablet by mouth at bedtime  carvedilol (COREG) 12.5 mg tablet take 1 tablet by mouth twice a day WITH MEALS (Patient taking differently: take 1 tablet by mouth once a day)  NIFEdipine ER (PROCARDIA XL) 30 mg ER tablet take 1 tablet by mouth DAILY FOR BLOOD PRESSURE  
 ferrous sulfate (SLOW FE) 142 mg (45 mg iron) ER tablet Take 1 Tab by mouth Daily (before breakfast).  aspirin (ASPIRIN) 325 mg tablet Take 325 mg by mouth daily.  traZODone (DESYREL) 50 mg tablet Take 50 mg by mouth nightly.  calcium carbonate (TUMS) 200 mg calcium (500 mg) chew Take 1 Tab by mouth three (3) times daily (with meals). (Patient taking differently: Take 2 Tabs by mouth two (2) times a day. each tab 200mg)  SSD 1 % topical cream apply to affected area once daily  insulin NPH/insulin regular (NOVOLIN 70/30, HUMULIN 70/30) 100 unit/mL (70-30) injection 8 units SQ BID  acetaminophen (TYLENOL EXTRA STRENGTH) 500 mg tablet Take  by mouth every six (6) hours as needed for Pain.  lipase-protease-amylase (CREON) 24,000-76,000 -120,000 unit capsule Take  by mouth three (3) times daily (with meals). No current facility-administered medications for this visit. No Known Allergies Review of Systems: - Eyes: no blurry vision or double vision - Cardiovascular: no chest pain - Respiratory: no shortness of breath - Musculoskeletal: no myalgias - Neurological: no numbness/tingling in extremities Physical Examination: 
Blood pressure (!) 165/96, pulse 90, height 6' 2\" (1.88 m), weight 164 lb (74.4 kg). - General: pleasant, no distress, good eye contact  
- Neck: no carotid bruits - Cardiovascular: regular, normal rate, nl s1 and s2, no m/r/g, 2+ DP pulses - Respiratory: clear bilaterally - Integumentary: no edema, + ulcers on shins bilaterally - Psychiatric: normal mood and affect Diabetic foot exam:  
 
Left Foot: 
 Visual Exam: callous - present Pulse DP: 2+ (normal) Filament test: reduced sensation Vibratory sensation: absent Right Foot: 
 Visual Exam: callous - present Pulse DP: 2+ (normal) Filament test: reduced sensation Vibratory sensation: absent Data Reviewed:  
His A1C today was 11.9% Assessment/Plan:  
1) DM > His A1C today was 11.9%. Will have pt increase his Novolin 70/30 to 10 units in the morning and 10 units with dinner. Pt to check his BGs 3 times per day and mail his BG logs to me in 2 weeks. I explained that once we can get his BGs under control he will start to gain weight and he will be able to have his cataract surgery. Pt voices understanding and agreement with the plan. Pain noted and pt was recommended to call his PCP for further evaluation and treatment, as needed RTC 4 weeks Patient Instructions 1) Novolin 70/30: Take 10 units in the morning and 10 units WITH DINNER. 2) Send me some blood sugar readings in 2 weeks. Copy sent to: 
Dr. Dixie Mac

## 2019-08-16 NOTE — PATIENT INSTRUCTIONS
1) Novolin 70/30: Take 12 units in the morning and 12 units WITH DINNER. 2) Send me some blood sugar readings in 2 weeks.

## 2019-08-16 NOTE — PROGRESS NOTES
Chief Complaint   Patient presents with    Diabetes     eye exam UTD   Records since last visit reviewed. History of Present Illness: Kika Mancini is a 64 y.o. male here for follow up of diabetes. He was diagnosed with diabetes 2003. At our last visit on 7/11/19 his A1C was 11.9%. He notes she was taking 70/30 8 units in the morning and 8 units at bedtime. He reported weight loss and his cataract surgery was postponed because of high BGs. I instructed him to increase his Novolin 70/30 to 10 units in the morning and 12 units with dinner. He notes that He notes that with this change his blood sugars in the morning have been in then 100's range. He denies issues of hypoglycemia. He is checking his BGs every morning and in the evening. His BGs have been in the 's range and his evening BGs have been in the 's range. His A1C today was down to 10.9%. Pt has gained 4 pounds since our last visit. He notes his appetite has improved. A typical day is as follows:  He goes for HD on M/W/F, from 645AM till 11AM.    \"I don't do anything around the house anymore, I just don't have the strength\". Pt has hx of CHF. Pt has hx of renal cancer s/p left nephrectomy. He also has ESRD and is on HD. He has been in HD since May 2018. He is followed by Dr. Aylin Henson of Nephrology. Last eye exam was March 2019. Pt had proliferative retinopathy with macular edema.     He is followed regularly by a podiatrist.    Pt had his entire pancrease removed in 1997, for a pancreatic cyst.     Current Outpatient Medications   Medication Sig    ammonium lactate (LAC-HYDRIN) 12 % lotion rub in to affected area well    silver sulfADIAZINE (SSD) 1 % topical cream apply to affected area once daily    insulin NPH/insulin regular (NOVOLIN 70/30, HUMULIN 70/30) 100 unit/mL (70-30) injection 10 units in the morning and 10 units with dinner    ONETOUCH ULTRA BLUE TEST STRIP strip TEST BLOOD SUGAR ONCE A DAY    triamcinolone acetonide (KENALOG) 0.1 % topical cream Apply  to affected area two (2) times a day. use thin layer    calcium acetate (PHOSLO) 667 mg cap daily.  diphenoxylate-atropine (LOMOTIL) 2.5-0.025 mg per tablet     Blood-Glucose Meter monitoring kit Use daily as directed    atorvastatin (LIPITOR) 20 mg tablet take 1 tablet by mouth at bedtime    atorvastatin (LIPITOR) 20 mg tablet take 1 tablet by mouth at bedtime    carvedilol (COREG) 12.5 mg tablet take 1 tablet by mouth twice a day WITH MEALS (Patient taking differently: take 1 tablet by mouth once a day)    NIFEdipine ER (PROCARDIA XL) 30 mg ER tablet take 1 tablet by mouth DAILY FOR BLOOD PRESSURE    acetaminophen (TYLENOL EXTRA STRENGTH) 500 mg tablet Take  by mouth every six (6) hours as needed for Pain.  lipase-protease-amylase (CREON) 24,000-76,000 -120,000 unit capsule Take  by mouth three (3) times daily (with meals).  ferrous sulfate (SLOW FE) 142 mg (45 mg iron) ER tablet Take 1 Tab by mouth Daily (before breakfast).  aspirin (ASPIRIN) 325 mg tablet Take 325 mg by mouth daily.  traZODone (DESYREL) 50 mg tablet Take 50 mg by mouth nightly.  calcium carbonate (TUMS) 200 mg calcium (500 mg) chew Take 1 Tab by mouth three (3) times daily (with meals). (Patient taking differently: Take 2 Tabs by mouth two (2) times a day. each tab 200mg)    vit B complx C/folic acid/zinc (RENAPLEX PO) Take  by mouth daily. No current facility-administered medications for this visit. No Known Allergies  Review of Systems:  - Eyes: no blurry vision or double vision  - Cardiovascular: no chest pain  - Respiratory: no shortness of breath  - Musculoskeletal: no myalgias  - Neurological: no numbness/tingling in extremities    Physical Examination:  Blood pressure 154/82, pulse 81, height 6' 2\" (1.88 m), weight 167 lb (75.8 kg).   - General: pleasant, no distress, good eye contact   - Psychiatric: normal mood and affect          Data Reviewed:   His A1C today has improved from 11.9% to 10.9%. Assessment/Plan:   1) DM > His A1C today has improved from 11.9% down to 10.9%. His BGs are looking much better on the higher insulin doses. Pt instructed to increase his Humalin 70/30 to 12 units with breakfast and 12 units with dinner. Pt to check his BGs 3 times per day and mail his BG logs to me in 2 weeks. Pt voices understanding and agreement with the plan. Pain noted and pt was recommended to call his PCP for further evaluation and treatment, as needed    RTC 3 monthsThere are no Patient Instructions on file for this visit.       Copy sent to:  Dr. India Villafuerte

## 2019-09-10 PROBLEM — H25.12 AGE-RELATED NUCLEAR CATARACT, LEFT EYE: Chronic | Status: ACTIVE | Noted: 2019-01-01

## 2019-09-11 PROBLEM — H25.12 AGE-RELATED NUCLEAR CATARACT, LEFT EYE: Chronic | Status: RESOLVED | Noted: 2019-01-01 | Resolved: 2019-01-01

## 2019-09-17 PROBLEM — H25.11 AGE-RELATED NUCLEAR CATARACT, RIGHT EYE: Chronic | Status: ACTIVE | Noted: 2019-01-01

## 2019-09-18 PROBLEM — H25.11 AGE-RELATED NUCLEAR CATARACT, RIGHT EYE: Chronic | Status: RESOLVED | Noted: 2019-01-01 | Resolved: 2019-01-01

## 2019-11-14 PROBLEM — I73.9 PERIPHERAL VASCULAR DISEASE (HCC): Status: ACTIVE | Noted: 2019-01-01

## 2019-11-19 NOTE — PROGRESS NOTES
Chief Complaint Patient presents with  Diabetes  
  pcp and pharmacy verified Records since last visit reviewed. History of Present Illness: Ingrid Munoz is a 64 y.o. male here for follow up of diabetes. He was diagnosed with diabetes 2003. His A1C today was 11.8%. 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 I fall and hurt my hip. They gave me the wrong pain medication (tramadol) and it caused me to have low BGs. \" His wife notes that after he was on the medication for 2 days he was having BGs in the 40's. He is no longer taking the Tramadol. Pt reports that his BGs are \"running under 200 when I test it\". He is checking his BGs twice per day. On his HD days \"they check my BGs for me and yesterday my BG was in the 60's. Last week it was 118 and 96. He denies any low BGs, other than the time he had the tramadol. Paul Marks are taking off too much fluid so that when I get home from HD I can barely walk. We asked they change how much fluid they remove and I am doing much better\". Pt notes that he will be going for HD in the near future. He has an appointment with vascular surgery for evaluation of his leg pain. Pt is taking Novolin 70/30 8 units in the morning and 8 units with dinner. \"This was changed when I had the low BGs a couple of weeks ago\". His weight today is 171 pounds. A typical day is as follows: 
He goes for HD on M/W/F, from 645AM till 11AM. He has breakfast every day. This AM he had a breakfast sandwich with sausage and eggs and water. He has lunch around 1130AM-Noon, yesterday he had a roast beef sandwich, no side items and green tea. He has dinner around 630PM, last night he had chicken teriyaki, chips and green tea. Pt has hx of CHF. Pt has hx of renal cancer s/p left nephrectomy. He also has ESRD and is on HD. He has been in HD since May 2018. He is followed by Dr. Zulema Holland of Nephrology. Last eye exam was March 2019. Pt had proliferative retinopathy with macular edema. He is followed regularly by a podiatrist. 
 
Pt had his entire pancrease removed in 1997, for a pancreatic cyst.  
 
Current Outpatient Medications Medication Sig  pantoprazole (PROTONIX) 40 mg tablet Take 40 mg by mouth.  aspirin delayed-release 81 mg tablet Take  by mouth daily.  ammonium lactate (LAC-HYDRIN) 12 % lotion rub in to affected area well  silver sulfADIAZINE (SSD) 1 % topical cream apply to affected area once daily  insulin NPH/insulin regular (NOVOLIN 70/30, HUMULIN 70/30) 100 unit/mL (70-30) injection 10 units in the morning and 10 units with dinner (Patient taking differently: 8 units in the morning and 8 units with dinner)  ONETOUCH ULTRA BLUE TEST STRIP strip TEST BLOOD SUGAR ONCE A DAY  calcium acetate (PHOSLO) 667 mg cap daily.  diphenoxylate-atropine (LOMOTIL) 2.5-0.025 mg per tablet  Blood-Glucose Meter monitoring kit Use daily as directed  atorvastatin (LIPITOR) 20 mg tablet take 1 tablet by mouth at bedtime  carvedilol (COREG) 12.5 mg tablet take 1 tablet by mouth twice a day WITH MEALS (Patient taking differently: take 1 tablet by mouth once a day)  NIFEdipine ER (PROCARDIA XL) 30 mg ER tablet take 1 tablet by mouth DAILY FOR BLOOD PRESSURE  
 acetaminophen (TYLENOL EXTRA STRENGTH) 500 mg tablet Take  by mouth every six (6) hours as needed for Pain.  lipase-protease-amylase (CREON) 24,000-76,000 -120,000 unit capsule Take 3 Caps by mouth three (3) times daily (with meals).  ferrous sulfate (SLOW FE) 142 mg (45 mg iron) ER tablet Take 1 Tab by mouth Daily (before breakfast).  traMADol (ULTRAM) 50 mg tablet Take 1 Tab by mouth daily as needed.  vit B complx C/folic acid/zinc (RENAPLEX PO) Take  by mouth daily.  triamcinolone acetonide (KENALOG) 0.1 % topical cream Apply  to affected area two (2) times a day. use thin layer  traZODone (DESYREL) 50 mg tablet Take 50 mg by mouth nightly. No current facility-administered medications for this visit. Allergies Allergen Reactions  Tramadol Other (comments) Brought down blood sugar too fast  
 
Review of Systems: - Eyes: no blurry vision or double vision - Cardiovascular: no chest pain - Respiratory: no shortness of breath - Musculoskeletal: no myalgias - Neurological: no numbness/tingling in extremities Physical Examination: 
Blood pressure 117/67, pulse 71, height 6' 2\" (1.88 m), weight 171 lb 3.2 oz (77.7 kg). General: pleasant, no distress, good eye contact Neck: no carotid bruits Cardiovascular: regular, normal rate, nl s1 and s2, no m/r/g, 2+ DP pulses Respiratory: clear bilaterally Integumentary: no edema, no foot ulcers Psychiatric: normal mood and affect Diabetic foot exam:  
 
Left Foot: 
 Visual Exam: callous - present Pulse DP: 1+ (weak) Filament test: normal sensation Vibratory sensation: normal 
   
Right Foot: 
 Visual Exam: callous - present Pulse DP: 1+ (weak) Filament test: absent sensation Vibratory sensation: absent Data Reviewed:  
His A1C today was 11.8% Assessment/Plan:  
1) DM > His A1C today was 11.8%. His reported BGs are not matching with the A1C. For the next two weeks. Pt to increase his Novolin 70/30 to 10 units in the morning and 10 units with dinner. Will also check a fructosamine level today to confirm the A1C. Pt to check his BG an hour after each meal and mail his sugar readings to me in 2 weeks. He had symptoms of low BG when he had the BG in the 40's which makes me think the low BGs were correct. Pt voices understanding and agreement with the plan. Pain noted and pt was recommended to call his PCP for further evaluation and treatment, as needed RTC 3 months Patient Instructions 1) Novolin 70/30: Take 10 units in the morning and 10 units WITH DINNER. 2) Check your sugars an hour after each meal and send me your sugars in 2 weeks. Follow-up and Dispositions · Return in about 3 months (around 2/19/2020). Copy sent to: 
Dr. Roby Simms

## 2019-11-19 NOTE — PATIENT INSTRUCTIONS
1) Novolin 70/30: Take 10 units in the morning and 10 units WITH DINNER. 2) Check your sugars an hour after each meal and send me your sugars in 2 weeks.

## 2019-11-21 NOTE — PROGRESS NOTES
Spoke with pt regarding his labs. The Fructosamine is also quite high, confirming the A1C is correct. Pt to send me BG readings in a week.

## 2020-01-01 ENCOUNTER — HOME CARE VISIT (OUTPATIENT)
Dept: SCHEDULING | Facility: HOME HEALTH | Age: 60
End: 2020-01-01
Payer: MEDICARE

## 2020-01-01 ENCOUNTER — APPOINTMENT (OUTPATIENT)
Dept: MRI IMAGING | Age: 60
DRG: 004 | End: 2020-01-01
Attending: ANESTHESIOLOGY
Payer: MEDICARE

## 2020-01-01 ENCOUNTER — APPOINTMENT (OUTPATIENT)
Dept: GENERAL RADIOLOGY | Age: 60
DRG: 004 | End: 2020-01-01
Attending: NURSE PRACTITIONER
Payer: MEDICARE

## 2020-01-01 ENCOUNTER — APPOINTMENT (OUTPATIENT)
Dept: NON INVASIVE DIAGNOSTICS | Age: 60
DRG: 004 | End: 2020-01-01
Attending: NURSE PRACTITIONER
Payer: MEDICARE

## 2020-01-01 ENCOUNTER — APPOINTMENT (OUTPATIENT)
Dept: GENERAL RADIOLOGY | Age: 60
DRG: 004 | End: 2020-01-01
Attending: ANESTHESIOLOGY
Payer: MEDICARE

## 2020-01-01 ENCOUNTER — HOME CARE VISIT (OUTPATIENT)
Dept: HOME HEALTH SERVICES | Facility: HOME HEALTH | Age: 60
End: 2020-01-01
Payer: MEDICARE

## 2020-01-01 ENCOUNTER — APPOINTMENT (OUTPATIENT)
Dept: MRI IMAGING | Age: 60
DRG: 004 | End: 2020-01-01
Attending: NURSE PRACTITIONER
Payer: MEDICARE

## 2020-01-01 ENCOUNTER — APPOINTMENT (OUTPATIENT)
Dept: GENERAL RADIOLOGY | Age: 60
DRG: 004 | End: 2020-01-01
Attending: EMERGENCY MEDICINE
Payer: MEDICARE

## 2020-01-01 ENCOUNTER — APPOINTMENT (OUTPATIENT)
Dept: GENERAL RADIOLOGY | Age: 60
DRG: 004 | End: 2020-01-01
Attending: SURGERY
Payer: MEDICARE

## 2020-01-01 ENCOUNTER — HOSPITAL ENCOUNTER (EMERGENCY)
Age: 60
Discharge: HOME | End: 2020-01-21
Attending: EMERGENCY MEDICINE | Admitting: EMERGENCY MEDICINE
Payer: MEDICARE

## 2020-01-01 ENCOUNTER — HOSPITAL ENCOUNTER (INPATIENT)
Age: 60
LOS: 22 days | Discharge: ANOTHER HOSPITAL | DRG: 004 | End: 2020-02-27
Attending: INTERNAL MEDICINE | Admitting: ANESTHESIOLOGY
Payer: MEDICARE

## 2020-01-01 ENCOUNTER — APPOINTMENT (OUTPATIENT)
Dept: CT IMAGING | Age: 60
DRG: 004 | End: 2020-01-01
Attending: NURSE PRACTITIONER
Payer: MEDICARE

## 2020-01-01 ENCOUNTER — APPOINTMENT (OUTPATIENT)
Dept: GENERAL RADIOLOGY | Age: 60
DRG: 004 | End: 2020-01-01
Attending: INTERNAL MEDICINE
Payer: MEDICARE

## 2020-01-01 ENCOUNTER — HOME HEALTH ADMISSION (OUTPATIENT)
Dept: HOME HEALTH SERVICES | Facility: HOME HEALTH | Age: 60
End: 2020-01-01
Payer: MEDICARE

## 2020-01-01 VITALS
DIASTOLIC BLOOD PRESSURE: 90 MMHG | OXYGEN SATURATION: 95 % | SYSTOLIC BLOOD PRESSURE: 160 MMHG | HEART RATE: 79 BPM | TEMPERATURE: 100.2 F | RESPIRATION RATE: 20 BRPM

## 2020-01-01 VITALS
DIASTOLIC BLOOD PRESSURE: 89 MMHG | HEART RATE: 89 BPM | TEMPERATURE: 98.3 F | RESPIRATION RATE: 15 BRPM | OXYGEN SATURATION: 100 % | SYSTOLIC BLOOD PRESSURE: 136 MMHG

## 2020-01-01 VITALS
HEIGHT: 74 IN | TEMPERATURE: 98.5 F | RESPIRATION RATE: 25 BRPM | OXYGEN SATURATION: 97 % | BODY MASS INDEX: 20 KG/M2 | HEART RATE: 106 BPM | DIASTOLIC BLOOD PRESSURE: 66 MMHG | WEIGHT: 155.87 LBS | SYSTOLIC BLOOD PRESSURE: 105 MMHG

## 2020-01-01 VITALS
RESPIRATION RATE: 20 BRPM | HEART RATE: 78 BPM | DIASTOLIC BLOOD PRESSURE: 70 MMHG | TEMPERATURE: 98.2 F | OXYGEN SATURATION: 98 % | SYSTOLIC BLOOD PRESSURE: 124 MMHG

## 2020-01-01 DIAGNOSIS — Z99.2 ANEMIA DUE TO CHRONIC KIDNEY DISEASE, ON CHRONIC DIALYSIS (HCC): ICD-10-CM

## 2020-01-01 DIAGNOSIS — I46.9 CARDIAC ARREST (HCC): ICD-10-CM

## 2020-01-01 DIAGNOSIS — N18.6 ANEMIA DUE TO CHRONIC KIDNEY DISEASE, ON CHRONIC DIALYSIS (HCC): ICD-10-CM

## 2020-01-01 DIAGNOSIS — N18.6 ESRD (END STAGE RENAL DISEASE) ON DIALYSIS (HCC): ICD-10-CM

## 2020-01-01 DIAGNOSIS — I67.82 SEVERE ANOXIC-ISCHEMIC ENCEPHALOPATHY (HCC): ICD-10-CM

## 2020-01-01 DIAGNOSIS — N18.6 DM TYPE 2 CAUSING ESRD (HCC): ICD-10-CM

## 2020-01-01 DIAGNOSIS — E11.22 DM TYPE 2 CAUSING ESRD (HCC): ICD-10-CM

## 2020-01-01 DIAGNOSIS — Z99.2 ESRD (END STAGE RENAL DISEASE) ON DIALYSIS (HCC): ICD-10-CM

## 2020-01-01 DIAGNOSIS — Z71.89 GOALS OF CARE, COUNSELING/DISCUSSION: ICD-10-CM

## 2020-01-01 DIAGNOSIS — G93.1 ANOXIC ENCEPHALOPATHY (HCC): ICD-10-CM

## 2020-01-01 DIAGNOSIS — R53.81 MALAISE AND FATIGUE: Primary | ICD-10-CM

## 2020-01-01 DIAGNOSIS — R41.82 ALTERED MENTAL STATUS, UNSPECIFIED ALTERED MENTAL STATUS TYPE: ICD-10-CM

## 2020-01-01 DIAGNOSIS — D63.1 ANEMIA DUE TO CHRONIC KIDNEY DISEASE, ON CHRONIC DIALYSIS (HCC): ICD-10-CM

## 2020-01-01 DIAGNOSIS — E16.2 HYPOGLYCEMIA: ICD-10-CM

## 2020-01-01 DIAGNOSIS — R56.9 PROVOKED SEIZURES (HCC): ICD-10-CM

## 2020-01-01 DIAGNOSIS — G93.1 SEVERE ANOXIC-ISCHEMIC ENCEPHALOPATHY (HCC): ICD-10-CM

## 2020-01-01 DIAGNOSIS — R53.83 MALAISE AND FATIGUE: Primary | ICD-10-CM

## 2020-01-01 LAB
ABO + RH BLD: NORMAL
ABO + RH BLD: NORMAL
ALBUMIN SERPL-MCNC: 1.4 G/DL (ref 3.5–5)
ALBUMIN SERPL-MCNC: 1.4 G/DL (ref 3.5–5)
ALBUMIN SERPL-MCNC: 1.5 G/DL (ref 3.5–5)
ALBUMIN SERPL-MCNC: 1.5 G/DL (ref 3.5–5)
ALBUMIN SERPL-MCNC: 1.6 G/DL (ref 3.5–5)
ALBUMIN SERPL-MCNC: 1.7 G/DL (ref 3.5–5)
ALBUMIN SERPL-MCNC: 1.7 G/DL (ref 3.5–5)
ALBUMIN SERPL-MCNC: 1.8 G/DL (ref 3.5–5)
ALBUMIN SERPL-MCNC: 1.9 G/DL (ref 3.5–5)
ALBUMIN SERPL-MCNC: 2.1 G/DL (ref 3.5–5)
ALBUMIN SERPL-MCNC: 2.2 G/DL (ref 3.5–5)
ALBUMIN/GLOB SERPL: 0.2 {RATIO} (ref 1.1–2.2)
ALBUMIN/GLOB SERPL: 0.3 {RATIO} (ref 1.1–2.2)
ALP SERPL-CCNC: 100 U/L (ref 45–117)
ALP SERPL-CCNC: 109 U/L (ref 45–117)
ALP SERPL-CCNC: 109 U/L (ref 45–117)
ALP SERPL-CCNC: 110 U/L (ref 45–117)
ALP SERPL-CCNC: 113 U/L (ref 45–117)
ALP SERPL-CCNC: 114 U/L (ref 45–117)
ALP SERPL-CCNC: 118 U/L (ref 45–117)
ALP SERPL-CCNC: 121 U/L (ref 45–117)
ALP SERPL-CCNC: 130 U/L (ref 45–117)
ALP SERPL-CCNC: 134 U/L (ref 45–117)
ALP SERPL-CCNC: 143 U/L (ref 45–117)
ALP SERPL-CCNC: 158 U/L (ref 45–117)
ALP SERPL-CCNC: 170 U/L (ref 45–117)
ALP SERPL-CCNC: 93 U/L (ref 45–117)
ALT SERPL-CCNC: 10 U/L (ref 12–78)
ALT SERPL-CCNC: 11 U/L (ref 12–78)
ALT SERPL-CCNC: 11 U/L (ref 12–78)
ALT SERPL-CCNC: 14 U/L (ref 12–78)
ALT SERPL-CCNC: 15 U/L (ref 12–78)
ALT SERPL-CCNC: 16 U/L (ref 12–78)
ALT SERPL-CCNC: 20 U/L (ref 12–78)
ALT SERPL-CCNC: 23 U/L (ref 12–78)
ALT SERPL-CCNC: 51 U/L (ref 12–78)
ALT SERPL-CCNC: 62 U/L (ref 12–78)
ALT SERPL-CCNC: 9 U/L (ref 12–78)
AMMONIA PLAS-SCNC: <10 UMOL/L
ANION GAP SERPL CALC-SCNC: 10 MMOL/L (ref 5–15)
ANION GAP SERPL CALC-SCNC: 10 MMOL/L (ref 5–15)
ANION GAP SERPL CALC-SCNC: 2 MMOL/L (ref 5–15)
ANION GAP SERPL CALC-SCNC: 4 MMOL/L (ref 5–15)
ANION GAP SERPL CALC-SCNC: 4 MMOL/L (ref 5–15)
ANION GAP SERPL CALC-SCNC: 5 MMOL/L (ref 5–15)
ANION GAP SERPL CALC-SCNC: 6 MMOL/L (ref 5–15)
ANION GAP SERPL CALC-SCNC: 7 MMOL/L (ref 5–15)
ANION GAP SERPL CALC-SCNC: 8 MMOL/L (ref 5–15)
ANION GAP SERPL CALC-SCNC: 9 MMOL/L (ref 5–15)
APPEARANCE UR: ABNORMAL
APPEARANCE UR: ABNORMAL
APTT PPP: 25.9 SEC (ref 22.1–32)
ARTERIAL PATENCY WRIST A: ABNORMAL
ARTERIAL PATENCY WRIST A: ABNORMAL
ARTERIAL PATENCY WRIST A: YES
AST SERPL-CCNC: 11 U/L (ref 15–37)
AST SERPL-CCNC: 14 U/L (ref 15–37)
AST SERPL-CCNC: 16 U/L (ref 15–37)
AST SERPL-CCNC: 16 U/L (ref 15–37)
AST SERPL-CCNC: 18 U/L (ref 15–37)
AST SERPL-CCNC: 20 U/L (ref 15–37)
AST SERPL-CCNC: 21 U/L (ref 15–37)
AST SERPL-CCNC: 24 U/L (ref 15–37)
AST SERPL-CCNC: 27 U/L (ref 15–37)
AST SERPL-CCNC: 45 U/L (ref 15–37)
AST SERPL-CCNC: 57 U/L (ref 15–37)
ATRIAL RATE: 66 BPM
BACTERIA SPEC CULT: ABNORMAL
BACTERIA SPEC CULT: NORMAL
BACTERIA URNS QL MICRO: ABNORMAL /HPF
BACTERIA URNS QL MICRO: NEGATIVE /HPF
BASE EXCESS BLD CALC-SCNC: 1 MMOL/L
BASE EXCESS BLD CALC-SCNC: 1 MMOL/L
BASE EXCESS BLD CALC-SCNC: 3 MMOL/L
BASE EXCESS BLD CALC-SCNC: 4 MMOL/L
BASE EXCESS BLD CALC-SCNC: 4 MMOL/L
BASE EXCESS BLD CALC-SCNC: 5 MMOL/L
BASE EXCESS BLD CALC-SCNC: 5 MMOL/L
BASE EXCESS BLD CALC-SCNC: 7 MMOL/L
BASOPHILS # BLD: 0 K/UL (ref 0–0.1)
BASOPHILS # BLD: 0.1 K/UL (ref 0–0.1)
BASOPHILS NFR BLD: 1 % (ref 0–1)
BASOPHILS NFR BLD: 1 % (ref 0–1)
BDY SITE: ABNORMAL
BILIRUB DIRECT SERPL-MCNC: 0.2 MG/DL (ref 0–0.2)
BILIRUB DIRECT SERPL-MCNC: 0.2 MG/DL (ref 0–0.2)
BILIRUB SERPL-MCNC: 0.4 MG/DL (ref 0.2–1)
BILIRUB SERPL-MCNC: 0.5 MG/DL (ref 0.2–1)
BILIRUB SERPL-MCNC: 0.6 MG/DL (ref 0.2–1)
BILIRUB SERPL-MCNC: 0.7 MG/DL (ref 0.2–1)
BILIRUB UR QL CFM: NEGATIVE
BILIRUB UR QL: NEGATIVE
BLD PROD TYP BPU: NORMAL
BLOOD GROUP ANTIBODIES SERPL: NORMAL
BLOOD GROUP ANTIBODIES SERPL: NORMAL
BPU ID: NORMAL
BUN SERPL-MCNC: 13 MG/DL (ref 6–20)
BUN SERPL-MCNC: 15 MG/DL (ref 6–20)
BUN SERPL-MCNC: 17 MG/DL (ref 6–20)
BUN SERPL-MCNC: 20 MG/DL (ref 6–20)
BUN SERPL-MCNC: 25 MG/DL (ref 6–20)
BUN SERPL-MCNC: 26 MG/DL (ref 6–20)
BUN SERPL-MCNC: 26 MG/DL (ref 6–20)
BUN SERPL-MCNC: 28 MG/DL (ref 6–20)
BUN SERPL-MCNC: 29 MG/DL (ref 6–20)
BUN SERPL-MCNC: 36 MG/DL (ref 6–20)
BUN SERPL-MCNC: 37 MG/DL (ref 6–20)
BUN SERPL-MCNC: 40 MG/DL (ref 6–20)
BUN SERPL-MCNC: 41 MG/DL (ref 6–20)
BUN SERPL-MCNC: 42 MG/DL (ref 6–20)
BUN SERPL-MCNC: 43 MG/DL (ref 6–20)
BUN SERPL-MCNC: 45 MG/DL (ref 6–20)
BUN SERPL-MCNC: 46 MG/DL (ref 6–20)
BUN SERPL-MCNC: 49 MG/DL (ref 6–20)
BUN SERPL-MCNC: 50 MG/DL (ref 6–20)
BUN SERPL-MCNC: 57 MG/DL (ref 6–20)
BUN SERPL-MCNC: 59 MG/DL (ref 6–20)
BUN SERPL-MCNC: 61 MG/DL (ref 6–20)
BUN SERPL-MCNC: 64 MG/DL (ref 6–20)
BUN SERPL-MCNC: 64 MG/DL (ref 6–20)
BUN/CREAT SERPL: 10 (ref 12–20)
BUN/CREAT SERPL: 11 (ref 12–20)
BUN/CREAT SERPL: 12 (ref 12–20)
BUN/CREAT SERPL: 4 (ref 12–20)
BUN/CREAT SERPL: 4 (ref 12–20)
BUN/CREAT SERPL: 5 (ref 12–20)
BUN/CREAT SERPL: 6 (ref 12–20)
BUN/CREAT SERPL: 6 (ref 12–20)
BUN/CREAT SERPL: 8 (ref 12–20)
BUN/CREAT SERPL: 9 (ref 12–20)
CA-I BLD-SCNC: 1.06 MMOL/L (ref 1.12–1.32)
CA-I BLD-SCNC: 1.07 MMOL/L (ref 1.12–1.32)
CA-I BLD-SCNC: 1.09 MMOL/L (ref 1.12–1.32)
CA-I BLD-SCNC: 1.1 MMOL/L (ref 1.12–1.32)
CA-I BLD-SCNC: 1.11 MMOL/L (ref 1.12–1.32)
CA-I BLD-SCNC: 1.11 MMOL/L (ref 1.12–1.32)
CA-I BLD-SCNC: 1.14 MMOL/L (ref 1.12–1.32)
CA-I BLD-SCNC: 1.15 MMOL/L (ref 1.12–1.32)
CA-I BLD-SCNC: 1.16 MMOL/L (ref 1.12–1.32)
CA-I BLD-SCNC: 1.19 MMOL/L (ref 1.12–1.32)
CA-I BLD-SCNC: 1.2 MMOL/L (ref 1.12–1.32)
CALCIUM SERPL-MCNC: 7.4 MG/DL (ref 8.5–10.1)
CALCIUM SERPL-MCNC: 7.5 MG/DL (ref 8.5–10.1)
CALCIUM SERPL-MCNC: 7.6 MG/DL (ref 8.5–10.1)
CALCIUM SERPL-MCNC: 7.6 MG/DL (ref 8.5–10.1)
CALCIUM SERPL-MCNC: 7.7 MG/DL (ref 8.5–10.1)
CALCIUM SERPL-MCNC: 7.8 MG/DL (ref 8.5–10.1)
CALCIUM SERPL-MCNC: 7.9 MG/DL (ref 8.5–10.1)
CALCIUM SERPL-MCNC: 8 MG/DL (ref 8.5–10.1)
CALCIUM SERPL-MCNC: 8.1 MG/DL (ref 8.5–10.1)
CALCIUM SERPL-MCNC: 8.2 MG/DL (ref 8.5–10.1)
CALCIUM SERPL-MCNC: 8.3 MG/DL (ref 8.5–10.1)
CALCIUM SERPL-MCNC: 8.4 MG/DL (ref 8.5–10.1)
CALCULATED P AXIS, ECG09: 74 DEGREES
CALCULATED R AXIS, ECG10: 10 DEGREES
CALCULATED T AXIS, ECG11: 68 DEGREES
CHLORIDE SERPL-SCNC: 100 MMOL/L (ref 97–108)
CHLORIDE SERPL-SCNC: 101 MMOL/L (ref 97–108)
CHLORIDE SERPL-SCNC: 102 MMOL/L (ref 97–108)
CHLORIDE SERPL-SCNC: 102 MMOL/L (ref 97–108)
CHLORIDE SERPL-SCNC: 103 MMOL/L (ref 97–108)
CHLORIDE SERPL-SCNC: 103 MMOL/L (ref 97–108)
CHLORIDE SERPL-SCNC: 93 MMOL/L (ref 97–108)
CHLORIDE SERPL-SCNC: 95 MMOL/L (ref 97–108)
CHLORIDE SERPL-SCNC: 96 MMOL/L (ref 97–108)
CHLORIDE SERPL-SCNC: 97 MMOL/L (ref 97–108)
CHLORIDE SERPL-SCNC: 98 MMOL/L (ref 97–108)
CHLORIDE SERPL-SCNC: 99 MMOL/L (ref 97–108)
CO2 SERPL-SCNC: 22 MMOL/L (ref 21–32)
CO2 SERPL-SCNC: 23 MMOL/L (ref 21–32)
CO2 SERPL-SCNC: 23 MMOL/L (ref 21–32)
CO2 SERPL-SCNC: 24 MMOL/L (ref 21–32)
CO2 SERPL-SCNC: 26 MMOL/L (ref 21–32)
CO2 SERPL-SCNC: 27 MMOL/L (ref 21–32)
CO2 SERPL-SCNC: 28 MMOL/L (ref 21–32)
CO2 SERPL-SCNC: 29 MMOL/L (ref 21–32)
CO2 SERPL-SCNC: 30 MMOL/L (ref 21–32)
COLOR UR: ABNORMAL
COLOR UR: ABNORMAL
COMMENT, HOLDF: NORMAL
COMMENT, HOLDF: NORMAL
CORTIS SERPL-MCNC: 17.6 UG/DL
CREAT SERPL-MCNC: 2.62 MG/DL (ref 0.7–1.3)
CREAT SERPL-MCNC: 2.77 MG/DL (ref 0.7–1.3)
CREAT SERPL-MCNC: 3.12 MG/DL (ref 0.7–1.3)
CREAT SERPL-MCNC: 3.15 MG/DL (ref 0.7–1.3)
CREAT SERPL-MCNC: 3.29 MG/DL (ref 0.7–1.3)
CREAT SERPL-MCNC: 3.33 MG/DL (ref 0.7–1.3)
CREAT SERPL-MCNC: 3.71 MG/DL (ref 0.7–1.3)
CREAT SERPL-MCNC: 3.72 MG/DL (ref 0.7–1.3)
CREAT SERPL-MCNC: 3.77 MG/DL (ref 0.7–1.3)
CREAT SERPL-MCNC: 3.82 MG/DL (ref 0.7–1.3)
CREAT SERPL-MCNC: 3.85 MG/DL (ref 0.7–1.3)
CREAT SERPL-MCNC: 3.87 MG/DL (ref 0.7–1.3)
CREAT SERPL-MCNC: 4.05 MG/DL (ref 0.7–1.3)
CREAT SERPL-MCNC: 4.12 MG/DL (ref 0.7–1.3)
CREAT SERPL-MCNC: 4.18 MG/DL (ref 0.7–1.3)
CREAT SERPL-MCNC: 4.26 MG/DL (ref 0.7–1.3)
CREAT SERPL-MCNC: 4.37 MG/DL (ref 0.7–1.3)
CREAT SERPL-MCNC: 4.49 MG/DL (ref 0.7–1.3)
CREAT SERPL-MCNC: 4.61 MG/DL (ref 0.7–1.3)
CREAT SERPL-MCNC: 4.65 MG/DL (ref 0.7–1.3)
CREAT SERPL-MCNC: 4.71 MG/DL (ref 0.7–1.3)
CREAT SERPL-MCNC: 4.88 MG/DL (ref 0.7–1.3)
CREAT SERPL-MCNC: 5.03 MG/DL (ref 0.7–1.3)
CREAT SERPL-MCNC: 5.15 MG/DL (ref 0.7–1.3)
CREAT SERPL-MCNC: 5.42 MG/DL (ref 0.7–1.3)
CREAT SERPL-MCNC: 5.44 MG/DL (ref 0.7–1.3)
CREAT SERPL-MCNC: 5.55 MG/DL (ref 0.7–1.3)
CREAT SERPL-MCNC: 5.78 MG/DL (ref 0.7–1.3)
CROSSMATCH RESULT,%XM: NORMAL
CRP SERPL-MCNC: 7.04 MG/DL (ref 0–0.6)
CRP SERPL-MCNC: 7.74 MG/DL (ref 0–0.6)
CRP SERPL-MCNC: 8.6 MG/DL (ref 0–0.6)
DIAGNOSIS, 93000: NORMAL
DIFFERENTIAL METHOD BLD: ABNORMAL
DIFFERENTIAL METHOD BLD: ABNORMAL
ECHO AO ROOT DIAM: 4.56 CM
ECHO LA MAJOR AXIS: 3.5 CM
ECHO LA TO AORTIC ROOT RATIO: 0.77
ECHO LV EDV A2C: 122.8 ML
ECHO LV EDV A4C: 107.3 ML
ECHO LV EDV BP: 122.5 ML (ref 67–155)
ECHO LV EDV INDEX A4C: 53.2 ML/M2
ECHO LV EDV INDEX BP: 60.8 ML/M2
ECHO LV EDV NDEX A2C: 60.9 ML/M2
ECHO LV EJECTION FRACTION A2C: 54 %
ECHO LV EJECTION FRACTION A4C: 41 %
ECHO LV EJECTION FRACTION BIPLANE: 50 % (ref 55–100)
ECHO LV ESV A2C: 56.3 ML
ECHO LV ESV A4C: 63.3 ML
ECHO LV ESV BP: 61.2 ML (ref 22–58)
ECHO LV ESV INDEX A2C: 27.9 ML/M2
ECHO LV ESV INDEX A4C: 31.4 ML/M2
ECHO LV ESV INDEX BP: 30.4 ML/M2
ECHO LV INTERNAL DIMENSION DIASTOLIC: 5.35 CM (ref 4.2–5.9)
ECHO LV INTERNAL DIMENSION SYSTOLIC: 4.16 CM
ECHO LV IVSD: 1.42 CM (ref 0.6–1)
ECHO LV MASS 2D: 366.8 G (ref 88–224)
ECHO LV MASS INDEX 2D: 182 G/M2 (ref 49–115)
ECHO LV POSTERIOR WALL DIASTOLIC: 1.26 CM (ref 0.6–1)
ECHO RV INTERNAL DIMENSION: 3.59 CM
ECHO RV TAPSE: 1.25 CM (ref 1.5–2)
EOSINOPHIL # BLD: 0 K/UL (ref 0–0.4)
EOSINOPHIL # BLD: 0.1 K/UL (ref 0–0.4)
EOSINOPHIL NFR BLD: 0 % (ref 0–7)
EOSINOPHIL NFR BLD: 2 % (ref 0–7)
EPITH CASTS URNS QL MICRO: ABNORMAL /LPF
EPITH CASTS URNS QL MICRO: ABNORMAL /LPF
ERYTHROCYTE [DISTWIDTH] IN BLOOD BY AUTOMATED COUNT: 16.3 % (ref 11.5–14.5)
ERYTHROCYTE [DISTWIDTH] IN BLOOD BY AUTOMATED COUNT: 16.4 % (ref 11.5–14.5)
ERYTHROCYTE [DISTWIDTH] IN BLOOD BY AUTOMATED COUNT: 16.4 % (ref 11.5–14.5)
ERYTHROCYTE [DISTWIDTH] IN BLOOD BY AUTOMATED COUNT: 16.6 % (ref 11.5–14.5)
ERYTHROCYTE [DISTWIDTH] IN BLOOD BY AUTOMATED COUNT: 16.7 % (ref 11.5–14.5)
ERYTHROCYTE [DISTWIDTH] IN BLOOD BY AUTOMATED COUNT: 16.8 % (ref 11.5–14.5)
ERYTHROCYTE [DISTWIDTH] IN BLOOD BY AUTOMATED COUNT: 16.9 % (ref 11.5–14.5)
ERYTHROCYTE [DISTWIDTH] IN BLOOD BY AUTOMATED COUNT: 17 % (ref 11.5–14.5)
ERYTHROCYTE [DISTWIDTH] IN BLOOD BY AUTOMATED COUNT: 17 % (ref 11.5–14.5)
ERYTHROCYTE [DISTWIDTH] IN BLOOD BY AUTOMATED COUNT: 17.1 % (ref 11.5–14.5)
ERYTHROCYTE [DISTWIDTH] IN BLOOD BY AUTOMATED COUNT: 17.1 % (ref 11.5–14.5)
ERYTHROCYTE [DISTWIDTH] IN BLOOD BY AUTOMATED COUNT: 17.4 % (ref 11.5–14.5)
ERYTHROCYTE [DISTWIDTH] IN BLOOD BY AUTOMATED COUNT: 17.6 % (ref 11.5–14.5)
ERYTHROCYTE [DISTWIDTH] IN BLOOD BY AUTOMATED COUNT: 18.2 % (ref 11.5–14.5)
ERYTHROCYTE [DISTWIDTH] IN BLOOD BY AUTOMATED COUNT: 18.3 % (ref 11.5–14.5)
ERYTHROCYTE [DISTWIDTH] IN BLOOD BY AUTOMATED COUNT: 18.4 % (ref 11.5–14.5)
ERYTHROCYTE [DISTWIDTH] IN BLOOD BY AUTOMATED COUNT: 18.5 % (ref 11.5–14.5)
ERYTHROCYTE [SEDIMENTATION RATE] IN BLOOD: 126 MM/HR (ref 0–20)
ERYTHROCYTE [SEDIMENTATION RATE] IN BLOOD: 126 MM/HR (ref 0–20)
ERYTHROCYTE [SEDIMENTATION RATE] IN BLOOD: 15 MM/HR (ref 0–20)
EST. AVERAGE GLUCOSE BLD GHB EST-MCNC: 246 MG/DL
GAS FLOW.O2 O2 DELIVERY SYS: ABNORMAL L/MIN
GAS FLOW.O2 SETTING OXYMISER: 15 BPM
GAS FLOW.O2 SETTING OXYMISER: 16 BPM
GAS FLOW.O2 SETTING OXYMISER: 18 BPM
GAS FLOW.O2 SETTING OXYMISER: 26 BPM
GLOBULIN SER CALC-MCNC: 6.3 G/DL (ref 2–4)
GLOBULIN SER CALC-MCNC: 6.6 G/DL (ref 2–4)
GLOBULIN SER CALC-MCNC: 6.6 G/DL (ref 2–4)
GLOBULIN SER CALC-MCNC: 6.7 G/DL (ref 2–4)
GLOBULIN SER CALC-MCNC: 6.9 G/DL (ref 2–4)
GLOBULIN SER CALC-MCNC: 6.9 G/DL (ref 2–4)
GLOBULIN SER CALC-MCNC: 7.1 G/DL (ref 2–4)
GLOBULIN SER CALC-MCNC: 7.1 G/DL (ref 2–4)
GLOBULIN SER CALC-MCNC: 7.3 G/DL (ref 2–4)
GLOBULIN SER CALC-MCNC: 7.4 G/DL (ref 2–4)
GLOBULIN SER CALC-MCNC: 7.4 G/DL (ref 2–4)
GLOBULIN SER CALC-MCNC: 7.7 G/DL (ref 2–4)
GLOBULIN SER CALC-MCNC: 7.8 G/DL (ref 2–4)
GLOBULIN SER CALC-MCNC: 7.9 G/DL (ref 2–4)
GLUCOSE BLD STRIP.AUTO-MCNC: 103 MG/DL (ref 65–100)
GLUCOSE BLD STRIP.AUTO-MCNC: 104 MG/DL (ref 65–100)
GLUCOSE BLD STRIP.AUTO-MCNC: 105 MG/DL (ref 65–100)
GLUCOSE BLD STRIP.AUTO-MCNC: 108 MG/DL (ref 65–100)
GLUCOSE BLD STRIP.AUTO-MCNC: 109 MG/DL (ref 65–100)
GLUCOSE BLD STRIP.AUTO-MCNC: 109 MG/DL (ref 65–100)
GLUCOSE BLD STRIP.AUTO-MCNC: 116 MG/DL (ref 65–100)
GLUCOSE BLD STRIP.AUTO-MCNC: 119 MG/DL (ref 65–100)
GLUCOSE BLD STRIP.AUTO-MCNC: 121 MG/DL (ref 65–100)
GLUCOSE BLD STRIP.AUTO-MCNC: 122 MG/DL (ref 65–100)
GLUCOSE BLD STRIP.AUTO-MCNC: 128 MG/DL (ref 65–100)
GLUCOSE BLD STRIP.AUTO-MCNC: 131 MG/DL (ref 65–100)
GLUCOSE BLD STRIP.AUTO-MCNC: 134 MG/DL (ref 65–100)
GLUCOSE BLD STRIP.AUTO-MCNC: 134 MG/DL (ref 65–100)
GLUCOSE BLD STRIP.AUTO-MCNC: 138 MG/DL (ref 65–100)
GLUCOSE BLD STRIP.AUTO-MCNC: 14 MG/DL (ref 65–100)
GLUCOSE BLD STRIP.AUTO-MCNC: 148 MG/DL (ref 65–100)
GLUCOSE BLD STRIP.AUTO-MCNC: 150 MG/DL (ref 65–100)
GLUCOSE BLD STRIP.AUTO-MCNC: 150 MG/DL (ref 65–100)
GLUCOSE BLD STRIP.AUTO-MCNC: 151 MG/DL (ref 65–100)
GLUCOSE BLD STRIP.AUTO-MCNC: 159 MG/DL (ref 65–100)
GLUCOSE BLD STRIP.AUTO-MCNC: 159 MG/DL (ref 65–100)
GLUCOSE BLD STRIP.AUTO-MCNC: 160 MG/DL (ref 65–100)
GLUCOSE BLD STRIP.AUTO-MCNC: 161 MG/DL (ref 65–100)
GLUCOSE BLD STRIP.AUTO-MCNC: 162 MG/DL (ref 65–100)
GLUCOSE BLD STRIP.AUTO-MCNC: 163 MG/DL (ref 65–100)
GLUCOSE BLD STRIP.AUTO-MCNC: 168 MG/DL (ref 65–100)
GLUCOSE BLD STRIP.AUTO-MCNC: 170 MG/DL (ref 65–100)
GLUCOSE BLD STRIP.AUTO-MCNC: 173 MG/DL (ref 65–100)
GLUCOSE BLD STRIP.AUTO-MCNC: 18 MG/DL (ref 65–100)
GLUCOSE BLD STRIP.AUTO-MCNC: 182 MG/DL (ref 65–100)
GLUCOSE BLD STRIP.AUTO-MCNC: 183 MG/DL (ref 65–100)
GLUCOSE BLD STRIP.AUTO-MCNC: 184 MG/DL (ref 65–100)
GLUCOSE BLD STRIP.AUTO-MCNC: 185 MG/DL (ref 65–100)
GLUCOSE BLD STRIP.AUTO-MCNC: 186 MG/DL (ref 65–100)
GLUCOSE BLD STRIP.AUTO-MCNC: 188 MG/DL (ref 65–100)
GLUCOSE BLD STRIP.AUTO-MCNC: 188 MG/DL (ref 65–100)
GLUCOSE BLD STRIP.AUTO-MCNC: 190 MG/DL (ref 65–100)
GLUCOSE BLD STRIP.AUTO-MCNC: 191 MG/DL (ref 65–100)
GLUCOSE BLD STRIP.AUTO-MCNC: 191 MG/DL (ref 65–100)
GLUCOSE BLD STRIP.AUTO-MCNC: 194 MG/DL (ref 65–100)
GLUCOSE BLD STRIP.AUTO-MCNC: 195 MG/DL (ref 65–100)
GLUCOSE BLD STRIP.AUTO-MCNC: 195 MG/DL (ref 65–100)
GLUCOSE BLD STRIP.AUTO-MCNC: 196 MG/DL (ref 65–100)
GLUCOSE BLD STRIP.AUTO-MCNC: 197 MG/DL (ref 65–100)
GLUCOSE BLD STRIP.AUTO-MCNC: 200 MG/DL (ref 65–100)
GLUCOSE BLD STRIP.AUTO-MCNC: 201 MG/DL (ref 65–100)
GLUCOSE BLD STRIP.AUTO-MCNC: 202 MG/DL (ref 65–100)
GLUCOSE BLD STRIP.AUTO-MCNC: 204 MG/DL (ref 65–100)
GLUCOSE BLD STRIP.AUTO-MCNC: 207 MG/DL (ref 65–100)
GLUCOSE BLD STRIP.AUTO-MCNC: 208 MG/DL (ref 65–100)
GLUCOSE BLD STRIP.AUTO-MCNC: 209 MG/DL (ref 65–100)
GLUCOSE BLD STRIP.AUTO-MCNC: 210 MG/DL (ref 65–100)
GLUCOSE BLD STRIP.AUTO-MCNC: 213 MG/DL (ref 65–100)
GLUCOSE BLD STRIP.AUTO-MCNC: 215 MG/DL (ref 65–100)
GLUCOSE BLD STRIP.AUTO-MCNC: 215 MG/DL (ref 65–100)
GLUCOSE BLD STRIP.AUTO-MCNC: 218 MG/DL (ref 65–100)
GLUCOSE BLD STRIP.AUTO-MCNC: 222 MG/DL (ref 65–100)
GLUCOSE BLD STRIP.AUTO-MCNC: 222 MG/DL (ref 65–100)
GLUCOSE BLD STRIP.AUTO-MCNC: 228 MG/DL (ref 65–100)
GLUCOSE BLD STRIP.AUTO-MCNC: 230 MG/DL (ref 65–100)
GLUCOSE BLD STRIP.AUTO-MCNC: 234 MG/DL (ref 65–100)
GLUCOSE BLD STRIP.AUTO-MCNC: 235 MG/DL (ref 65–100)
GLUCOSE BLD STRIP.AUTO-MCNC: 236 MG/DL (ref 65–100)
GLUCOSE BLD STRIP.AUTO-MCNC: 238 MG/DL (ref 65–100)
GLUCOSE BLD STRIP.AUTO-MCNC: 239 MG/DL (ref 65–100)
GLUCOSE BLD STRIP.AUTO-MCNC: 241 MG/DL (ref 65–100)
GLUCOSE BLD STRIP.AUTO-MCNC: 241 MG/DL (ref 65–100)
GLUCOSE BLD STRIP.AUTO-MCNC: 242 MG/DL (ref 65–100)
GLUCOSE BLD STRIP.AUTO-MCNC: 247 MG/DL (ref 65–100)
GLUCOSE BLD STRIP.AUTO-MCNC: 248 MG/DL (ref 65–100)
GLUCOSE BLD STRIP.AUTO-MCNC: 251 MG/DL (ref 65–100)
GLUCOSE BLD STRIP.AUTO-MCNC: 252 MG/DL (ref 65–100)
GLUCOSE BLD STRIP.AUTO-MCNC: 253 MG/DL (ref 65–100)
GLUCOSE BLD STRIP.AUTO-MCNC: 256 MG/DL (ref 65–100)
GLUCOSE BLD STRIP.AUTO-MCNC: 259 MG/DL (ref 65–100)
GLUCOSE BLD STRIP.AUTO-MCNC: 260 MG/DL (ref 65–100)
GLUCOSE BLD STRIP.AUTO-MCNC: 263 MG/DL (ref 65–100)
GLUCOSE BLD STRIP.AUTO-MCNC: 263 MG/DL (ref 65–100)
GLUCOSE BLD STRIP.AUTO-MCNC: 267 MG/DL (ref 65–100)
GLUCOSE BLD STRIP.AUTO-MCNC: 270 MG/DL (ref 65–100)
GLUCOSE BLD STRIP.AUTO-MCNC: 271 MG/DL (ref 65–100)
GLUCOSE BLD STRIP.AUTO-MCNC: 273 MG/DL (ref 65–100)
GLUCOSE BLD STRIP.AUTO-MCNC: 274 MG/DL (ref 65–100)
GLUCOSE BLD STRIP.AUTO-MCNC: 280 MG/DL (ref 65–100)
GLUCOSE BLD STRIP.AUTO-MCNC: 281 MG/DL (ref 65–100)
GLUCOSE BLD STRIP.AUTO-MCNC: 281 MG/DL (ref 65–100)
GLUCOSE BLD STRIP.AUTO-MCNC: 286 MG/DL (ref 65–100)
GLUCOSE BLD STRIP.AUTO-MCNC: 288 MG/DL (ref 65–100)
GLUCOSE BLD STRIP.AUTO-MCNC: 291 MG/DL (ref 65–100)
GLUCOSE BLD STRIP.AUTO-MCNC: 296 MG/DL (ref 65–100)
GLUCOSE BLD STRIP.AUTO-MCNC: 298 MG/DL (ref 65–100)
GLUCOSE BLD STRIP.AUTO-MCNC: 299 MG/DL (ref 65–100)
GLUCOSE BLD STRIP.AUTO-MCNC: 30 MG/DL (ref 65–100)
GLUCOSE BLD STRIP.AUTO-MCNC: 303 MG/DL (ref 65–100)
GLUCOSE BLD STRIP.AUTO-MCNC: 311 MG/DL (ref 65–100)
GLUCOSE BLD STRIP.AUTO-MCNC: 320 MG/DL (ref 65–100)
GLUCOSE BLD STRIP.AUTO-MCNC: 321 MG/DL (ref 65–100)
GLUCOSE BLD STRIP.AUTO-MCNC: 332 MG/DL (ref 65–100)
GLUCOSE BLD STRIP.AUTO-MCNC: 342 MG/DL (ref 65–100)
GLUCOSE BLD STRIP.AUTO-MCNC: 345 MG/DL (ref 65–100)
GLUCOSE BLD STRIP.AUTO-MCNC: 347 MG/DL (ref 65–100)
GLUCOSE BLD STRIP.AUTO-MCNC: 353 MG/DL (ref 65–100)
GLUCOSE BLD STRIP.AUTO-MCNC: 36 MG/DL (ref 65–100)
GLUCOSE BLD STRIP.AUTO-MCNC: 366 MG/DL (ref 65–100)
GLUCOSE BLD STRIP.AUTO-MCNC: 56 MG/DL (ref 65–100)
GLUCOSE BLD STRIP.AUTO-MCNC: 64 MG/DL (ref 65–100)
GLUCOSE BLD STRIP.AUTO-MCNC: 64 MG/DL (ref 65–100)
GLUCOSE BLD STRIP.AUTO-MCNC: 67 MG/DL (ref 65–100)
GLUCOSE BLD STRIP.AUTO-MCNC: 70 MG/DL (ref 65–100)
GLUCOSE BLD STRIP.AUTO-MCNC: 74 MG/DL (ref 65–100)
GLUCOSE BLD STRIP.AUTO-MCNC: 76 MG/DL (ref 65–100)
GLUCOSE BLD STRIP.AUTO-MCNC: 83 MG/DL (ref 65–100)
GLUCOSE BLD STRIP.AUTO-MCNC: 98 MG/DL (ref 65–100)
GLUCOSE BLD STRIP.AUTO-MCNC: 98 MG/DL (ref 65–100)
GLUCOSE BLD STRIP.AUTO-MCNC: 99 MG/DL (ref 65–100)
GLUCOSE SERPL-MCNC: 116 MG/DL (ref 65–100)
GLUCOSE SERPL-MCNC: 121 MG/DL (ref 65–100)
GLUCOSE SERPL-MCNC: 135 MG/DL (ref 65–100)
GLUCOSE SERPL-MCNC: 150 MG/DL (ref 65–100)
GLUCOSE SERPL-MCNC: 161 MG/DL (ref 65–100)
GLUCOSE SERPL-MCNC: 166 MG/DL (ref 65–100)
GLUCOSE SERPL-MCNC: 181 MG/DL (ref 65–100)
GLUCOSE SERPL-MCNC: 188 MG/DL (ref 65–100)
GLUCOSE SERPL-MCNC: 191 MG/DL (ref 65–100)
GLUCOSE SERPL-MCNC: 192 MG/DL (ref 65–100)
GLUCOSE SERPL-MCNC: 201 MG/DL (ref 65–100)
GLUCOSE SERPL-MCNC: 209 MG/DL (ref 65–100)
GLUCOSE SERPL-MCNC: 215 MG/DL (ref 65–100)
GLUCOSE SERPL-MCNC: 217 MG/DL (ref 65–100)
GLUCOSE SERPL-MCNC: 224 MG/DL (ref 65–100)
GLUCOSE SERPL-MCNC: 23 MG/DL (ref 65–100)
GLUCOSE SERPL-MCNC: 234 MG/DL (ref 65–100)
GLUCOSE SERPL-MCNC: 235 MG/DL (ref 65–100)
GLUCOSE SERPL-MCNC: 239 MG/DL (ref 65–100)
GLUCOSE SERPL-MCNC: 243 MG/DL (ref 65–100)
GLUCOSE SERPL-MCNC: 244 MG/DL (ref 65–100)
GLUCOSE SERPL-MCNC: 246 MG/DL (ref 65–100)
GLUCOSE SERPL-MCNC: 302 MG/DL (ref 65–100)
GLUCOSE SERPL-MCNC: 331 MG/DL (ref 65–100)
GLUCOSE SERPL-MCNC: 337 MG/DL (ref 65–100)
GLUCOSE SERPL-MCNC: 341 MG/DL (ref 65–100)
GLUCOSE SERPL-MCNC: 383 MG/DL (ref 65–100)
GLUCOSE SERPL-MCNC: 50 MG/DL (ref 65–100)
GLUCOSE UR STRIP.AUTO-MCNC: >1000 MG/DL
GLUCOSE UR STRIP.AUTO-MCNC: NEGATIVE MG/DL
GRAM STN SPEC: ABNORMAL
HBA1C MFR BLD: 10.2 % (ref 4–5.6)
HBV SURFACE AG SER QL: <0.1 INDEX
HBV SURFACE AG SER QL: NEGATIVE
HCO3 BLD-SCNC: 25.2 MMOL/L (ref 22–26)
HCO3 BLD-SCNC: 26.2 MMOL/L (ref 22–26)
HCO3 BLD-SCNC: 26.5 MMOL/L (ref 22–26)
HCO3 BLD-SCNC: 27.1 MMOL/L (ref 22–26)
HCO3 BLD-SCNC: 27.3 MMOL/L (ref 22–26)
HCO3 BLD-SCNC: 27.8 MMOL/L (ref 22–26)
HCO3 BLD-SCNC: 27.9 MMOL/L (ref 22–26)
HCO3 BLD-SCNC: 28.5 MMOL/L (ref 22–26)
HCO3 BLD-SCNC: 29.2 MMOL/L (ref 22–26)
HCO3 BLD-SCNC: 29.9 MMOL/L (ref 22–26)
HCO3 BLD-SCNC: 30.6 MMOL/L (ref 22–26)
HCT VFR BLD AUTO: 21.7 % (ref 36.6–50.3)
HCT VFR BLD AUTO: 22.2 % (ref 36.6–50.3)
HCT VFR BLD AUTO: 23 % (ref 36.6–50.3)
HCT VFR BLD AUTO: 23.4 % (ref 36.6–50.3)
HCT VFR BLD AUTO: 23.4 % (ref 36.6–50.3)
HCT VFR BLD AUTO: 23.9 % (ref 36.6–50.3)
HCT VFR BLD AUTO: 24.1 % (ref 36.6–50.3)
HCT VFR BLD AUTO: 24.4 % (ref 36.6–50.3)
HCT VFR BLD AUTO: 24.5 % (ref 36.6–50.3)
HCT VFR BLD AUTO: 24.6 % (ref 36.6–50.3)
HCT VFR BLD AUTO: 24.7 % (ref 36.6–50.3)
HCT VFR BLD AUTO: 25.5 % (ref 36.6–50.3)
HCT VFR BLD AUTO: 25.6 % (ref 36.6–50.3)
HCT VFR BLD AUTO: 25.9 % (ref 36.6–50.3)
HCT VFR BLD AUTO: 26.2 % (ref 36.6–50.3)
HCT VFR BLD AUTO: 26.5 % (ref 36.6–50.3)
HCT VFR BLD AUTO: 26.6 % (ref 36.6–50.3)
HCT VFR BLD AUTO: 27.3 % (ref 36.6–50.3)
HCT VFR BLD AUTO: 27.5 % (ref 36.6–50.3)
HCT VFR BLD AUTO: 27.6 % (ref 36.6–50.3)
HCT VFR BLD AUTO: 27.9 % (ref 36.6–50.3)
HCT VFR BLD AUTO: 28.5 % (ref 36.6–50.3)
HCT VFR BLD AUTO: 28.9 % (ref 36.6–50.3)
HGB BLD-MCNC: 6.9 G/DL (ref 12.1–17)
HGB BLD-MCNC: 6.9 G/DL (ref 12.1–17)
HGB BLD-MCNC: 7.1 G/DL (ref 12.1–17)
HGB BLD-MCNC: 7.3 G/DL (ref 12.1–17)
HGB BLD-MCNC: 7.4 G/DL (ref 12.1–17)
HGB BLD-MCNC: 7.7 G/DL (ref 12.1–17)
HGB BLD-MCNC: 7.8 G/DL (ref 12.1–17)
HGB BLD-MCNC: 7.8 G/DL (ref 12.1–17)
HGB BLD-MCNC: 7.9 G/DL (ref 12.1–17)
HGB BLD-MCNC: 8 G/DL (ref 12.1–17)
HGB BLD-MCNC: 8.3 G/DL (ref 12.1–17)
HGB BLD-MCNC: 8.6 G/DL (ref 12.1–17)
HGB BLD-MCNC: 8.8 G/DL (ref 12.1–17)
HGB BLD-MCNC: 8.9 G/DL (ref 12.1–17)
HGB BLD-MCNC: 9 G/DL (ref 12.1–17)
HGB BLD-MCNC: 9.1 G/DL (ref 12.1–17)
HGB BLD-MCNC: 9.4 G/DL (ref 12.1–17)
HGB UR QL STRIP: ABNORMAL
HGB UR QL STRIP: ABNORMAL
IMM GRANULOCYTES # BLD AUTO: 0 K/UL (ref 0–0.04)
IMM GRANULOCYTES # BLD AUTO: 0 K/UL (ref 0–0.04)
IMM GRANULOCYTES NFR BLD AUTO: 0 % (ref 0–0.5)
IMM GRANULOCYTES NFR BLD AUTO: 0 % (ref 0–0.5)
INR PPP: 1.2 (ref 0.9–1.1)
INSPIRATION.DURATION SETTING TIME VENT: 1 SEC
IRON SATN MFR SERPL: 14 % (ref 20–50)
IRON SERPL-MCNC: 12 UG/DL (ref 35–150)
KETONES UR QL STRIP.AUTO: NEGATIVE MG/DL
KETONES UR QL STRIP.AUTO: NEGATIVE MG/DL
LACTATE SERPL-SCNC: 0.8 MMOL/L (ref 0.4–2)
LACTATE SERPL-SCNC: 1.2 MMOL/L (ref 0.4–2)
LACTATE SERPL-SCNC: 1.4 MMOL/L (ref 0.4–2)
LACTATE SERPL-SCNC: 1.5 MMOL/L (ref 0.4–2)
LACTATE SERPL-SCNC: 2.3 MMOL/L (ref 0.4–2)
LEUKOCYTE ESTERASE UR QL STRIP.AUTO: ABNORMAL
LEUKOCYTE ESTERASE UR QL STRIP.AUTO: ABNORMAL
LVFS 2D: 22.32 %
LYMPHOCYTES # BLD: 1.6 K/UL (ref 0.8–3.5)
LYMPHOCYTES # BLD: 2 K/UL (ref 0.8–3.5)
LYMPHOCYTES NFR BLD: 24 % (ref 12–49)
LYMPHOCYTES NFR BLD: 27 % (ref 12–49)
MAGNESIUM SERPL-MCNC: 1.9 MG/DL (ref 1.6–2.4)
MAGNESIUM SERPL-MCNC: 2 MG/DL (ref 1.6–2.4)
MAGNESIUM SERPL-MCNC: 2 MG/DL (ref 1.6–2.4)
MAGNESIUM SERPL-MCNC: 2.1 MG/DL (ref 1.6–2.4)
MAGNESIUM SERPL-MCNC: 2.2 MG/DL (ref 1.6–2.4)
MAGNESIUM SERPL-MCNC: 2.3 MG/DL (ref 1.6–2.4)
MAGNESIUM SERPL-MCNC: 2.4 MG/DL (ref 1.6–2.4)
MAGNESIUM SERPL-MCNC: 2.5 MG/DL (ref 1.6–2.4)
MAGNESIUM SERPL-MCNC: 2.6 MG/DL (ref 1.6–2.4)
MAGNESIUM SERPL-MCNC: 2.6 MG/DL (ref 1.6–2.4)
MAGNESIUM SERPL-MCNC: 2.9 MG/DL (ref 1.6–2.4)
MAGNESIUM SERPL-MCNC: 3 MG/DL (ref 1.6–2.4)
MCH RBC QN AUTO: 22.9 PG (ref 26–34)
MCH RBC QN AUTO: 23.1 PG (ref 26–34)
MCH RBC QN AUTO: 23.1 PG (ref 26–34)
MCH RBC QN AUTO: 23.3 PG (ref 26–34)
MCH RBC QN AUTO: 23.4 PG (ref 26–34)
MCH RBC QN AUTO: 23.4 PG (ref 26–34)
MCH RBC QN AUTO: 23.5 PG (ref 26–34)
MCH RBC QN AUTO: 23.5 PG (ref 26–34)
MCH RBC QN AUTO: 23.6 PG (ref 26–34)
MCH RBC QN AUTO: 23.7 PG (ref 26–34)
MCH RBC QN AUTO: 23.7 PG (ref 26–34)
MCH RBC QN AUTO: 23.8 PG (ref 26–34)
MCH RBC QN AUTO: 23.8 PG (ref 26–34)
MCH RBC QN AUTO: 23.9 PG (ref 26–34)
MCH RBC QN AUTO: 24 PG (ref 26–34)
MCH RBC QN AUTO: 24 PG (ref 26–34)
MCH RBC QN AUTO: 24.3 PG (ref 26–34)
MCHC RBC AUTO-ENTMCNC: 30.2 G/DL (ref 30–36.5)
MCHC RBC AUTO-ENTMCNC: 30.5 G/DL (ref 30–36.5)
MCHC RBC AUTO-ENTMCNC: 30.7 G/DL (ref 30–36.5)
MCHC RBC AUTO-ENTMCNC: 30.9 G/DL (ref 30–36.5)
MCHC RBC AUTO-ENTMCNC: 31.1 G/DL (ref 30–36.5)
MCHC RBC AUTO-ENTMCNC: 31.3 G/DL (ref 30–36.5)
MCHC RBC AUTO-ENTMCNC: 31.6 G/DL (ref 30–36.5)
MCHC RBC AUTO-ENTMCNC: 31.6 G/DL (ref 30–36.5)
MCHC RBC AUTO-ENTMCNC: 31.8 G/DL (ref 30–36.5)
MCHC RBC AUTO-ENTMCNC: 31.8 G/DL (ref 30–36.5)
MCHC RBC AUTO-ENTMCNC: 31.9 G/DL (ref 30–36.5)
MCHC RBC AUTO-ENTMCNC: 31.9 G/DL (ref 30–36.5)
MCHC RBC AUTO-ENTMCNC: 32 G/DL (ref 30–36.5)
MCHC RBC AUTO-ENTMCNC: 32.3 G/DL (ref 30–36.5)
MCHC RBC AUTO-ENTMCNC: 32.3 G/DL (ref 30–36.5)
MCHC RBC AUTO-ENTMCNC: 32.4 G/DL (ref 30–36.5)
MCHC RBC AUTO-ENTMCNC: 32.5 G/DL (ref 30–36.5)
MCHC RBC AUTO-ENTMCNC: 32.5 G/DL (ref 30–36.5)
MCHC RBC AUTO-ENTMCNC: 32.6 G/DL (ref 30–36.5)
MCHC RBC AUTO-ENTMCNC: 32.9 G/DL (ref 30–36.5)
MCHC RBC AUTO-ENTMCNC: 32.9 G/DL (ref 30–36.5)
MCV RBC AUTO: 71.8 FL (ref 80–99)
MCV RBC AUTO: 72.4 FL (ref 80–99)
MCV RBC AUTO: 72.4 FL (ref 80–99)
MCV RBC AUTO: 72.6 FL (ref 80–99)
MCV RBC AUTO: 72.7 FL (ref 80–99)
MCV RBC AUTO: 73 FL (ref 80–99)
MCV RBC AUTO: 73 FL (ref 80–99)
MCV RBC AUTO: 73.1 FL (ref 80–99)
MCV RBC AUTO: 73.3 FL (ref 80–99)
MCV RBC AUTO: 73.7 FL (ref 80–99)
MCV RBC AUTO: 73.7 FL (ref 80–99)
MCV RBC AUTO: 74.4 FL (ref 80–99)
MCV RBC AUTO: 75 FL (ref 80–99)
MCV RBC AUTO: 75.2 FL (ref 80–99)
MCV RBC AUTO: 75.4 FL (ref 80–99)
MCV RBC AUTO: 75.4 FL (ref 80–99)
MCV RBC AUTO: 75.8 FL (ref 80–99)
MCV RBC AUTO: 75.8 FL (ref 80–99)
MCV RBC AUTO: 75.9 FL (ref 80–99)
MCV RBC AUTO: 76 FL (ref 80–99)
MCV RBC AUTO: 76.4 FL (ref 80–99)
MCV RBC AUTO: 76.6 FL (ref 80–99)
MCV RBC AUTO: 76.6 FL (ref 80–99)
MONOCYTES # BLD: 0.6 K/UL (ref 0–1)
MONOCYTES # BLD: 0.8 K/UL (ref 0–1)
MONOCYTES NFR BLD: 10 % (ref 5–13)
MONOCYTES NFR BLD: 10 % (ref 5–13)
NEUTS SEG # BLD: 3.5 K/UL (ref 1.8–8)
NEUTS SEG # BLD: 5.3 K/UL (ref 1.8–8)
NEUTS SEG NFR BLD: 60 % (ref 32–75)
NEUTS SEG NFR BLD: 65 % (ref 32–75)
NITRITE UR QL STRIP.AUTO: NEGATIVE
NITRITE UR QL STRIP.AUTO: NEGATIVE
NRBC # BLD: 0 K/UL (ref 0–0.01)
NRBC # BLD: 0.02 K/UL (ref 0–0.01)
NRBC BLD-RTO: 0 PER 100 WBC
NRBC BLD-RTO: 0.2 PER 100 WBC
NSE SERPL IA-MCNC: 3.3 NG/ML (ref 0–12.5)
O2/TOTAL GAS SETTING VFR VENT: 0.24 %
O2/TOTAL GAS SETTING VFR VENT: 0.4 %
O2/TOTAL GAS SETTING VFR VENT: 0.6 %
O2/TOTAL GAS SETTING VFR VENT: 0.6 %
O2/TOTAL GAS SETTING VFR VENT: 24 %
O2/TOTAL GAS SETTING VFR VENT: 40 %
O2/TOTAL GAS SETTING VFR VENT: 40 %
O2/TOTAL GAS SETTING VFR VENT: 50 %
P-R INTERVAL, ECG05: 168 MS
PCO2 BLD: 32.2 MMHG (ref 35–45)
PCO2 BLD: 36.6 MMHG (ref 35–45)
PCO2 BLD: 36.9 MMHG (ref 35–45)
PCO2 BLD: 37 MMHG (ref 35–45)
PCO2 BLD: 39.3 MMHG (ref 35–45)
PCO2 BLD: 39.8 MMHG (ref 35–45)
PCO2 BLD: 41 MMHG (ref 35–45)
PCO2 BLD: 42.1 MMHG (ref 35–45)
PCO2 BLD: 46.6 MMHG (ref 35–45)
PCO2 BLD: 48 MMHG (ref 35–45)
PCO2 BLD: 72.5 MMHG (ref 35–45)
PEEP RESPIRATORY: 5 CMH2O
PEEP RESPIRATORY: 5.5 CMH2O
PH BLD: 7.23 [PH] (ref 7.35–7.45)
PH BLD: 7.38 [PH] (ref 7.35–7.45)
PH BLD: 7.39 [PH] (ref 7.35–7.45)
PH BLD: 7.4 [PH] (ref 7.35–7.45)
PH BLD: 7.43 [PH] (ref 7.35–7.45)
PH BLD: 7.44 [PH] (ref 7.35–7.45)
PH BLD: 7.46 [PH] (ref 7.35–7.45)
PH BLD: 7.46 [PH] (ref 7.35–7.45)
PH BLD: 7.48 [PH] (ref 7.35–7.45)
PH BLD: 7.49 [PH] (ref 7.35–7.45)
PH BLD: 7.55 [PH] (ref 7.35–7.45)
PH UR STRIP: 6 [PH] (ref 5–8)
PH UR STRIP: 7 [PH] (ref 5–8)
PHOSPHATE SERPL-MCNC: 0.9 MG/DL (ref 2.6–4.7)
PHOSPHATE SERPL-MCNC: 1 MG/DL (ref 2.6–4.7)
PHOSPHATE SERPL-MCNC: 1.2 MG/DL (ref 2.6–4.7)
PHOSPHATE SERPL-MCNC: 1.7 MG/DL (ref 2.6–4.7)
PHOSPHATE SERPL-MCNC: 1.8 MG/DL (ref 2.6–4.7)
PHOSPHATE SERPL-MCNC: 2.1 MG/DL (ref 2.6–4.7)
PHOSPHATE SERPL-MCNC: 2.3 MG/DL (ref 2.6–4.7)
PHOSPHATE SERPL-MCNC: 2.4 MG/DL (ref 2.6–4.7)
PHOSPHATE SERPL-MCNC: 2.6 MG/DL (ref 2.6–4.7)
PHOSPHATE SERPL-MCNC: 2.8 MG/DL (ref 2.6–4.7)
PHOSPHATE SERPL-MCNC: 2.8 MG/DL (ref 2.6–4.7)
PHOSPHATE SERPL-MCNC: 2.9 MG/DL (ref 2.6–4.7)
PHOSPHATE SERPL-MCNC: 2.9 MG/DL (ref 2.6–4.7)
PHOSPHATE SERPL-MCNC: 3.1 MG/DL (ref 2.6–4.7)
PHOSPHATE SERPL-MCNC: 3.2 MG/DL (ref 2.6–4.7)
PHOSPHATE SERPL-MCNC: 3.3 MG/DL (ref 2.6–4.7)
PHOSPHATE SERPL-MCNC: 3.5 MG/DL (ref 2.6–4.7)
PHOSPHATE SERPL-MCNC: 3.6 MG/DL (ref 2.6–4.7)
PHOSPHATE SERPL-MCNC: 3.8 MG/DL (ref 2.6–4.7)
PHOSPHATE SERPL-MCNC: 3.8 MG/DL (ref 2.6–4.7)
PHOSPHATE SERPL-MCNC: 4 MG/DL (ref 2.6–4.7)
PIP ISTAT,IPIP: 18
PIP ISTAT,IPIP: 24
PIP ISTAT,IPIP: 24
PIP ISTAT,IPIP: 26
PLATELET # BLD AUTO: 101 K/UL (ref 150–400)
PLATELET # BLD AUTO: 109 K/UL (ref 150–400)
PLATELET # BLD AUTO: 115 K/UL (ref 150–400)
PLATELET # BLD AUTO: 159 K/UL (ref 150–400)
PLATELET # BLD AUTO: 166 K/UL (ref 150–400)
PLATELET # BLD AUTO: 170 K/UL (ref 150–400)
PLATELET # BLD AUTO: 176 K/UL (ref 150–400)
PLATELET # BLD AUTO: 178 K/UL (ref 150–400)
PLATELET # BLD AUTO: 181 K/UL (ref 150–400)
PLATELET # BLD AUTO: 194 K/UL (ref 150–400)
PLATELET # BLD AUTO: 211 K/UL (ref 150–400)
PLATELET # BLD AUTO: 212 K/UL (ref 150–400)
PLATELET # BLD AUTO: 230 K/UL (ref 150–400)
PLATELET # BLD AUTO: 235 K/UL (ref 150–400)
PLATELET # BLD AUTO: 244 K/UL (ref 150–400)
PLATELET # BLD AUTO: 244 K/UL (ref 150–400)
PLATELET # BLD AUTO: 247 K/UL (ref 150–400)
PLATELET # BLD AUTO: 251 K/UL (ref 150–400)
PLATELET # BLD AUTO: 255 K/UL (ref 150–400)
PLATELET # BLD AUTO: 256 K/UL (ref 150–400)
PLATELET # BLD AUTO: 262 K/UL (ref 150–400)
PLATELET # BLD AUTO: 86 K/UL (ref 150–400)
PLATELET # BLD AUTO: 87 K/UL (ref 150–400)
PMV BLD AUTO: 10.3 FL (ref 8.9–12.9)
PMV BLD AUTO: 10.5 FL (ref 8.9–12.9)
PMV BLD AUTO: 10.5 FL (ref 8.9–12.9)
PMV BLD AUTO: 10.7 FL (ref 8.9–12.9)
PMV BLD AUTO: 10.7 FL (ref 8.9–12.9)
PMV BLD AUTO: 10.9 FL (ref 8.9–12.9)
PMV BLD AUTO: 11.1 FL (ref 8.9–12.9)
PMV BLD AUTO: 11.2 FL (ref 8.9–12.9)
PMV BLD AUTO: 11.3 FL (ref 8.9–12.9)
PMV BLD AUTO: 11.5 FL (ref 8.9–12.9)
PMV BLD AUTO: 11.6 FL (ref 8.9–12.9)
PMV BLD AUTO: ABNORMAL FL (ref 8.9–12.9)
PO2 BLD: 105 MMHG (ref 80–100)
PO2 BLD: 132 MMHG (ref 80–100)
PO2 BLD: 60 MMHG (ref 80–100)
PO2 BLD: 65 MMHG (ref 80–100)
PO2 BLD: 68 MMHG (ref 80–100)
PO2 BLD: 70 MMHG (ref 80–100)
PO2 BLD: 71 MMHG (ref 80–100)
PO2 BLD: 75 MMHG (ref 80–100)
PO2 BLD: 86 MMHG (ref 80–100)
PO2 BLD: 97 MMHG (ref 80–100)
PO2 BLD: 97 MMHG (ref 80–100)
POTASSIUM SERPL-SCNC: 2.9 MMOL/L (ref 3.5–5.1)
POTASSIUM SERPL-SCNC: 3.1 MMOL/L (ref 3.5–5.1)
POTASSIUM SERPL-SCNC: 3.2 MMOL/L (ref 3.5–5.1)
POTASSIUM SERPL-SCNC: 3.2 MMOL/L (ref 3.5–5.1)
POTASSIUM SERPL-SCNC: 3.3 MMOL/L (ref 3.5–5.1)
POTASSIUM SERPL-SCNC: 3.4 MMOL/L (ref 3.5–5.1)
POTASSIUM SERPL-SCNC: 3.6 MMOL/L (ref 3.5–5.1)
POTASSIUM SERPL-SCNC: 3.9 MMOL/L (ref 3.5–5.1)
POTASSIUM SERPL-SCNC: 4 MMOL/L (ref 3.5–5.1)
POTASSIUM SERPL-SCNC: 4.2 MMOL/L (ref 3.5–5.1)
POTASSIUM SERPL-SCNC: 4.2 MMOL/L (ref 3.5–5.1)
POTASSIUM SERPL-SCNC: 4.3 MMOL/L (ref 3.5–5.1)
POTASSIUM SERPL-SCNC: 4.3 MMOL/L (ref 3.5–5.1)
POTASSIUM SERPL-SCNC: 4.4 MMOL/L (ref 3.5–5.1)
POTASSIUM SERPL-SCNC: 4.7 MMOL/L (ref 3.5–5.1)
POTASSIUM SERPL-SCNC: 4.8 MMOL/L (ref 3.5–5.1)
POTASSIUM SERPL-SCNC: 4.8 MMOL/L (ref 3.5–5.1)
POTASSIUM SERPL-SCNC: 4.9 MMOL/L (ref 3.5–5.1)
POTASSIUM SERPL-SCNC: 5.1 MMOL/L (ref 3.5–5.1)
POTASSIUM SERPL-SCNC: 5.1 MMOL/L (ref 3.5–5.1)
POTASSIUM SERPL-SCNC: 5.2 MMOL/L (ref 3.5–5.1)
POTASSIUM SERPL-SCNC: 5.7 MMOL/L (ref 3.5–5.1)
PRESSURE SUPPORT SETTING VENT: 10 CMH2O
PRESSURE SUPPORT SETTING VENT: 5 CMH2O
PROCALCITONIN SERPL-MCNC: 10.6 NG/ML
PROCALCITONIN SERPL-MCNC: 12.81 NG/ML
PROCALCITONIN SERPL-MCNC: 2.13 NG/ML
PROCALCITONIN SERPL-MCNC: 25.49 NG/ML
PROCALCITONIN SERPL-MCNC: 5.59 NG/ML
PROCALCITONIN SERPL-MCNC: 8.25 NG/ML
PROT SERPL-MCNC: 10 G/DL (ref 6.4–8.2)
PROT SERPL-MCNC: 8.2 G/DL (ref 6.4–8.2)
PROT SERPL-MCNC: 8.2 G/DL (ref 6.4–8.2)
PROT SERPL-MCNC: 8.4 G/DL (ref 6.4–8.2)
PROT SERPL-MCNC: 8.4 G/DL (ref 6.4–8.2)
PROT SERPL-MCNC: 8.5 G/DL (ref 6.4–8.2)
PROT SERPL-MCNC: 8.6 G/DL (ref 6.4–8.2)
PROT SERPL-MCNC: 8.6 G/DL (ref 6.4–8.2)
PROT SERPL-MCNC: 8.7 G/DL (ref 6.4–8.2)
PROT SERPL-MCNC: 8.7 G/DL (ref 6.4–8.2)
PROT SERPL-MCNC: 8.9 G/DL (ref 6.4–8.2)
PROT SERPL-MCNC: 9 G/DL (ref 6.4–8.2)
PROT SERPL-MCNC: 9.3 G/DL (ref 6.4–8.2)
PROT SERPL-MCNC: 9.8 G/DL (ref 6.4–8.2)
PROT UR STRIP-MCNC: 300 MG/DL
PROT UR STRIP-MCNC: 300 MG/DL
PROTHROMBIN TIME: 12 SEC (ref 9–11.1)
Q-T INTERVAL, ECG07: 420 MS
QRS DURATION, ECG06: 98 MS
QTC CALCULATION (BEZET), ECG08: 440 MS
RBC # BLD AUTO: 2.92 M/UL (ref 4.1–5.7)
RBC # BLD AUTO: 2.96 M/UL (ref 4.1–5.7)
RBC # BLD AUTO: 3.05 M/UL (ref 4.1–5.7)
RBC # BLD AUTO: 3.12 M/UL (ref 4.1–5.7)
RBC # BLD AUTO: 3.18 M/UL (ref 4.1–5.7)
RBC # BLD AUTO: 3.22 M/UL (ref 4.1–5.7)
RBC # BLD AUTO: 3.23 M/UL (ref 4.1–5.7)
RBC # BLD AUTO: 3.25 M/UL (ref 4.1–5.7)
RBC # BLD AUTO: 3.26 M/UL (ref 4.1–5.7)
RBC # BLD AUTO: 3.28 M/UL (ref 4.1–5.7)
RBC # BLD AUTO: 3.35 M/UL (ref 4.1–5.7)
RBC # BLD AUTO: 3.45 M/UL (ref 4.1–5.7)
RBC # BLD AUTO: 3.47 M/UL (ref 4.1–5.7)
RBC # BLD AUTO: 3.5 M/UL (ref 4.1–5.7)
RBC # BLD AUTO: 3.52 M/UL (ref 4.1–5.7)
RBC # BLD AUTO: 3.55 M/UL (ref 4.1–5.7)
RBC # BLD AUTO: 3.59 M/UL (ref 4.1–5.7)
RBC # BLD AUTO: 3.66 M/UL (ref 4.1–5.7)
RBC # BLD AUTO: 3.71 M/UL (ref 4.1–5.7)
RBC # BLD AUTO: 3.76 M/UL (ref 4.1–5.7)
RBC # BLD AUTO: 3.78 M/UL (ref 4.1–5.7)
RBC # BLD AUTO: 3.83 M/UL (ref 4.1–5.7)
RBC # BLD AUTO: 3.92 M/UL (ref 4.1–5.7)
RBC #/AREA URNS HPF: ABNORMAL /HPF (ref 0–5)
RBC #/AREA URNS HPF: ABNORMAL /HPF (ref 0–5)
RBC MORPH BLD: ABNORMAL
SAMPLES BEING HELD,HOLD: NORMAL
SAMPLES BEING HELD,HOLD: NORMAL
SAO2 % BLD: 88 % (ref 92–97)
SAO2 % BLD: 92 % (ref 92–97)
SAO2 % BLD: 94 % (ref 92–97)
SAO2 % BLD: 94 % (ref 92–97)
SAO2 % BLD: 95 % (ref 92–97)
SAO2 % BLD: 96 % (ref 92–97)
SAO2 % BLD: 97 % (ref 92–97)
SAO2 % BLD: 98 % (ref 92–97)
SAO2 % BLD: 99 % (ref 92–97)
SERVICE CMNT-IMP: ABNORMAL
SERVICE CMNT-IMP: NORMAL
SODIUM SERPL-SCNC: 127 MMOL/L (ref 136–145)
SODIUM SERPL-SCNC: 128 MMOL/L (ref 136–145)
SODIUM SERPL-SCNC: 129 MMOL/L (ref 136–145)
SODIUM SERPL-SCNC: 130 MMOL/L (ref 136–145)
SODIUM SERPL-SCNC: 131 MMOL/L (ref 136–145)
SODIUM SERPL-SCNC: 132 MMOL/L (ref 136–145)
SODIUM SERPL-SCNC: 133 MMOL/L (ref 136–145)
SODIUM SERPL-SCNC: 134 MMOL/L (ref 136–145)
SODIUM SERPL-SCNC: 135 MMOL/L (ref 136–145)
SODIUM SERPL-SCNC: 135 MMOL/L (ref 136–145)
SP GR UR REFRACTOMETRY: 1.02 (ref 1–1.03)
SP GR UR REFRACTOMETRY: 1.02 (ref 1–1.03)
SPECIMEN EXP DATE BLD: NORMAL
SPECIMEN EXP DATE BLD: NORMAL
SPECIMEN TYPE: ABNORMAL
STATUS OF UNIT,%ST: NORMAL
T PALLIDUM AB SER QL IA: NON REACTIVE
THERAPEUTIC RANGE,PTTT: NORMAL SECS (ref 58–77)
TIBC SERPL-MCNC: 87 UG/DL (ref 250–450)
TOTAL RESP. RATE, ITRR: 15
TOTAL RESP. RATE, ITRR: 15
TOTAL RESP. RATE, ITRR: 17
TOTAL RESP. RATE, ITRR: 18
TOTAL RESP. RATE, ITRR: 23
TOTAL RESP. RATE, ITRR: 24
TOTAL RESP. RATE, ITRR: 26
TOTAL RESP. RATE, ITRR: 26
TROPONIN I SERPL-MCNC: 0.12 NG/ML
TROPONIN I SERPL-MCNC: 0.12 NG/ML
UA: UC IF INDICATED,UAUC: ABNORMAL
UNIT DIVISION, %UDIV: 0
UROBILINOGEN UR QL STRIP.AUTO: 0.2 EU/DL (ref 0.2–1)
UROBILINOGEN UR QL STRIP.AUTO: 1 EU/DL (ref 0.2–1)
VANCOMYCIN SERPL-MCNC: 20.3 UG/ML
VENTILATION MODE VENT: ABNORMAL
VENTRICULAR RATE, ECG03: 66 BPM
VIT B1 BLD-SCNC: 112 NMOL/L (ref 66.5–200)
VIT B12 SERPL-MCNC: 1340 PG/ML (ref 193–986)
VOLUME CONTROL IVLC: YES
VT SETTING VENT: 450 ML
VT SETTING VENT: 468 ML
VT SETTING VENT: 500 ML
WBC # BLD AUTO: 10.1 K/UL (ref 4.1–11.1)
WBC # BLD AUTO: 10.5 K/UL (ref 4.1–11.1)
WBC # BLD AUTO: 10.5 K/UL (ref 4.1–11.1)
WBC # BLD AUTO: 10.7 K/UL (ref 4.1–11.1)
WBC # BLD AUTO: 10.9 K/UL (ref 4.1–11.1)
WBC # BLD AUTO: 11.5 K/UL (ref 4.1–11.1)
WBC # BLD AUTO: 12 K/UL (ref 4.1–11.1)
WBC # BLD AUTO: 12.4 K/UL (ref 4.1–11.1)
WBC # BLD AUTO: 13 K/UL (ref 4.1–11.1)
WBC # BLD AUTO: 17.5 K/UL (ref 4.1–11.1)
WBC # BLD AUTO: 5.9 K/UL (ref 4.1–11.1)
WBC # BLD AUTO: 6.7 K/UL (ref 4.1–11.1)
WBC # BLD AUTO: 7 K/UL (ref 4.1–11.1)
WBC # BLD AUTO: 7.4 K/UL (ref 4.1–11.1)
WBC # BLD AUTO: 7.8 K/UL (ref 4.1–11.1)
WBC # BLD AUTO: 7.9 K/UL (ref 4.1–11.1)
WBC # BLD AUTO: 7.9 K/UL (ref 4.1–11.1)
WBC # BLD AUTO: 8.2 K/UL (ref 4.1–11.1)
WBC # BLD AUTO: 8.2 K/UL (ref 4.1–11.1)
WBC # BLD AUTO: 8.6 K/UL (ref 4.1–11.1)
WBC # BLD AUTO: 9.1 K/UL (ref 4.1–11.1)
WBC # BLD AUTO: 9.1 K/UL (ref 4.1–11.1)
WBC # BLD AUTO: 9.3 K/UL (ref 4.1–11.1)
WBC URNS QL MICRO: >100 /HPF (ref 0–4)
WBC URNS QL MICRO: >100 /HPF (ref 0–4)
YEAST BUDDING URNS QL: PRESENT

## 2020-01-01 PROCEDURE — 0DH63UZ INSERTION OF FEEDING DEVICE INTO STOMACH, PERCUTANEOUS APPROACH: ICD-10-PCS | Performed by: INTERNAL MEDICINE

## 2020-01-01 PROCEDURE — 3331090001 HH PPS REVENUE CREDIT

## 2020-01-01 PROCEDURE — 74018 RADEX ABDOMEN 1 VIEW: CPT

## 2020-01-01 PROCEDURE — 82962 GLUCOSE BLOOD TEST: CPT

## 2020-01-01 PROCEDURE — 74011636637 HC RX REV CODE- 636/637: Performed by: EMERGENCY MEDICINE

## 2020-01-01 PROCEDURE — 77030018836 HC SOL IRR NACL ICUM -A

## 2020-01-01 PROCEDURE — P9047 ALBUMIN (HUMAN), 25%, 50ML: HCPCS | Performed by: NURSE PRACTITIONER

## 2020-01-01 PROCEDURE — 65610000006 HC RM INTENSIVE CARE

## 2020-01-01 PROCEDURE — 36415 COLL VENOUS BLD VENIPUNCTURE: CPT

## 2020-01-01 PROCEDURE — 83605 ASSAY OF LACTIC ACID: CPT

## 2020-01-01 PROCEDURE — 94003 VENT MGMT INPAT SUBQ DAY: CPT

## 2020-01-01 PROCEDURE — 74011250637 HC RX REV CODE- 250/637: Performed by: EMERGENCY MEDICINE

## 2020-01-01 PROCEDURE — 74011000258 HC RX REV CODE- 258: Performed by: NURSE PRACTITIONER

## 2020-01-01 PROCEDURE — 74011250636 HC RX REV CODE- 250/636: Performed by: EMERGENCY MEDICINE

## 2020-01-01 PROCEDURE — 70544 MR ANGIOGRAPHY HEAD W/O DYE: CPT

## 2020-01-01 PROCEDURE — 74011250637 HC RX REV CODE- 250/637: Performed by: HOSPITALIST

## 2020-01-01 PROCEDURE — 74011250637 HC RX REV CODE- 250/637: Performed by: SURGERY

## 2020-01-01 PROCEDURE — 86316 IMMUNOASSAY TUMOR OTHER: CPT

## 2020-01-01 PROCEDURE — 80053 COMPREHEN METABOLIC PANEL: CPT

## 2020-01-01 PROCEDURE — 74011250636 HC RX REV CODE- 250/636: Performed by: NURSE PRACTITIONER

## 2020-01-01 PROCEDURE — 80048 BASIC METABOLIC PNL TOTAL CA: CPT

## 2020-01-01 PROCEDURE — 82607 VITAMIN B-12: CPT

## 2020-01-01 PROCEDURE — 74011000250 HC RX REV CODE- 250: Performed by: ANESTHESIOLOGY

## 2020-01-01 PROCEDURE — 71045 X-RAY EXAM CHEST 1 VIEW: CPT

## 2020-01-01 PROCEDURE — 74011636637 HC RX REV CODE- 636/637: Performed by: ANESTHESIOLOGY

## 2020-01-01 PROCEDURE — 3331090002 HH PPS REVENUE DEBIT

## 2020-01-01 PROCEDURE — 36600 WITHDRAWAL OF ARTERIAL BLOOD: CPT

## 2020-01-01 PROCEDURE — 74011250637 HC RX REV CODE- 250/637: Performed by: ANESTHESIOLOGY

## 2020-01-01 PROCEDURE — 74011250636 HC RX REV CODE- 250/636: Performed by: HOSPITALIST

## 2020-01-01 PROCEDURE — 0042T CT PERF W CBF: CPT

## 2020-01-01 PROCEDURE — 87070 CULTURE OTHR SPECIMN AEROBIC: CPT

## 2020-01-01 PROCEDURE — 74011000258 HC RX REV CODE- 258: Performed by: ANESTHESIOLOGY

## 2020-01-01 PROCEDURE — 82803 BLOOD GASES ANY COMBINATION: CPT

## 2020-01-01 PROCEDURE — 83735 ASSAY OF MAGNESIUM: CPT

## 2020-01-01 PROCEDURE — 84145 PROCALCITONIN (PCT): CPT

## 2020-01-01 PROCEDURE — 85027 COMPLETE CBC AUTOMATED: CPT

## 2020-01-01 PROCEDURE — 87077 CULTURE AEROBIC IDENTIFY: CPT

## 2020-01-01 PROCEDURE — 3331090003 HH PPS REVENUE ADJ

## 2020-01-01 PROCEDURE — 90935 HEMODIALYSIS ONE EVALUATION: CPT

## 2020-01-01 PROCEDURE — 74011250636 HC RX REV CODE- 250/636: Performed by: ANESTHESIOLOGY

## 2020-01-01 PROCEDURE — 74011250637 HC RX REV CODE- 250/637: Performed by: INTERNAL MEDICINE

## 2020-01-01 PROCEDURE — 74011000250 HC RX REV CODE- 250: Performed by: NURSE PRACTITIONER

## 2020-01-01 PROCEDURE — C9113 INJ PANTOPRAZOLE SODIUM, VIA: HCPCS | Performed by: NURSE PRACTITIONER

## 2020-01-01 PROCEDURE — 74011250636 HC RX REV CODE- 250/636: Performed by: INTERNAL MEDICINE

## 2020-01-01 PROCEDURE — 74011250637 HC RX REV CODE- 250/637: Performed by: NURSE PRACTITIONER

## 2020-01-01 PROCEDURE — 70496 CT ANGIOGRAPHY HEAD: CPT

## 2020-01-01 PROCEDURE — 87340 HEPATITIS B SURFACE AG IA: CPT

## 2020-01-01 PROCEDURE — 77030018798 HC PMP KT ENTRL FED COVD -A

## 2020-01-01 PROCEDURE — 84100 ASSAY OF PHOSPHORUS: CPT

## 2020-01-01 PROCEDURE — 74011000250 HC RX REV CODE- 250: Performed by: INTERNAL MEDICINE

## 2020-01-01 PROCEDURE — 95714 VEEG EA 12-26 HR UNMNTR: CPT | Performed by: ANESTHESIOLOGY

## 2020-01-01 PROCEDURE — 76010000379 HC BRONCHOSCOPY DIAG/THERAPEUTIC

## 2020-01-01 PROCEDURE — 86900 BLOOD TYPING SEROLOGIC ABO: CPT

## 2020-01-01 PROCEDURE — 77030012058: Performed by: INTERNAL MEDICINE

## 2020-01-01 PROCEDURE — 81001 URINALYSIS AUTO W/SCOPE: CPT

## 2020-01-01 PROCEDURE — 74011636637 HC RX REV CODE- 636/637: Performed by: SURGERY

## 2020-01-01 PROCEDURE — 87186 SC STD MICRODIL/AGAR DIL: CPT

## 2020-01-01 PROCEDURE — 74011000250 HC RX REV CODE- 250: Performed by: SURGERY

## 2020-01-01 PROCEDURE — 94002 VENT MGMT INPAT INIT DAY: CPT

## 2020-01-01 PROCEDURE — 74011250636 HC RX REV CODE- 250/636

## 2020-01-01 PROCEDURE — 85652 RBC SED RATE AUTOMATED: CPT

## 2020-01-01 PROCEDURE — 70551 MRI BRAIN STEM W/O DYE: CPT

## 2020-01-01 PROCEDURE — 77030018846 HC SOL IRR STRL H20 ICUM -A

## 2020-01-01 PROCEDURE — 93308 TTE F-UP OR LMTD: CPT

## 2020-01-01 PROCEDURE — 87040 BLOOD CULTURE FOR BACTERIA: CPT

## 2020-01-01 PROCEDURE — 400013 HH SOC

## 2020-01-01 PROCEDURE — G0299 HHS/HOSPICE OF RN EA 15 MIN: HCPCS

## 2020-01-01 PROCEDURE — C1894 INTRO/SHEATH, NON-LASER: HCPCS

## 2020-01-01 PROCEDURE — 93005 ELECTROCARDIOGRAM TRACING: CPT

## 2020-01-01 PROCEDURE — 77010033678 HC OXYGEN DAILY

## 2020-01-01 PROCEDURE — 74011000258 HC RX REV CODE- 258: Performed by: HOSPITALIST

## 2020-01-01 PROCEDURE — 84425 ASSAY OF VITAMIN B-1: CPT

## 2020-01-01 PROCEDURE — 74011000258 HC RX REV CODE- 258: Performed by: INTERNAL MEDICINE

## 2020-01-01 PROCEDURE — P9045 ALBUMIN (HUMAN), 5%, 250 ML: HCPCS | Performed by: ANESTHESIOLOGY

## 2020-01-01 PROCEDURE — 94760 N-INVAS EAR/PLS OXIMETRY 1: CPT

## 2020-01-01 PROCEDURE — 86923 COMPATIBILITY TEST ELECTRIC: CPT

## 2020-01-01 PROCEDURE — 87086 URINE CULTURE/COLONY COUNT: CPT

## 2020-01-01 PROCEDURE — P9047 ALBUMIN (HUMAN), 25%, 50ML: HCPCS | Performed by: INTERNAL MEDICINE

## 2020-01-01 PROCEDURE — 77030008793 HC TU TRACH CUF COVD -B

## 2020-01-01 PROCEDURE — 83540 ASSAY OF IRON: CPT

## 2020-01-01 PROCEDURE — 85025 COMPLETE CBC W/AUTO DIFF WBC: CPT

## 2020-01-01 PROCEDURE — 74011000250 HC RX REV CODE- 250: Performed by: HOSPITALIST

## 2020-01-01 PROCEDURE — C9113 INJ PANTOPRAZOLE SODIUM, VIA: HCPCS | Performed by: EMERGENCY MEDICINE

## 2020-01-01 PROCEDURE — 86140 C-REACTIVE PROTEIN: CPT

## 2020-01-01 PROCEDURE — 94400 HC END TIDAL CO2 RESPONSE CURVE: CPT

## 2020-01-01 PROCEDURE — 77030005123 HC CATH GASTMY PEG BSC -C: Performed by: INTERNAL MEDICINE

## 2020-01-01 PROCEDURE — 80076 HEPATIC FUNCTION PANEL: CPT

## 2020-01-01 PROCEDURE — 74011000250 HC RX REV CODE- 250

## 2020-01-01 PROCEDURE — 77030020291 HC FLEXSEAL FMS BMS -C

## 2020-01-01 PROCEDURE — 80202 ASSAY OF VANCOMYCIN: CPT

## 2020-01-01 PROCEDURE — 5A1D70Z PERFORMANCE OF URINARY FILTRATION, INTERMITTENT, LESS THAN 6 HOURS PER DAY: ICD-10-PCS | Performed by: INTERNAL MEDICINE

## 2020-01-01 PROCEDURE — 95714 VEEG EA 12-26 HR UNMNTR: CPT | Performed by: NURSE PRACTITIONER

## 2020-01-01 PROCEDURE — 74011000250 HC RX REV CODE- 250: Performed by: EMERGENCY MEDICINE

## 2020-01-01 PROCEDURE — 87103 BLOOD FUNGUS CULTURE: CPT

## 2020-01-01 PROCEDURE — 84484 ASSAY OF TROPONIN QUANT: CPT

## 2020-01-01 PROCEDURE — 74011636320 HC RX REV CODE- 636/320: Performed by: RADIOLOGY

## 2020-01-01 PROCEDURE — 0B113F4 BYPASS TRACHEA TO CUTANEOUS WITH TRACHEOSTOMY DEVICE, PERCUTANEOUS APPROACH: ICD-10-PCS | Performed by: THORACIC SURGERY (CARDIOTHORACIC VASCULAR SURGERY)

## 2020-01-01 PROCEDURE — P9016 RBC LEUKOCYTES REDUCED: HCPCS

## 2020-01-01 PROCEDURE — 95816 EEG AWAKE AND DROWSY: CPT | Performed by: PSYCHIATRY & NEUROLOGY

## 2020-01-01 PROCEDURE — 5A1955Z RESPIRATORY VENTILATION, GREATER THAN 96 CONSECUTIVE HOURS: ICD-10-PCS | Performed by: INTERNAL MEDICINE

## 2020-01-01 PROCEDURE — 74011000250 HC RX REV CODE- 250: Performed by: PHYSICIAN ASSISTANT

## 2020-01-01 PROCEDURE — 74011000258 HC RX REV CODE- 258: Performed by: RADIOLOGY

## 2020-01-01 PROCEDURE — 96374 THER/PROPH/DIAG INJ IV PUSH: CPT

## 2020-01-01 PROCEDURE — 36430 TRANSFUSION BLD/BLD COMPNT: CPT

## 2020-01-01 PROCEDURE — 96361 HYDRATE IV INFUSION ADD-ON: CPT

## 2020-01-01 PROCEDURE — 74011250636 HC RX REV CODE- 250/636: Performed by: PHYSICIAN ASSISTANT

## 2020-01-01 PROCEDURE — 70450 CT HEAD/BRAIN W/O DYE: CPT

## 2020-01-01 PROCEDURE — 82533 TOTAL CORTISOL: CPT

## 2020-01-01 PROCEDURE — 77030037378 HC BRONCHOSCOPE DISP TRAN -D

## 2020-01-01 PROCEDURE — 95816 EEG AWAKE AND DROWSY: CPT | Performed by: NURSE PRACTITIONER

## 2020-01-01 PROCEDURE — 83036 HEMOGLOBIN GLYCOSYLATED A1C: CPT

## 2020-01-01 PROCEDURE — 82140 ASSAY OF AMMONIA: CPT

## 2020-01-01 PROCEDURE — 99283 EMERGENCY DEPT VISIT LOW MDM: CPT

## 2020-01-01 PROCEDURE — 94762 N-INVAS EAR/PLS OXIMTRY CONT: CPT

## 2020-01-01 PROCEDURE — 30233N1 TRANSFUSION OF NONAUTOLOGOUS RED BLOOD CELLS INTO PERIPHERAL VEIN, PERCUTANEOUS APPROACH: ICD-10-PCS | Performed by: INTERNAL MEDICINE

## 2020-01-01 PROCEDURE — 74011636637 HC RX REV CODE- 636/637: Performed by: NURSE PRACTITIONER

## 2020-01-01 PROCEDURE — 76040000008: Performed by: INTERNAL MEDICINE

## 2020-01-01 PROCEDURE — 85730 THROMBOPLASTIN TIME PARTIAL: CPT

## 2020-01-01 PROCEDURE — 86780 TREPONEMA PALLIDUM: CPT

## 2020-01-01 PROCEDURE — 85610 PROTHROMBIN TIME: CPT

## 2020-01-01 PROCEDURE — 0BJ08ZZ INSPECTION OF TRACHEOBRONCHIAL TREE, VIA NATURAL OR ARTIFICIAL OPENING ENDOSCOPIC: ICD-10-PCS | Performed by: THORACIC SURGERY (CARDIOTHORACIC VASCULAR SURGERY)

## 2020-01-01 PROCEDURE — 74011250636 HC RX REV CODE- 250/636: Performed by: SURGERY

## 2020-01-01 PROCEDURE — P9045 ALBUMIN (HUMAN), 5%, 250 ML: HCPCS | Performed by: NURSE PRACTITIONER

## 2020-01-01 RX ORDER — SODIUM CHLORIDE 0.9 % (FLUSH) 0.9 %
5-40 SYRINGE (ML) INJECTION EVERY 8 HOURS
Status: DISCONTINUED | OUTPATIENT
Start: 2020-01-01 | End: 2020-01-01 | Stop reason: HOSPADM

## 2020-01-01 RX ORDER — NALOXONE HYDROCHLORIDE 0.4 MG/ML
0.4 INJECTION, SOLUTION INTRAMUSCULAR; INTRAVENOUS; SUBCUTANEOUS
Status: ACTIVE | OUTPATIENT
Start: 2020-01-01 | End: 2020-01-01

## 2020-01-01 RX ORDER — BACITRACIN 500 UNIT/G
PACKET (EA) TOPICAL
Status: COMPLETED
Start: 2020-01-01 | End: 2020-01-01

## 2020-01-01 RX ORDER — ALBUMIN HUMAN 50 G/1000ML
50 SOLUTION INTRAVENOUS ONCE
Status: COMPLETED | OUTPATIENT
Start: 2020-01-01 | End: 2020-01-01

## 2020-01-01 RX ORDER — LORAZEPAM 2 MG/ML
1 INJECTION INTRAMUSCULAR
Status: COMPLETED | OUTPATIENT
Start: 2020-01-01 | End: 2020-01-01

## 2020-01-01 RX ORDER — LORAZEPAM 2 MG/ML
INJECTION INTRAMUSCULAR
Status: DISPENSED
Start: 2020-01-01 | End: 2020-01-01

## 2020-01-01 RX ORDER — HEPARIN SODIUM 1000 [USP'U]/ML
2000 INJECTION, SOLUTION INTRAVENOUS; SUBCUTANEOUS ONCE
Status: COMPLETED | OUTPATIENT
Start: 2020-01-01 | End: 2020-01-01

## 2020-01-01 RX ORDER — DEXTROSE MONOHYDRATE AND SODIUM CHLORIDE 5; .45 G/100ML; G/100ML
75 INJECTION, SOLUTION INTRAVENOUS CONTINUOUS
Status: DISCONTINUED | OUTPATIENT
Start: 2020-01-01 | End: 2020-01-01

## 2020-01-01 RX ORDER — INSULIN GLARGINE 100 [IU]/ML
10 INJECTION, SOLUTION SUBCUTANEOUS 2 TIMES DAILY
Status: DISCONTINUED | OUTPATIENT
Start: 2020-01-01 | End: 2020-01-01

## 2020-01-01 RX ORDER — ACETAMINOPHEN 650 MG/1
650 SUPPOSITORY RECTAL
Status: DISCONTINUED | OUTPATIENT
Start: 2020-01-01 | End: 2020-01-01

## 2020-01-01 RX ORDER — SODIUM,POTASSIUM PHOSPHATES 280-250MG
2 POWDER IN PACKET (EA) ORAL 4 TIMES DAILY
Status: COMPLETED | OUTPATIENT
Start: 2020-01-01 | End: 2020-01-01

## 2020-01-01 RX ORDER — INSULIN GLARGINE 100 [IU]/ML
5 INJECTION, SOLUTION SUBCUTANEOUS DAILY
Status: DISCONTINUED | OUTPATIENT
Start: 2020-01-01 | End: 2020-01-01

## 2020-01-01 RX ORDER — SODIUM CHLORIDE 0.9 % (FLUSH) 0.9 %
10 SYRINGE (ML) INJECTION
Status: COMPLETED | OUTPATIENT
Start: 2020-01-01 | End: 2020-01-01

## 2020-01-01 RX ORDER — ALBUMIN HUMAN 250 G/1000ML
12.5 SOLUTION INTRAVENOUS
Status: DISCONTINUED | OUTPATIENT
Start: 2020-01-01 | End: 2020-01-01 | Stop reason: HOSPADM

## 2020-01-01 RX ORDER — PROPOFOL 10 MG/ML
INJECTION, EMULSION INTRAVENOUS
Status: COMPLETED
Start: 2020-01-01 | End: 2020-01-01

## 2020-01-01 RX ORDER — MAGNESIUM SULFATE 100 %
4 CRYSTALS MISCELLANEOUS AS NEEDED
Status: DISCONTINUED | OUTPATIENT
Start: 2020-01-01 | End: 2020-01-01 | Stop reason: HOSPADM

## 2020-01-01 RX ORDER — INSULIN GLARGINE 100 [IU]/ML
8 INJECTION, SOLUTION SUBCUTANEOUS DAILY
Status: DISCONTINUED | OUTPATIENT
Start: 2020-01-01 | End: 2020-01-01

## 2020-01-01 RX ORDER — INSULIN GLARGINE 100 [IU]/ML
7 INJECTION, SOLUTION SUBCUTANEOUS ONCE
Status: COMPLETED | OUTPATIENT
Start: 2020-01-01 | End: 2020-01-01

## 2020-01-01 RX ORDER — MIDODRINE HYDROCHLORIDE 5 MG/1
10 TABLET ORAL EVERY 8 HOURS
Status: DISCONTINUED | OUTPATIENT
Start: 2020-01-01 | End: 2020-01-01

## 2020-01-01 RX ORDER — SODIUM CHLORIDE 0.9 % (FLUSH) 0.9 %
5-40 SYRINGE (ML) INJECTION AS NEEDED
Status: DISCONTINUED | OUTPATIENT
Start: 2020-01-01 | End: 2020-01-01 | Stop reason: ALTCHOICE

## 2020-01-01 RX ORDER — BACITRACIN 500 UNIT/G
1 PACKET (EA) TOPICAL ONCE
Status: COMPLETED | OUTPATIENT
Start: 2020-01-01 | End: 2020-01-01

## 2020-01-01 RX ORDER — ALBUMIN HUMAN 250 G/1000ML
12.5 SOLUTION INTRAVENOUS ONCE
Status: COMPLETED | OUTPATIENT
Start: 2020-01-01 | End: 2020-01-01

## 2020-01-01 RX ORDER — SODIUM CHLORIDE 9 MG/ML
50 INJECTION, SOLUTION INTRAVENOUS CONTINUOUS
Status: DISPENSED | OUTPATIENT
Start: 2020-01-01 | End: 2020-01-01

## 2020-01-01 RX ORDER — PROPOFOL 10 MG/ML
0-50 VIAL (ML) INTRAVENOUS
Status: DISCONTINUED | OUTPATIENT
Start: 2020-01-01 | End: 2020-01-01

## 2020-01-01 RX ORDER — HYDRALAZINE HYDROCHLORIDE 20 MG/ML
10 INJECTION INTRAMUSCULAR; INTRAVENOUS
Status: DISCONTINUED | OUTPATIENT
Start: 2020-01-01 | End: 2020-01-01

## 2020-01-01 RX ORDER — SODIUM CHLORIDE 9 MG/ML
250 INJECTION, SOLUTION INTRAVENOUS AS NEEDED
Status: CANCELLED | OUTPATIENT
Start: 2020-01-01

## 2020-01-01 RX ORDER — DIPHENHYDRAMINE HYDROCHLORIDE 50 MG/ML
25 INJECTION, SOLUTION INTRAMUSCULAR; INTRAVENOUS ONCE
Status: COMPLETED | OUTPATIENT
Start: 2020-01-01 | End: 2020-01-01

## 2020-01-01 RX ORDER — PANTOPRAZOLE SODIUM 40 MG/1
40 TABLET, DELAYED RELEASE ORAL DAILY
Status: DISCONTINUED | OUTPATIENT
Start: 2020-01-01 | End: 2020-01-01

## 2020-01-01 RX ORDER — ALBUMIN HUMAN 50 G/1000ML
25 SOLUTION INTRAVENOUS ONCE
Status: COMPLETED | OUTPATIENT
Start: 2020-01-01 | End: 2020-01-01

## 2020-01-01 RX ORDER — SCOLOPAMINE TRANSDERMAL SYSTEM 1 MG/1
1 PATCH, EXTENDED RELEASE TRANSDERMAL
Status: DISCONTINUED | OUTPATIENT
Start: 2020-01-01 | End: 2020-01-01

## 2020-01-01 RX ORDER — HEPARIN SODIUM 5000 [USP'U]/ML
5000 INJECTION, SOLUTION INTRAVENOUS; SUBCUTANEOUS EVERY 12 HOURS
Status: DISCONTINUED | OUTPATIENT
Start: 2020-01-01 | End: 2020-01-01 | Stop reason: HOSPADM

## 2020-01-01 RX ORDER — INSULIN LISPRO 100 [IU]/ML
INJECTION, SOLUTION INTRAVENOUS; SUBCUTANEOUS
Status: DISCONTINUED | OUTPATIENT
Start: 2020-01-01 | End: 2020-01-01

## 2020-01-01 RX ORDER — CHLORHEXIDINE GLUCONATE 0.12 MG/ML
15 RINSE ORAL EVERY 12 HOURS
Status: DISCONTINUED | OUTPATIENT
Start: 2020-01-01 | End: 2020-01-01 | Stop reason: HOSPADM

## 2020-01-01 RX ORDER — EPINEPHRINE 0.1 MG/ML
1 INJECTION INTRACARDIAC; INTRAVENOUS
Status: ACTIVE | OUTPATIENT
Start: 2020-01-01 | End: 2020-01-01

## 2020-01-01 RX ORDER — INSULIN GLARGINE 100 [IU]/ML
15 INJECTION, SOLUTION SUBCUTANEOUS DAILY
Status: DISCONTINUED | OUTPATIENT
Start: 2020-01-01 | End: 2020-01-01 | Stop reason: HOSPADM

## 2020-01-01 RX ORDER — DEXTROSE MONOHYDRATE 100 MG/ML
0-250 INJECTION, SOLUTION INTRAVENOUS AS NEEDED
Status: DISCONTINUED | OUTPATIENT
Start: 2020-01-01 | End: 2020-01-01 | Stop reason: HOSPADM

## 2020-01-01 RX ORDER — DEXTROMETHORPHAN/PSEUDOEPHED 2.5-7.5/.8
1.2 DROPS ORAL
Status: DISCONTINUED | OUTPATIENT
Start: 2020-01-01 | End: 2020-01-01 | Stop reason: ALTCHOICE

## 2020-01-01 RX ORDER — INSULIN LISPRO 100 [IU]/ML
10 INJECTION, SOLUTION INTRAVENOUS; SUBCUTANEOUS ONCE
Status: COMPLETED | OUTPATIENT
Start: 2020-01-01 | End: 2020-01-01

## 2020-01-01 RX ORDER — DEXTROSE MONOHYDRATE 100 MG/ML
15 INJECTION, SOLUTION INTRAVENOUS CONTINUOUS
Status: DISCONTINUED | OUTPATIENT
Start: 2020-01-01 | End: 2020-01-01

## 2020-01-01 RX ORDER — BALSAM PERU/CASTOR OIL
OINTMENT (GRAM) TOPICAL EVERY 8 HOURS
Status: DISCONTINUED | OUTPATIENT
Start: 2020-01-01 | End: 2020-01-01 | Stop reason: HOSPADM

## 2020-01-01 RX ORDER — VANCOMYCIN 1.75 GRAM/500 ML IN 0.9 % SODIUM CHLORIDE INTRAVENOUS
1750 ONCE
Status: COMPLETED | OUTPATIENT
Start: 2020-01-01 | End: 2020-01-01

## 2020-01-01 RX ORDER — ATROPINE SULFATE 0.1 MG/ML
0.5 INJECTION INTRAVENOUS
Status: ACTIVE | OUTPATIENT
Start: 2020-01-01 | End: 2020-01-01

## 2020-01-01 RX ORDER — SODIUM CHLORIDE 0.9 % (FLUSH) 0.9 %
5-40 SYRINGE (ML) INJECTION AS NEEDED
Status: DISCONTINUED | OUTPATIENT
Start: 2020-01-01 | End: 2020-01-01 | Stop reason: HOSPADM

## 2020-01-01 RX ORDER — SODIUM CHLORIDE 9 MG/ML
250 INJECTION, SOLUTION INTRAVENOUS AS NEEDED
Status: DISCONTINUED | OUTPATIENT
Start: 2020-01-01 | End: 2020-01-01 | Stop reason: HOSPADM

## 2020-01-01 RX ORDER — METRONIDAZOLE 500 MG/100ML
500 INJECTION, SOLUTION INTRAVENOUS EVERY 8 HOURS
Status: DISCONTINUED | OUTPATIENT
Start: 2020-01-01 | End: 2020-01-01

## 2020-01-01 RX ORDER — MIDODRINE HYDROCHLORIDE 5 MG/1
10 TABLET ORAL EVERY 8 HOURS
Status: DISCONTINUED | OUTPATIENT
Start: 2020-01-01 | End: 2020-01-01 | Stop reason: HOSPADM

## 2020-01-01 RX ORDER — FENTANYL CITRATE 50 UG/ML
25-200 INJECTION, SOLUTION INTRAMUSCULAR; INTRAVENOUS
Status: DISPENSED | OUTPATIENT
Start: 2020-01-01 | End: 2020-01-01

## 2020-01-01 RX ORDER — CEFEPIME HYDROCHLORIDE 1 G/1
1 INJECTION, POWDER, FOR SOLUTION INTRAMUSCULAR; INTRAVENOUS EVERY 24 HOURS
Status: DISCONTINUED | OUTPATIENT
Start: 2020-01-01 | End: 2020-01-01 | Stop reason: SDUPTHER

## 2020-01-01 RX ORDER — INSULIN GLARGINE 100 [IU]/ML
7 INJECTION, SOLUTION SUBCUTANEOUS DAILY
Status: DISCONTINUED | OUTPATIENT
Start: 2020-01-01 | End: 2020-01-01

## 2020-01-01 RX ORDER — MIDODRINE HYDROCHLORIDE 5 MG/1
5 TABLET ORAL EVERY 8 HOURS
Status: DISCONTINUED | OUTPATIENT
Start: 2020-01-01 | End: 2020-01-01

## 2020-01-01 RX ORDER — LORAZEPAM 2 MG/ML
2 INJECTION INTRAMUSCULAR ONCE
Status: COMPLETED | OUTPATIENT
Start: 2020-01-01 | End: 2020-01-01

## 2020-01-01 RX ORDER — INSULIN GLARGINE 100 [IU]/ML
20 INJECTION, SOLUTION SUBCUTANEOUS DAILY
Status: DISCONTINUED | OUTPATIENT
Start: 2020-01-01 | End: 2020-01-01

## 2020-01-01 RX ORDER — MIDAZOLAM HYDROCHLORIDE 1 MG/ML
2 INJECTION, SOLUTION INTRAMUSCULAR; INTRAVENOUS ONCE
Status: COMPLETED | OUTPATIENT
Start: 2020-01-01 | End: 2020-01-01

## 2020-01-01 RX ORDER — ATORVASTATIN CALCIUM 20 MG/1
20 TABLET, FILM COATED ORAL DAILY
Status: DISCONTINUED | OUTPATIENT
Start: 2020-01-01 | End: 2020-01-01 | Stop reason: HOSPADM

## 2020-01-01 RX ORDER — CHLORHEXIDINE GLUCONATE 1.2 MG/ML
15 RINSE ORAL EVERY 12 HOURS
Status: DISCONTINUED | OUTPATIENT
Start: 2020-01-01 | End: 2020-01-01

## 2020-01-01 RX ORDER — LORAZEPAM 2 MG/ML
INJECTION INTRAMUSCULAR
Status: COMPLETED
Start: 2020-01-01 | End: 2020-01-01

## 2020-01-01 RX ORDER — INSULIN LISPRO 100 [IU]/ML
INJECTION, SOLUTION INTRAVENOUS; SUBCUTANEOUS EVERY 4 HOURS
Status: DISCONTINUED | OUTPATIENT
Start: 2020-01-01 | End: 2020-01-01

## 2020-01-01 RX ORDER — INSULIN LISPRO 100 [IU]/ML
INJECTION, SOLUTION INTRAVENOUS; SUBCUTANEOUS EVERY 6 HOURS
Status: DISCONTINUED | OUTPATIENT
Start: 2020-01-01 | End: 2020-01-01 | Stop reason: HOSPADM

## 2020-01-01 RX ORDER — ETOMIDATE 2 MG/ML
20 INJECTION INTRAVENOUS ONCE
Status: COMPLETED | OUTPATIENT
Start: 2020-01-01 | End: 2020-01-01

## 2020-01-01 RX ORDER — DEXTROSE MONOHYDRATE 100 MG/ML
25 INJECTION, SOLUTION INTRAVENOUS CONTINUOUS
Status: DISCONTINUED | OUTPATIENT
Start: 2020-01-01 | End: 2020-01-01

## 2020-01-01 RX ORDER — FENTANYL CITRATE 50 UG/ML
100 INJECTION, SOLUTION INTRAMUSCULAR; INTRAVENOUS ONCE
Status: COMPLETED | OUTPATIENT
Start: 2020-01-01 | End: 2020-01-01

## 2020-01-01 RX ORDER — FENTANYL CITRATE 50 UG/ML
50 INJECTION, SOLUTION INTRAMUSCULAR; INTRAVENOUS
Status: DISCONTINUED | OUTPATIENT
Start: 2020-01-01 | End: 2020-01-01

## 2020-01-01 RX ORDER — FLUMAZENIL 0.1 MG/ML
0.2 INJECTION INTRAVENOUS
Status: ACTIVE | OUTPATIENT
Start: 2020-01-01 | End: 2020-01-01

## 2020-01-01 RX ORDER — BUPIVACAINE HYDROCHLORIDE 2.5 MG/ML
INJECTION, SOLUTION EPIDURAL; INFILTRATION; INTRACAUDAL
Status: COMPLETED
Start: 2020-01-01 | End: 2020-01-01

## 2020-01-01 RX ORDER — MIDAZOLAM HYDROCHLORIDE 1 MG/ML
.25-5 INJECTION, SOLUTION INTRAMUSCULAR; INTRAVENOUS
Status: DISPENSED | OUTPATIENT
Start: 2020-01-01 | End: 2020-01-01

## 2020-01-01 RX ORDER — INSULIN LISPRO 100 [IU]/ML
INJECTION, SOLUTION INTRAVENOUS; SUBCUTANEOUS EVERY 6 HOURS
Status: DISCONTINUED | OUTPATIENT
Start: 2020-01-01 | End: 2020-01-01

## 2020-01-01 RX ORDER — ONDANSETRON 2 MG/ML
4 INJECTION INTRAMUSCULAR; INTRAVENOUS
Status: DISCONTINUED | OUTPATIENT
Start: 2020-01-01 | End: 2020-01-01

## 2020-01-01 RX ORDER — VANCOMYCIN 2 GRAM/500 ML IN 0.9 % SODIUM CHLORIDE INTRAVENOUS
2000 ONCE
Status: COMPLETED | OUTPATIENT
Start: 2020-01-01 | End: 2020-01-01

## 2020-01-01 RX ADMIN — POTASSIUM PHOSPHATE, MONOBASIC AND POTASSIUM PHOSPHATE, DIBASIC: 224; 236 INJECTION, SOLUTION, CONCENTRATE INTRAVENOUS at 10:20

## 2020-01-01 RX ADMIN — Medication 10 ML: at 05:00

## 2020-01-01 RX ADMIN — Medication 10 ML: at 14:35

## 2020-01-01 RX ADMIN — INSULIN LISPRO 2 UNITS: 100 INJECTION, SOLUTION INTRAVENOUS; SUBCUTANEOUS at 09:43

## 2020-01-01 RX ADMIN — INSULIN LISPRO 4 UNITS: 100 INJECTION, SOLUTION INTRAVENOUS; SUBCUTANEOUS at 07:41

## 2020-01-01 RX ADMIN — CHLORHEXIDINE GLUCONATE 15 ML: 0.12 RINSE ORAL at 08:55

## 2020-01-01 RX ADMIN — CASTOR OIL AND BALSAM, PERU: 788; 87 OINTMENT TOPICAL at 14:00

## 2020-01-01 RX ADMIN — Medication 10 ML: at 06:11

## 2020-01-01 RX ADMIN — CHLORHEXIDINE GLUCONATE 15 ML: 0.12 RINSE ORAL at 20:43

## 2020-01-01 RX ADMIN — MIDODRINE HYDROCHLORIDE 10 MG: 5 TABLET ORAL at 05:40

## 2020-01-01 RX ADMIN — MIDODRINE HYDROCHLORIDE 10 MG: 5 TABLET ORAL at 21:22

## 2020-01-01 RX ADMIN — CASTOR OIL AND BALSAM, PERU: 788; 87 OINTMENT TOPICAL at 06:01

## 2020-01-01 RX ADMIN — Medication 10 ML: at 13:32

## 2020-01-01 RX ADMIN — CASTOR OIL AND BALSAM, PERU: 788; 87 OINTMENT TOPICAL at 21:44

## 2020-01-01 RX ADMIN — INSULIN LISPRO 2 UNITS: 100 INJECTION, SOLUTION INTRAVENOUS; SUBCUTANEOUS at 18:00

## 2020-01-01 RX ADMIN — PANCRELIPASE 3 CAPSULE: 24000; 76000; 120000 CAPSULE, DELAYED RELEASE PELLETS ORAL at 14:00

## 2020-01-01 RX ADMIN — LANSOPRAZOLE 30 MG: KIT at 06:40

## 2020-01-01 RX ADMIN — ACETAMINOPHEN 500 MG: 650 SOLUTION ORAL at 12:44

## 2020-01-01 RX ADMIN — CEFEPIME HYDROCHLORIDE 1 G: 1 INJECTION, POWDER, FOR SOLUTION INTRAMUSCULAR; INTRAVENOUS at 17:55

## 2020-01-01 RX ADMIN — DEXMEDETOMIDINE HYDROCHLORIDE 0.4 MCG/KG/HR: 100 INJECTION, SOLUTION, CONCENTRATE INTRAVENOUS at 06:46

## 2020-01-01 RX ADMIN — Medication 10 ML: at 05:14

## 2020-01-01 RX ADMIN — Medication 10 ML: at 21:18

## 2020-01-01 RX ADMIN — ALBUMIN (HUMAN) 12.5 G: 0.25 INJECTION, SOLUTION INTRAVENOUS at 12:45

## 2020-01-01 RX ADMIN — INSULIN LISPRO 3 UNITS: 100 INJECTION, SOLUTION INTRAVENOUS; SUBCUTANEOUS at 12:11

## 2020-01-01 RX ADMIN — HEPARIN SODIUM 5000 UNITS: 5000 INJECTION INTRAVENOUS; SUBCUTANEOUS at 21:45

## 2020-01-01 RX ADMIN — INSULIN LISPRO 2 UNITS: 100 INJECTION, SOLUTION INTRAVENOUS; SUBCUTANEOUS at 18:30

## 2020-01-01 RX ADMIN — INSULIN LISPRO 4 UNITS: 100 INJECTION, SOLUTION INTRAVENOUS; SUBCUTANEOUS at 06:12

## 2020-01-01 RX ADMIN — HYDRALAZINE HYDROCHLORIDE 10 MG: 20 INJECTION INTRAMUSCULAR; INTRAVENOUS at 04:16

## 2020-01-01 RX ADMIN — ATORVASTATIN CALCIUM 20 MG: 20 TABLET, FILM COATED ORAL at 10:18

## 2020-01-01 RX ADMIN — ALBUMIN (HUMAN) 12.5 G: 0.25 INJECTION, SOLUTION INTRAVENOUS at 12:14

## 2020-01-01 RX ADMIN — CEFEPIME HYDROCHLORIDE 1 G: 1 INJECTION, POWDER, FOR SOLUTION INTRAMUSCULAR; INTRAVENOUS at 10:35

## 2020-01-01 RX ADMIN — PANCRELIPASE 3 CAPSULE: 24000; 76000; 120000 CAPSULE, DELAYED RELEASE PELLETS ORAL at 16:45

## 2020-01-01 RX ADMIN — PANCRELIPASE 3 CAPSULE: 24000; 76000; 120000 CAPSULE, DELAYED RELEASE PELLETS ORAL at 13:56

## 2020-01-01 RX ADMIN — LORAZEPAM 1 MG: 2 INJECTION INTRAMUSCULAR; INTRAVENOUS at 21:55

## 2020-01-01 RX ADMIN — DEXTROSE MONOHYDRATE 50 ML/HR: 10 INJECTION, SOLUTION INTRAVENOUS at 03:36

## 2020-01-01 RX ADMIN — SODIUM CHLORIDE 40 MG: 9 INJECTION INTRAMUSCULAR; INTRAVENOUS; SUBCUTANEOUS at 09:36

## 2020-01-01 RX ADMIN — CHLORHEXIDINE GLUCONATE 15 ML: 0.12 RINSE ORAL at 20:19

## 2020-01-01 RX ADMIN — DEXTROSE MONOHYDRATE 125 ML: 10 INJECTION, SOLUTION INTRAVENOUS at 01:11

## 2020-01-01 RX ADMIN — MINERAL OIL AND WHITE PETROLATUM: 150; 830 OINTMENT OPHTHALMIC at 08:46

## 2020-01-01 RX ADMIN — MINERAL OIL AND WHITE PETROLATUM: 150; 830 OINTMENT OPHTHALMIC at 19:50

## 2020-01-01 RX ADMIN — PANCRELIPASE 3 CAPSULE: 24000; 76000; 120000 CAPSULE, DELAYED RELEASE PELLETS ORAL at 05:52

## 2020-01-01 RX ADMIN — ALBUMIN (HUMAN) 12.5 G: 0.25 INJECTION, SOLUTION INTRAVENOUS at 13:25

## 2020-01-01 RX ADMIN — MIDODRINE HYDROCHLORIDE 10 MG: 5 TABLET ORAL at 06:50

## 2020-01-01 RX ADMIN — PANCRELIPASE 3 CAPSULE: 24000; 76000; 120000 CAPSULE, DELAYED RELEASE PELLETS ORAL at 05:41

## 2020-01-01 RX ADMIN — MINERAL OIL AND WHITE PETROLATUM: 150; 830 OINTMENT OPHTHALMIC at 09:19

## 2020-01-01 RX ADMIN — INSULIN GLARGINE 15 UNITS: 100 INJECTION, SOLUTION SUBCUTANEOUS at 08:00

## 2020-01-01 RX ADMIN — LANSOPRAZOLE 30 MG: KIT at 06:50

## 2020-01-01 RX ADMIN — PANCRELIPASE 3 CAPSULE: 24000; 76000; 120000 CAPSULE, DELAYED RELEASE PELLETS ORAL at 14:38

## 2020-01-01 RX ADMIN — Medication 10 ML: at 22:30

## 2020-01-01 RX ADMIN — MINERAL OIL AND WHITE PETROLATUM: 150; 830 OINTMENT OPHTHALMIC at 08:39

## 2020-01-01 RX ADMIN — MIDODRINE HYDROCHLORIDE 10 MG: 5 TABLET ORAL at 06:18

## 2020-01-01 RX ADMIN — Medication 1 PACKET: at 14:15

## 2020-01-01 RX ADMIN — INSULIN LISPRO 3 UNITS: 100 INJECTION, SOLUTION INTRAVENOUS; SUBCUTANEOUS at 05:37

## 2020-01-01 RX ADMIN — HEPARIN SODIUM 5000 UNITS: 5000 INJECTION INTRAVENOUS; SUBCUTANEOUS at 08:17

## 2020-01-01 RX ADMIN — EPOETIN ALFA-EPBX 20000 UNITS: 10000 INJECTION, SOLUTION INTRAVENOUS; SUBCUTANEOUS at 22:29

## 2020-01-01 RX ADMIN — Medication 10 ML: at 06:08

## 2020-01-01 RX ADMIN — PANCRELIPASE 3 CAPSULE: 24000; 76000; 120000 CAPSULE, DELAYED RELEASE PELLETS ORAL at 06:56

## 2020-01-01 RX ADMIN — CEFEPIME HYDROCHLORIDE 1 G: 1 INJECTION, POWDER, FOR SOLUTION INTRAMUSCULAR; INTRAVENOUS at 18:12

## 2020-01-01 RX ADMIN — MIDODRINE HYDROCHLORIDE 10 MG: 5 TABLET ORAL at 13:52

## 2020-01-01 RX ADMIN — INSULIN GLARGINE 8 UNITS: 100 INJECTION, SOLUTION SUBCUTANEOUS at 10:30

## 2020-01-01 RX ADMIN — CHLORHEXIDINE GLUCONATE 15 ML: 0.12 RINSE ORAL at 21:10

## 2020-01-01 RX ADMIN — ACETAMINOPHEN 650 MG: 650 SOLUTION ORAL at 13:44

## 2020-01-01 RX ADMIN — HEPARIN SODIUM 5000 UNITS: 5000 INJECTION INTRAVENOUS; SUBCUTANEOUS at 09:59

## 2020-01-01 RX ADMIN — PANCRELIPASE 3 CAPSULE: 24000; 76000; 120000 CAPSULE, DELAYED RELEASE PELLETS ORAL at 22:27

## 2020-01-01 RX ADMIN — THIAMINE HYDROCHLORIDE 100 MG: 100 INJECTION, SOLUTION INTRAMUSCULAR; INTRAVENOUS at 08:38

## 2020-01-01 RX ADMIN — CASTOR OIL AND BALSAM, PERU: 788; 87 OINTMENT TOPICAL at 05:29

## 2020-01-01 RX ADMIN — MIDODRINE HYDROCHLORIDE 10 MG: 5 TABLET ORAL at 05:32

## 2020-01-01 RX ADMIN — Medication 10 ML: at 13:53

## 2020-01-01 RX ADMIN — ACETAMINOPHEN 650 MG: 650 SOLUTION ORAL at 08:40

## 2020-01-01 RX ADMIN — ALBUMIN (HUMAN) 25 G: 12.5 INJECTION, SOLUTION INTRAVENOUS at 15:52

## 2020-01-01 RX ADMIN — DEXMEDETOMIDINE HYDROCHLORIDE 0.2 MCG/KG/HR: 100 INJECTION, SOLUTION, CONCENTRATE INTRAVENOUS at 21:04

## 2020-01-01 RX ADMIN — INSULIN GLARGINE 10 UNITS: 100 INJECTION, SOLUTION SUBCUTANEOUS at 08:55

## 2020-01-01 RX ADMIN — Medication 10 ML: at 06:02

## 2020-01-01 RX ADMIN — HEPARIN SODIUM 5000 UNITS: 5000 INJECTION INTRAVENOUS; SUBCUTANEOUS at 21:44

## 2020-01-01 RX ADMIN — INSULIN GLARGINE 8 UNITS: 100 INJECTION, SOLUTION SUBCUTANEOUS at 08:56

## 2020-01-01 RX ADMIN — INSULIN LISPRO 2 UNITS: 100 INJECTION, SOLUTION INTRAVENOUS; SUBCUTANEOUS at 01:59

## 2020-01-01 RX ADMIN — INSULIN LISPRO 2 UNITS: 100 INJECTION, SOLUTION INTRAVENOUS; SUBCUTANEOUS at 21:28

## 2020-01-01 RX ADMIN — ATORVASTATIN CALCIUM 20 MG: 20 TABLET, FILM COATED ORAL at 08:45

## 2020-01-01 RX ADMIN — MIDODRINE HYDROCHLORIDE 10 MG: 5 TABLET ORAL at 13:55

## 2020-01-01 RX ADMIN — Medication 10 ML: at 06:58

## 2020-01-01 RX ADMIN — HEPARIN SODIUM 2000 UNITS: 1000 INJECTION INTRAVENOUS; SUBCUTANEOUS at 17:10

## 2020-01-01 RX ADMIN — INSULIN GLARGINE 15 UNITS: 100 INJECTION, SOLUTION SUBCUTANEOUS at 08:48

## 2020-01-01 RX ADMIN — INSULIN LISPRO 2 UNITS: 100 INJECTION, SOLUTION INTRAVENOUS; SUBCUTANEOUS at 05:40

## 2020-01-01 RX ADMIN — PANCRELIPASE 3 CAPSULE: 24000; 76000; 120000 CAPSULE, DELAYED RELEASE PELLETS ORAL at 06:08

## 2020-01-01 RX ADMIN — DEXTROSE MONOHYDRATE 50 ML/HR: 10 INJECTION, SOLUTION INTRAVENOUS at 18:55

## 2020-01-01 RX ADMIN — HEPARIN SODIUM 5000 UNITS: 5000 INJECTION INTRAVENOUS; SUBCUTANEOUS at 10:26

## 2020-01-01 RX ADMIN — Medication 10 ML: at 22:26

## 2020-01-01 RX ADMIN — MIDODRINE HYDROCHLORIDE 10 MG: 5 TABLET ORAL at 22:45

## 2020-01-01 RX ADMIN — HEPARIN SODIUM 5000 UNITS: 5000 INJECTION INTRAVENOUS; SUBCUTANEOUS at 21:22

## 2020-01-01 RX ADMIN — Medication 10 ML: at 00:00

## 2020-01-01 RX ADMIN — CHLORHEXIDINE GLUCONATE 15 ML: 0.12 RINSE ORAL at 20:57

## 2020-01-01 RX ADMIN — Medication 10 ML: at 06:48

## 2020-01-01 RX ADMIN — MIDODRINE HYDROCHLORIDE 10 MG: 5 TABLET ORAL at 06:11

## 2020-01-01 RX ADMIN — Medication 10 ML: at 14:40

## 2020-01-01 RX ADMIN — PANCRELIPASE 3 CAPSULE: 24000; 76000; 120000 CAPSULE, DELAYED RELEASE PELLETS ORAL at 14:09

## 2020-01-01 RX ADMIN — Medication 10 ML: at 06:30

## 2020-01-01 RX ADMIN — Medication 10 ML: at 06:00

## 2020-01-01 RX ADMIN — MINERAL OIL AND WHITE PETROLATUM: 150; 830 OINTMENT OPHTHALMIC at 20:43

## 2020-01-01 RX ADMIN — ACETAMINOPHEN 500 MG: 650 SOLUTION ORAL at 12:17

## 2020-01-01 RX ADMIN — CHLORHEXIDINE GLUCONATE 15 ML: 0.12 RINSE ORAL at 20:44

## 2020-01-01 RX ADMIN — DEXMEDETOMIDINE HYDROCHLORIDE 0.4 MCG/KG/HR: 100 INJECTION, SOLUTION, CONCENTRATE INTRAVENOUS at 04:45

## 2020-01-01 RX ADMIN — CHLORHEXIDINE GLUCONATE 15 ML: 0.12 RINSE ORAL at 08:25

## 2020-01-01 RX ADMIN — INSULIN LISPRO 2 UNITS: 100 INJECTION, SOLUTION INTRAVENOUS; SUBCUTANEOUS at 00:46

## 2020-01-01 RX ADMIN — DEXTROSE MONOHYDRATE AND SODIUM CHLORIDE 75 ML/HR: 5; .45 INJECTION, SOLUTION INTRAVENOUS at 22:53

## 2020-01-01 RX ADMIN — MIDODRINE HYDROCHLORIDE 10 MG: 5 TABLET ORAL at 21:00

## 2020-01-01 RX ADMIN — BUPIVACAINE HYDROCHLORIDE 75 MG: 2.5 INJECTION, SOLUTION EPIDURAL; INFILTRATION; INTRACAUDAL at 11:22

## 2020-01-01 RX ADMIN — MINERAL OIL AND WHITE PETROLATUM: 150; 830 OINTMENT OPHTHALMIC at 21:44

## 2020-01-01 RX ADMIN — Medication 10 ML: at 21:00

## 2020-01-01 RX ADMIN — Medication 10 ML: at 14:15

## 2020-01-01 RX ADMIN — ACETAMINOPHEN 650 MG: 650 SOLUTION ORAL at 00:32

## 2020-01-01 RX ADMIN — CEFEPIME HYDROCHLORIDE 1 G: 1 INJECTION, POWDER, FOR SOLUTION INTRAMUSCULAR; INTRAVENOUS at 10:19

## 2020-01-01 RX ADMIN — Medication 10 ML: at 05:55

## 2020-01-01 RX ADMIN — ATORVASTATIN CALCIUM 20 MG: 20 TABLET, FILM COATED ORAL at 10:26

## 2020-01-01 RX ADMIN — PANCRELIPASE 3 CAPSULE: 24000; 76000; 120000 CAPSULE, DELAYED RELEASE PELLETS ORAL at 21:25

## 2020-01-01 RX ADMIN — Medication 10 ML: at 21:05

## 2020-01-01 RX ADMIN — CASTOR OIL AND BALSAM, PERU: 788; 87 OINTMENT TOPICAL at 21:04

## 2020-01-01 RX ADMIN — EPOETIN ALFA-EPBX 20000 UNITS: 10000 INJECTION, SOLUTION INTRAVENOUS; SUBCUTANEOUS at 20:41

## 2020-01-01 RX ADMIN — CHLORHEXIDINE GLUCONATE 15 ML: 0.12 RINSE ORAL at 19:44

## 2020-01-01 RX ADMIN — MINERAL OIL AND WHITE PETROLATUM: 150; 830 OINTMENT OPHTHALMIC at 20:35

## 2020-01-01 RX ADMIN — SODIUM BICARBONATE 1 DOSE: 650 TABLET ORAL at 15:02

## 2020-01-01 RX ADMIN — CASTOR OIL AND BALSAM, PERU: 788; 87 OINTMENT TOPICAL at 21:08

## 2020-01-01 RX ADMIN — PANCRELIPASE 3 CAPSULE: 24000; 76000; 120000 CAPSULE, DELAYED RELEASE PELLETS ORAL at 21:42

## 2020-01-01 RX ADMIN — PANCRELIPASE 3 CAPSULE: 24000; 76000; 120000 CAPSULE, DELAYED RELEASE PELLETS ORAL at 22:45

## 2020-01-01 RX ADMIN — CEFEPIME HYDROCHLORIDE 1 G: 1 INJECTION, POWDER, FOR SOLUTION INTRAMUSCULAR; INTRAVENOUS at 11:40

## 2020-01-01 RX ADMIN — LANSOPRAZOLE 30 MG: KIT at 08:24

## 2020-01-01 RX ADMIN — EPOETIN ALFA-EPBX 10000 UNITS: 10000 INJECTION, SOLUTION INTRAVENOUS; SUBCUTANEOUS at 20:21

## 2020-01-01 RX ADMIN — PATIROMER 8.4 G: 8.4 POWDER, FOR SUSPENSION ORAL at 10:19

## 2020-01-01 RX ADMIN — Medication 10 ML: at 21:35

## 2020-01-01 RX ADMIN — HYDRALAZINE HYDROCHLORIDE 10 MG: 20 INJECTION INTRAMUSCULAR; INTRAVENOUS at 10:47

## 2020-01-01 RX ADMIN — CHLORHEXIDINE GLUCONATE 15 ML: 0.12 RINSE ORAL at 20:33

## 2020-01-01 RX ADMIN — DEXTROSE MONOHYDRATE 50 ML/HR: 10 INJECTION, SOLUTION INTRAVENOUS at 09:31

## 2020-01-01 RX ADMIN — HEPARIN SODIUM 5000 UNITS: 5000 INJECTION INTRAVENOUS; SUBCUTANEOUS at 20:32

## 2020-01-01 RX ADMIN — CHLORHEXIDINE GLUCONATE 15 ML: 0.12 RINSE ORAL at 22:19

## 2020-01-01 RX ADMIN — PANCRELIPASE 3 CAPSULE: 24000; 76000; 120000 CAPSULE, DELAYED RELEASE PELLETS ORAL at 06:19

## 2020-01-01 RX ADMIN — SODIUM CHLORIDE 40 MG: 9 INJECTION INTRAMUSCULAR; INTRAVENOUS; SUBCUTANEOUS at 08:05

## 2020-01-01 RX ADMIN — MINERAL OIL AND WHITE PETROLATUM: 150; 830 OINTMENT OPHTHALMIC at 23:38

## 2020-01-01 RX ADMIN — INSULIN GLARGINE 7 UNITS: 100 INJECTION, SOLUTION SUBCUTANEOUS at 12:18

## 2020-01-01 RX ADMIN — EPOETIN ALFA-EPBX 10000 UNITS: 10000 INJECTION, SOLUTION INTRAVENOUS; SUBCUTANEOUS at 22:13

## 2020-01-01 RX ADMIN — ALBUMIN (HUMAN) 12.5 G: 0.25 INJECTION, SOLUTION INTRAVENOUS at 22:03

## 2020-01-01 RX ADMIN — LANSOPRAZOLE 30 MG: KIT at 09:23

## 2020-01-01 RX ADMIN — MINERAL OIL AND WHITE PETROLATUM: 150; 830 OINTMENT OPHTHALMIC at 20:56

## 2020-01-01 RX ADMIN — SODIUM BICARBONATE 1 DOSE: 650 TABLET ORAL at 15:23

## 2020-01-01 RX ADMIN — DEXTROSE MONOHYDRATE 250 ML: 10 INJECTION, SOLUTION INTRAVENOUS at 20:41

## 2020-01-01 RX ADMIN — MINERAL OIL AND WHITE PETROLATUM: 150; 830 OINTMENT OPHTHALMIC at 09:37

## 2020-01-01 RX ADMIN — INSULIN LISPRO 246 UNITS: 100 INJECTION, SOLUTION INTRAVENOUS; SUBCUTANEOUS at 21:01

## 2020-01-01 RX ADMIN — INSULIN LISPRO 3 UNITS: 100 INJECTION, SOLUTION INTRAVENOUS; SUBCUTANEOUS at 11:50

## 2020-01-01 RX ADMIN — MIDODRINE HYDROCHLORIDE 10 MG: 5 TABLET ORAL at 21:07

## 2020-01-01 RX ADMIN — VANCOMYCIN HYDROCHLORIDE 1750 MG: 10 INJECTION, POWDER, LYOPHILIZED, FOR SOLUTION INTRAVENOUS at 21:09

## 2020-01-01 RX ADMIN — INSULIN LISPRO 3 UNITS: 100 INJECTION, SOLUTION INTRAVENOUS; SUBCUTANEOUS at 00:16

## 2020-01-01 RX ADMIN — INSULIN GLARGINE 15 UNITS: 100 INJECTION, SOLUTION SUBCUTANEOUS at 08:45

## 2020-01-01 RX ADMIN — INSULIN GLARGINE 15 UNITS: 100 INJECTION, SOLUTION SUBCUTANEOUS at 08:32

## 2020-01-01 RX ADMIN — MINERAL OIL AND WHITE PETROLATUM: 150; 830 OINTMENT OPHTHALMIC at 19:46

## 2020-01-01 RX ADMIN — BACITRACIN 1 PACKET: 500 OINTMENT TOPICAL at 14:15

## 2020-01-01 RX ADMIN — CASTOR OIL AND BALSAM, PERU: 788; 87 OINTMENT TOPICAL at 22:28

## 2020-01-01 RX ADMIN — INSULIN LISPRO 2 UNITS: 100 INJECTION, SOLUTION INTRAVENOUS; SUBCUTANEOUS at 12:30

## 2020-01-01 RX ADMIN — THIAMINE HYDROCHLORIDE 100 MG: 100 INJECTION, SOLUTION INTRAMUSCULAR; INTRAVENOUS at 11:51

## 2020-01-01 RX ADMIN — CHLORHEXIDINE GLUCONATE 15 ML: 0.12 RINSE ORAL at 20:26

## 2020-01-01 RX ADMIN — HEPARIN SODIUM 5000 UNITS: 5000 INJECTION INTRAVENOUS; SUBCUTANEOUS at 10:22

## 2020-01-01 RX ADMIN — Medication 10 ML: at 21:31

## 2020-01-01 RX ADMIN — MIDODRINE HYDROCHLORIDE 10 MG: 5 TABLET ORAL at 05:13

## 2020-01-01 RX ADMIN — CEFTRIAXONE 1 G: 1 INJECTION, POWDER, FOR SOLUTION INTRAMUSCULAR; INTRAVENOUS at 09:32

## 2020-01-01 RX ADMIN — Medication 10 ML: at 06:01

## 2020-01-01 RX ADMIN — SODIUM CHLORIDE 100 ML: 900 INJECTION, SOLUTION INTRAVENOUS at 04:25

## 2020-01-01 RX ADMIN — INSULIN LISPRO 3 UNITS: 100 INJECTION, SOLUTION INTRAVENOUS; SUBCUTANEOUS at 14:13

## 2020-01-01 RX ADMIN — CHLORHEXIDINE GLUCONATE 15 ML: 0.12 RINSE ORAL at 08:38

## 2020-01-01 RX ADMIN — MINERAL OIL AND WHITE PETROLATUM: 150; 830 OINTMENT OPHTHALMIC at 08:35

## 2020-01-01 RX ADMIN — MIDODRINE HYDROCHLORIDE 10 MG: 5 TABLET ORAL at 22:18

## 2020-01-01 RX ADMIN — ALBUMIN (HUMAN) 25 G: 12.5 INJECTION, SOLUTION INTRAVENOUS at 20:23

## 2020-01-01 RX ADMIN — Medication 10 ML: at 15:09

## 2020-01-01 RX ADMIN — CASTOR OIL AND BALSAM, PERU: 788; 87 OINTMENT TOPICAL at 06:30

## 2020-01-01 RX ADMIN — HEPARIN SODIUM 5000 UNITS: 5000 INJECTION INTRAVENOUS; SUBCUTANEOUS at 08:31

## 2020-01-01 RX ADMIN — SODIUM CHLORIDE 500 ML: 900 INJECTION, SOLUTION INTRAVENOUS at 17:33

## 2020-01-01 RX ADMIN — MIDODRINE HYDROCHLORIDE 10 MG: 5 TABLET ORAL at 22:05

## 2020-01-01 RX ADMIN — INSULIN LISPRO 2 UNITS: 100 INJECTION, SOLUTION INTRAVENOUS; SUBCUTANEOUS at 06:00

## 2020-01-01 RX ADMIN — CHLORHEXIDINE GLUCONATE 15 ML: 0.12 RINSE ORAL at 21:51

## 2020-01-01 RX ADMIN — MIDODRINE HYDROCHLORIDE 10 MG: 5 TABLET ORAL at 14:38

## 2020-01-01 RX ADMIN — MINERAL OIL AND WHITE PETROLATUM: 150; 830 OINTMENT OPHTHALMIC at 20:00

## 2020-01-01 RX ADMIN — ALBUMIN (HUMAN) 25 G: 12.5 INJECTION, SOLUTION INTRAVENOUS at 16:04

## 2020-01-01 RX ADMIN — METRONIDAZOLE 500 MG: 500 INJECTION, SOLUTION INTRAVENOUS at 04:36

## 2020-01-01 RX ADMIN — INSULIN LISPRO 2 UNITS: 100 INJECTION, SOLUTION INTRAVENOUS; SUBCUTANEOUS at 10:37

## 2020-01-01 RX ADMIN — ETOMIDATE 20 MG: 2 INJECTION INTRAVENOUS at 11:01

## 2020-01-01 RX ADMIN — Medication 10 ML: at 14:07

## 2020-01-01 RX ADMIN — CASTOR OIL AND BALSAM, PERU: 788; 87 OINTMENT TOPICAL at 15:51

## 2020-01-01 RX ADMIN — MINERAL OIL AND WHITE PETROLATUM: 150; 830 OINTMENT OPHTHALMIC at 08:53

## 2020-01-01 RX ADMIN — Medication 10 ML: at 05:13

## 2020-01-01 RX ADMIN — MIDODRINE HYDROCHLORIDE 10 MG: 5 TABLET ORAL at 14:40

## 2020-01-01 RX ADMIN — INSULIN LISPRO 3 UNITS: 100 INJECTION, SOLUTION INTRAVENOUS; SUBCUTANEOUS at 21:45

## 2020-01-01 RX ADMIN — DEXTROSE MONOHYDRATE 125 ML: 10 INJECTION, SOLUTION INTRAVENOUS at 03:16

## 2020-01-01 RX ADMIN — METRONIDAZOLE 500 MG: 500 INJECTION, SOLUTION INTRAVENOUS at 11:36

## 2020-01-01 RX ADMIN — CEFEPIME HYDROCHLORIDE 1 G: 1 INJECTION, POWDER, FOR SOLUTION INTRAMUSCULAR; INTRAVENOUS at 20:42

## 2020-01-01 RX ADMIN — PANCRELIPASE 3 CAPSULE: 24000; 76000; 120000 CAPSULE, DELAYED RELEASE PELLETS ORAL at 06:57

## 2020-01-01 RX ADMIN — Medication 10 ML: at 06:25

## 2020-01-01 RX ADMIN — HEPARIN SODIUM 5000 UNITS: 5000 INJECTION INTRAVENOUS; SUBCUTANEOUS at 08:00

## 2020-01-01 RX ADMIN — Medication 20 ML: at 05:06

## 2020-01-01 RX ADMIN — MINERAL OIL AND WHITE PETROLATUM: 150; 830 OINTMENT OPHTHALMIC at 08:56

## 2020-01-01 RX ADMIN — CHLORHEXIDINE GLUCONATE 15 ML: 0.12 RINSE ORAL at 09:00

## 2020-01-01 RX ADMIN — MIDODRINE HYDROCHLORIDE 10 MG: 5 TABLET ORAL at 23:28

## 2020-01-01 RX ADMIN — PANCRELIPASE 3 CAPSULE: 24000; 76000; 120000 CAPSULE, DELAYED RELEASE PELLETS ORAL at 22:59

## 2020-01-01 RX ADMIN — MIDODRINE HYDROCHLORIDE 10 MG: 5 TABLET ORAL at 07:16

## 2020-01-01 RX ADMIN — Medication 40 ML: at 13:19

## 2020-01-01 RX ADMIN — Medication 10 ML: at 21:48

## 2020-01-01 RX ADMIN — MIDODRINE HYDROCHLORIDE 10 MG: 5 TABLET ORAL at 05:51

## 2020-01-01 RX ADMIN — Medication 10 ML: at 05:07

## 2020-01-01 RX ADMIN — DEXTROSE MONOHYDRATE 50 ML/HR: 10 INJECTION, SOLUTION INTRAVENOUS at 06:14

## 2020-01-01 RX ADMIN — DIPHENHYDRAMINE HYDROCHLORIDE 25 MG: 50 INJECTION, SOLUTION INTRAMUSCULAR; INTRAVENOUS at 01:53

## 2020-01-01 RX ADMIN — MIDODRINE HYDROCHLORIDE 10 MG: 5 TABLET ORAL at 14:31

## 2020-01-01 RX ADMIN — INSULIN GLARGINE 15 UNITS: 100 INJECTION, SOLUTION SUBCUTANEOUS at 11:07

## 2020-01-01 RX ADMIN — MIDODRINE HYDROCHLORIDE 10 MG: 5 TABLET ORAL at 21:04

## 2020-01-01 RX ADMIN — SODIUM CHLORIDE 750 MG: 900 INJECTION, SOLUTION INTRAVENOUS at 16:47

## 2020-01-01 RX ADMIN — METRONIDAZOLE 500 MG: 500 INJECTION, SOLUTION INTRAVENOUS at 13:29

## 2020-01-01 RX ADMIN — CHLORHEXIDINE GLUCONATE 15 ML: 0.12 RINSE ORAL at 08:27

## 2020-01-01 RX ADMIN — CEFEPIME HYDROCHLORIDE 1 G: 1 INJECTION, POWDER, FOR SOLUTION INTRAMUSCULAR; INTRAVENOUS at 21:22

## 2020-01-01 RX ADMIN — INSULIN LISPRO 2 UNITS: 100 INJECTION, SOLUTION INTRAVENOUS; SUBCUTANEOUS at 00:12

## 2020-01-01 RX ADMIN — MINERAL OIL AND WHITE PETROLATUM: 150; 830 OINTMENT OPHTHALMIC at 08:51

## 2020-01-01 RX ADMIN — INSULIN GLARGINE 15 UNITS: 100 INJECTION, SOLUTION SUBCUTANEOUS at 08:30

## 2020-01-01 RX ADMIN — CASTOR OIL AND BALSAM, PERU: 788; 87 OINTMENT TOPICAL at 13:46

## 2020-01-01 RX ADMIN — CASTOR OIL AND BALSAM, PERU: 788; 87 OINTMENT TOPICAL at 06:26

## 2020-01-01 RX ADMIN — LANSOPRAZOLE 30 MG: KIT at 06:51

## 2020-01-01 RX ADMIN — INSULIN GLARGINE 5 UNITS: 100 INJECTION, SOLUTION SUBCUTANEOUS at 09:26

## 2020-01-01 RX ADMIN — LANSOPRAZOLE 30 MG: KIT at 09:58

## 2020-01-01 RX ADMIN — ACYCLOVIR SODIUM 411 MG: 50 INJECTION, SOLUTION INTRAVENOUS at 15:03

## 2020-01-01 RX ADMIN — DEXTROSE MONOHYDRATE 250 ML: 10 INJECTION, SOLUTION INTRAVENOUS at 06:01

## 2020-01-01 RX ADMIN — SODIUM CHLORIDE 5 MG/HR: 900 INJECTION, SOLUTION INTRAVENOUS at 22:46

## 2020-01-01 RX ADMIN — Medication 10 ML: at 05:40

## 2020-01-01 RX ADMIN — INSULIN LISPRO 2 UNITS: 100 INJECTION, SOLUTION INTRAVENOUS; SUBCUTANEOUS at 05:37

## 2020-01-01 RX ADMIN — HEPARIN SODIUM 5000 UNITS: 5000 INJECTION INTRAVENOUS; SUBCUTANEOUS at 21:34

## 2020-01-01 RX ADMIN — EPOETIN ALFA-EPBX 10000 UNITS: 10000 INJECTION, SOLUTION INTRAVENOUS; SUBCUTANEOUS at 22:18

## 2020-01-01 RX ADMIN — INSULIN LISPRO 8 UNITS: 100 INJECTION, SOLUTION INTRAVENOUS; SUBCUTANEOUS at 13:22

## 2020-01-01 RX ADMIN — Medication 10 ML: at 16:11

## 2020-01-01 RX ADMIN — PROPOFOL 25 MCG/KG/MIN: 10 INJECTION, EMULSION INTRAVENOUS at 23:01

## 2020-01-01 RX ADMIN — MINERAL OIL AND WHITE PETROLATUM: 150; 830 OINTMENT OPHTHALMIC at 08:21

## 2020-01-01 RX ADMIN — INSULIN LISPRO 4 UNITS: 100 INJECTION, SOLUTION INTRAVENOUS; SUBCUTANEOUS at 02:57

## 2020-01-01 RX ADMIN — MINERAL OIL AND WHITE PETROLATUM: 150; 830 OINTMENT OPHTHALMIC at 09:51

## 2020-01-01 RX ADMIN — FENTANYL CITRATE 50 MCG: 50 INJECTION, SOLUTION INTRAMUSCULAR; INTRAVENOUS at 07:01

## 2020-01-01 RX ADMIN — Medication 10 ML: at 14:29

## 2020-01-01 RX ADMIN — MIDODRINE HYDROCHLORIDE 10 MG: 5 TABLET ORAL at 21:34

## 2020-01-01 RX ADMIN — INSULIN LISPRO 4 UNITS: 100 INJECTION, SOLUTION INTRAVENOUS; SUBCUTANEOUS at 18:10

## 2020-01-01 RX ADMIN — PANCRELIPASE 3 CAPSULE: 24000; 76000; 120000 CAPSULE, DELAYED RELEASE PELLETS ORAL at 21:48

## 2020-01-01 RX ADMIN — HEPARIN SODIUM 5000 UNITS: 5000 INJECTION INTRAVENOUS; SUBCUTANEOUS at 21:03

## 2020-01-01 RX ADMIN — Medication 10 ML: at 06:26

## 2020-01-01 RX ADMIN — CASTOR OIL AND BALSAM, PERU: 788; 87 OINTMENT TOPICAL at 13:05

## 2020-01-01 RX ADMIN — MIDODRINE HYDROCHLORIDE 10 MG: 5 TABLET ORAL at 22:29

## 2020-01-01 RX ADMIN — Medication 10 ML: at 06:50

## 2020-01-01 RX ADMIN — POTASSIUM & SODIUM PHOSPHATES POWDER PACK 280-160-250 MG 2 PACKET: 280-160-250 PACK at 13:09

## 2020-01-01 RX ADMIN — ACYCLOVIR SODIUM 411 MG: 50 INJECTION, SOLUTION INTRAVENOUS at 11:36

## 2020-01-01 RX ADMIN — PANCRELIPASE 3 CAPSULE: 24000; 76000; 120000 CAPSULE, DELAYED RELEASE PELLETS ORAL at 08:18

## 2020-01-01 RX ADMIN — Medication 10 ML: at 21:44

## 2020-01-01 RX ADMIN — MIDODRINE HYDROCHLORIDE 10 MG: 5 TABLET ORAL at 13:09

## 2020-01-01 RX ADMIN — HYDRALAZINE HYDROCHLORIDE 10 MG: 20 INJECTION INTRAMUSCULAR; INTRAVENOUS at 18:42

## 2020-01-01 RX ADMIN — INSULIN GLARGINE 7 UNITS: 100 INJECTION, SOLUTION SUBCUTANEOUS at 17:56

## 2020-01-01 RX ADMIN — Medication 10 ML: at 05:33

## 2020-01-01 RX ADMIN — PANCRELIPASE 3 CAPSULE: 24000; 76000; 120000 CAPSULE, DELAYED RELEASE PELLETS ORAL at 21:00

## 2020-01-01 RX ADMIN — Medication 10 ML: at 18:27

## 2020-01-01 RX ADMIN — PROPOFOL 4.54 MCG/KG/MIN: 10 INJECTION, EMULSION INTRAVENOUS at 18:00

## 2020-01-01 RX ADMIN — INSULIN LISPRO 2 UNITS: 100 INJECTION, SOLUTION INTRAVENOUS; SUBCUTANEOUS at 23:44

## 2020-01-01 RX ADMIN — CHLORHEXIDINE GLUCONATE 15 ML: 0.12 RINSE ORAL at 08:52

## 2020-01-01 RX ADMIN — DEXMEDETOMIDINE HYDROCHLORIDE 0.2 MCG/KG/HR: 100 INJECTION, SOLUTION, CONCENTRATE INTRAVENOUS at 17:25

## 2020-01-01 RX ADMIN — SODIUM CHLORIDE 40 MG: 9 INJECTION INTRAMUSCULAR; INTRAVENOUS; SUBCUTANEOUS at 09:05

## 2020-01-01 RX ADMIN — MIDODRINE HYDROCHLORIDE 10 MG: 5 TABLET ORAL at 13:59

## 2020-01-01 RX ADMIN — ACETAMINOPHEN 500 MG: 650 SOLUTION ORAL at 10:20

## 2020-01-01 RX ADMIN — MINERAL OIL AND WHITE PETROLATUM: 150; 830 OINTMENT OPHTHALMIC at 10:01

## 2020-01-01 RX ADMIN — SODIUM CHLORIDE 40 MG: 9 INJECTION INTRAMUSCULAR; INTRAVENOUS; SUBCUTANEOUS at 08:35

## 2020-01-01 RX ADMIN — LORAZEPAM 1 MG: 2 INJECTION INTRAMUSCULAR at 21:55

## 2020-01-01 RX ADMIN — PANCRELIPASE 3 CAPSULE: 24000; 76000; 120000 CAPSULE, DELAYED RELEASE PELLETS ORAL at 01:18

## 2020-01-01 RX ADMIN — PANCRELIPASE 3 CAPSULE: 24000; 76000; 120000 CAPSULE, DELAYED RELEASE PELLETS ORAL at 22:05

## 2020-01-01 RX ADMIN — MIDODRINE HYDROCHLORIDE 10 MG: 5 TABLET ORAL at 05:00

## 2020-01-01 RX ADMIN — DEXTROSE MONOHYDRATE 15 ML: 10 INJECTION, SOLUTION INTRAVENOUS at 07:54

## 2020-01-01 RX ADMIN — PANCRELIPASE 3 CAPSULE: 24000; 76000; 120000 CAPSULE, DELAYED RELEASE PELLETS ORAL at 05:14

## 2020-01-01 RX ADMIN — PANCRELIPASE 3 CAPSULE: 24000; 76000; 120000 CAPSULE, DELAYED RELEASE PELLETS ORAL at 13:53

## 2020-01-01 RX ADMIN — HEPARIN SODIUM 5000 UNITS: 5000 INJECTION INTRAVENOUS; SUBCUTANEOUS at 08:56

## 2020-01-01 RX ADMIN — PANCRELIPASE 3 CAPSULE: 24000; 76000; 120000 CAPSULE, DELAYED RELEASE PELLETS ORAL at 08:47

## 2020-01-01 RX ADMIN — CHLORHEXIDINE GLUCONATE 15 ML: 0.12 RINSE ORAL at 21:26

## 2020-01-01 RX ADMIN — HEPARIN SODIUM 5000 UNITS: 5000 INJECTION INTRAVENOUS; SUBCUTANEOUS at 08:33

## 2020-01-01 RX ADMIN — Medication 10 ML: at 21:04

## 2020-01-01 RX ADMIN — CASTOR OIL AND BALSAM, PERU: 788; 87 OINTMENT TOPICAL at 05:22

## 2020-01-01 RX ADMIN — METRONIDAZOLE 500 MG: 500 INJECTION, SOLUTION INTRAVENOUS at 19:43

## 2020-01-01 RX ADMIN — INSULIN LISPRO 12 UNITS: 100 INJECTION, SOLUTION INTRAVENOUS; SUBCUTANEOUS at 10:17

## 2020-01-01 RX ADMIN — ATORVASTATIN CALCIUM 20 MG: 20 TABLET, FILM COATED ORAL at 08:00

## 2020-01-01 RX ADMIN — Medication 10 ML: at 21:42

## 2020-01-01 RX ADMIN — INSULIN LISPRO 3 UNITS: 100 INJECTION, SOLUTION INTRAVENOUS; SUBCUTANEOUS at 19:01

## 2020-01-01 RX ADMIN — PANCRELIPASE 3 CAPSULE: 24000; 76000; 120000 CAPSULE, DELAYED RELEASE PELLETS ORAL at 06:11

## 2020-01-01 RX ADMIN — INSULIN LISPRO 3 UNITS: 100 INJECTION, SOLUTION INTRAVENOUS; SUBCUTANEOUS at 18:29

## 2020-01-01 RX ADMIN — CASTOR OIL AND BALSAM, PERU: 788; 87 OINTMENT TOPICAL at 23:17

## 2020-01-01 RX ADMIN — THIAMINE HYDROCHLORIDE 100 MG: 100 INJECTION, SOLUTION INTRAMUSCULAR; INTRAVENOUS at 09:37

## 2020-01-01 RX ADMIN — MIDODRINE HYDROCHLORIDE 10 MG: 5 TABLET ORAL at 21:31

## 2020-01-01 RX ADMIN — MINERAL OIL AND WHITE PETROLATUM: 150; 830 OINTMENT OPHTHALMIC at 21:35

## 2020-01-01 RX ADMIN — Medication 10 ML: at 14:00

## 2020-01-01 RX ADMIN — ACETAMINOPHEN 650 MG: 650 SOLUTION ORAL at 11:47

## 2020-01-01 RX ADMIN — INSULIN GLARGINE 15 UNITS: 100 INJECTION, SOLUTION SUBCUTANEOUS at 08:55

## 2020-01-01 RX ADMIN — MINERAL OIL AND WHITE PETROLATUM: 150; 830 OINTMENT OPHTHALMIC at 09:27

## 2020-01-01 RX ADMIN — MIDODRINE HYDROCHLORIDE 10 MG: 5 TABLET ORAL at 06:08

## 2020-01-01 RX ADMIN — MIDODRINE HYDROCHLORIDE 10 MG: 5 TABLET ORAL at 21:42

## 2020-01-01 RX ADMIN — HEPARIN SODIUM 5000 UNITS: 5000 INJECTION INTRAVENOUS; SUBCUTANEOUS at 09:24

## 2020-01-01 RX ADMIN — CHLORHEXIDINE GLUCONATE 15 ML: 0.12 RINSE ORAL at 11:00

## 2020-01-01 RX ADMIN — SODIUM CHLORIDE 40 MG: 9 INJECTION INTRAMUSCULAR; INTRAVENOUS; SUBCUTANEOUS at 08:38

## 2020-01-01 RX ADMIN — SODIUM CHLORIDE 750 MG: 900 INJECTION, SOLUTION INTRAVENOUS at 19:19

## 2020-01-01 RX ADMIN — INSULIN LISPRO 3 UNITS: 100 INJECTION, SOLUTION INTRAVENOUS; SUBCUTANEOUS at 17:30

## 2020-01-01 RX ADMIN — Medication 40 ML: at 13:28

## 2020-01-01 RX ADMIN — ATORVASTATIN CALCIUM 20 MG: 20 TABLET, FILM COATED ORAL at 08:54

## 2020-01-01 RX ADMIN — HEPARIN SODIUM 5000 UNITS: 5000 INJECTION INTRAVENOUS; SUBCUTANEOUS at 20:41

## 2020-01-01 RX ADMIN — Medication 10 ML: at 21:09

## 2020-01-01 RX ADMIN — INSULIN LISPRO 4 UNITS: 100 INJECTION, SOLUTION INTRAVENOUS; SUBCUTANEOUS at 18:23

## 2020-01-01 RX ADMIN — INSULIN LISPRO 3 UNITS: 100 INJECTION, SOLUTION INTRAVENOUS; SUBCUTANEOUS at 06:52

## 2020-01-01 RX ADMIN — ALBUMIN (HUMAN) 12.5 G: 0.25 INJECTION, SOLUTION INTRAVENOUS at 18:05

## 2020-01-01 RX ADMIN — MINERAL OIL AND WHITE PETROLATUM: 150; 830 OINTMENT OPHTHALMIC at 20:32

## 2020-01-01 RX ADMIN — ALBUMIN (HUMAN) 12.5 G: 0.25 INJECTION, SOLUTION INTRAVENOUS at 03:15

## 2020-01-01 RX ADMIN — CHLORHEXIDINE GLUCONATE 15 ML: 0.12 RINSE ORAL at 09:26

## 2020-01-01 RX ADMIN — MIDODRINE HYDROCHLORIDE 10 MG: 5 TABLET ORAL at 21:03

## 2020-01-01 RX ADMIN — PANCRELIPASE 3 CAPSULE: 24000; 76000; 120000 CAPSULE, DELAYED RELEASE PELLETS ORAL at 06:00

## 2020-01-01 RX ADMIN — SODIUM CHLORIDE 750 MG: 900 INJECTION, SOLUTION INTRAVENOUS at 17:12

## 2020-01-01 RX ADMIN — METRONIDAZOLE 500 MG: 500 INJECTION, SOLUTION INTRAVENOUS at 04:04

## 2020-01-01 RX ADMIN — Medication 10 ML: at 15:22

## 2020-01-01 RX ADMIN — INSULIN LISPRO 1 UNITS: 100 INJECTION, SOLUTION INTRAVENOUS; SUBCUTANEOUS at 00:11

## 2020-01-01 RX ADMIN — METRONIDAZOLE 500 MG: 500 INJECTION, SOLUTION INTRAVENOUS at 12:28

## 2020-01-01 RX ADMIN — MIDODRINE HYDROCHLORIDE 10 MG: 5 TABLET ORAL at 21:47

## 2020-01-01 RX ADMIN — EPOETIN ALFA-EPBX 10000 UNITS: 10000 INJECTION, SOLUTION INTRAVENOUS; SUBCUTANEOUS at 21:23

## 2020-01-01 RX ADMIN — ACETAMINOPHEN 650 MG: 650 SUPPOSITORY RECTAL at 02:05

## 2020-01-01 RX ADMIN — IOPAMIDOL 20 ML: 755 INJECTION, SOLUTION INTRAVENOUS at 04:26

## 2020-01-01 RX ADMIN — METRONIDAZOLE 500 MG: 500 INJECTION, SOLUTION INTRAVENOUS at 20:18

## 2020-01-01 RX ADMIN — PANCRELIPASE 3 CAPSULE: 24000; 76000; 120000 CAPSULE, DELAYED RELEASE PELLETS ORAL at 15:22

## 2020-01-01 RX ADMIN — MIDODRINE HYDROCHLORIDE 10 MG: 5 TABLET ORAL at 21:43

## 2020-01-01 RX ADMIN — ACETAMINOPHEN 650 MG: 650 SOLUTION ORAL at 18:03

## 2020-01-01 RX ADMIN — LANSOPRAZOLE 30 MG: KIT at 07:14

## 2020-01-01 RX ADMIN — SODIUM CHLORIDE 40 MG: 9 INJECTION INTRAMUSCULAR; INTRAVENOUS; SUBCUTANEOUS at 17:05

## 2020-01-01 RX ADMIN — EPOETIN ALFA-EPBX 10000 UNITS: 10000 INJECTION, SOLUTION INTRAVENOUS; SUBCUTANEOUS at 22:45

## 2020-01-01 RX ADMIN — HYDRALAZINE HYDROCHLORIDE 10 MG: 20 INJECTION INTRAMUSCULAR; INTRAVENOUS at 18:20

## 2020-01-01 RX ADMIN — PANCRELIPASE 3 CAPSULE: 24000; 76000; 120000 CAPSULE, DELAYED RELEASE PELLETS ORAL at 05:29

## 2020-01-01 RX ADMIN — INSULIN LISPRO 2 UNITS: 100 INJECTION, SOLUTION INTRAVENOUS; SUBCUTANEOUS at 12:40

## 2020-01-01 RX ADMIN — INSULIN LISPRO 4 UNITS: 100 INJECTION, SOLUTION INTRAVENOUS; SUBCUTANEOUS at 11:43

## 2020-01-01 RX ADMIN — PANCRELIPASE 3 CAPSULE: 24000; 76000; 120000 CAPSULE, DELAYED RELEASE PELLETS ORAL at 23:28

## 2020-01-01 RX ADMIN — HEPARIN SODIUM 5000 UNITS: 5000 INJECTION INTRAVENOUS; SUBCUTANEOUS at 08:45

## 2020-01-01 RX ADMIN — PANCRELIPASE 3 CAPSULE: 24000; 76000; 120000 CAPSULE, DELAYED RELEASE PELLETS ORAL at 23:54

## 2020-01-01 RX ADMIN — CASTOR OIL AND BALSAM, PERU: 788; 87 OINTMENT TOPICAL at 13:44

## 2020-01-01 RX ADMIN — POTASSIUM & SODIUM PHOSPHATES POWDER PACK 280-160-250 MG 2 PACKET: 280-160-250 PACK at 23:28

## 2020-01-01 RX ADMIN — INSULIN LISPRO 3 UNITS: 100 INJECTION, SOLUTION INTRAVENOUS; SUBCUTANEOUS at 06:12

## 2020-01-01 RX ADMIN — MIDODRINE HYDROCHLORIDE 10 MG: 5 TABLET ORAL at 21:44

## 2020-01-01 RX ADMIN — CASTOR OIL AND BALSAM, PERU: 788; 87 OINTMENT TOPICAL at 05:14

## 2020-01-01 RX ADMIN — Medication 10 ML: at 13:47

## 2020-01-01 RX ADMIN — MIDODRINE HYDROCHLORIDE 10 MG: 5 TABLET ORAL at 14:08

## 2020-01-01 RX ADMIN — MINERAL OIL AND WHITE PETROLATUM: 150; 830 OINTMENT OPHTHALMIC at 20:16

## 2020-01-01 RX ADMIN — Medication 20 ML: at 23:27

## 2020-01-01 RX ADMIN — CEFTRIAXONE 1 G: 1 INJECTION, POWDER, FOR SOLUTION INTRAMUSCULAR; INTRAVENOUS at 08:05

## 2020-01-01 RX ADMIN — Medication 10 ML: at 14:44

## 2020-01-01 RX ADMIN — POTASSIUM BICARBONATE 40 MEQ: 782 TABLET, EFFERVESCENT ORAL at 08:04

## 2020-01-01 RX ADMIN — LANSOPRAZOLE 30 MG: KIT at 09:05

## 2020-01-01 RX ADMIN — CHLORHEXIDINE GLUCONATE 15 ML: 0.12 RINSE ORAL at 10:26

## 2020-01-01 RX ADMIN — PANCRELIPASE 3 CAPSULE: 24000; 76000; 120000 CAPSULE, DELAYED RELEASE PELLETS ORAL at 14:40

## 2020-01-01 RX ADMIN — HEPARIN SODIUM 5000 UNITS: 5000 INJECTION INTRAVENOUS; SUBCUTANEOUS at 20:57

## 2020-01-01 RX ADMIN — CHLORHEXIDINE GLUCONATE 15 ML: 0.12 RINSE ORAL at 19:47

## 2020-01-01 RX ADMIN — DEXTROSE MONOHYDRATE 250 ML: 10 INJECTION, SOLUTION INTRAVENOUS at 21:23

## 2020-01-01 RX ADMIN — MIDODRINE HYDROCHLORIDE 10 MG: 5 TABLET ORAL at 14:28

## 2020-01-01 RX ADMIN — FENTANYL CITRATE 100 MCG: 50 INJECTION, SOLUTION INTRAMUSCULAR; INTRAVENOUS at 11:03

## 2020-01-01 RX ADMIN — MINERAL OIL AND WHITE PETROLATUM: 150; 830 OINTMENT OPHTHALMIC at 08:25

## 2020-01-01 RX ADMIN — ALBUMIN (HUMAN) 12.5 G: 0.25 INJECTION, SOLUTION INTRAVENOUS at 15:04

## 2020-01-01 RX ADMIN — CHLORHEXIDINE GLUCONATE 15 ML: 0.12 RINSE ORAL at 20:16

## 2020-01-01 RX ADMIN — HEPARIN SODIUM 5000 UNITS: 5000 INJECTION INTRAVENOUS; SUBCUTANEOUS at 20:33

## 2020-01-01 RX ADMIN — CEFEPIME HYDROCHLORIDE 2 G: 2 INJECTION, POWDER, FOR SOLUTION INTRAVENOUS at 13:30

## 2020-01-01 RX ADMIN — INSULIN LISPRO 4 UNITS: 100 INJECTION, SOLUTION INTRAVENOUS; SUBCUTANEOUS at 05:17

## 2020-01-01 RX ADMIN — INSULIN LISPRO 3 UNITS: 100 INJECTION, SOLUTION INTRAVENOUS; SUBCUTANEOUS at 18:03

## 2020-01-01 RX ADMIN — HEPARIN SODIUM 5000 UNITS: 5000 INJECTION INTRAVENOUS; SUBCUTANEOUS at 08:47

## 2020-01-01 RX ADMIN — CEFEPIME HYDROCHLORIDE 1 G: 1 INJECTION, POWDER, FOR SOLUTION INTRAMUSCULAR; INTRAVENOUS at 20:32

## 2020-01-01 RX ADMIN — CASTOR OIL AND BALSAM, PERU: 788; 87 OINTMENT TOPICAL at 15:29

## 2020-01-01 RX ADMIN — Medication 10 ML: at 21:47

## 2020-01-01 RX ADMIN — LANSOPRAZOLE 30 MG: KIT at 06:48

## 2020-01-01 RX ADMIN — INSULIN LISPRO 3 UNITS: 100 INJECTION, SOLUTION INTRAVENOUS; SUBCUTANEOUS at 14:31

## 2020-01-01 RX ADMIN — PROPOFOL 10 MCG/KG/MIN: 10 INJECTION, EMULSION INTRAVENOUS at 11:31

## 2020-01-01 RX ADMIN — HEPARIN SODIUM 5000 UNITS: 5000 INJECTION INTRAVENOUS; SUBCUTANEOUS at 20:25

## 2020-01-01 RX ADMIN — CHLORHEXIDINE GLUCONATE 15 ML: 0.12 RINSE ORAL at 08:17

## 2020-01-01 RX ADMIN — MIDAZOLAM 2 MG: 1 INJECTION INTRAMUSCULAR; INTRAVENOUS at 11:03

## 2020-01-01 RX ADMIN — INSULIN LISPRO 2 UNITS: 100 INJECTION, SOLUTION INTRAVENOUS; SUBCUTANEOUS at 06:18

## 2020-01-01 RX ADMIN — EPOETIN ALFA-EPBX 20000 UNITS: 10000 INJECTION, SOLUTION INTRAVENOUS; SUBCUTANEOUS at 22:56

## 2020-01-01 RX ADMIN — MIDODRINE HYDROCHLORIDE 10 MG: 5 TABLET ORAL at 06:57

## 2020-01-01 RX ADMIN — CHLORHEXIDINE GLUCONATE 15 ML: 0.12 RINSE ORAL at 20:22

## 2020-01-01 RX ADMIN — INSULIN LISPRO 2 UNITS: 100 INJECTION, SOLUTION INTRAVENOUS; SUBCUTANEOUS at 06:42

## 2020-01-01 RX ADMIN — CHLORHEXIDINE GLUCONATE 15 ML: 0.12 RINSE ORAL at 08:32

## 2020-01-01 RX ADMIN — DEXTROSE MONOHYDRATE 125 ML: 10 INJECTION, SOLUTION INTRAVENOUS at 17:03

## 2020-01-01 RX ADMIN — INSULIN GLARGINE 15 UNITS: 100 INJECTION, SOLUTION SUBCUTANEOUS at 13:55

## 2020-01-01 RX ADMIN — MIDODRINE HYDROCHLORIDE 10 MG: 5 TABLET ORAL at 07:06

## 2020-01-01 RX ADMIN — Medication 10 ML: at 13:56

## 2020-01-01 RX ADMIN — PANCRELIPASE 3 CAPSULE: 24000; 76000; 120000 CAPSULE, DELAYED RELEASE PELLETS ORAL at 21:44

## 2020-01-01 RX ADMIN — CEFEPIME HYDROCHLORIDE 1 G: 1 INJECTION, POWDER, FOR SOLUTION INTRAMUSCULAR; INTRAVENOUS at 10:59

## 2020-01-01 RX ADMIN — CHLORHEXIDINE GLUCONATE 15 ML: 0.12 RINSE ORAL at 08:47

## 2020-01-01 RX ADMIN — Medication 10 ML: at 22:05

## 2020-01-01 RX ADMIN — CHLORHEXIDINE GLUCONATE 15 ML: 0.12 RINSE ORAL at 21:00

## 2020-01-01 RX ADMIN — INSULIN LISPRO 3 UNITS: 100 INJECTION, SOLUTION INTRAVENOUS; SUBCUTANEOUS at 13:52

## 2020-01-01 RX ADMIN — SODIUM CHLORIDE 40 MG: 9 INJECTION INTRAMUSCULAR; INTRAVENOUS; SUBCUTANEOUS at 09:26

## 2020-01-01 RX ADMIN — PANCRELIPASE 3 CAPSULE: 24000; 76000; 120000 CAPSULE, DELAYED RELEASE PELLETS ORAL at 05:00

## 2020-01-01 RX ADMIN — INSULIN LISPRO 5 UNITS: 100 INJECTION, SOLUTION INTRAVENOUS; SUBCUTANEOUS at 12:25

## 2020-01-01 RX ADMIN — THIAMINE HYDROCHLORIDE 100 MG: 100 INJECTION, SOLUTION INTRAMUSCULAR; INTRAVENOUS at 11:19

## 2020-01-01 RX ADMIN — Medication 10 ML: at 15:45

## 2020-01-01 RX ADMIN — Medication 10 ML: at 21:29

## 2020-01-01 RX ADMIN — CHLORHEXIDINE GLUCONATE 15 ML: 0.12 RINSE ORAL at 09:44

## 2020-01-01 RX ADMIN — DEXTROSE MONOHYDRATE 250 ML: 10 INJECTION, SOLUTION INTRAVENOUS at 01:12

## 2020-01-01 RX ADMIN — POTASSIUM BICARBONATE 40 MEQ: 782 TABLET, EFFERVESCENT ORAL at 01:23

## 2020-01-01 RX ADMIN — METRONIDAZOLE 500 MG: 500 INJECTION, SOLUTION INTRAVENOUS at 19:47

## 2020-01-01 RX ADMIN — PROPOFOL 25 MCG/KG/MIN: 10 INJECTION, EMULSION INTRAVENOUS at 09:45

## 2020-01-01 RX ADMIN — PANCRELIPASE 3 CAPSULE: 24000; 76000; 120000 CAPSULE, DELAYED RELEASE PELLETS ORAL at 21:05

## 2020-01-01 RX ADMIN — PANCRELIPASE 3 CAPSULE: 24000; 76000; 120000 CAPSULE, DELAYED RELEASE PELLETS ORAL at 21:34

## 2020-01-01 RX ADMIN — PANCRELIPASE 3 CAPSULE: 24000; 76000; 120000 CAPSULE, DELAYED RELEASE PELLETS ORAL at 14:31

## 2020-01-01 RX ADMIN — MINERAL OIL AND WHITE PETROLATUM: 150; 830 OINTMENT OPHTHALMIC at 20:25

## 2020-01-01 RX ADMIN — MINERAL OIL AND WHITE PETROLATUM: 150; 830 OINTMENT OPHTHALMIC at 21:34

## 2020-01-01 RX ADMIN — MIDODRINE HYDROCHLORIDE 10 MG: 5 TABLET ORAL at 06:00

## 2020-01-01 RX ADMIN — DEXTROSE MONOHYDRATE 30 ML/HR: 10 INJECTION, SOLUTION INTRAVENOUS at 06:42

## 2020-01-01 RX ADMIN — LORAZEPAM 2 MG: 2 INJECTION INTRAMUSCULAR; INTRAVENOUS at 09:42

## 2020-01-01 RX ADMIN — INSULIN GLARGINE 5 UNITS: 100 INJECTION, SOLUTION SUBCUTANEOUS at 09:57

## 2020-01-01 RX ADMIN — LANSOPRAZOLE 30 MG: KIT at 06:52

## 2020-01-01 RX ADMIN — INSULIN LISPRO 3 UNITS: 100 INJECTION, SOLUTION INTRAVENOUS; SUBCUTANEOUS at 12:16

## 2020-01-01 RX ADMIN — MIDODRINE HYDROCHLORIDE 10 MG: 5 TABLET ORAL at 05:06

## 2020-01-01 RX ADMIN — ALBUMIN (HUMAN) 12.5 G: 0.25 INJECTION, SOLUTION INTRAVENOUS at 11:00

## 2020-01-01 RX ADMIN — LANSOPRAZOLE 30 MG: KIT at 06:46

## 2020-01-01 RX ADMIN — CHLORHEXIDINE GLUCONATE 15 ML: 0.12 RINSE ORAL at 20:56

## 2020-01-01 RX ADMIN — MIDODRINE HYDROCHLORIDE 10 MG: 5 TABLET ORAL at 21:18

## 2020-01-01 RX ADMIN — INSULIN GLARGINE 15 UNITS: 100 INJECTION, SOLUTION SUBCUTANEOUS at 10:27

## 2020-01-01 RX ADMIN — HEPARIN SODIUM 5000 UNITS: 5000 INJECTION INTRAVENOUS; SUBCUTANEOUS at 20:43

## 2020-01-01 RX ADMIN — INSULIN LISPRO 4 UNITS: 100 INJECTION, SOLUTION INTRAVENOUS; SUBCUTANEOUS at 17:23

## 2020-01-01 RX ADMIN — Medication 10 ML: at 21:25

## 2020-01-01 RX ADMIN — MIDODRINE HYDROCHLORIDE 10 MG: 5 TABLET ORAL at 06:42

## 2020-01-01 RX ADMIN — CASTOR OIL AND BALSAM, PERU: 788; 87 OINTMENT TOPICAL at 05:51

## 2020-01-01 RX ADMIN — MIDODRINE HYDROCHLORIDE 10 MG: 5 TABLET ORAL at 15:34

## 2020-01-01 RX ADMIN — CASTOR OIL AND BALSAM, PERU: 788; 87 OINTMENT TOPICAL at 22:08

## 2020-01-01 RX ADMIN — DEXTROSE MONOHYDRATE 30 ML/HR: 10 INJECTION, SOLUTION INTRAVENOUS at 10:59

## 2020-01-01 RX ADMIN — ALBUMIN (HUMAN) 12.5 G: 0.25 INJECTION, SOLUTION INTRAVENOUS at 17:34

## 2020-01-01 RX ADMIN — MINERAL OIL AND WHITE PETROLATUM: 150; 830 OINTMENT OPHTHALMIC at 08:00

## 2020-01-01 RX ADMIN — INSULIN LISPRO 10 UNITS: 100 INJECTION, SOLUTION INTRAVENOUS; SUBCUTANEOUS at 09:26

## 2020-01-01 RX ADMIN — ALBUMIN (HUMAN) 12.5 G: 0.25 INJECTION, SOLUTION INTRAVENOUS at 02:30

## 2020-01-01 RX ADMIN — HYDRALAZINE HYDROCHLORIDE 10 MG: 20 INJECTION INTRAMUSCULAR; INTRAVENOUS at 06:41

## 2020-01-01 RX ADMIN — IOPAMIDOL 100 ML: 755 INJECTION, SOLUTION INTRAVENOUS at 04:26

## 2020-01-01 RX ADMIN — THIAMINE HYDROCHLORIDE 100 MG: 100 INJECTION, SOLUTION INTRAMUSCULAR; INTRAVENOUS at 09:17

## 2020-01-01 RX ADMIN — INSULIN LISPRO 2 UNITS: 100 INJECTION, SOLUTION INTRAVENOUS; SUBCUTANEOUS at 00:00

## 2020-01-01 RX ADMIN — INSULIN GLARGINE 15 UNITS: 100 INJECTION, SOLUTION SUBCUTANEOUS at 09:25

## 2020-01-01 RX ADMIN — Medication 10 ML: at 05:31

## 2020-01-01 RX ADMIN — CHLORHEXIDINE GLUCONATE 15 ML: 0.12 RINSE ORAL at 08:46

## 2020-01-01 RX ADMIN — CHLORHEXIDINE GLUCONATE 15 ML: 1.2 RINSE ORAL at 03:51

## 2020-01-01 RX ADMIN — MIDODRINE HYDROCHLORIDE 10 MG: 5 TABLET ORAL at 16:04

## 2020-01-01 RX ADMIN — CEFTRIAXONE 1 G: 1 INJECTION, POWDER, FOR SOLUTION INTRAMUSCULAR; INTRAVENOUS at 09:05

## 2020-01-01 RX ADMIN — MINERAL OIL AND WHITE PETROLATUM: 150; 830 OINTMENT OPHTHALMIC at 20:22

## 2020-01-01 RX ADMIN — CHLORHEXIDINE GLUCONATE 15 ML: 0.12 RINSE ORAL at 20:53

## 2020-01-01 RX ADMIN — PANCRELIPASE 3 CAPSULE: 24000; 76000; 120000 CAPSULE, DELAYED RELEASE PELLETS ORAL at 21:07

## 2020-01-01 RX ADMIN — Medication 10 ML: at 15:33

## 2020-01-01 RX ADMIN — HEPARIN SODIUM 5000 UNITS: 5000 INJECTION INTRAVENOUS; SUBCUTANEOUS at 08:37

## 2020-01-01 RX ADMIN — EPOETIN ALFA-EPBX 20000 UNITS: 10000 INJECTION, SOLUTION INTRAVENOUS; SUBCUTANEOUS at 21:28

## 2020-01-01 RX ADMIN — SODIUM CHLORIDE 5 MG/HR: 9 INJECTION, SOLUTION INTRAVENOUS at 03:45

## 2020-01-01 RX ADMIN — Medication 10 ML: at 06:28

## 2020-01-01 RX ADMIN — ACETAMINOPHEN 650 MG: 650 SOLUTION ORAL at 16:45

## 2020-01-01 RX ADMIN — SODIUM CHLORIDE 40 MG: 9 INJECTION INTRAMUSCULAR; INTRAVENOUS; SUBCUTANEOUS at 08:56

## 2020-01-01 RX ADMIN — PANCRELIPASE 3 CAPSULE: 24000; 76000; 120000 CAPSULE, DELAYED RELEASE PELLETS ORAL at 06:49

## 2020-01-01 RX ADMIN — PANCRELIPASE 3 CAPSULE: 24000; 76000; 120000 CAPSULE, DELAYED RELEASE PELLETS ORAL at 13:59

## 2020-01-01 RX ADMIN — MINERAL OIL AND WHITE PETROLATUM: 150; 830 OINTMENT OPHTHALMIC at 21:10

## 2020-01-01 RX ADMIN — MIDODRINE HYDROCHLORIDE 10 MG: 5 TABLET ORAL at 22:56

## 2020-01-01 RX ADMIN — PANCRELIPASE 3 CAPSULE: 24000; 76000; 120000 CAPSULE, DELAYED RELEASE PELLETS ORAL at 14:26

## 2020-01-01 RX ADMIN — PANCRELIPASE 3 CAPSULE: 24000; 76000; 120000 CAPSULE, DELAYED RELEASE PELLETS ORAL at 22:12

## 2020-01-01 RX ADMIN — CHLORHEXIDINE GLUCONATE 15 ML: 0.12 RINSE ORAL at 08:53

## 2020-01-01 RX ADMIN — ATORVASTATIN CALCIUM 20 MG: 20 TABLET, FILM COATED ORAL at 08:17

## 2020-01-01 RX ADMIN — MIDODRINE HYDROCHLORIDE 10 MG: 5 TABLET ORAL at 06:02

## 2020-01-01 RX ADMIN — Medication 10 ML: at 22:18

## 2020-01-01 RX ADMIN — INSULIN GLARGINE 15 UNITS: 100 INJECTION, SOLUTION SUBCUTANEOUS at 08:17

## 2020-01-01 RX ADMIN — INSULIN LISPRO 2 UNITS: 100 INJECTION, SOLUTION INTRAVENOUS; SUBCUTANEOUS at 18:17

## 2020-01-01 RX ADMIN — Medication 10 ML: at 05:48

## 2020-01-01 RX ADMIN — Medication 20 ML: at 21:05

## 2020-01-01 RX ADMIN — Medication 10 ML: at 05:30

## 2020-01-01 RX ADMIN — CHLORHEXIDINE GLUCONATE 15 ML: 0.12 RINSE ORAL at 20:41

## 2020-01-01 RX ADMIN — VANCOMYCIN HYDROCHLORIDE 2000 MG: 10 INJECTION, POWDER, LYOPHILIZED, FOR SOLUTION INTRAVENOUS at 13:28

## 2020-01-01 RX ADMIN — ACETAMINOPHEN 500 MG: 650 SOLUTION ORAL at 03:02

## 2020-01-01 RX ADMIN — CEFEPIME HYDROCHLORIDE 1 G: 1 INJECTION, POWDER, FOR SOLUTION INTRAMUSCULAR; INTRAVENOUS at 10:53

## 2020-01-01 RX ADMIN — CALCIUM GLUCONATE 2 G: 98 INJECTION, SOLUTION INTRAVENOUS at 16:35

## 2020-01-01 RX ADMIN — Medication 10 ML: at 21:24

## 2020-01-01 RX ADMIN — SODIUM CHLORIDE 500 MG: 900 INJECTION, SOLUTION INTRAVENOUS at 09:58

## 2020-01-01 RX ADMIN — MINERAL OIL AND WHITE PETROLATUM: 150; 830 OINTMENT OPHTHALMIC at 09:26

## 2020-01-01 RX ADMIN — Medication 10 ML: at 04:25

## 2020-01-01 RX ADMIN — INSULIN LISPRO 3 UNITS: 100 INJECTION, SOLUTION INTRAVENOUS; SUBCUTANEOUS at 01:53

## 2020-01-01 RX ADMIN — CHLORHEXIDINE GLUCONATE 15 ML: 0.12 RINSE ORAL at 09:57

## 2020-01-01 RX ADMIN — INSULIN LISPRO 2 UNITS: 100 INJECTION, SOLUTION INTRAVENOUS; SUBCUTANEOUS at 13:21

## 2020-01-01 RX ADMIN — MINERAL OIL AND WHITE PETROLATUM: 150; 830 OINTMENT OPHTHALMIC at 09:33

## 2020-01-01 RX ADMIN — CASTOR OIL AND BALSAM, PERU: 788; 87 OINTMENT TOPICAL at 06:56

## 2020-01-01 RX ADMIN — Medication 10 ML: at 22:46

## 2020-01-01 RX ADMIN — DEXTROSE MONOHYDRATE 250 ML: 10 INJECTION, SOLUTION INTRAVENOUS at 22:18

## 2020-01-01 RX ADMIN — INSULIN LISPRO 4 UNITS: 100 INJECTION, SOLUTION INTRAVENOUS; SUBCUTANEOUS at 02:51

## 2020-01-01 RX ADMIN — INSULIN LISPRO 3 UNITS: 100 INJECTION, SOLUTION INTRAVENOUS; SUBCUTANEOUS at 22:17

## 2020-01-01 RX ADMIN — ATORVASTATIN CALCIUM 20 MG: 20 TABLET, FILM COATED ORAL at 09:57

## 2020-01-01 RX ADMIN — ACYCLOVIR SODIUM 411 MG: 50 INJECTION, SOLUTION INTRAVENOUS at 12:26

## 2020-01-01 RX ADMIN — HEPARIN SODIUM 5000 UNITS: 5000 INJECTION INTRAVENOUS; SUBCUTANEOUS at 08:55

## 2020-01-01 RX ADMIN — CHLORHEXIDINE GLUCONATE 15 ML: 0.12 RINSE ORAL at 23:51

## 2020-01-01 RX ADMIN — CHLORHEXIDINE GLUCONATE 15 ML: 0.12 RINSE ORAL at 09:27

## 2020-01-01 RX ADMIN — CHLORHEXIDINE GLUCONATE 15 ML: 0.12 RINSE ORAL at 23:27

## 2020-01-01 RX ADMIN — CASTOR OIL AND BALSAM, PERU: 788; 87 OINTMENT TOPICAL at 21:34

## 2020-01-01 RX ADMIN — SODIUM CHLORIDE 40 MG: 9 INJECTION INTRAMUSCULAR; INTRAVENOUS; SUBCUTANEOUS at 08:52

## 2020-01-01 RX ADMIN — METRONIDAZOLE 500 MG: 500 INJECTION, SOLUTION INTRAVENOUS at 03:21

## 2020-01-01 RX ADMIN — FENTANYL CITRATE 50 MCG: 50 INJECTION, SOLUTION INTRAMUSCULAR; INTRAVENOUS at 16:45

## 2020-01-01 RX ADMIN — POTASSIUM & SODIUM PHOSPHATES POWDER PACK 280-160-250 MG 2 PACKET: 280-160-250 PACK at 09:44

## 2020-01-01 RX ADMIN — MIDODRINE HYDROCHLORIDE 10 MG: 5 TABLET ORAL at 23:38

## 2020-01-01 RX ADMIN — PANCRELIPASE 3 CAPSULE: 24000; 76000; 120000 CAPSULE, DELAYED RELEASE PELLETS ORAL at 13:18

## 2020-01-01 RX ADMIN — ACETAMINOPHEN 650 MG: 650 SOLUTION ORAL at 14:11

## 2020-01-01 RX ADMIN — LANSOPRAZOLE 30 MG: KIT at 06:42

## 2020-01-01 RX ADMIN — CEFTRIAXONE 1 G: 1 INJECTION, POWDER, FOR SOLUTION INTRAMUSCULAR; INTRAVENOUS at 08:27

## 2020-01-01 RX ADMIN — MIDODRINE HYDROCHLORIDE 10 MG: 5 TABLET ORAL at 14:11

## 2020-01-01 RX ADMIN — MINERAL OIL AND WHITE PETROLATUM: 150; 830 OINTMENT OPHTHALMIC at 07:59

## 2020-01-01 RX ADMIN — MIDODRINE HYDROCHLORIDE 5 MG: 5 TABLET ORAL at 14:35

## 2020-01-01 RX ADMIN — PANCRELIPASE 3 CAPSULE: 24000; 76000; 120000 CAPSULE, DELAYED RELEASE PELLETS ORAL at 06:05

## 2020-01-01 RX ADMIN — ACETAMINOPHEN 650 MG: 650 SOLUTION ORAL at 20:42

## 2020-01-01 RX ADMIN — ATORVASTATIN CALCIUM 20 MG: 20 TABLET, FILM COATED ORAL at 09:33

## 2020-01-01 RX ADMIN — PANCRELIPASE 3 CAPSULE: 24000; 76000; 120000 CAPSULE, DELAYED RELEASE PELLETS ORAL at 13:47

## 2020-01-01 RX ADMIN — ACETAMINOPHEN 650 MG: 650 SOLUTION ORAL at 19:03

## 2020-01-01 RX ADMIN — ACETAMINOPHEN 500 MG: 650 SOLUTION ORAL at 09:16

## 2020-01-01 RX ADMIN — HEPARIN SODIUM 5000 UNITS: 5000 INJECTION INTRAVENOUS; SUBCUTANEOUS at 20:42

## 2020-01-01 RX ADMIN — MIDODRINE HYDROCHLORIDE 10 MG: 5 TABLET ORAL at 13:28

## 2020-01-01 RX ADMIN — CHLORHEXIDINE GLUCONATE 15 ML: 0.12 RINSE ORAL at 08:05

## 2020-01-01 RX ADMIN — ATORVASTATIN CALCIUM 20 MG: 20 TABLET, FILM COATED ORAL at 08:37

## 2020-01-01 RX ADMIN — MINERAL OIL AND WHITE PETROLATUM: 150; 830 OINTMENT OPHTHALMIC at 20:44

## 2020-01-01 RX ADMIN — MIDODRINE HYDROCHLORIDE 10 MG: 5 TABLET ORAL at 05:37

## 2020-01-01 RX ADMIN — POTASSIUM & SODIUM PHOSPHATES POWDER PACK 280-160-250 MG 2 PACKET: 280-160-250 PACK at 17:24

## 2020-01-01 RX ADMIN — Medication 10 ML: at 06:19

## 2020-01-01 RX ADMIN — MIDODRINE HYDROCHLORIDE 10 MG: 5 TABLET ORAL at 14:45

## 2020-01-01 RX ADMIN — MINERAL OIL AND WHITE PETROLATUM: 150; 830 OINTMENT OPHTHALMIC at 20:23

## 2020-01-01 RX ADMIN — INSULIN LISPRO 4 UNITS: 100 INJECTION, SOLUTION INTRAVENOUS; SUBCUTANEOUS at 13:57

## 2020-01-01 RX ADMIN — CHLORHEXIDINE GLUCONATE 15 ML: 0.12 RINSE ORAL at 20:25

## 2020-01-01 RX ADMIN — CHLORHEXIDINE GLUCONATE 15 ML: 0.12 RINSE ORAL at 08:09

## 2020-01-01 RX ADMIN — CASTOR OIL AND BALSAM, PERU: 788; 87 OINTMENT TOPICAL at 14:26

## 2020-01-01 RX ADMIN — HYDRALAZINE HYDROCHLORIDE 10 MG: 20 INJECTION INTRAMUSCULAR; INTRAVENOUS at 18:39

## 2020-01-01 RX ADMIN — INSULIN LISPRO 2 UNITS: 100 INJECTION, SOLUTION INTRAVENOUS; SUBCUTANEOUS at 10:25

## 2020-01-01 RX ADMIN — EPOETIN ALFA-EPBX 10000 UNITS: 10000 INJECTION, SOLUTION INTRAVENOUS; SUBCUTANEOUS at 20:56

## 2020-01-01 RX ADMIN — CHLORHEXIDINE GLUCONATE 15 ML: 0.12 RINSE ORAL at 08:35

## 2020-01-01 RX ADMIN — INSULIN LISPRO 2 UNITS: 100 INJECTION, SOLUTION INTRAVENOUS; SUBCUTANEOUS at 12:53

## 2020-01-01 RX ADMIN — SODIUM CHLORIDE 40 MG: 9 INJECTION INTRAMUSCULAR; INTRAVENOUS; SUBCUTANEOUS at 08:28

## 2020-01-21 NOTE — ED NOTES
Pt states he was seen at [facility] this morning and was diagnosed with pneumonia and discharged. Wife states they called PCP and was told to come here. Pt c/o general weakness, cough, poor appetite. Wife states pt stool was black yesterday and last night. Pt is a dialysis pt last dialysis was yesterday
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
## 2020-02-05 PROBLEM — I46.9 CARDIOPULMONARY ARREST WITH SUCCESSFUL RESUSCITATION (HCC): Status: ACTIVE | Noted: 2020-01-01

## 2020-02-05 PROBLEM — I46.9 PEA (PULSELESS ELECTRICAL ACTIVITY) (HCC): Status: ACTIVE | Noted: 2020-01-01
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
## 2020-02-05 NOTE — INTERDISCIPLINARY ROUNDS
ICU multidisciplinary rounds lead by Dr. Nimo Mcguire (Intensivist): The following were reviewed and discussed: current labs,  recent imaging, lines/drains, review of body systems, nutrition, cultures, mobility, length of ICU stay. The plan of care for the day is as follows monitor BP, blood sugars, and neuro status, possible extubation if appropriate (written note by RN)

## 2020-02-05 NOTE — PROGRESS NOTES
Reason for Admission:   Found by wife unresponsive. Transferred from [facility] RUR Score:  30 % high Resources/supports as identified by patient/family:   Lives with wife Jun Crowder [phone] Top Challenges facing patient (as identified by patient/family and CM): Finances/Medication cost?  None voiced Transportation? Wife transports Support system or lack thereof? Wife, daughter and grand daughter Living arrangements? Lives with wife in a single level home Self-care/ADLs/Cognition? Independent PTA Current Advanced Directive/Advance Care Plan:  Not on file, patient currently vented. Patient is a full code Plan for utilizing home health: Open to [place] per daughter for SN, she is not sure of the agency's name Transition of Care Plan: TBD Care manager met with patient's daughter to introduce self and explain role. She confirmed patient's PCP to be Dr Morteza Echevarria and sees him every 6 weeks and uses the local [facility] in [place] as his pharmacy. Patient uses a walker for ambulation. Patient is ESRD and receives dialysis at [address] [place] [address] dialysis center [phone] on a M-W-F schedule and his wife takes him, chair time is 3:00 pm. Confirmed patient has Medicare A,B for insurance. Care management will follow for transitions of care. Phan Campbell RN,CRM Care Management Interventions PCP Verified by CM: Yes(Dr Becker) Transition of Care Consult (CM Consult): Home Health MyChart Signup: Yes Discharge Durable Medical Equipment: No 
Physical Therapy Consult: No 
Occupational Therapy Consult: No 
Speech Therapy Consult: No 
Current Support Network: Lives with Spouse(Wife Jun Crowder [phone])

## 2020-02-05 NOTE — PROGRESS NOTES
Bedside shift change report given to Tino Schofield RN (oncoming nurse) by Breanna Wagner RN (offgoing nurse). Report included the following information SBAR, Kardex, ED Summary, Procedure Summary, Intake/Output, MAR, Recent Results, Cardiac Rhythm SR, Alarm Parameters  and Dual Neuro Assessment. 2064: , discussed with MD and pharmacist, given scheduled 10 units of Lantus and one time dose of 10 units Humalog. Repeat BG in 4 hours. 1011: Order received for KUB to confirm OGT placement for medication use. 1025: X-ray at bedside for KUB. 1230: Dr. Trent Wing reviewing KUB for placement confirmation, OGT in correct place, ok for use. Tylenol ordered for fever. BG results discussed with Dr. Trent Wing, BG less than 300, will continue with q4 BG checks for now. 1244: Tylenol given for temperature of 101.4 F bladder. 1345: Sharma d/c'd. Rectal probe placed for temperature monitoring 1405: (R) femoral line d/c'd, site dressed with bactirin and sterile dressing, pressure held for 5 mins without signs of bleeding. Site with bobby sized sangineours drainage with dressing application. Manual pressure held for additional 5 min, hemostatis, site without signs of additional bleeding ,drainage amount marked on dressing. Site observed, no further drainage from site, without signs of hematoma. Will continue to monitor. 1440: (R) groin site reassessed, no change in size of marked drainage or hematoma. Will continue to monitor. [address] Patient with hypoglycemic episode(s) at 1657(art) and 1700 (finger stick) (time) on t 
02/05/2020 (date). BG value(s) pre-treatment 67 and 64 Was patient symptomatic? [] yes, [x] no Patient was treated with the following rescue medications/treatments: [x] D10 
              [] Glucose tablets 
              [] Glucagon 
              [] 4oz juice 
              [] 6oz reg soda 
              [] 8oz low fat milk BG value post-treatment: 116 
 Once BG treated and value greater than 80mg/dl, pt was provided with the following: 
[] snack 
[] meal 
Name of MD notified:Isis The following orders were received: none
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
## 2020-02-05 NOTE — PROGRESS NOTES
SBT FAILED 
 
 02/05/20 1643 Weaning Parameters Spontaneous Breathing Trial Complete Yes Resp Rate Observed 0 Shaikh Agitation Sedation Scale (RASS) -2

## 2020-02-05 NOTE — PROGRESS NOTES
0230: TRANSFER - IN REPORT: 
 
Verbal report received from [employer] (name) on Melissa Howard  being received from [facility] (unit) for routine progression of care Report consisted of patients Situation, Background, Assessment and  
Recommendations(SBAR). Information from the following report(s) SBAR, ED Summary, Intake/Output, MAR and Recent Results was reviewed with the receiving nurse. Opportunity for questions and clarification was provided. Assessment completed upon patients arrival to unit and care assumed. 0300: Patient arrived via Critical Care Transport on 40mcg of Propofol. Per Thuan Cantor MD Propofol off to obtain neuro exam. Does not respond to painful stimuli. Noted diffuse muscle fasciculations; per Intensivist will plan for CTA/CT Perfusion. BP 180s; MD ordering Cardene gtt. 0345Paolo Harper MD ordering Precedex to keep patient calm. Patient woke up with spontaneous movement in all extremities and opening eyes; patient is quite agitated and trying to pull at tubes. Will start Cardene for BP 180s. 0430: Returned from CT. Cardene started and up titrated to 10mg/hr - see MAR.  
 
0630: Cardene gtt weaned off. Patient more awake and responding to voice. Questionable movement of BLE to command. 0730: Bedside shift change report given to [address] (oncoming nurse) by Alan Sandoval RN (offgoing nurse). Report included the following information SBAR, Intake/Output, MAR and Recent Results.
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
## 2020-02-05 NOTE — DIABETES MGMT
[address] Presentation Hardy Jones is a 64 y.o. male admitted with r/o intracrainal hemmorage. S/P seizure with PEA in ED. Current clinical course has been complicated by PEA in ED and HTN crisis. Patient has known diabetes diabetes. Consulted by Provider for advanced diabetes nursing assessment and care, specifically related to  
[] Transitioning off Ragena Maudlin [x] Inpatient management strategy [x] Home management assessment 
[] Survival skill education Diabetes-related medical history Acute complications HTN crisis/PEA/Seizures Neurological complications Peripheral neuropathy Microvascular disease Nephropathy Macrovascular disease CAD and Peripheral vascular disease Other associated conditions HTN, HF, Diabetes medication history Drug class Currently in use Discontinued Never used Biguanide DDP-4 inhibitor Sulfonylurea Thiazolidinedione GLP-1 RA SGLT-2 inhibitors Basal insulin 70/30 NPH 8 units AM/8 unitsPM    
Bolus insulin Subjective Patient is currently intubated on a ventilator. Vibratory and Filament test: deferred due to clinical condition Patient reports the following home diabetes self-care practices:-unable to obtain at this time. Social determinants of health impacting diabetes self-management practices Unable to obtain at this time Objective Physical exam 
General Intubated; ill-appearing. Vital Signs Visit Vitals /67 Pulse 80 Temp (!) 101 °F (38.3 °C) Resp 18 Wt 75.7 kg (166 lb 14.2 oz) SpO2 99% BMI 21.43 kg/m² Laboratory Lab Results Component Value Date/Time Hemoglobin A1c 10.2 (H) 02/05/2020 03:10 AM  
 Hemoglobin A1c (POC) 11.8 11/19/2019 12:00 PM  
 
Lab Results Component Value Date/Time LDL, calculated 48 01/22/2018 03:55 PM  
 
Lab Results Component Value Date/Time Creatinine (POC) 4.4 (H) 11/13/2018 12:06 PM  
 Creatinine 5.44 (H) 02/05/2020 03:10 AM  
 
Lab Results Component Value Date/Time Sodium 132 (L) 02/05/2020 03:10 AM  
 Potassium 3.3 (L) 02/05/2020 03:10 AM  
 Chloride 97 02/05/2020 03:10 AM  
 CO2 27 02/05/2020 03:10 AM  
 Anion gap 8 02/05/2020 03:10 AM  
 Glucose 341 (H) 02/05/2020 03:10 AM  
 BUN 26 (H) 02/05/2020 03:10 AM  
 Creatinine 5.44 (H) 02/05/2020 03:10 AM  
 BUN/Creatinine ratio 5 (L) 02/05/2020 03:10 AM  
 GFR est AA 13 (L) 02/05/2020 03:10 AM  
 GFR est non-AA 11 (L) 02/05/2020 03:10 AM  
 Calcium 8.1 (L) 02/05/2020 03:10 AM  
 Bilirubin, total 0.6 02/05/2020 03:10 AM  
 AST (SGOT) 45 (H) 02/05/2020 03:10 AM  
 Alk. phosphatase 134 (H) 02/05/2020 03:10 AM  
 Protein, total 10.0 (H) 02/05/2020 03:10 AM  
 Albumin 2.2 (L) 02/05/2020 03:10 AM  
 Globulin 7.8 (H) 02/05/2020 03:10 AM  
 A-G Ratio 0.3 (L) 02/05/2020 03:10 AM  
 ALT (SGPT) 51 02/05/2020 03:10 AM  
 
Lab Results Component Value Date/Time ALT (SGPT) 51 02/05/2020 03:10 AM  
 
 
Blood glucose pattern Current BG -350; only given daily Lantus dose this morning, SSI held. Assessment and Plan Nursing Diagnosis Risk for unstable blood glucose pattern Nursing Intervention Domain 0827 Decision-making Support Nursing Interventions Examined current inpatient diabetes control Explored factors facilitating and impeding inpatient management Identified self-management practices impeding diabetes control Explored corrective strategies with patient and responsible inpatient provider Informed patient of rational for insulin strategy while hospitalized Evaluation This gentleman, with Type 2, hasn't achieved inpatient blood glucose target of 140-180mg/dl. Inpatient blood glucose management has been impacted by 
[x] Kidney dysfunction 
[x] Erratic meal consumption 
[] Glucocorticoid use 
[]  ___________________ Basal insulin is in use. Bolus insulin isn't in use. Patient isn't eating meals Corrective insulin is in use. Recommendations Recommend: 
 
DO NOT HOLD SSI insulin for NPO; please give basal insulin (Lantus) AND SSI. Basal insulin  
[x] 0.3 units/kg/D=20 units daily HOLD Bolus insulin Continue SSI Referral  
[] Behavioral health services 
[] Inpatient nutrition services [] Pharmacy services for medication management 
[] Diabetes Self-Management Training through [facility] (Phone [phone] to schedule appointment) Billing Code(s) [] 21  IP subsequent hospital care - 45 minutes [x] [id number] IP subsequent hospital care - 30 minutes [] [id number] IP subsequent hospital care - 15 minutes Signed By: Beth Skelton RN Clinical Nurse Specialist 
[facility] February 5, 2020
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
## 2020-02-05 NOTE — PROCEDURES
[facility]  [phone] Vitals   Pre   Post   Assessment   Pre   Post    
Temp  Temp: 100.3 °F (37.9 °C) (02/05/20 1545); Axillary 99.9 rectal LOC  Intubated; responding to verbal stimuli; nodded head to introduction and explanation of starting HD treatment. Intubated; responding to verbal stimuli HR   Pulse (Heart Rate): 69 (02/05/20 1545) 95 Lungs   clear clear B/P   BP: 107/62 (02/05/20 1545) 169/89 Cardiac   NSR NSR Resp   Resp Rate: 14 (02/05/20 1545) 18 Skin   Scattered bruising/ blisters Scattered bruising/ blisters Pain level   5/10 mouth; Primary RN notified 0, pt resting quietly Edema  none 
 
 none Orders: Duration:   Start:    1545 End:    1915 Total:   3.5hr  
Dialyzer:   Dialyzer/Set Up Inspection: Linnette Yanes (02/05/20 1545) K Bath:   Dialysate K (mEq/L): 3 (02/05/20 1545) Ca Bath:   Dialysate CA (mEq/L): 2.5 (02/05/20 1545) Na/Bicarb:   Dialysate NA (mEq/L): 140 (02/05/20 1545) Target Fluid Removal:   Goal/Amount of Fluid to Remove (mL): 2000 mL (02/05/20 1545) Access Type & Location:   ANDREINA AVF: skin CDI. No s/s of infection. No issues with cannulation or hemostasis. Running well at . Labs Obtained/Reviewed Critical Results Called   Date when labs were drawn- 
Hgb-   
HGB Date Value Ref Range Status 02/05/2020 9.0 (L) 12.1 - 17.0 g/dL Final  
 
K-   
Potassium Date Value Ref Range Status 02/05/2020 3.3 (L) 3.5 - 5.1 mmol/L Final  
 
Ca-  
Calcium Date Value Ref Range Status 02/05/2020 8.1 (L) 8.5 - 10.1 MG/DL Final  
 
Bun-  
BUN Date Value Ref Range Status 02/05/2020 26 (H) 6 - 20 MG/DL Final  
 
Creat-  
Creatinine Date Value Ref Range Status 02/05/2020 5.44 (H) 0.70 - 1.30 MG/DL Final  
 
  
Medications/ Blood Products Given Name   Dose   Route and Time None ordered Blood Volume Processed (BVP):    77.8L Net Fluid Removed:  2000ml Comments Time Out Done: 0659 Primary Nurse Rpt Pre: Matt Hager RN 
Primary Nurse Rpt Post: Matt Payment, RN 
Pt Education: need for procedure; family: consents; nodded to understanding/ verbalized understanding Care Plan: ongoing Tx Summary: 
Arrived to patient room. Pt intubated/ sedated, family at bedside. Hospital consent on file. Tristian Mora consent obtained from family. Consent signed & on file. SBAR received from Primary RN.  and tested per policy. 1545: Pt suddenly awake. Explained to patient he was about to be cannulated to start HD. Pt nodded ok. Pt cannulated with 75C needles per policy & without issue. VSS. Dialysis Tx initiated. 1600: Neurology at bedside for assessment. Pt nodding responses. VSS. 
1700: Primary RN checked BG 67mg/dL. Recheck 64mg/dl. D10 started IV by Primary RN. VSS. Pt informed of BG and that he was getting D10 to help increase it. Pt nodded approval.  
2187: BG 116mg/dL. VSS. 1745: 100ml NS flush given for clots prevention. Clotting visible in venous chamber. 1830: Primary RN notified of /90. Pt received IV hydralazine. Other VSS. 1915: Tx ended. VSS. All possible blood returned to patient. Hemostasis achieved without issue. Bed locked and in the lowest position, call bell and belongings in reach. SBAR given to Primary, RN. Patient is stable at time of my departure. All Dialysis related medications have been reviewed. Admiting Diagnosis: PEA/ Cardiac arrest?/ Seizure? Pt's previous clinic- [facility] Consent signed - Informed Consent Verified: Yes (02/05/20 1545) Buffy Consent - obtained/ signed/ filed Hepatitis Status- Neg Ag 1/8/2020; Clinic; Susceptible Machine #- Machine Number: [id number] (02/05/20 1545) Telemetry status- Tele, NSR Pre-dialysis wt. -

## 2020-02-06 NOTE — PROGRESS NOTES
HD TRANSFER - OUT REPORT: 
 
Verbal report given to Lynn Garcia RN on Peggy Vivar being transferred to ICU for routine progression of care Report consisted of patient's Situation, Background, Assessment and  
Recommendations(SBAR). Information from the following report(s) SBAR, Procedure Summary, Intake/Output, MAR and Recent Results was reviewed with the receiving nurse. Method:  $$ Method: Hemodialysis (02/05/20 1545) Fluid Removed  NET Fluid Removed (mL): 2000 ml (02/05/20 1915) Patient response to treatment:  Unchanged End Time  Hemodialysis End Time: 1915 (02/05/20 1915) If not documented, dialysis nurse to update post-dialysis row in HD/Filtration flowsheet Medications /Volume expansion agents or Fluid boluses administered during treatment? no 
 
Post-dialysis medication administration due?  yes Remind nurse to administer post-HD medication upon return to unit. Fistula hemostasis? yes Line heparinization? no 
 
Lines: ANDREINA AVF, arterial site 10min, venous site 5 min hold times Opportunity for questions and clarification was provided.    
 
Patient transported with: N/A

## 2020-02-06 NOTE — PROGRESS NOTES
1930: Bedside and Verbal shift change report given to Lexi King RN (oncoming nurse) by Tre Valentin RN (offgoing nurse). Report included the following information SBAR, Kardex, ED Summary, Intake/Output, MAR, Recent Results, Cardiac Rhythm NSR and Alarm Parameters . 2000: -Dialysis nurse reported hypotension, gave pt albumin per NP Danyell. -POC BG 18, checked on opposite hand BG 14. Infused 250mL of D10. BG recheck 64. Infused another 250mL of D10 per NP Danyell. BG recheck 70. Infused another 250mL of D10 per NP Danyell. BG recheck 104. Started D5 0.45% NaCl per NP Danyell.  
 
-Pt not withdrawing, following commands, or opening eyes. Turned off Precedex to obtain better neuro assessment. Precedex off for >30 minutes and pt still unresponsive. Occasionally moves LUE slightly but no withdraws or movement in other extremities. 2200: Seizure like activity noted by Daniel Morales RN while this nurse was out of the room. Neuro NP to bedside, ativan given. 2127-1668: Off floor for MRI. No issues. 0100: POC BG 30, checked on opposite hand BG 36. Infused 250mL of D10. BG recheck 98. TF started per NP Danyell to aid in resolving hypoglycemic episodes. 0130: Primary Nurse Vazquez Gross RN and Daniel Morales RN performed a dual skin assessment on this patient No impairment noted Ruddy score is 11 
 
0315: POC BG 76. Infused 125mL of D10. Discussed with NP Danyell, changed continuous fluids to D10, discontinued D5 0.45% NaCl.  
 
0600: Pt's wife, Yanni, called for an update. Told her the results of the MRI and that the pt was still not responding neurologically. She said pt has been very weak and tired after dialysis for months now and that his strength is declining. She said she would come to the hospital later today. 0730: Bedside and Verbal shift change report given to Josep Santos RN (oncoming nurse) by Lexi King RN (offgoing nurse).  Report included the following information SBAR, Kardex, ED Summary, Intake/Output, Recent Results, Cardiac Rhythm NSR/SB, Alarm Parameters  and Dual Neuro Assessment.

## 2020-02-06 NOTE — PROGRESS NOTES
[facility] 
 [address] [address] [facility] [address] [facility] [facility] Phone: [phone]   [phone]   
  
Nephrology Progress Note Tim Duane     1960     [id number] Date of Admission : 2/5/2020 02/06/20 CC: Follow up for ESRD Assessment and Plan ESRD- HD  
- Dialysis dependent since 4/2018. 
- Dialyzes MWF at [address] [place] [address]. F/b Dr Constantin Peralta  
- next HD tomorrow  
  
HTN  
- stable now  
  
Seizure - per CCM / Neuro 
  
S/P PEA arrest  
  
Hx of R Pleural effusion w/ Trapped Lung - s/p VATS and decortication 4/2018 
  
Anemia of CKD  
- resume LUIS  
  
Solitary Kidney S/p Prior Left Nephrectomy for RCC 
  
Sec HTPH 
- resume binders if he gets TF  
  
Care Plan discussed with:  RN Interval History: 
Seen and examined Seizure overnight Now reportedly no gag Review of Systems: Review of systems not obtained due to patient factors. Current Medications:  
Current Facility-Administered Medications Medication Dose Route Frequency  dextrose 10% infusion  50 mL/hr IntraVENous CONTINUOUS  
 sodium chloride (NS) flush 5-40 mL  5-40 mL IntraVENous Q8H  
 sodium chloride (NS) flush 5-40 mL  5-40 mL IntraVENous PRN  
 ondansetron (ZOFRAN) injection 4 mg  4 mg IntraVENous Q4H PRN  
 dexmedeTOMidine (PRECEDEX) 400 mcg in 0.9% sodium chloride 100 mL infusion  0.2-0.7 mcg/kg/hr IntraVENous TITRATE  glucose chewable tablet 16 g  4 Tab Oral PRN  
 glucagon (GLUCAGEN) injection 1 mg  1 mg IntraMUSCular PRN  
 dextrose 10% infusion 0-250 mL  0-250 mL IntraVENous PRN  
 cefTRIAXone (ROCEPHIN) 1 g in 0.9% sodium chloride (MBP/ADV) 50 mL  1 g IntraVENous Q24H  chlorhexidine (ORAL CARE KIT) 0.12 % mouthwash 15 mL  15 mL Oral Q12H  pantoprazole (PROTONIX) 40 mg in 0.9% sodium chloride 10 mL injection  40 mg IntraVENous DAILY  epoetin tyler-epbx (RETACRIT) injection 10,000 Units  10,000 Units SubCUTAneous Q MON, WED & FRI  
  niCARdipine (CARDENE) 25 mg in 0.9% sodium chloride 250 mL infusion  0-15 mg/hr IntraVENous TITRATE  hydrALAZINE (APRESOLINE) 20 mg/mL injection 10 mg  10 mg IntraVENous Q6H PRN  
 acetaminophen (TYLENOL) solution 500 mg  500 mg Per NG tube Q6H PRN  propofol (DIPRIVAN) 10 mg/mL infusion  0-50 mcg/kg/min IntraVENous TITRATE Allergies Allergen Reactions  Tramadol Other (comments) Brought down blood sugar too fast  
 
 
Objective: 
Vitals:   
Vitals:  
 02/06/20 0500 02/06/20 0533 02/06/20 0600 02/06/20 0700 BP: 100/70  91/62 135/74 Pulse: 63 (!) 56 (!) 59 Resp: 18 18 18 Temp:      
TempSrc:      
SpO2: 93% 95% 95% 100% Weight:      
 
Intake and Output: 
No intake/output data recorded. 02/04 1901 - 02/06 0700 In: 2394.4 [I.V.:2044.4] Out: 2045 [Urine:45] Physical Examination: 
General:  On vent HEENT: PERRL,  + Pallor , No Icterus Neck: Supple,no mass palpable Lungs : CTA 
CVS: RRR, S1 S2 normal, No murmur Abdomen: Soft, NT, BS + Extremities: trace Edema, RUE AVF + thrill MS: No joint swelling, erythema, warmth Neurologic:unresponsive on vent Psych: Unable to assess 
  
 
[]    High complexity decision making was performed 
[]    Patient is at high-risk of decompensation with multiple organ involvement Lab Data Personally Reviewed: I have reviewed all the pertinent labs, microbiology data and radiology studies during assessment. Recent Labs 02/06/20 
0430 02/05/20 
2047 02/05/20 
0310 * 135* 132* K 4.9 2.9* 3.3*  
CL 97 102 97 CO2 29 28 27 * 23* 341* BUN 15 13 26* CREA 3.71* 3.33* 5.44* CA 7.6* 7.4* 8.1*  
MG 1.9  --   --   
PHOS 2.8  --   --   
ALB  --   --  2.2*  
SGOT  --   --  45* ALT  --   --  51 INR  --   --  1.2* Recent Labs 02/06/20 
0430 02/05/20 
0310 WBC 6.7 8.2 HGB 7.7* 9.0*  
HCT 24.4* 27.9*  
PLT 86* 109* No results found for: SDES Lab Results Component Value Date/Time Culture result: No growth (<1,000 CFU/ML) 02/05/2020 08:48 AM  
 Culture result: NO GROWTH 14 DAYS 01/03/2019 12:26 PM  
 Culture result: NO GROWTH ON SOLID MEDIA AT 14 DAYS 01/03/2019 11:45 AM  
 Culture result: (A) 01/03/2019 11:45 AM  
  STAPHYLOCOCCUS HOMINIS SUBSPECIES HOMINIS ISOLATED FROM THIO BROTH ONLY Culture result: NO GROWTH 4 DAYS 05/01/2018 10:14 AM  
 Culture result: Culture performed on Fluid swab specimen 05/01/2018 10:14 AM  
 Culture result: NO GROWTH 4 DAYS 05/01/2018 10:14 AM  
 
Recent Results (from the past 24 hour(s)) TROPONIN I Collection Time: 02/05/20 10:19 AM  
Result Value Ref Range Troponin-I, Qt. 0.12 (H) <0.05 ng/mL GLUCOSE, POC Collection Time: 02/05/20 12:17 PM  
Result Value Ref Range Glucose (POC) 253 (H) 65 - 100 mg/dL Performed by Katalina Cybera GLUCOSE, POC Collection Time: 02/05/20  4:57 PM  
Result Value Ref Range Glucose (POC) 67 65 - 100 mg/dL Performed by Darden Cybera GLUCOSE, POC Collection Time: 02/05/20  5:00 PM  
Result Value Ref Range Glucose (POC) 64 (L) 65 - 100 mg/dL Performed by Darden Cybera GLUCOSE, POC Collection Time: 02/05/20  5:16 PM  
Result Value Ref Range Glucose (POC) 116 (H) 65 - 100 mg/dL Performed by Darden Cybera GLUCOSE, POC Collection Time: 02/05/20  8:33 PM  
Result Value Ref Range Glucose (POC) 18 (LL) 65 - 100 mg/dL Performed by Tristan Anaya GLUCOSE, POC Collection Time: 02/05/20  8:35 PM  
Result Value Ref Range Glucose (POC) 14 (LL) 65 - 100 mg/dL Performed by Tristan Anaya METABOLIC PANEL, BASIC Collection Time: 02/05/20  8:47 PM  
Result Value Ref Range Sodium 135 (L) 136 - 145 mmol/L Potassium 2.9 (L) 3.5 - 5.1 mmol/L Chloride 102 97 - 108 mmol/L  
 CO2 28 21 - 32 mmol/L Anion gap 5 5 - 15 mmol/L Glucose 23 (LL) 65 - 100 mg/dL BUN 13 6 - 20 MG/DL  Creatinine 3.33 (H) 0.70 - 1.30 MG/DL  
 BUN/Creatinine ratio 4 (L) 12 - 20    
 GFR est AA 23 (L) >60 ml/min/1.73m2 GFR est non-AA 19 (L) >60 ml/min/1.73m2 Calcium 7.4 (L) 8.5 - 10.1 MG/DL  
GLUCOSE, POC Collection Time: 02/05/20  9:22 PM  
Result Value Ref Range Glucose (POC) 64 (L) 65 - 100 mg/dL Performed by Maynor Norris GLUCOSE, POC Collection Time: 02/05/20  9:51 PM  
Result Value Ref Range Glucose (POC) 109 (H) 65 - 100 mg/dL Performed by Leonie Paul, POC Collection Time: 02/05/20 10:12 PM  
Result Value Ref Range Glucose (POC) 70 65 - 100 mg/dL Performed by Maynor Norris GLUCOSE, POC Collection Time: 02/05/20 10:52 PM  
Result Value Ref Range Glucose (POC) 104 (H) 65 - 100 mg/dL Performed by Maynor Norris GLUCOSE, POC Collection Time: 02/06/20  1:08 AM  
Result Value Ref Range Glucose (POC) 30 (LL) 65 - 100 mg/dL Performed by Maynor Norris GLUCOSE, POC Collection Time: 02/06/20  1:11 AM  
Result Value Ref Range Glucose (POC) 36 (LL) 65 - 100 mg/dL Performed by Maynor Norris GLUCOSE, POC Collection Time: 02/06/20  1:56 AM  
Result Value Ref Range Glucose (POC) 98 65 - 100 mg/dL Performed by Maynor Norris GLUCOSE, POC Collection Time: 02/06/20  3:14 AM  
Result Value Ref Range Glucose (POC) 76 65 - 100 mg/dL Performed by Maynor Norris GLUCOSE, POC Collection Time: 02/06/20  3:57 AM  
Result Value Ref Range Glucose (POC) 98 65 - 100 mg/dL Performed by Maynor Norris METABOLIC PANEL, BASIC Collection Time: 02/06/20  4:30 AM  
Result Value Ref Range Sodium 130 (L) 136 - 145 mmol/L Potassium 4.9 3.5 - 5.1 mmol/L Chloride 97 97 - 108 mmol/L  
 CO2 29 21 - 32 mmol/L Anion gap 4 (L) 5 - 15 mmol/L Glucose 116 (H) 65 - 100 mg/dL BUN 15 6 - 20 MG/DL Creatinine 3.71 (H) 0.70 - 1.30 MG/DL  
 BUN/Creatinine ratio 4 (L) 12 - 20 GFR est AA 20 (L) >60 ml/min/1.73m2 GFR est non-AA 17 (L) >60 ml/min/1.73m2 Calcium 7.6 (L) 8.5 - 10.1 MG/DL MAGNESIUM  
 Collection Time: 02/06/20  4:30 AM  
Result Value Ref Range Magnesium 1.9 1.6 - 2.4 mg/dL PHOSPHORUS Collection Time: 02/06/20  4:30 AM  
Result Value Ref Range Phosphorus 2.8 2.6 - 4.7 MG/DL  
CBC W/O DIFF Collection Time: 02/06/20  4:30 AM  
Result Value Ref Range WBC 6.7 4.1 - 11.1 K/uL  
 RBC 3.22 (L) 4.10 - 5.70 M/uL HGB 7.7 (L) 12.1 - 17.0 g/dL HCT 24.4 (L) 36.6 - 50.3 % MCV 75.8 (L) 80.0 - 99.0 FL  
 MCH 23.9 (L) 26.0 - 34.0 PG  
 MCHC 31.6 30.0 - 36.5 g/dL  
 RDW 17.6 (H) 11.5 - 14.5 % PLATELET 86 (L) 266 - 400 K/uL MPV ABNORMAL 8.9 - 12.9 FL  
 NRBC 0.0 0  WBC ABSOLUTE NRBC 0.00 0.00 - 0.01 K/uL GLUCOSE, POC Collection Time: 02/06/20  6:47 AM  
Result Value Ref Range Glucose (POC) 99 65 - 100 mg/dL Performed by Kristina Mayberry Total time spent with patient:  xxx   min. Care Plan discussed with: 
Patient Family RN Consulting Physician [address] [place] [address],      
 
I have reviewed the flowsheets. Chart and Pertinent Notes have been reviewed. No change in PMH ,family and social history from Consult note.  
 
 
Raymond Eckert MD

## 2020-02-06 NOTE — DIABETES MGMT
[address] Followup Progress Note Presentation Morris Garcia is a 64 y.o. male admitted with seizure like symptoms and PEA on admission. Intracranial bleed ruled out. Consulted by Provider for advanced specialty nursing care related to inpatient diabetes management. Subjective Overnight patient had extremely low BG (14), not responsive and having seizure like activity. -Hypoglycemia protocol followed and BG came up to 64. A total of three runs of D10 was infused. BG came up to 109, then 70. Likely due to NPO status. He had 10 units of Lantus and 10 units of Lispro yesterday morning b/c of his high BG yesterday morning of 353, 253. He remains on the ventilator. Objective Physical exam 
General Intubated; no sedation and patient remains unresponsieve. RN performed neuro checks-no gag, no response to painful stimuli. Neurology following. Vital Signs Visit Vitals /74 Pulse (!) 59 Temp 96.3 °F (35.7 °C) Resp 18 Wt 76 kg (167 lb 8.8 oz) SpO2 100% BMI 21.51 kg/m² Skin Warm and dry Heart Regular rate and rhythm. No murmurs, rubs or gallops Lungs Clear to auscultation without rales or rhonchi Extremities No foot wounds Laboratory CKD- on HD 
 
 
 
BG trends: had extreme lows overnight. TF ordered and started @ 0145 at 20cc/hr (Nepro). RECOMMENDATION: 
 
IF BG trend over 200, can ADD Lantus 7units daily. HOLD SSI until BS>200. Spoke with  Dr. Manuel Ramirez re: recommendations. Assessment and Plan Nursing Diagnosis Risk for unstable blood glucose pattern Nursing Intervention Domain 6209 Decision-making Support Nursing Interventions Examined current inpatient diabetes control Explored factors facilitating and impeding inpatient management Identified self-management practices impeding diabetes control Explored corrective strategies with patient and responsible inpatient provider Informed patient of rational for basal bolus insulin strategy while hospitalized Thanks, Signed By: Fernando Bustos RN Clinical Nurse Specialist 
[facility] [phone] February 6, 2020

## 2020-02-06 NOTE — WOUND CARE
Wound Consult:  New Patient Visit. Chart reviewed. Consulted for diabetic extremities per consult. Spoke with patients nurses and at bed to turn/provide incontinence care for large/copious watery stools; FMS was inserted by staff during visit. Patient is resting on a SPORT bed. Mr. Derek Vora is intubated on vent; sedated; some eye opening very lightly during visit. Assessment: The following areas noted POA: 
Left index / 2nd finger - near tip, 2.7 x 1.7 cm area of non-blanching red, purple and eschar formation; dry; then note a decompressed intact red blister more proximally on finger ~ 1 x 0.5 cm. No surrounding redness. Patient has diabetic peripheral neuropathy. Left 3rd finger - 1 x 1 cm area near tip of hyperpigmented skin. Left 4th finger - 2 x 1.5 cm area of non-blanching red/purple and light eschar formation, dry, no surrounding redness. Right 3rd finger - 1 x 0.5 cm serous intact blister, no surrounding redness. Left dorsal 5th toe - 0.7 x 0.6 cm area of thick eschar with hyperpigmented skin below on toe; no drainage, dry, stable. Diabetic injury. Toes on left foot with some dry rough areas noted; some hyperpigmentation on areas of dorsal toes, no other skin injuries noted. No discoloration to left or right heel. Sacrum - upper right of is area of hyperpigmented thin scabs; ~ 2 x 1 cm in area; no surrounding discoloration; no redness or discoloration to central bony prominence; towards left of sacrum is hyperpigmented intact skin - no sign of acute injury/discoloration noted. Large well healed upper abdominal transverse scar across abdominal wall - note patient has history of pancreatic pseudocyst. 
Treatment: 
Used Hydrogaurd to moisturize all extremities and sacrum/buttocks to better assess skin discoloration changes. Staff placed FMS; used Z guard to perianal area for prevention. Turned and repositioned after care/heels being floated with pillows. Wound Recommendations: Would keep fingers/left 5th toe dry; no dressings needed at this time. Skin Care Recommendations: 1. Minimize friction/shear: minimize layers of linen/pads under patient. Unit using Mepilex Sacral dressings for prevention - this would assist in decreasing friction/shear to sacrum - if FMS contains stool - would use sacral border dressing - discussed with patient's nurse. 2. Off load pressure/reposition: continue to turn and reposition approximately every 2 hours; float heels or use Prevalon boots; SPORT bed. 3. Manage Moisture - keep skin folds dry; incontinence skin care including barrier ointment in perianal area around FMS as needed. 4. Continue to monitor nutrition, pain, and skin risk scale, and skin assessment. Plan: We will continue to reassess routinely and as needed. Jacquelin Harrison RN,[facility] [phone] Pager [phone]

## 2020-02-06 NOTE — PROGRESS NOTES
NUTRITION COMPLETE ASSESSMENT 
 
RECOMMENDATIONS:  
Fecal elastase test 
 
3 capsules Creon 24,000 every 8 hr 
  Dissolve in nectar thick apple juice Discontinue D10 if BG >200 mg/dl Interventions/Plan:  
Food/Nutrient Delivery:   Modify rate, concentration, composition, and schedule: Nepro @ 50 ml/hr with 120 ml water flush q 4 hr 
 
Assessment:  
Reason for Assessment:  
[x] Provider Consult-Tube Feeding management Tube Feeding: Nepro @ 20 ml/hr with 50 ml water flush q 6 hr 
Diet: NPO Nutritionally Significant Medications: [x] Reviewed & Includes: Protonix, D10 @ 50 ml/hr, Diprivan Subjective: 
Per wife-pt has diarrhea every day. History of pancreatitis-pt had \"some of it removed\" at [facility] in 2006. States patient has EPI. Cost of Creon too expensive so have not been on it for a year. Objective: 
Mr Karina Gonzales was admitted with ESRD. PMHx: ESRD, DM 2, HTN, CM/HFrEF, CAD. Noted: AMS, seizure; PEA arrest-acute hypoxic respiratory failure-remains intubated; acute toxic metabolic encephalopathy. Hypoglycemic overnight-D10 ordered; tube feedings started. Patient has tolerated enteral feeding well thus far; 2 stools this morning per RN. Suspect pt's diarrhea PTA d/t fat malabsorption 2/2 EPI. Recommend resuming Creon and monitoring output. May wish to check fecal elastase to confirm EPI. Per wife pt has had gradual weight loss over the years; current weight 88% of IBW. Patient appears thin but fairly well-nourished. BG trending up but less than 180 mg/dl. Plan to add Lantus if rises >200 mg/dl. Suggest discontinuing D10. Noted poor BG control PTA per elevated A1c. Recommend increasing tube feeding to goal to meet nutrient needs: Nepro @ 50 ml/hr with 120 ml water flush q 4 hr. This will provide 1200 ml, 2160 calories, 97 gm protein, 115 gm fat and 1600 ml free water (tube feeding/flush) per day. Estimated Nutrition Needs:  
Kcals/day: 2115 Kcals/day Protein: 91 g(1.2g/kg) Fluid: (~1800 ml/per renal) Based On: [facility] (2231J) Weight Used: Actual wt(76 kg) Pt expected to meet estimated nutrient needs:  [x]   Yes via tube feeding     []  No  [] Unable to predict at this time Nutrition Diagnosis:  
1. Inadequate oral intake related to PEA arrest as evidenced by NPO d/t intubation. Goals: Tolerate tube feeding at goal in next 24 hr. 
  
Monitoring & Evaluation: - Enteral/parenteral nutrition intake - Weight/weight change, Electrolyte and renal profile, Glucose profile, CV-pulmonary Previous Nutrition Goals Met: N/A Previous Recommendations: N/A Education & Discharge Needs: 
 [x] None Identified 
 [] Identified and addressed [x] Participated in care plan, discharge planning, and/or interdisciplinary rounds Cultural, Quaker and ethnic food preferences identified: 
 None Skin Integrity: [x]Intact  []Other Edema: [x]None []Other Last BM: 2/6 Food Allergies: [x]None []Other Anthropometrics:   
Weight Loss Metrics 2/6/2020 2/5/2020 1/30/2020 1/29/2020 1/21/2020 1/14/2020 12/24/2019 Today's Wt 167 lb 8.8 oz - 173 lb 6.4 oz 169 lb 173 lb 170 lb 171 lb BMI - 21.51 kg/m2 22.26 kg/m2 21.7 kg/m2 22.21 kg/m2 21.83 kg/m2 21.96 kg/m2 Last 3 Recorded Weights in this Encounter 02/05/20 0230 02/06/20 0400 02/06/20 0955 Weight: 75.7 kg (166 lb 14.2 oz) 76 kg (167 lb 8.8 oz) 76 kg (167 lb 8.8 oz) Weight Source: Bed Height: 6' 2\" (188 cm), Body mass index is 21.51 kg/m². IBW : 86.2 kg (190 lb), % IBW (Calculated): 88.18 % 
 ,   
 
Labs:   
Lab Results Component Value Date/Time  Sodium 130 (L) 02/06/2020 04:30 AM  
 Potassium 4.9 02/06/2020 04:30 AM  
 Chloride 97 02/06/2020 04:30 AM  
 CO2 29 02/06/2020 04:30 AM  
 Glucose 116 (H) 02/06/2020 04:30 AM  
 BUN 15 02/06/2020 04:30 AM  
 Creatinine 3.71 (H) 02/06/2020 04:30 AM  
 Calcium 7.6 (L) 02/06/2020 04:30 AM  
 Magnesium 1.9 02/06/2020 04:30 AM  
 Phosphorus 2.8 02/06/2020 04:30 AM  
 Albumin 2.2 (L) 02/05/2020 03:10 AM  
 
Lab Results Component Value Date/Time Hemoglobin A1c 10.2 (H) 02/05/2020 03:10 AM  
 Hemoglobin A1c (POC) 11.8 11/19/2019 12:00 PM  
 
Lab Results Component Value Date/Time Glucose (POC) 150 (H) 02/06/2020 09:33 AM  
  
Lab Results Component Value Date/Time ALT (SGPT) 51 02/05/2020 03:10 AM  
 AST (SGOT) 45 (H) 02/05/2020 03:10 AM  
 Alk.  phosphatase 134 (H) 02/05/2020 03:10 AM  
 Bilirubin, direct 0.2 02/05/2020 03:10 AM  
 Bilirubin, total 0.6 02/05/2020 03:10 AM  
  
 
Yudith Sullivan RD [facility]
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
## 2020-02-06 NOTE — PROGRESS NOTES
Neurocritical Care Brief Progress Note: 
 
Called to bedside by nursing for seizure like activity. Pt noted with rhythmic movement of left arm, jaw clenching and nystagmus. Precedex turned off, pt is unresponsive to sternal rub. PERRL, 3 mm. Immediately prior to seizure-like activity, BG was 14, was given 250 ml of D10. On recheck, glucose was 64, so was given an additional 250 ml D10. Activity started while second bolus of D10 was infusing. Recheck glucose after second D10 bolus completed was 109 and activity continued. Glucose recheck 15 minutes later and was 70, so was again given D10. 
 - D5 maintenance fluids and tube feeds added per intensivist 
 - Likely seizure in the setting of hypoglycemia, cont glucose checks and hypoglycemia protocol - 1 mg Ativan IV given - MRI brain tonight D/w intensivist NP Juan Humphries. Abdelrahman Mantilla NP Neurocritical Care Nurse Practitioner

## 2020-02-06 NOTE — PROCEDURES
PROCEDURE: ROUTINE INPATIENT EEG 
NAME:   Becky Dangelo ACCOUNT NUMBER : [de-identified] MRN:   [id number] DATE OF SERVICE: 2/6/2020 HISTORY/INDICATION: Pt is a 65yo male admitted after an episode of LOC, doing well yesterday and following commands, last night had hypoglycemia with seizure activity. EEG is performed to assess for evidence of ongoing seizure. MEDICATIONS:  
Current Facility-Administered Medications Medication Dose Route Frequency Provider Last Rate Last Dose  dextrose 10% infusion  50 mL/hr IntraVENous CONTINUOUS Danyell Rico NP 50 mL/hr at 02/06/20 0931 50 mL/hr at 02/06/20 5700  LORazepam (ATIVAN) 2 mg/mL injection  midodrine (PROAMITINE) tablet 10 mg  10 mg Oral Q8H Jarvis Marinelli DO   10 mg at 02/06/20 1604  insulin glargine (LANTUS) injection 7 Units  7 Units SubCUTAneous DAILY Jarvis Marinelli DO      
 sodium chloride (NS) flush 5-40 mL  5-40 mL IntraVENous Q8H Qasim Viveros NP   10 mL at 02/06/20 1611  
 sodium chloride (NS) flush 5-40 mL  5-40 mL IntraVENous PRN Niko Viveros NP      
 ondansetron (ZOFRAN) injection 4 mg  4 mg IntraVENous Q4H PRN Niko Viveros NP      
 dexmedeTOMidine (PRECEDEX) 400 mcg in 0.9% sodium chloride 100 mL infusion  0.2-0.7 mcg/kg/hr IntraVENous TITRATE Niko Viveros NP   Stopped at 02/05/20 2120  
 glucose chewable tablet 16 g  4 Tab Oral PRN Niko Viveros NP      
 glucagon (GLUCAGEN) injection 1 mg  1 mg IntraMUSCular PRN Niko Viveros NP      
 dextrose 10% infusion 0-250 mL  0-250 mL IntraVENous PRN Niko Viveros  mL/hr at 02/06/20 0316 125 mL at 02/06/20 0316  cefTRIAXone (ROCEPHIN) 1 g in 0.9% sodium chloride (MBP/ADV) 50 mL  1 g IntraVENous Q24H Jarvis Marinelli  mL/hr at 02/06/20 0932 1 g at 02/06/20 0932  chlorhexidine (ORAL CARE KIT) 0.12 % mouthwash 15 mL  15 mL Oral Q12H Qasim Viveros NP   15 mL at 02/06/20 0900  pantoprazole (PROTONIX) 40 mg in 0.9% sodium chloride 10 mL injection  40 mg IntraVENous DAILY Dina Viveros NP   40 mg at 02/06/20 9438  epoetin tyler-epbx (RETACRIT) injection 10,000 Units  10,000 Units SubCUTAneous Q MON, WED & Ana Strickland MD   10,000 Units at 02/05/20 2218  niCARdipine (CARDENE) 25 mg in 0.9% sodium chloride 250 mL infusion  0-15 mg/hr IntraVENous TITRATE Jarvis Marinelli DO   Stopped at 02/05/20 2328  hydrALAZINE (APRESOLINE) 20 mg/mL injection 10 mg  10 mg IntraVENous Q6H PRN Jarvis Marinelli DO   10 mg at 02/05/20 1842  acetaminophen (TYLENOL) solution 500 mg  500 mg Per NG tube Q6H PRN Jarvis Marinelli DO   500 mg at 02/05/20 1244  propofol (DIPRIVAN) 10 mg/mL infusion  0-50 mcg/kg/min IntraVENous TITRATE Danyell Rico NP   Stopped at 02/06/20 1202 CONDITIONS OF RECORDING: This is a routine 21-channel EEG recording performed in accordance with the international 10-20 system with one channel devoted to limited EKG. This study was done during an intubated, sedated, poorly responsive state. Photic stimulation was performed as an activating procedure. DESCRIPTION:  
As the record opens, there is diffuse generalized suppression of the background with intermittent bursts of 5Hz activity seen. Photic stimulation did not significantly alter the tracing. No normal sleep architecture is seen. There are no focal abnormalities, epileptiform discharges, or electrographic seizures seen. INTERPRETATION: Abnormal EEG due to diffuse generalized suppression and delta slowing of the background. CLINICAL CORRELATION: Finding is c/w a severe encephalopathy which can have many etiologies including metabolic, toxic, or medication effect. Clinical correlation advised.   
 
Baltazar Campbell MD
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
## 2020-02-06 NOTE — PROCEDURES
PROCEDURE: ROUTINE INPATIENT EEG 
NAME:   Tvao Cortés ACCOUNT NUMBER : [de-identified] MRN:   [id number] DATE OF SERVICE: 2/5/20 HISTORY/INDICATION: Patient is a 71-year-old male admitted  after an episode of syncope, currently intubated and sedated. EEG is ordered to assess for evidence of underlying epilepsy. MEDICATIONS:  
Current Facility-Administered Medications Medication Dose Route Frequency Provider Last Rate Last Dose  dextrose 10% infusion  50 mL/hr IntraVENous CONTINUOUS Danyell Rico NP 50 mL/hr at 02/06/20 0931 50 mL/hr at 02/06/20 7532  LORazepam (ATIVAN) 2 mg/mL injection  sodium chloride (NS) flush 5-40 mL  5-40 mL IntraVENous Q8H Qasim Viveros NP   10 mL at 02/06/20 2107  sodium chloride (NS) flush 5-40 mL  5-40 mL IntraVENous PRN Ino Viveros NP      
 ondansetron (ZOFRAN) injection 4 mg  4 mg IntraVENous Q4H PRN Ino Viveros NP      
 dexmedeTOMidine (PRECEDEX) 400 mcg in 0.9% sodium chloride 100 mL infusion  0.2-0.7 mcg/kg/hr IntraVENous TITRATE Ino Viveros NP   Stopped at 02/05/20 2120  
 glucose chewable tablet 16 g  4 Tab Oral PRN Ino Viveros NP      
 glucagon (GLUCAGEN) injection 1 mg  1 mg IntraMUSCular PRN Ino Viveros NP      
 dextrose 10% infusion 0-250 mL  0-250 mL IntraVENous PRN Ino Viveros  mL/hr at 02/06/20 0316 125 mL at 02/06/20 0316  cefTRIAXone (ROCEPHIN) 1 g in 0.9% sodium chloride (MBP/ADV) 50 mL  1 g IntraVENous Q24H Jarvis Marinelli  mL/hr at 02/06/20 0932 1 g at 02/06/20 0932  chlorhexidine (ORAL CARE KIT) 0.12 % mouthwash 15 mL  15 mL Oral Q12H Qasim Viveros NP   15 mL at 02/05/20 2219  pantoprazole (PROTONIX) 40 mg in 0.9% sodium chloride 10 mL injection  40 mg IntraVENous DAILY Ino Viveros NP   40 mg at 02/06/20 1926  epoetin tyler-epbx (RETACRIT) injection 10,000 Units  10,000 Units SubCUTAneous Q MON, WED & Aaron Sal MD   10,000 Units at 02/05/20 2218  niCARdipine (CARDENE) 25 mg in 0.9% sodium chloride 250 mL infusion  0-15 mg/hr IntraVENous TITRATE Jarvis Marinelli DO   Stopped at 02/05/20 2328  hydrALAZINE (APRESOLINE) 20 mg/mL injection 10 mg  10 mg IntraVENous Q6H PRN Jarvis Marinelli DO   10 mg at 02/05/20 1842  acetaminophen (TYLENOL) solution 500 mg  500 mg Per NG tube Q6H PRN Jarvis Marinelli DO   500 mg at 02/05/20 1244  propofol (DIPRIVAN) 10 mg/mL infusion  0-50 mcg/kg/min IntraVENous TITRATE Danyell Rico NP 11.4 mL/hr at 02/06/20 0945 25 mcg/kg/min at 02/06/20 0945 CONDITIONS OF RECORDING: This is a routine 21-channel EEG recording performed in accordance with the international 10-20 system with one channel devoted to limited EKG. This study was done during states of wakefulness and drowsiness. Photic stimulation was performed as an activating procedure. DESCRIPTION:  
There is diffuse muscle artifact seen, however there times when this dissipates and diffuse generalized 2 to 4 Hz low amplitude activity is seen that could be consistent with drowsiness or sleep. Upon maximal arousal the posterior dominant rhythm has a frequency of 6Hz with an amplitude of 40uV. This activity is symmetric over the bilateral posterior derivations. Photic stimulation did not significantly alter the tracing. The patient becomes drowsy, but deeper stages of sleep are not attained. There are no focal abnormalities, epileptiform discharges, or electrographic seizures seen. INTERPRETATION: Abnormal EEG due to diffuse generalized delta and theta slowing of the background CLINICAL CORRELATION: Findings consistent with a mild to moderate encephalopathy which can have many etiologies including metabolic, toxic, or medication effect such as from propofol.  
 
Greg Monge MD

## 2020-02-06 NOTE — PROGRESS NOTES
0730: Bedside shift change report given to [facility] YASHIRA Rn (oncoming nurse) by Verónica Vora (offgoing nurse). Report included the following information SBAR, Kardex, ED Summary, Procedure Summary, Intake/Output, MAR, Accordion, Recent Results and Med Rec Status. Primary Nurse Jerman Levi and Essie Manuel RN performed a dual skin assessment on this patient Impairment noted- see wound doc flow sheet Ruddy score is 12. 
 
0800: Patient off sedation, not responding. Seizure like activity per Dr. Tamara Piper. Orders received. Neurology notified. New orders received. Patient with 3 liquid BMs since admitt. Orders received. 0930: Wife at bedside, updated. Express some concern for patient receiving Ativan, passed along to Dr Tamara Piper. 
1000: ICU multidisciplinary rounds lead by Dr. Tamara Piper (Intensivist): The following were reviewed and discussed: current labs,  recent imaging, lines/drains, review of body systems, nutrition, cultures, mobility, length of ICU stay. The plan of care for the day is as follows  EEG, Watch for seizures(written note by RN) 1300: Dr. Ciara Garcia at bedside, Propofol gtt off.  
 
1930: Bedside shift change report given to [address] (oncoming nurse) by Gunjan Farmer (offgoing nurse). Report included the following information SBAR, Kardex, ED Summary, Procedure Summary, Intake/Output, MAR, Accordion, Recent Results and Dual Neuro Assessment.
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
## 2020-02-07 NOTE — PROGRESS NOTES
0730: Bedside and Verbal shift change report given to TE Granados (oncoming nurse) by Bhargavi Harvey RN (offgoing nurse). Report included the following information SBAR, Kardex, Intake/Output, MAR, Recent Results, Cardiac Rhythm SR, Alarm Parameters  and Dual Neuro Assessment. ICU multidisciplinary rounds lead by Dr. Na Metz (Intensivist): The following were reviewed and discussed: current labs,  recent imaging, lines/drains, review of body systems, nutrition, cultures, mobility, length of ICU stay. The plan of care for the day is as follows  Assess and reassess neuro status, RT to manage vent, and continue to provide ICU care. (written note by RN) 1800: Dr. Na Metz made aware of abnormal involuntary eye movement(side to side), MD assessed patient with this RN. 1900: Neuro NP made aware of this situation, no new orders received. Neuro NP to consult with Critical care MD and follow up with RN. 
 
1930: Bedside and Verbal shift change report given to TE Mccarthy (oncoming nurse) by Jessee Crowe RN (offgoing nurse). Report included the following information SBAR, Kardex, Intake/Output, MAR, Recent Results, Cardiac Rhythm SR, Alarm Parameters  and Dual Neuro Assessment.

## 2020-02-07 NOTE — PROGRESS NOTES
Neurocritical Care Brief Progress Note: 
 
 
Physical Exam: 
Gen: resting quietly in no acute distress. Neuro:Unresponsive, not sedated, intubated on ventilator. No obvious facial asymetry. PERRL sluggish, 2 mm bilaterally. Eyes with mildly upward gaze deviated to the right. No blink to threat. No spontaneous movement noted in limbs. Mild withdrawal from noxious stimuli in LUE. No withdrawal in other limbs. Reflexes: Babinski's mute BLE. Skin: Warm, dry. No changes in exam overnight. No seizures noted or other events overnight. EEG done yesterday c/w severe encephalopathy. Preliminary report of MRA of head done last night showed no significant findings. Aggie Parks NP Neurocritical Care Nurse Practitioner 
[phone]

## 2020-02-07 NOTE — PROGRESS NOTES
1930: Bedside and Verbal shift change report given to [address] (oncoming nurse) by Arden Cordero (offgoing nurse). Report included the following information SBAR, Kardex, ED Summary, MAR, Recent Results, Cardiac Rhythm sinus mark, and Dual Neuro Assessment. 2108: Completed MRA and returned to the unit.

## 2020-02-07 NOTE — PROGRESS NOTES
2035: Transporting pt to MRI 
 
2048: pt arrived at MRI, no distress noted, sats 97% 2110: pt back in ICU

## 2020-02-07 NOTE — PROGRESS NOTES
Neurology Progress Note NAME: Maude Davison :  1960 MRN:  [id number] DATE:  2020 Assessment:  
 
Active Problems: 
  HTN (hypertension) (2013) ESRD (end stage renal disease) on dialysis ([facility] [address] (2018) PEA (Pulseless electrical activity) ([facility] [address] (2020) Cardiopulmonary arrest with successful resuscitation ([facility] (2020) Pt is a 65yo male with HTN, CAD, CM, DM with labile BG at home, but typically elevated, found slumped over his rollator walker. EMS saw \"shaking\" prior to possible PEA arrest. Pt was intubated at OSH and requiring multiple meds due to moving everything very well. Pt denies h/o seizures. CTA H/N negative. CTP negative. CTH neg. UA wth UTI. HgbA1C 10.2. EEG 20 was normal. On 20, during HD, pt was alert and appropriate. Had severe hypoglycemia overnight on - with hypoglylcemia induced seizure. EEG 20 with diffuse generalized suppression and delta slowing of the bkgd. Had been on propofol overnight and in AM 20. Today with eye deviation to right. Exam still with eye deviation to right, does appear to grimace or respond to sternal rub, no commands, no spontaneous eye opening, no w/d. MRI brain wo contrast 20 with small area of encephalomalacia in left temporal lobe.  CTA brain on 20 was unremarkable. MRA brain 20 ordered by the intensivist is still unremarkable. Possible seizure at presentation. Pt on tramadol and has UTI, both could lower sz threshold, vs myoclonic jerking/post-syncopal sz due to LOC. Now with hypoglycemia induced seizure and global dysfunction. Plan:  
-24 hour EEG ordered by intensivist 
-Control BG 
-May take some time for his brain to recover from hypoglycemic injury Subjective: Pt is intubated, now off sedation since noon yesterday, not following commands. Approx one hour ago, intensivist noted eye deviation to right. Pt is seen with RN at bedside. BG have been in 300's, now off D10 Objective:  
Chart reviewed since last seen Current Facility-Administered Medications Medication Dose Route Frequency  insulin lispro (HUMALOG) injection   SubCUTAneous Q4H  
 midodrine (PROAMITINE) tablet 10 mg  10 mg Oral Q8H  
 [Held by provider] insulin glargine (LANTUS) injection 7 Units  7 Units SubCUTAneous DAILY  sodium chloride (NS) flush 5-40 mL  5-40 mL IntraVENous Q8H  
 sodium chloride (NS) flush 5-40 mL  5-40 mL IntraVENous PRN  
 ondansetron (ZOFRAN) injection 4 mg  4 mg IntraVENous Q4H PRN  
 glucose chewable tablet 16 g  4 Tab Oral PRN  
 glucagon (GLUCAGEN) injection 1 mg  1 mg IntraMUSCular PRN  
 dextrose 10% infusion 0-250 mL  0-250 mL IntraVENous PRN  
 cefTRIAXone (ROCEPHIN) 1 g in 0.9% sodium chloride (MBP/ADV) 50 mL  1 g IntraVENous Q24H  chlorhexidine (ORAL CARE KIT) 0.12 % mouthwash 15 mL  15 mL Oral Q12H  pantoprazole (PROTONIX) 40 mg in 0.9% sodium chloride 10 mL injection  40 mg IntraVENous DAILY  epoetin tyler-epbx (RETACRIT) injection 10,000 Units  10,000 Units SubCUTAneous Q MON, WED & FRI  hydrALAZINE (APRESOLINE) 20 mg/mL injection 10 mg  10 mg IntraVENous Q6H PRN  
 acetaminophen (TYLENOL) solution 500 mg  500 mg Per NG tube Q6H PRN  propofol (DIPRIVAN) 10 mg/mL infusion  0-50 mcg/kg/min IntraVENous TITRATE Visit Vitals /68 Pulse 91 Temp 98.6 °F (37 °C) Resp 21 Ht 6' 2\" (1.88 m) Wt 80.2 kg (176 lb 12.9 oz) SpO2 100% BMI 22.70 kg/m² Temp (24hrs), Av.5 °F (36.4 °C), Min:96.3 °F (35.7 °C), Max:98.6 °F (37 °C) 
 
 
701 -  1900 In: 576.3 [I.V.:86.3] Out:  -  0700 In: 4847.5 [I.V.:3637.5] Out:  [Drains:200] Physical Exam: General: Well developed well nourished patient in no apparent distress, intubated Cardiac: Regular rate and rhythm. Extremities: 2+ Radial pulses, no cyanosis or edema Neurological Exam: 
Mental Status: Intubated. Does not open eyes spontaneously. No commands. Grimace/moves head to sternal rub. Cranial Nerves:   PERRL, eyes deviated to the right, no obvious facial asym Motor:  No spontaneous movement Reflexes:   Sym in UE, unable to elicit in LE, toes mute Sensory:   No w/d to noxious stim to UE Gait:  Unable to assess due to patient factors Cerebellar:  Unable to assess due to patient factors Lab Review Recent Results (from the past 24 hour(s)) LACTIC ACID Collection Time: 02/06/20  3:43 PM  
Result Value Ref Range Lactic acid 1.4 0.4 - 2.0 MMOL/L  
CULTURE, BLOOD, PAIRED Collection Time: 02/06/20  3:43 PM  
Result Value Ref Range Special Requests: NO SPECIAL REQUESTS Culture result: NO GROWTH AFTER 12 HOURS    
SAMPLES BEING HELD Collection Time: 02/06/20  3:45 PM  
Result Value Ref Range SAMPLES BEING HELD 1PST COMMENT Add-on orders for these samples will be processed based on acceptable specimen integrity and analyte stability, which may vary by analyte. GLUCOSE, POC Collection Time: 02/06/20  4:07 PM  
Result Value Ref Range Glucose (POC) 252 (H) 65 - 100 mg/dL Performed by Ranjeet Soto, POC Collection Time: 02/06/20  8:09 PM  
Result Value Ref Range Glucose (POC) 291 (H) 65 - 100 mg/dL Performed by Andres Sosa GLUCOSE, POC Collection Time: 02/06/20 11:54 PM  
Result Value Ref Range Glucose (POC) 281 (H) 65 - 100 mg/dL Performed by Andres Sosa GLUCOSE, POC Collection Time: 02/07/20  4:01 AM  
Result Value Ref Range Glucose (POC) 366 (H) 65 - 100 mg/dL Performed by Andres Sosa HEP B SURFACE AG Collection Time: 02/07/20  4:40 AM  
Result Value Ref Range Hepatitis B surface Ag <0.10 Index Hep B surface Ag Interp. NEGATIVE  NEG    
METABOLIC PANEL, BASIC Collection Time: 02/07/20  5:09 AM  
Result Value Ref Range Sodium 127 (L) 136 - 145 mmol/L Potassium 3.4 (L) 3.5 - 5.1 mmol/L Chloride 93 (L) 97 - 108 mmol/L  
 CO2 24 21 - 32 mmol/L Anion gap 10 5 - 15 mmol/L Glucose 383 (H) 65 - 100 mg/dL BUN 26 (H) 6 - 20 MG/DL Creatinine 4.61 (H) 0.70 - 1.30 MG/DL  
 BUN/Creatinine ratio 6 (L) 12 - 20 GFR est AA 16 (L) >60 ml/min/1.73m2 GFR est non-AA 13 (L) >60 ml/min/1.73m2 Calcium 7.7 (L) 8.5 - 10.1 MG/DL MAGNESIUM Collection Time: 02/07/20  5:09 AM  
Result Value Ref Range Magnesium 2.0 1.6 - 2.4 mg/dL PHOSPHORUS Collection Time: 02/07/20  5:09 AM  
Result Value Ref Range Phosphorus 3.8 2.6 - 4.7 MG/DL  
CBC W/O DIFF Collection Time: 02/07/20  5:09 AM  
Result Value Ref Range WBC 10.9 4.1 - 11.1 K/uL  
 RBC 3.92 (L) 4.10 - 5.70 M/uL HGB 9.4 (L) 12.1 - 17.0 g/dL HCT 28.9 (L) 36.6 - 50.3 % MCV 73.7 (L) 80.0 - 99.0 FL  
 MCH 24.0 (L) 26.0 - 34.0 PG  
 MCHC 32.5 30.0 - 36.5 g/dL  
 RDW 17.0 (H) 11.5 - 14.5 % PLATELET 87 (L) 695 - 400 K/uL MPV ABNORMAL 8.9 - 12.9 FL  
 NRBC 0.0 0  WBC ABSOLUTE NRBC 0.00 0.00 - 0.01 K/uL PROCALCITONIN Collection Time: 02/07/20  5:09 AM  
Result Value Ref Range Procalcitonin 2.13 ng/mL GLUCOSE, POC Collection Time: 02/07/20  8:26 AM  
Result Value Ref Range Glucose (POC) 239 (H) 65 - 100 mg/dL Performed by Елена Bun GLUCOSE, POC Collection Time: 02/07/20 10:12 AM  
Result Value Ref Range Glucose (POC) 215 (H) 65 - 100 mg/dL Performed by Елена Bun GLUCOSE, POC Collection Time: 02/07/20 12:19 PM  
Result Value Ref Range Glucose (POC) 210 (H) 65 - 100 mg/dL Performed by Елена Bun Additional comments: 
I have reviewed the patient's new clinical lab test results. I have personally reviewed the patient's radiographs. CT scan CT Results (most recent): 
Results from [facility] Encounter encounter on 02/05/20 CT HEAD WO CONT Narrative EXAM:  CT head without contrast 
 
INDICATION: Cardiopulmonary arrest with resuscitation. COMPARISON: None. TECHNIQUE: Axial noncontrast head CT from foramen magnum to vertex. Coronal and 
sagittal reformatted images were obtained. CT dose reduction was achieved 
through use of a standardized protocol tailored for this examination and 
automatic exposure control for dose modulation. Adaptive statistical iterative 
reconstruction (ASIR) was utilized. FINDINGS:  There is diffuse age-related parenchymal volume loss. The ventricles 
and sulci are age-appropriate without hydrocephalus. There is no mass effect or 
midline shift. There is no intracranial hemorrhage or extra-axial fluid 
collection. Scattered foci of low attenuation in the periventricular white 
matter most likely represent age-indeterminate microvascular ischemic changes. The gray-white matter differentiation is maintained. The basal cisterns are 
patent. The osseous structures are intact. There is mild mucosal thickening in the right 
maxillary sinus. The remaining paranasal sinuses and mastoid air cells are 
clear. Impression IMPRESSION:  
1. No acute intracranial hemorrhage. 2. Age-indeterminate microvascular ischemic disease in the periventricular white 
matter. MRI Results (most recent): 
 
Results from [facility] Encounter encounter on 02/05/20 MRI BRAIN WO CONT Narrative *PRELIMINARY REPORT* No evidence of acute infarct, intracranial hemorrhage, or intracranial mass. Preliminary report was provided by Dr. Jenna Alvarez, the on-call radiologist, on 
2/6/2020 at 0030 hours. Final report to follow. *END PRELIMINARY REPORT* *FINAL REPORT BELOW* EXAM:  MRI BRAIN WO CONT INDICATION:  s/p seizure-like activity,, noted with rhythmic movement left arm, 
jaw clenching and nystagmus.  End-stage renal disease, history of cardiopulmonary 
arrest with resuscitation, TECHNIQUE:  
Sagittal T1, axial FLAIR, T2, T1 and gradient echo T2-weighted images of the 
head were obtained. Coronal T2-weighted images were obtained including thin 
angled high-resolution coronal T2-weighted images through the temporal lobes. Axial diffusion weighted images were also obtained. COMPARISON: CT, CTA 2/5/20 FINDINGS:  
Generalized ventricular and sulcal prominence significantly greater than 
expected for age consistent with volume loss. T2 hyperintensity left lateral temporal lobe suggesting encephalomalacia and 
possibly related to old infarct or other injury. No restricted diffusion or other finding to suggest acute brain infarction. No other abnormal signal in the cerebral hemispheres, brain stem and cerebellum. Hippocampi are relatively symmetric and normal in signal. 
No evidence of acute intracranial hemorrhage, mass or abnormal extra-axial fluid 
collections. Small right petrous apex effusion and minimal fluid right mastoid air cells. Minimal mucosal thickening paranasal sinuses. Flow voids are present in vertebral basilar and carotid artery systems. Impression IMPRESSION:  
1. Left lateral temporal lobe focal encephalomalacia suggesting prior 
injury/infarct. 2. No acute intracranial abnormality demonstrated. 3. Chronic generalized volume loss. 4. Right petrous apex effusion. Mild paranasal sinus mucosal thickening. Care Plan discussed with: 
Patient Family RN x Care Manager Consultant/Specialist:    
 
Signed: William Foreman MD
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
## 2020-02-07 NOTE — DIABETES MGMT
[address] Followup Progress Note Presentation Dwayne Farley is a 64 y.o. male admitted with seizure like symptoms and PEA. His clinical course has been complex. Neurologic status is poor. MRI/MRA unremarkable. Per neuro note EEG consistent with severe encephalopathy. Consulted by Provider for advanced specialty nursing care related to inpatient diabetes management. Subjective Mr. Naif Crawford remains intubated without sedation and on ventilator. Not responsive to painful stimuli, no gag reflex (per nurse). No purposeful movement noted. He had no hypoglycemia episodes overnight. Objective Physical exam 
General: Intubated and on ventilator. Vital Signs Visit Vitals /77 Pulse 85 Temp 97 °F (36.1 °C) Resp 20 Ht 6' 2\" (1.88 m) Wt 80.2 kg (176 lb 12.9 oz) SpO2 96% BMI 22.70 kg/m² Skin Warm and dry Heart Regular rate and rhythm. No murmurs, rubs or gallops Lungs Clear to auscultation without rales or rhonchi Extremities No foot wounds Laboratory ESRD on HD today Glucose trends past 24hours have been very labile. On TF @ 50cc/hr now. D10 discontinued, BS now >200. ICU pharmacist discussed her plan and I am in agreement with the plan below:  Plan will be discussed with Dr. Carlos Eduardo Bonilla during rounds. 
 
 
-Stopped Lantus,  
 
high sensitivity SSI starting @ >200 Monitor BG after D10 stopped and HD completed, if BG >200, will start with NPH Start with NPH 5units in AM/ 5units in PM to start AT HOME he was getting Novolin 70/30 8 units in AM /8 units in PM daily  
(11 units of NPH and 5 units of regular daily) Assessment and Plan Nursing Diagnosis Risk for unstable blood glucose pattern Nursing Intervention Domain 4193 Decision-making Support Nursing Interventions Examined current inpatient diabetes control Explored factors facilitating and impeding inpatient management Identified self-management practices impeding diabetes control Explored corrective strategies with patient and responsible inpatient provider Informed patient of rational for basal bolus insulin strategy while hospitalized Signed By: Ann Casarez RN Can be reached via [address] [place] [address] February 7, 2020

## 2020-02-07 NOTE — PROCEDURES
[facility] Dialysis Team [place]  [phone] Vitals   Pre   Post   Assessment   Pre   Post    
Temp  Temp: 97 °F (36.1 °C) (02/07/20 0615)  98 LOC  Unresponsive, no sedation same HR   Pulse (Heart Rate): 77 (02/07/20 0615) 83 Lungs   Clear, intubated on ventilator  same B/P   BP: 146/85 (02/07/20 0615) 133/69 Cardiac   NSR- monitored at bedside  same Resp   Resp Rate: 21 (02/07/20 0615) 20 Skin   Dry and intace  same Pain level    0 Edema   
 
none same Orders: Duration:   Start:     0615 End:    0945 Total:   3.5 hours Dialyzer:   Dialyzer/Set Up Inspection: Timothy France (02/07/20 0615) K Bath:   Dialysate K (mEq/L): 3 (02/07/20 0615) Ca Bath:   Dialysate CA (mEq/L): 2.5 (02/07/20 0615) Na/Bicarb:   Dialysate NA (mEq/L): 140 (02/07/20 0615) Target Fluid Removal:   Goal/Amount of Fluid to Remove (mL): 2000 mL (02/07/20 0615) Access Type & Location:   Right upper arm AVf, + bruit and thrill, no s/s of infection, cannulated x 2 with 15 gauge needles without difficulty Labs Obtained/Reviewed Critical Results Called   Date when labs were drawn- 
Hgb-   
HGB Date Value Ref Range Status 02/07/2020 9.4 (L) 12.1 - 17.0 g/dL Final  
 
K-   
Potassium Date Value Ref Range Status 02/07/2020 3.4 (L) 3.5 - 5.1 mmol/L Final  
  Comment:  
  INVESTIGATED PER DELTA CHECK PROTOCOL Ca-  
Calcium Date Value Ref Range Status 02/07/2020 7.7 (L) 8.5 - 10.1 MG/DL Final  
 
Bun-  
BUN Date Value Ref Range Status 02/07/2020 26 (H) 6 - 20 MG/DL Final  
 
Creat-  
Creatinine Date Value Ref Range Status 02/07/2020 4.61 (H) 0.70 - 1.30 MG/DL Final  
  Comment:  
  INVESTIGATED PER DELTA CHECK PROTOCOL Medications/ Blood Products Given Name   Dose   Route and Time None ordered Blood Volume Processed (BVP):    77.9 Net Fluid Removed:  2000 ml Comments Time Out Done: 8283 Primary Nurse Rpt Malorie Hagen RN 
 Primary Nurse Rpt Post:TE Granados 
Pt Education: Procedure Care Plan: Continue Nephrology plan of care Tx Summary:0615- HD was started using right upper arm AVF without any issues. Cherrie- Nephrology Lanie Melgoza is at bedside assessing pt, pt VSS 
0830-Primary at bedside repositioning pt and administering morning medications VSS 
0845- Bp was low , uf was turned off, will monitor 
0900- BP now stable uf turned back on. 
0945_ HD completed and pt removed ordered 2 liters of fluid. All possible blood was rinsed back without difficulty. Post AVF was + bruit and thrill, hemostasis achieved with needle removal, No excessive bleeding. Dry gauze and tape applied. Primary nurse was given a report. Admiting Diagnosis: Cardiac Arrest, PEA 
Pt's previous clinic- [facility] Consent signed - Informed Consent Verified: Yes (02/07/20 0615) [place]ita Consent - Verified signed Hepatitis Status- Negative AG / susceptible AB 2/7/20 Machine #- Machine Number: [id number] (02/07/20 4004) Telemetry status- NSR Pre-dialysis wt. -

## 2020-02-07 NOTE — PROGRESS NOTES
[facility] 
 [address] [facility] [facility] [address][1] Phone: [phone]   [phone]   
  
Nephrology Progress Note Geraldo Quijano     1960     [id number] Date of Admission : 2/5/2020 02/07/20 CC: Follow up for ESRD Assessment and Plan ESRD- HD  
- Dialysis dependent since 4/2018. 
- Dialyzes MWF at [address] [facility]. F/b Dr Sangeeta Braswell  
- HD now  
- do not think HD is contributing to his neuro issues. I will try and meet his wife to discuss any concerns. Unable to meet her yesterday or this morning -- we will see him again Monday. Please call Dr Jacqueline Mccormick over w/e if any renal issues Hyponatremia : 
- 2/2 Hypotonic IVF for Hypoglycemia  
- dialyze w/ higher Na bath today  
- minimize/ avoid hypotonic fluids UTI : 
- On abx  
- follow up Cx  
- ordered Renal US of R Kidney 
  
HTN  
- stable now  
  
Seizure - per CCM / Neuro - MRI/MRA unremarkable  
  
S/P PEA arrest  
  
Hx of R Pleural effusion w/ Trapped Lung - s/p VATS and decortication 4/2018 
  
Anemia of CKD  
- resume LUIS  
  
Solitary Kidney S/p Prior Left Nephrectomy for RCC 
  
Sec HTPH 
- resume binders if he gets TF  
  
Care Plan discussed with:  RN Interval History: 
Seen and examined on HD Remains unresponsive on vent. No sedatives MRA ok Urine +ve for UTI  
BP stable Review of Systems: Review of systems not obtained due to patient factors. Current Medications:  
Current Facility-Administered Medications Medication Dose Route Frequency  dextrose 10% infusion  25 mL/hr IntraVENous CONTINUOUS  
 midodrine (PROAMITINE) tablet 10 mg  10 mg Oral Q8H  
 insulin glargine (LANTUS) injection 7 Units  7 Units SubCUTAneous DAILY  sodium chloride (NS) flush 5-40 mL  5-40 mL IntraVENous Q8H  
 sodium chloride (NS) flush 5-40 mL  5-40 mL IntraVENous PRN  
 ondansetron (ZOFRAN) injection 4 mg  4 mg IntraVENous Q4H PRN  
  dexmedeTOMidine (PRECEDEX) 400 mcg in 0.9% sodium chloride 100 mL infusion  0.2-0.7 mcg/kg/hr IntraVENous TITRATE  glucose chewable tablet 16 g  4 Tab Oral PRN  
 glucagon (GLUCAGEN) injection 1 mg  1 mg IntraMUSCular PRN  
 dextrose 10% infusion 0-250 mL  0-250 mL IntraVENous PRN  
 cefTRIAXone (ROCEPHIN) 1 g in 0.9% sodium chloride (MBP/ADV) 50 mL  1 g IntraVENous Q24H  chlorhexidine (ORAL CARE KIT) 0.12 % mouthwash 15 mL  15 mL Oral Q12H  pantoprazole (PROTONIX) 40 mg in 0.9% sodium chloride 10 mL injection  40 mg IntraVENous DAILY  epoetin tyler-epbx (RETACRIT) injection 10,000 Units  10,000 Units SubCUTAneous Q MON, WED & FRI  niCARdipine (CARDENE) 25 mg in 0.9% sodium chloride 250 mL infusion  0-15 mg/hr IntraVENous TITRATE  hydrALAZINE (APRESOLINE) 20 mg/mL injection 10 mg  10 mg IntraVENous Q6H PRN  
 acetaminophen (TYLENOL) solution 500 mg  500 mg Per NG tube Q6H PRN  propofol (DIPRIVAN) 10 mg/mL infusion  0-50 mcg/kg/min IntraVENous TITRATE Allergies Allergen Reactions  Tramadol Other (comments) Brought down blood sugar too fast  
 
 
Objective: 
Vitals:   
Vitals:  
 02/07/20 0645 02/07/20 0700 02/07/20 0711 02/07/20 0715 BP: 130/77 134/78  129/76 Pulse: 75 78 82 80 Resp: 19 22 21 22 Temp:      
TempSrc:      
SpO2: 97% 98% 99% 98% Weight:      
Height:      
 
Intake and Output: 
No intake/output data recorded. 02/05 1901 - 02/07 0700 In: 4847.5 [I.V.:3637.5] Out: 2200 [Drains:200] Physical Examination: 
General:  On vent HEENT: PERRL,  + Pallor , No Icterus Neck: Supple,no mass palpable Lungs : CTA 
CVS: RRR, S1 S2 normal, No murmur Abdomen: Soft, NT, BS + Extremities: trace Edema, RUE AVF + thrill MS: No joint swelling, erythema, warmth Neurologic:unresponsive on vent Psych: Unable to assess 
  
 
[]    High complexity decision making was performed 
[]    Patient is at high-risk of decompensation with multiple organ involvement Lab Data Personally Reviewed: I have reviewed all the pertinent labs, microbiology data and radiology studies during assessment. Recent Labs 02/07/20 
[id number] 02/06/20 0430 02/05/20 2047 02/05/20 0310 * 130* 135* 132* K 3.4* 4.9 2.9* 3.3*  
CL 93* 97 102 97 CO2 24 29 28 27 * 116* 23* 341* BUN 26* 15 13 26* CREA 4.61* 3.71* 3.33* 5.44* CA 7.7* 7.6* 7.4* 8.1*  
MG 2.0 1.9  --   --   
PHOS 3.8 2.8  --   --   
ALB  --   --   --  2.2*  
SGOT  --   --   --  45* ALT  --   --   --  51 INR  --   --   --  1.2* Recent Labs 02/07/20 
[id number] 02/06/20 0430 02/05/20 0310 WBC 10.9 6.7 8.2 HGB 9.4* 7.7* 9.0*  
HCT 28.9* 24.4* 27.9*  
PLT 87* 86* 109* No results found for: SDES Lab Results Component Value Date/Time Culture result: NO GROWTH AFTER 12 HOURS 02/06/2020 03:43 PM  
 Culture result: No growth (<1,000 CFU/ML) 02/05/2020 08:48 AM  
 Culture result: NO GROWTH 14 DAYS 01/03/2019 12:26 PM  
 Culture result: NO GROWTH ON SOLID MEDIA AT 14 DAYS 01/03/2019 11:45 AM  
 Culture result: (A) 01/03/2019 11:45 AM  
  STAPHYLOCOCCUS HOMINIS SUBSPECIES HOMINIS ISOLATED FROM THIO BROTH ONLY Recent Results (from the past 24 hour(s)) GLUCOSE, POC Collection Time: 02/06/20  9:33 AM  
Result Value Ref Range Glucose (POC) 150 (H) 65 - 100 mg/dL Performed by Latricia Whelan, POC Collection Time: 02/06/20 11:57 AM  
Result Value Ref Range Glucose (POC) 162 (H) 65 - 100 mg/dL Performed by Shira Cornelius   
LACTIC ACID Collection Time: 02/06/20  3:43 PM  
Result Value Ref Range Lactic acid 1.4 0.4 - 2.0 MMOL/L  
CULTURE, BLOOD, PAIRED Collection Time: 02/06/20  3:43 PM  
Result Value Ref Range Special Requests: NO SPECIAL REQUESTS Culture result: NO GROWTH AFTER 12 HOURS    
SAMPLES BEING HELD Collection Time: 02/06/20  3:45 PM  
Result Value Ref Range SAMPLES BEING HELD 1PST COMMENT Add-on orders for these samples will be processed based on acceptable specimen integrity and analyte stability, which may vary by analyte. GLUCOSE, POC Collection Time: 02/06/20  4:07 PM  
Result Value Ref Range Glucose (POC) 252 (H) 65 - 100 mg/dL Performed by Hilary Haney, POC Collection Time: 02/06/20  8:09 PM  
Result Value Ref Range Glucose (POC) 291 (H) 65 - 100 mg/dL Performed by Christina Jeffries GLUCOSE, POC Collection Time: 02/06/20 11:54 PM  
Result Value Ref Range Glucose (POC) 281 (H) 65 - 100 mg/dL Performed by Christina Jeffries GLUCOSE, POC Collection Time: 02/07/20  4:01 AM  
Result Value Ref Range Glucose (POC) 366 (H) 65 - 100 mg/dL Performed by Christina Jeffries METABOLIC PANEL, BASIC Collection Time: 02/07/20  5:09 AM  
Result Value Ref Range Sodium 127 (L) 136 - 145 mmol/L Potassium 3.4 (L) 3.5 - 5.1 mmol/L Chloride 93 (L) 97 - 108 mmol/L  
 CO2 24 21 - 32 mmol/L Anion gap 10 5 - 15 mmol/L Glucose 383 (H) 65 - 100 mg/dL BUN 26 (H) 6 - 20 MG/DL Creatinine 4.61 (H) 0.70 - 1.30 MG/DL  
 BUN/Creatinine ratio 6 (L) 12 - 20 GFR est AA 16 (L) >60 ml/min/1.73m2 GFR est non-AA 13 (L) >60 ml/min/1.73m2 Calcium 7.7 (L) 8.5 - 10.1 MG/DL MAGNESIUM Collection Time: 02/07/20  5:09 AM  
Result Value Ref Range Magnesium 2.0 1.6 - 2.4 mg/dL PHOSPHORUS Collection Time: 02/07/20  5:09 AM  
Result Value Ref Range Phosphorus 3.8 2.6 - 4.7 MG/DL  
CBC W/O DIFF Collection Time: 02/07/20  5:09 AM  
Result Value Ref Range WBC 10.9 4.1 - 11.1 K/uL  
 RBC 3.92 (L) 4.10 - 5.70 M/uL HGB 9.4 (L) 12.1 - 17.0 g/dL HCT 28.9 (L) 36.6 - 50.3 % MCV 73.7 (L) 80.0 - 99.0 FL  
 MCH 24.0 (L) 26.0 - 34.0 PG  
 MCHC 32.5 30.0 - 36.5 g/dL  
 RDW 17.0 (H) 11.5 - 14.5 % PLATELET 87 (L) 572 - 400 K/uL MPV ABNORMAL 8.9 - 12.9 FL  
 NRBC 0.0 0  WBC ABSOLUTE NRBC 0.00 0.00 - 0.01 K/uL PROCALCITONIN  
 Collection Time: 02/07/20  5:09 AM  
Result Value Ref Range Procalcitonin 2.13 ng/mL Total time spent with patient:  xxx   min. Care Plan discussed with: 
Patient Family RN Consulting Physician [facility]     
 
I have reviewed the flowsheets. Chart and Pertinent Notes have been reviewed. No change in PMH ,family and social history from Consult note.  
 
 
Cari Rodriguez MD

## 2020-02-08 NOTE — PROGRESS NOTES
Neurology Progress Note Patient ID: Melissa Howard [id number] 
64 y.o. 
1960 Subjective: No significant events noted over the past 24 hours. Patient remains essentially comatose without appreciable improvement in his clinical status. Wife mentions that he was doing well up until he had his episode of hypoglycemia (notes that he has difficulty with fluctuant/wide ranging blood sugars at home which is longstanding). Since then he has never been the same. He was reported to have seizure at the time of hypoglycemia with no clinical activity concerning for seizure since that time. Current Facility-Administered Medications Medication Dose Route Frequency  thiamine (B-1) 100 mg in 0.9% sodium chloride 50 mL IVPB  100 mg IntraVENous DAILY  metroNIDAZOLE (FLAGYL) IVPB premix 500 mg  500 mg IntraVENous Q8H  
 acyclovir (ZOVIRAX) 411 mg in 0.9% sodium chloride 100 mL IVPB  5 mg/kg (Ideal) IntraVENous Q24H  cefepime (MAXIPIME) 2 g in 0.9% sodium chloride (MBP/ADV) 100 mL  2 g IntraVENous Q24H  Vancomycin - Pharmacy to Dose   Other Rx Dosing/Monitoring  vancomycin (VANCOCIN) 2000 mg in  ml infusion  2,000 mg IntraVENous ONCE  
 insulin lispro (HUMALOG) injection   SubCUTAneous Q4H  
 midodrine (PROAMITINE) tablet 10 mg  10 mg Oral Q8H  
 [Held by provider] insulin glargine (LANTUS) injection 7 Units  7 Units SubCUTAneous DAILY  sodium chloride (NS) flush 5-40 mL  5-40 mL IntraVENous Q8H  
 sodium chloride (NS) flush 5-40 mL  5-40 mL IntraVENous PRN  
 ondansetron (ZOFRAN) injection 4 mg  4 mg IntraVENous Q4H PRN  
 glucose chewable tablet 16 g  4 Tab Oral PRN  
 glucagon (GLUCAGEN) injection 1 mg  1 mg IntraMUSCular PRN  
 dextrose 10% infusion 0-250 mL  0-250 mL IntraVENous PRN  chlorhexidine (ORAL CARE KIT) 0.12 % mouthwash 15 mL  15 mL Oral Q12H  pantoprazole (PROTONIX) 40 mg in 0.9% sodium chloride 10 mL injection  40 mg IntraVENous DAILY  epoetin tyler-epbx (RETACRIT) injection 10,000 Units  10,000 Units SubCUTAneous Q MON, WED & FRI  hydrALAZINE (APRESOLINE) 20 mg/mL injection 10 mg  10 mg IntraVENous Q6H PRN  
 acetaminophen (TYLENOL) solution 500 mg  500 mg Per NG tube Q6H PRN Objective:  
 
Patient Vitals for the past 8 hrs: 
 BP Temp Pulse Resp SpO2  
02/08/20 1230 122/61  96 26 95 % 02/08/20 1200 116/67 (!) 100.6 °F (38.1 °C) 97 18 94 % 02/08/20 1130 108/63  (!) 101 29 93 % 02/08/20 1100 125/77  (!) 104 22 99 % 02/08/20 1030 116/72  (!) 110 23 100 % 02/08/20 1014   (!) 109 21 100 % 02/08/20 1000 158/80  (!) 106 22 100 % 02/08/20 0937   (!) 104 26 100 % 02/08/20 0930 133/70  (!) 105 29 99 % 02/08/20 0900 125/70  99 19 97 % 02/08/20 0830 118/68  92 16 98 % 02/08/20 0800 133/70 (!) 101 °F (38.3 °C) 99 15 97 % 02/08/20 0704   (!) 101 23 97 % 02/08/20 0700 106/54  94 16 97 % 02/08/20 0630 99/59  100 21 97 % 02/08/20 0600 104/61  98 15 99 % 02/08/20 0500 111/68  (!) 106 19 99 % Physical exam: 
Middle-age male appearing stated age [de-identified] in bed intubated off sedation. HEENT appears unremarkable, neck appears supple. Cardiovascular demonstrates constant S1/S2 regular rhythm. Pulmonary demonstrates are is rate and rhythm equal entry bilaterally. Abdomen is nondistended/nontender. Extremities are warm/dry. Neurologically patient is essentially comatose, noninteractive through tactile or verbal stimuli. Cranial nerves pupils are irregular (patient has had bilateral eye surgery) with sluggish reactivity in the right eye and less well appreciated on the left. Patient's eyes are noted to rove in an encephalopathic pattern from midline and back to the left. Corneal responses not clearly present, given presence of family response to noxious stimuli supraorbital ridge or anterior nares were not present no clear cough with deep suctioning.   Motor exam there is no appreciable response to mild noxious stimuli in any extremity. Sensation is noted remainder of exam is deferred. Lab Review Recent Results (from the past 24 hour(s)) GLUCOSE, POC Collection Time: 02/07/20  2:00 PM  
Result Value Ref Range Glucose (POC) 260 (H) 65 - 100 mg/dL Performed by Doylene Rape POC [id number] Collection Time: 02/07/20  2:18 PM  
Result Value Ref Range Calcium, ionized (POC) 1.09 (L) 1.12 - 1.32 mmol/L  
 FIO2 (POC) 0.24 % pH (POC) 7.489 (H) 7.35 - 7.45    
 pCO2 (POC) 39.3 35.0 - 45.0 MMHG  
 pO2 (POC) 60 (L) 80 - 100 MMHG  
 HCO3 (POC) 29.9 (H) 22 - 26 MMOL/L Base excess (POC) 7 mmol/L  
 sO2 (POC) 92 92 - 97 % Site RIGHT RADIAL Device: VENT Mode ASSIST CONTROL Tidal volume 500 ml Set Rate 18 bpm  
 PEEP/CPAP (POC) 5 cmH2O  
 PIP (POC) 18 Allens test (POC) YES Specimen type (POC) ARTERIAL Total resp. rate 23 Volume control YES    
METABOLIC PANEL, BASIC Collection Time: 02/07/20  4:06 PM  
Result Value Ref Range Sodium 131 (L) 136 - 145 mmol/L Potassium 3.9 3.5 - 5.1 mmol/L Chloride 97 97 - 108 mmol/L  
 CO2 29 21 - 32 mmol/L Anion gap 5 5 - 15 mmol/L Glucose 244 (H) 65 - 100 mg/dL BUN 17 6 - 20 MG/DL Creatinine 3.29 (H) 0.70 - 1.30 MG/DL  
 BUN/Creatinine ratio 5 (L) 12 - 20 GFR est AA 24 (L) >60 ml/min/1.73m2 GFR est non-AA 19 (L) >60 ml/min/1.73m2 Calcium 7.8 (L) 8.5 - 10.1 MG/DL  
GLUCOSE, POC Collection Time: 02/07/20  6:11 PM  
Result Value Ref Range Glucose (POC) 251 (H) 65 - 100 mg/dL Performed by Doylene Rape GLUCOSE, POC Collection Time: 02/07/20  8:54 PM  
Result Value Ref Range Glucose (POC) 204 (H) 65 - 100 mg/dL Performed by [id number], BASIC Collection Time: 02/08/20  1:53 AM  
Result Value Ref Range Sodium 133 (L) 136 - 145 mmol/L Potassium 3.1 (L) 3.5 - 5.1 mmol/L  Chloride 98 97 - 108 mmol/L  
 CO2 28 21 - 32 mmol/L  
 Anion gap 7 5 - 15 mmol/L Glucose 121 (H) 65 - 100 mg/dL BUN 20 6 - 20 MG/DL Creatinine 3.77 (H) 0.70 - 1.30 MG/DL  
 BUN/Creatinine ratio 5 (L) 12 - 20 GFR est AA 20 (L) >60 ml/min/1.73m2 GFR est non-AA 17 (L) >60 ml/min/1.73m2 Calcium 7.8 (L) 8.5 - 10.1 MG/DL MAGNESIUM Collection Time: 02/08/20  1:53 AM  
Result Value Ref Range Magnesium 2.0 1.6 - 2.4 mg/dL PHOSPHORUS Collection Time: 02/08/20  1:53 AM  
Result Value Ref Range Phosphorus 2.6 2.6 - 4.7 MG/DL  
CBC W/O DIFF Collection Time: 02/08/20  1:53 AM  
Result Value Ref Range WBC 10.1 4.1 - 11.1 K/uL  
 RBC 3.83 (L) 4.10 - 5.70 M/uL HGB 9.1 (L) 12.1 - 17.0 g/dL HCT 28.5 (L) 36.6 - 50.3 % MCV 74.4 (L) 80.0 - 99.0 FL  
 MCH 23.8 (L) 26.0 - 34.0 PG  
 MCHC 31.9 30.0 - 36.5 g/dL  
 RDW 16.9 (H) 11.5 - 14.5 % PLATELET 299 (L) 542 - 400 K/uL NRBC 0.0 0  WBC ABSOLUTE NRBC 0.00 0.00 - 0.01 K/uL GLUCOSE, POC Collection Time: 02/08/20  2:09 AM  
Result Value Ref Range Glucose (POC) 131 (H) 65 - 100 mg/dL Performed by [employer]   
POC EG7 Collection Time: 02/08/20  4:27 AM  
Result Value Ref Range Calcium, ionized (POC) 1.10 (L) 1.12 - 1.32 mmol/L  
 FIO2 (POC) 24 % pH (POC) 7.546 (H) 7.35 - 7.45    
 pCO2 (POC) 32.2 (L) 35.0 - 45.0 MMHG  
 pO2 (POC) 65 (L) 80 - 100 MMHG  
 HCO3 (POC) 27.9 (H) 22 - 26 MMOL/L Base excess (POC) 5 mmol/L  
 sO2 (POC) 95 92 - 97 % Site RIGHT RADIAL Device: VENT Mode ASSIST CONTROL Tidal volume 500 ml Set Rate 18 bpm  
 PEEP/CPAP (POC) 5 cmH2O Allens test (POC) YES Specimen type (POC) ARTERIAL    
GLUCOSE, POC Collection Time: 02/08/20  5:01 AM  
Result Value Ref Range Glucose (POC) 150 (H) 65 - 100 mg/dL Performed by [place] POC Collection Time: 02/08/20  9:20 AM  
Result Value Ref Range Glucose (POC) 213 (H) 65 - 100 mg/dL Performed by Jaymie Mallory POC EG7  
 Collection Time: 02/08/20 10:09 AM  
Result Value Ref Range Calcium, ionized (POC) 1.11 (L) 1.12 - 1.32 mmol/L  
 FIO2 (POC) 0.40 % pH (POC) 7.477 (H) 7.35 - 7.45    
 pCO2 (POC) 36.6 35.0 - 45.0 MMHG  
 pO2 (POC) 97 80 - 100 MMHG  
 HCO3 (POC) 27.1 (H) 22 - 26 MMOL/L Base excess (POC) 4 mmol/L  
 sO2 (POC) 98 (H) 92 - 97 % Site RIGHT RADIAL Device: VENT Mode CPAP/SPON    
 PEEP/CPAP (POC) 5 cmH2O Pressure support 5 cmH2O Allens test (POC) YES Specimen type (POC) ARTERIAL Total resp. rate 24 VITAMIN B12 Collection Time: 02/08/20 10:41 AM  
Result Value Ref Range Vitamin B12 1,340 (H) 193 - 986 pg/mL METABOLIC PANEL, COMPREHENSIVE Collection Time: 02/08/20 10:41 AM  
Result Value Ref Range Sodium 131 (L) 136 - 145 mmol/L Potassium 3.2 (L) 3.5 - 5.1 mmol/L Chloride 98 97 - 108 mmol/L  
 CO2 27 21 - 32 mmol/L Anion gap 6 5 - 15 mmol/L Glucose 234 (H) 65 - 100 mg/dL BUN 25 (H) 6 - 20 MG/DL Creatinine 4.26 (H) 0.70 - 1.30 MG/DL  
 BUN/Creatinine ratio 6 (L) 12 - 20 GFR est AA 17 (L) >60 ml/min/1.73m2 GFR est non-AA 14 (L) >60 ml/min/1.73m2 Calcium 7.9 (L) 8.5 - 10.1 MG/DL Bilirubin, total 0.4 0.2 - 1.0 MG/DL  
 ALT (SGPT) 23 12 - 78 U/L  
 AST (SGOT) 21 15 - 37 U/L Alk. phosphatase 109 45 - 117 U/L Protein, total 8.6 (H) 6.4 - 8.2 g/dL Albumin 1.7 (L) 3.5 - 5.0 g/dL Globulin 6.9 (H) 2.0 - 4.0 g/dL A-G Ratio 0.2 (L) 1.1 - 2.2 Assessment:  
 
[employer] is an unfortunate 56-year-old male who suffered PEA arrest prior to presentation with return in neurologic function followed by ongoing neurologic decline following a least a 3-hour period of severe hypoglycemia on the evening of February 5, 2020 Plan:  
Encephalopathy: At this moment, greatest concern is for hypoglycorrhachia leading to CNS insult, in setting of possible recent hypoxic-anoxic injury Certainly could consider delayed presentation of anoxic encephalopathy as well challenging to definitively comment on this contribution in the setting of documented profound hypoglycemia Roving eye movements are not epileptic phenomenon, with EEG suggesting ongoing encephalopathy without interictal discharges If paralytics are necessary would not pursue EEG as the likelihood of this demonstrating any significant findings is low however if this is not necessary could consider prolonged EEG monitoring to effectively rule out subclinical seizures as contributing to his current state otherwise would recommend monitoring clinically n the setting of ongoing supportive care Would consider repeating MRI in a few more days to allow for interval changes to blossom There is no clear indication to pursue CSF analysis from a neurology standpoint currently Signed: 
Baudilio Rivera MD 
2/8/2020 
12:45 PM

## 2020-02-08 NOTE — PROGRESS NOTES
1930: Bedside and Verbal shift change report given to TE Mccarthy (oncoming nurse) by Royal Davis RN (offgoing nurse). Report included the following information SBAR, Kardex, Intake/Output, MAR, Recent Results, Cardiac Rhythm SR, Alarm Parameters  and Dual Neuro Assessment. 0300-oral secretions bloody/bite block placed/jaw remains clenched shut 
 
 
0730-bedside shift report given to RN using sbar format

## 2020-02-08 NOTE — PROGRESS NOTES
Spiritual Care Assessment/Progress Note [address] 
 
 
NAME: Joey Gilliland      MRN: [id number] AGE: 64 y.o. SEX: male Anabaptism Affiliation: [facility]  
Language: [place] 2/8/2020     Total Time (in minutes): 5 Spiritual Assessment begun in [facility] 7S1 INTENSIVE CARE through conversation with: 
  
    []Patient        [] Family    [] Friend(s) Reason for Consult: Initial visit Spiritual beliefs: (Please include comment if needed) 
   [] Identifies with a dawood tradition:     
   [] Supported by a dawood community:        
   [] Claims no spiritual orientation:       
   [] Seeking spiritual identity:            
   [] Adheres to an individual form of spirituality:       
   [x] Not able to assess:                   
 
    
Identified resources for coping:  
   [] Prayer                           
   [] Music                  [] Guided Imagery 
   [] Family/friends                 [] Pet visits [] Devotional reading                         [] Unknown 
   [] Other:                                       
 
 
Interventions offered during this visit: (See comments for more details) Patient Interventions: Initial visit Plan of Care: 
 
 [] Support spiritual and/or cultural needs  
 [] Support AMD and/or advance care planning process    
 [] Support grieving process 
 [] Coordinate Rites and/or Rituals  
 [] Coordination with community clergy [] No spiritual needs identified at this time 
 [] Detailed Plan of Care below (See Comments)  [] Make referral to Music Therapy 
[] Make referral to Pet Therapy    
[] Make referral to Addiction services 
[] Make referral to [facility] 
[] Make referral to Spiritual Care Partner 
[] No future visits requested       
[x] Follow up visits as needed Attempted to visit pt on ICU without success. Pt intubated and no family present. Chaplains will continue to offer support as needed.  
Chaplain Anthony, MDiv, MS, [facility] 
 Jun [phone])

## 2020-02-08 NOTE — PROGRESS NOTES
Neurocritical Care Brief Progress Note: 
 
82-year-old male with metabolic encephalopathy and possible seizure-like activity S/P PEA arrest and subsequent severe hypoglycemic event with seizure-like activity noted on 2/5/20. Physical Exam: 
Gen: resting quietly in no acute distress. Neuro:Unresponsive, not sedated, intubated on ventilator. No obvious facial asymetry. PERRL sluggish, 2 mm bilaterally. Eyes with mildly upward gaze deviated to the right. Slow roving eye movements noted going from the right to midline. No blink to threat. No spontaneous movement noted in limbs. Inward flexion of arms bilaterally to sternal rub. No withdrawal to noxious stimuli noted in limbs. Reflexes: Babinski's mute BLE. Skin: Warm, dry. Called to the bedside by nursing staff due to patient having roving eye movements. Was told by nursing that EEG had been attempted earlier today and halted due to patient biting down on ETT and causing artifact. Reviewed Dr. Lalo Lopez note and discussed eye movements with Dr. Nohelia Motley in Neurology. It is determined that the roving eye movements are most likely encephalopathic in nature. This information was relayed to Intensivists. EEG done 2/6 c/w severe encephalopathy. Preliminary report of MRA of head done 2/6 showed no significant findings. No changes in treatment plan made at this time. Arnaldo Bowen NP Neurocritical Care Nurse Practitioner 
[phone]

## 2020-02-08 NOTE — PROGRESS NOTES
0730: Bedside and Verbal shift change report given to TE Granados (oncoming nurse) by Raul Levi RN (offgoing nurse). Report included the following information SBAR, Kardex, Intake/Output, MAR, Recent Results, Cardiac Rhythm SR, Alarm Parameters  and Dual Neuro Assessment. 1230: EEG tech at bedside. Primary Nurse Darwin Thomas and Teressa Pappas RN performed a dual skin assessment on this patient No impairment noted Rudyd score is 10 
 
1930: Bedside and Verbal shift change report given to TE Mccarthy (oncoming nurse) by Geraldine Le RN (offgoing nurse). Report included the following information SBAR, Kardex, Intake/Output, MAR, Recent Results, Cardiac Rhythm SR-ST, Alarm Parameters  and Dual Neuro Assessment.

## 2020-02-08 NOTE — PROGRESS NOTES
02/08/20 1012 Weaning Parameters Spontaneous Breathing Trial Complete Yes Resp Rate Observed 30 Ve 8.2  RSBI 95 SBT complete, returned to previous setting due to WOB and mental status.

## 2020-02-08 NOTE — PROGRESS NOTES
Patient is a 64year-old male who is being followed by Neurology for seizure evaluation. He has a PMH of HTN, ESRD, DM, CAD and dilated cardiomyopathy who presented to [facility] ED on 2/4/2020 with initial complaints of feeling weak and was found slumped over in his rollator walker by his wife, so EMS was called. En route, EMS witnessed seizure-like activity, then EMS reported pt had a PEA arrest and was given epi and CPR. On arrival to [address] [place] [address] ED, pt was noted to have a pulse, was then intubated for airway protection. CT of Head was negative for any acute abnormality. CTA of Head showed no LVO. CT Perfusion showed no abnormality. Patient was transferred to [facility] for further care. EEG was performed on 2/5 showing abnormal EEG due to diffuse generalized delta and theta slowing of the background consistent with mild to moderate encephalopathy. Patient had an hypoglycemic event overnight on 2/5-2/6. Blood glucose dropped to as low as 14. He was reportedly noted to have a seizure at time of hypoglycemia. Blood glucose has now been corrected. Repeat EEG on 2/6 showed Abnormal EEG due to diffuse generalized suppression and delta slowing. Findings consistent with severe encephalopathy. MRI of Brain on 2/5 showed Left lateral temporal lobe focal encephalomalacia suggesting prior Injury/infarct. No acute abnormality. MRA of brain with no evidence of significant stenosis or aneurysm. He remains intubated with no sedation on board within the past 48- 72 hours. He remains unresponsive on the ventilator. Roving eye movements noted on exam today. No command following. Pupils equal 3 mm with sluggish response. Right pupil irregularly shaped. Minimal movement to pain on RUE, otherwise no movement to any stimulus. Eyes do not open to stimulus. No blink to visual threat. Plan: 
24 hr EEG to assess for subclinical seizures Continue supportive care Profound hypoglycemia may take some time for full brain recovery, will repeat MRI of Brain after completion of 24 hour EEG to assess for any underlying changes See Full progress note by Dr. Krystle New today. Plan discussed with Dr. Krystle New, Dr. Joycelyn Weiss, and ICU RN. Natividad Aguero, [facility] Neurocritical care NP

## 2020-02-08 NOTE — PROGRESS NOTES
Pharmacist Note - Vancomycin Dosing (Hemodialysis Patient) Consult provided for this 64 y.o. male for indication of CNS infection. This patient is also on the following antibiotic regimen(s): acyclovir, metronidazole, cefepime Lab Results Component Value Date/Time Creatinine 4.26 (H) 2020 10:41 AM  
 Creatinine (POC) 4.4 (H) 2018 12:06 PM  
 WBC 10.1 2020 01:53 AM  
 BUN 25 (H) 2020 10:41 AM  
 BUN (POC) 30 (H) 2018 12:06 PM  
Temp (24hrs), Av.8 °F (37.7 °C), Min:99.1 °F (37.3 °C), Max:101 °F (38.3 °C) Estimated CrCl:  ESRD on HD (Monday/Wednesday/Friday) Cultures: 
2/ urine - NG, final 
2/ blood - NGTD For this HD patient, will initiate therapy with a loading dose of Vancomycin 2000 mg, to be followed with a dose of 750 mg after each HD session. Dose will be adjusted to maintain a pre-HD trough of approximately 20 - 25 mcg/mL for therapeutic goal of 15 - 20 mcg/mL as approximately 35% of drug will be removed by HD filtration. Pharmacy to follow patient daily and order levels / make dose adjustments as appropriate.
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
## 2020-02-09 NOTE — PROGRESS NOTES
Neurocritical Care Brief Progress Note: 
 
53-year-old male with metabolic encephalopathy and possible seizure-like activity S/P PEA arrest and subsequent severe hypoglycemic event with seizure-like activity noted on 2/5/20. Physical Exam: 
Gen: resting quietly in no acute distress. Neuro:Unresponsive, not sedated, intubated on ventilator. No obvious facial asymetry. PERRL sluggish, 2 mm bilaterally. Slow roving eye movements noted going from the left to right. No blink to threat. No spontaneous movement noted in limbs. Inward flexion of arms bilaterally to sternal rub. No withdrawal to noxious stimuli noted in limbs. Reflexes: Babinski's mute BLE. Skin: Warm, dry. Patient on continuous EEG. EEG done 2/6 c/w severe encephalopathy. Preliminary report of MRA of head done 2/6 showed no significant findings. No acute events reported overnight. Aggie Parks NP Neurocritical Care Nurse Practitioner 
[phone]

## 2020-02-09 NOTE — PROCEDURES
ELECTROENCEPHALOGRAM REPORT Patient Name: Lucila Abraham : 1960 Age: 64 y.o. Ordering physician: Jackie Huerta  
Date of EEG: 2020 at 1307 until  at [phone] Interpreting physician: Jackie Huerta MD 
 
PROCEDURE: 12-24 hour continuous-video electroencephalogram (cvEEG) CLINICAL INDICATION: The patient is a 64 y.o. male who is being evaluated for possible subclinical seizures as etiology to persistent deeply obtunded/near-comatose state. DESCRIPTION OF THE RECORD:  
While compromised by myogenic artifact at various points during this record, the background appears to consist of diffuse, polymorphic delta with attenuated theta intermixed. Reactivity appears present with no definitive state changes noted. No discrete sleep architecture is appreciated. No epileptiform discharges or electrographic seizures. Single-lead ECG demonstrates normal sinus rhythm. INTERPRETATION:  
 
This is an abnormal 12-24 cvEEG characterized by evidence for severe diffuse cerebral dysfunction, nonspecific in etiology. Of note, does not appear appreciably changed from prior routine EEG per report.   
 
Lavell Handley MD
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
## 2020-02-09 NOTE — PROGRESS NOTES
Neurology Progress Note Patient ID: Becky Dangelo [id number] 
64 y.o. 
1960 Subjective: No significant events noted over the past twenty-four hours. No clinical change per wife or bedside staff. 24 hour EEG was completed with review pending. Current Facility-Administered Medications Medication Dose Route Frequency  thiamine (B-1) 100 mg in 0.9% sodium chloride 50 mL IVPB  100 mg IntraVENous DAILY  metroNIDAZOLE (FLAGYL) IVPB premix 500 mg  500 mg IntraVENous Q8H  
 acyclovir (ZOVIRAX) 411 mg in 0.9% sodium chloride 100 mL IVPB  5 mg/kg (Ideal) IntraVENous Q24H  cefepime (MAXIPIME) 2 g in 0.9% sodium chloride (MBP/ADV) 100 mL  2 g IntraVENous Q24H  Vancomycin - Pharmacy to Dose   Other Rx Dosing/Monitoring  white petrolatum-mineral oil (AKWA TEARS) 83-15 % ophthalmic ointment   Both Eyes Q12H  
 insulin lispro (HUMALOG) injection   SubCUTAneous Q4H  
 midodrine (PROAMITINE) tablet 10 mg  10 mg Oral Q8H  
 [Held by provider] insulin glargine (LANTUS) injection 7 Units  7 Units SubCUTAneous DAILY  sodium chloride (NS) flush 5-40 mL  5-40 mL IntraVENous Q8H  
 sodium chloride (NS) flush 5-40 mL  5-40 mL IntraVENous PRN  
 ondansetron (ZOFRAN) injection 4 mg  4 mg IntraVENous Q4H PRN  
 glucose chewable tablet 16 g  4 Tab Oral PRN  
 glucagon (GLUCAGEN) injection 1 mg  1 mg IntraMUSCular PRN  
 dextrose 10% infusion 0-250 mL  0-250 mL IntraVENous PRN  chlorhexidine (ORAL CARE KIT) 0.12 % mouthwash 15 mL  15 mL Oral Q12H  pantoprazole (PROTONIX) 40 mg in 0.9% sodium chloride 10 mL injection  40 mg IntraVENous DAILY  epoetin tyler-epbx (RETACRIT) injection 10,000 Units  10,000 Units SubCUTAneous Q MON, WED & FRI  hydrALAZINE (APRESOLINE) 20 mg/mL injection 10 mg  10 mg IntraVENous Q6H PRN  
 acetaminophen (TYLENOL) solution 500 mg  500 mg Per NG tube Q6H PRN Objective:  
 
Patient Vitals for the past 8 hrs: 
 BP Temp Pulse Resp SpO2 02/09/20 1000 99/63  94 18 96 % 02/09/20 0930 98/61  87 15 94 % 02/09/20 0900 133/84  97 29 99 % 02/09/20 0830 121/76  98 23 95 % 02/09/20 0800 129/76 (!) 100.5 °F (38.1 °C) 99 23 96 % 02/09/20 0730 160/85  92 24 100 % 02/09/20 0725   99 27 97 % 02/09/20 0630 130/72  94 27 97 % 02/09/20 0615 128/76  98 25 97 % 02/09/20 0600 137/70  96 27 97 % 02/09/20 0545 134/72  98 26 97 % 02/09/20 0530 126/71  99 28 97 % 02/09/20 0515 142/72  (!) 101 27 97 % 02/09/20 0500 121/64  (!) 101 27 97 % 02/09/20 0445 123/70  (!) 101 25 97 % 02/09/20 0430 109/72  (!) 105 27 97 % 02/09/20 0420   (!) 104 25 98 % 02/09/20 0415 134/70  (!) 104 27 97 % 02/09/20 0400 136/72  (!) 106 27 97 % 02/09/20 0345 149/73  (!) 105 27 96 % 02/09/20 0330 139/77  (!) 107 26 96 % 02/09/20 0315 134/73  (!) 110 26 95 % 02/09 0701 - 02/09 1900 In: 320 [I.V.:50] Out: 0  
02/07 1901 - 02/09 0700 In: 4426 [I.V.:1100] Out: 1400 [Drains:1400] Lab Review Recent Results (from the past 24 hour(s)) GLUCOSE, POC Collection Time: 02/08/20  2:27 PM  
Result Value Ref Range Glucose (POC) 280 (H) 65 - 100 mg/dL Performed by [email or web address] GLUCOSE, POC Collection Time: 02/08/20  5:24 PM  
Result Value Ref Range Glucose (POC) 259 (H) 65 - 100 mg/dL Performed by [email or web address] GLUCOSE, POC Collection Time: 02/08/20  9:26 PM  
Result Value Ref Range Glucose (POC) 238 (H) 65 - 100 mg/dL Performed by [id number], BASIC Collection Time: 02/09/20  1:41 AM  
Result Value Ref Range Sodium 129 (L) 136 - 145 mmol/L Potassium 5.1 3.5 - 5.1 mmol/L Chloride 99 97 - 108 mmol/L  
 CO2 23 21 - 32 mmol/L Anion gap 7 5 - 15 mmol/L Glucose 201 (H) 65 - 100 mg/dL BUN 37 (H) 6 - 20 MG/DL Creatinine 4.71 (H) 0.70 - 1.30 MG/DL  
 BUN/Creatinine ratio 8 (L) 12 - 20 GFR est AA 16 (L) >60 ml/min/1.73m2 GFR est non-AA 13 (L) >60 ml/min/1.73m2 Calcium 8.0 (L) 8.5 - 10.1 MG/DL MAGNESIUM Collection Time: 02/09/20  1:41 AM  
Result Value Ref Range Magnesium 2.1 1.6 - 2.4 mg/dL PHOSPHORUS Collection Time: 02/09/20  1:41 AM  
Result Value Ref Range Phosphorus 1.2 (L) 2.6 - 4.7 MG/DL  
CBC W/O DIFF Collection Time: 02/09/20  1:41 AM  
Result Value Ref Range WBC 10.7 4.1 - 11.1 K/uL  
 RBC 3.76 (L) 4.10 - 5.70 M/uL HGB 8.9 (L) 12.1 - 17.0 g/dL HCT 27.3 (L) 36.6 - 50.3 % MCV 72.6 (L) 80.0 - 99.0 FL  
 MCH 23.7 (L) 26.0 - 34.0 PG  
 MCHC 32.6 30.0 - 36.5 g/dL  
 RDW 16.4 (H) 11.5 - 14.5 % PLATELET 101 967 - 623 K/uL MPV 10.9 8.9 - 12.9 FL  
 NRBC 0.0 0  WBC ABSOLUTE NRBC 0.00 0.00 - 0.01 K/uL GLUCOSE, POC Collection Time: 02/09/20  1:55 AM  
Result Value Ref Range Glucose (POC) 218 (H) 65 - 100 mg/dL Performed by [place], POC Collection Time: 02/09/20  5:10 AM  
Result Value Ref Range Glucose (POC) 191 (H) 65 - 100 mg/dL Performed by [place], POC Collection Time: 02/09/20 10:19 AM  
Result Value Ref Range Glucose (POC) 241 (H) 65 - 100 mg/dL Performed by Tammy Espinosa Physical examination: 
Middle aged male appearing younger than stated age, laying in bed in no distress. HEENT appears unremarkable other than instrumentation. Neck appears supple. Cardiovascular demonstrates constant S1/S2, regular rate/rhythm. Pulmonary demonstrates equal air entry bilaterally. Abdomen is nondistended/nontender. Extremities warm/dry. Neurologically, patient is noninteractive to voice or tactile stimuli. Cranial nerves are notable for left gaze preference with dyscongugation of gaze. On motor examination, tone is normal with no appreciable response to noxious stimuli. Response to sensory stimuli as noted, remainder of examination is deferred. Assessment: Elroy De Jesus is a 64year old RH dominant male admitted following PEA arrest with course concerning for persistent deeply obtunded state Plan: Hypoglycorrhea: 
Based on history, remain considered for CNS insult secondary to profound hypoglycemia which preceded clinical decline Occurred in setting of prior PEA arrest, with potential for repeated CNS insult 24 hour EEG pending, no concerning features on bedside review but will evaluate and provide updated recommendations today Would plan to obtain repeat MRI in non-emergent fashion within the next 1-2 days Neurology will continue to follow, please call with questions/concerns Signed: 
Cristine Boone MD 
2/9/2020 
11:08 AM

## 2020-02-09 NOTE — PROGRESS NOTES
02/09/20 0725 Weaning Parameters Spontaneous Breathing Trial Complete Yes Resp Rate Observed 34 Ve 6.3  RSBI 184 SBT Results, cuff leak presented

## 2020-02-09 NOTE — PROGRESS NOTES
Neurocritical care Brief Progress Note Patient is a 64year-old male who is being followed by Neurology for seizure evaluation. He has a PMH of HTN, ESRD, DM, CAD and dilated cardiomyopathy who presented to [facility] ED on 2/4/2020 with initial complaints of feeling weak and was found slumped over in his rollator walker by his wife, so EMS was called. En route, EMS witnessed seizure-like activity, then EMS reported pt had a PEA arrest and was given epi and CPR. On arrival to [facility] ED, pt was noted to have a pulse, was then intubated for airway protection. CT of Head was negative for any acute abnormality. CTA of Head showed no LVO. CT Perfusion showed no abnormality. Patient was transferred to [facility] for further care. EEG was performed on 2/5 showing abnormal EEG due to diffuse generalized delta and theta slowing of the background consistent with mild to moderate encephalopathy. Patient had an hypoglycemic event overnight on 2/5-2/6. Blood glucose dropped to as low as 14. He was reportedly noted to have a seizure at time of hypoglycemia. Blood glucose has now been corrected. Repeat EEG on 2/6 showed Abnormal EEG due to diffuse generalized suppression and delta slowing. Findings consistent with severe encephalopathy. MRI of Brain on 2/5 showed Left lateral temporal lobe focal encephalomalacia suggesting prior injury/infarct. No acute abnormality. MRA of brain with no evidence of significant stenosis or aneurysm. Patient placed on 24 hour EEG yesterday afternoon and taken off today. EEG read by Dr. Tri Muñoz which shows no evidence for epileptiform discharges/seizures. EEG consistent with severe diffuse cerebral dysfunction, nonspecific in etiology. Of note, does not appear appreciably changed from prior routine EEG per report. PE: 
General: NAD 
CV: RRR, no murmur, click, rub, or gallop RESP: lungs CTA NEUROLOGIC: Neuro exam is limited. Patient remains intubated with no sedation. Obtunded, eyes do not open to stimulus. Roving eye movements noted on exam. Pupils equal with sluggish response bilaterally. No blink to threat. No movement to any stimulus. Assessment/Plan: 
Acute Encephalopathy 
 - 24 hour EEG negative for seizures, shows diffuse cerebral dysfunction.  
 - No improvement in neuro exam 
 - plan to repeat MRI in the next 1-2 days to assess for any interval changes as patient's neurological assessment has not improved, can consider a CNS insult due to patient's recent episode of profound hypoglycemia Discussed plan with Dr. Sari Atkins,  Dr. Ayleen Kohler, TE, and patient's wife. Natividad Aguero, [facility] Neuro[facility] care NP

## 2020-02-09 NOTE — PROGRESS NOTES
Day #2 of Cefepime - Renal Dosing Update Indication:  Possible CNS infection Current regimen:  2 gm IV Q 24 hr Abx regimen: Acyclovir + Cefepime + Metronidazole + Vancomycin Recent Labs 20 
0141 20 
1041 20 
0153  20 
3917 WBC 10.7  --  10.1  --  10.9 CREA 4.71* 4.26* 3.77*   < > 4.61* BUN 37* 25* 20   < > 26*  
 < > = values in this interval not displayed. Est CrCl: ERSD on HD Temp (24hrs), Av.6 °F (37.6 °C), Min:98.3 °F (36.8 °C), Max:100.6 °F (38.1 °C) Cultures:  
 Urine: NG, final 
 Blood: NGTD Plan: Change to 1 gm IV Q 24 hr per [facility] P&T Committee Protocol with respect to renal function. Pharmacy will continue to monitor patient daily and will make dosage adjustments based upon changing renal function.

## 2020-02-09 NOTE — PROGRESS NOTES
Problem: Ventilator Management Goal: *Adequate oxygenation and ventilation Outcome: Progressing Towards Goal 
Goal: *Patient maintains clear airway/free of aspiration Outcome: Progressing Towards Goal 
Goal: *Absence of infection signs and symptoms Outcome: Progressing Towards Goal 
Goal: *Normal spontaneous ventilation Outcome: Not Progressing Towards Goal 
  
Problem: Patient Education: Go to Patient Education Activity Goal: Patient/Family Education Outcome: Progressing Towards Goal 
  
Problem: Falls - Risk of 
Goal: *Absence of Falls Description Document Paulalatanya Gould Fall Risk and appropriate interventions in the flowsheet. Outcome: Progressing Towards Goal 
Note: Fall Risk Interventions: 
  
 
Mentation Interventions: Adequate sleep, hydration, pain control, Door open when patient unattended, Evaluate medications/consider consulting pharmacy, Familiar objects from home, Increase mobility, More frequent rounding, Reorient patient, Room close to nurse's station, Toileting rounds, Update white board Medication Interventions: Evaluate medications/consider consulting pharmacy Elimination Interventions: Call light in reach, Patient to call for help with toileting needs, Stay With Me (per policy), Toilet paper/wipes in reach, Toileting schedule/hourly rounds Problem: Pressure Injury - Risk of 
Goal: *Prevention of pressure injury Description Document Ruddy Scale and appropriate interventions in the flowsheet.  
Outcome: Progressing Towards Goal 
Note: Pressure Injury Interventions: 
Sensory Interventions: Assess changes in LOC, Assess need for specialty bed, Avoid rigorous massage over bony prominences, Chair cushion, Check visual cues for pain, Discuss PT/OT consult with provider, Float heels, Keep linens dry and wrinkle-free, Maintain/enhance activity level, Minimize linen layers, Monitor skin under medical devices, Pad between skin to skin, Pressure redistribution bed/mattress (bed type), Sit a 90-degree angle/use footstool if needed, Turn and reposition approx. every two hours (pillows and wedges if needed), Use 30-degree side-lying position Moisture Interventions: Absorbent underpads, Apply protective barrier, creams and emollients, Assess need for specialty bed, Check for incontinence Q2 hours and as needed, Internal/External fecal devices, Internal/External urinary devices, Limit adult briefs, Maintain skin hydration (lotion/cream), Minimize layers, Moisture barrier Activity Interventions: Assess need for specialty bed, Increase time out of bed, Pressure redistribution bed/mattress(bed type) Mobility Interventions: Assess need for specialty bed, Float heels, HOB 30 degrees or less, Pressure redistribution bed/mattress (bed type), Turn and reposition approx. every two hours(pillow and wedges) Nutrition Interventions: Document food/fluid/supplement intake, Discuss nutritional consult with provider, Offer support with meals,snacks and hydration Friction and Shear Interventions: Apply protective barrier, creams and emollients, Feet elevated on foot rest, Foam dressings/transparent film/skin sealants, HOB 30 degrees or less, Lift sheet, Lift team/patient mobility team, Minimize layers, Sit at 90-degree angle Problem: Patient Education: Go to Patient Education Activity Goal: Patient/Family Education Outcome: Progressing Towards Goal 
  
Problem: Diabetes Self-Management Goal: *Disease process and treatment process Description Define diabetes and identify own type of diabetes; list 3 options for treating diabetes. Outcome: Not Progressing Towards Goal 
Goal: *Incorporating nutritional management into lifestyle Description Describe effect of type, amount and timing of food on blood glucose; list 3 methods for planning meals.  
Outcome: Not Progressing Towards Goal 
 Goal: *Incorporating physical activity into lifestyle Description State effect of exercise on blood glucose levels. Outcome: Not Progressing Towards Goal 
Goal: *Developing strategies to promote health/change behavior Description Define the ABC's of diabetes; identify appropriate screenings, schedule and personal plan for screenings. Outcome: Not Progressing Towards Goal 
Goal: *Using medications safely Description State effect of diabetes medications on diabetes; name diabetes medication taking, action and side effects. Outcome: Not Progressing Towards Goal 
Goal: *Monitoring blood glucose, interpreting and using results Description Identify recommended blood glucose targets  and personal targets. Outcome: Not Progressing Towards Goal 
Goal: *Prevention, detection, treatment of acute complications Description List symptoms of hyper- and hypoglycemia; describe how to treat low blood sugar and actions for lowering  high blood glucose level. Outcome: Not Progressing Towards Goal 
Goal: *Prevention, detection and treatment of chronic complications Description Define the natural course of diabetes and describe the relationship of blood glucose levels to long term complications of diabetes. Outcome: Not Progressing Towards Goal 
Goal: *Developing strategies to address psychosocial issues Description Describe feelings about living with diabetes; identify support needed and support network Outcome: Not Progressing Towards Goal 
  
Problem: Nutrition Deficit Goal: *Optimize nutritional status Outcome: Progressing Towards Goal

## 2020-02-09 NOTE — PROGRESS NOTES
1930: Bedside and Verbal shift change report given to Shari RN (oncoming nurse) by Africa Mariano RN (offgoing nurse). Report included the following information SBAR, Kardex, Intake/Output, MAR, Recent Results, Cardiac Rhythm SR, Alarm Parameters  and Dual Neuro Assessment. 0730-bedside shift report given to RN using sbar format

## 2020-02-09 NOTE — PROGRESS NOTES
0730: Bedside and Verbal shift change report given to TE Granados (oncoming nurse) by Katy Tran RN (offgoing nurse). Report included the following information SBAR, Kardex, Intake/Output, MAR, Recent Results, Cardiac Rhythm SR-ST, Alarm Parameters  and Dual Neuro Assessment. 0930: Free water flush reduced from 120ml Q4 hrs to 50 ml Q4 hrs per order. 1930: Bedside and Verbal shift change report given to TE Mccarthy (oncoming nurse) by Zuly Álvarez RN (offgoing nurse). Report included the following information SBAR, Kardex, Intake/Output, MAR, Recent Results, Cardiac Rhythm SR, Alarm Parameters  and Dual Neuro Assessment.

## 2020-02-10 PROBLEM — R65.20 SEVERE SEPSIS WITH ACUTE ORGAN DYSFUNCTION (HCC): Status: ACTIVE | Noted: 2020-01-01

## 2020-02-10 PROBLEM — J96.01 ACUTE RESPIRATORY FAILURE WITH HYPOXIA AND HYPERCAPNIA (HCC): Status: ACTIVE | Noted: 2020-01-01

## 2020-02-10 PROBLEM — I67.82 SEVERE ANOXIC-ISCHEMIC ENCEPHALOPATHY (HCC): Status: ACTIVE | Noted: 2020-01-01

## 2020-02-10 PROBLEM — J96.02 ACUTE RESPIRATORY FAILURE WITH HYPOXIA AND HYPERCAPNIA (HCC): Status: ACTIVE | Noted: 2020-01-01

## 2020-02-10 PROBLEM — G93.1 SEVERE ANOXIC-ISCHEMIC ENCEPHALOPATHY (HCC): Status: ACTIVE | Noted: 2020-01-01

## 2020-02-10 PROBLEM — A41.9 SEVERE SEPSIS WITH ACUTE ORGAN DYSFUNCTION (HCC): Status: ACTIVE | Noted: 2020-01-01
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
## 2020-02-10 NOTE — DIABETES MGMT
[address] Followup Progress Note Presentation Carolina Muniz is a 64 y.o. male initially admitted with seizure like activity and developed PEA in ED. Has had a complicated course and neuro status remains very diminished/absent. Consulted by Provider for advanced specialty nursing care related to inpatient diabetes management. Subjective Mr. Cong Luna remains intubated; no sedation. Neuro status is not good. Had 24hr EEG, and is going down today for MRI. TF is stopped for now for MRI testing.  Objective Physical exam 
General Intubated; on ventilator. Vital Signs Visit Vitals /80 Pulse 86 Temp 98 °F (36.7 °C) Resp (!) 31 Ht 6' 2\" (1.88 m) Wt 75.3 kg (166 lb 0.1 oz) SpO2 96% BMI 21.31 kg/m² Laboratory ESRD on HD 
 
 
 
BG trends >200 for past 24hrs. On TF (Nepro @ 50cc)-stopped for testing Receiving Lispro only for coverage; No basal given since 2/6. He received 11units of Lispro yesterday. RECOMMENDATIONS Consider starting  on NPH 5AM/5PM daily Continue with correction; Cover starting @ 200. Assessment and Plan Nursing Diagnosis Risk for unstable blood glucose pattern Nursing Intervention Domain 8173 Decision-making Support Nursing Interventions Examined current inpatient diabetes control Explored factors facilitating and impeding inpatient management Identified self-management practices impeding diabetes control Explored corrective strategies with patient and responsible inpatient provider Informed patient of rational for basal bolus insulin strategy while hospitalized Signed By: Edith Ramirez RN Clinical Nurse Specialist 
[facility] Contact via [employer] or [phone] February 10, 2020

## 2020-02-10 NOTE — PROGRESS NOTES
[facility] 
 [id number] [facility], [address] [facility], [facility] [place] Phone: [phone]   [phone]   
  
Nephrology Progress Note Melissa Howard     1960     [id number] Date of Admission : 2/5/2020 
02/10/20 CC: Follow up for ESRD Assessment and Plan ESRD- HD  
- Dialysis dependent since 4/2018. 
- Dialyzes MWF at [facility]. F/b Dr Marianela Raygoza  
- HD now Hyponatremia : 
- improving HTN  
- stable now  
  
Seizure - per CCM / Neuro - MRI/MRA unremarkable  
  
S/P PEA arrest  
  
Hx of R Pleural effusion w/ Trapped Lung - s/p VATS and decortication 4/2018 
  
Anemia of CKD  
- resume LUIS  
  
Solitary Kidney S/p Prior Left Nephrectomy for RCC 
  
Sec HTPH Interval History: 
Seen and examined on HD. Sedated on the vent. BP stable. Unable to obtain ROS. Review of Systems: Review of systems not obtained due to patient factors. Current Medications:  
Current Facility-Administered Medications Medication Dose Route Frequency  cefepime (MAXIPIME) 1 g in 0.9% sodium chloride (MBP/ADV) 50 mL  1 g IntraVENous Q24H  
 thiamine (B-1) 100 mg in 0.9% sodium chloride 50 mL IVPB  100 mg IntraVENous DAILY  metroNIDAZOLE (FLAGYL) IVPB premix 500 mg  500 mg IntraVENous Q8H  
 acyclovir (ZOVIRAX) 411 mg in 0.9% sodium chloride 100 mL IVPB  5 mg/kg (Ideal) IntraVENous Q24H  Vancomycin - Pharmacy to Dose   Other Rx Dosing/Monitoring  white petrolatum-mineral oil (AKWA TEARS) 83-15 % ophthalmic ointment   Both Eyes Q12H  
 insulin lispro (HUMALOG) injection   SubCUTAneous Q4H  
 midodrine (PROAMITINE) tablet 10 mg  10 mg Oral Q8H  
 [Held by provider] insulin glargine (LANTUS) injection 7 Units  7 Units SubCUTAneous DAILY  sodium chloride (NS) flush 5-40 mL  5-40 mL IntraVENous Q8H  
 sodium chloride (NS) flush 5-40 mL  5-40 mL IntraVENous PRN  
 ondansetron (ZOFRAN) injection 4 mg  4 mg IntraVENous Q4H PRN  
  glucose chewable tablet 16 g  4 Tab Oral PRN  
 glucagon (GLUCAGEN) injection 1 mg  1 mg IntraMUSCular PRN  
 dextrose 10% infusion 0-250 mL  0-250 mL IntraVENous PRN  chlorhexidine (ORAL CARE KIT) 0.12 % mouthwash 15 mL  15 mL Oral Q12H  pantoprazole (PROTONIX) 40 mg in 0.9% sodium chloride 10 mL injection  40 mg IntraVENous DAILY  epoetin tyler-epbx (RETACRIT) injection 10,000 Units  10,000 Units SubCUTAneous Q MON, WED & FRI  hydrALAZINE (APRESOLINE) 20 mg/mL injection 10 mg  10 mg IntraVENous Q6H PRN  
 acetaminophen (TYLENOL) solution 500 mg  500 mg Per NG tube Q6H PRN Allergies Allergen Reactions  Tramadol Other (comments) Brought down blood sugar too fast  
 
 
Objective: 
Vitals:   
Vitals:  
 02/10/20 0745 02/10/20 0800 02/10/20 0815 02/10/20 0830 BP: 127/74 113/78 109/68 130/85 Pulse: 85 90 88 82 Resp: 25 26 27 25 Temp:  97.6 °F (36.4 °C) TempSrc:      
SpO2: 99% 97% 98% 98% Weight:      
Height:      
 
Intake and Output: 
No intake/output data recorded. 02/08 1901 - 02/10 0700 In: 2539 [I.V.:700] Out: 1000 [Drains:1000] Physical Examination: 
General:  On vent HEENT: PERRL,  + Pallor , No Icterus Neck: Supple,no mass palpable Lungs : CTA 
CVS: RRR, S1 S2 normal, No murmur Abdomen: Soft, NT, BS + Extremities: trace Edema, RUE AVF + thrill MS: No joint swelling, erythema, warmth Neurologic:unresponsive on vent Psych: Unable to assess 
  
 
[]    High complexity decision making was performed 
[]    Patient is at high-risk of decompensation with multiple organ involvement Lab Data Personally Reviewed: I have reviewed all the pertinent labs, microbiology data and radiology studies during assessment. Recent Labs  
  02/10/20 
0353 02/09/20 
0141 02/08/20 
1041 02/08/20 
0153 02/07/20 
1606 * 129* 131* 133* 131*  
K 3.2* 5.1 3.2* 3.1* 3.9 CL 99 99 98 98 97 CO2 24 23 27 28 29 * 201* 234* 121* 244* BUN 59* 37* 25* 20 17  
 CREA 5.78* 4.71* 4.26* 3.77* 3.29* CA 7.9* 8.0* 7.9* 7.8* 7.8*  
MG 2.4 2.1  --  2.0  --   
PHOS 1.0* 1.2*  --  2.6  --   
ALB  --   --  1.7*  --   --   
SGOT  --   --  21  --   --   
ALT  --   --  23  --   --   
 
Recent Labs  
  02/10/20 
0353 02/09/20 
0141 02/08/20 
0153 WBC 9.1 10.7 10.1 HGB 8.3* 8.9* 9.1*  
HCT 25.6* 27.3* 28.5*  
 159 115* No results found for: SDES Lab Results Component Value Date/Time Culture result: NO GROWTH 4 DAYS 02/06/2020 03:43 PM  
 Culture result: No growth (<1,000 CFU/ML) 02/05/2020 08:48 AM  
 Culture result: NO GROWTH 14 DAYS 01/03/2019 12:26 PM  
 Culture result: NO GROWTH ON SOLID MEDIA AT 14 DAYS 01/03/2019 11:45 AM  
 Culture result: (A) 01/03/2019 11:45 AM  
  STAPHYLOCOCCUS HOMINIS SUBSPECIES HOMINIS ISOLATED FROM THIO BROTH ONLY Recent Results (from the past 24 hour(s)) GLUCOSE, POC Collection Time: 02/09/20 10:19 AM  
Result Value Ref Range Glucose (POC) 241 (H) 65 - 100 mg/dL Performed by Macarena Akins GLUCOSE, POC Collection Time: 02/09/20  1:46 PM  
Result Value Ref Range Glucose (POC) 281 (H) 65 - 100 mg/dL Performed by Macarena Akins GLUCOSE, POC Collection Time: 02/09/20  5:54 PM  
Result Value Ref Range Glucose (POC) 296 (H) 65 - 100 mg/dL Performed by Macarena Akins GLUCOSE, POC Collection Time: 02/09/20 10:14 PM  
Result Value Ref Range Glucose (POC) 263 (H) 65 - 100 mg/dL Performed by Chinyere, POC Collection Time: 02/10/20  1:53 AM  
Result Value Ref Range Glucose (POC) 270 (H) 65 - 100 mg/dL Performed by [id number], BASIC Collection Time: 02/10/20  3:53 AM  
Result Value Ref Range Sodium 132 (L) 136 - 145 mmol/L Potassium 3.2 (L) 3.5 - 5.1 mmol/L Chloride 99 97 - 108 mmol/L  
 CO2 24 21 - 32 mmol/L Anion gap 9 5 - 15 mmol/L Glucose 239 (H) 65 - 100 mg/dL  BUN 59 (H) 6 - 20 MG/DL  
 Creatinine 5.78 (H) 0.70 - 1.30 MG/DL  
 BUN/Creatinine ratio 10 (L) 12 - 20 GFR est AA 12 (L) >60 ml/min/1.73m2 GFR est non-AA 10 (L) >60 ml/min/1.73m2 Calcium 7.9 (L) 8.5 - 10.1 MG/DL MAGNESIUM Collection Time: 02/10/20  3:53 AM  
Result Value Ref Range Magnesium 2.4 1.6 - 2.4 mg/dL PHOSPHORUS Collection Time: 02/10/20  3:53 AM  
Result Value Ref Range Phosphorus 1.0 (L) 2.6 - 4.7 MG/DL  
CBC W/O DIFF Collection Time: 02/10/20  3:53 AM  
Result Value Ref Range WBC 9.1 4.1 - 11.1 K/uL  
 RBC 3.50 (L) 4.10 - 5.70 M/uL HGB 8.3 (L) 12.1 - 17.0 g/dL HCT 25.6 (L) 36.6 - 50.3 % MCV 73.1 (L) 80.0 - 99.0 FL  
 MCH 23.7 (L) 26.0 - 34.0 PG  
 MCHC 32.4 30.0 - 36.5 g/dL  
 RDW 16.7 (H) 11.5 - 14.5 % PLATELET 889 778 - 982 K/uL MPV 11.1 8.9 - 12.9 FL  
 NRBC 0.0 0  WBC ABSOLUTE NRBC 0.00 0.00 - 0.01 K/uL GLUCOSE, POC Collection Time: 02/10/20  5:34 AM  
Result Value Ref Range Glucose (POC) 271 (H) 65 - 100 mg/dL Performed by Kp Code Total time spent with patient:  xxx   min. Care Plan discussed with: 
Patient Family RN Consulting Physician [id number] [facility],      
 
I have reviewed the flowsheets. Chart and Pertinent Notes have been reviewed. No change in PMH ,family and social history from Consult note.  
 
 
Colby Crum MD

## 2020-02-10 NOTE — PROGRESS NOTES
Palliative Medicine [place]: [phone]) [facility]: [phone]) Consult received. Chart reviewed. Code Status: Full Code Advance Care Planning: No AMD on file LNOK:   Primary Decision Maker: Marshall Cuellar - Spouse - [phone] Maribell Samson is a 65 y/o male with a PMH of ESRD (HD MWF), CKD V, type II DM, CAD, chronic systolic HF, dilated cardiomyopathy, renal cell carcinoma, Gastroenteritis, HLD, HTN, pancreatic pseudocyst, pleural effusion, nephrectomy, and AV fistula formation. He was recently dx w/ PNA and on Abx therapy. According to the OSH, the patient c/o feeling weak and unwell, wife went to get the patient something to eat and when she returned she found him slumped over his walker and called EMD.  He presented to the ED at [facility] via EMS after diversion d/t acute AMS and HTN crisis, concern for ICH. The patient developed seizure-like activity then became pulseless in PEA/Asystole. CPR was initiated and the patient was given 1mg Epinephrine w/ ROSC PTA to the ED. CT of Head was negative for any acute abnormality. Patient was transferred to [facility] for further care. Pt has had a a complicated course. He remains intubated on no sedation, with diminished neuro status: EEG 2/5 showing abnormal EEG consistent with mild to moderate encephalopathy. (Patient had an hypoglycemic event overnight on 2/5-2/6.) Repeat EEG 2/6 showed Abnormal EEG consistent with severe encephalopathy.   
Repeat 24 hour EEG 2/8 shows no evidence of seizures and consistent with severe diffuse cerebral dysfunction (not appreciably changed from prior routine EEG) MRI of Brain on 2/5 showed Left lateral temporal lobe focal encephalomalacia suggesting prior injury/infarct.  
MRA of brain with no evidence of significant stenosis or aneurysm.  
  
Social: lives with wife/LNOK; has daughter and grand daughter Baseline: independent, uses walker for ambulation Vmail left for wife to set up family. Thank you for the opportunity to be involved in the care of Mr. Azra Piper and his family. Selma Iglesias LMSW, Supervisee in Social Work Palliative Medicine  [phone]

## 2020-02-10 NOTE — PROCEDURES
[facility] Dialysis Team [place]  [phone] Vitals   Pre   Post   Assessment   Pre   Post    
Temp  Temp: 97.8 °F (36.6 °C) (02/10/20 0550)  98.0 LOC  Unresponsive, no sedation same HR   Pulse (Heart Rate): 79 (02/10/20 0550) 91 Lungs   Clear, intubated on ventilator  same B/P   BP: 161/82 (02/10/20 0550) 104/80 Cardiac   SV Rhythm- monitored at bedside  same Resp   Resp Rate: 28 (02/10/20 0550) 29 Skin   Dry and intace  same Pain level    0 Edema Swelling to Bilateral arms same Orders: Duration:   Start:     0550 End:    0920 Total:   3.5 hours Dialyzer:   Dialyzer/Set Up Inspection: Guyann Phlegm (02/10/20 0550) K Bath:   Dialysate K (mEq/L): 3 (02/10/20 0550) Ca Bath:   Dialysate CA (mEq/L): 2.5 (02/10/20 0550) Na/Bicarb:   Dialysate NA (mEq/L): 140 (02/10/20 0550) Target Fluid Removal:   Goal/Amount of Fluid to Remove (mL): 2000 mL (02/10/20 0550) Access Type & Location:   Right upper arm AVf, + bruit and thrill, no s/s of infection, cannulated x 2 with 15 gauge needles without difficulty Labs Obtained/Reviewed Critical Results Called   Date when labs were drawn- 
Hgb-   
HGB Date Value Ref Range Status 02/10/2020 8.3 (L) 12.1 - 17.0 g/dL Final  
 
K-   
Potassium Date Value Ref Range Status 02/10/2020 3.2 (L) 3.5 - 5.1 mmol/L Final  
  Comment:  
  INVESTIGATED PER DELTA CHECK PROTOCOL Ca-  
Calcium Date Value Ref Range Status 02/10/2020 7.9 (L) 8.5 - 10.1 MG/DL Final  
 
Bun-  
BUN Date Value Ref Range Status 02/10/2020 59 (H) 6 - 20 MG/DL Final  
  Comment:  
  INVESTIGATED PER DELTA CHECK PROTOCOL Creat-  
Creatinine Date Value Ref Range Status 02/10/2020 5.78 (H) 0.70 - 1.30 MG/DL Final  
 
  
Medications/ Blood Products Given Name   Dose   Route and Time None ordered Blood Volume Processed (BVP):   78.9 Net Fluid Removed:  2000 ml Comments Time Out Done: 1817 Primary Nurse Rpt Pre:TE Dugan 
Primary Nurse Rpt Post:TE Shanks 
Pt Education: Procedure Care Plan: Continue Nephrology plan of care Tx Summary:0550- HD was started using right upper arm AVF without any issues. 7580- Vital signs are stable and pt tolerating HD, will continue to monitor 0750- Pt tolerating HD well, VSS 
0845- Dr. Phillip Guzmán at bedside assessing the pt no issues 0920-HD was completed and 2 liters of fluid was removed. All possible blood was returned without difficulty. Hemostasis was achieved to needles sites post needle removal. No excessive bleeding. Post AVF was + bruit and thrill. Dry gauze and tape was applied. Primary nurse was given a report. Admiting Diagnosis: Cardiac Arrest, PEA 
Pt's previous clinic- [facility] Consent signed - Informed Consent Verified: Yes (02/10/20 0550) Buffy Consent - Verified signed Hepatitis Status- Negative AG / susceptible AB 2/7/20 Machine #- Machine Number: [id number] (02/10/20 7405) Telemetry status- NSR Pre-dialysis wt. -

## 2020-02-10 NOTE — PROGRESS NOTES
1930: Bedside and Verbal shift change report given to Shari RN (oncoming nurse) by Steffanie Hightower RN (offgoing nurse). Report included the following information SBAR, Kardex, Intake/Output, MAR, Recent Results, Cardiac Rhythm SR, Alarm Parameters  and Dual Neuro Assessment. 1410-[facility] nurse at bedside to start HD 
  
  
  
0730-bedside shift report given to RN using sbar format

## 2020-02-11 PROBLEM — I63.9 CEREBRAL INFARCTION, ACUTE (HCC): Status: ACTIVE | Noted: 2020-01-01

## 2020-02-11 PROBLEM — I63.9 NEW CEREBELLAR INFARCT (HCC): Status: ACTIVE | Noted: 2020-01-01

## 2020-02-11 PROBLEM — A41.9 SEVERE SEPSIS WITH ACUTE ORGAN DYSFUNCTION (HCC): Status: RESOLVED | Noted: 2020-01-01 | Resolved: 2020-01-01

## 2020-02-11 PROBLEM — Z90.5 S/P NEPHRECTOMY: Chronic | Status: ACTIVE | Noted: 2020-01-01

## 2020-02-11 PROBLEM — E87.6 ACUTE HYPOKALEMIA: Status: ACTIVE | Noted: 2020-01-01

## 2020-02-11 PROBLEM — G93.89 ENCEPHALOMALACIA: Chronic | Status: ACTIVE | Noted: 2020-01-01

## 2020-02-11 PROBLEM — R65.20 SEVERE SEPSIS WITH ACUTE ORGAN DYSFUNCTION (HCC): Status: RESOLVED | Noted: 2020-01-01 | Resolved: 2020-01-01

## 2020-02-11 PROBLEM — Z99.89 USES ROLLER WALKER: Chronic | Status: ACTIVE | Noted: 2017-01-01

## 2020-02-11 PROBLEM — E83.39 HYPOPHOSPHATASIA: Status: ACTIVE | Noted: 2020-01-01

## 2020-02-11 NOTE — PROGRESS NOTES
Neurology Progress Note Patient ID: Alma Delia Martinez [id number] 
64 y.o. 
1960 Subjective: No significant events over the past 24 hours. Patient underwent MRI yesterday which demonstrated interval appearance of punctuate areas of diffusion restriction in bilateral subcortical white matter, scattered and subcortical white matter as well as medial aspects of cerebellum bilaterally. Interpreted as being concerning for findings consistent with hypoxic/anoxic injury. Discussion with bedside staff, plan is to discuss plan of care between wife and palliative care on Thursday. Current Facility-Administered Medications Medication Dose Route Frequency  potassium, sodium phosphates (NEUTRA-PHOS) packet 2 Packet  2 Packet Oral QID  insulin glargine (LANTUS) injection 5 Units  5 Units SubCUTAneous DAILY  potassium phosphate 30 mmol in 0.9% sodium chloride 250 mL infusion   IntraVENous ONCE  
 thiamine (B-1) 100 mg in 0.9% sodium chloride 50 mL IVPB  100 mg IntraVENous DAILY  acyclovir (ZOVIRAX) 411 mg in 0.9% sodium chloride 100 mL IVPB  5 mg/kg (Ideal) IntraVENous Q24H  white petrolatum-mineral oil (AKWA TEARS) 83-15 % ophthalmic ointment   Both Eyes Q12H  
 insulin lispro (HUMALOG) injection   SubCUTAneous Q4H  
 midodrine (PROAMITINE) tablet 10 mg  10 mg Oral Q8H  
 sodium chloride (NS) flush 5-40 mL  5-40 mL IntraVENous Q8H  
 sodium chloride (NS) flush 5-40 mL  5-40 mL IntraVENous PRN  
 glucose chewable tablet 16 g  4 Tab Oral PRN  
 glucagon (GLUCAGEN) injection 1 mg  1 mg IntraMUSCular PRN  
 dextrose 10% infusion 0-250 mL  0-250 mL IntraVENous PRN  chlorhexidine (ORAL CARE KIT) 0.12 % mouthwash 15 mL  15 mL Oral Q12H  pantoprazole (PROTONIX) 40 mg in 0.9% sodium chloride 10 mL injection  40 mg IntraVENous DAILY  epoetin tyler-epbx (RETACRIT) injection 10,000 Units  10,000 Units SubCUTAneous Q MON, WED & FRI  
  hydrALAZINE (APRESOLINE) 20 mg/mL injection 10 mg  10 mg IntraVENous Q6H PRN  
 acetaminophen (TYLENOL) solution 500 mg  500 mg Per NG tube Q6H PRN Objective:  
 
Patient Vitals for the past 8 hrs: 
 BP Temp Pulse Resp SpO2 Weight  
02/11/20 0900 143/74  75 15 96 %   
02/11/20 0800 133/77  83 (!) 0 95 %   
02/11/20 0720   70 15 97 %   
02/11/20 0600 136/67  68 (!) 6 95 %   
02/11/20 0500 147/75  76 10 98 %   
02/11/20 0400 135/70 97.9 °F (36.6 °C) 76 (!) 32 98 %   
02/11/20 0318   72 23 98 %   
02/11/20 0302 150/75  75 20 99 %   
02/11/20 0200 139/67  69 26 100 % 75.3 kg (166 lb 0.1 oz) 02/11 0701 - 02/11 1900 In: 48 Out: -  
02/09 1901 - 02/11 0700 In: 1105 [I.V.:950] Out: 2900 [Drains:900] Lab Review Recent Results (from the past 24 hour(s)) GLUCOSE, POC Collection Time: 02/10/20  1:22 PM  
Result Value Ref Range Glucose (POC) 173 (H) 65 - 100 mg/dL Performed by Alejandro Daniel, POC Collection Time: 02/10/20  5:34 PM  
Result Value Ref Range Glucose (POC) 195 (H) 65 - 100 mg/dL Performed by [facility], POC Collection Time: 02/10/20  9:45 PM  
Result Value Ref Range Glucose (POC) 274 (H) 65 - 100 mg/dL Performed by [id number], BASIC Collection Time: 02/11/20  2:30 AM  
Result Value Ref Range Sodium 132 (L) 136 - 145 mmol/L Potassium 3.6 3.5 - 5.1 mmol/L Chloride 100 97 - 108 mmol/L  
 CO2 22 21 - 32 mmol/L Anion gap 10 5 - 15 mmol/L Glucose 331 (H) 65 - 100 mg/dL BUN 45 (H) 6 - 20 MG/DL Creatinine 4.37 (H) 0.70 - 1.30 MG/DL  
 BUN/Creatinine ratio 10 (L) 12 - 20 GFR est AA 17 (L) >60 ml/min/1.73m2 GFR est non-AA 14 (L) >60 ml/min/1.73m2 Calcium 7.8 (L) 8.5 - 10.1 MG/DL MAGNESIUM Collection Time: 02/11/20  2:30 AM  
Result Value Ref Range Magnesium 2.3 1.6 - 2.4 mg/dL PHOSPHORUS Collection Time: 02/11/20  2:30 AM  
Result Value Ref Range Phosphorus 0.9 (LL) 2.6 - 4.7 MG/DL  
CBC W/O DIFF Collection Time: 02/11/20  2:30 AM  
Result Value Ref Range WBC 7.0 4.1 - 11.1 K/uL  
 RBC 3.52 (L) 4.10 - 5.70 M/uL HGB 8.3 (L) 12.1 - 17.0 g/dL HCT 25.5 (L) 36.6 - 50.3 % MCV 72.4 (L) 80.0 - 99.0 FL  
 MCH 23.6 (L) 26.0 - 34.0 PG  
 MCHC 32.5 30.0 - 36.5 g/dL  
 RDW 16.7 (H) 11.5 - 14.5 % PLATELET 982 816 - 338 K/uL MPV 11.1 8.9 - 12.9 FL  
 NRBC 0.0 0  WBC ABSOLUTE NRBC 0.00 0.00 - 0.01 K/uL GLUCOSE, POC Collection Time: 02/11/20  2:51 AM  
Result Value Ref Range Glucose (POC) 311 (H) 65 - 100 mg/dL Performed by Chinyere, POC Collection Time: 02/11/20  5:17 AM  
Result Value Ref Range Glucose (POC) 345 (H) 65 - 100 mg/dL Performed by [place] Pa Physical exam: Thin male laying in bed intubated off sedation. HEENT appears unremarkable other than instrumentation. Neck appears supple. Cardiovascular, pulmonary abdominal exams deferred. Abdomen is nondistended. Extremities are warm/dry. Neurologically patient does not respond to voice. On cranial nerves, right pupil is irregular in shape (postsurgical) and weakly reactive, reactivity is equivocal in left pupil. Corneal response appears absent. No response to noxious stimuli at the supraorbital ridge, oculocephalic appears absent. Equivocal/weak cough with deep suctioning, gag not tested. Remainder of exam is deferred. Assessment:  
 
Rohm and Bolanos is a 51-year-old male history of brittle diabetes mellitus admitted following PEA arrest on February 5th, 2020 with course complicated by an episode of severe hypoglycemia with persistent deeply somnolent versus comatose state since Plan: Anoxic encephalopathy: 
Occurred in setting of recent PEA arrest and severe hypoglycemia with no appreciable improvement in clinical status over the past several days Plan to meet with Palliative care on Thursday (with wife) as discussed with bedside staff Would recommend ongoing supportive care in the meantime If we can be of assistance in family discussion, wife desires to speak with Neurology please feel free to call Signed: 
Lavell Handley MD 
2/11/2020 
9:46 AM

## 2020-02-11 NOTE — PROGRESS NOTES
Neurology Progress Note Benito Finney NP Neuroscience Nurse Practitioner Admit Date: 2020 LOS: 5 days Daily Progress Note: 2/10/2020 Daria Woodward is a 64 y.o. male with multiple medical problems including HTN, ESRD, DM11, CAD and cardiomyopathy who presented to [address] [place] [address] ED 20, with initiate complaints of feeling weak, was found \"slumped over\" in his rollator walker by his wife, so his wife called 46. Enroute, EMS witnessed seizure-like activity, unknown what was actually witnessed, then EMS reported pt had a PEA arrest, gave epi and CPR. On arrival to [address] [place] [address] ED, pt was noted to have a pulse, was then intubated for airway protection. A CT of his head was performed, which showed \"No acute intracranial hemorrhage or large territory ischemia\" per chart review, but images are not available for review. No seizure activity was witnessed in the ED and that pt was moving all extremities after intubation, so was then sedated with propofol. He was then transferred to [facility] for higher level of care. Pt does not have a prior hx of seizure, is on dialysis and is currently compliant with tx but has been non-compliant in the past. On 20 had an episode of severe hypoglycemia, with BG of 14 with an accompanying seizure, which was treated with glucose correction and ativan. Subjective:  
 
Pt remains obtunded on ventilator with no sedation. MRI was performed today which showed multiple small bilaterally symmetrical infarcts in the deep white matter of the cerebral hemispheres and in the cerebellum. Findings are most consistent with a recent hypoxic ischemic episode. Allergies Allergen Reactions  Tramadol Other (comments) Brought down blood sugar too fast  
 
Review of Systems: 
Review of systems not obtained due to patient factors. Objective:  
Vital signs Temp (24hrs), Av °F (36.7 °C), Min:97.6 °F (36.4 °C), Max:98.8 °F (37.1 °C) No intake/output data recorded. 02/09 0701 - 02/10 1900 In: 3310 [I.V.:1050] Out: 2900 [Drains:900] Visit Vitals /70 Pulse 84 Temp 97.9 °F (36.6 °C) Resp 23 Ht 6' 2\" (1.88 m) Wt 166 lb 0.1 oz (75.3 kg) SpO2 100% BMI 21.31 kg/m² O2 Device: Endotracheal tube Vitals:  
 02/10/20 1700 02/10/20 1800 02/10/20 1900 02/10/20 1917 BP: 124/76 117/66 118/70 Pulse: 79 77 80 84 Resp: 29 26 26 23 Temp:      
TempSrc:      
SpO2: 100% 100% 100% 100% Weight:      
Height:      
  
Physical Exam: 
GENERAL: NAD, intubated with no sedation running SKIN: Warm, dry, color appropriate for ethnicity. NEURO: Unresponsive. No command following. Does not withdraw to painful stimuli in all 4 extremities. Pupils equal, 3 mm bilaterally and sluggishly reactive. Roving eye movements present. No blink to threat. Face symmetric. No gag or cough present. Labs: 
Lab Results Component Value Date/Time WBC 9.1 02/10/2020 03:53 AM  
 Hemoglobin (POC) error 01/17/2019 11:23 AM  
 HGB 8.3 (L) 02/10/2020 03:53 AM  
 Hematocrit (POC) 34 (L) 11/13/2018 12:06 PM  
 HCT 25.6 (L) 02/10/2020 03:53 AM  
 PLATELET 358 70/67/1211 03:53 AM  
 MCV 73.1 (L) 02/10/2020 03:53 AM  
 
Lab Results Component Value Date/Time Sodium 132 (L) 02/10/2020 03:53 AM  
 Potassium 3.2 (L) 02/10/2020 03:53 AM  
 Chloride 99 02/10/2020 03:53 AM  
 CO2 24 02/10/2020 03:53 AM  
 Anion gap 9 02/10/2020 03:53 AM  
 Glucose 239 (H) 02/10/2020 03:53 AM  
 BUN 59 (H) 02/10/2020 03:53 AM  
 Creatinine 5.78 (H) 02/10/2020 03:53 AM  
 BUN/Creatinine ratio 10 (L) 02/10/2020 03:53 AM  
 GFR est AA 12 (L) 02/10/2020 03:53 AM  
 GFR est non-AA 10 (L) 02/10/2020 03:53 AM  
 Calcium 7.9 (L) 02/10/2020 03:53 AM  
 
Imaging: MRI Results (most recent): 
Results from [facility] Encounter encounter on 02/05/20 MRI BRAIN WO CONT  Narrative EXAM: MRI BRAIN WO CONT 
 
TECHNIQUE: Brain images including sagittal and axial T1-weighted, axial FLAIR, 
 T2-weighted, diffusion weighted, gradient echo,  susceptibility weighted. Coronal T2 
 
IV CONTRAST:  None INDICATION:  assess for interval changes, encephalopathy COMPARISON:  MRI of 2/6/2020 FINDINGS: 
BRAIN PARENCHYMA:   
1. Multiple small bilateral foci of restricted diffusion in the deep cerebral 
white matter and in the cerebellum bilaterally at the base of the middle 
cerebellar peduncles,, in a watershed distribution and bilaterally symmetrical. 
Findings are most consistent with areas of acute infarction from a 
hypoxic/ischemic event. These findings are new since 2/5/2020. 
2. Unchanged small area of encephalomalacia in the left posterior temporal lobe. INTRACRANIAL HEMORRHAGE: Unchanged foci of chronic microhemorrhage in the right 
cerebellum and in the bilateral basal ganglia. CSF SPACES:  Normal in size and morphology for the patient's age. BASAL CISTERNS:  Patent. MIDLINE SHIFT: None. VASCULAR SYSTEM:  Normal flow voids. PARANASAL SINUSES AND MASTOID AIR CELLS:  Fluid in the dependent nasal cavity. Mild mastoid fluid. Unchanged right petrous apex effusion. No significant 
paranasal sinus opacification. VISUALIZED ORBITS:  No significant abnormalities. Previous bilateral cataract 
surgery. VISUALIZED UPPER CERVICAL SPINE:  No significant abnormalities. SELLA:  No enlargement or  focal abnormality. SKULL BASE:  No significant abnormalities. Cerebellar tonsils in normal 
position. CALVARIUM:  Intact. Impression IMPRESSION:   
1. Multiple small bilaterally symmetrical infarcts in the deep white matter of 
the cerebral hemispheres and in the cerebellum. Findings are most consistent 
with a recent hypoxic ischemic episode. 2. Small area of encephalomalacia in the left temporal lobe. Assessment:  
Principal Problem: 
  Severe anoxic-ischemic encephalopathy ([address] (2/5/2020) Active Problems: 
  Severe sepsis with acute organ dysfunction ([address] (2/5/2020) HTN (hypertension) (11/27/2013) ESRD (end stage renal disease) on dialysis ([facility] (4/27/2018) Cardiac arrest with pulseless electrical activity ([facility] (2/5/2020) Cardiopulmonary arrest with successful resuscitation ([facility] (2/5/2020) Plan:  
 
1.) Acute Encephalopathy 
            - No improvement in neuro exam, remains obtunded on ventilator with sedation off - MRI performed today showed multiple small bilaterally symmetrical infarcts in the deep white matter of the cerebral hemispheres and in the cerebellum. Findings are most consistent with a recent hypoxic ischemic episode. - Likely due to CNS insult from episode of profound hypoglycemia vs delayed finding from PEA arrest 
 - Palliative care consult placed by Intensivist to discuss prognosis and care decisions  
 
2.) Seizure - Due to hypoglycemia 
 - 24 hour EEG negative for seizures, shows diffuse cerebral dysfunction. Plan discussed with Dr. Darling Gonzáles, the [facility]. [address], NP Neurocritical Care Nurse Practitioner
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
## 2020-02-11 NOTE — PROGRESS NOTES
ICU multidisciplinary rounds lead by samara (Intensivist): The following were reviewed and discussed: current labs,  recent imaging, lines/drains, review of body systems, nutrition, cultures, mobility, length of ICU stay. The plan of care for the day is as follows  Palliative meeting on thursday(written note by RN)

## 2020-02-11 NOTE — PROGRESS NOTES
Palliative Medicine Social Work Spoke with wife. Meeting planned for Thursday 11:30 AM. Thank you for the opportunity to be involved in the care of Mr. Roshan Calix and his family. Tony Thomas LMSW, Supervisee in Social Work Palliative Medicine  [phone]
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
## 2020-02-11 NOTE — PROGRESS NOTES
Primary Nurse Mya Rivera RN and Reagan Kasper RN performed a dual skin assessment on this patient Impairment noted- see wound doc flow sheet Ruddy score is 9 Skin tear sacrum and anus

## 2020-02-11 NOTE — PROGRESS NOTES
1930: Bedside and Verbal shift change report given to TE Mccarthy (oncoming nurse) by Adrian Hicks RN (offgoing nurse). Report included the following information SBAR, Kardex, Intake/Output, MAR, Recent Results, Cardiac Rhythm SR, Alarm Parameters  and Dual Neuro Assessment. 0635-[facility] coordinator called unit for pt update/ pt discussed/[facility] would like to be notified if family decides to withdrawal care.  
 
0717-bedside shift report given to RN using sbar format

## 2020-02-11 NOTE — PROGRESS NOTES
visit per palliative consult. Pt is unable to interact and no family present at this time. Please contact [phone] [facility] for further support.  follow up as needed. Car Edwards, MACE 
 [phone]

## 2020-02-11 NOTE — PROGRESS NOTES
[facility] 
 [address] [facility], [facility] [facility] Phone: [phone]   [phone]   
  
Nephrology Progress Note Tavo Cortés     1960     [id number] Date of Admission : 2/5/2020 02/11/20 CC: Follow up for ESRD Assessment and Plan ESRD- HD  
- Dialysis dependent since 4/2018. 
- Dialyzes MWF at [place]. F/b Dr July Ballard  
- HD tomorrow Hyponatremia : 
- improving HTN  
- stable now  
  
Seizure - per CCM / Neuro Multiple b/l infarcts from hypoxic injury: 
- per neuro 
  
S/P PEA arrest  
  
Hx of R Pleural effusion w/ Trapped Lung - s/p VATS and decortication 4/2018 
  
Anemia of CKD  
- resume LUIS  
  
Solitary Kidney S/p Prior Left Nephrectomy for RCC 
  
Sec HTPH Poor prognosis overall. Appreciate palliative help. Interval History: 
Seen and examined. On the vent, off sedation, unresponsive. Review of Systems: Review of systems not obtained due to patient factors. Current Medications:  
Current Facility-Administered Medications Medication Dose Route Frequency  potassium, sodium phosphates (NEUTRA-PHOS) packet 2 Packet  2 Packet Oral QID  insulin glargine (LANTUS) injection 5 Units  5 Units SubCUTAneous DAILY  potassium phosphate 30 mmol in 0.9% sodium chloride 250 mL infusion   IntraVENous ONCE  
 thiamine (B-1) 100 mg in 0.9% sodium chloride 50 mL IVPB  100 mg IntraVENous DAILY  acyclovir (ZOVIRAX) 411 mg in 0.9% sodium chloride 100 mL IVPB  5 mg/kg (Ideal) IntraVENous Q24H  white petrolatum-mineral oil (AKWA TEARS) 83-15 % ophthalmic ointment   Both Eyes Q12H  
 insulin lispro (HUMALOG) injection   SubCUTAneous Q4H  
 midodrine (PROAMITINE) tablet 10 mg  10 mg Oral Q8H  
 sodium chloride (NS) flush 5-40 mL  5-40 mL IntraVENous Q8H  
 sodium chloride (NS) flush 5-40 mL  5-40 mL IntraVENous PRN  
 glucose chewable tablet 16 g  4 Tab Oral PRN  
  glucagon (GLUCAGEN) injection 1 mg  1 mg IntraMUSCular PRN  
 dextrose 10% infusion 0-250 mL  0-250 mL IntraVENous PRN  chlorhexidine (ORAL CARE KIT) 0.12 % mouthwash 15 mL  15 mL Oral Q12H  pantoprazole (PROTONIX) 40 mg in 0.9% sodium chloride 10 mL injection  40 mg IntraVENous DAILY  epoetin tyler-epbx (RETACRIT) injection 10,000 Units  10,000 Units SubCUTAneous Q MON, WED & FRI  hydrALAZINE (APRESOLINE) 20 mg/mL injection 10 mg  10 mg IntraVENous Q6H PRN  
 acetaminophen (TYLENOL) solution 500 mg  500 mg Per NG tube Q6H PRN Allergies Allergen Reactions  Tramadol Other (comments) Brought down blood sugar too fast  
 
 
Objective: 
Vitals:   
Vitals:  
 02/11/20 0400 02/11/20 0500 02/11/20 0600 02/11/20 0720 BP: 135/70 147/75 136/67 Pulse: 76 76 68 70 Resp: (!) 32 10 (!) 6 15 Temp: 97.9 °F (36.6 °C) TempSrc:      
SpO2: 98% 98% 95% 97% Weight:      
Height:      
 
Intake and Output: 
02/11 0701 - 02/11 1900 In: 48 Out: -  
02/09 1901 - 02/11 0700 In: 7181 [I.V.:950] Out: 2900 [Drains:900] Physical Examination: 
General:  On vent HEENT: PERRL,  + Pallor , No Icterus Neck: Supple,no mass palpable Lungs : CTA 
CVS: RRR, S1 S2 normal, No murmur Abdomen: Soft, NT, BS + Extremities: trace Edema, RUE AVF + thrill MS: No joint swelling, erythema, warmth Neurologic:unresponsive on vent Psych: Unable to assess 
  
 
[]    High complexity decision making was performed 
[]    Patient is at high-risk of decompensation with multiple organ involvement Lab Data Personally Reviewed: I have reviewed all the pertinent labs, microbiology data and radiology studies during assessment. Recent Labs  
  02/11/20 
0230 02/10/20 
0353 02/09/20 
0141 02/08/20 
1041 * 132* 129* 131*  
K 3.6 3.2* 5.1 3.2*  
 99 99 98  
CO2 22 24 23 27 * 239* 201* 234* BUN 45* 59* 37* 25* CREA 4.37* 5.78* 4.71* 4.26* CA 7.8* 7.9* 8.0* 7.9*  
MG 2.3 2.4 2.1  --   
 PHOS 0.9* 1.0* 1.2*  --   
ALB  --   --   --  1.7* SGOT  --   --   --  21 ALT  --   --   --  23 Recent Labs  
  02/11/20 
0230 02/10/20 
0353 02/09/20 
0141 WBC 7.0 9.1 10.7 HGB 8.3* 8.3* 8.9* HCT 25.5* 25.6* 27.3*  
 176 159 No results found for: SDES Lab Results Component Value Date/Time Culture result: NO GROWTH 5 DAYS 02/06/2020 03:43 PM  
 Culture result: No growth (<1,000 CFU/ML) 02/05/2020 08:48 AM  
 Culture result: NO GROWTH 14 DAYS 01/03/2019 12:26 PM  
 Culture result: NO GROWTH ON SOLID MEDIA AT 14 DAYS 01/03/2019 11:45 AM  
 Culture result: (A) 01/03/2019 11:45 AM  
  STAPHYLOCOCCUS HOMINIS SUBSPECIES HOMINIS ISOLATED FROM THIO BROTH ONLY Recent Results (from the past 24 hour(s)) GLUCOSE, POC Collection Time: 02/10/20  1:22 PM  
Result Value Ref Range Glucose (POC) 173 (H) 65 - 100 mg/dL Performed by [facility], POC Collection Time: 02/10/20  5:34 PM  
Result Value Ref Range Glucose (POC) 195 (H) 65 - 100 mg/dL Performed by [facility], POC Collection Time: 02/10/20  9:45 PM  
Result Value Ref Range Glucose (POC) 274 (H) 65 - 100 mg/dL Performed by [address], BASIC Collection Time: 02/11/20  2:30 AM  
Result Value Ref Range Sodium 132 (L) 136 - 145 mmol/L Potassium 3.6 3.5 - 5.1 mmol/L Chloride 100 97 - 108 mmol/L  
 CO2 22 21 - 32 mmol/L Anion gap 10 5 - 15 mmol/L Glucose 331 (H) 65 - 100 mg/dL BUN 45 (H) 6 - 20 MG/DL Creatinine 4.37 (H) 0.70 - 1.30 MG/DL  
 BUN/Creatinine ratio 10 (L) 12 - 20 GFR est AA 17 (L) >60 ml/min/1.73m2 GFR est non-AA 14 (L) >60 ml/min/1.73m2 Calcium 7.8 (L) 8.5 - 10.1 MG/DL MAGNESIUM Collection Time: 02/11/20  2:30 AM  
Result Value Ref Range Magnesium 2.3 1.6 - 2.4 mg/dL PHOSPHORUS Collection Time: 02/11/20  2:30 AM  
Result Value Ref Range  Phosphorus 0.9 (LL) 2.6 - 4.7 MG/DL  
CBC W/O DIFF  
 Collection Time: 02/11/20  2:30 AM  
Result Value Ref Range WBC 7.0 4.1 - 11.1 K/uL  
 RBC 3.52 (L) 4.10 - 5.70 M/uL HGB 8.3 (L) 12.1 - 17.0 g/dL HCT 25.5 (L) 36.6 - 50.3 % MCV 72.4 (L) 80.0 - 99.0 FL  
 MCH 23.6 (L) 26.0 - 34.0 PG  
 MCHC 32.5 30.0 - 36.5 g/dL  
 RDW 16.7 (H) 11.5 - 14.5 % PLATELET 987 543 - 244 K/uL MPV 11.1 8.9 - 12.9 FL  
 NRBC 0.0 0  WBC ABSOLUTE NRBC 0.00 0.00 - 0.01 K/uL GLUCOSE, POC Collection Time: 02/11/20  2:51 AM  
Result Value Ref Range Glucose (POC) 311 (H) 65 - 100 mg/dL Performed by [place] POC Collection Time: 02/11/20  5:17 AM  
Result Value Ref Range Glucose (POC) 345 (H) 65 - 100 mg/dL Performed by Chu Arthur Total time spent with patient:  xxx   min. Care Plan discussed with: 
Patient Family RN Consulting Physician [id number],      
 
I have reviewed the flowsheets. Chart and Pertinent Notes have been reviewed. No change in PMH ,family and social history from Consult note.  
 
 
Melanie Kenyon MD

## 2020-02-11 NOTE — PROGRESS NOTES
Patient remains in the ICU vented. MRI shows hypoxic brain injury. Plan will be for family to meet with Palliative Care on Thursday. Care management is continuing to follow. Tyrell Curtis RN,CRM

## 2020-02-11 NOTE — DIABETES MGMT
[address] Followup Progress Note Presentation Tiffanie Owen is a 64 y.o. male admitted with seizure like activity and PEA en route to hospital and has been intubated and on a ventilator since admission. Consulted by Provider for advanced specialty nursing care related to inpatient diabetes management. Subjective The patient remains on the ventilator without sedation. MRI tests reveal hypoxic brain injury. Objective Physical exam 
 
Vital Signs Visit Vitals /71 Pulse 80 Temp 99.5 °F (37.5 °C) Resp 18 Ht 6' 2\" (1.88 m) Wt 75.3 kg (166 lb 0.1 oz) SpO2 100% BMI 21.31 kg/m² Laboratory ESRD on HD 
 
 
 
BG trends have been >200 for past several days. Receiving multiple SSI doses. Started on 5 units of Lantus today. RECOMMENDATIONS If BG remains >200 after 24 hours, increase basal insulin to 10units. INCREASE basal insulin to cover for the TF. Assessment and Plan Nursing Diagnosis Risk for unstable blood glucose pattern Nursing Intervention Domain 7537 Decision-making Support Nursing Interventions Examined current inpatient diabetes control Explored factors facilitating and impeding inpatient management Identified self-management practices impeding diabetes control Explored corrective strategies with patient and responsible inpatient provider Informed patient of rational for basal bolus insulin strategy while hospitalized Signed By: Dory Beverly RN Clinical Nurse Specialist 
[facility] Contact via [employer]/[phone] February 11, 2020

## 2020-02-12 NOTE — PROGRESS NOTES
Problem: Ventilator Management Goal: *Adequate oxygenation and ventilation Outcome: Progressing Towards Goal 
Goal: *Patient maintains clear airway/free of aspiration Outcome: Progressing Towards Goal 
Goal: *Absence of infection signs and symptoms Outcome: Progressing Towards Goal 
Goal: *Normal spontaneous ventilation Outcome: Progressing Towards Goal 
  
Problem: Falls - Risk of 
Goal: *Absence of Falls Description Document Raymond Sam Fall Risk and appropriate interventions in the flowsheet. Outcome: Progressing Towards Goal 
Note: Fall Risk Interventions: 
  
 
Mentation Interventions: Adequate sleep, hydration, pain control, Door open when patient unattended, Bed/chair exit alarm, More frequent rounding, Room close to nurse's station Medication Interventions: Assess postural VS orthostatic hypotension, Bed/chair exit alarm Elimination Interventions: Bed/chair exit alarm Problem: Patient Education: Go to Patient Education Activity Goal: Patient/Family Education Outcome: Progressing Towards Goal 
  
Problem: Pressure Injury - Risk of 
Goal: *Prevention of pressure injury Description Document Ruddy Scale and appropriate interventions in the flowsheet. Outcome: Progressing Towards Goal 
Note: Pressure Injury Interventions: 
Sensory Interventions: Assess changes in LOC, Assess need for specialty bed, Float heels, Keep linens dry and wrinkle-free, Maintain/enhance activity level, Minimize linen layers, Monitor skin under medical devices, Pressure redistribution bed/mattress (bed type), Use 30-degree side-lying position, Turn and reposition approx. every two hours (pillows and wedges if needed) Moisture Interventions: Assess need for specialty bed, Check for incontinence Q2 hours and as needed, Internal/External fecal devices, Contain wound drainage, Apply protective barrier, creams and emollients Activity Interventions: Pressure redistribution bed/mattress(bed type) Mobility Interventions: Float heels, HOB 30 degrees or less, Turn and reposition approx. every two hours(pillow and wedges) Nutrition Interventions: Document food/fluid/supplement intake Friction and Shear Interventions: Lift sheet, HOB 30 degrees or less, Minimize layers, Lift team/patient mobility team

## 2020-02-12 NOTE — DIABETES MGMT
[address] Followup Progress Note Presentation Carolina Muniz is a 64 y.o. male admitted with seizure like activity and PEA en route to hospital and has been intubated and on a ventilator since admission. He has been found to have hypoxic brain injury. Consulted by Provider for advanced specialty nursing care related to inpatient diabetes management. Subjective Patient remains intubated and ventilated. On no sedation. Palliative care following and family meeting slated for today. Objective Vital Signs Visit Vitals /76 Pulse 79 Temp 98.5 °F (36.9 °C) Resp 27 Ht 6' 2\" (1.88 m) Wt 75.3 kg (166 lb 0.1 oz) SpO2 100% BMI 21.31 kg/m² Laboratory ESRD on HD 
 
 
 
 
BG trends: has trended down below 200 in past 24hours. Required 32 units of Lispro yesterday. RECOMMENDATION Would consider increasing basal dose to 10units. We can slowly increase as we see how he adjusts. He is on 50cc continuous TF. In order to cover for his TF he would need 19units of basal insulin. Assessment and Plan Nursing Diagnosis Risk for unstable blood glucose pattern Nursing Intervention Domain 7818 Decision-making Support Nursing Interventions Examined current inpatient diabetes control Explored factors facilitating and impeding inpatient management Identified self-management practices impeding diabetes control Explored corrective strategies with patient and responsible inpatient provider Informed patient of rational for basal bolus insulin strategy while hospitalized Signed By: Edith Ramirez RN Clinical Nurse Specialist 
[facility] Contact via [employer]/[phone] February 12, 2020
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
## 2020-02-12 NOTE — PROGRESS NOTES
Palliative Medicine Social Work Spoke with pt wife on phone. She is still processing meeting from earlier today. She said she talked to family, that they are people of dawood and make decisions based on God. They are wanting to leave patient in god's hands. We explored this more and she indicated they are leaning toward compassionate extubation to let God do his work and also she knows patient would never want to live in a nursing facility. However, she is NOT ready to do this yet. We made plan to meet Tuesday 1pm to discuss next steps. She indicated she \"might change my mind\" meaning she may not decide for extubation. She's still processing and acknowledged this. We discussed code status, high risk for sudden event, resuscitative efforts would not be meaningful. She is aware. Patient remains FULL CODE. If she goes forward with extubation, she will re-address code status. Will reconvene with wife/family Tuesday 1pm 
 
 
 
 
 
Thank you for the opportunity to be involved in the care of Mr. Tamiko Retana and his family. Robert Wright, [place], Supervisee in Social Work Palliative Medicine  [phone]
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
## 2020-02-12 NOTE — PROGRESS NOTES
Spiritual Care Assessment/Progress Note [address] 
 
 
NAME: Cruz Sanchez      MRN: [id number] AGE: 64 y.o. SEX: male Yazdanism Affiliation: uShare  
Language: [place] 2/12/2020     Total Time (in minutes): 10 Spiritual Assessment begun in [address] [facility] through conversation with: 
  
    []Patient        [x] Family    [] Friend(s) Reason for Consult: Palliative Care, Initial/Spiritual Assessment Spiritual beliefs: (Please include comment if needed) 
   [] Identifies with a dawood tradition:     
   [] Supported by a dawood community:        
   [] Claims no spiritual orientation:       
   [] Seeking spiritual identity:            
   [] Adheres to an individual form of spirituality:       
   [x] Not able to assess:                   
 
    
Identified resources for coping:  
   [] Prayer                           
   [] Music                  [] Guided Imagery [x] Family/friends                 [] Pet visits [] Devotional reading                         [] Unknown 
   [] Other:                                          
 
 
Interventions offered during this visit: (See comments for more details) Patient Interventions: Initial visit Family/Friend(s): Affirmation of emotions/emotional suffering, Normalization of emotional/spiritual concerns Plan of Care: 
 
 [x] Support spiritual and/or cultural needs  
 [] Support AMD and/or advance care planning process    
 [] Support grieving process 
 [] Coordinate Rites and/or Rituals  
 [] Coordination with community clergy [] No spiritual needs identified at this time 
 [] Detailed Plan of Care below (See Comments)  [] Make referral to Music Therapy 
[] Make referral to Pet Therapy    
[] Make referral to Addiction services 
[] Make referral to [facility] 
[] Make referral to Spiritual Care Partner 
[] No future visits requested       
[x] Follow up visits as needed Comments:  for initial visit, pt's daughter and son-in-law were present. Let them know of  support and availability. Please contact [phone] [facility] for further support.  follow up as needed. Car Edwards, MACE 
 [phone])

## 2020-02-12 NOTE — PROGRESS NOTES
0730: Bedside and Verbal shift change report given to Chante Hickey RN (oncoming nurse) by Terrance Gonzales RN (offgoing nurse). Report included the following information SBAR, Kardex, Intake/Output, MAR, Recent Results and Cardiac Rhythm NSR.  
 
0800:  Assessment complete. No new issues. Patient remains unresponsive on the vent with no sedation. No purposeful activity, no gag, no cough. 1200:  Reassessment complete. No new issues.

## 2020-02-12 NOTE — PROGRESS NOTES
Palliative Medicine [place]: [phone] ([phone]) [facility]: [phone] ([phone]) Code Status: Full Code Advance Care Planning: 
No AMD on file LNOK:   Primary Decision Maker (Active): Yanni Tpaia - Spouse - [phone] Per chart: Morris Garcia is a 64 y.o. with a past history of ESRD on HD since 4/2018 (Dr. Brigitte Piña), HTN, DM w peripheral and autonomic neuropathy, CAD/ ischemic cardiomyopathy, hx renal cell carcinoma s/p L nephrectomy, hx pancreatic pseudocyst, hx pneumonia/empyema/ VATS decortication 4/2018, seen in office 1/14/20 w fatigue/anorexia/weakness/ epistaxis, seen in ED 1/30/2020 w RLL pneumonia, who was admitted on 2/5/2020 from home/ transfer from [facility] with uncontrolled HTN/ PEA arrest.    Current medical issues leading to Palliative Medicine involvement include: Poor neurologic recovery after PEA arrest en route to hospital, now with MRI and EEG consistent with anoxic injury. Patient is currently on ventilator (has been off all sedation), unresponsive/ no spontaneous movements/ absent gag except with very deep suction. Patient / Family Encounter Documentation Participants (names): Patient wife/PARVEEN Liao; Palliative Medicine (Srinivasa Mayes, Dr. Chelsey Lancaster) Narrative:  
 
Dr. Chelsey Lancaster and I met with patient wife/PARVEEN Liao in conference room. Daughter [facility] MISAEL and Andriy Jay declined to join. Patient unresponsive on vent and unable to participate in care decisions. Yanni recapped her understanding of patient's test results, medical care/challenges. Dr. Chelsey Lancaster provided detailed account of hospitalization, current medical status. See her note. Wife appropriately tearful, asking good questions. 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  
 
Wife is understanding of two pathways of care : trach and peg v. Compassionate extubation. She was concerned about logistical details including when she would have to decide and what facility he would go to. We discussed 2 week timeframe for deciding about trache and long term acute care facilities. Patient never completed AMD and he and wife had never talked about what his wishes would be in this kind of situation. She did tell us that he said he would never want to live in a nursing facility. Encouraged her to continue bringing his voice to the table to honor him. Psycho: unable to assess Social: lives with wife in [address] [place] [address], 2 dtrs, 2 sons and all local except one, 6 grandkids, brother also involved Spiritual: not assessed Baseline : independent in ADLS, though has recently become weaker and needing assistance; uses walker for ambulation; loves to go fishing Psychosocial challenges/Resilience factors:  
 
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 No financial concerns noted during assessment Goals of Care / Plan: Wife will call us with next steps when she has time to process Will continue to follow for support Thank you for the opportunity to be involved in the care of Mr. Liz Garduno and his family. Mary Renteria, [facility], Supervisee in Social Work Palliative Medicine  [phone]
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
## 2020-02-12 NOTE — PROGRESS NOTES
[facility] 
 [address] [facility] Phone: [phone]   [phone]   
  
Nephrology Progress Note Taylor Paulino     1960     [id number] Date of Admission : 2/5/2020 02/12/20 CC: Follow up for ESRD Assessment and Plan ESRD- HD  
- Dialysis dependent since 4/2018. 
- Dialyzes MWF at [facility]. F/b Dr Chika Harley  
- HD now Hyponatremia : 
- Na stable HTN  
- stable now  
  
Seizure - per CCM / Neuro Multiple b/l infarcts from hypoxic injury: 
- per neuro 
  
S/P PEA arrest  
  
Hx of R Pleural effusion w/ Trapped Lung - s/p VATS and decortication 4/2018 
  
Anemia of CKD  
- resume LUIS  
  
Solitary Kidney S/p Prior Left Nephrectomy for RCC 
  
Sec HTPH Poor prognosis overall. Appreciate palliative help. Family meeting for tomorrow Interval History: 
Seen and examined on dialysis. On the vent, off sedation, unresponsive. Review of Systems: Review of systems not obtained due to patient factors. Current Medications:  
Current Facility-Administered Medications Medication Dose Route Frequency  insulin glargine (LANTUS) injection 5 Units  5 Units SubCUTAneous DAILY  lipase-protease-amylase (CREON 24,000) capsule 3 Cap  3 Cap Oral Q8H  
 thiamine (B-1) 100 mg in 0.9% sodium chloride 50 mL IVPB  100 mg IntraVENous DAILY  white petrolatum-mineral oil (AKWA TEARS) 83-15 % ophthalmic ointment   Both Eyes Q12H  
 insulin lispro (HUMALOG) injection   SubCUTAneous Q4H  
 midodrine (PROAMITINE) tablet 10 mg  10 mg Oral Q8H  
 sodium chloride (NS) flush 5-40 mL  5-40 mL IntraVENous Q8H  
 sodium chloride (NS) flush 5-40 mL  5-40 mL IntraVENous PRN  
 glucose chewable tablet 16 g  4 Tab Oral PRN  
 glucagon (GLUCAGEN) injection 1 mg  1 mg IntraMUSCular PRN  
 dextrose 10% infusion 0-250 mL  0-250 mL IntraVENous PRN  chlorhexidine (ORAL CARE KIT) 0.12 % mouthwash 15 mL  15 mL Oral Q12H  pantoprazole (PROTONIX) 40 mg in 0.9% sodium chloride 10 mL injection  40 mg IntraVENous DAILY  epoetin tyler-epbx (RETACRIT) injection 10,000 Units  10,000 Units SubCUTAneous Q MON, WED & FRI  hydrALAZINE (APRESOLINE) 20 mg/mL injection 10 mg  10 mg IntraVENous Q6H PRN  
 acetaminophen (TYLENOL) solution 500 mg  500 mg Per NG tube Q6H PRN Allergies Allergen Reactions  Tramadol Other (comments) Brought down blood sugar too fast  
 
 
Objective: 
Vitals:   
Vitals:  
 02/12/20 0730 02/12/20 0745 02/12/20 0800 02/12/20 0815 BP: 118/69 127/78 125/71 111/73 Pulse: 75 86 75 81 Resp: 19 16 15 18 Temp:      
TempSrc:      
SpO2: 100% 100% 100% 100% Weight:      
Height:      
 
Intake and Output: 
No intake/output data recorded. 02/10 1901 - 02/12 0700 In: 4488.7 [I.V.:538.7] Out: 2150 [Drains:2150] Physical Examination: 
General:  On vent HEENT: PERRL,  + Pallor , No Icterus Neck: Supple,no mass palpable Lungs : CTA 
CVS: RRR, S1 S2 normal, No murmur Abdomen: Soft, NT, BS + Extremities: trace Edema, RUE AVF + thrill MS: No joint swelling, erythema, warmth Neurologic:unresponsive on vent Psych: Unable to assess 
  
 
[]    High complexity decision making was performed 
[]    Patient is at high-risk of decompensation with multiple organ involvement Lab Data Personally Reviewed: I have reviewed all the pertinent labs, microbiology data and radiology studies during assessment. Recent Labs  
  02/12/20 
0300 02/11/20 
0230 02/10/20 
0353 * 132* 132* K 4.7 3.6 3.2*  
 100 99 CO2 23 22 24 * 331* 239* BUN 64* 45* 59* CREA 5.15* 4.37* 5.78* CA 7.7* 7.8* 7.9*  
MG 2.4 2.3 2.4 PHOS 2.9 0.9* 1.0* Recent Labs  
  02/12/20 0300 02/11/20 
0230 02/10/20 
0353 WBC 7.9 7.0 9.1 HGB 8.6* 8.3* 8.3* HCT 26.6* 25.5* 25.6*  
 166 176 No results found for: SDES Lab Results Component Value Date/Time Culture result: NO GROWTH 5 DAYS 02/06/2020 03:43 PM  
 Culture result: No growth (<1,000 CFU/ML) 02/05/2020 08:48 AM  
 Culture result: NO GROWTH 14 DAYS 01/03/2019 12:26 PM  
 Culture result: NO GROWTH ON SOLID MEDIA AT 14 DAYS 01/03/2019 11:45 AM  
 Culture result: (A) 01/03/2019 11:45 AM  
  STAPHYLOCOCCUS HOMINIS SUBSPECIES HOMINIS ISOLATED FROM THIO BROTH ONLY Recent Results (from the past 24 hour(s)) GLUCOSE, POC Collection Time: 02/11/20 10:10 AM  
Result Value Ref Range Glucose (POC) 288 (H) 65 - 100 mg/dL Performed by Sin Fennel GLUCOSE, POC Collection Time: 02/11/20  1:16 PM  
Result Value Ref Range Glucose (POC) 242 (H) 65 - 100 mg/dL Performed by Sin Fennel GLUCOSE, POC Collection Time: 02/11/20  5:18 PM  
Result Value Ref Range Glucose (POC) 159 (H) 65 - 100 mg/dL Performed by Sin Fennel GLUCOSE, POC Collection Time: 02/11/20 11:42 PM  
Result Value Ref Range Glucose (POC) 138 (H) 65 - 100 mg/dL Performed by Cindra Homans GLUCOSE, POC Collection Time: 02/12/20  2:53 AM  
Result Value Ref Range Glucose (POC) 184 (H) 65 - 100 mg/dL Performed by Cindra Homans METABOLIC PANEL, BASIC Collection Time: 02/12/20  3:00 AM  
Result Value Ref Range Sodium 132 (L) 136 - 145 mmol/L Potassium 4.7 3.5 - 5.1 mmol/L Chloride 101 97 - 108 mmol/L  
 CO2 23 21 - 32 mmol/L Anion gap 8 5 - 15 mmol/L Glucose 192 (H) 65 - 100 mg/dL BUN 64 (H) 6 - 20 MG/DL Creatinine 5.15 (H) 0.70 - 1.30 MG/DL  
 BUN/Creatinine ratio 12 12 - 20 GFR est AA 14 (L) >60 ml/min/1.73m2 GFR est non-AA 12 (L) >60 ml/min/1.73m2 Calcium 7.7 (L) 8.5 - 10.1 MG/DL MAGNESIUM Collection Time: 02/12/20  3:00 AM  
Result Value Ref Range Magnesium 2.4 1.6 - 2.4 mg/dL PHOSPHORUS Collection Time: 02/12/20  3:00 AM  
Result Value Ref Range Phosphorus 2.9 2.6 - 4.7 MG/DL  
CBC W/O DIFF  Collection Time: 02/12/20  3:00 AM  
 Result Value Ref Range WBC 7.9 4.1 - 11.1 K/uL  
 RBC 3.66 (L) 4.10 - 5.70 M/uL HGB 8.6 (L) 12.1 - 17.0 g/dL HCT 26.6 (L) 36.6 - 50.3 % MCV 72.7 (L) 80.0 - 99.0 FL  
 MCH 23.5 (L) 26.0 - 34.0 PG  
 MCHC 32.3 30.0 - 36.5 g/dL  
 RDW 16.6 (H) 11.5 - 14.5 % PLATELET 161 794 - 093 K/uL MPV 11.2 8.9 - 12.9 FL  
 NRBC 0.0 0  WBC ABSOLUTE NRBC 0.00 0.00 - 0.01 K/uL SED RATE (ESR) Collection Time: 02/12/20  3:00 AM  
Result Value Ref Range Sed rate, automated 15 0 - 20 mm/hr C REACTIVE PROTEIN, QT Collection Time: 02/12/20  3:00 AM  
Result Value Ref Range C-Reactive protein 7.04 (H) 0.00 - 0.60 mg/dL PROCALCITONIN Collection Time: 02/12/20  3:00 AM  
Result Value Ref Range Procalcitonin 25.49 ng/mL GLUCOSE, POC Collection Time: 02/12/20  6:55 AM  
Result Value Ref Range Glucose (POC) 163 (H) 65 - 100 mg/dL Performed by Princess Taveras Total time spent with patient:  xxx   min. Care Plan discussed with: 
Patient Family RN Consulting Physician [facility],      
 
I have reviewed the flowsheets. Chart and Pertinent Notes have been reviewed. No change in PMH ,family and social history from Consult note.  
 
 
Jeferson Dominique MD

## 2020-02-12 NOTE — PROCEDURES
[facility] Dialysis Team [place]  [phone] Vitals   Pre   Post   Assessment   Pre   Post    
Temp  Temp: 99.3 °F (37.4 °C) (02/12/20 0615) 98.5 LOC  Unresponsive, no sedation same HR   Pulse (Heart Rate): 73 (02/12/20 0615) 79 Lungs   Clear, intubated on ventilator  same B/P   BP: 142/77 (02/12/20 0615) 109/76 Cardiac   Normal Sinus Rhythm- monitored at bedside  same Resp   Resp Rate: 18 (02/12/20 0615) 27 Skin   Dry and intact  same Pain level     Edema Swelling to Bilateral arms same Orders: Duration:   Start:     0615 End:    0945 Total:   3.5 hours Dialyzer:   Dialyzer/Set Up Inspection: Timothy France (02/12/20 0615) K Bath:   Dialysate K (mEq/L): 3 (02/12/20 0615) Ca Bath:   Dialysate CA (mEq/L): 2.5 (02/12/20 0615) Na/Bicarb:   Dialysate NA (mEq/L): 140 (02/12/20 0615) Target Fluid Removal:   Goal/Amount of Fluid to Remove (mL): 2000 mL (02/12/20 0615) Access Type & Location:   Right upper arm AVf, + bruit and thrill, no s/s of infection, cannulated x 2 with 15 gauge needles without difficulty Labs Obtained/Reviewed Critical Results Called   Date when labs were drawn- 
Hgb-   
HGB Date Value Ref Range Status 02/12/2020 8.6 (L) 12.1 - 17.0 g/dL Final  
 
K-   
Potassium Date Value Ref Range Status 02/12/2020 4.7 3.5 - 5.1 mmol/L Final  
  Comment:  
  INVESTIGATED PER DELTA CHECK PROTOCOL Ca-  
Calcium Date Value Ref Range Status 02/12/2020 7.7 (L) 8.5 - 10.1 MG/DL Final  
 
Bun-  
BUN Date Value Ref Range Status 02/12/2020 64 (H) 6 - 20 MG/DL Final  
 
Creat-  
Creatinine Date Value Ref Range Status 02/12/2020 5.15 (H) 0.70 - 1.30 MG/DL Final  
 
  
Medications/ Blood Products Given Name   Dose   Route and Time None ordered Blood Volume Processed (BVP):   78.6 Net Fluid Removed:  2000 ml Comments Time Out Done: 9502 Primary Nurse Rpt Pre:Melanie EUCEDA RN 
Primary Nurse Rpt Post:Mirela CARTER RN 
 Pt Education: Procedure Care Plan: Continue Nephrology plan of care Tx Summary:0615- HD was started using right upper arm AVF without any issues. 0715-Tolerating HD well, vital signs are stable 0800- Dr Serafin Canavan at bedside assessing the pt , no change in status 
0900- vital signs stable 0945-HD was completed and all possible blood was rinsed back. Pt removed ordered 2 liters of fluid. Hemostasis was achieved form needle sites, with no excessive bleeding. Post AVf was + bruit and thrill, dry gauze and tape in place. Primary nurse was given a report. Pt remain intubated on ventilator unresponsive as pre treatment. Admiting Diagnosis: Cardiac Arrest, PEA 
Pt's previous clinic- [facility] Consent signed - Informed Consent Verified: Yes (02/12/20 0615) Buffy Consent - Verified signed Hepatitis Status- Negative AG / susceptible AB 2/7/20 Machine #- Machine Number: [id number] [id number] (02/12/20 3011) Telemetry status- NSR Pre-dialysis wt. -

## 2020-02-13 NOTE — PROGRESS NOTES
Problem: Ventilator Management Goal: *Adequate oxygenation and ventilation Outcome: Progressing Towards Goal 
Goal: *Patient maintains clear airway/free of aspiration Outcome: Progressing Towards Goal 
Goal: *Absence of infection signs and symptoms Outcome: Progressing Towards Goal 
Goal: *Normal spontaneous ventilation Outcome: Progressing Towards Goal 
  
Problem: Falls - Risk of 
Goal: *Absence of Falls Description Document Aly Ross Fall Risk and appropriate interventions in the flowsheet. Outcome: Progressing Towards Goal 
Note: Fall Risk Interventions: 
  
 
Mentation Interventions: Bed/chair exit alarm, Door open when patient unattended, Adequate sleep, hydration, pain control Medication Interventions: Bed/chair exit alarm Elimination Interventions: Bed/chair exit alarm, Toileting schedule/hourly rounds History of Falls Interventions: Bed/chair exit alarm, Vital signs minimum Q4HRs X 24 hrs (comment for end date), Door open when patient unattended, Room close to nurse's station Problem: Patient Education: Go to Patient Education Activity Goal: Patient/Family Education Outcome: Progressing Towards Goal 
  
Problem: Pressure Injury - Risk of 
Goal: *Prevention of pressure injury Description Document Ruddy Scale and appropriate interventions in the flowsheet. Outcome: Progressing Towards Goal 
Note: Pressure Injury Interventions: 
Sensory Interventions: Assess changes in LOC, Assess need for specialty bed, Float heels, Keep linens dry and wrinkle-free, Minimize linen layers, Pressure redistribution bed/mattress (bed type), Pad between skin to skin, Turn and reposition approx. every two hours (pillows and wedges if needed) Moisture Interventions: Absorbent underpads, Apply protective barrier, creams and emollients, Assess need for specialty bed, Check for incontinence Q2 hours and as needed, Internal/External fecal devices, Moisture barrier, Minimize layers Activity Interventions: Assess need for specialty bed, Pressure redistribution bed/mattress(bed type) Mobility Interventions: Assess need for specialty bed, Float heels, HOB 30 degrees or less, Turn and reposition approx. every two hours(pillow and wedges) Nutrition Interventions: Document food/fluid/supplement intake Friction and Shear Interventions: HOB 30 degrees or less, Lift sheet, Lift team/patient mobility team, Minimize layers Problem: Nutrition Deficit Goal: *Optimize nutritional status Outcome: Progressing Towards Goal

## 2020-02-13 NOTE — PROGRESS NOTES
[facility] 
 [address] [facility], [address] [facility], [facility] Phone: [phone]   [phone]   
  
Nephrology Progress Note Rubi Hooks     1960     [id number] Date of Admission : 2/5/2020 02/13/20 CC: Follow up for ESRD Assessment and Plan ESRD- HD  
- Dialysis dependent since 4/2018. 
- Dialyzes MWF at [place] [address]. F/b Dr Sd Farley  
- next HD tomorrow Hyponatremia : 
- reduced water flushes to 50 ml Q4 hr  
 
HTN  
- stable now  
  
Seizure - per CCM / Neuro Multiple b/l infarcts from hypoxic injury: 
- per neuro 
  
S/P PEA arrest  
  
Hx of R Pleural effusion w/ Trapped Lung - s/p VATS and decortication 4/2018 
  
Anemia of CKD  
- resume LUIS  
  
Solitary Kidney S/p Prior Left Nephrectomy for RCC 
  
Sec HTPH Poor prognosis overall. Appreciate palliative help. Interval History: 
Seen and examined. Palliative care notes read. On the vent, off sedation, unresponsive. Review of Systems: Review of systems not obtained due to patient factors. Current Medications:  
Current Facility-Administered Medications Medication Dose Route Frequency  scopolamine (TRANSDERM-SCOP) 1 mg over 3 days 1 Patch  1 Patch TransDERmal Q72H  
 insulin glargine (LANTUS) injection 8 Units  8 Units SubCUTAneous DAILY  heparin (porcine) injection 5,000 Units  5,000 Units SubCUTAneous Q12H  cosyntropin (CORTROSYN) 0.25 mg in 0.9% sodium chloride TEST injection  0.25 mg IntraVENous ONCE  
 insulin lispro (HUMALOG) injection   SubCUTAneous Q6H  
 lipase-protease-amylase (CREON 24,000) capsule 3 Cap  3 Cap Oral Q8H  
 white petrolatum-mineral oil (AKWA TEARS) 83-15 % ophthalmic ointment   Both Eyes Q12H  
 midodrine (PROAMITINE) tablet 10 mg  10 mg Oral Q8H  
 sodium chloride (NS) flush 5-40 mL  5-40 mL IntraVENous Q8H  
 sodium chloride (NS) flush 5-40 mL  5-40 mL IntraVENous PRN  
 glucose chewable tablet 16 g  4 Tab Oral PRN  
  glucagon (GLUCAGEN) injection 1 mg  1 mg IntraMUSCular PRN  
 dextrose 10% infusion 0-250 mL  0-250 mL IntraVENous PRN  chlorhexidine (ORAL CARE KIT) 0.12 % mouthwash 15 mL  15 mL Oral Q12H  pantoprazole (PROTONIX) 40 mg in 0.9% sodium chloride 10 mL injection  40 mg IntraVENous DAILY  epoetin tyler-epbx (RETACRIT) injection 10,000 Units  10,000 Units SubCUTAneous Q MON, WED & FRI  hydrALAZINE (APRESOLINE) 20 mg/mL injection 10 mg  10 mg IntraVENous Q6H PRN  
 acetaminophen (TYLENOL) solution 500 mg  500 mg Per NG tube Q6H PRN Allergies Allergen Reactions  Tramadol Other (comments) Brought down blood sugar too fast  
 
 
Objective: 
Vitals:   
Vitals:  
 02/13/20 0514 02/13/20 0600 02/13/20 0700 02/13/20 0800 BP:  135/67 129/71 139/75 Pulse: 72 69 68 68 Resp: 15 15 15 15 Temp:    (P) 99.1 °F (37.3 °C) TempSrc:      
SpO2: 94% 95% 95% 96% Weight:      
Height:      
 
Intake and Output: 
No intake/output data recorded. 02/11 1901 - 02/13 0700 In: 4500 [I.V.:50] Out: 3100 [Drains:1100] Physical Examination: 
General:  On vent HEENT: PERRL,  + Pallor , No Icterus Neck: Supple,no mass palpable Lungs : CTA 
CVS: RRR, S1 S2 normal, No murmur Abdomen: Soft, NT, BS + Extremities: trace Edema, RUE AVF + thrill MS: No joint swelling, erythema, warmth Neurologic:unresponsive on vent Psych: Unable to assess 
  
 
[]    High complexity decision making was performed 
[]    Patient is at high-risk of decompensation with multiple organ involvement Lab Data Personally Reviewed: I have reviewed all the pertinent labs, microbiology data and radiology studies during assessment. Recent Labs  
  02/13/20 
0443 02/12/20 
0300 02/11/20 
0230 * 132* 132* K 4.4 4.7 3.6 CL 96* 101 100 CO2 28 23 22 * 192* 331* BUN 46* 64* 45* CREA 4.18* 5.15* 4.37* CA 7.8* 7.7* 7.8*  
MG 2.2 2.4 2.3 PHOS 2.4* 2.9 0.9* Recent Labs  
  02/13/20 
0443 02/12/20 0300 02/11/20 
0230 WBC 8.6 7.9 7.0 HGB 8.3* 8.6* 8.3* HCT 25.9* 26.6* 25.5*  
 181 166 No results found for: SDES Lab Results Component Value Date/Time Culture result: NO GROWTH 5 DAYS 02/06/2020 03:43 PM  
 Culture result: No growth (<1,000 CFU/ML) 02/05/2020 08:48 AM  
 Culture result: NO GROWTH 14 DAYS 01/03/2019 12:26 PM  
 Culture result: NO GROWTH ON SOLID MEDIA AT 14 DAYS 01/03/2019 11:45 AM  
 Culture result: (A) 01/03/2019 11:45 AM  
  STAPHYLOCOCCUS HOMINIS SUBSPECIES HOMINIS ISOLATED FROM THIO BROTH ONLY Recent Results (from the past 24 hour(s)) GLUCOSE, POC Collection Time: 02/12/20 10:17 AM  
Result Value Ref Range Glucose (POC) 128 (H) 65 - 100 mg/dL Performed by Ulisses Jewell ( ODEME) LACTIC ACID Collection Time: 02/12/20 11:53 AM  
Result Value Ref Range Lactic acid 1.5 0.4 - 2.0 MMOL/L  
GLUCOSE, POC Collection Time: 02/12/20 12:03 PM  
Result Value Ref Range Glucose (POC) 151 (H) 65 - 100 mg/dL Performed by Ulisses Jewell ( TTRWJ) GLUCOSE, POC Collection Time: 02/12/20  6:02 PM  
Result Value Ref Range Glucose (POC) 188 (H) 65 - 100 mg/dL Performed by Deejay Peralta, POC Collection Time: 02/13/20 12:51 AM  
Result Value Ref Range Glucose (POC) 234 (H) 65 - 100 mg/dL Performed by Kaushal Garcia METABOLIC PANEL, BASIC Collection Time: 02/13/20  4:43 AM  
Result Value Ref Range Sodium 130 (L) 136 - 145 mmol/L Potassium 4.4 3.5 - 5.1 mmol/L Chloride 96 (L) 97 - 108 mmol/L  
 CO2 28 21 - 32 mmol/L Anion gap 6 5 - 15 mmol/L Glucose 224 (H) 65 - 100 mg/dL BUN 46 (H) 6 - 20 MG/DL Creatinine 4.18 (H) 0.70 - 1.30 MG/DL  
 BUN/Creatinine ratio 11 (L) 12 - 20 GFR est AA 18 (L) >60 ml/min/1.73m2 GFR est non-AA 15 (L) >60 ml/min/1.73m2 Calcium 7.8 (L) 8.5 - 10.1 MG/DL MAGNESIUM Collection Time: 02/13/20  4:43 AM  
Result Value Ref Range Magnesium 2.2 1.6 - 2.4 mg/dL PHOSPHORUS  
 Collection Time: 02/13/20  4:43 AM  
Result Value Ref Range Phosphorus 2.4 (L) 2.6 - 4.7 MG/DL  
CBC W/O DIFF Collection Time: 02/13/20  4:43 AM  
Result Value Ref Range WBC 8.6 4.1 - 11.1 K/uL  
 RBC 3.55 (L) 4.10 - 5.70 M/uL HGB 8.3 (L) 12.1 - 17.0 g/dL HCT 25.9 (L) 36.6 - 50.3 % MCV 73.0 (L) 80.0 - 99.0 FL  
 MCH 23.4 (L) 26.0 - 34.0 PG  
 MCHC 32.0 30.0 - 36.5 g/dL  
 RDW 16.9 (H) 11.5 - 14.5 % PLATELET 220 012 - 876 K/uL MPV 11.3 8.9 - 12.9 FL  
 NRBC 0.0 0  WBC ABSOLUTE NRBC 0.00 0.00 - 0.01 K/uL GLUCOSE, POC Collection Time: 02/13/20  5:37 AM  
Result Value Ref Range Glucose (POC) 242 (H) 65 - 100 mg/dL Performed by Stephen Ng EKG, 12 LEAD, INITIAL Collection Time: 02/13/20  7:31 AM  
Result Value Ref Range Ventricular Rate 66 BPM  
 Atrial Rate 66 BPM  
 P-R Interval 168 ms QRS Duration 98 ms Q-T Interval 420 ms QTC Calculation (Bezet) 440 ms Calculated P Axis 74 degrees Calculated R Axis 10 degrees Calculated T Axis 68 degrees Diagnosis Normal sinus rhythm When compared with ECG of 28-JUN-2018 14:10, 
premature ventricular complexes are no longer present ST no longer depressed in Lateral leads T wave amplitude has increased in Inferior leads T wave inversion no longer evident in Lateral leads Total time spent with patient:  xxx   min. Care Plan discussed with: 
Patient Family RN Consulting Physician Nick Kaiser I have reviewed the flowsheets. Chart and Pertinent Notes have been reviewed. No change in PMH ,family and social history from Consult note.  
 
 
Cari Rodriguez MD

## 2020-02-13 NOTE — PROGRESS NOTES
Mr. Sly Serrano is a 63y/o male with a PMH of ESRD, CKD V, type II DM, CAD, chronic systolic HF, dilated cardiomyopathy, PNA, renal cell carcinoma, Gastroenteritis, HLD, HTN, pancreatic pseudocyst, pleural effusion, nephrectomy, and AV fistula formation; According to the OSH, the patient c/o feeling weak and unwell. Tika Overton was recently dx w/ PNA and on Abx therapy.  Per OSH ED note, the wife went to get the patient something to eat and when she returned she found him slumped over his walker and called Sanjiv Severiano presented to the ED at [place] [address] via EMS after diversion d/t acute AMS and HTN crisis, concern for ICH.  The patient developed SZ like activity then became pulseless in PEA/Asystole.  CPR was initiated and the patient was given 1mg Epinephrine w/ ROSC PTA to the ED.  
  
 
2/13-no major events overnight, still intubated requiring mechanical ventilation Patient Vitals for the past 24 hrs: 
 Temp Pulse Resp BP SpO2  
02/13/20 1200 98.8 °F (37.1 °C) 75 (!) 0 134/78 94 % 02/13/20 1100  74 15 137/76 94 % 02/13/20 1000  77 15 142/75 94 % 02/13/20 0900  72 16 142/76 96 % 02/13/20 0800 99.1 °F (37.3 °C) 68 15 139/75 96 % 02/13/20 0700  68 15 129/71 95 % 02/13/20 0600  69 15 135/67 95 % 02/13/20 0514  72 15  94 % 02/13/20 0500  76 16 150/82 97 % 02/13/20 0400 99.5 °F (37.5 °C) 73 18 109/69 98 % 02/13/20 0300  75 17 113/70 98 % 02/13/20 0200  83 17 107/68 96 % 02/13/20 0100  78 16 124/73 96 % 02/13/20 0014  81 20  96 % 02/13/20 0000 99.1 °F (37.3 °C)   145/80   
02/12/20 2300  80 22 115/76 96 % 02/12/20 2200  82 19 124/71 96 % 02/12/20 2100  83 23 148/81 98 % 02/12/20 2020  82 20  96 % 02/12/20 2000 99.1 °F (37.3 °C) 82 18 129/74 96 % 02/12/20 1900  84 21 120/74 96 % 02/12/20 1800  83 20 123/75 94 % 02/12/20 1700  83 18 115/77 96 % 02/12/20 1600 100.2 °F (37.9 °C) 76 19 152/80 96 % 02/12/20 1548  73 20  96 % 02/12/20 1500  78 16 160/84  02/12/20 1400  80 20 113/73 97 % 02/12/20 1327  82 20  97 % Physical exam 
General-intubated, NAD, off sedation Neuro-initiates breaths, with cough, pupils reactive, does not withdraw to noxious stimuli Cardiac-RRR Lungs-clear Abdomen-soft, distended, and seemingly nontender Extremities-warm Recent Results (from the past 24 hour(s)) GLUCOSE, POC Collection Time: 02/12/20  6:02 PM  
Result Value Ref Range Glucose (POC) 188 (H) 65 - 100 mg/dL Performed by Patricia Sánchez, POC Collection Time: 02/13/20 12:51 AM  
Result Value Ref Range Glucose (POC) 234 (H) 65 - 100 mg/dL Performed by Mason Avalos METABOLIC PANEL, BASIC Collection Time: 02/13/20  4:43 AM  
Result Value Ref Range Sodium 130 (L) 136 - 145 mmol/L Potassium 4.4 3.5 - 5.1 mmol/L Chloride 96 (L) 97 - 108 mmol/L  
 CO2 28 21 - 32 mmol/L Anion gap 6 5 - 15 mmol/L Glucose 224 (H) 65 - 100 mg/dL BUN 46 (H) 6 - 20 MG/DL Creatinine 4.18 (H) 0.70 - 1.30 MG/DL  
 BUN/Creatinine ratio 11 (L) 12 - 20 GFR est AA 18 (L) >60 ml/min/1.73m2 GFR est non-AA 15 (L) >60 ml/min/1.73m2 Calcium 7.8 (L) 8.5 - 10.1 MG/DL MAGNESIUM Collection Time: 02/13/20  4:43 AM  
Result Value Ref Range Magnesium 2.2 1.6 - 2.4 mg/dL PHOSPHORUS Collection Time: 02/13/20  4:43 AM  
Result Value Ref Range Phosphorus 2.4 (L) 2.6 - 4.7 MG/DL  
CBC W/O DIFF Collection Time: 02/13/20  4:43 AM  
Result Value Ref Range WBC 8.6 4.1 - 11.1 K/uL  
 RBC 3.55 (L) 4.10 - 5.70 M/uL HGB 8.3 (L) 12.1 - 17.0 g/dL HCT 25.9 (L) 36.6 - 50.3 % MCV 73.0 (L) 80.0 - 99.0 FL  
 MCH 23.4 (L) 26.0 - 34.0 PG  
 MCHC 32.0 30.0 - 36.5 g/dL  
 RDW 16.9 (H) 11.5 - 14.5 % PLATELET 494 597 - 869 K/uL MPV 11.3 8.9 - 12.9 FL  
 NRBC 0.0 0  WBC ABSOLUTE NRBC 0.00 0.00 - 0.01 K/uL GLUCOSE, POC Collection Time: 02/13/20  5:37 AM  
Result Value Ref Range Glucose (POC) 242 (H) 65 - 100 mg/dL Performed by Juan Barkley EKG, 12 LEAD, INITIAL Collection Time: 02/13/20  7:31 AM  
Result Value Ref Range Ventricular Rate 66 BPM  
 Atrial Rate 66 BPM  
 P-R Interval 168 ms QRS Duration 98 ms Q-T Interval 420 ms QTC Calculation (Bezet) 440 ms Calculated P Axis 74 degrees Calculated R Axis 10 degrees Calculated T Axis 68 degrees Diagnosis Normal sinus rhythm When compared with ECG of 28-JUN-2018 14:10, 
premature ventricular complexes are no longer present ST no longer depressed in Lateral leads T wave amplitude has increased in Inferior leads T wave inversion no longer evident in Lateral leads Confirmed by Patricia Card ([id number]) on 2/13/2020 11:23:27 AM 
  
POC EG7 Collection Time: 02/13/20 10:43 AM  
Result Value Ref Range Calcium, ionized (POC) 1.07 (L) 1.12 - 1.32 mmol/L  
 pH (POC) 7.463 (H) 7.35 - 7.45    
 pCO2 (POC) 37.0 35.0 - 45.0 MMHG  
 pO2 (POC) 75 (L) 80 - 100 MMHG  
 HCO3 (POC) 26.5 (H) 22 - 26 MMOL/L Base excess (POC) 3 mmol/L  
 sO2 (POC) 96 92 - 97 % Site RIGHT RADIAL Device: VENT Mode ASSIST CONTROL Tidal volume 500 ml Set Rate 15 bpm  
 PEEP/CPAP (POC) 5 cmH2O  
 PIP (POC) 18 Allens test (POC) YES Specimen type (POC) ARTERIAL Imaging reviewed Assessment 1. Acute Hypoxic Encephalopathy 2. PEA Arrest 
3. Small Bilateral Symmetrical Infarcts - Anoxic Event from #1 4. Seizure 5. Acute on Chronic ESRD 6. Acute Hypoxic Respiratory Failure - resolving 7. Stress Hyperglycemia/DM2 
8. Hypertensive Emergency Plan Pain control as needed, avoid benzodiazepines, other delirium prevention strategies, MRI done on 8 February showed multiple small bilateral symmetrical infarcts in the deep white matter of the cerebral hemispheres and in the cerebellum consistent with recent hypoxic ischemic injury Continue hemodynamic monitoring, map goal greater than 65, SBP goal less than 150, continue midodrine therapy Continue lung protective strategies on the ventilator, serial chest x-rays and ABGs as indicated, daily breathing trials-at this point in time mental status precludes extubation, if family wants to continue with aggressive medical therapy will need a tracheostomy Continue tube feeds, chronic diarrhea, continue Creon need a PEG if continuing with aggressive medical therapy ESRD on HD-to get a session tomorrow, correct electrolyte derangements as needed Daily CBCs Monitor off antibiotics for now-procalcitonin sent recently above 20-we will trend for now, repeat sputum cultures today Keep glucose less than 180 with subcutaneous insulin/dextrose as needed Lines-peripheral IVs 
 
DVT prophylaxis-SCDs, heparin GI prophylaxis-Protonix Critical care time-45-minute

## 2020-02-13 NOTE — PROGRESS NOTES
1930. Updates received from Michelle Mishra RN in [employer].  
 
9209. Changes noted on monitor. Spoke with Arnav Zavala NP. Will get a 12-lead EKG. EKG showing NSR.  
 
0730. Updates given to Michelle Mishra RN in [employer].

## 2020-02-13 NOTE — PROGRESS NOTES
0730: Bedside and Verbal shift change report given to Vernell Barnes RN (oncoming nurse) by Freida Malik RN (offgoing nurse). Report included the following information SBAR, Kardex, Intake/Output, MAR, Recent Results and Cardiac Rhythm NSR.  
 
0800:  Assessment complete. No new issues. Patient remains unresponsive on the ventilator without sedation. Dr. Milton Mays at bedside. Water flush on tube feed changed to 50 Q4. Patient still with watery stool. 1030:  ICU multidisciplinary rounds lead by Dr. Nicho Chiu (Intensivist): The following were reviewed and discussed: current labs,  recent imaging, lines/drains, review of body systems, nutrition, cultures, mobility, length of ICU stay. The plan of care for the day is as follows  Continue current support, family needing time to make goals of care decisions. 1200:  Reassessment complete. No new issues. 1600:  Reassessment complete. No new issues. 1700:  Primary Nurse Joe Alexander RN and Sondra Otto RN performed a dual skin assessment on this patient Impairment noted- see wound doc flow sheet. Ruddy score is 11. 
 
1930:  Bedside and Verbal shift change report given to Geoff Camacho RN (oncoming nurse) by Vernell Barnes RN (offgoing nurse). Report included the following information SBAR, Kardex, Intake/Output, MAR, Recent Results and Cardiac Rhythm NSR.

## 2020-02-13 NOTE — PROGRESS NOTES
NUTRITION COMPLETE ASSESSMENT 
 
RECOMMENDATIONS:  
If no improvement in output after changing formula-increase Creon to 4 capsules per day Interventions/Plan:  
Food/Nutrient Delivery: Modify rate, concentration, composition, and schedule: 
   Peptamen 1.5 @ 50 ml/hr with 50 ml water flush q 4 hr 
 
Assessment:  
Reason for Assessment:   
[x]Reassessment Tube Feeding: Nepro @ 50 ml/hr with 50 ml water flush q 4 hr 
Diet: NPO Nutritionally Significant Medications: [x] Reviewed & Includes: Lantus, correction scale insulin,Creon, Protonix Subjective: 
 
Objective: 
Mr Gutierrez was admitted with severe anoxic-ischemic encephalopathy. PMHx: ESRD, DM 2, HTN, CM/HFrEF, CAD; exocrine pancreatic insufficiency (per wife). Noted: AMS, seizure; PEA arrest-acute respiratory failure-intubated; hypoxic/anoxic brain injury per MRI; Palliative Care following. Mr Gutierrez is tolerating enteral feedings well; no gastric residuals. Flexiseal in place d/t loose stools-suspect 2/2 fat malabsorption from pancreatic insufficiency. Creon ordered 2 days ago without improvement in output. Unable to fully quantify amount d/t stool leaking around the flexiseal.  
 
Recommend changing tube feeding formula to Peptamen 1.5 for better absorption (peptide based/high MCT oil). Suggested goal: Peptamen 1.5 @ 55 ml/hr with 50 ml water flush q 4 hr. This will provide 1320 ml, 1980 calories. 90 gm protein, 74 gm fat, 63 mEq potassium, 1320 mg phosphorus, 528 mg magnesium and 1315 ml free water (tube feeding/flush) per day to meet estimated needs. Current tube feeding provides 115 gm fat per day so hope to see improvement in output. Water flush reduced today from 250 to 50 ml q 4 hr-may help with hyponatremia. BG higher today-noted Lantus dose increased today. Estimated Nutrition Needs:  
Kcals/day: 56 Kcals/day Protein: 91 g(1.2g/kg) Fluid: (~1800 ml/per renal) Based On: [facility] ([id number]) Weight Used: Actual wt(76 kg) Pt expected to meet estimated nutrient needs:  [x]   Yes     []  No  [] Unable to predict at this time Nutrition Diagnosis:  
1. Inadequate oral intake related to PEA arrest as evidenced by NPO d/t intubation. 2. Altered GI function related to exocrine pancreatic insufficiency as evidenced by loose liquid stools. Goals:   
 Tube feeding to meet at least 90% estimated needs x 5-7 days; decrease stool output. Monitoring & Evaluation: - Enteral/parenteral nutrition intake - Weight/weight change, GI, Electrolyte and renal profile, Glucose profile, CV-pulmonary Previous Nutrition Goals Met: 
Yes Previous Recommendations:     
Yes 
 
Education & Discharge Needs: 
 [x] None Identified 
 [] Identified and addressed [x] Participated in care plan, discharge planning, and/or interdisciplinary rounds Cultural, Latter day and ethnic food preferences identified: 
 None Skin Integrity: [x]Intact  []Other Edema: []None [x] 2+ generalized, BUE's Last BM: 2/13 Food Allergies: [x]None []Other Anthropometrics:   
Weight Loss Metrics 2/13/2020 2/5/2020 1/30/2020 1/29/2020 1/21/2020 1/14/2020 12/24/2019 Today's Wt 162 lb 14.7 oz - 173 lb 6.4 oz 169 lb 173 lb 170 lb 171 lb BMI - 20.92 kg/m2 22.26 kg/m2 21.7 kg/m2 22.21 kg/m2 21.83 kg/m2 21.96 kg/m2 Last 3 Recorded Weights in this Encounter 02/12/20 1034 02/13/20 0800 02/13/20 1350 Weight: 74 kg (163 lb 2.3 oz) 73.9 kg (162 lb 14.7 oz) 73.9 kg (162 lb 14.7 oz) Weight Source: Bed Height: 6' 2\" (188 cm), Body mass index is 20.92 kg/m². IBW : 86.2 kg (190 lb), % IBW (Calculated): 85.75 % 
 ,   
 
Labs:   
Lab Results Component Value Date/Time  Sodium 130 (L) 02/13/2020 04:43 AM  
 Potassium 4.4 02/13/2020 04:43 AM  
 Chloride 96 (L) 02/13/2020 04:43 AM  
 CO2 28 02/13/2020 04:43 AM  
 Glucose 224 (H) 02/13/2020 04:43 AM  
 BUN 46 (H) 02/13/2020 04:43 AM  
 Creatinine 4.18 (H) 02/13/2020 04:43 AM  
 Calcium 7.8 (L) 02/13/2020 04:43 AM  
 Magnesium 2.2 02/13/2020 04:43 AM  
 Phosphorus 2.4 (L) 02/13/2020 04:43 AM  
 Albumin 1.7 (L) 02/08/2020 10:41 AM  
 
Lab Results Component Value Date/Time Hemoglobin A1c 10.2 (H) 02/05/2020 03:10 AM  
 Hemoglobin A1c (POC) 11.8 11/19/2019 12:00 PM  
 
Lab Results Component Value Date/Time Glucose (POC) 242 (H) 02/13/2020 05:37 AM  
  
Lab Results Component Value Date/Time ALT (SGPT) 23 02/08/2020 10:41 AM  
 AST (SGOT) 21 02/08/2020 10:41 AM  
 Alk.  phosphatase 109 02/08/2020 10:41 AM  
 Bilirubin, direct 0.2 02/05/2020 03:10 AM  
 Bilirubin, total 0.4 02/08/2020 10:41 AM  
  
 
Nehemiah Strauss RD [facility]

## 2020-02-14 PROBLEM — R64 DIABETIC NEUROPATHIC CACHEXIA (HCC): Chronic | Status: ACTIVE | Noted: 2020-01-01

## 2020-02-14 PROBLEM — R29.898 SEVERE MUSCLE DECONDITIONING: Chronic | Status: ACTIVE | Noted: 2020-01-01

## 2020-02-14 PROBLEM — E11.40 DIABETIC NEUROPATHIC CACHEXIA (HCC): Chronic | Status: ACTIVE | Noted: 2020-01-01

## 2020-02-14 NOTE — PROGRESS NOTES
0730: Bedside and Verbal shift change report given to Latisha Jackson RN (oncoming nurse) by Zaida Hashimoto, RN (offgoing nurse). Report included the following information SBAR, Kardex, Procedure Summary, Intake/Output, MAR, Recent Results, Cardiac Rhythm NSR and Alarm Parameters . Shift Summary: Patient remains minimally responsive to noxious stimuli. Withdraws weak on RUE. Weak gag, no cough. PERRLA. Patient received scheduled HD, [facility] RN had to give Albumin due to SBP 80s. HD was stopped per Intensivist due to continued hypotension and HR 120s. PRN Tylenol given for fever, paired blood cultures, urinalysis and sputum culture ordered by Intensivist.  
 
1930: Bedside and Verbal shift change report given to Zaida Hashimoto, RN (oncoming nurse) by Mike Mccall RN (offgoing nurse). Report included the following information SBAR, Kardex, Procedure Summary, Intake/Output, MAR, Recent Results, Cardiac Rhythm Sinus Tachycardia and Alarm Parameters .

## 2020-02-14 NOTE — DIABETES MGMT
[address] Followup Progress Note Presentation Carlos Reinoso is a 64 y.o. male admitted with seizure like activity and PEA en route to hospital and has been intubated and on a ventilator since admission. Consulted by Provider for advanced specialty nursing care related to inpatient diabetes management. Subjective Patient remains intubated and ventilated.  On no sedation. Objective Physical exam 
General intubated on ventilator Vital Signs Visit Vitals /85 Pulse 87 Temp 98.1 °F (36.7 °C) Resp 12 Ht 6' 2\" (1.88 m) Wt 72.1 kg (158 lb 15.2 oz) SpO2 93% BMI 20.41 kg/m² Laboratory ESRD on HD 
 
 
BG trends >200 since yesterday. TF were changed -Peptamen @50cc/hr continuous RECOMMENDATIONS Titrate daily LANUTS to 20 units. This is the amount needed to cover for the carbohydrates(235 carbs over 24h period) in his TF. The insulin ratio 1unit for every 10 carbs. Continue to cover with correction- 
 
Assessment and Plan Nursing Diagnosis Risk for unstable blood glucose pattern Nursing Intervention Domain 5259 Decision-making Support Nursing Interventions Examined current inpatient diabetes control Explored factors facilitating and impeding inpatient management Identified self-management practices impeding diabetes control Explored corrective strategies with patient and responsible inpatient provider Informed patient of rational for basal bolus insulin strategy while hospitalized Signed By: CANDI Maier [facility] Contact via [employer]/ [phone] February 14, 2020

## 2020-02-14 NOTE — PROGRESS NOTES
2330: Report received from [address] Shift Summary: Patient remains minimally responsive to noxious stimuli. Does not withdraw in extremities. Minimal LUE spontaneous movement - decorticate posturing? Weak gag, no cough. PERRLA. Hemodynamics stable. 0730: Bedside shift change report given to [place] (oncoming nurse) by Francy Haro (offgoing nurse). Report included the following information SBAR, Intake/Output and MAR.

## 2020-02-14 NOTE — PROGRESS NOTES
[facility] 
 [address] [facility], [facility] [address] [facility] Phone: [phone]   [phone]   
  
Nephrology Progress Note Morris Garcia     1960     [id number] Date of Admission : 2/5/2020 02/14/20 CC: Follow up for ESRD Assessment and Plan ESRD- HD  
- Dialysis dependent since 4/2018. 
- Dialyzes MWF at [facility]. F/b Dr Brigitte Piña  
- HD for later today Hyponatremia : 
- HD today should help HTN  
- stable now  
  
Seizure - per CCM / Neuro Multiple b/l infarcts from hypoxic injury: 
- per neuro 
  
S/P PEA arrest  
  
Hx of R Pleural effusion w/ Trapped Lung - s/p VATS and decortication 4/2018 
  
Anemia of CKD  
- cont LUIS w/ HD 
  
Solitary Kidney S/p Prior Left Nephrectomy for RCC 
  
Sec HTPH Poor prognosis overall. Appreciate palliative help. Interval History: 
Seen and examined. No new issues overnight. On the vent, off sedation, unresponsive. For HD later today. Review of Systems: Review of systems not obtained due to patient factors. Current Medications:  
Current Facility-Administered Medications Medication Dose Route Frequency  midodrine (PROAMITINE) tablet 5 mg  5 mg Oral Q8H  
 scopolamine (TRANSDERM-SCOP) 1 mg over 3 days 1 Patch  1 Patch TransDERmal Q72H  
 insulin glargine (LANTUS) injection 8 Units  8 Units SubCUTAneous DAILY  heparin (porcine) injection 5,000 Units  5,000 Units SubCUTAneous Q12H  
 insulin lispro (HUMALOG) injection   SubCUTAneous Q6H  
 lipase-protease-amylase (CREON 24,000) capsule 3 Cap  3 Cap Oral Q8H  
 white petrolatum-mineral oil (AKWA TEARS) 83-15 % ophthalmic ointment   Both Eyes Q12H  
 sodium chloride (NS) flush 5-40 mL  5-40 mL IntraVENous Q8H  
 sodium chloride (NS) flush 5-40 mL  5-40 mL IntraVENous PRN  
 glucose chewable tablet 16 g  4 Tab Oral PRN  
 glucagon (GLUCAGEN) injection 1 mg  1 mg IntraMUSCular PRN  
  dextrose 10% infusion 0-250 mL  0-250 mL IntraVENous PRN  chlorhexidine (ORAL CARE KIT) 0.12 % mouthwash 15 mL  15 mL Oral Q12H  pantoprazole (PROTONIX) 40 mg in 0.9% sodium chloride 10 mL injection  40 mg IntraVENous DAILY  epoetin tyler-epbx (RETACRIT) injection 10,000 Units  10,000 Units SubCUTAneous Q MON, WED & FRI  hydrALAZINE (APRESOLINE) 20 mg/mL injection 10 mg  10 mg IntraVENous Q6H PRN  
 acetaminophen (TYLENOL) solution 500 mg  500 mg Per NG tube Q6H PRN Allergies Allergen Reactions  Tramadol Other (comments) Brought down blood sugar too fast  
 
 
Objective: 
Vitals:   
Vitals:  
 02/14/20 0613 02/14/20 0700 02/14/20 0715 02/14/20 0719 BP:  130/76 Pulse: 65 67 69 70 Resp: 15 15 15 13 Temp:      
TempSrc:      
SpO2: 96% 94% 94% 94% Weight:      
Height:      
 
Intake and Output: 
No intake/output data recorded. 02/12 1901 - 02/14 0700 In: 8665 Out: 600 [Drains:600] Physical Examination: 
General:  On vent HEENT: PERRL,  + Pallor , No Icterus Neck: Supple,no mass palpable Lungs : CTA 
CVS: RRR, S1 S2 normal, No murmur Abdomen: Soft, NT, BS + Extremities: trace Edema, RUE AVF + thrill MS: No joint swelling, erythema, warmth Neurologic:unresponsive on vent Psych: Unable to assess 
  
 
[]    High complexity decision making was performed 
[]    Patient is at high-risk of decompensation with multiple organ involvement Lab Data Personally Reviewed: I have reviewed all the pertinent labs, microbiology data and radiology studies during assessment. Recent Labs  
  02/14/20 
0441 02/13/20 
0443 02/12/20 
0300 * 130* 132*  
K 5.1 4.4 4.7 CL 95* 96* 101 CO2 26 28 23 * 224* 192* BUN 61* 46* 64* CREA 5.03* 4.18* 5.15* CA 8.0* 7.8* 7.7* MG 2.5* 2.2 2.4 PHOS 3.6 2.4* 2.9 ALB 1.5*  --   --   
SGOT 27  --   --   
ALT 20  --   --   
 
Recent Labs  
  02/14/20 0441 02/13/20 
0443 02/12/20 
0300 WBC 9.3 8.6 7.9 HGB 8.8* 8.3* 8.6* HCT 27.6* 25.9* 26.6*  
 170 181 No results found for: SDES Lab Results Component Value Date/Time Culture result: MODERATE POSSIBLE STAPHYLOCOCCUS AUREUS (A) 02/13/2020 02:52 PM  
 Culture result: NO GROWTH 5 DAYS 02/06/2020 03:43 PM  
 Culture result: No growth (<1,000 CFU/ML) 02/05/2020 08:48 AM  
 Culture result: NO GROWTH 14 DAYS 01/03/2019 12:26 PM  
 Culture result: NO GROWTH ON SOLID MEDIA AT 14 DAYS 01/03/2019 11:45 AM  
 Culture result: (A) 01/03/2019 11:45 AM  
  STAPHYLOCOCCUS HOMINIS SUBSPECIES HOMINIS ISOLATED FROM THIO BROTH ONLY Recent Results (from the past 24 hour(s)) POC EG7 Collection Time: 02/13/20 10:43 AM  
Result Value Ref Range Calcium, ionized (POC) 1.07 (L) 1.12 - 1.32 mmol/L  
 pH (POC) 7.463 (H) 7.35 - 7.45    
 pCO2 (POC) 37.0 35.0 - 45.0 MMHG  
 pO2 (POC) 75 (L) 80 - 100 MMHG  
 HCO3 (POC) 26.5 (H) 22 - 26 MMOL/L Base excess (POC) 3 mmol/L  
 sO2 (POC) 96 92 - 97 % Site RIGHT RADIAL Device: VENT Mode ASSIST CONTROL Tidal volume 500 ml Set Rate 15 bpm  
 PEEP/CPAP (POC) 5 cmH2O  
 PIP (POC) 18 Allens test (POC) YES Specimen type (POC) ARTERIAL    
GLUCOSE, POC Collection Time: 02/13/20  1:49 PM  
Result Value Ref Range Glucose (POC) 342 (H) 65 - 100 mg/dL Performed by Elton Galeazzi ( SRYQT) GLUCOSE, POC Collection Time: 02/13/20  1:51 PM  
Result Value Ref Range Glucose (POC) 347 (H) 65 - 100 mg/dL Performed by Elton Galeazzi ( TOWNJ) CULTURE, RESPIRATORY/SPUTUM/BRONCH W GRAM STAIN Collection Time: 02/13/20  2:52 PM  
Result Value Ref Range Special Requests: NO SPECIAL REQUESTS    
 GRAM STAIN 2+ WBCS SEEN    
 GRAM STAIN FEW EPITHELIAL CELLS SEEN    
 GRAM STAIN OCCASIONAL GRAM POSITIVE COCCI IN CLUSTERS Culture result: MODERATE POSSIBLE STAPHYLOCOCCUS AUREUS (A) GLUCOSE, POC Collection Time: 02/13/20  6:06 PM  
Result Value Ref Range Glucose (POC) 303 (H) 65 - 100 mg/dL Performed by Moon Herrera ( YYL) GLUCOSE, POC Collection Time: 02/14/20 12:11 AM  
Result Value Ref Range Glucose (POC) 298 (H) 65 - 100 mg/dL Performed by Levy Felix Collection Time: 02/14/20  4:41 AM  
Result Value Ref Range Magnesium 2.5 (H) 1.6 - 2.4 mg/dL PHOSPHORUS Collection Time: 02/14/20  4:41 AM  
Result Value Ref Range Phosphorus 3.6 2.6 - 4.7 MG/DL  
CBC W/O DIFF Collection Time: 02/14/20  4:41 AM  
Result Value Ref Range WBC 9.3 4.1 - 11.1 K/uL  
 RBC 3.78 (L) 4.10 - 5.70 M/uL HGB 8.8 (L) 12.1 - 17.0 g/dL HCT 27.6 (L) 36.6 - 50.3 % MCV 73.0 (L) 80.0 - 99.0 FL  
 MCH 23.3 (L) 26.0 - 34.0 PG  
 MCHC 31.9 30.0 - 36.5 g/dL  
 RDW 17.1 (H) 11.5 - 14.5 % PLATELET 526 467 - 761 K/uL MPV 11.5 8.9 - 12.9 FL  
 NRBC 0.0 0  WBC ABSOLUTE NRBC 0.00 0.00 - 0.01 K/uL PROCALCITONIN Collection Time: 02/14/20  4:41 AM  
Result Value Ref Range Procalcitonin 12.81 ng/mL METABOLIC PANEL, COMPREHENSIVE Collection Time: 02/14/20  4:41 AM  
Result Value Ref Range Sodium 128 (L) 136 - 145 mmol/L Potassium 5.1 3.5 - 5.1 mmol/L Chloride 95 (L) 97 - 108 mmol/L  
 CO2 26 21 - 32 mmol/L Anion gap 7 5 - 15 mmol/L Glucose 302 (H) 65 - 100 mg/dL BUN 61 (H) 6 - 20 MG/DL Creatinine 5.03 (H) 0.70 - 1.30 MG/DL  
 BUN/Creatinine ratio 12 12 - 20 GFR est AA 14 (L) >60 ml/min/1.73m2 GFR est non-AA 12 (L) >60 ml/min/1.73m2 Calcium 8.0 (L) 8.5 - 10.1 MG/DL Bilirubin, total 0.4 0.2 - 1.0 MG/DL  
 ALT (SGPT) 20 12 - 78 U/L  
 AST (SGOT) 27 15 - 37 U/L Alk. phosphatase 143 (H) 45 - 117 U/L Protein, total 8.9 (H) 6.4 - 8.2 g/dL Albumin 1.5 (L) 3.5 - 5.0 g/dL Globulin 7.4 (H) 2.0 - 4.0 g/dL A-G Ratio 0.2 (L) 1.1 - 2.2 GLUCOSE, POC Collection Time: 02/14/20  6:08 AM  
Result Value Ref Range Glucose (POC) 321 (H) 65 - 100 mg/dL Performed by [facility] Total time spent with patient:  xxx   min. Care Plan discussed with: 
Patient Family RN Consulting Physician [address] [facility],      
 
I have reviewed the flowsheets. Chart and Pertinent Notes have been reviewed. No change in PMH ,family and social history from Consult note.  
 
 
Eddie Rondon MD

## 2020-02-14 NOTE — PROGRESS NOTES
Pharmacist Note - Vancomycin Dosing (Hemodialysis Patient) Consult provided for this 64 y.o. male for indication of sepsis/GPC+ sputum culture Lab Results Component Value Date/Time Creatinine 5.03 (H) 2020 04:41 AM  
 Creatinine (POC) 4.4 (H) 2018 12:06 PM  
 WBC 9.3 2020 04:41 AM  
 BUN 61 (H) 2020 04:41 AM  
 BUN (POC) 30 (H) 2018 12:06 PM  
Temp (24hrs), Av.6 °F (37.6 °C), Min:98.1 °F (36.7 °C), Max:102 °F (38.9 °C) Estimated CrCl:  ESRD on HD (Monday/Wednesday/Friday) For this HD patient, will initiate therapy with a loading dose of Vancomycin 1750 mg, to be followed with a dose of 750 mg after each HD session. Dose will be adjusted to maintain a pre-HD trough of approximately 20 - 25 mcg/mL for therapeutic goal of 15 - 20 mcg/mL as approximately 35% of drug will be removed by HD filtration. Pharmacy to follow patient daily and order levels / make dose adjustments as appropriate.

## 2020-02-14 NOTE — PROGRESS NOTES
Clinical Pharmacy Note: IV to PO Automatic Conversion Please note: Charlie Dewey medication(s) (pantoprazole) has/have been changed from IV to PO (lansoprazole via NG tube) based on the following critiera: 
 
? Patient is taking scheduled oral medications ? Patient is tolerating tube feeds at goal rate or a full liquid, soft or regular diet This IV to PO conversion is based on the P&T approved automatic conversion policy for eligible patients. Please call with questions.

## 2020-02-14 NOTE — PROGRESS NOTES
Palliative Medicine Social Work ADDENDUM 10:36 AM 
Spoke with wife/PARVEEN Liao again. She will tour [facility] with family this weekend. She is aware of other LTACs in [place] but does not want to discuss those now to keep from getting overwhelmed. We discussed much of the care plan/next steps will be determined by how pt. does over next few days and we can readdress that on Tuesday when we meet. 9:42 AM 
Spoke with wife/PARVEEN Liao this morning 1. She voiced concerns over communication with medical team. Patient advocacy number provided. 2. She requested information about [place] - recommended touring facility. Contact information provided. 3. Yanni expressed concern about placing patient in any facility especially so far from home. She is also concerned about inconsistent care. She is solely focused on where patient will go and I gently suggested also continuing to explore what patient would want. She knows he wouldn't want to be in nursing facility but doesn't know what else to do \"am I just supposed to let him die? \" We talked a little about what comfort care looks like. Sounds like their kids are supporting Yanni in any decisions she makes however they are not voicing opinions other than one daughter saying she would not want patient going to the  where she works due to concerns about level of care. 4. We confirmed plan to meet Tuesday 1pm. 
 
 
 
 
 
 
Thank you for the opportunity to be involved in the care of Mr. Niki Palomo and his family. Robert Baum, RALEIGH, Supervisee in Social Work Palliative Medicine  [phone]

## 2020-02-14 NOTE — PROGRESS NOTES
02/14/20 1233 Weaning Parameters Spontaneous Breathing Trial Complete Yes Resp Rate Observed 28 Ve 11.1  RSBI 69 SBT Results, cuff leak presented

## 2020-02-14 NOTE — PROGRESS NOTES
Mr. Liz Garduno is a 65y/o male with a PMH of ESRD, CKD V, type II DM, CAD, chronic systolic HF, dilated cardiomyopathy, PNA, renal cell carcinoma, Gastroenteritis, HLD, HTN, pancreatic pseudocyst, pleural effusion, nephrectomy, and AV fistula formation; According to the OSH, the patient c/o feeling weak and unwell. [facility] was recently dx w/ PNA and on Abx therapy.  Per OSH ED note, the wife went to get the patient something to eat and when she returned she found him slumped over his walker and called Paul Cardenas presented to the ED at [facility] via EMS after diversion d/t acute AMS and HTN crisis, concern for ICH.  The patient developed SZ like activity then became pulseless in PEA/Asystole.  CPR was initiated and the patient was given 1mg Epinephrine w/ ROSC PTA to the ED.  
  
  
2/14-no major events overnight, still intubated requiring mechanical ventilation Patient Vitals for the past 24 hrs: 
 Temp Pulse Resp BP SpO2  
02/14/20 0900  87 12 151/85 93 % 02/14/20 0800 98.1 °F (36.7 °C) 80 28 154/86 93 % 02/14/20 0719  70 13  94 % 02/14/20 0715  69 15  94 % 02/14/20 0700  67 15 130/76 94 % 02/14/20 0613  65 15  96 % 02/14/20 0600  63 15 139/77 95 % 02/14/20 0500  62 15 131/79 94 % 02/14/20 0400 98.8 °F (37.1 °C) 66 15 135/70 92 % 02/14/20 0300  64 15 136/81 94 % 02/14/20 0200  68 15 155/84 95 % 02/14/20 0100  62 15 147/80 96 % 02/14/20 0031  66 16  95 % 02/14/20 0000 99.1 °F (37.3 °C) 64 16 140/76 94 % 02/13/20 2300  66 15 140/77 93 % 02/13/20 2200  62 15 138/72 95 % 02/13/20 2123  68 13  97 % 02/13/20 2100  66 15 151/71 97 % 02/13/20 2000 99 °F (37.2 °C) 68 15 116/70 97 % 02/13/20 1900  67 15 116/67 95 % 02/13/20 1800  61 15 122/66 99 % 02/13/20 1700  61 15 (!) 87/58 99 % 02/13/20 1600 99.4 °F (37.4 °C) 79 17 102/72 97 % 02/13/20 1500  84 21 92/69 95 % 02/13/20 1400  80 15 91/58 94 % 02/13/20 1300  73 (!) 0 130/73 94 % 02/13/20 1200 98.8 °F (37.1 °C) 75 (!) 0 134/78 94 % 02/13/20 1100  74 15 137/76 94 % 02/13/20 1000  77 15 142/75 94 % Physical exam 
General-intubated, NAD, off sedation Neuro-initiates breaths, weak cough on deep suctioning, pupils reactive, does not withdraw to noxious stimuli Cardiac-RRR Lungs-clear Abdomen-soft, distended, and seemingly nontender Extremities-warm 
  
Recent Results (from the past 12 hour(s)) GLUCOSE, POC Collection Time: 02/14/20 12:11 AM  
Result Value Ref Range Glucose (POC) 298 (H) 65 - 100 mg/dL Performed by Georgina Patricio Collection Time: 02/14/20  4:41 AM  
Result Value Ref Range Magnesium 2.5 (H) 1.6 - 2.4 mg/dL PHOSPHORUS Collection Time: 02/14/20  4:41 AM  
Result Value Ref Range Phosphorus 3.6 2.6 - 4.7 MG/DL  
CBC W/O DIFF Collection Time: 02/14/20  4:41 AM  
Result Value Ref Range WBC 9.3 4.1 - 11.1 K/uL  
 RBC 3.78 (L) 4.10 - 5.70 M/uL HGB 8.8 (L) 12.1 - 17.0 g/dL HCT 27.6 (L) 36.6 - 50.3 % MCV 73.0 (L) 80.0 - 99.0 FL  
 MCH 23.3 (L) 26.0 - 34.0 PG  
 MCHC 31.9 30.0 - 36.5 g/dL  
 RDW 17.1 (H) 11.5 - 14.5 % PLATELET 175 323 - 337 K/uL MPV 11.5 8.9 - 12.9 FL  
 NRBC 0.0 0  WBC ABSOLUTE NRBC 0.00 0.00 - 0.01 K/uL PROCALCITONIN Collection Time: 02/14/20  4:41 AM  
Result Value Ref Range Procalcitonin 12.81 ng/mL METABOLIC PANEL, COMPREHENSIVE Collection Time: 02/14/20  4:41 AM  
Result Value Ref Range Sodium 128 (L) 136 - 145 mmol/L Potassium 5.1 3.5 - 5.1 mmol/L Chloride 95 (L) 97 - 108 mmol/L  
 CO2 26 21 - 32 mmol/L Anion gap 7 5 - 15 mmol/L Glucose 302 (H) 65 - 100 mg/dL BUN 61 (H) 6 - 20 MG/DL Creatinine 5.03 (H) 0.70 - 1.30 MG/DL  
 BUN/Creatinine ratio 12 12 - 20 GFR est AA 14 (L) >60 ml/min/1.73m2 GFR est non-AA 12 (L) >60 ml/min/1.73m2 Calcium 8.0 (L) 8.5 - 10.1 MG/DL  Bilirubin, total 0.4 0.2 - 1.0 MG/DL  
 ALT (SGPT) 20 12 - 78 U/L  
 AST (SGOT) 27 15 - 37 U/L Alk. phosphatase 143 (H) 45 - 117 U/L Protein, total 8.9 (H) 6.4 - 8.2 g/dL Albumin 1.5 (L) 3.5 - 5.0 g/dL Globulin 7.4 (H) 2.0 - 4.0 g/dL A-G Ratio 0.2 (L) 1.1 - 2.2 GLUCOSE, POC Collection Time: 02/14/20  6:08 AM  
Result Value Ref Range Glucose (POC) 321 (H) 65 - 100 mg/dL Performed by Reza Ayers Imaging reviewed Assessment 1. Acute Hypoxic Encephalopathy 2. PEA Arrest 
3. Small Bilateral Symmetrical Infarcts - Anoxic Event from #1 4. Seizure 5. Acute on Chronic ESRD 6. Acute Hypoxic Respiratory Failure - resolving 7. Stress Hyperglycemia/DM2 
8. Hypertensive Emergency  
  
  
Plan Pain control as needed, avoid benzodiazepines, other delirium prevention strategies, MRI done on 8 February showed multiple small bilateral symmetrical infarcts in the deep white matter of the cerebral hemispheres and in the cerebellum consistent with recent hypoxic ischemic injury 
  
Continue hemodynamic monitoring, map goal greater than 65, SBP goal less than 150, continue midodrine therapy but decrease dose 
  
Continue lung protective strategies on the ventilator, serial chest x-rays and ABGs as indicated, daily breathing trials-at this point in time mental status precludes extubation, if family wants to continue with aggressive medical therapy will need a tracheostomy 
  
Continue tube feeds, chronic diarrhea, continue Creon, will need a PEG if continuing with aggressive medical therapy 
  
ESRD on HD-to get a session today, f/u renal recommendations, correct electrolyte derangements as needed 
  
Daily CBCs, continue epoetin 
  
Elevated pro calcitonin level - trending down off abx therapy, GPCs in clusters in sputum form 2/13 - colonization vs active infection - WBC WNL, no temp spikes, will maintain a low threshold to start abx therapy 
  
Keep glucose less than 180 with subcutaneous insulin/dextrose as needed, check a cortisol level tmr morning 
  
 Lines-peripheral IVs 
  
DVT prophylaxis-SCDs, heparin GI prophylaxis-Protonix 
  
Critical care time-4o-minute

## 2020-02-14 NOTE — DIALYSIS
[facility] Dialysis Team [place]  [phone] Vitals   Pre   Post   Assessment   Pre   Post    
Temp  Temp: (!) 102 °F (38.9 °C) (02/14/20 1515)  99.0 LOC  unresponsive unresponsive HR   Pulse (Heart Rate): (!) 111 (02/14/20 1515) 110 Lungs   Diminished on vent  ventilator B/P   BP: 109/76 (02/14/20 1515) 93/72 Cardiac   Sinus Tach  sinus tach Resp   Resp Rate: 14 (02/14/20 1515) 18 Skin   intact  intact Pain level     Edema  generalized 
 
 generalized Orders: Duration:   Start:    1512 End:    1850 Total:   2 hr 35 mins Dialyzer:   Dialyzer/Set Up Inspection: Ranjeet Bourgeois (02/14/20 1515) K Bath:   Dialysate K (mEq/L): 3 (02/14/20 1515) Ca Bath:   Dialysate CA (mEq/L): 2.5 (02/14/20 1515) Na/Bicarb:   Dialysate NA (mEq/L): 140 (02/14/20 1515) Target Fluid Removal:   Goal/Amount of Fluid to Remove (mL): 2000 mL (02/14/20 1515) Access Type & Location:   Right upper arm AV Fistula without evidence of warmth, redness, or drainage. +thrill/+bruit. Each access site disinfected for 60 seconds per site with alcohol swabs per P&P. Cannulated with 15G needles x2 and secured with paper tape. +aspiration/+flushed. Labs Obtained/Reviewed Critical Results Called   Date when labs were drawn- 
Hgb-   
HGB Date Value Ref Range Status 02/14/2020 8.8 (L) 12.1 - 17.0 g/dL Final  
 
K-   
Potassium Date Value Ref Range Status 02/14/2020 5.1 3.5 - 5.1 mmol/L Final  
 
Ca-  
Calcium Date Value Ref Range Status 02/14/2020 8.0 (L) 8.5 - 10.1 MG/DL Final  
 
Bun-  
BUN Date Value Ref Range Status 02/14/2020 61 (H) 6 - 20 MG/DL Final  
 
Creat-  
Creatinine Date Value Ref Range Status 02/14/2020 5.03 (H) 0.70 - 1.30 MG/DL Final  
 
  
Medications/ Blood Products Given Name   Dose   Route and Time Heparin  1:1000  1000 units/hour x 2 hours Albumin 25% 12.5 g @ 1734, 1804 Blood Volume Processed (BVP):    60.4 Net Fluid Removed:  350 Comments Time Out Done:  
Primary Nurse Rpt Pre: Micah Coles RN 
Primary Nurse Rpt Post: Micah Coles RN 
Pt Education: unresponsive Care Plan: continue current HD plan of care Tx Summary: 
1400 Arrived to patient room to set up for HD treatment. Sustained HR 120s, O2Sats 91% on vent, Temp 100.5 @ 1200 treated with Tylenol at 1214. Current temperature @ 1430 102F. Dr Roxanne Jules notified of patient status and fever. Telephone order with RB - permission to start treatment but to terminate treatment if  HR > 150.  
1512 HD treatment initiated per physician order. 1638 Excessive venous pressures s/t clotting in lines and dialyzer. Unable to return blood to patient resulting in 200 mL blood loss. Dr Roxanne Jules notified. Telephone order with readback to restart treatment for remaining 2 hours using Heparin 1000 units/hour infusion. 1715 HD treatment restarted per physician order with Heparin infusing on pump. 1730 BP 86/62. UF off. Albumin requested. 1734 BP 83/66. UF off. Albumin initiated. 1740 BP 98/73. UF on. Albumin infusing. 1745 BP 89/63. Albumin infusing. 1750 BP 8971. UF off. Albumin infusing. 1753 BP 89/63. UF off. Albumin infusing. NS 50 mL bolus given. 1804 Albumin #1 completed. Albumin #2 initiated. 1810 BP 79/60. UF remains off. Albumin still infusing.  Bolus given. 1815 BP 97/66. UF remains off. Will continue to monitor. 1820 BP 85/59. UF remains off. Albumin #2 infusing. 1830 BP 84/61. Patient's BP not responding to Albumin infusions or NS boluses. On-call Nephrologist Dr. Caridad smith. 3510 Dr Caridad Huber returned page. Explained current patient status and interventions attempted. He requested Levophed - Primary RN notified. [place] Primary RN states patient does not have a central line and therefore cannot give Levophed - per intensivist, stop HD treatment and he will increase PO midodrine dosage. Dr Caridad Huber notified of intensivist plan and agreed. 1850 HD treatment terminated at this time. All possible blood returned to patient. Hemostasis achieved in < 10 minutes. Access site dressings clean, dry, and intact. +thrill. Admiting Diagnosis: Cardiac Arrest 
Pt's previous clinic- [facility] Consent signed - Informed Consent Verified: Yes (02/14/20 1515) [place]ita Consent - verified Hepatitis Status- 02/07/20 Ag Neg  
Machine #- Machine Number: [id number] (02/14/20 1515) Telemetry status- ICU Pre-dialysis wt. -

## 2020-02-14 NOTE — PROGRESS NOTES
1930:  Bedside and Verbal shift change report given to Sherrie Cook RN (oncoming nurse) by Spring Villarreal RN (offgoing nurse). Report included the following information SBAR, Kardex, ED Summary, Procedure Summary, Intake/Output, MAR, Accordion, Recent Results, Med Rec Status and Cardiac Rhythm sinus rhythm w/PVCs. 2330:  Bedside and Verbal shift change report given to Akil Sommer RN (oncoming nurse) by Sherrie Cook RN (offgoing nurse). Report included the following information SBAR, Kardex, ED Summary, OR Summary, Procedure Summary, Intake/Output, MAR, Accordion, Recent Results, Med Rec Status and Cardiac Rhythm sinus rhythm w/PVCs.
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
## 2020-02-15 NOTE — PROGRESS NOTES
0730: Bedside shift change report given to Nba Smith RN (oncoming nurse) by Juhi Alberts RN (offgoing nurse). Report included the following information SBAR, Kardex, ED Summary, Procedure Summary, Intake/Output, MAR, Accordion and Recent Results. Primary Nurse Adore Martinez and Danny Finch, RN performed a dual skin assessment on this patient Impairment noted- see wound doc flow sheet Ruddy score is 11 
 
1100: Patient MRSA + in Sputum, pt placed on contact.

## 2020-02-15 NOTE — PROGRESS NOTES
Day #1 of cefepime Indication:  VAP, HAP Current regimen:  2 gm q12h Abx regimen: cefepime Recent Labs  
  20 
0441 20 
0443 20 
0300 WBC 9.3 8.6 7.9 CREA 5.03* 4.18* 5.15* BUN 61* 46* 64* Est CrCl: ESRD on HD Temp (24hrs), Av.5 °F (37.5 °C), Min:98.1 °F (36.7 °C), Max:102 °F (38.9 °C) Plan: Change to 1 gm q24h per renal dosing protocol

## 2020-02-15 NOTE — PROGRESS NOTES
1930: [facility] Summary: No acute issues overnight. Cultures obtained and Abx started subsequently. HR decreasing to NSR 70-80s with Abx. Did see patient withdraw bilateral UE to painful stimuli and then briefly decorticate posture. 0730: Bedside shift change report given to [facility] (oncoming nurse) by Stevan Nix (offgoing nurse). Report included the following information SBAR, Intake/Output, MAR and Recent Results.
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
## 2020-02-16 NOTE — PROGRESS NOTES
0730: Bedside shift change report given to Sussy Taylor RN (oncoming nurse) by Jamel Dejesus RN (offgoing nurse). Report included the following information SBAR, Kardex, ED Summary, Procedure Summary, Intake/Output, MAR, Accordion, Recent Results and Dual Neuro Assessment. 0800: Assessment complete. Patient on vent and precedex gtt. VSS, afebrile. 1000: Wife at bedside, wants to proceed with trach/PEG. Dr. Aman Escoto aware, orders received. 1400: Dr. Aman Escoto at bedside for IV placement. Patient biting on tube, no IV at this time. 1500: IV placed, 20g left upper arm. 1930: Bedside shift change report given to Shar TIWARI (oncoming nurse) by Sussy Taylor RN (offgoing nurse). Report included the following information SBAR, Kardex, ED Summary, Procedure Summary, Intake/Output, MAR, Accordion, Recent Results, Med Rec Status and Dual Neuro Assessment.

## 2020-02-16 NOTE — PROGRESS NOTES
02/16/20 1126 Weaning Parameters Spontaneous Breathing Trial Complete No (Comments) Resp Rate Observed 27 Ve 5.5  RSBI 143 SBT COMPLETE 
SBT FAILED PATIENT RETURNED TO PREVIOUS VENT SETTINGS AC/VC 15 500 50 5CMH20

## 2020-02-17 NOTE — DIABETES MGMT
[address] Followup Progress Note Presentation Manasa Forbes is a 64 y.o. male admitted with seizure like activity and PEA en route to hospital and has been intubated and on a ventilator since admission and consulted by Provider for advanced specialty nursing care related to inpatient diabetes management. Subjective Patient remains intubated and on ventilator. Palliative following and meeting with family weekly. Continues on TF (Peptaman 1.5 @ 50cc/hr). Objective Vital Signs Visit Vitals /69 Pulse (!) 103 Temp 98.1 °F (36.7 °C) (Axillary) Resp 15 Ht 6' 2\" (1.88 m) Wt 72.7 kg (160 lb 4.4 oz) SpO2 97% BMI 20.58 kg/m² Laboratory ESRD on HD 
 
 
BG trends in 200's ,  
 
Receiving 15 units Lantus; required 8 units Lispro yesterday RECOMMENDATIONS: 
 
Since BG has been consistently 200s while on 15 units of Lantus; INCREASE lantus to 20 units daily. Assessment and Plan Nursing Diagnosis Risk for unstable blood glucose pattern Nursing Intervention Domain 5258 Decision-making Support Nursing Interventions Examined current inpatient diabetes control Explored factors facilitating and impeding inpatient management Identified self-management practices impeding diabetes control Explored corrective strategies with patient and responsible inpatient provider Informed patient of rational for basal bolus insulin strategy while hospitalized Signed By: CANDI Crow [facility] Contact via Perfect [employer]/[phone] February 17, 2020

## 2020-02-17 NOTE — ROUTINE PROCESS
0730: Bedside and Verbal shift change report given to Fuentes Momin RN (oncoming nurse) by Andrea Mercado RN (offgoing nurse). Report included the following information SBAR, Kardex, ED Summary, Procedure Summary, Intake/Output, MAR, Recent Results, Med Rec Status, Cardiac Rhythm SR and Alarm Parameters . 0945: ENT consult called. 9776: GI consult called. 1130: ICU multidisciplinary rounds lead by Dr. Reema Cruz (Intensivist): The following were reviewed and discussed: current labs,  recent imaging, lines/drains, review of body systems, nutrition, cultures, mobility, length of ICU stay. The plan of care for the day is as follows HD(written note by RN) 1930: Bedside and Verbal shift change report given to  (oncoming nurse) by Fuentes Momin RN (offgoing nurse). Report included the following information SBAR, Kardex, Intake/Output, MAR, Recent Results, Med Rec Status, Cardiac Rhythm SR/ST and Alarm Parameters . no

## 2020-02-17 NOTE — PROGRESS NOTES
[facility] 
 [address] [facility], [address] [facility] Phone: [phone]   [phone]   
  
Nephrology Progress Note Pablito Schumacher     1960     [id number] Date of Admission : 2/5/2020 02/17/20 CC: Follow up for ESRD Assessment and Plan ESRD- HD  
- Dialysis dependent since 4/2018. 
- Dialyzes MWF at [address] F/b Dr Abi Deluca  
- HD this morning Hyponatremia\ Hyperkalemia : 
- HD today HTN  
- stable now  
  
Seizure - per CCM / Neuro Multiple b/l infarcts from hypoxic injury: 
- per neuro 
  
S/P PEA arrest  
  
Hx of R Pleural effusion w/ Trapped Lung - s/p VATS and decortication 4/2018 
  
Anemia of CKD  
- cont LUIS w/ HD 
  
Solitary Kidney S/p Prior Left Nephrectomy for RCC 
  
Sec HTPH Poor prognosis overall Interval History: 
Seen and examined. No new issues overnight. On the vent, off sedation, unresponsive. For HD soon- d/w tej  Review of Systems: Review of systems not obtained due to patient factors. Current Medications:  
Current Facility-Administered Medications Medication Dose Route Frequency  propofol (DIPRIVAN) 10 mg/mL infusion  0-50 mcg/kg/min IntraVENous TITRATE  dexmedeTOMidine (PRECEDEX) 400 mcg in 0.9% sodium chloride 100 mL infusion  0.2-0.7 mcg/kg/hr IntraVENous TITRATE  lansoprazole (PREVACID) 3 mg/mL oral suspension 30 mg  30 mg Per NG tube ACB  insulin glargine (LANTUS) injection 15 Units  15 Units SubCUTAneous DAILY  acetaminophen (TYLENOL) solution 650 mg  650 mg Per NG tube Q6H PRN  Vancomycin- pharmacy to dose   Other Rx Dosing/Monitoring  albumin human 25% (BUMINATE) solution 12.5 g  12.5 g IntraVENous DIALYSIS PRN  
 midodrine (PROAMITINE) tablet 10 mg  10 mg Oral Q8H  
 cefepime (MAXIPIME) 1 g in 0.9% sodium chloride (MBP/ADV) 50 mL  1 g IntraVENous Q24H  
 scopolamine (TRANSDERM-SCOP) 1 mg over 3 days 1 Patch  1 Patch TransDERmal Q72H  heparin (porcine) injection 5,000 Units  5,000 Units SubCUTAneous Q12H  
 insulin lispro (HUMALOG) injection   SubCUTAneous Q6H  
 lipase-protease-amylase (CREON 24,000) capsule 3 Cap  3 Cap Oral Q8H  
 white petrolatum-mineral oil (AKWA TEARS) 83-15 % ophthalmic ointment   Both Eyes Q12H  
 sodium chloride (NS) flush 5-40 mL  5-40 mL IntraVENous Q8H  
 sodium chloride (NS) flush 5-40 mL  5-40 mL IntraVENous PRN  
 glucose chewable tablet 16 g  4 Tab Oral PRN  
 glucagon (GLUCAGEN) injection 1 mg  1 mg IntraMUSCular PRN  
 dextrose 10% infusion 0-250 mL  0-250 mL IntraVENous PRN  chlorhexidine (ORAL CARE KIT) 0.12 % mouthwash 15 mL  15 mL Oral Q12H  
 epoetin tyler-epbx (RETACRIT) injection 10,000 Units  10,000 Units SubCUTAneous Q MON, WED & FRI  hydrALAZINE (APRESOLINE) 20 mg/mL injection 10 mg  10 mg IntraVENous Q6H PRN Allergies Allergen Reactions  Tramadol Other (comments) Brought down blood sugar too fast  
 
 
Objective: 
Vitals:   
Vitals:  
 02/16/20 2325 02/17/20 0000 02/17/20 0310 02/17/20 0400 BP:  101/59  128/69 Pulse: 95 78 86 91 Resp: 15 15 15 15 Temp:  100 °F (37.8 °C)  99.6 °F (37.6 °C) TempSrc:      
SpO2: (!) 82% 100% 98% 97% Weight:    72.7 kg (160 lb 4.4 oz) Height:      
 
Intake and Output: 
02/16 1901 - 02/17 0700 In: 369.1 [I.V.:69.1] Out: 0  
02/15 0701 - 02/16 1900 In: 2253.4 [I.V.:253.4] Out: 850 [Drains:850] Physical Examination: 
General:  On vent HEENT: PERRL,  + Pallor , No Icterus Neck: Supple,no mass palpable Lungs : CTA 
CVS: RRR, S1 S2 normal, No murmur Abdomen: Soft, NT, BS + Extremities: trace Edema, RUE AVF + thrill MS: No joint swelling, erythema, warmth Neurologic:unresponsive on vent Psych: Unable to assess 
  
 
[]    High complexity decision making was performed 
[]    Patient is at high-risk of decompensation with multiple organ involvement Lab Data Personally Reviewed: I have reviewed all the pertinent labs, microbiology data and radiology studies during assessment. Recent Labs  
  02/17/20 
0506 02/16/20 
0242 02/15/20 
0252 * 132* 131*  
K 5.7* 5.2* 4.8  
CL 98 98 98 CO2 24 26 27 * 246* 217* BUN 64* 50* 40* CREA 5.55* 4.88* 3.85* CA 8.1* 7.5* 7.8*  
MG 2.6* 2.4 2.3 PHOS 3.5 3.2 3.1 ALB  --  1.6* 1.8* SGOT  --  16 24 ALT  --  15 16 Recent Labs  
  02/17/20 
0506 02/16/20 
0242 WBC 13.0* 17.5* HGB 7.7* 7.8* HCT 23.4* 24.7*  
 178 No results found for: SDES Lab Results Component Value Date/Time Culture result: NO GROWTH 2 DAYS 02/14/2020 09:00 PM  
 Culture result: No growth (<1,000 CFU/ML) 02/14/2020 09:00 PM  
 Culture result: (A) 02/14/2020 07:34 PM  
  LIGHT * METHICILLIN RESISTANT STAPHYLOCOCCUS AUREUS * Culture result: FEW NORMAL RESPIRATORY BON 02/14/2020 07:34 PM  
 Culture result:  02/14/2020 07:34 PM  
  MRSA RESULT 
CALLED TO AND READ BACK BY 
TE FARRELL,AT 0915 ON 2/16/20. RC Recent Results (from the past 24 hour(s)) GLUCOSE, POC Collection Time: 02/16/20 11:29 AM  
Result Value Ref Range Glucose (POC) 286 (H) 65 - 100 mg/dL Performed by Huey Cavanaugh   
POC EG7 Collection Time: 02/16/20 12:48 PM  
Result Value Ref Range Calcium, ionized (POC) 1.11 (L) 1.12 - 1.32 mmol/L  
 FIO2 (POC) 50 % pH (POC) 7.442 7.35 - 7.45    
 pCO2 (POC) 36.9 35.0 - 45.0 MMHG  
 pO2 (POC) 86 80 - 100 MMHG  
 HCO3 (POC) 25.2 22 - 26 MMOL/L Base excess (POC) 1 mmol/L  
 sO2 (POC) 97 92 - 97 % Site RIGHT RADIAL Device: VENT Mode ASSIST CONTROL Set Rate 15 bpm  
 PEEP/CPAP (POC) 5 cmH2O Allens test (POC) N/A Specimen type (POC) ARTERIAL Total resp. rate 15 GLUCOSE, POC Collection Time: 02/16/20  5:50 PM  
Result Value Ref Range Glucose (POC) 209 (H) 65 - 100 mg/dL  Performed by Félix Oscar POC  
 Collection Time: 02/16/20 11:59 PM  
Result Value Ref Range Glucose (POC) 201 (H) 65 - 100 mg/dL Performed by Nonpareil METABOLIC PANEL, BASIC Collection Time: 02/17/20  5:06 AM  
Result Value Ref Range Sodium 130 (L) 136 - 145 mmol/L Potassium 5.7 (H) 3.5 - 5.1 mmol/L Chloride 98 97 - 108 mmol/L  
 CO2 24 21 - 32 mmol/L Anion gap 8 5 - 15 mmol/L Glucose 191 (H) 65 - 100 mg/dL BUN 64 (H) 6 - 20 MG/DL Creatinine 5.55 (H) 0.70 - 1.30 MG/DL  
 BUN/Creatinine ratio 12 12 - 20 GFR est AA 13 (L) >60 ml/min/1.73m2 GFR est non-AA 11 (L) >60 ml/min/1.73m2 Calcium 8.1 (L) 8.5 - 10.1 MG/DL MAGNESIUM Collection Time: 02/17/20  5:06 AM  
Result Value Ref Range Magnesium 2.6 (H) 1.6 - 2.4 mg/dL PHOSPHORUS Collection Time: 02/17/20  5:06 AM  
Result Value Ref Range Phosphorus 3.5 2.6 - 4.7 MG/DL  
CBC W/O DIFF Collection Time: 02/17/20  5:06 AM  
Result Value Ref Range WBC 13.0 (H) 4.1 - 11.1 K/uL  
 RBC 3.23 (L) 4.10 - 5.70 M/uL HGB 7.7 (L) 12.1 - 17.0 g/dL HCT 23.4 (L) 36.6 - 50.3 % MCV 72.4 (L) 80.0 - 99.0 FL  
 MCH 23.8 (L) 26.0 - 34.0 PG  
 MCHC 32.9 30.0 - 36.5 g/dL  
 RDW 16.4 (H) 11.5 - 14.5 % PLATELET 247 090 - 808 K/uL MPV 11.2 8.9 - 12.9 FL  
 NRBC 0.0 0  WBC ABSOLUTE NRBC 0.00 0.00 - 0.01 K/uL GLUCOSE, POC Collection Time: 02/17/20  5:58 AM  
Result Value Ref Range Glucose (POC) 207 (H) 65 - 100 mg/dL Performed by Nicolás Sam Total time spent with patient:  xxx   min. Care Plan discussed with: 
Patient Family RN Consulting Physician [id number],      
 
I have reviewed the flowsheets. Chart and Pertinent Notes have been reviewed. No change in PMH ,family and social history from Consult note.  
 
 
Brad Montalvo MD

## 2020-02-17 NOTE — PROGRESS NOTES
1930: Bedside shift change report given to Zita Mcdowell, RN (oncoming nurse) by Daysi Soto RN (offgoing nurse). Report included the following information SBAR, Kardex, ED Summary, Intake/Output, MAR, Recent Results and Cardiac Rhythm NSR-ST.  
 
2150: Karlie Kingston from [employer] called for update 2300: Yilu Caifu ([employer]id Escobare from [employer] called to say they were signing off. If anything changes to give [employer] a call back. 0645: Dr Talya Virk at the [facility] assessing pt. 0730: Bedside shift change report given to Cyrus Lechuga RN (oncoming nurse) by Zita Mcdowell Rn (offgoing nurse). Report included the following information SBAR, Kardex, Intake/Output, MAR, Recent Results and Cardiac Rhythm NSR.

## 2020-02-17 NOTE — PROGRESS NOTES
Palliative Medicine Social Work Spoke with patient wife Yanni on phone. 1. Yanni said she was leaning toward trach/PEG after touring [facility]. However she still wants to meet with Palliative tomorrow to discuss this before making final decisions. 2. Yanni mentioned pt. Opening eyes when she called his name and still hoping for a miracle recovery. She has previously voiced that patient told her he would never want to live in a NF/be care dependent. If she goes forward with trach/PEG Yanni will need education on potential risks as she was unable to verbalize this to me at this time. Family meeting planned for Tuesday 1pm to determine next steps for patient. Thank you for the opportunity to be involved in the care of Mr. Gutierrez and his family. Nika Suarez LMSW, Supervisee in Social Work Palliative Medicine  [phone]
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
## 2020-02-17 NOTE — DIALYSIS
[facility] Dialysis Team [place]  [phone] Vitals   Pre   Post   Assessment   Pre   Post    
Temp  Temp: 98.1 °F (36.7 °C) (02/17/20 1010)  98.4 LOC  Sedated and intubated Sedated and intubated HR   Pulse (Heart Rate): 98 (02/17/20 1010) 112 Lungs   Coarse bilaterally  on ventilator  Coarse bilaterally  on ventilator B/P   BP: 155/81 (02/17/20 1010) 122/75 Cardiac   Regular rate and rhythm; bedside monitor  Sinus tachy; bedside monitor Resp   Resp Rate: 17 (02/17/20 1010) 23 Skin   Warm, dry, intact  Warm, dry, intact Pain level  0 out of 10 0 out of 10 Edema  BUE +1 
 
 BUE +1 Orders: Duration:   Start:   1010 End:    1340 Total:   3.5 hrs Dialyzer:   Dialyzer/Set Up Inspection: Javon Landeros (02/17/20 1010) K Bath:   Dialysate K (mEq/L): 3.5 (02/17/20 1010) Ca Bath:   Dialysate CA (mEq/L): 2.5 (02/17/20 1010) Na/Bicarb:   Dialysate NA (mEq/L): 140 (02/17/20 1010) Target Fluid Removal:   Goal/Amount of Fluid to Remove (mL): 2500 mL (02/17/20 1010) Access Type & Location:   Right upper arm AVF. No s./sx of infection. No drainage, redness, or warmth. +bruit/thrill. Access disinfected with alcohol pads per P&P. Cannulated x2 with 15g needles. Sites secured with tape. Aspirated and flushed well. Treatment initiated. Vitals stable. Labs Obtained/Reviewed Critical Results Called   Date when labs were drawn- 
Hgb-   
HGB Date Value Ref Range Status 02/17/2020 7.7 (L) 12.1 - 17.0 g/dL Final  
 
K-   
Potassium Date Value Ref Range Status 02/17/2020 5.7 (H) 3.5 - 5.1 mmol/L Final  
 
Ca-  
Calcium Date Value Ref Range Status 02/17/2020 8.1 (L) 8.5 - 10.1 MG/DL Final  
 
Bun-  
BUN Date Value Ref Range Status 02/17/2020 64 (H) 6 - 20 MG/DL Final  
 
Creat-  
Creatinine Date Value Ref Range Status 02/17/2020 5.55 (H) 0.70 - 1.30 MG/DL Final  
 
  
Medications/ Blood Products Given Name   Dose   Route and Time Albumin 12.5g/50mL  IVPB @ 1100 and 1245 and 1325 Blood Volume Processed (BVP):    68.4L Net Fluid Removed:  1653mL Comments Time Out Done: 0930 Primary Nurse Rpt Pre: Ana María Wolff RN 
Primary Nurse Rpt Post: Jenny Sheppard RN 
Pt Education: Procedural 
Care Plan: Continue with dialysis as ordered Tx Summary: 
Arterial pressures high at start of treatment, maintaining at BFR of 380.  
1100: BP 74/60 - Albumin infusion initiated. UF turned off. Patient stable 1115: /82 - Albumin infusion terminated. UF turned back on. Patient Stable. 1245:BP 96/58. Albumin #2 infusion initiated. 1305: /71 Albumin completed. Patient stable. 1325: BP 81/60. 3rd albumin initiated. Treatment completed. At end all possible blood rinsed back. Lines flushed with NS 0.9%. Hemostasis achieved without excessive bleeding. Sites secured with gauze and tape. + bruit/thrill. Vitals stable. Bed in lowest possible position. Report given to primary nurse. Admiting Diagnosis: ESRD, PEA, Cardiopulmonary Arrest, HTN 
Pt's previous clinic- [facility] Consent signed - Informed Consent Verified: Yes (02/17/20 1010) Jacobita Consent - Verified Hepatitis Status- HbsAg negative 2/7/20. HbsAb unknown Machine #- Machine Number: [id number] (02/17/20 1010) Telemetry status- bedside monitor Pre-dialysis wt. - unable to obtain Post- dialysis wt- unable to obtain

## 2020-02-17 NOTE — PROGRESS NOTES
1930: Report received from [facility]. Shift Summary: Patient remains minimally responsive; Precedex initially weaned off but patient more restless and consistently biting ETT with locked jaw. Precedex restarted. x1 25% albumin for sys 80s with BP improving to sys >100.  
 
0730: Bedside shift change report given to [facility] (oncoming nurse) by Yarely Beckwith (offgoing nurse). Report included the following information SBAR, Intake/Output, MAR and Recent Results.

## 2020-02-17 NOTE — PROGRESS NOTES
Transitions of care Plan for Palliative meeting tomorrow Tuesday. If wife decides to have PEG tube and trach placement patient will need placement in a LTAC with possible placement in a facility in long term care afterwards. Request made for Med Assist referral to screen for Medicaid. Krystle Baird RN,CRM

## 2020-02-18 NOTE — PROGRESS NOTES
[facility] 
 [address] [facility], [address] [facility], [facility] Phone: [phone]   [phone]   
  
Nephrology Progress Note Isis Ruiz     1960     [id number] Date of Admission : 2/5/2020 02/18/20 CC: Follow up for ESRD Assessment and Plan ESRD- HD  
- Dialysis dependent since 4/2018. 
- Dialyzes MWF at [address] [place] [address]. F/b Dr Pablo Barkley  
- HD tomorrow Hyponatremia Hyperkalemia : 
- adrenal insuff/ mineralocorticoid def not excluded  
- one dose of Veltessa through DHT  
 
HTN  
- stable now  
  
Seizure - per CCM / Neuro Multiple b/l infarcts from hypoxic injury: 
- per neuro 
  
S/P PEA arrest  
  
Hx of R Pleural effusion w/ Trapped Lung - s/p VATS and decortication 4/2018 
  
Anemia of CKD  
- cont LUIS w/ HD: increased dose - check iron profile  
  
Solitary Kidney S/p Prior Left Nephrectomy for RCC 
  
Sec HTPH Poor prognosis overall Interval History: 
Seen and examined. HD c/b hypotension Tolerated 1.6Kg UF  
K still high Review of Systems: Review of systems not obtained due to patient factors. Current Medications:  
Current Facility-Administered Medications Medication Dose Route Frequency  fentaNYL citrate (PF) injection 50 mcg  50 mcg IntraVENous Q4H PRN  
 [START ON 2/19/2020] vancomycin - random level with am labs on 2/19   Other ONCE  
 atorvastatin (LIPITOR) tablet 20 mg  20 mg Oral DAILY  patiromer calcium sorbitex (VELTASSA) powder 8.4 g  8.4 g Oral NOW  
 [START ON 2/19/2020] epoetin tyler-epbx (RETACRIT) injection 20,000 Units  20,000 Units SubCUTAneous Q MON, WED & FRI  propofol (DIPRIVAN) 10 mg/mL infusion  0-50 mcg/kg/min IntraVENous TITRATE  dexmedeTOMidine (PRECEDEX) 400 mcg in 0.9% sodium chloride 100 mL infusion  0.2-0.7 mcg/kg/hr IntraVENous TITRATE  lansoprazole (PREVACID) 3 mg/mL oral suspension 30 mg  30 mg Per NG tube ACB  insulin glargine (LANTUS) injection 15 Units  15 Units SubCUTAneous DAILY  acetaminophen (TYLENOL) solution 650 mg  650 mg Per NG tube Q6H PRN  Vancomycin- pharmacy to dose   Other Rx Dosing/Monitoring  albumin human 25% (BUMINATE) solution 12.5 g  12.5 g IntraVENous DIALYSIS PRN  
 midodrine (PROAMITINE) tablet 10 mg  10 mg Oral Q8H  
 scopolamine (TRANSDERM-SCOP) 1 mg over 3 days 1 Patch  1 Patch TransDERmal Q72H  heparin (porcine) injection 5,000 Units  5,000 Units SubCUTAneous Q12H  
 insulin lispro (HUMALOG) injection   SubCUTAneous Q6H  
 lipase-protease-amylase (CREON 24,000) capsule 3 Cap  3 Cap Oral Q8H  
 white petrolatum-mineral oil (AKWA TEARS) 83-15 % ophthalmic ointment   Both Eyes Q12H  
 sodium chloride (NS) flush 5-40 mL  5-40 mL IntraVENous Q8H  
 sodium chloride (NS) flush 5-40 mL  5-40 mL IntraVENous PRN  
 glucose chewable tablet 16 g  4 Tab Oral PRN  
 glucagon (GLUCAGEN) injection 1 mg  1 mg IntraMUSCular PRN  
 dextrose 10% infusion 0-250 mL  0-250 mL IntraVENous PRN  chlorhexidine (ORAL CARE KIT) 0.12 % mouthwash 15 mL  15 mL Oral Q12H  hydrALAZINE (APRESOLINE) 20 mg/mL injection 10 mg  10 mg IntraVENous Q6H PRN Allergies Allergen Reactions  Tramadol Other (comments) Brought down blood sugar too fast  
 
 
Objective: 
Vitals:   
Vitals:  
 02/18/20 0600 02/18/20 0700 02/18/20 0800 02/18/20 6059 BP: 151/75 130/71 141/73 Pulse: (!) 102 (!) 105 (!) 103 (!) 114 Resp: 15 15 15 21 Temp:   (!) 100.5 °F (38.1 °C) TempSrc:      
SpO2: 97% 96% 97% 97% Weight:      
Height:      
 
Intake and Output: 
02/18 0701 - 02/18 1900 In: 160 Out: -  
02/16 1901 - 02/18 0700 In: 2344.1 [I.V.:469.1] Out: 2253 [Drains:600] Physical Examination: 
General:  On vent HEENT: PERRL,  + Pallor , No Icterus Neck: Supple,no mass palpable Lungs : CTA 
CVS: RRR, S1 S2 normal, No murmur Abdomen: Soft, NT, BS + 
 Extremities:2+ upper extremity Edema, RUE AVF + thrill MS: No joint swelling, erythema, warmth Neurologic:unresponsive on vent Psych: Unable to assess 
  
 
[]    High complexity decision making was performed 
[]    Patient is at high-risk of decompensation with multiple organ involvement Lab Data Personally Reviewed: I have reviewed all the pertinent labs, microbiology data and radiology studies during assessment. Recent Labs  
  02/18/20 
0354 02/17/20 
0506 02/16/20 
0242 * 130* 132*  
K 5.2* 5.7* 5.2*  
CL 98 98 98 CO2 27 24 26 * 191* 246* BUN 37* 64* 50* CREA 3.87* 5.55* 4.88* CA 8.2* 8.1* 7.5* MG 2.5* 2.6* 2.4 PHOS 2.8 3.5 3.2 ALB 1.7* 1.6* 1.6* SGOT 11* 11* 16 ALT 11* 14 15 Recent Labs  
  02/18/20 
0354 02/17/20 
0506 02/16/20 
0242 WBC 9.1 13.0* 17.5* HGB 7.1* 7.7* 7.8* HCT 23.0* 23.4* 24.7*  
 211 178 No results found for: SDES Lab Results Component Value Date/Time Culture result: NO GROWTH 4 DAYS 02/14/2020 09:00 PM  
 Culture result: No growth (<1,000 CFU/ML) 02/14/2020 09:00 PM  
 Culture result: (A) 02/14/2020 07:34 PM  
  LIGHT * METHICILLIN RESISTANT STAPHYLOCOCCUS AUREUS * Culture result: FEW NORMAL RESPIRATORY BON 02/14/2020 07:34 PM  
 Culture result:  02/14/2020 07:34 PM  
  MRSA RESULT 
CALLED TO AND READ BACK BY 
TE FARRELL,AT 0915 ON 2/16/20. RC Recent Results (from the past 24 hour(s)) GLUCOSE, POC Collection Time: 02/17/20  1:21 PM  
Result Value Ref Range Glucose (POC) 134 (H) 65 - 100 mg/dL Performed by Faina Canal GLUCOSE, POC Collection Time: 02/17/20  5:50 PM  
Result Value Ref Range Glucose (POC) 186 (H) 65 - 100 mg/dL Performed by Faina Canal GLUCOSE, POC Collection Time: 02/17/20 11:15 PM  
Result Value Ref Range Glucose (POC) 241 (H) 65 - 100 mg/dL Performed by Corby Renteria MAGNESIUM  Collection Time: 02/18/20  3:54 AM  
 Result Value Ref Range Magnesium 2.5 (H) 1.6 - 2.4 mg/dL PHOSPHORUS Collection Time: 02/18/20  3:54 AM  
Result Value Ref Range Phosphorus 2.8 2.6 - 4.7 MG/DL  
CBC W/O DIFF Collection Time: 02/18/20  3:54 AM  
Result Value Ref Range WBC 9.1 4.1 - 11.1 K/uL  
 RBC 3.05 (L) 4.10 - 5.70 M/uL HGB 7.1 (L) 12.1 - 17.0 g/dL HCT 23.0 (L) 36.6 - 50.3 % MCV 75.4 (L) 80.0 - 99.0 FL  
 MCH 23.3 (L) 26.0 - 34.0 PG  
 MCHC 30.9 30.0 - 36.5 g/dL  
 RDW 16.7 (H) 11.5 - 14.5 % PLATELET 070 607 - 787 K/uL MPV 11.2 8.9 - 12.9 FL  
 NRBC 0.0 0  WBC ABSOLUTE NRBC 0.00 0.00 - 0.01 K/uL METABOLIC PANEL, COMPREHENSIVE Collection Time: 02/18/20  3:54 AM  
Result Value Ref Range Sodium 132 (L) 136 - 145 mmol/L Potassium 5.2 (H) 3.5 - 5.1 mmol/L Chloride 98 97 - 108 mmol/L  
 CO2 27 21 - 32 mmol/L Anion gap 7 5 - 15 mmol/L Glucose 243 (H) 65 - 100 mg/dL BUN 37 (H) 6 - 20 MG/DL Creatinine 3.87 (H) 0.70 - 1.30 MG/DL  
 BUN/Creatinine ratio 10 (L) 12 - 20 GFR est AA 20 (L) >60 ml/min/1.73m2 GFR est non-AA 16 (L) >60 ml/min/1.73m2 Calcium 8.2 (L) 8.5 - 10.1 MG/DL Bilirubin, total 0.6 0.2 - 1.0 MG/DL  
 ALT (SGPT) 11 (L) 12 - 78 U/L  
 AST (SGOT) 11 (L) 15 - 37 U/L Alk. phosphatase 114 45 - 117 U/L Protein, total 8.4 (H) 6.4 - 8.2 g/dL Albumin 1.7 (L) 3.5 - 5.0 g/dL Globulin 6.7 (H) 2.0 - 4.0 g/dL A-G Ratio 0.3 (L) 1.1 - 2.2 PROCALCITONIN Collection Time: 02/18/20  3:54 AM  
Result Value Ref Range Procalcitonin 5.59 ng/mL POC EG7 Collection Time: 02/18/20  5:18 AM  
Result Value Ref Range Calcium, ionized (POC) 1.14 1.12 - 1.32 mmol/L  
 FIO2 (POC) 40 % pH (POC) 7.463 (H) 7.35 - 7.45    
 pCO2 (POC) 39.8 35.0 - 45.0 MMHG  
 pO2 (POC) 97 80 - 100 MMHG  
 HCO3 (POC) 28.5 (H) 22 - 26 MMOL/L Base excess (POC) 5 mmol/L  
 sO2 (POC) 98 (H) 92 - 97 % Site RIGHT RADIAL Device: VENT Mode ASSIST CONTROL Set Rate 15 bpm  
 PEEP/CPAP (POC) 5 cmH2O  
 PIP (POC) 24 Allens test (POC) N/A Inspiratory Time 1 sec Specimen type (POC) ARTERIAL Total resp. rate 15 GLUCOSE, POC Collection Time: 02/18/20  5:31 AM  
Result Value Ref Range Glucose (POC) 242 (H) 65 - 100 mg/dL Performed by Kei Hong Total time spent with patient:  xxx   min. Care Plan discussed with: 
Patient Family RN Consulting Physician [facility]     
 
I have reviewed the flowsheets. Chart and Pertinent Notes have been reviewed. No change in PMH ,family and social history from Consult note.  
 
 
Asuncion Jovel MD
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
## 2020-02-18 NOTE — PROGRESS NOTES
Palliative Medicine [place]: [phone] ([phone]) [facility]: [phone] ([phone]) Code Status: Partial Code Advance Care Planning: 
No AMD on file LNOK:   Primary Decision Maker (Active): Yanni Tapia - Spouse - [phone] Per chart: Maude Davison is a 64 y.o. with a past history of ESRD on HD since 4/2018 (Dr. Tsering Maria), HTN, DM w peripheral and autonomic neuropathy, CAD/ ischemic cardiomyopathy, hx renal cell carcinoma s/p L nephrectomy, hx pancreatic pseudocyst, hx pneumonia/empyema/ VATS decortication 4/2018, seen in office 1/14/20 w fatigue/anorexia/weakness/ epistaxis, seen in ED 1/30/2020 w RLL pneumonia, who was admitted on 2/5/2020 from home/ transfer from [facility] with uncontrolled HTN/ PEA arrest.    Current medical issues leading to Palliative Medicine involvement include: Poor neurologic recovery after PEA arrest en route to hospital, now with MRI and EEG consistent with anoxic injury. Patient is currently on ventilator (has been off all sedation), unresponsive/ no spontaneous movements/ absent gag except with very deep suction. Patient / Family Encounter Documentation Participants (names): Patient wife/PARVEEN Liao; Palliative Medicine (Michela Kehr, Dr. Jaki Kelly) Narrative:  
 
Dr. Jaki Kelly and I met with patient wife/PARVEEN Liao in conference room. Her demeanor was somber, worried. 1. Yanni is clear she would like to move forward with trach/PEG to give more patient more time to get better. We explored what \"better\" means and she told us she wants patient to be able to wake up and recognize family. 2. Provided education on risks/burdens of this path of tx. See Dr. Bhavin Fuchs notes. 3. Yanni has questions about what is covered under Medicare A/B, how does Medicaid application fall into this process - will refer to care manager Charline Miramontes.  
 
4. CODE STATUS: reviewed code status with patient and she would like patient to be DNR. She hesitated to sign DDNR \"I don't want to play god\" meaning deciding who gets a chance a resuscitation. Provided more education about CPR efficacy for critically ill patients. Supported her in reframing DDNR as getting out of god's way so he can do His work with patient. Also discussed that she is able to revoke DDNR if she is uncomfortable with this plan. DDNR signed 5. Wife expressed concern that she doesn't want to just be doing things to patient to keep him alive for her and the family. She just wants to give him more time and a chance to get better. We talked about setting a timeframe for re-assessment of his care plan (at 30/60/90 days) to make sure it continues to be a good fit for him as he either improves or declines. Psycho: unable to assess Social: lives with wife in [address] [place] [address], 2 dtrs, 2 sons and all local except one, 6 grandkids, brother also involved Spiritual: not assessed Baseline : independent in ADLS, though has recently become weaker and needing assistance; uses walker for ambulation; loves to go fishing Psychosocial challenges/Resilience factors:  
 
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 No financial concerns noted during assessment Goals of Care / Plan: DDNR on chart to be scanned, pt wife has copy. Plan for trach/PEG possible d/c to [facility] Wife has questions re: Medicaid - CM updated Thank you for the opportunity to be involved in the care of Mr. Florence Wright and his family. Brooks Luis LMSW, Supervisee in Social Work Palliative Medicine  [phone]

## 2020-02-18 NOTE — PROGRESS NOTES
Transitions of Care Jose to evaluate for admission Referral sent to [facility] for Medicaid screening Care manager met with patient's wife after she met with Palliative Care to discuss goals of care. Patient's wife has decided to proceed with [facility]  Per wife she toured [facility] over the weekend and is agreeable to have him evaluated. Referral sent via [employer]. Also inquired about Medicaid, she confirmed he only has Medicare A,B.per wife they have applied for Medicaid in the past and have been denied, patient owns some land and patient's wife owns her own business. Referral has been made to [facility] to screened for long term care. Care management will follow for additional needs. Cristina Galindo RN,CRM

## 2020-02-18 NOTE — DIABETES MGMT
[address] Followup Progress Note Presentation Morris Garcia is a 64 y.o. male admitted with seizure like activity and PEA en route to hospital and has been intubated and on a ventilator since admission  and subsequently had anoxic ischemia encephalopathy and consulted by Provider for advanced specialty nursing care related to inpatient diabetes management. Objective Vital Signs Visit Vitals /73 Pulse (!) 114 Temp (!) 100.5 °F (38.1 °C) Resp 21 Ht 6' 2\" (1.88 m) Wt 74.9 kg (165 lb 2 oz) SpO2 97% BMI 21.20 kg/m² Laboratory ESRD on HD 
 
 
BG trends >200. Patient continues on 15units Lantus daily. RECOMMENDATIONS: 
 
Since BG has been consistently 200s while on 15 units of Lantus; INCREASE lantus to 20 units daily.   
  
Assessment and Plan Nursing Diagnosis Risk for unstable blood glucose pattern Nursing Intervention Domain 5277 Decision-making Support Nursing Interventions Examined current inpatient diabetes control Explored factors facilitating and impeding inpatient management Identified self-management practices impeding diabetes control Explored corrective strategies with patient and responsible inpatient provider Informed patient of rational for basal bolus insulin strategy while hospitalized Signed By: Indira Little, CNS Contact via [employer]/[phone] February 18, 2020

## 2020-02-18 NOTE — PROGRESS NOTES
[facility] 
[address] [facility], [address] GI PROGRESS NOTE Will Bev Sweet, [address] [facility] office [phone] NP/PA in-hospital cell phone M-F until 4:30PM 
After 5PM or on weekends, please call  for physician on call NAME: Carolina Muniz :  1960 MRN:  [id number] Subjective:  
Family meeting held today with palliative care. Discussed with Dr. Elissa Beal. Patient's wife and RN are at the bedside. Objective: VITALS:  
Last 24hrs VS reviewed since prior progress note. Most recent are: 
Visit Vitals /72 Pulse 100 Temp 100.4 °F (38 °C) Resp 16 Ht 6' 2\" (1.88 m) Wt 74.9 kg (165 lb 2 oz) SpO2 97% BMI 21.20 kg/m² PHYSICAL EXAM: 
General: Intubated Neurologic:  Unresponsive HEENT: No scleral icterus, intubated, NG tube in place Lungs:  Diminished bilaterally anteriorly Heart:  S1 S2 Abdomen: Soft, non-distended, no apparent tenderness. +Bowel sounds. FMS in place. Extremities: Warm Psych:   Unable to assess Lab Data Reviewed:  
 
Recent Results (from the past 24 hour(s)) GLUCOSE, POC Collection Time: 20  5:50 PM  
Result Value Ref Range Glucose (POC) 186 (H) 65 - 100 mg/dL Performed by Brandee Gaitan GLUCOSE, POC Collection Time: 20 11:15 PM  
Result Value Ref Range Glucose (POC) 241 (H) 65 - 100 mg/dL Performed by Toya Cordova MAGNESIUM Collection Time: 20  3:54 AM  
Result Value Ref Range Magnesium 2.5 (H) 1.6 - 2.4 mg/dL PHOSPHORUS Collection Time: 20  3:54 AM  
Result Value Ref Range Phosphorus 2.8 2.6 - 4.7 MG/DL  
CBC W/O DIFF Collection Time: 20  3:54 AM  
Result Value Ref Range WBC 9.1 4.1 - 11.1 K/uL  
 RBC 3.05 (L) 4.10 - 5.70 M/uL HGB 7.1 (L) 12.1 - 17.0 g/dL HCT 23.0 (L) 36.6 - 50.3 % MCV 75.4 (L) 80.0 - 99.0 FL  
 MCH 23.3 (L) 26.0 - 34.0 PG  
 MCHC 30.9 30.0 - 36.5 g/dL  
 RDW 16.7 (H) 11.5 - 14.5 % PLATELET 847 162 - 349 K/uL MPV 11.2 8.9 - 12.9 FL  
 NRBC 0.0 0  WBC ABSOLUTE NRBC 0.00 0.00 - 0.01 K/uL METABOLIC PANEL, COMPREHENSIVE Collection Time: 02/18/20  3:54 AM  
Result Value Ref Range Sodium 132 (L) 136 - 145 mmol/L Potassium 5.2 (H) 3.5 - 5.1 mmol/L Chloride 98 97 - 108 mmol/L  
 CO2 27 21 - 32 mmol/L Anion gap 7 5 - 15 mmol/L Glucose 243 (H) 65 - 100 mg/dL BUN 37 (H) 6 - 20 MG/DL Creatinine 3.87 (H) 0.70 - 1.30 MG/DL  
 BUN/Creatinine ratio 10 (L) 12 - 20 GFR est AA 20 (L) >60 ml/min/1.73m2 GFR est non-AA 16 (L) >60 ml/min/1.73m2 Calcium 8.2 (L) 8.5 - 10.1 MG/DL Bilirubin, total 0.6 0.2 - 1.0 MG/DL  
 ALT (SGPT) 11 (L) 12 - 78 U/L  
 AST (SGOT) 11 (L) 15 - 37 U/L Alk. phosphatase 114 45 - 117 U/L Protein, total 8.4 (H) 6.4 - 8.2 g/dL Albumin 1.7 (L) 3.5 - 5.0 g/dL Globulin 6.7 (H) 2.0 - 4.0 g/dL A-G Ratio 0.3 (L) 1.1 - 2.2 PROCALCITONIN Collection Time: 02/18/20  3:54 AM  
Result Value Ref Range Procalcitonin 5.59 ng/mL IRON PROFILE Collection Time: 02/18/20  3:54 AM  
Result Value Ref Range Iron 12 (L) 35 - 150 ug/dL TIBC 87 (L) 250 - 450 ug/dL Iron % saturation 14 (L) 20 - 50 % POC EG7 Collection Time: 02/18/20  5:18 AM  
Result Value Ref Range Calcium, ionized (POC) 1.14 1.12 - 1.32 mmol/L  
 FIO2 (POC) 40 % pH (POC) 7.463 (H) 7.35 - 7.45    
 pCO2 (POC) 39.8 35.0 - 45.0 MMHG  
 pO2 (POC) 97 80 - 100 MMHG  
 HCO3 (POC) 28.5 (H) 22 - 26 MMOL/L Base excess (POC) 5 mmol/L  
 sO2 (POC) 98 (H) 92 - 97 % Site RIGHT RADIAL Device: VENT Mode ASSIST CONTROL Set Rate 15 bpm  
 PEEP/CPAP (POC) 5 cmH2O  
 PIP (POC) 24 Allens test (POC) N/A Inspiratory Time 1 sec Specimen type (POC) ARTERIAL Total resp. rate 15 GLUCOSE, POC Collection Time: 02/18/20  5:31 AM  
Result Value Ref Range Glucose (POC) 242 (H) 65 - 100 mg/dL Performed by Arlene Hayward GLUCOSE, POC Collection Time: 02/18/20 11:42 AM  
Result Value Ref Range Glucose (POC) 256 (H) 65 - 100 mg/dL Performed by Jayden Rees) Assessment:  
· Feeding difficulties: on heparin subQ. Hgb 7.1, platelets 763. · Status post PEA arrest 
· Chronic anemia: epogen per nephrology · Hypoxic respiratory failure · Acute encephalopathy: bilateral infarcts, hypoxic injury, seizures · End-stage renal disease: on MWF dialysis. Nephrology following. Patient Active Problem List  
Diagnosis Code  Peyronie's disease N48.6  
 HTN (hypertension) I10  
 Renal cell cancer (HCC) C64.9  Peripheral autonomic neuropathy due to diabetes mellitus (HCC) E11.43  Type 2 diabetes mellitus with diabetic nephropathy (HCC) E11.21  
 HLD (hyperlipidemia) E78.5  Carpal tunnel syndrome, left G56.02  
 CKD stage 5 due to type 2 diabetes mellitus ([facility] E11.22, N18.5  Hypertensive heart disease with chronic systolic congestive heart failure (HCC) I11.0, I50.22  
 Dilated cardiomyopathy (HCC) I42.0  ESRD (end stage renal disease) on dialysis (HCC) N18.6, Z99.2  ESRD (end stage renal disease) ([facility] N18.6  Murmur, cardiac R01.1  Peripheral vascular disease (HCC) I73.9  Cardiac arrest with pulseless electrical activity (HCC) I46.9  Cardiopulmonary arrest with successful resuscitation ([facility] I46.9  Severe anoxic-ischemic encephalopathy (HCC) G93.1, I67.82  
 Acute respiratory failure with hypoxia and hypercapnia (HCC) J96.01, J96.02  
 Acute hypokalemia E87.6  Hypophosphatasia E83.39  
 S/p nephrectomy Z90.5  Encephalomalacia G93.89  Cerebral infarction, acute (HCC) I63.9  New cerebellar infarct (HCC) I63.9  Uses roller walker Z99.89  
 Diabetic neuropathic cachexia ([facility] E11.40, R64  Severe muscle deconditioning R29.898 Plan:  
· Continue tube feeds · Palliative care following, meeting held today - discussed with  Colleen and family would like to proceed with PEG placement. ENT consulted for trach. · Details and risks of EGD with PEG placement to include (but not limited to) anesthesia, bleeding, infection, perforation, injury to surrounding organs, and death were discussed with the patient's wife at the bedside. She understands and is in agreement to proceed. Discussed with RN - consent to be obtained. Timing per Dr. Elizabeth Marie.    
 
Signed By: JOSEE Diop   
 2/18/2020  1:29 PM 
  
 
Will follow and decide on timing of EGD
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
## 2020-02-18 NOTE — PROGRESS NOTES
1930: Bedside shift change report given to January Booth RN (oncoming nurse) by Israel Justin RN (offgoing nurse). Report included the following information SBAR, Kardex, Intake/Output, MAR, Recent Results and Cardiac Rhythm ST.  
 
0000: Primary Nurse Luigi Noriega, TE and Edmund Nieves RN performed a dual skin assessment on this patient Impairment noted- see wound doc flow sheet Ruddy score is 11. Pt noted to have moisture break down around anus from stool leaking around FMS. 0645: Pt tachy with elevated BP. Wallace Rosales, JORGE L, made aware and put in PRN pain medicine. 0730: Bedside shift change report given to Taz Gomez RN (oncoming nurse) by January Booth RN (offgoing nurse). Report included the following information SBAR, Kardex, ED Summary, Procedure Summary, Intake/Output, MAR, Recent Results and Cardiac Rhythm NSR-ST.

## 2020-02-18 NOTE — PROGRESS NOTES
Bedside and Verbal shift change report given to Connie Hernández (oncoming nurse) by Tamia Marrero (offgoing nurse). Report included the following information SBAR, Intake/Output, MAR, Recent Results and Cardiac Rhythm ST/NSR. ICU multidisciplinary rounds lead by Dr. Vinicio Robledo (Intensivist): The following were reviewed and discussed: current labs,  recent imaging, lines/drains, review of body systems, nutrition, cultures, mobility, length of ICU stay. The plan of care for the day is blood cultures, sputum culture, and palliative care meeting. Bedside and Verbal shift change report given to Tamia Marrero (oncoming nurse) by Connie Hernández (offgoing nurse). Report included the following information SBAR, Intake/Output, MAR and Cardiac Rhythm NSR.
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
## 2020-02-19 NOTE — DIABETES MGMT
[address] Followup Progress Note Presentation Tiffanie Owen is a 64 y.o. male admitted with PEA en route to hospital and has been intubated and on a ventilator since admission  and subsequently had anoxic ischemia encephalopathy  and consulted by Provider for advanced specialty nursing care related to inpatient diabetes management. Subjective Patient remains non-responsive and on ventilator. Family meeting with palliative yesterday re: goals of care decisions. Patient on continuous TF. Objective Physical exam 
General intubated; on ventilator Vital Signs Visit Vitals /73 Pulse (!) 103 Temp 98.8 °F (37.1 °C) (Axillary) Resp 15 Ht 6' 2\" (1.88 m) Wt 78.8 kg (173 lb 11.6 oz) SpO2 94% BMI 22.30 kg/m² Laboratory ESRD on HD Blood glucose pattern BG trends averaging 150-200s. Spoke with Dr. Ana Pa re: slightly increasing basal insulin by 2-3 units to keep BG below 200s consistently. Assessment and Plan Nursing Diagnosis Risk for unstable blood glucose pattern Nursing Intervention Domain 2557 Decision-making Support Nursing Interventions Examined current inpatient diabetes control Explored factors facilitating and impeding inpatient management Identified self-management practices impeding diabetes control Explored corrective strategies with patient and responsible inpatient provider Informed patient of rational for basal bolus insulin strategy while hospitalized Signed By: CANDI Espinosa [facility] Contact via [employer][phone] February 19, 2020

## 2020-02-19 NOTE — PROGRESS NOTES
[facility] 
 [address] [facility], [address] [facility], [facility] Phone: [phone]   [phone]   
  
Nephrology Progress Note Isis Ruiz     1960     [id number] Date of Admission : 2/5/2020 02/19/20 CC: Follow up for ESRD Assessment and Plan ESRD- HD  
- Dialysis dependent since 4/2018. 
- Dialyzes MWF at [address] [place] [address]. F/b Dr Pablo Barkley  
- HD MWF. Dialyzed overnight Hyponatremia 
- stable HTN  
- stable now  
  
Seizure - per CCM / Neuro Multiple b/l infarcts from hypoxic injury: 
- per neuro 
  
S/P PEA arrest  
  
Hx of R Pleural effusion w/ Trapped Lung - s/p VATS and decortication 4/2018 
  
Anemia of CKD  
- cont LUIS w/ HD: increased dose - check iron profile  
  
Solitary Kidney S/p Prior Left Nephrectomy for RCC 
  
Sec HTPH Poor prognosis overall Interval History: 
Seen and examined. Noted plans for trach and PEG Dialyzed overnight and had 1 Kg removed Still has edema in both UE Remains unresponsive on vent Review of Systems: Review of systems not obtained due to patient factors. Current Medications:  
Current Facility-Administered Medications Medication Dose Route Frequency  vancomycin (VANCOCIN) 500 mg in 0.9% sodium chloride (MBP/ADV) 100 mL  500 mg IntraVENous ONCE  
 0.9% sodium chloride infusion 250 mL  250 mL IntraVENous PRN  
 cefepime (MAXIPIME) 1 g in 0.9% sodium chloride (MBP/ADV) 50 mL  1 g IntraVENous Q24H  
 0.9% sodium chloride infusion 250 mL  250 mL IntraVENous PRN  
 fentaNYL citrate (PF) injection 50 mcg  50 mcg IntraVENous Q4H PRN  
 atorvastatin (LIPITOR) tablet 20 mg  20 mg Oral DAILY  epoetin tyler-epbx (RETACRIT) injection 20,000 Units  20,000 Units SubCUTAneous Q MON, WED & FRI  propofol (DIPRIVAN) 10 mg/mL infusion  0-50 mcg/kg/min IntraVENous TITRATE  dexmedeTOMidine (PRECEDEX) 400 mcg in 0.9% sodium chloride 100 mL infusion  0.2-0.7 mcg/kg/hr IntraVENous TITRATE  lansoprazole (PREVACID) 3 mg/mL oral suspension 30 mg  30 mg Per NG tube ACB  insulin glargine (LANTUS) injection 15 Units  15 Units SubCUTAneous DAILY  acetaminophen (TYLENOL) solution 650 mg  650 mg Per NG tube Q6H PRN  Vancomycin- pharmacy to dose   Other Rx Dosing/Monitoring  albumin human 25% (BUMINATE) solution 12.5 g  12.5 g IntraVENous DIALYSIS PRN  
 midodrine (PROAMITINE) tablet 10 mg  10 mg Oral Q8H  
 scopolamine (TRANSDERM-SCOP) 1 mg over 3 days 1 Patch  1 Patch TransDERmal Q72H  heparin (porcine) injection 5,000 Units  5,000 Units SubCUTAneous Q12H  
 insulin lispro (HUMALOG) injection   SubCUTAneous Q6H  
 lipase-protease-amylase (CREON 24,000) capsule 3 Cap  3 Cap Oral Q8H  
 white petrolatum-mineral oil (AKWA TEARS) 83-15 % ophthalmic ointment   Both Eyes Q12H  
 sodium chloride (NS) flush 5-40 mL  5-40 mL IntraVENous Q8H  
 sodium chloride (NS) flush 5-40 mL  5-40 mL IntraVENous PRN  
 glucose chewable tablet 16 g  4 Tab Oral PRN  
 glucagon (GLUCAGEN) injection 1 mg  1 mg IntraMUSCular PRN  
 dextrose 10% infusion 0-250 mL  0-250 mL IntraVENous PRN  chlorhexidine (ORAL CARE KIT) 0.12 % mouthwash 15 mL  15 mL Oral Q12H  hydrALAZINE (APRESOLINE) 20 mg/mL injection 10 mg  10 mg IntraVENous Q6H PRN Allergies Allergen Reactions  Tramadol Other (comments) Brought down blood sugar too fast  
 
 
Objective: 
Vitals:   
Vitals:  
 02/19/20 0600 02/19/20 0614 02/19/20 0700 02/19/20 0849 BP: 139/80  132/75 Pulse: (!) 101 97 (!) 102 (!) 107 Resp: 15 16 15 15 Temp:      
TempSrc:      
SpO2: 98% 99% 96% 93% Weight: 78.8 kg (173 lb 11.6 oz) Height:      
 
Intake and Output: 
No intake/output data recorded. 02/17 1901 - 02/19 0700 In: 2090 Out: 1225 [Drains:725] Physical Examination: 
General:  On vent HEENT: PERRL,  + Pallor , No Icterus Neck: Supple,no mass palpable Lungs : CTA 
CVS: RRR, S1 S2 normal, No murmur Abdomen: Soft, NT, BS + Extremities:2+ upper extremity Edema, RUE AVF + thrill MS: No joint swelling, erythema, warmth Neurologic:unresponsive on vent Psych: Unable to assess 
  
 
[]    High complexity decision making was performed 
[]    Patient is at high-risk of decompensation with multiple organ involvement Lab Data Personally Reviewed: I have reviewed all the pertinent labs, microbiology data and radiology studies during assessment. Recent Labs  
  02/19/20 
9727 02/18/20 
1348 02/18/20 
0354 02/17/20 
6271 * 130* 132* 130*  
K 4.3 5.2* 5.2* 5.7* CL 97 99 98 98 CO2 30 29 27 24 * 181* 243* 191* BUN 25* 42* 37* 64* CREA 2.77* 4.12* 3.87* 5.55* CA 8.1* 8.0* 8.2* 8.1*  
MG 2.3  --  2.5* 2.6* PHOS 2.1*  --  2.8 3.5 ALB 1.9*  --  1.7* 1.6* SGOT 11*  --  11* 11* ALT 9*  --  11* 14  
 
Recent Labs  
  02/19/20 
0633 02/18/20 
0354 02/17/20 
1087 WBC 8.2 9.1 13.0* HGB 6.9* 7.1* 7.7* HCT 21.7* 23.0* 23.4*  
 212 211 No results found for: SDES Lab Results Component Value Date/Time Culture result: NO GROWTH AFTER 14 HOURS 02/18/2020 02:29 PM  
 Culture result: PENDING 02/18/2020 02:29 PM  
 Culture result: NO GROWTH 5 DAYS 02/14/2020 09:00 PM  
 Culture result: No growth (<1,000 CFU/ML) 02/14/2020 09:00 PM  
 Culture result: (A) 02/14/2020 07:34 PM  
  LIGHT * METHICILLIN RESISTANT STAPHYLOCOCCUS AUREUS * Culture result: FEW NORMAL RESPIRATORY BON 02/14/2020 07:34 PM  
 Culture result:  02/14/2020 07:34 PM  
  MRSA RESULT 
CALLED TO AND READ BACK BY 
TE FARRELL,AT 0915 ON 2/16/20. RC Recent Results (from the past 24 hour(s)) GLUCOSE, POC Collection Time: 02/18/20 11:42 AM  
Result Value Ref Range Glucose (POC) 256 (H) 65 - 100 mg/dL Performed by Jayden Rees) METABOLIC PANEL, BASIC Collection Time: 02/18/20  1:48 PM  
Result Value Ref Range Sodium 130 (L) 136 - 145 mmol/L  Potassium 5.2 (H) 3.5 - 5.1 mmol/L  
 Chloride 99 97 - 108 mmol/L  
 CO2 29 21 - 32 mmol/L Anion gap 2 (L) 5 - 15 mmol/L Glucose 181 (H) 65 - 100 mg/dL BUN 42 (H) 6 - 20 MG/DL Creatinine 4.12 (H) 0.70 - 1.30 MG/DL  
 BUN/Creatinine ratio 10 (L) 12 - 20 GFR est AA 18 (L) >60 ml/min/1.73m2 GFR est non-AA 15 (L) >60 ml/min/1.73m2 Calcium 8.0 (L) 8.5 - 10.1 MG/DL  
CULTURE, BLOOD Collection Time: 02/18/20  2:29 PM  
Result Value Ref Range Special Requests: NO SPECIAL REQUESTS Culture result: NO GROWTH AFTER 14 HOURS    
CULTURE, RESPIRATORY/SPUTUM/BRONCH W GRAM STAIN Collection Time: 02/18/20  2:29 PM  
Result Value Ref Range Special Requests: NO SPECIAL REQUESTS    
 GRAM STAIN 2+ WBCS SEEN    
 GRAM STAIN FEW EPITHELIAL CELLS SEEN    
 GRAM STAIN 1+ GRAM POSITIVE COCCI IN PAIRS    
 GRAM STAIN OCCASIONAL GRAM NEGATIVE RODS    
 GRAM STAIN OCCASIONAL GRAM POSITIVE RODS Culture result: PENDING   
GLUCOSE, POC Collection Time: 02/18/20  5:13 PM  
Result Value Ref Range Glucose (POC) 108 (H) 65 - 100 mg/dL Performed by Marisela Turpin Govern) GLUCOSE, POC Collection Time: 02/18/20 11:35 PM  
Result Value Ref Range Glucose (POC) 183 (H) 65 - 100 mg/dL Performed by Thiago Mejia MAGNESIUM Collection Time: 02/19/20  6:33 AM  
Result Value Ref Range Magnesium 2.3 1.6 - 2.4 mg/dL PHOSPHORUS Collection Time: 02/19/20  6:33 AM  
Result Value Ref Range Phosphorus 2.1 (L) 2.6 - 4.7 MG/DL  
CBC W/O DIFF Collection Time: 02/19/20  6:33 AM  
Result Value Ref Range WBC 8.2 4.1 - 11.1 K/uL  
 RBC 2.96 (L) 4.10 - 5.70 M/uL HGB 6.9 (L) 12.1 - 17.0 g/dL HCT 21.7 (L) 36.6 - 50.3 % MCV 73.3 (L) 80.0 - 99.0 FL  
 MCH 23.3 (L) 26.0 - 34.0 PG  
 MCHC 31.8 30.0 - 36.5 g/dL  
 RDW 16.3 (H) 11.5 - 14.5 % PLATELET 172 721 - 083 K/uL MPV 10.9 8.9 - 12.9 FL  
 NRBC 0.0 0  WBC ABSOLUTE NRBC 0.00 0.00 - 0.01 K/uL METABOLIC PANEL, COMPREHENSIVE  
 Collection Time: 02/19/20  6:33 AM  
Result Value Ref Range Sodium 133 (L) 136 - 145 mmol/L Potassium 4.3 3.5 - 5.1 mmol/L Chloride 97 97 - 108 mmol/L  
 CO2 30 21 - 32 mmol/L Anion gap 6 5 - 15 mmol/L Glucose 166 (H) 65 - 100 mg/dL BUN 25 (H) 6 - 20 MG/DL Creatinine 2.77 (H) 0.70 - 1.30 MG/DL  
 BUN/Creatinine ratio 9 (L) 12 - 20 GFR est AA 29 (L) >60 ml/min/1.73m2 GFR est non-AA 24 (L) >60 ml/min/1.73m2 Calcium 8.1 (L) 8.5 - 10.1 MG/DL Bilirubin, total 0.6 0.2 - 1.0 MG/DL  
 ALT (SGPT) 9 (L) 12 - 78 U/L  
 AST (SGOT) 11 (L) 15 - 37 U/L Alk. phosphatase 109 45 - 117 U/L Protein, total 8.2 6.4 - 8.2 g/dL Albumin 1.9 (L) 3.5 - 5.0 g/dL Globulin 6.3 (H) 2.0 - 4.0 g/dL A-G Ratio 0.3 (L) 1.1 - 2.2 Aleks Beck Collection Time: 02/19/20  6:33 AM  
Result Value Ref Range Vancomycin, random 20.3 UG/ML  
GLUCOSE, POC Collection Time: 02/19/20  6:46 AM  
Result Value Ref Range Glucose (POC) 159 (H) 65 - 100 mg/dL Performed by Maryanne Womack Total time spent with patient:  xxx   min. Care Plan discussed with: 
Patient Family RN Consulting Physician [address] [facility],      
 
I have reviewed the flowsheets. Chart and Pertinent Notes have been reviewed. No change in PMH ,family and social history from Consult note.  
 
 
Dejan Watkins MD

## 2020-02-19 NOTE — PROGRESS NOTES
[facility] 
[address] [facility], [address] GI PROGRESS NOTE Heather Warner, [address] office [phone] NP/PA in-hospital cell phone M-F until 4:30PM 
After 5PM or on weekends, please call  for physician on call NAME: Taylor Paulino :  1960 MRN:  [id number] Subjective: No acute events. Wife agreeable for PEG. Objective: VITALS:  
Last 24hrs VS reviewed since prior progress note. Most recent are: 
Visit Vitals /75 Pulse (!) 107 Temp 98.8 °F (37.1 °C) (Axillary) Resp 15 Ht 6' 2\" (1.88 m) Wt 78.8 kg (173 lb 11.6 oz) SpO2 93% BMI 22.30 kg/m² PHYSICAL EXAM: 
General: Intubated Neurologic:  Unresponsive HEENT: No scleral icterus, intubated, NG tube in place Lungs:  Coarse, wheeze Heart:  S1 S2 Abdomen: Soft, non-distended, no apparent tenderness. +Bowel sounds. FMS in place. Extremities: Warm Psych:   Unable to assess Lab Data Reviewed:  
 
Recent Results (from the past 24 hour(s)) GLUCOSE, POC Collection Time: 20 11:42 AM  
Result Value Ref Range Glucose (POC) 256 (H) 65 - 100 mg/dL Performed by Jayden Rees) METABOLIC PANEL, BASIC Collection Time: 20  1:48 PM  
Result Value Ref Range Sodium 130 (L) 136 - 145 mmol/L Potassium 5.2 (H) 3.5 - 5.1 mmol/L Chloride 99 97 - 108 mmol/L  
 CO2 29 21 - 32 mmol/L Anion gap 2 (L) 5 - 15 mmol/L Glucose 181 (H) 65 - 100 mg/dL BUN 42 (H) 6 - 20 MG/DL Creatinine 4.12 (H) 0.70 - 1.30 MG/DL  
 BUN/Creatinine ratio 10 (L) 12 - 20 GFR est AA 18 (L) >60 ml/min/1.73m2 GFR est non-AA 15 (L) >60 ml/min/1.73m2 Calcium 8.0 (L) 8.5 - 10.1 MG/DL  
CULTURE, BLOOD Collection Time: 20  2:29 PM  
Result Value Ref Range Special Requests: NO SPECIAL REQUESTS Culture result: NO GROWTH AFTER 14 HOURS    
CULTURE, RESPIRATORY/SPUTUM/BRONCH W GRAM STAIN  Collection Time: 20  2:29 PM  
 Result Value Ref Range Special Requests: NO SPECIAL REQUESTS    
 GRAM STAIN 2+ WBCS SEEN    
 GRAM STAIN FEW EPITHELIAL CELLS SEEN    
 GRAM STAIN 1+ GRAM POSITIVE COCCI IN PAIRS    
 GRAM STAIN OCCASIONAL GRAM NEGATIVE RODS    
 GRAM STAIN OCCASIONAL GRAM POSITIVE RODS Culture result: PENDING   
GLUCOSE, POC Collection Time: 02/18/20  5:13 PM  
Result Value Ref Range Glucose (POC) 108 (H) 65 - 100 mg/dL Performed by [facility]) GLUCOSE, POC Collection Time: 02/18/20 11:35 PM  
Result Value Ref Range Glucose (POC) 183 (H) 65 - 100 mg/dL Performed by Hermelinda Palomares MAGNESIUM Collection Time: 02/19/20  6:33 AM  
Result Value Ref Range Magnesium 2.3 1.6 - 2.4 mg/dL PHOSPHORUS Collection Time: 02/19/20  6:33 AM  
Result Value Ref Range Phosphorus 2.1 (L) 2.6 - 4.7 MG/DL  
CBC W/O DIFF Collection Time: 02/19/20  6:33 AM  
Result Value Ref Range WBC 8.2 4.1 - 11.1 K/uL  
 RBC 2.96 (L) 4.10 - 5.70 M/uL HGB 6.9 (L) 12.1 - 17.0 g/dL HCT 21.7 (L) 36.6 - 50.3 % MCV 73.3 (L) 80.0 - 99.0 FL  
 MCH 23.3 (L) 26.0 - 34.0 PG  
 MCHC 31.8 30.0 - 36.5 g/dL  
 RDW 16.3 (H) 11.5 - 14.5 % PLATELET 155 883 - 475 K/uL MPV 10.9 8.9 - 12.9 FL  
 NRBC 0.0 0  WBC ABSOLUTE NRBC 0.00 0.00 - 0.01 K/uL METABOLIC PANEL, COMPREHENSIVE Collection Time: 02/19/20  6:33 AM  
Result Value Ref Range Sodium 133 (L) 136 - 145 mmol/L Potassium 4.3 3.5 - 5.1 mmol/L Chloride 97 97 - 108 mmol/L  
 CO2 30 21 - 32 mmol/L Anion gap 6 5 - 15 mmol/L Glucose 166 (H) 65 - 100 mg/dL BUN 25 (H) 6 - 20 MG/DL Creatinine 2.77 (H) 0.70 - 1.30 MG/DL  
 BUN/Creatinine ratio 9 (L) 12 - 20 GFR est AA 29 (L) >60 ml/min/1.73m2 GFR est non-AA 24 (L) >60 ml/min/1.73m2 Calcium 8.1 (L) 8.5 - 10.1 MG/DL Bilirubin, total 0.6 0.2 - 1.0 MG/DL  
 ALT (SGPT) 9 (L) 12 - 78 U/L  
 AST (SGOT) 11 (L) 15 - 37 U/L Alk.  phosphatase 109 45 - 117 U/L  
 Protein, total 8.2 6.4 - 8.2 g/dL Albumin 1.9 (L) 3.5 - 5.0 g/dL Globulin 6.3 (H) 2.0 - 4.0 g/dL A-G Ratio 0.3 (L) 1.1 - 2.2 Cabrera Marshall Collection Time: 02/19/20  6:33 AM  
Result Value Ref Range Vancomycin, random 20.3 UG/ML  
GLUCOSE, POC Collection Time: 02/19/20  6:46 AM  
Result Value Ref Range Glucose (POC) 159 (H) 65 - 100 mg/dL Performed by Arlene Hayward Assessment:  
· Feeding difficulties: on heparin subQ. Hgb 6.9 · Status post PEA arrest 
· Chronic anemia: epogen per nephrology · Hypoxic respiratory failure · Acute encephalopathy: bilateral infarcts, hypoxic injury, seizures · End-stage renal disease: on MWF dialysis. Nephrology following. Patient Active Problem List  
Diagnosis Code  Peyronie's disease N48.6  
 HTN (hypertension) I10  
 Renal cell cancer (HCC) C64.9  Peripheral autonomic neuropathy due to diabetes mellitus (HCC) E11.43  Type 2 diabetes mellitus with diabetic nephropathy (HCC) E11.21  
 HLD (hyperlipidemia) E78.5  Carpal tunnel syndrome, left G56.02  
 CKD stage 5 due to type 2 diabetes mellitus ([facility] E11.22, N18.5  Hypertensive heart disease with chronic systolic congestive heart failure (HCC) I11.0, I50.22  
 Dilated cardiomyopathy (HCC) I42.0  ESRD (end stage renal disease) on dialysis ([facility]) N18.6, Z99.2  ESRD (end stage renal disease) ([facility] N18.6  Murmur, cardiac R01.1  Peripheral vascular disease (HCC) I73.9  Cardiac arrest with pulseless electrical activity ([facility]) I46.9  Cardiopulmonary arrest with successful resuscitation ([facility] I46.9  Severe anoxic-ischemic encephalopathy (HCC) G93.1, I67.82  
 Acute respiratory failure with hypoxia and hypercapnia ([facility]) J96.01, J96.02  
 Acute hypokalemia E87.6  Hypophosphatasia E83.39  
 S/p nephrectomy Z90.5  Encephalomalacia G93.89  Cerebral infarction, acute ([facility]) I63.9  New cerebellar infarct (HCC) I63.9  Uses roller walker Z99.89  
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  
· Hold tube feeds and heparin at midnight - discussed with RN 
· Consent on chart · Plan for EGD/PEG placement tomorrow 2/20/2020 at the bedside Signed By: Kitty Jasso NP   
 2/19/2020  1:29 PM 
  
EGD and peg tube placement in am

## 2020-02-19 NOTE — PROGRESS NOTES
Bedside and Verbal shift change report given to Ashley Obrien  (oncoming nurse) by Antonia Christianson  (offgoing nurse). Report included the following information SBAR, Intake/Output and Cardiac Rhythm ST/NSR. Shift note:  Temp 102.4 ax. Cooling blankent applied, Tylenol 650 mg via OG  Given . Continue to monitor temp. Bedside and Verbal shift change report given to Yajaira  (oncoming nurse) by Ashley Obrien (offgoing nurse). Report included the following information SBAR, Intake/Output and Cardiac Rhythm ST.

## 2020-02-19 NOTE — PROGRESS NOTES
Day #6 of Vancomycin Indication:  Sepsis of unknown etiology. Fevers - MRSA sputum cx colonization vs active infection. Current regimen:  post-HD Abx regimen:  vancomycin + cefepime Inpatient dialysis schedule: Monday/Wednesday/Friday Recent Labs  
  20 
8786 20 
1348 20 
0354 20 
0663 WBC 8.2  --  9.1 13.0*  
CREA 2.77* 4.12* 3.87* 5.55* BUN 25* 42* 37* 64* Est CrCl: ESRD on HD Temp (24hrs), Av.1 °F (37.8 °C), Min:98.8 °F (37.1 °C), Max:101.8 °F (38.8 °C) Cultures (recent):  
 sputum - moderate MRSA (van arpit=1), 2+ WBC 
 sputum - light MRSA (vanc arpit=1), 1+ WBC 
 blood - ngtd  urine - ng, final  
 
Goal trough = 20 - 25 mcg/mL for therapeutic goal of 15 - 20 mcg/mL (assuming ~35% removal by dialysis) Date                Dialysis (Yes/No)       Pre-HD Level              Dose   yes   --   1750 mg (post-HD) 
2/15  no   --   -- 
  no   --   -- 
                yes (3208-2114)          --                                 750 mg 
                no                                --                                 -- 
                yes (0191-5956)         20.3 @ 0633               500 mg Plan:  HD today early am.  Level drawn @ 06:33 = 20.3 mcg/ml (post HD). Will order 500 mg today instead of 750 mg. Daily monitoring to continue.

## 2020-02-19 NOTE — PROGRESS NOTES
1930: Bedside shift change report given to Essie Stratton RN (oncoming nurse) by Jeanine Hurtado RN (offgoing nurse). Report included the following information SBAR, Kardex, Intake/Output, MAR, Recent Results and Cardiac Rhythm ST-NSR.  
 
0030: Dialysis started. 0430: Dialysis ended. Pt received Albumin for low BP but otherwise tolerated well. 0730: Bedside shift change report given to Lia Rivera RN (oncoming nurse) by Essie Stratton RN (offgoing nurse). Report included the following information SBAR, Kardex, Procedure Summary, Intake/Output, MAR, Recent Results and Cardiac Rhythm ST-NSR.
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
## 2020-02-19 NOTE — DIALYSIS
[address] Dialysis Team [facility]  [phone] Vitals   Pre   Post   Assessment   Pre   Post    
Temp  Temp: 99.4 °F (37.4 °C) (02/19/20 0300)  98.8 LOC  Intubated and sedated  No Changes HR   101 97 Lungs   Intubated, Diminished  at the bases   No Changes B/P  153/80 126/73 Cardiac   Tachy in the 100's   No Changes Resp   Resp Rate: 17 (02/19/20 0300) 15 Skin   Warm, Dry, Intact   No Changes Pain level  0/10 0/10 Edema  Generalized Edema No Changes Orders: Duration:   Start:   0100 End:   0330 Total:   3.5 hrs Dialyzer:   Dialyzer/Set Up Inspection: Taniya Tejeda (02/19/20 0300) K Bath:   Dialysate K (mEq/L): 2 (02/19/20 0300) Ca Bath:   Dialysate CA (mEq/L): 2.5 (02/19/20 0300) Na/Bicarb:   Dialysate NA (mEq/L): 140 (02/19/20 0300) Target Fluid Removal:   Goal/Amount of Fluid to Remove (mL): 1000 mL (02/19/20 0300) Access Type & Location:   Right Upper arm AVF, no s/s of infection, cannulated with 15g needles, site secured with tape. Good patency Labs Obtained/Reviewed Critical Results Called   Date when labs were drawn- 
Hgb-   
HGB Date Value Ref Range Status 02/18/2020 7.1 (L) 12.1 - 17.0 g/dL Final  
 
K-   
Potassium Date Value Ref Range Status 02/18/2020 5.2 (H) 3.5 - 5.1 mmol/L Final  
 
Ca-  
Calcium Date Value Ref Range Status 02/18/2020 8.0 (L) 8.5 - 10.1 MG/DL Final  
 
Bun-  
BUN Date Value Ref Range Status 02/18/2020 42 (H) 6 - 20 MG/DL Final  
 
Creat-  
Creatinine Date Value Ref Range Status 02/18/2020 4.12 (H) 0.70 - 1.30 MG/DL Final  
 
  
Medications/ Blood Products Given Name   Dose   Route and Time Albumin  12.5g/50ml  Right AVF @ 0230 @8853 Blood Volume Processed (BVP):    61 Net Fluid Removed:  500 Comments Time Out Done: 0030 Primary Nurse Rpt Pre: DOMINICK Garcia RN  
Primary Nurse Rpt Post:DOMINICK Garcia RN  
Pt Education:Pt Sedated and Intubated, Unable to teach Care Plan:Cont. HD treatment plan as tolerated Tx Summary: *Connect Care down the first 2hrs of pt treatment* Pt BP soft @0230 Administered Albumin Pt BP dropping gave second Albumin @0315, Lowered UF by 500ml. Pt tolerated tx Fair, some hypotension needing albumin x2 and decreased UF, all possible blood returned with normal saline rinse back, needles removed, arterial site at AVF had a longer then optimal hemostasis time. Bleeding stopped and site is secured with tape. Pt sedated in bed, Report gven to primary RN. Admiting Diagnosis: PEA/ ESRD Pt's previous clinic-Unknown Consent signed - Informed Consent Verified: Yes (02/19/20 0300) Jacobita Consent - Verified Hepatitis Status- Neg 02/07/20 Machine #- Machine Number: [id number] (02/19/20 0300) Telemetry status- Bedside Monitor Pre-dialysis wt. - Pre-Dialysis Weight: 74.9 kg (165 lb 2 oz) (02/19/20 0300)

## 2020-02-20 NOTE — PROGRESS NOTES
NUTRITION COMPLETE ASSESSMENT 
 
RECOMMENDATIONS:  
Discontinue D10 once tube feedings resumed Interventions/Plan:  
Food/Nutrient Delivery:   Modify rate, concentration, composition, and schedule Increase tube feeding to 55 ml/hr with 30 ml water flush q 6 hr 
 
Assessment:  
Reason for Assessment:   
[x]Reassessment Tube Feeding: (on hold for PEG tube placement) Peptamen 1.5 @ 50 ml/hr with no free water flush Diet: NPO Nutritionally Significant Medications: [x] Reviewed & Includes: Lipitor, correction scale insulin, Creon, Prevacid, D10 @ 30 ml/hr Subjective: 
 
Objective: 
Mr Lela Adkins was admitted with severe anoxic-ischemic encephalopathy. PMHx: ESRD, DM 2, HTN, CM/HFrEF, CAD; exocrine pancreatic insufficiency (per wife). Noted: AMS, seizure; PEA arrest-acute respiratory failure-intubated; hypoxic/anoxic brain injury per MRI; MRSA PNA; PEG tube placement today; trach soon; Palliative Care following. Overall stool output has decreased but difficult to determine exact amount d/t stool leaking around flexiseal. Tube feeding on hold today for PEG placement-plan to be resumed tomorrow. Water flush discontinued yesterday however recommend flushing with a minimal amount of water to keep tube patent. Sodium running BNL but stable-nephrology following. OGT clogged today. Tube feeding as ordered provides: 1200 ml, 1800 calories, 82 gm protein and 920 ml free water per day to meet 90-92% estimated protein and energy needs, respectively. Suggest increasing rate to 55 ml/hr to provide 90 gm protein per day (and 1320 ml, 1980 calories, 74 gm fat) meeting 100% protein needs. Lytes WNL except for sodium. Corrected Calcium 9.5 mg/dl. Overall weight appears stable but pt now edematous; suspect some weight loss d/t muscle atrophy (bed bound). D10 ordered today d/t hypoglycemia (tube feeding held since midnight for PEG tube placement this afternoon). Estimated Nutrition Needs: Kcals/day: 65 Kcals/day Protein: 91 g(1.2g/kg) Fluid: (~1800 ml/per renal) Based On: [facility] (8519L) Weight Used: Actual wt(76 kg) Pt expected to meet estimated nutrient needs:  [x]   Yes     []  No  [] Unable to predict at this time Nutrition Diagnosis:  
1. Inadequate oral intake related to PEA arrest as evidenced by NPO d/t intubation. 2. Altered GI function related to exocrine pancreatic insufficiency as evidenced by loose liquid stools. Goals:   
 Tube feeding to meet at least 90% estimated needs x 5-7 days; decrease stool output. Monitoring & Evaluation: - Enteral/parenteral nutrition intake - Weight/weight change, GI, Electrolyte and renal profile, Glucose profile, CV-pulmonary Previous Nutrition Goals Met: 
Yes Previous Recommendations:     
Yes 
 
Education & Discharge Needs: 
 [x] None Identified 
 [] Identified and addressed [x] Participated in care plan, discharge planning, and/or interdisciplinary rounds Cultural, Restorationism and ethnic food preferences identified: 
 None Skin Integrity: [x]Intact  []Other Edema: []None [x] generalized pitting, NP BUE's Last BM: 2/20 Food Allergies: [x]None []Other Anthropometrics:   
Weight Loss Metrics 2/20/2020 2/5/2020 1/30/2020 1/29/2020 1/21/2020 1/14/2020 12/24/2019 Today's Wt 166 lb 7.2 oz - 173 lb 6.4 oz 169 lb 173 lb 170 lb 171 lb BMI - 21.37 kg/m2 22.26 kg/m2 21.7 kg/m2 22.21 kg/m2 21.83 kg/m2 21.96 kg/m2 Last 3 Recorded Weights in this Encounter 02/19/20 0600 02/20/20 0445 02/20/20 1434 Weight: 78.8 kg (173 lb 11.6 oz) 75.5 kg (166 lb 7.2 oz) 75.5 kg (166 lb 7.2 oz) Weight Source: Bed Height: 6' 2\" (188 cm), Body mass index is 21.37 kg/m². IBW : 86.2 kg (190 lb), % IBW (Calculated): 87.6 % 
 ,   
 
Labs:   
Lab Results Component Value Date/Time  Sodium 133 (L) 02/20/2020 06:03 AM  
 Potassium 4.8 02/20/2020 06:03 AM  
 Chloride 98 02/20/2020 06:03 AM  
 CO2 28 02/20/2020 06:03 AM  
 Glucose 50 (L) 02/20/2020 06:03 AM  
 BUN 36 (H) 02/20/2020 06:03 AM  
 Creatinine 3.72 (H) 02/20/2020 06:03 AM  
 Calcium 7.6 (L) 02/20/2020 06:03 AM  
 Magnesium 2.4 02/20/2020 06:03 AM  
 Phosphorus 2.9 02/20/2020 06:03 AM  
 Albumin 1.6 (L) 02/20/2020 06:03 AM  
 
Lab Results Component Value Date/Time Hemoglobin A1c 10.2 (H) 02/05/2020 03:10 AM  
 Hemoglobin A1c (POC) 11.8 11/19/2019 12:00 PM  
 
Lab Results Component Value Date/Time Glucose (POC) 103 (H) 02/20/2020 11:13 AM  
  
Lab Results Component Value Date/Time ALT (SGPT) 9 (L) 02/20/2020 06:03 AM  
 AST (SGOT) 14 (L) 02/20/2020 06:03 AM  
 Alk.  phosphatase 93 02/20/2020 06:03 AM  
 Bilirubin, direct 0.2 02/17/2020 05:06 AM  
 Bilirubin, total 0.6 02/20/2020 06:03 AM  
  
 
Lissette Brooks RD [facility]

## 2020-02-20 NOTE — ROUTINE PROCESS
TRANSFER - OUT REPORT: 
 
Verbal report given to Long Dangelo on Khadra Alcantara  being transferred to [facility] for routine progression of care Report consisted of patients Situation, Background, Assessment and  
Recommendations(SBAR). Information from the following report(s) SBAR and Procedure Summary was reviewed with the receiving nurse. Lines:  
Peripheral IV 02/17/20 Anterior; Left External jugular (Active) Site Assessment Clean, dry, & intact 2/20/2020  4:00 PM  
Phlebitis Assessment 0 2/20/2020  4:00 PM  
Infiltration Assessment 0 2/20/2020  4:00 PM  
Dressing Status Clean, dry, & intact 2/20/2020  4:00 PM  
Dressing Type Transparent;Tape 2/20/2020  4:00 PM  
Hub Color/Line Status Infusing 2/20/2020  4:00 PM  
Action Taken Open ports on tubing capped 2/20/2020  4:00 PM  
Alcohol Cap Used Yes 2/20/2020  4:00 PM  
  
 
Opportunity for questions and clarification was provided. Patient in room with: 
 Monitor

## 2020-02-20 NOTE — PROGRESS NOTES
1930:  Bedside and Verbal shift change report given to [facility] (oncoming nurse) by Lilibeth Allen RN (offgoing nurse). Report included the following information SBAR, Kardex, ED Summary, OR Summary, Procedure Summary, Intake/Output, MAR, Accordion, Recent Results, Med Rec Status and Cardiac Rhythm sinus tach. 2330:  Bedside and Verbal shift change report given to 2[facility] (oncoming nurse) by Marshall Jama RN (offgoing nurse). Report included the following information SBAR, Kardex, ED Summary, OR Summary, Procedure Summary, Intake/Output, MAR, Accordion, Recent Results, Med Rec Status and Cardiac Rhythm sinus tach.

## 2020-02-20 NOTE — PROGRESS NOTES
[facility] 
 [address] [facility], [address] [facility], [facility] Phone: [phone]   [phone]   
  
Nephrology Progress Note Gayatri Treadwell     1960     [id number] Date of Admission : 2/5/2020 02/20/20 CC: Follow up for ESRD Assessment and Plan ESRD- HD  
- Dialysis dependent since 4/2018. 
- Dialyzes MWF at [facility]. F/b Dr [place]  
- HD MWF. HD tomorrow Hyponatremia 
- stable HTN  
- stable now  
  
Seizure - per CCM / Neuro Multiple b/l infarcts from hypoxic injury: 
- per neuro 
  
S/P PEA arrest  
  
Hx of R Pleural effusion w/ Trapped Lung - s/p VATS and decortication 4/2018 
  
Anemia of CKD  
- cont LUIS w/ HD: increased dose - check iron profile  
  
Solitary Kidney S/p Prior Left Nephrectomy for RCC 
  
Sec HTPH Poor prognosis overall Interval History: 
Seen and examined. No new events Remains obtunded Planned for trach / PEG and possible Vibra Review of Systems: Review of systems not obtained due to patient factors. Current Medications:  
Current Facility-Administered Medications Medication Dose Route Frequency  acetaminophen (TYLENOL) suppository 650 mg  650 mg Rectal Q6H PRN  
 dextrose 10% infusion  30 mL/hr IntraVENous CONTINUOUS  
 0.9% sodium chloride infusion 250 mL  250 mL IntraVENous PRN  
 cefepime (MAXIPIME) 1 g in 0.9% sodium chloride (MBP/ADV) 50 mL  1 g IntraVENous Q24H  
 0.9% sodium chloride infusion 250 mL  250 mL IntraVENous PRN  
 balsam peru-castor oil (VENELEX) ointment   Topical Q8H  
 fentaNYL citrate (PF) injection 50 mcg  50 mcg IntraVENous Q4H PRN  
 atorvastatin (LIPITOR) tablet 20 mg  20 mg Oral DAILY  epoetin tyler-epbx (RETACRIT) injection 20,000 Units  20,000 Units SubCUTAneous Q MON, WED & FRI  lansoprazole (PREVACID) 3 mg/mL oral suspension 30 mg  30 mg Per NG tube ACB  [Held by provider] insulin glargine (LANTUS) injection 15 Units  15 Units SubCUTAneous DAILY  acetaminophen (TYLENOL) solution 650 mg  650 mg Per NG tube Q6H PRN  Vancomycin- pharmacy to dose   Other Rx Dosing/Monitoring  albumin human 25% (BUMINATE) solution 12.5 g  12.5 g IntraVENous DIALYSIS PRN  
 midodrine (PROAMITINE) tablet 10 mg  10 mg Oral Q8H  
 heparin (porcine) injection 5,000 Units  5,000 Units SubCUTAneous Q12H  
 insulin lispro (HUMALOG) injection   SubCUTAneous Q6H  
 lipase-protease-amylase (CREON 24,000) capsule 3 Cap  3 Cap Oral Q8H  
 white petrolatum-mineral oil (AKWA TEARS) 83-15 % ophthalmic ointment   Both Eyes Q12H  
 sodium chloride (NS) flush 5-40 mL  5-40 mL IntraVENous Q8H  
 sodium chloride (NS) flush 5-40 mL  5-40 mL IntraVENous PRN  
 glucose chewable tablet 16 g  4 Tab Oral PRN  
 glucagon (GLUCAGEN) injection 1 mg  1 mg IntraMUSCular PRN  
 dextrose 10% infusion 0-250 mL  0-250 mL IntraVENous PRN  chlorhexidine (ORAL CARE KIT) 0.12 % mouthwash 15 mL  15 mL Oral Q12H  hydrALAZINE (APRESOLINE) 20 mg/mL injection 10 mg  10 mg IntraVENous Q6H PRN Allergies Allergen Reactions  Tramadol Other (comments) Brought down blood sugar too fast  
 
 
Objective: 
Vitals:   
Vitals:  
 02/20/20 0452 02/20/20 0500 02/20/20 0600 02/20/20 0700 BP:   122/71 125/78 Pulse: 96  89 89 Resp: 15  15 15 Temp:      
TempSrc:      
SpO2: 98% 99% 94% 96% Weight:      
Height:      
 
Intake and Output: 
No intake/output data recorded. 02/18 1901 - 02/20 0700 In: 2984 [I.V.:534] Out: 1375 [Drains:875] Physical Examination: 
General:  On vent HEENT: PERRL,  + Pallor , No Icterus Neck: Supple,no mass palpable Lungs : CTA 
CVS: RRR, S1 S2 normal, No murmur Abdomen: Soft, NT, BS + Extremities:2+ upper extremity Edema, RUE AVF + thrill MS: No joint swelling, erythema, warmth Neurologic:unresponsive on vent Psych: Unable to assess 
  
 
[]    High complexity decision making was performed []    Patient is at high-risk of decompensation with multiple organ involvement Lab Data Personally Reviewed: I have reviewed all the pertinent labs, microbiology data and radiology studies during assessment. Recent Labs  
  02/20/20 
0603 02/19/20 
1508 02/18/20 
1348 02/18/20 
0354 * 133* 130* 132* K 4.8 4.3 5.2* 5.2*  
CL 98 97 99 98  
CO2 28 30 29 27 GLU 50* 166* 181* 243* BUN 36* 25* 42* 37* CREA 3.72* 2.77* 4.12* 3.87* CA 7.6* 8.1* 8.0* 8.2* MG 2.4 2.3  --  2.5* PHOS 2.9 2.1*  --  2.8 ALB 1.6* 1.9*  --  1.7* SGOT 14* 11*  --  11* ALT 9* 9*  --  11* Recent Labs  
  02/20/20 
0603 02/19/20 
7006 02/18/20 
0354 WBC 7.4 8.2 9.1 HGB 7.8* 6.9* 7.1*  
HCT 24.5* 21.7* 23.0*  
 230 212 No results found for: SDES Lab Results Component Value Date/Time Culture result: NO GROWTH AFTER 10 HOURS 02/19/2020 07:28 PM  
 Culture result: NO GROWTH AFTER 10 HOURS 02/19/2020 07:28 PM  
 Culture result: NO GROWTH 2 DAYS 02/18/2020 02:29 PM  
 Culture result: HEAVY PROBABLE STAPHYLOCOCCUS AUREUS SO FAR (A) 02/18/2020 02:29 PM  
 Culture result: NO GROWTH 5 DAYS 02/14/2020 09:00 PM  
 Culture result: No growth (<1,000 CFU/ML) 02/14/2020 09:00 PM  
 
Recent Results (from the past 24 hour(s)) GLUCOSE, POC Collection Time: 02/19/20 11:48 AM  
Result Value Ref Range Glucose (POC) 230 (H) 65 - 100 mg/dL Performed by [employer] TYPE + CROSSMATCH Collection Time: 02/19/20 12:56 PM  
Result Value Ref Range Crossmatch Expiration 02/22/2020 ABO/Rh(D) O POSITIVE Antibody screen NEG Unit number [id number] Blood component type RC LR,1 Unit division 00 Status of unit ISSUED Crossmatch result Compatible GLUCOSE, POC Collection Time: 02/19/20  5:20 PM  
Result Value Ref Range Glucose (POC) 122 (H) 65 - 100 mg/dL Performed by Vince BALLARD, BLOOD Collection Time: 02/19/20  7:28 PM  
Result Value Ref Range Special Requests: NO SPECIAL REQUESTS Culture result: NO GROWTH AFTER 10 HOURS    
CULTURE, BLOOD FOR FUNGUS Collection Time: 02/19/20  7:28 PM  
Result Value Ref Range Special Requests: HOLD 21 DAYS FOR FUNGUS Culture result: NO GROWTH AFTER 10 HOURS    
LACTIC ACID Collection Time: 02/19/20  7:28 PM  
Result Value Ref Range Lactic acid 0.8 0.4 - 2.0 MMOL/L  
GLUCOSE, POC Collection Time: 02/20/20 12:39 AM  
Result Value Ref Range Glucose (POC) 74 65 - 100 mg/dL Performed by Camila Cabrales GLUCOSE, POC Collection Time: 02/20/20  5:59 AM  
Result Value Ref Range Glucose (POC) 56 (L) 65 - 100 mg/dL Performed by Camila Cabrales MAGNESIUM Collection Time: 02/20/20  6:03 AM  
Result Value Ref Range Magnesium 2.4 1.6 - 2.4 mg/dL PHOSPHORUS Collection Time: 02/20/20  6:03 AM  
Result Value Ref Range Phosphorus 2.9 2.6 - 4.7 MG/DL  
CBC W/O DIFF Collection Time: 02/20/20  6:03 AM  
Result Value Ref Range WBC 7.4 4.1 - 11.1 K/uL  
 RBC 3.25 (L) 4.10 - 5.70 M/uL HGB 7.8 (L) 12.1 - 17.0 g/dL HCT 24.5 (L) 36.6 - 50.3 % MCV 75.4 (L) 80.0 - 99.0 FL  
 MCH 24.0 (L) 26.0 - 34.0 PG  
 MCHC 31.8 30.0 - 36.5 g/dL  
 RDW 16.9 (H) 11.5 - 14.5 % PLATELET 365 627 - 149 K/uL MPV 10.3 8.9 - 12.9 FL  
 NRBC 0.0 0  WBC ABSOLUTE NRBC 0.00 0.00 - 0.01 K/uL METABOLIC PANEL, COMPREHENSIVE Collection Time: 02/20/20  6:03 AM  
Result Value Ref Range Sodium 133 (L) 136 - 145 mmol/L Potassium 4.8 3.5 - 5.1 mmol/L Chloride 98 97 - 108 mmol/L  
 CO2 28 21 - 32 mmol/L Anion gap 7 5 - 15 mmol/L Glucose 50 (L) 65 - 100 mg/dL BUN 36 (H) 6 - 20 MG/DL Creatinine 3.72 (H) 0.70 - 1.30 MG/DL  
 BUN/Creatinine ratio 10 (L) 12 - 20 GFR est AA 20 (L) >60 ml/min/1.73m2 GFR est non-AA 17 (L) >60 ml/min/1.73m2 Calcium 7.6 (L) 8.5 - 10.1 MG/DL  Bilirubin, total 0.6 0.2 - 1.0 MG/DL  
 ALT (SGPT) 9 (L) 12 - 78 U/L  
 AST (SGOT) 14 (L) 15 - 37 U/L  
 Alk. phosphatase 93 45 - 117 U/L Protein, total 8.2 6.4 - 8.2 g/dL Albumin 1.6 (L) 3.5 - 5.0 g/dL Globulin 6.6 (H) 2.0 - 4.0 g/dL A-G Ratio 0.2 (L) 1.1 - 2.2 GLUCOSE, POC Collection Time: 02/20/20  6:42 AM  
Result Value Ref Range Glucose (POC) 121 (H) 65 - 100 mg/dL Performed by Sy Alonzo Total time spent with patient:  xxx   min. Care Plan discussed with: 
Patient Family RN Consulting Physician [address] [facility],      
 
I have reviewed the flowsheets. Chart and Pertinent Notes have been reviewed. No change in PMH ,family and social history from Consult note.  
 
 
Wu Hansen MD

## 2020-02-20 NOTE — PROGRESS NOTES
Domi Norman 
[address] [facility], [address] GI PROGRESS NOTE Will Mika Natarajan, [address] [facility] office [phone] NP/PA in-hospital cell phone M-F until 4:30PM 
After 5PM or on weekends, please call  for physician on call NAME: Anderson Yanes :  1960 MRN:  [id number] Subjective:  
Discussed with RN. Tube feeds and heparin on hold. Consent is on patient's chart. Objective: VITALS:  
Last 24hrs VS reviewed since prior progress note. Most recent are: 
Visit Vitals /78 Pulse 89 Temp 99.8 °F (37.7 °C) Resp 15 Ht 6' 2\" (1.88 m) Wt 75.5 kg (166 lb 7.2 oz) SpO2 96% BMI 21.37 kg/m² PHYSICAL EXAM: 
General:          Intubated Neurologic:      Unresponsive HEENT:           Intubated, NG tube in place Lungs:             CTA bilaterally anteriorly Heart:              S1 S2 Abdomen:        Soft, non-distended, no apparent tenderness. +Bowel sounds. Extremities:     Warm Psych:             Unable to assess Lab Data Reviewed:  
 
Recent Results (from the past 24 hour(s)) GLUCOSE, POC Collection Time: 20 11:48 AM  
Result Value Ref Range Glucose (POC) 230 (H) 65 - 100 mg/dL Performed by [employer] TYPE + CROSSMATCH Collection Time: 20 12:56 PM  
Result Value Ref Range Crossmatch Expiration 2020 ABO/Rh(D) O POSITIVE Antibody screen NEG Unit number [id number] Blood component type RC LR,1 Unit division 00 Status of unit ISSUED Crossmatch result Compatible GLUCOSE, POC Collection Time: 20  5:20 PM  
Result Value Ref Range Glucose (POC) 122 (H) 65 - 100 mg/dL Performed by [employer] CULTURE, BLOOD Collection Time: 20  7:28 PM  
Result Value Ref Range Special Requests: NO SPECIAL REQUESTS Culture result: NO GROWTH AFTER 10 HOURS    
CULTURE, BLOOD FOR FUNGUS  Collection Time: 20  7:28 PM  
 Result Value Ref Range Special Requests: HOLD 21 DAYS FOR FUNGUS Culture result: NO GROWTH AFTER 10 HOURS    
LACTIC ACID Collection Time: 02/19/20  7:28 PM  
Result Value Ref Range Lactic acid 0.8 0.4 - 2.0 MMOL/L  
GLUCOSE, POC Collection Time: 02/20/20 12:39 AM  
Result Value Ref Range Glucose (POC) 74 65 - 100 mg/dL Performed by [employer] GLUCOSE, POC Collection Time: 02/20/20  5:59 AM  
Result Value Ref Range Glucose (POC) 56 (L) 65 - 100 mg/dL Performed by [employer] MAGNESIUM Collection Time: 02/20/20  6:03 AM  
Result Value Ref Range Magnesium 2.4 1.6 - 2.4 mg/dL PHOSPHORUS Collection Time: 02/20/20  6:03 AM  
Result Value Ref Range Phosphorus 2.9 2.6 - 4.7 MG/DL  
CBC W/O DIFF Collection Time: 02/20/20  6:03 AM  
Result Value Ref Range WBC 7.4 4.1 - 11.1 K/uL  
 RBC 3.25 (L) 4.10 - 5.70 M/uL HGB 7.8 (L) 12.1 - 17.0 g/dL HCT 24.5 (L) 36.6 - 50.3 % MCV 75.4 (L) 80.0 - 99.0 FL  
 MCH 24.0 (L) 26.0 - 34.0 PG  
 MCHC 31.8 30.0 - 36.5 g/dL  
 RDW 16.9 (H) 11.5 - 14.5 % PLATELET 913 123 - 775 K/uL MPV 10.3 8.9 - 12.9 FL  
 NRBC 0.0 0  WBC ABSOLUTE NRBC 0.00 0.00 - 0.01 K/uL METABOLIC PANEL, COMPREHENSIVE Collection Time: 02/20/20  6:03 AM  
Result Value Ref Range Sodium 133 (L) 136 - 145 mmol/L Potassium 4.8 3.5 - 5.1 mmol/L Chloride 98 97 - 108 mmol/L  
 CO2 28 21 - 32 mmol/L Anion gap 7 5 - 15 mmol/L Glucose 50 (L) 65 - 100 mg/dL BUN 36 (H) 6 - 20 MG/DL Creatinine 3.72 (H) 0.70 - 1.30 MG/DL  
 BUN/Creatinine ratio 10 (L) 12 - 20 GFR est AA 20 (L) >60 ml/min/1.73m2 GFR est non-AA 17 (L) >60 ml/min/1.73m2 Calcium 7.6 (L) 8.5 - 10.1 MG/DL Bilirubin, total 0.6 0.2 - 1.0 MG/DL  
 ALT (SGPT) 9 (L) 12 - 78 U/L  
 AST (SGOT) 14 (L) 15 - 37 U/L Alk. phosphatase 93 45 - 117 U/L Protein, total 8.2 6.4 - 8.2 g/dL Albumin 1.6 (L) 3.5 - 5.0 g/dL Globulin 6.6 (H) 2.0 - 4.0 g/dL A-G Ratio 0.2 (L) 1.1 - 2.2 GLUCOSE, POC Collection Time: 02/20/20  6:42 AM  
Result Value Ref Range Glucose (POC) 121 (H) 65 - 100 mg/dL Performed by Shey Barrientos Assessment:  
· Feeding difficulties: heparin SQ on hold. Hgb 7.8. · Status post PEA arrest 
· Chronic anemia: epogen per nephrology · Hypoxic respiratory failure · Acute encephalopathy: bilateral infarcts, hypoxic injury, seizures · End-stage renal disease: on MWF dialysis. Nephrology following. Patient Active Problem List  
Diagnosis Code  Peyronie's disease N48.6  
 HTN (hypertension) I10  
 Renal cell cancer (HCC) C64.9  Peripheral autonomic neuropathy due to diabetes mellitus (HCC) E11.43  Type 2 diabetes mellitus with diabetic nephropathy (HCC) E11.21  
 HLD (hyperlipidemia) E78.5  Carpal tunnel syndrome, left G56.02  
 CKD stage 5 due to type 2 diabetes mellitus ([facility] E11.22, N18.5  Hypertensive heart disease with chronic systolic congestive heart failure (HCC) I11.0, I50.22  
 Dilated cardiomyopathy (HCC) I42.0  ESRD (end stage renal disease) on dialysis ([facility]) N18.6, Z99.2  ESRD (end stage renal disease) ([facility] N18.6  Murmur, cardiac R01.1  Peripheral vascular disease (HCC) I73.9  Cardiac arrest with pulseless electrical activity ([facility]) I46.9  Cardiopulmonary arrest with successful resuscitation ([facility] I46.9  Severe anoxic-ischemic encephalopathy (HCC) G93.1, I67.82  
 Acute respiratory failure with hypoxia and hypercapnia (HCC) J96.01, J96.02  
 Acute hypokalemia E87.6  Hypophosphatasia E83.39  
 S/p nephrectomy Z90.5  Encephalomalacia G93.89  Cerebral infarction, acute (HCC) I63.9  New cerebellar infarct (HCC) I63.9  Uses roller walker Z99.89  
 Diabetic neuropathic cachexia ([facility] E11.40, R64  Severe muscle deconditioning R29.898 Plan: · Tube feeds and heparin on hold · Consent on chart · Plan for EGD/PEG placement today by Dr. Binta Hill at the bedside Signed By: JOSEE Navarro   
 2/20/2020  10:29 AM

## 2020-02-20 NOTE — PERIOP NOTES
TRANSFER - IN REPORT: 
 
Verbal report received from [facility] on Saul Huddleston  being received from 7101 for ordered procedure Report consisted of patients Situation, Background, Assessment and  
Recommendations(SBAR). Information from the following report(s) SBAR was reviewed with the receiving nurse. Opportunity for questions and clarification was provided. Assessment completed upon patients arrival to unit and care assumed.

## 2020-02-20 NOTE — PERIOP NOTES
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
## 2020-02-20 NOTE — PROCEDURES
[address] 
[address] [facility], [facility] PEG ENDOSCOPY 
 
NAME: Oswald Wheeler :  1960 MRN:  [id number] Procedure Type:   EGD and PEG tube Indications:  dysphagia, intubated Pre-operative Diagnosis: see indication above Post-operative Diagnosis:  See findings below :  Maryjean Boeck, MD 
 
Referring Provider: Danita Tripp MD 
 
 
Sedation:  Versed 2 mg IV and Fentanyl 25 mcg IV , pt is intubated in ICU, on iv vancomycin Prior to the procedure its objectives, risks, consequences and alternatives were discussed with the patient who then elected to proceed. The patient had the opportunity to ask questions and those questions were answered. A physical exam was performed. The heart, lungs, and mental status were examined prior to the procedure and found to be satisfactory for conscious sedation and for the procedure. Conscious sedation was initiated by the physician. Continuous pulse oximetry and blood pressure monitoring were used throughout the procedure. After appropriate pharyngeal anesthesia, the endoscope was passed into the esophagus without difficulty. The proximal esophagus is normal as is the distal esophagus. The fundus, body, antrum, pylorus, bulb and postbulbar area are unremarkable. On slow withdrawal of the scope, the stomach was transilluminated into the abdominal wall. Under sterile conditions and 1% Xylocaine anesthesia, a small incision was made in the abdominal wall. A needle was passed through the incision, and under direct vision into the stomach. A wire was passed through the needle, snared and brought out the mouth. The PEG tube was passed over the wire and brought out the abdominal wall without difficulty. The scope was then reinserted and the positioning of the PEG tube was excellent. The patient tolerated the procedure without complication and will return to the room in satisfactory condition. Normal EGD OGT was removed Specimen Removed:  none Complications: None. EBL:  None. Impression:    successful placement of 20 [place] PEG tube in stomach ( push peg, [employer]) Recommendations:  
May use PEG tube immediately for pills and flushes May start TF in am per dietitian's recommendations Signed By: Néstor Sanches MD   
 2/20/2020  6:35 PM
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
## 2020-02-20 NOTE — PROGRESS NOTES
Bedside shift change report received from [facility]. Report included the following information SBAR, Kardex, Procedure Summary, Intake/Output, MAR and Recent Results. Shift Summary: VSS, on vent. Neuro unchanged. TF on hold since 0000, hypoglycemia. OGT clogged. Primary Nurse Mayra Mendez, TE and Jaycob Mai RN performed a dual skin assessment on this patient Impairment noted- see wound doc flow sheet Ruddy score is 10. Sacral skin tears.

## 2020-02-20 NOTE — DIABETES MGMT
[address]. Followup Progress Note Presentation Cruz Sanchez is a 64 y.o. male with complicated admission that includes seizure with PEA/ anoxic ischemic encephalopathy  and consulted by Provider for advanced specialty nursing care related to inpatient diabetes management. Hyperglycemia management order set is in place. Subjective Patient remains intubated and on ventilator without sedation. Slated for PEG placement today [id number]. Has skin tear on sacrum. Boots on both feet with protective barrier on heels to prevent breakdown. Objective Vital Signs Visit Vitals BP (P) 114/70 (BP 1 Location: Left arm, BP Patient Position: At rest) Pulse 96 Temp 99.1 °F (37.3 °C) Resp 15 Ht 6' 2\" (1.88 m) Wt 75.5 kg (166 lb 7.2 oz) SpO2 96% BMI 21.37 kg/m² Laboratory ESRD on HD 
 
 
 
BG trends:  Had low this AM due to TF held overnight for PEG placement today. Currently on D10 to keep BG >70. Lantus on hold. Assessment and Plan Nursing Diagnosis Risk for unstable blood glucose pattern Nursing Intervention Domain 6391 Decision-making Support Nursing Interventions Examined current inpatient diabetes control Explored factors facilitating and impeding inpatient management Identified self-management practices impeding diabetes control Explored corrective strategies with patient and responsible inpatient provider Informed patient of rational for basal bolus insulin strategy while hospitalized Signed By: CANDI Callahan [facility] Contact via [facility]/[phone] February 20, 2020

## 2020-02-21 NOTE — BRIEF OP NOTE
BRIEF OPERATIVE NOTE Date of Procedure: 2/21/2020 Preoperative Diagnosis: 1: Acute on chronic hypoxic respiratory failure  2; Anoxic brain injury  3: MSOF Postoperative Diagnosis: 1same Procedure(s): 
P1: Flexible bronchoscopy  2: Percutaneous tracheostomy Surgeon(s) and Role: Justin Gonzalez MD - Primary Surgical Assistant: Yolande Mills, [address] Surgical Staff: 
 
No case tracking events are documented in the log. Anesthesia: Con-Sed  
Estimated Blood Loss: minimal 
Specimens: * No specimens in log * Findings: 6Fr Shiley well placed Complications: none Implants: * No implants in log * Condition: critical 
 
Disposition: to remain in icu

## 2020-02-21 NOTE — PROGRESS NOTES
[facility] 
[address] [facility], [address] GI PROGRESS NOTE Margot Valdovinos, [address] office [phone] NP/PA in-hospital cell phone M-F until 4:30PM 
After 5PM or on weekends, please call  for physician on call NAME: Maribell Samson :  1960 MRN:  [id number] Subjective: No acute events. Trach this morning, HD. Objective: VITALS:  
Last 24hrs VS reviewed since prior progress note. Most recent are: 
Visit Vitals /71 (BP 1 Location: Left arm, BP Patient Position: At rest) Pulse (!) 103 Temp 99.8 °F (37.7 °C) Resp 22 Ht 6' 2\" (1.88 m) Wt 77.1 kg (169 lb 15.6 oz) SpO2 99% BMI 21.82 kg/m² PHYSICAL EXAM: 
General:          no distress Neurologic:      Unresponsive HEENT:           trach Lungs:             CTA bilaterally anteriorly Heart:              S1 S2 Abdomen:        Soft, non-distended, no apparent tenderness. +Bowel sounds. PEG easy 360 degree rotation, no erythema, scant dried blood, abdominal binder Extremities:     Warm Psych:             Unable to assess Lab Data Reviewed:  
 
Recent Results (from the past 24 hour(s)) GLUCOSE, POC Collection Time: 20  7:09 PM  
Result Value Ref Range Glucose (POC) 168 (H) 65 - 100 mg/dL Performed by [employer] GLUCOSE, POC Collection Time: 20 12:38 AM  
Result Value Ref Range Glucose (POC) 215 (H) 65 - 100 mg/dL Performed by Lida Thakur   
CBC W/O DIFF Collection Time: 20  4:41 AM  
Result Value Ref Range WBC 7.9 4.1 - 11.1 K/uL  
 RBC 3.28 (L) 4.10 - 5.70 M/uL HGB 7.7 (L) 12.1 - 17.0 g/dL HCT 24.6 (L) 36.6 - 50.3 % MCV 75.0 (L) 80.0 - 99.0 FL  
 MCH 23.5 (L) 26.0 - 34.0 PG  
 MCHC 31.3 30.0 - 36.5 g/dL  
 RDW 17.4 (H) 11.5 - 14.5 % PLATELET 064 421 - 575 K/uL MPV 10.7 8.9 - 12.9 FL  
 NRBC 0.0 0  WBC ABSOLUTE NRBC 0.00 0.00 - 0.01 K/uL METABOLIC PANEL, COMPREHENSIVE  
 Collection Time: 02/21/20  4:41 AM  
Result Value Ref Range Sodium 127 (L) 136 - 145 mmol/L Potassium 5.2 (H) 3.5 - 5.1 mmol/L Chloride 95 (L) 97 - 108 mmol/L  
 CO2 26 21 - 32 mmol/L Anion gap 6 5 - 15 mmol/L Glucose 161 (H) 65 - 100 mg/dL BUN 43 (H) 6 - 20 MG/DL Creatinine 4.49 (H) 0.70 - 1.30 MG/DL  
 BUN/Creatinine ratio 10 (L) 12 - 20 GFR est AA 16 (L) >60 ml/min/1.73m2 GFR est non-AA 14 (L) >60 ml/min/1.73m2 Calcium 8.1 (L) 8.5 - 10.1 MG/DL Bilirubin, total 0.5 0.2 - 1.0 MG/DL  
 ALT (SGPT) 9 (L) 12 - 78 U/L  
 AST (SGOT) 16 15 - 37 U/L Alk. phosphatase 100 45 - 117 U/L Protein, total 9.0 (H) 6.4 - 8.2 g/dL Albumin 1.6 (L) 3.5 - 5.0 g/dL Globulin 7.4 (H) 2.0 - 4.0 g/dL A-G Ratio 0.2 (L) 1.1 - 2.2 MAGNESIUM Collection Time: 02/21/20  4:41 AM  
Result Value Ref Range Magnesium 2.5 (H) 1.6 - 2.4 mg/dL PHOSPHORUS Collection Time: 02/21/20  4:41 AM  
Result Value Ref Range Phosphorus 4.0 2.6 - 4.7 MG/DL  
GLUCOSE, POC Collection Time: 02/21/20  7:00 AM  
Result Value Ref Range Glucose (POC) 170 (H) 65 - 100 mg/dL Performed by Maynor García Assessment:  
· Feeding difficulties: status post EGD/PEG placement 2/20/2020 · Status post PEA arrest 
· Chronic anemia: epogen per nephrology · Hypoxic respiratory failure: sp trach · Acute encephalopathy: bilateral infarcts, hypoxic injury, seizures · End-stage renal disease: on MWF dialysis. Nephrology following. Patient Active Problem List  
Diagnosis Code  Peyronie's disease N48.6  
 HTN (hypertension) I10  
 Renal cell cancer (HCC) C64.9  Peripheral autonomic neuropathy due to diabetes mellitus (HCC) E11.43  Type 2 diabetes mellitus with diabetic nephropathy (HCC) E11.21  
 HLD (hyperlipidemia) E78.5  Carpal tunnel syndrome, left G56.02  
 CKD stage 5 due to type 2 diabetes mellitus ([facility] E11.22, N18.5  Hypertensive heart disease with chronic systolic congestive heart failure (HCC) I11.0, I50.22  
 Dilated cardiomyopathy (HCC) I42.0  ESRD (end stage renal disease) on dialysis (HCC) N18.6, Z99.2  ESRD (end stage renal disease) ([facility] N18.6  Murmur, cardiac R01.1  Peripheral vascular disease (HCC) I73.9  Cardiac arrest with pulseless electrical activity ([facility]) I46.9  Cardiopulmonary arrest with successful resuscitation ([facility] I46.9  Severe anoxic-ischemic encephalopathy (HCC) G93.1, I67.82  
 Acute respiratory failure with hypoxia and hypercapnia ([facility]) J96.01, J96.02  
 Acute hypokalemia E87.6  Hypophosphatasia E83.39  
 S/p nephrectomy Z90.5  Encephalomalacia G93.89  Cerebral infarction, acute (HCC) I63.9  New cerebellar infarct ([facility]) I63.9  Uses roller walker Z99.89  
 Diabetic neuropathic cachexia ([facility] E11.40, R64  Severe muscle deconditioning R29.898 Plan: · Tube feeds per RD · Will be available to see again on request  
 
Signed By: Verónica Velasco NP   
 2/21/2020  10:29 AM 
  
Will sign off Please call for questions

## 2020-02-21 NOTE — OP NOTES
[address]  
OPERATIVE REPORT Name:  Nathan Mendoza 
MR#:  [id number] :  1960 ACCOUNT #:  [de-identified] DATE OF SERVICE:  2020 CLINICAL SERVICE:  Thoracic Surgery. ATTENDING SURGEON:  Torie Ndiaye MD 
 
OPERATIONS PERFORMED: 1. Percutaneous tracheostomy. 2.  Flexible bronchoscopy. PREOPERATIVE DIAGNOSES: 
1. Acute on chronic hypoxic respiratory failure. 2.  Anoxic brain injury. 3.  Multisystem organ failure. POSTOPERATIVE DIAGNOSES: 
1. Acute on chronic hypoxic respiratory failure. 2.  Anoxic brain injury. 3.  Multisystem organ failure. FIRST ASSISTANT:  JOSEE Menard 
 
SPECIMENS SENT:  None. DRAINS AND TUBES:  A 6-Albanian percutaneous Shiley was left within the anterior neck. ANESTHESIA:  20 mg IV etomidate, 2 mg IV Versed, 100 mcg IV fentanyl. ESTIMATED BLOOD LOSS:  For this case was minimal. 
 
INDICATIONS FOR PROCEDURE:  The patient is a 56-year-old gentleman who has been hospitalized for a while sustaining anoxic brain injury after a cardiopulmonary arrest and is now chronically intubated. We were consulted by the ICU team to provide tracheostomy for long-term placement and vent weaning. PROCEDURE IN DETAIL:  After informed consent was obtained and placed on the chart, the patient was laid supine in his ICU bed. The anterior neck was prepped and draped in a sterile fashion. Time-out was performed. Flexible bronchoscopy was performed. The distal trachea and melina were normal in appearance. The right and left tracheobronchial trees were normal in appearance. There was no evidence of purulence or mucus plugging. The bronchoscope and ET tube were then withdrawn to approximately 17 cm. Approximately 10 mL of 1 % lidocaine mixed with 0.25% Marcaine was used as local anesthetic. A finder needle was visualized in the anterior trachea using the bronchoscope. The introducer needle was then placed. Under bronchoscopic guidance, guidewire was fed down the right mainstem. The introducer needle was withdrawn. An approximate 1.5 cm vertical incision was made around the guidewire. Under bronchoscopic guidance, a 14-Cymraes dilator was passed. Under bronchoscopic guidance, the blue Rhino dilator was then passed multiple times for easy tract dilation. The 6-Cymraes Shiley was then passed over the guidewire under bronchoscopic guidance using another 26-Cymraes blue Rhino dilator for assistance. Once the tracheostomy was confirmed to be in place with the bronchoscope, the dilator and guidewire were withdrawn. The bronchoscope was then fed directly into the tracheostomy and there was no immediate rundown of blood and the melina was well visualized. The inner cannula was placed and the tracheostomy was connected to the vent with good mechanics. The flanges were then sutured to the anterior neck without complication. All surgical counts were correct x2 at the end of this case. COMPLICATIONS:  There were no immediate complications identified during this case. Dr. Edwige Mcgovern was present and scrubbed throughout the entire procedure. Abdiel TRIPP, was instrumental in completion of this operation as she assisted with the bronchoscopy portion of this procedure and the visualization of the tracheostomy. Tim Rice MD 
 
 
[id number] 
D:  02/21/2020 11:40 
T:  02/21/2020 14:11 
JOB #:  [id number]

## 2020-02-21 NOTE — DIABETES MGMT
[address]. Followup Progress Note Presentation Geraldo Quijano is a 64 y.o. male admitted with complicated admission that includes seizure with PEA/ anoxic ischemic encephalopathy and consulted by Provider for advanced specialty nursing care related to inpatient diabetes management. Hyperglycemia management order set is in place. Subjective Patient had PEG placed yesterday and tracheostomy this morning. On D10 infusion while TF on hold for PEG and trach. Will restart TF this afternoon. Objective Physical exam 
General Tracheostomy- on vent; no sedation. Vital Signs Visit Vitals /72 (BP 1 Location: Left arm, BP Patient Position: At rest) Pulse 98 Temp 99.5 °F (37.5 °C) Resp 15 Ht 6' 2\" (1.88 m) Wt 77.1 kg (169 lb 15.6 oz) SpO2 94% BMI 21.82 kg/m² Laboratory ESRD on HD; A1c10.2 Blood glucose pattern BG trends @200 or below. D10 infusing. Anticipate BG increasing once TF re-started. RECOMMEND -Re-start Lantus 15 units when BG trend >200 after TF resumed. Assessment and Plan Nursing Diagnosis Risk for unstable blood glucose pattern Nursing Intervention Domain 8423 Decision-making Support Nursing Interventions Examined current inpatient diabetes control Explored factors facilitating and impeding inpatient management Identified self-management practices impeding diabetes control Explored corrective strategies with patient and responsible inpatient provider Informed patient of rational for basal bolus insulin strategy while hospitalized Signed By: CANDI Geller February 21, 2020

## 2020-02-21 NOTE — PROGRESS NOTES
HD TRANSFER - OUT REPORT: 
 
Verbal report given to Vicente Teague RN  (Name) on Tim Duane for routine progression of care Report consisted of patient's Situation, Background, Assessment and  
Recommendations(SBAR). Information from the following report(s) Procedure Summary was reviewed with the receiving nurse. Method:  $$ Method: Hemodialysis (02/21/20 1300) Fluid Removed  NET Fluid Removed (mL): 2000 ml (02/21/20 1645) Patient response to treatment:  Stable End Time  Hemodialysis End Time: 1630 (02/21/20 1645) If not documented, dialysis nurse to update post-dialysis row in HD/Filtration flowsheet Medications /Volume expansion agents or Fluid boluses administered during treatment? no 
 
Post-dialysis medication administration due?  no 
Remind nurse to administer post-HD medication upon return to unit. Fistula hemostasis? yes Line heparinization? no 
 
Lines: ANDREINA AVF Opportunity for questions and clarification was provided.

## 2020-02-21 NOTE — PROCEDURES
[facility] Dialysis Team [place]  [phone] Vitals   Pre   Post   Assessment   Pre   Post    
Temp  Temp: 99.4 °F (37.4 °C) (02/21/20 1300)  99.9 A LOC  Vented and sedated. Same HR   Pulse (Heart Rate): 95 (02/21/20 1300) 108 Lungs   Coarse/ Wheezing Same B/P   BP: 139/75 (02/21/20 1300) 146/83 Cardiac   Regular. Sinus Tach. Same Resp   Resp Rate: 24 (02/21/20 1300) 23 Skin   Fragile. Same Pain level  0 0 Edema  2+ BUE Generalized. Same Orders: Duration:   Start:   1300 End:    1630 Total:   3.5 hours Dialyzer:   Dialyzer/Set Up Inspection: Raul Yang (02/21/20 1300) K Bath:   Dialysate K (mEq/L): 2 (02/21/20 1300) Ca Bath:   Dialysate CA (mEq/L): 2.5 (02/21/20 1300) Na/Bicarb:   Dialysate NA (mEq/L): 140 (02/21/20 1300) Target Fluid Removal:   Goal/Amount of Fluid to Remove (mL): 2000 mL (02/21/20 1300) Access Type & Location:   ANDREINA AVF: skin CDI. No s/s of infection. No issues with cannulation or hemostasis. Running well at . Labs Obtained/Reviewed Critical Results Called   Date when labs were drawn- 
Hgb-   
HGB Date Value Ref Range Status 02/21/2020 7.7 (L) 12.1 - 17.0 g/dL Final  
 
K-   
Potassium Date Value Ref Range Status 02/21/2020 5.2 (H) 3.5 - 5.1 mmol/L Final  
 
Ca-  
Calcium Date Value Ref Range Status 02/21/2020 8.1 (L) 8.5 - 10.1 MG/DL Final  
 
Bun-  
BUN Date Value Ref Range Status 02/21/2020 43 (H) 6 - 20 MG/DL Final  
 
Creat-  
Creatinine Date Value Ref Range Status 02/21/2020 4.49 (H) 0.70 - 1.30 MG/DL Final  
 
  
Medications/ Blood Products Given Name   Dose   Route and Time None Blood Volume Processed (BVP):    85L Net Fluid Removed:  2000 ml Comments Time Out Done: 8898 Primary Nurse Rpt Pre: Tammy Maza RN 
Primary Nurse Rpt Post: Tammy Maza RN 
Pt Education: Procedural 
Care Plan: Continue with HD orders per MD. Tx Summary: Pt vented and sedated. Consent signed & on file. SBAR received from Primary RN. 1300: Pt cannulated with 58U needles per policy & without issue. VSS. Dialysis Tx initiated. 1630: Tx ended. VSS. All possible blood returned to patient. Hemostasis achieved without issue. Bed locked and in the lowest position, call bell and belongings in reach. SBAR given to Primary, RN. Patient is stable at time of my departure. All Dialysis related medications have been reviewed. Admiting Diagnosis: PEA/ESRD Pt's previous clinic-  
Consent signed - Informed Consent Verified: Yes (02/21/20 1300) [place] Consent - Verified Hepatitis Status- Ag Negative 2/7/20 Machine #- Machine Number: [id number] (02/21/20 1300) Telemetry status- Bedside Pre-dialysis wt. - Pre-Dialysis Weight: 74.9 kg (165 lb 2 oz) (02/19/20 0300)

## 2020-02-21 NOTE — PROGRESS NOTES
[facility] 
 [address] [facility], [address] [facility], [facility] Phone: [phone]   [phone]   
  
Nephrology Progress Note Anderson Yanes     1960     [id number] Date of Admission : 2/5/2020 02/21/20 CC: Follow up for ESRD Assessment and Plan ESRD- HD  
- Dialysis dependent since 4/2018. 
- Dialyzes MWF at [address] [place] [address]. F/b Dr Harris [facility]  
- HD MWF. HD today post Tracheostomy Lytes : stable HTN  
- stable now  
- can taper down Midodrine  
  
Seizure - per CCM / Neuro Multiple b/l infarcts from hypoxic injury: 
- per neuro 
  
S/P PEA arrest  
  
Hx of R Pleural effusion w/ Trapped Lung - s/p VATS and decortication 4/2018 
  
Anemia of CKD  
- continue LUIS  
  
Solitary Kidney S/p Prior Left Nephrectomy for RCC 
  
Sec HTPH Interval History: 
Seen and examined. S/p PEG placement yesterday BP elevated Scheduled for Trach at 11 am today BG stable. Remains obtunded Review of Systems: Review of systems not obtained due to patient factors. Current Medications:  
Current Facility-Administered Medications Medication Dose Route Frequency  sodium chloride (NS) flush 5-40 mL  5-40 mL IntraVENous Q8H  
 sodium chloride (NS) flush 5-40 mL  5-40 mL IntraVENous PRN  
 simethicone (MYLICON) 11HJ/2.4JT oral drops 80 mg  1.2 mL Oral Multiple  atropine injection 0.5 mg  0.5 mg IntraVENous ONCE PRN  
 EPINEPHrine (ADRENALIN) 0.1 mg/mL syringe 1 mg  1 mg Endoscopically ONCE PRN  
 acetaminophen (TYLENOL) suppository 650 mg  650 mg Rectal Q6H PRN  
 0.9% sodium chloride infusion 250 mL  250 mL IntraVENous PRN  
 cefepime (MAXIPIME) 1 g in 0.9% sodium chloride (MBP/ADV) 50 mL  1 g IntraVENous Q24H  
 0.9% sodium chloride infusion 250 mL  250 mL IntraVENous PRN  
 balsam peru-castor oil (VENELEX) ointment   Topical Q8H  
 fentaNYL citrate (PF) injection 50 mcg  50 mcg IntraVENous Q4H PRN  
  atorvastatin (LIPITOR) tablet 20 mg  20 mg Oral DAILY  epoetin tyler-epbx (RETACRIT) injection 20,000 Units  20,000 Units SubCUTAneous Q MON, WED & FRI  lansoprazole (PREVACID) 3 mg/mL oral suspension 30 mg  30 mg Per NG tube ACB  [Held by provider] insulin glargine (LANTUS) injection 15 Units  15 Units SubCUTAneous DAILY  acetaminophen (TYLENOL) solution 650 mg  650 mg Per NG tube Q6H PRN  Vancomycin- pharmacy to dose   Other Rx Dosing/Monitoring  albumin human 25% (BUMINATE) solution 12.5 g  12.5 g IntraVENous DIALYSIS PRN  
 midodrine (PROAMITINE) tablet 10 mg  10 mg Oral Q8H  
 heparin (porcine) injection 5,000 Units  5,000 Units SubCUTAneous Q12H  
 insulin lispro (HUMALOG) injection   SubCUTAneous Q6H  
 lipase-protease-amylase (CREON 24,000) capsule 3 Cap  3 Cap Oral Q8H  
 white petrolatum-mineral oil (AKWA TEARS) 83-15 % ophthalmic ointment   Both Eyes Q12H  
 sodium chloride (NS) flush 5-40 mL  5-40 mL IntraVENous Q8H  
 sodium chloride (NS) flush 5-40 mL  5-40 mL IntraVENous PRN  
 glucose chewable tablet 16 g  4 Tab Oral PRN  
 glucagon (GLUCAGEN) injection 1 mg  1 mg IntraMUSCular PRN  
 dextrose 10% infusion 0-250 mL  0-250 mL IntraVENous PRN  chlorhexidine (ORAL CARE KIT) 0.12 % mouthwash 15 mL  15 mL Oral Q12H  hydrALAZINE (APRESOLINE) 20 mg/mL injection 10 mg  10 mg IntraVENous Q6H PRN Allergies Allergen Reactions  Tramadol Other (comments) Brought down blood sugar too fast  
 
 
Objective: 
Vitals:   
Vitals:  
 02/21/20 0200 02/21/20 0300 02/21/20 0400 02/21/20 0433 BP: 123/73 123/69 128/71 Pulse: (!) 106 100 90 96 Resp: 22 21 17 16 Temp:   99.8 °F (37.7 °C) TempSrc:      
SpO2: 90% 93% 94% 96% Weight:      
Height:      
 
Intake and Output: 
No intake/output data recorded. 02/19 1901 - 02/21 0700 In: 1276 [I.V.:976] Out: 375 [Drains:375] Physical Examination: 
General:  On vent HEENT: PERRL,  + Pallor , No Icterus Neck: Supple,no mass palpable Lungs : CTA 
CVS: RRR, S1 S2 normal, No murmur Abdomen: Soft, NT, , [PEG + Extremities:2+ upper extremity Edema, RUE AVF + thrill MS: No joint swelling, erythema, warmth Neurologic:unresponsive on vent Psych: Unable to assess 
  
 
[]    High complexity decision making was performed 
[]    Patient is at high-risk of decompensation with multiple organ involvement Lab Data Personally Reviewed: I have reviewed all the pertinent labs, microbiology data and radiology studies during assessment. Recent Labs  
  02/21/20 
0441 02/20/20 
0603 02/19/20 
6592 02/18/20 
1348 * 133* 133* 130*  
K 5.2* 4.8 4.3 5.2*  
CL 95* 98 97 99 CO2 26 28 30 29 * 50* 166* 181* BUN 43* 36* 25* 42* CREA 4.49* 3.72* 2.77* 4.12* CA 8.1* 7.6* 8.1* 8.0*  
MG 2.5* 2.4 2.3  --   
PHOS 4.0 2.9 2.1*  --   
ALB 1.6* 1.6* 1.9*  --   
SGOT 16 14* 11*  --   
ALT 9* 9* 9*  --   
 
Recent Labs  
  02/21/20 
0441 02/20/20 
0603 02/19/20 
1217 WBC 7.9 7.4 8.2 HGB 7.7* 7.8* 6.9*  
HCT 24.6* 24.5* 21.7*  
 235 230 No results found for: SDES Lab Results Component Value Date/Time Culture result: NO GROWTH 2 DAYS 02/19/2020 07:28 PM  
 Culture result: NO GROWTH 2 DAYS 02/19/2020 07:28 PM  
 Culture result: NO GROWTH 3 DAYS 02/18/2020 02:29 PM  
 Culture result: (A) 02/18/2020 02:29 PM  
  HEAVY * METHICILLIN RESISTANT STAPHYLOCOCCUS AUREUS * Culture result: SCANT NORMAL RESPIRATORY BON 02/18/2020 02:29 PM  
 
Recent Results (from the past 24 hour(s)) GLUCOSE, POC Collection Time: 02/20/20 11:13 AM  
Result Value Ref Range Glucose (POC) 103 (H) 65 - 100 mg/dL Performed by [employer] GLUCOSE, POC Collection Time: 02/20/20  7:09 PM  
Result Value Ref Range Glucose (POC) 168 (H) 65 - 100 mg/dL Performed by [employer] GLUCOSE, POC Collection Time: 02/21/20 12:38 AM  
Result Value Ref Range Glucose (POC) 215 (H) 65 - 100 mg/dL Performed by Lai Gandara   
CBC W/O DIFF Collection Time: 02/21/20  4:41 AM  
Result Value Ref Range WBC 7.9 4.1 - 11.1 K/uL  
 RBC 3.28 (L) 4.10 - 5.70 M/uL HGB 7.7 (L) 12.1 - 17.0 g/dL HCT 24.6 (L) 36.6 - 50.3 % MCV 75.0 (L) 80.0 - 99.0 FL  
 MCH 23.5 (L) 26.0 - 34.0 PG  
 MCHC 31.3 30.0 - 36.5 g/dL  
 RDW 17.4 (H) 11.5 - 14.5 % PLATELET 083 763 - 078 K/uL MPV 10.7 8.9 - 12.9 FL  
 NRBC 0.0 0  WBC ABSOLUTE NRBC 0.00 0.00 - 0.01 K/uL METABOLIC PANEL, COMPREHENSIVE Collection Time: 02/21/20  4:41 AM  
Result Value Ref Range Sodium 127 (L) 136 - 145 mmol/L Potassium 5.2 (H) 3.5 - 5.1 mmol/L Chloride 95 (L) 97 - 108 mmol/L  
 CO2 26 21 - 32 mmol/L Anion gap 6 5 - 15 mmol/L Glucose 161 (H) 65 - 100 mg/dL BUN 43 (H) 6 - 20 MG/DL Creatinine 4.49 (H) 0.70 - 1.30 MG/DL  
 BUN/Creatinine ratio 10 (L) 12 - 20 GFR est AA 16 (L) >60 ml/min/1.73m2 GFR est non-AA 14 (L) >60 ml/min/1.73m2 Calcium 8.1 (L) 8.5 - 10.1 MG/DL Bilirubin, total 0.5 0.2 - 1.0 MG/DL  
 ALT (SGPT) 9 (L) 12 - 78 U/L  
 AST (SGOT) 16 15 - 37 U/L Alk. phosphatase 100 45 - 117 U/L Protein, total 9.0 (H) 6.4 - 8.2 g/dL Albumin 1.6 (L) 3.5 - 5.0 g/dL Globulin 7.4 (H) 2.0 - 4.0 g/dL A-G Ratio 0.2 (L) 1.1 - 2.2 MAGNESIUM Collection Time: 02/21/20  4:41 AM  
Result Value Ref Range Magnesium 2.5 (H) 1.6 - 2.4 mg/dL PHOSPHORUS Collection Time: 02/21/20  4:41 AM  
Result Value Ref Range Phosphorus 4.0 2.6 - 4.7 MG/DL  
GLUCOSE, POC Collection Time: 02/21/20  7:00 AM  
Result Value Ref Range Glucose (POC) 170 (H) 65 - 100 mg/dL Performed by Lai Gandara Total time spent with patient:  xxx   min. Care Plan discussed with: 
Patient Family RN Consulting Physician [address] [facility],      
 
I have reviewed the flowsheets. Chart and Pertinent Notes have been reviewed. No change in PMH ,family and social history from Consult note.  
 
 
Raymond Eckert MD
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
## 2020-02-21 NOTE — PROGRESS NOTES
0730: Bedside shift change report given to Kristel Beard (oncoming nurse) by Frank Miramontes (offgoing nurse). Report included the following information SBAR, Kardex, Intake/Output and MAR.  
 
1930: Bedside shift change report given to Wanda Henderson (oncoming nurse) by Kristel Beard (offgoing nurse). Report included the following information SBAR, Kardex, Intake/Output and MAR.

## 2020-02-21 NOTE — PROGRESS NOTES
Bedside and Verbal shift change report given to Kvng Sung (oncoming nurse) by Olla Simmonds (offgoing nurse). Report included the following information SBAR and Cardiac Rhythm NSR. Bedside and Verbal shift change report given to Ana Grajeda (oncoming nurse) by Kvng Sung (offgoing nurse). Report included the following information SBAR, Procedure Summary and Cardiac Rhythm NSR.
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
## 2020-02-21 NOTE — PROGRESS NOTES
Problem: Ventilator Management Goal: *Adequate oxygenation and ventilation Outcome: Progressing Towards Goal 
Goal: *Patient maintains clear airway/free of aspiration Outcome: Progressing Towards Goal 
Goal: *Absence of infection signs and symptoms Outcome: Progressing Towards Goal 
Goal: *Normal spontaneous ventilation Outcome: Progressing Towards Goal 
  
Problem: Patient Education: Go to Patient Education Activity Goal: Patient/Family Education Outcome: Progressing Towards Goal 
  
Problem: Falls - Risk of 
Goal: *Absence of Falls Description Document Aly Garlandsivakumar Fall Risk and appropriate interventions in the flowsheet. Outcome: Progressing Towards Goal 
Note: Fall Risk Interventions: 
Mobility Interventions: Communicate number of staff needed for ambulation/transfer Mentation Interventions: Adequate sleep, hydration, pain control Medication Interventions: Evaluate medications/consider consulting pharmacy Elimination Interventions: Toileting schedule/hourly rounds History of Falls Interventions: Door open when patient unattended Problem: Patient Education: Go to Patient Education Activity Goal: Patient/Family Education Outcome: Progressing Towards Goal 
  
Problem: Pressure Injury - Risk of 
Goal: *Prevention of pressure injury Description Document Ruddy Scale and appropriate interventions in the flowsheet. Offload heels Turn approximately every 2 hours Venelex ointment Total Care Sport Outcome: Progressing Towards Goal 
Note: Pressure Injury Interventions: 
Sensory Interventions: Assess need for specialty bed, Assess changes in LOC Moisture Interventions: Absorbent underpads Activity Interventions: Assess need for specialty bed Mobility Interventions: Assess need for specialty bed Nutrition Interventions: Document food/fluid/supplement intake Friction and Shear Interventions: HOB 30 degrees or less Problem: Patient Education: Go to Patient Education Activity Goal: Patient/Family Education Outcome: Progressing Towards Goal 
  
Problem: Patient Education: Go to Patient Education Activity Goal: Patient/Family Education Outcome: Progressing Towards Goal 
  
Problem: Heart Failure: Discharge Outcomes Goal: *Demonstrates ability to perform prescribed activity without shortness of breath or discomfort Outcome: Progressing Towards Goal 
Goal: *Left ventricular function assessment completed prior to or during stay, or planned for post-discharge Outcome: Progressing Towards Goal 
Goal: *ACEI prescribed if LVEF less than 40% and no contraindications or ARB prescribed Outcome: Progressing Towards Goal 
Goal: *Verbalizes understanding and describes prescribed diet Outcome: Progressing Towards Goal 
Goal: *Verbalizes understanding/describes prescribed medications Outcome: Progressing Towards Goal 
Goal: *Describes available resources and support systems Description 
(eg: Home Health, Palliative Care, Advanced Medical Directive) Outcome: Progressing Towards Goal 
Goal: *Describes smoking cessation resources Outcome: Progressing Towards Goal 
Goal: *Understands and describes signs and symptoms to report to providers(Stroke Metric) Outcome: Progressing Towards Goal 
Goal: *Describes/verbalizes understanding of follow-up/return appt Description 
(eg: to physicians, diabetes treatment coordinator, and other resources Outcome: Progressing Towards Goal 
Goal: *Describes importance of continuing daily weights and changes to report to physician Outcome: Progressing Towards Goal 
  
Problem: Diabetes Self-Management Goal: *Disease process and treatment process Description Define diabetes and identify own type of diabetes; list 3 options for treating diabetes. Outcome: Progressing Towards Goal 
Goal: *Incorporating nutritional management into lifestyle Description Describe effect of type, amount and timing of food on blood glucose; list 3 methods for planning meals. Outcome: Progressing Towards Goal 
Goal: *Incorporating physical activity into lifestyle Description State effect of exercise on blood glucose levels. Outcome: Progressing Towards Goal 
Goal: *Developing strategies to promote health/change behavior Description Define the ABC's of diabetes; identify appropriate screenings, schedule and personal plan for screenings. Outcome: Progressing Towards Goal 
Goal: *Using medications safely Description State effect of diabetes medications on diabetes; name diabetes medication taking, action and side effects. Outcome: Progressing Towards Goal 
Goal: *Monitoring blood glucose, interpreting and using results Description Identify recommended blood glucose targets  and personal targets. Outcome: Progressing Towards Goal 
Goal: *Prevention, detection, treatment of acute complications Description List symptoms of hyper- and hypoglycemia; describe how to treat low blood sugar and actions for lowering  high blood glucose level. Outcome: Progressing Towards Goal 
Goal: *Prevention, detection and treatment of chronic complications Description Define the natural course of diabetes and describe the relationship of blood glucose levels to long term complications of diabetes. Outcome: Progressing Towards Goal 
Goal: *Developing strategies to address psychosocial issues Description Describe feelings about living with diabetes; identify support needed and support network Outcome: Progressing Towards Goal 
Goal: *Insulin pump training Outcome: Progressing Towards Goal 
Goal: *Sick day guidelines Outcome: Progressing Towards Goal 
Goal: *Patient Specific Goal (EDIT GOAL, INSERT TEXT) Outcome: Progressing Towards Goal 
  
Problem: Patient Education: Go to Patient Education Activity Goal: Patient/Family Education Outcome: Progressing Towards Goal 
  
 Problem: Nutrition Deficit Goal: *Optimize nutritional status Outcome: Progressing Towards Goal 
  
Problem: Anoxic Brain Injury Goal: *Neurologically stable Outcome: Progressing Towards Goal 
Goal: *Hemodynamically stable without vasoactive medications Outcome: Progressing Towards Goal 
Goal: *Adequate oxygenation and ventilation Outcome: Progressing Towards Goal 
  
Problem: Patient Education: Go to Patient Education Activity Goal: Patient/Family Education Outcome: Progressing Towards Goal 
  
Problem: Risk for Spread of Infection Goal: Prevent transmission of infectious organism to others Description Prevent the transmission of infectious organisms to other patients, staff members, and visitors. Outcome: Progressing Towards Goal 
  
Problem: Patient Education:  Go to Education Activity Goal: Patient/Family Education Outcome: Progressing Towards Goal 
  
Problem: Discharge Planning Goal: *Discharge to safe environment Outcome: Progressing Towards Goal 
Goal: *Knowledge of medication management Outcome: Progressing Towards Goal 
Goal: *Knowledge of discharge instructions Outcome: Progressing Towards Goal 
  
Problem: Patient Education: Go to Patient Education Activity Goal: Patient/Family Education Outcome: Progressing Towards Goal

## 2020-02-22 NOTE — PROGRESS NOTES
PT PT ON CPAP PS 10 PEEP 5 the patient DID NOT TRIGGER VENT FOR 60 SEC . PT APNEIC. PUT BACK ON RATE
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
## 2020-02-22 NOTE — PROGRESS NOTES
1930: Report received from Leanne Regan [facility]. Shift Summary: No acute events. Does not withdraw to pain, intermittently decorticate posturing on left arm/shoulder. PERRLA, weak gag. Hemodynamics stable. BS stable on TF.  
 
0730: Bedside shift change report given to Pamela Morales RN (oncoming nurse) by Saima Armando (offgoing nurse). Report included the following information SBAR, Intake/Output, MAR and Recent Results.

## 2020-02-22 NOTE — PROGRESS NOTES
Mr. Tahir Back is POD#1 after a perc trach. No acute events overnight. Afebrile Tachycardic 
SBP>120 On exam he is on the vent Unresponsive Does not follow commands Trach site c/d/i Diagnoses  1- anoxic brain injury  2- chronci hypoxic respiratory failure  3- MSOF Mr. Tahir Back is doing well with his tracheostomy. Vent care per ICU team.  We will remove suture s in one week.

## 2020-02-22 NOTE — ROUTINE PROCESS
Primary Nurse Leyda Chester RN and Tarik Cortés RN performed a dual skin assessment on this patient Impairment noted- see wound doc flow sheet, Ruddy score is 11.
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
## 2020-02-24 NOTE — PROGRESS NOTES
[facility] 
 [address] [facility], [address] [facility], [facility] Phone: [phone]   [phone]   
  
Nephrology Progress Note Stiven Ocasio     1960     [id number] Date of Admission : 2/5/2020 02/24/20 CC: Follow up for ESRD Assessment and Plan ESRD- HD  
- Dialysis dependent since 4/2018. 
- Dialyzes MWF at [facility]. F/b Dr Ruby Bell  
- HD today and MWF while here Mild hyperkalemia: 
- will correct w/ HD 
 
HTN: 
- on midodrine 10mg TID 
  
Resp failure 2/p trach Seizure - per CCM / Neuro Multiple b/l infarcts from hypoxic injury: 
- per neuro 
  
S/P PEA arrest  
  
Hx of R Pleural effusion w/ Trapped Lung - s/p VATS and decortication 4/2018 
  
Anemia of CKD  
- continue LUIS  
  
Solitary Kidney S/p Prior Left Nephrectomy for RCC 
  
Sec HTPH Malnutrition s/p PEG Interval History: 
Seen and examined on dialysis. UF goal 2kg. BP stable. No changes clinically. Unable to obtain any ROS. Review of Systems: Review of systems not obtained due to patient factors. Current Medications:  
Current Facility-Administered Medications Medication Dose Route Frequency  sodium chloride (NS) flush 5-40 mL  5-40 mL IntraVENous Q8H  
 sodium chloride (NS) flush 5-40 mL  5-40 mL IntraVENous PRN  
 simethicone (MYLICON) 99FL/9.4UL oral drops 80 mg  1.2 mL Oral Multiple  acetaminophen (TYLENOL) suppository 650 mg  650 mg Rectal Q6H PRN  
 0.9% sodium chloride infusion 250 mL  250 mL IntraVENous PRN  
 0.9% sodium chloride infusion 250 mL  250 mL IntraVENous PRN  
 balsam peru-castor oil (VENELEX) ointment   Topical Q8H  
 fentaNYL citrate (PF) injection 50 mcg  50 mcg IntraVENous Q4H PRN  
 atorvastatin (LIPITOR) tablet 20 mg  20 mg Oral DAILY  epoetin tyler-epbx (RETACRIT) injection 20,000 Units  20,000 Units SubCUTAneous Q MON, WED & FRI  lansoprazole (PREVACID) 3 mg/mL oral suspension 30 mg  30 mg Per NG tube ACB  insulin glargine (LANTUS) injection 15 Units  15 Units SubCUTAneous DAILY  acetaminophen (TYLENOL) solution 650 mg  650 mg Per NG tube Q6H PRN  Vancomycin- pharmacy to dose   Other Rx Dosing/Monitoring  albumin human 25% (BUMINATE) solution 12.5 g  12.5 g IntraVENous DIALYSIS PRN  
 midodrine (PROAMITINE) tablet 10 mg  10 mg Oral Q8H  
 heparin (porcine) injection 5,000 Units  5,000 Units SubCUTAneous Q12H  
 insulin lispro (HUMALOG) injection   SubCUTAneous Q6H  
 lipase-protease-amylase (CREON 24,000) capsule 3 Cap  3 Cap Oral Q8H  
 white petrolatum-mineral oil (AKWA TEARS) 83-15 % ophthalmic ointment   Both Eyes Q12H  
 sodium chloride (NS) flush 5-40 mL  5-40 mL IntraVENous Q8H  
 sodium chloride (NS) flush 5-40 mL  5-40 mL IntraVENous PRN  
 glucose chewable tablet 16 g  4 Tab Oral PRN  
 glucagon (GLUCAGEN) injection 1 mg  1 mg IntraMUSCular PRN  
 dextrose 10% infusion 0-250 mL  0-250 mL IntraVENous PRN  chlorhexidine (ORAL CARE KIT) 0.12 % mouthwash 15 mL  15 mL Oral Q12H  hydrALAZINE (APRESOLINE) 20 mg/mL injection 10 mg  10 mg IntraVENous Q6H PRN Allergies Allergen Reactions  Tramadol Other (comments) Brought down blood sugar too fast  
 
 
Objective: 
Vitals:   
Vitals:  
 02/24/20 0730 02/24/20 0733 02/24/20 0745 02/24/20 0800 BP: 127/70  114/60 121/71 Pulse: 100 (!) 105 (!) 105 (!) 106 Resp: 26 24 25 24 Temp:      
TempSrc:      
SpO2: 100% 96% 96% 96% Weight:      
Height:      
 
Intake and Output: 
No intake/output data recorded. 02/22 1901 - 02/24 0700 In: 043 [I.V.:50] Out: 1700 [Drains:1700] Physical Examination: 
General:  On vent HEENT: normocephalic Neck: trach in place Lungs : CTA anteriorlly CVS: RRR, S1 S2 normal, No murmur Abdomen: Soft, NT, + PEG Extremities: 2+ upper extremity Edema Neurologic:unresponsive on vent Psych: Unable to assess Access: RUE AVF cannulated 
 
[]    High complexity decision making was performed []    Patient is at high-risk of decompensation with multiple organ involvement Lab Data Personally Reviewed: I have reviewed all the pertinent labs, microbiology data and radiology studies during assessment. Recent Labs  
  02/24/20 0355 02/23/20 0304 02/22/20 0354 * 135* 133*  
K 5.2* 4.2 4.0  
 102 98 CO2 24 26 28 * 209* 235* BUN 49* 37* 28* CREA 4.65* 4.05* 3.12* CA 7.7* 8.0* 7.7* MG 3.0*  --  2.3 PHOS 3.8  --  3.3 ALB  --  1.4* 1.5* SGOT  --  11* 18 ALT  --  10* 9* Recent Labs  
  02/24/20 0355 02/23/20 0304 02/22/20 0354 WBC 11.5* 10.5 7.8 HGB 8.0* 7.4* 6.9*  
HCT 26.5* 24.1* 22.2*  
 262 244 No results found for: SDES Lab Results Component Value Date/Time Culture result: NO GROWTH 5 DAYS 02/19/2020 07:28 PM  
 Culture result: NO GROWTH 5 DAYS 02/19/2020 07:28 PM  
 Culture result: NO GROWTH 5 DAYS 02/18/2020 02:29 PM  
 Culture result: (A) 02/18/2020 02:29 PM  
  HEAVY * METHICILLIN RESISTANT STAPHYLOCOCCUS AUREUS * Culture result: SCANT NORMAL RESPIRATORY BON 02/18/2020 02:29 PM  
 
Recent Results (from the past 24 hour(s)) GLUCOSE, POC Collection Time: 02/23/20 11:39 AM  
Result Value Ref Range Glucose (POC) 195 (H) 65 - 100 mg/dL Performed by Louann Eldridge GLUCOSE, POC Collection Time: 02/23/20  6:26 PM  
Result Value Ref Range Glucose (POC) 222 (H) 65 - 100 mg/dL Performed by Louann Eldridge GLUCOSE, POC Collection Time: 02/24/20 12:21 AM  
Result Value Ref Range Glucose (POC) 134 (H) 65 - 100 mg/dL Performed by Tatiana VELASQUEZ METABOLIC PANEL, BASIC Collection Time: 02/24/20  3:55 AM  
Result Value Ref Range Sodium 134 (L) 136 - 145 mmol/L Potassium 5.2 (H) 3.5 - 5.1 mmol/L Chloride 103 97 - 108 mmol/L  
 CO2 24 21 - 32 mmol/L Anion gap 7 5 - 15 mmol/L Glucose 135 (H) 65 - 100 mg/dL BUN 49 (H) 6 - 20 MG/DL  Creatinine 4.65 (H) 0.70 - 1.30 MG/DL  
 BUN/Creatinine ratio 11 (L) 12 - 20 GFR est AA 16 (L) >60 ml/min/1.73m2 GFR est non-AA 13 (L) >60 ml/min/1.73m2 Calcium 7.7 (L) 8.5 - 10.1 MG/DL MAGNESIUM Collection Time: 02/24/20  3:55 AM  
Result Value Ref Range Magnesium 3.0 (H) 1.6 - 2.4 mg/dL PHOSPHORUS Collection Time: 02/24/20  3:55 AM  
Result Value Ref Range Phosphorus 3.8 2.6 - 4.7 MG/DL  
CBC W/O DIFF Collection Time: 02/24/20  3:55 AM  
Result Value Ref Range WBC 11.5 (H) 4.1 - 11.1 K/uL  
 RBC 3.47 (L) 4.10 - 5.70 M/uL HGB 8.0 (L) 12.1 - 17.0 g/dL HCT 26.5 (L) 36.6 - 50.3 % MCV 76.4 (L) 80.0 - 99.0 FL  
 MCH 23.1 (L) 26.0 - 34.0 PG  
 MCHC 30.2 30.0 - 36.5 g/dL  
 RDW 18.3 (H) 11.5 - 14.5 % PLATELET 524 155 - 017 K/uL MPV 10.5 8.9 - 12.9 FL  
 NRBC 0.0 0  WBC ABSOLUTE NRBC 0.00 0.00 - 0.01 K/uL GLUCOSE, POC Collection Time: 02/24/20  6:02 AM  
Result Value Ref Range Glucose (POC) 148 (H) 65 - 100 mg/dL Performed by Charmian Krabbe TODD   
POC EG7 Collection Time: 02/24/20  7:35 AM  
Result Value Ref Range Calcium, ionized (POC) 1.15 1.12 - 1.32 mmol/L  
 FIO2 (POC) 0.40 % pH (POC) 7.392 7.35 - 7.45    
 pCO2 (POC) 48.0 (H) 35.0 - 45.0 MMHG  
 pO2 (POC) 105 (H) 80 - 100 MMHG  
 HCO3 (POC) 29.2 (H) 22 - 26 MMOL/L Base excess (POC) 4 mmol/L  
 sO2 (POC) 98 (H) 92 - 97 % Site LEFT RADIAL Device: VENT Mode CPAP/SPON    
 PEEP/CPAP (POC) 5.5 cmH2O Pressure support 10 cmH2O Allens test (POC) YES Specimen type (POC) ARTERIAL Total resp. rate 26 Total time spent with patient:  xxx   min. Care Plan discussed with: 
Patient Family RN Consulting Physician [facility]     
 
I have reviewed the flowsheets. Chart and Pertinent Notes have been reviewed. No change in PMH ,family and social history from Consult note.  
 
 
Marky Srivastava MD
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
## 2020-02-24 NOTE — PROGRESS NOTES
0750 - Bedside and Verbal shift change report given to marti flood (oncoming nurse) by ritika (offgoing nurse). Report included the following information SBAR, Kardex, Intake/Output, MAR, Recent Results and Cardiac Rhythm ST.  
1930 - Bedside and Verbal shift change report given to Jayyrossy Mao (oncoming nurse) by Westley Morales (offgoing nurse). Report included the following information SBAR, Kardex, Intake/Output, MAR, Recent Results and Cardiac Rhythm ST.
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
## 2020-02-24 NOTE — PROGRESS NOTES
2000 Bedside shift change report given to Vinayak Ernst RN (oncoming nurse) by Blanca Rendon RN (offgoing nurse). Report included the following information SBAR, Kardex, Intake/Output, MAR and Cardiac Rhythm NSR/Sinus Tach. Bedside shift change report given to Adilene Hemphill RN (oncoming nurse) by Vinayak Ernst RN (offgoing nurse). Report included the following information SBAR, Kardex, Intake/Output, MAR, Recent Results and Cardiac Rhythm NSR/Sinus Tach.

## 2020-02-24 NOTE — PROCEDURES
[facility] Dialysis Team [place]  [phone] Vitals   Pre   Post   Assessment   Pre   Post    
Temp  Temp: 97 °F (36.1 °C) (02/24/20 0630) 97.3 LOC  Unresponsive, no sedation same HR   Pulse (Heart Rate): 93 (02/24/20 0630) 103 Lungs   Clear, trach on ventilator  same B/P   BP: 134/72 (02/24/20 0630) 106/58 Cardiac   Normal Sinus Rhythm- monitored at bedside Sinus tachy Resp   Resp Rate: 26 (02/24/20 0630) 26 Skin   Dry and intact  same Pain level  Pain Intensity 1: 0 (02/24/20 0400) 0 Edema Swelling to Bilateral arms same Orders: Duration:   Start:     0630 End:    1000 Total:   3.5 hours Dialyzer:   Dialyzer/Set Up Inspection: Misael Kennedy (02/24/20 0630) K Bath:   Dialysate K (mEq/L): 2 (02/24/20 0630) Ca Bath:   Dialysate CA (mEq/L): 2.5 (02/24/20 0630) Na/Bicarb:   Dialysate NA (mEq/L): 140 (02/24/20 0630) Target Fluid Removal:   Goal/Amount of Fluid to Remove (mL): 2000 mL (02/24/20 0630) Access Type & Location:   Right upper arm AVf, + bruit and thrill, no s/s of infection, cannulated x 2 with 15 gauge needles without difficulty Labs Obtained/Reviewed Critical Results Called   Date when labs were drawn- 
Hgb-   
HGB Date Value Ref Range Status 02/24/2020 8.0 (L) 12.1 - 17.0 g/dL Final  
 
K-   
Potassium Date Value Ref Range Status 02/24/2020 5.2 (H) 3.5 - 5.1 mmol/L Final  
 
Ca-  
Calcium Date Value Ref Range Status 02/24/2020 7.7 (L) 8.5 - 10.1 MG/DL Final  
 
Bun-  
BUN Date Value Ref Range Status 02/24/2020 49 (H) 6 - 20 MG/DL Final  
 
Creat-  
Creatinine Date Value Ref Range Status 02/24/2020 4.65 (H) 0.70 - 1.30 MG/DL Final  
 
  
Medications/ Blood Products Given Name   Dose   Route and Time None ordered Blood Volume Processed (BVP):   81.0 Net Fluid Removed:  2000 ml Comments Time Out Done: 4632 Primary Nurse Rpt Pre:Anival LI RN 
Primary Nurse Rpt Post:TE Hayes 
Pt Education: Procedure Care Plan: Continue Nephrology plan of care Tx Summary:0630- HD was started using right upper arm AVF without any issues. 0730- Pt VSS, no distress observed 0830-Pt tolerating HD well, remains unresponsive 
0930-NO s/s of distress and vital signs are stable 
1000-HD was completed and all possible blood was rinsed back. Pt removed ordered 2 liters of fluid. Hemostasis was achieved form needle sites, with no excessive bleeding. Post AVf was + bruit and thrill, dry gauze and tape in place. Primary nurse was given a report. Pt remain intubated on ventilator unresponsive as pre treatment. Admiting Diagnosis: Cardiac Arrest, PEA 
Pt's previous clinic- [facility] Consent signed - Informed Consent Verified: Yes (02/24/20 0630) Buffy Consent - Verified signed Hepatitis Status- Negative AG / unknown AB 2/7/20 Machine #- Machine Number: [id number] (02/24/20 0630) Telemetry status- NSR Pre-dialysis wt. - Pre-Dialysis Weight: 74.9 kg (165 lb 2 oz) (02/19/20 0300)

## 2020-02-24 NOTE — PROGRESS NOTES
Problem: Ventilator Management Goal: *Adequate oxygenation and ventilation Outcome: Progressing Towards Goal 
Goal: *Patient maintains clear airway/free of aspiration Outcome: Progressing Towards Goal 
  
Problem: Pressure Injury - Risk of 
Goal: *Prevention of pressure injury Description Document Ruddy Scale and appropriate interventions in the flowsheet. Offload heels Turn approximately every 2 hours Venelex ointment Total Care Sport Outcome: Progressing Towards Goal 
Note: Pressure Injury Interventions: 
Sensory Interventions: Assess changes in LOC, Float heels Moisture Interventions: Assess need for specialty bed, Check for incontinence Q2 hours and as needed Activity Interventions: Pressure redistribution bed/mattress(bed type) Mobility Interventions: Pressure redistribution bed/mattress (bed type), Turn and reposition approx. every two hours(pillow and wedges) Nutrition Interventions: Document food/fluid/supplement intake, Discuss nutritional consult with provider Friction and Shear Interventions: HOB 30 degrees or less, Lift sheet Problem: Diabetes Self-Management Goal: *Disease process and treatment process Description Define diabetes and identify own type of diabetes; list 3 options for treating diabetes. Outcome: Not Progressing Towards Goal 
  
Problem: Anoxic Brain Injury Goal: *Adequate oxygenation and ventilation Outcome: Progressing Towards Goal

## 2020-02-25 NOTE — PROGRESS NOTES
[facility] 
 [address] [facility], [address] [facility], [facility] Phone: [phone]   [phone]   
  
Nephrology Progress Note Tegan Sorensen     1960     [id number] Date of Admission : 2/5/2020 02/25/20 CC: Follow up for ESRD Assessment and Plan ESRD- HD  
- Dialysis dependent since 4/2018. 
- Dialyzes MWF at [facility]. F/b Dr Jagdeep Barreto  
- HD tomorrow as planned Hypotension: 
- on midodrine 10mg TID 
  
Resp failure 2/p trach Seizure - per CCM / Neuro Multiple b/l infarcts from hypoxic injury: 
- per neuro 
  
S/P PEA arrest  
  
Hx of R Pleural effusion w/ Trapped Lung - s/p VATS and decortication 4/2018 
  
Anemia of CKD  
- continue LUIS  
  
Solitary Kidney S/p Prior Left Nephrectomy for RCC 
  
Sec HTPH Malnutrition s/p PEG Interval History: 
Seen and examined. No new issues overnight. Remains unresponsive. Review of Systems: Review of systems not obtained due to patient factors. Current Medications:  
Current Facility-Administered Medications Medication Dose Route Frequency  sodium chloride (NS) flush 5-40 mL  5-40 mL IntraVENous Q8H  
 sodium chloride (NS) flush 5-40 mL  5-40 mL IntraVENous PRN  
 simethicone (MYLICON) 61TN/9.7WR oral drops 80 mg  1.2 mL Oral Multiple  acetaminophen (TYLENOL) suppository 650 mg  650 mg Rectal Q6H PRN  
 0.9% sodium chloride infusion 250 mL  250 mL IntraVENous PRN  
 0.9% sodium chloride infusion 250 mL  250 mL IntraVENous PRN  
 balsam peru-castor oil (VENELEX) ointment   Topical Q8H  
 atorvastatin (LIPITOR) tablet 20 mg  20 mg Oral DAILY  epoetin tyler-epbx (RETACRIT) injection 20,000 Units  20,000 Units SubCUTAneous Q MON, WED & FRI  lansoprazole (PREVACID) 3 mg/mL oral suspension 30 mg  30 mg Per NG tube ACB  insulin glargine (LANTUS) injection 15 Units  15 Units SubCUTAneous DAILY  acetaminophen (TYLENOL) solution 650 mg  650 mg Per NG tube Q6H PRN  
  albumin human 25% (BUMINATE) solution 12.5 g  12.5 g IntraVENous DIALYSIS PRN  
 midodrine (PROAMITINE) tablet 10 mg  10 mg Oral Q8H  
 heparin (porcine) injection 5,000 Units  5,000 Units SubCUTAneous Q12H  
 insulin lispro (HUMALOG) injection   SubCUTAneous Q6H  
 lipase-protease-amylase (CREON 24,000) capsule 3 Cap  3 Cap Oral Q8H  
 white petrolatum-mineral oil (AKWA TEARS) 83-15 % ophthalmic ointment   Both Eyes Q12H  
 sodium chloride (NS) flush 5-40 mL  5-40 mL IntraVENous Q8H  
 sodium chloride (NS) flush 5-40 mL  5-40 mL IntraVENous PRN  
 glucose chewable tablet 16 g  4 Tab Oral PRN  
 glucagon (GLUCAGEN) injection 1 mg  1 mg IntraMUSCular PRN  
 dextrose 10% infusion 0-250 mL  0-250 mL IntraVENous PRN  chlorhexidine (ORAL CARE KIT) 0.12 % mouthwash 15 mL  15 mL Oral Q12H  hydrALAZINE (APRESOLINE) 20 mg/mL injection 10 mg  10 mg IntraVENous Q6H PRN Allergies Allergen Reactions  Tramadol Other (comments) Brought down blood sugar too fast  
 
 
Objective: 
Vitals:   
Vitals:  
 02/25/20 0600 02/25/20 0647 02/25/20 0700 02/25/20 0719 BP: 123/69  109/63 Pulse: (!) 110  (!) 109 (!) 108 Resp: 26  27 25 Temp:      
TempSrc:      
SpO2: 97%  93% 93% Weight:  72.2 kg (159 lb 2.8 oz) Height:      
 
Intake and Output: 
No intake/output data recorded. 02/23 1901 - 02/25 0700 In: 2195 Out: 5145 [Drains:675] Physical Examination: 
General:  On vent HEENT: normocephalic Neck: trach in place Lungs : CTA anteriorlly CVS: RRR, S1 S2 normal, No murmur Abdomen: Soft, NT, + PEG Extremities: 2+ upper extremity Edema Neurologic:unresponsive on vent Psych: Unable to assess Access: RUE AVF cannulated 
 
[]    High complexity decision making was performed 
[]    Patient is at high-risk of decompensation with multiple organ involvement Lab Data Personally Reviewed: I have reviewed all the pertinent labs, microbiology data and radiology studies during assessment. Recent Labs  
  02/25/20 0318 02/24/20 0355 02/23/20 
0304 * 134* 135* K 4.2 5.2* 4.2  103 102 CO2 28 24 26 * 135* 209* BUN 29* 49* 37* CREA 3.15* 4.65* 4.05* CA 8.1* 7.7* 8.0*  
MG 2.6* 3.0*  --   
PHOS 2.3* 3.8  --   
ALB 1.4*  --  1.4* SGOT 20  --  11* ALT 11*  --  10* Recent Labs  
  02/25/20 0318 02/24/20 0355 02/23/20 
0304 WBC 12.4* 11.5* 10.5 HGB 8.3* 8.0* 7.4* HCT 27.5* 26.5* 24.1*  
 251 262 No results found for: SDES Lab Results Component Value Date/Time Culture result: NO GROWTH 5 DAYS 02/19/2020 07:28 PM  
 Culture result: NO GROWTH 6 DAYS 02/19/2020 07:28 PM  
 Culture result: NO GROWTH 5 DAYS 02/18/2020 02:29 PM  
 Culture result: (A) 02/18/2020 02:29 PM  
  HEAVY * METHICILLIN RESISTANT STAPHYLOCOCCUS AUREUS * Culture result: SCANT NORMAL RESPIRATORY BON 02/18/2020 02:29 PM  
 
Recent Results (from the past 24 hour(s)) GLUCOSE, POC Collection Time: 02/24/20 12:17 PM  
Result Value Ref Range Glucose (POC) 197 (H) 65 - 100 mg/dL Performed by [employer]  GLUCOSE, POC Collection Time: 02/24/20  5:34 PM  
Result Value Ref Range Glucose (POC) 191 (H) 65 - 100 mg/dL Performed by [employer]  GLUCOSE, POC Collection Time: 02/24/20 11:52 PM  
Result Value Ref Range Glucose (POC) 185 (H) 65 - 100 mg/dL Performed by Myrtle VELASQUEZ MAGNESIUM Collection Time: 02/25/20  3:18 AM  
Result Value Ref Range Magnesium 2.6 (H) 1.6 - 2.4 mg/dL PHOSPHORUS Collection Time: 02/25/20  3:18 AM  
Result Value Ref Range Phosphorus 2.3 (L) 2.6 - 4.7 MG/DL  
CBC W/O DIFF Collection Time: 02/25/20  3:18 AM  
Result Value Ref Range WBC 12.4 (H) 4.1 - 11.1 K/uL  
 RBC 3.59 (L) 4.10 - 5.70 M/uL HGB 8.3 (L) 12.1 - 17.0 g/dL HCT 27.5 (L) 36.6 - 50.3 % MCV 76.6 (L) 80.0 - 99.0 FL  
 MCH 23.1 (L) 26.0 - 34.0 PG  
 MCHC 30.2 30.0 - 36.5 g/dL  
 RDW 18.2 (H) 11.5 - 14.5 % PLATELET 977 965 - 177 K/uL MPV 11.3 8.9 - 12.9 FL  
 NRBC 0.2 (H) 0  WBC ABSOLUTE NRBC 0.02 (H) 0.00 - 0.01 K/uL METABOLIC PANEL, COMPREHENSIVE Collection Time: 02/25/20  3:18 AM  
Result Value Ref Range Sodium 134 (L) 136 - 145 mmol/L Potassium 4.2 3.5 - 5.1 mmol/L Chloride 100 97 - 108 mmol/L  
 CO2 28 21 - 32 mmol/L Anion gap 6 5 - 15 mmol/L Glucose 188 (H) 65 - 100 mg/dL BUN 29 (H) 6 - 20 MG/DL Creatinine 3.15 (H) 0.70 - 1.30 MG/DL  
 BUN/Creatinine ratio 9 (L) 12 - 20 GFR est AA 25 (L) >60 ml/min/1.73m2 GFR est non-AA 20 (L) >60 ml/min/1.73m2 Calcium 8.1 (L) 8.5 - 10.1 MG/DL Bilirubin, total 0.4 0.2 - 1.0 MG/DL  
 ALT (SGPT) 11 (L) 12 - 78 U/L  
 AST (SGOT) 20 15 - 37 U/L Alk. phosphatase 158 (H) 45 - 117 U/L Protein, total 9.3 (H) 6.4 - 8.2 g/dL Albumin 1.4 (L) 3.5 - 5.0 g/dL Globulin 7.9 (H) 2.0 - 4.0 g/dL A-G Ratio 0.2 (L) 1.1 - 2.2 GLUCOSE, POC Collection Time: 02/25/20  5:55 AM  
Result Value Ref Range Glucose (POC) 182 (H) 65 - 100 mg/dL Performed by Lesia VELASQUEZ Total time spent with patient:  xxx   min. Care Plan discussed with: 
Patient Family RN Consulting Physician [id number] [facility],      
 
I have reviewed the flowsheets. Chart and Pertinent Notes have been reviewed. No change in PMH ,family and social history from Consult note.  
 
 
Jelena Diaz MD

## 2020-02-25 NOTE — PROGRESS NOTES
0730: Bedside shift change report given to Gold Forbes (oncoming nurse) by Lexus Scruggs (offgoing nurse). Report included the following information SBAR, Kardex, Intake/Output and MAR.  
 
 
1230: Primary Nurse Scarlet Orona and Ramon John RN performed a dual skin assessment on this patient Impairment noted- see wound doc flow sheet Ruddy score is 9 
 
1600: Pt becoming increasingly hypotensive and having low O2 sats (82-88). Spoke with physician, orders obtained for stat cxr, stat abg, bolus of albumin, and calcium gluconate bolus. Nurse will continue to monitor. 1930: Bedside shift change report given to  [id number]  (oncoming nurse) by Gold Forbes (offgoing nurse).  Report included the following information SBAR, Kardex, Procedure Summary, Intake/Output and Cardiac Rhythm st.

## 2020-02-25 NOTE — PROGRESS NOTES
Transitions of care Patient has been accepted by [facility] Discharge to [facility] once a dialysis bed is available Will need ALS with vent transport CM received notification that patient has been accepted by [facility]. Patient will need a M-W-F dialysis bed. Wife is agreeable with discharge plan. Reina Hood RN,CRM
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
## 2020-02-26 NOTE — PROGRESS NOTES
2330: Bedside and Verbal shift change report given to Judy Epley RN (oncoming nurse) by Shireen Hoover RN (offgoing nurse). Report included the following information SBAR, Kardex, ED Summary, OR Summary, Procedure Summary, Intake/Output, MAR, Accordion, Recent Results, Med Rec Status and Cardiac Rhythm sinus tachycardia  
 
0730: Bedside and Verbal shift change report given to Carmen Paniagua RN (oncoming nurse) by Judy Epley RN (offgoing nurse). Report included the following information SBAR, Kardex, ED Summary, OR Summary, Procedure Summary, Intake/Output, MAR, Accordion, Recent Results, Med Rec Status and Cardiac Rhythm sinus rhythm .

## 2020-02-26 NOTE — DISCHARGE SUMMARY
[facility] Discharge Summary Patient: Ruchi Naik MRN: [id number] YOB: 1960 Age: 64 y.o. Date of admission:  2/5/2020 Date of discharge:  2/27/2020 Primary care provider:  Cindi Banks MD  
 
Admitting provider:  Samuel Granados DO Discharging provider(s): Samuel Granados DO - Staff [address] Consultations IP CONSULT TO NEUROLOGY 
IP CONSULT TO NEPHROLOGY 
IP CONSULT TO PALLIATIVE CARE - PROVIDER 
IP CONSULT TO GASTROENTEROLOGY 
IP CONSULT TO THORACIC SURGERY 
IP CONSULT TO CARDIOLOGY Procedures · PEG tube placement on 2/20/2020 · Ski[facility] Segovia [facility] destination: [place] [facility]. The patient is stable for discharge. Admission diagnosis ESRD (end stage renal disease) on dialysis ([facility] [N18.6, Z99.2] PEA (Pulseless electrical activity) ([facility] [I46.9] Cardiopulmonary arrest with successful resuscitation ([facility] [I46.9] HTN (hypertension) [I10] Please refer to the admission history and physical for details on the presenting problem. Final discharge diagnoses and brief hospital course 1. Acute Anoxic Encephalopathy 2. PEA Arrest 
3. MRSA PNA 4. Small Bilateral Symmetrical Infarcts (deep white matter in the cerebral heimisphere) - 2/2 Anoxia 5. Seizures 6. ESRD 7. Acute Hypoxic Respiratory Failure 8. PEG tube placement on 2/20/2020 9. Perch trach placement on 2/21/2020 Ruchi Naik is a 64 y.o. male with multiple medical problems including HTN, ESRD, DM11, CAD and cardiomyopathy who presented to [address] [place] [address] ED 2/4/2020, with initial complaints of feeling weak, was found \"slumped over\" in his rollator walker by his wife at approx 1930, so his wife called 911.  Enroute, EMS witnessed seizure-like activity, unknown what was actually witnessed, then EMS reported pt had a PEA arrest, gave epi and CPR. On arrival to [facility] ED, pt was noted to have a pulse, was then intubated for airway protection. A CT of his head was performed, which showed \"No acute intracranial hemorrhage or large territory ischemia\" per chart review, but images are not available for review. Discussed with Dr. Tim Kramer who reports no seizure activity was witnessed in the ED and that pt was moving all extremities after intubation, so was then sedated with propofol. He was then transferred to [facility] for higher level of care. During transport, he received fentanyl, rocuronium, ketamine and versed to keep him sedated. Pt does not have a prior hx of seizure, is on dialysis and is currently compliant with tx but has been non-compliant in the past.  
 
During this hospital stay his neurological status unfortunately never improved. Neurology workup including multiple spot EEGs and a 24 hour EEG that never conveyed seizures and associated his lack of responsiveness to anoxic encephalopathy. He had an MRI on 2/10/2020 that had changes consistent with his recent hypoxic ischemic episode and PEA arrest that showed multiple small bilaterally symmetrical infarcts in the deep white matter of the cerebral hemispheres and in the cerebellum. He received dialysis three times a week (via a previous RUE AVF), was treated with Vancomycin for MRSA PNA (completed a 14 day course), had a PEG tube and tracheostomy placed in the hopes that he would regain some neurological recovery. His vent settings are minimal, he is on Assist Control at a rate of 19, Vt of 450 cc, PEEP of 5. He has failed his spontaneous breathing trials due to increased work of breathing and developed subsequent hypercarbia. He does receive midodrine 10 mg every 8 hours and has so on an outpatient basis prior to being admitted to this hospital.  He is getting TF's at goal: Peptamen 1.5 @ 55 ml/hr with 30 ml water flush every 6 hours. Physical examination at discharge Visit Vitals /64 Pulse (!) 108 Temp 98.6 °F (37 °C) Resp 21 Ht 6' 2\" (1.88 m) Wt 70.7 kg (155 lb 13.8 oz) SpO2 90% BMI 20.01 kg/m² Recent Labs  
  02/27/20 
7117 02/26/20 
0448 02/25/20 
9781 WBC 12.0* 10.5 12.4* HGB 7.9* 7.3* 8.3* HCT 26.2* 23.9* 27.5*  
 244 256 Recent Labs  
  02/27/20 
5347 02/26/20 
0448 02/25/20 
1474 * 134* 134* K 4.0 4.3 4.2  103 100 CO2 28 26 28 BUN 25* 41* 29* CREA 2.62* 3.82* 3.15* * 150* 188* CA 8.3* 8.4* 8.1*  
MG 2.4 2.9* 2.6* PHOS 1.7* 1.8* 2.3* Recent Labs  
  02/25/20 
5275 SGOT 20 * TP 9.3* ALB 1.4*  
GLOB 7.9* No results for input(s): INR, PTP, APTT, INREXT, INREXT in the last 72 hours. No results for input(s): FE, TIBC, PSAT, FERR in the last 72 hours. No results for input(s): PH, PCO2, PO2 in the last 72 hours. No results for input(s): CPK, CKMB in the last 72 hours. No lab exists for component: TROPONINI No components found for: [place] Pertinent imaging studies: EXAM: MRI BRAIN WO CONT 
  
TECHNIQUE: Brain images including sagittal and axial T1-weighted, axial FLAIR, T2-weighted, diffusion weighted, gradient echo,  susceptibility weighted. Coronal T2 
  
IV CONTRAST:  None 
  INDICATION:  assess for interval changes, encephalopathy 
  
COMPARISON:  MRI of 2/6/2020 
  
FINDINGS: 
BRAIN PARENCHYMA:   
1. Multiple small bilateral foci of restricted diffusion in the deep cerebral 
white matter and in the cerebellum bilaterally at the base of the middle 
cerebellar peduncles,, in a watershed distribution and bilaterally symmetrical. 
Findings are most consistent with areas of acute infarction from a 
hypoxic/ischemic event. These findings are new since 2/5/2020. 
2. Unchanged small area of encephalomalacia in the left posterior temporal lobe. INTRACRANIAL HEMORRHAGE: Unchanged foci of chronic microhemorrhage in the right cerebellum and in the bilateral basal ganglia. CSF SPACES:  Normal in size and morphology for the patient's age. BASAL CISTERNS:  Patent. MIDLINE SHIFT: None. VASCULAR SYSTEM:  Normal flow voids. PARANASAL SINUSES AND MASTOID AIR CELLS:  Fluid in the dependent nasal cavity. Mild mastoid fluid. Unchanged right petrous apex effusion. No significant 
paranasal sinus opacification. VISUALIZED ORBITS:  No significant abnormalities. Previous bilateral cataract 
surgery. VISUALIZED UPPER CERVICAL SPINE:  No significant abnormalities. SELLA:  No enlargement or  focal abnormality. SKULL BASE:  No significant abnormalities. Cerebellar tonsils in normal 
position. CALVARIUM:  Intact.  
  
  
IMPRESSION IMPRESSION:   
1. Multiple small bilaterally symmetrical infarcts in the deep white matter of 
the cerebral hemispheres and in the cerebellum. Findings are most consistent 
with a recent hypoxic ischemic episode. 2. Small area of encephalomalacia in the left temporal lobe. 
--------------------------------- Chronic Diagnoses:   
Problem List as of 2/27/2020 Date Reviewed: 1/14/2020 Codes Class Noted - Resolved * (Principal) Severe anoxic-ischemic encephalopathy (HCC) ICD-10-CM: G93.1, I67.82 
ICD-9-CM: 348.1, 437.1 Acute 2/5/2020 - Present Cerebral infarction, acute ([facility] ICD-10-CM: I63.9 ICD-9-CM: 434.91 Acute 2/5/2020 - Present New cerebellar infarct [facility]) ICD-10-CM: I63.9 ICD-9-CM: 434.91 Acute 2/5/2020 - Present Hypophosphatasia ICD-10-CM: E83.39 
ICD-9-CM: 275.3 Acute 2/11/2020 - Present Severe muscle deconditioning (Chronic) ICD-10-CM: T09.548 ICD-9-CM: 781.99 End Stage 2/5/2020 - Present Acute hypokalemia ICD-10-CM: E87.6 ICD-9-CM: 276.8 Acute 2/8/2020 - Present Diabetic neuropathic cachexia (HCC) (Chronic) ICD-10-CM: E11.40, R64 
ICD-9-CM: 250.60, 799.4 End Stage 2/5/2020 - Present RESOLVED: Severe sepsis with acute organ dysfunction (HCC) ICD-10-CM: A41.9, R65.20 ICD-9-CM: 038.9, 995.92 Acute 2/5/2020 - 2/11/2020 Acute respiratory failure with hypoxia and hypercapnia (HCC) ICD-10-CM: J96.01, J96.02 
ICD-9-CM: 518.81 Acute 2/5/2020 - Present Uses roller walker (Chronic) ICD-10-CM: K03.28 ICD-9-CM: V46.8 End Stage 1/1/2017 - Present Encephalomalacia (Chronic) ICD-10-CM: M12.25 ICD-9-CM: 348.89 Acute 2/10/2020 - Present Overview Signed 2/11/2020  9:17 AM by Rachelle Au MD  
  LEFT temporal lobe.   
  
  
   
 Cardiac arrest with pulseless electrical activity ([facility] ICD-10-CM: I46.9 ICD-9-CM: 427.5 Acute 2/5/2020 - Present Cardiopulmonary arrest with successful resuscitation [facility]) ICD-10-CM: I46.9 ICD-9-CM: 427.5  2/5/2020 - Present S/p nephrectomy (Chronic) ICD-10-CM: Z90.5 ICD-9-CM: V45.73 End Stage 2/5/2020 - Present Overview Signed 2/11/2020  9:14 AM by Rachelle Au MD  
  LEFT Peripheral vascular disease ([facility] ICD-10-CM: I73.9 ICD-9-CM: 443.9  11/14/2019 - Present Murmur, cardiac ICD-10-CM: R01.1 ICD-9-CM: 785.2  4/25/2019 - Present Overview Addendum 4/27/2019  1:53 PM by Azalea Pearl MD  
  TR 
 
ECHO 4/25/19:   
 
· Estimated left ventricular ejection fraction is 56 - 60%. Visually measured ejection fraction. Left ventricular severe concentric hypertrophy. · Left atrial cavity size is severely dilated. · Trace mitral valve regurgitation. · Mild to moderate tricuspid valve regurgitation is present. Moderate to severe pulmonary hypertension is present. · Severely elevated central venous pressure (15+ mmHg); IVC diameter is larger than 21 mm and collapses less than 50% with respiration. ESRD (end stage renal disease) [facility] ICD-10-CM: N18.6 ICD-9-CM: 585.6  11/13/2018 - Present Dilated cardiomyopathy ([facility] ICD-10-CM: I42.0 ICD-9-CM: 425.4  4/27/2018 - Present ESRD (end stage renal disease) on dialysis ([facility] ICD-10-CM: N18.6, Z99.2 ICD-9-CM: 585.6, V45.11  4/27/2018 - Present Hypertensive heart disease with chronic systolic congestive heart failure (HCC) ICD-10-CM: I11.0, I50.22 ICD-9-CM: 402.91, 428.22  4/26/2018 - Present CKD stage 5 due to type 2 diabetes mellitus ([facility] ICD-10-CM: E11.22, N18.5 ICD-9-CM: 250.40, 585.5  2/2/2018 - Present Type 2 diabetes mellitus with diabetic nephropathy ([facility] ICD-10-CM: E11.21 
ICD-9-CM: 250.40, 583.81  8/28/2015 - Present HLD (hyperlipidemia) ICD-10-CM: E78.5 ICD-9-CM: 272.4  8/28/2015 - Present Carpal tunnel syndrome, left ICD-10-CM: G56.02 
ICD-9-CM: 354.0  8/28/2015 - Present Peripheral autonomic neuropathy due to diabetes mellitus ([facility] ICD-10-CM: E11.43 
ICD-9-CM: 250.60, 337.1  7/4/2015 - Present Renal cell cancer (HCC) ICD-10-CM: C64.9 ICD-9-CM: 189.0  4/10/2015 - Present Peyronie's disease ICD-10-CM: N48.6 ICD-9-CM: 607.85  11/27/2013 - Present HTN (hypertension) ICD-10-CM: I10 
ICD-9-CM: 401.9  11/27/2013 - Present RESOLVED: Age-related nuclear cataract, right eye (Chronic) ICD-10-CM: H25.11 ICD-9-CM: 366.16  9/17/2019 - 9/18/2019 RESOLVED: Age-related nuclear cataract, left eye (Chronic) ICD-10-CM: H25.12 
ICD-9-CM: 366.16  9/10/2019 - 9/11/2019 RESOLVED: Pleural effusion ICD-10-CM: J90 ICD-9-CM: 511.9  4/29/2018 - 5/7/2018 RESOLVED: Pleural effusion on right ICD-10-CM: J90 ICD-9-CM: 511.9  4/27/2018 - 5/7/2018 Overview Signed 4/27/2018  2:09 PM by Isamar Sanchez NP  
  4/24/18 CT placed with 2200cc out RESOLVED: Acute systolic congestive heart failure, NYHA class 2 (HCC) ICD-10-CM: I50.21 ICD-9-CM: 428.21, 428.0  4/26/2018 - 5/10/2018 RESOLVED: Hyperkalemia ICD-10-CM: E87.5 ICD-9-CM: 276.7  4/24/2018 - 5/10/2018  RESOLVED: Acute respiratory failure with hypoxia (HCC) ICD-10-CM: J96.01 
 ICD-9-CM: 518.81  4/24/2018 - 5/10/2018 RESOLVED: DKA (diabetic ketoacidoses) ([facility] ICD-10-CM: E11.10 ICD-9-CM: 250.10  3/16/2018 - 5/10/2018 RESOLVED: Acute on chronic renal failure (HCC) ICD-10-CM: N17.9, N18.9 ICD-9-CM: 584.9, 585.9  3/16/2018 - 5/10/2018 RESOLVED: HCAP (healthcare-associated pneumonia) ICD-10-CM: J18.9 ICD-9-CM: 498  3/16/2018 - 5/10/2018 RESOLVED: DKA (diabetic ketoacidoses) [facility] ICD-10-CM: E11.10 ICD-9-CM: 250.10  2/13/2018 - 2/19/2018 RESOLVED: GI bleed ICD-10-CM: K92.2 ICD-9-CM: 578.9  11/27/2013 - 5/10/2018 RESOLVED: Liver abscess ICD-10-CM: K75.0 ICD-9-CM: 572.0  11/27/2013 - 2/11/2020 RESOLVED: Pancreatic pseudocyst ICD-10-CM: K86.3 ICD-9-CM: 577.2  11/27/2013 - 2/11/2020 RESOLVED: Vasovagal reaction ICD-10-CM: R55 
ICD-9-CM: 780.2  10/4/2013 - 10/4/2013 RESOLVED: Dehydration ICD-10-CM: E86.0 ICD-9-CM: 276.51  10/4/2013 - 10/4/2013 Time spent on discharge related activities today greater than 30 minutes. Signed:  
 
 Sandy Gutierrez DO Staff C/ [place] 64 2/27/2020 
 12:43 PM 
 Attending Cc: Karrie Mota MD

## 2020-02-26 NOTE — DIABETES MGMT
Diabetes Management Team to sign off at this point as patient BG remains stable. Please re-consult us if patient needs change. Thank you for including us in his care. Signed By: CANDI Hermosillo February 26, 2020

## 2020-02-26 NOTE — PROGRESS NOTES
0730: Bedside and Verbal shift change report given to Zachary Nayak RN (oncoming nurse) by David Elena RN (offgoing nurse). Report included the following information SBAR, Kardex, Intake/Output, MAR, Recent Results, Cardiac Rhythm Sinus Tach and Dual Neuro Assessment. 0800:  Assessment complete. No new issues. Patient remains unresponsive without sedation on the vent. 1030:  ICU multidisciplinary rounds lead by Dr. Manuel Ramirez, [address] [place] [address]  (Intensivist): The following were reviewed and discussed: current labs,  recent imaging, lines/drains, review of body systems, nutrition, cultures, mobility, length of ICU stay. The plan of care for the day is as follows  Continue current plan of care. Plan for HD later today and discharge to [facility] tomorrow. (written note by RN) 1200:  Reassessment complete. No new issues. 1530:  Bedside and Verbal shift change report given to Briana Holland RN (oncoming nurse) by Zachary Nayak RN (offgoing nurse). Report included the following information SBAR, Kardex, Intake/Output, MAR, Recent Results, Cardiac Rhythm Sinus Tach/NSR and Dual Neuro Assessment. Shift Summary:  Uneventful shift, plan for patient to transport to [facility] at 1000 tomorrow morning.
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
## 2020-02-27 NOTE — PROGRESS NOTES
[facility] 
 [address] [facility], [address] [facility] Phone: [phone]   [phone]   
  
Nephrology Progress Note Jareth Pack     1960     [id number] Date of Admission : 2/5/2020 02/27/20 CC: Follow up for ESRD Assessment and Plan ESRD- HD  
- Dialyzes MWF at [address]. F/b Dr Deshawn Armas  
- HD yesterday no issues - d/c to [place] today Hypotension: 
- on midodrine 10mg TID 
  
Resp failure s/p trach Seizure - per CCM / Neuro Multiple b/l infarcts from hypoxic injury: 
- per neuro 
  
S/P PEA arrest  
  
Hx of R Pleural effusion w/ Trapped Lung - s/p VATS and decortication 4/2018 
  
Anemia of CKD  
- continue LUIS  
  
Solitary Kidney S/p Prior Left Nephrectomy for RCC 
  
Sec HTPH Malnutrition s/p PEG Interval History: 
Seen and examined. No new issues overnight. Remains unresponsive. D/c to [facility] this AM. Review of Systems: Review of systems not obtained due to patient factors. Current Medications:  
Current Facility-Administered Medications Medication Dose Route Frequency  sodium chloride (NS) flush 5-40 mL  5-40 mL IntraVENous Q8H  
 0.9% sodium chloride infusion 250 mL  250 mL IntraVENous PRN  
 0.9% sodium chloride infusion 250 mL  250 mL IntraVENous PRN  
 balsam peru-castor oil (VENELEX) ointment   Topical Q8H  
 atorvastatin (LIPITOR) tablet 20 mg  20 mg Oral DAILY  epoetin tyler-epbx (RETACRIT) injection 20,000 Units  20,000 Units SubCUTAneous Q MON, WED & FRI  lansoprazole (PREVACID) 3 mg/mL oral suspension 30 mg  30 mg Per NG tube ACB  insulin glargine (LANTUS) injection 15 Units  15 Units SubCUTAneous DAILY  acetaminophen (TYLENOL) solution 650 mg  650 mg Per NG tube Q6H PRN  
 albumin human 25% (BUMINATE) solution 12.5 g  12.5 g IntraVENous DIALYSIS PRN  
 midodrine (PROAMITINE) tablet 10 mg  10 mg Oral Q8H  
 heparin (porcine) injection 5,000 Units  5,000 Units SubCUTAneous Q12H  insulin lispro (HUMALOG) injection   SubCUTAneous Q6H  
 lipase-protease-amylase (CREON 24,000) capsule 3 Cap  3 Cap Oral Q8H  
 white petrolatum-mineral oil (AKWA TEARS) 83-15 % ophthalmic ointment   Both Eyes Q12H  
 sodium chloride (NS) flush 5-40 mL  5-40 mL IntraVENous Q8H  
 sodium chloride (NS) flush 5-40 mL  5-40 mL IntraVENous PRN  
 glucose chewable tablet 16 g  4 Tab Oral PRN  
 glucagon (GLUCAGEN) injection 1 mg  1 mg IntraMUSCular PRN  
 dextrose 10% infusion 0-250 mL  0-250 mL IntraVENous PRN  chlorhexidine (ORAL CARE KIT) 0.12 % mouthwash 15 mL  15 mL Oral Q12H Allergies Allergen Reactions  Tramadol Other (comments) Brought down blood sugar too fast  
 
 
Objective: 
Vitals:   
Vitals:  
 02/27/20 0600 02/27/20 0626 02/27/20 0700 02/27/20 0743 BP: 116/65  117/64 Pulse: (!) 107  (!) 106 (!) 108 Resp: 22 21 21 Temp:      
TempSrc:      
SpO2: 96%  91% 90% Weight:  70.7 kg (155 lb 13.8 oz) Height:      
 
Intake and Output: 
No intake/output data recorded. 02/25 1901 - 02/27 0700 In: 1250 Out: 2550 [Drains:550] Physical Examination: 
General:  On vent HEENT: normocephalic Neck: trach in place Lungs : CTA anteriorlly CVS: RRR, S1 S2 normal, No murmur Abdomen: Soft, NT, + PEG Extremities: 2+ upper extremity Edema Neurologic:unresponsive on vent Psych: Unable to assess Access: RUE AVF cannulated 
 
[]    High complexity decision making was performed 
[]    Patient is at high-risk of decompensation with multiple organ involvement Lab Data Personally Reviewed: I have reviewed all the pertinent labs, microbiology data and radiology studies during assessment. Recent Labs  
  02/27/20 
4054 02/26/20 
0448 02/25/20 
5728 * 134* 134* K 4.0 4.3 4.2  103 100 CO2 28 26 28 * 150* 188* BUN 25* 41* 29* CREA 2.62* 3.82* 3.15* CA 8.3* 8.4* 8.1*  
MG 2.4 2.9* 2.6* PHOS 1.7* 1.8* 2.3* ALB  --   --  1.4*  
 SGOT  --   --  20 ALT  --   --  11* Recent Labs  
  02/27/20 
4755 02/26/20 
0448 02/25/20 
8050 WBC 12.0* 10.5 12.4* HGB 7.9* 7.3* 8.3* HCT 26.2* 23.9* 27.5*  
 244 256 No results found for: SDES Lab Results Component Value Date/Time Culture result: NO GROWTH 5 DAYS 02/19/2020 07:28 PM  
 Culture result: NO GROWTH 8 DAYS 02/19/2020 07:28 PM  
 Culture result: NO GROWTH 5 DAYS 02/18/2020 02:29 PM  
 Culture result: (A) 02/18/2020 02:29 PM  
  HEAVY * METHICILLIN RESISTANT STAPHYLOCOCCUS AUREUS * Culture result: SCANT NORMAL RESPIRATORY BON 02/18/2020 02:29 PM  
 
Recent Results (from the past 24 hour(s)) GLUCOSE, POC Collection Time: 02/26/20 12:27 PM  
Result Value Ref Range Glucose (POC) 235 (H) 65 - 100 mg/dL Performed by Minnie Telles ( [facility]) GLUCOSE, POC Collection Time: 02/26/20  6:56 PM  
Result Value Ref Range Glucose (POC) 263 (H) 65 - 100 mg/dL Performed by DANIEL GROVER   
GLUCOSE, POC Collection Time: 02/27/20  1:16 AM  
Result Value Ref Range Glucose (POC) 188 (H) 65 - 100 mg/dL Performed by Licha Pillai PANEL, BASIC Collection Time: 02/27/20  6:35 AM  
Result Value Ref Range Sodium 133 (L) 136 - 145 mmol/L Potassium 4.0 3.5 - 5.1 mmol/L Chloride 100 97 - 108 mmol/L  
 CO2 28 21 - 32 mmol/L Anion gap 5 5 - 15 mmol/L Glucose 215 (H) 65 - 100 mg/dL BUN 25 (H) 6 - 20 MG/DL Creatinine 2.62 (H) 0.70 - 1.30 MG/DL  
 BUN/Creatinine ratio 10 (L) 12 - 20 GFR est AA 31 (L) >60 ml/min/1.73m2 GFR est non-AA 25 (L) >60 ml/min/1.73m2 Calcium 8.3 (L) 8.5 - 10.1 MG/DL MAGNESIUM Collection Time: 02/27/20  6:35 AM  
Result Value Ref Range Magnesium 2.4 1.6 - 2.4 mg/dL PHOSPHORUS Collection Time: 02/27/20  6:35 AM  
Result Value Ref Range Phosphorus 1.7 (L) 2.6 - 4.7 MG/DL  
CBC W/O DIFF Collection Time: 02/27/20  6:35 AM  
Result Value Ref Range WBC 12.0 (H) 4.1 - 11.1 K/uL RBC 3.45 (L) 4.10 - 5.70 M/uL HGB 7.9 (L) 12.1 - 17.0 g/dL HCT 26.2 (L) 36.6 - 50.3 % MCV 75.9 (L) 80.0 - 99.0 FL  
 MCH 22.9 (L) 26.0 - 34.0 PG  
 MCHC 30.2 30.0 - 36.5 g/dL  
 RDW 18.4 (H) 11.5 - 14.5 % PLATELET 859 204 - 552 K/uL MPV 10.7 8.9 - 12.9 FL  
 NRBC 0.0 0  WBC ABSOLUTE NRBC 0.00 0.00 - 0.01 K/uL GLUCOSE, POC Collection Time: 02/27/20  6:38 AM  
Result Value Ref Range Glucose (POC) 222 (H) 65 - 100 mg/dL Performed by BENEDICTO Total time spent with patient:  xxx   min. Care Plan discussed with: 
Patient Family RN Consulting Physician [address] [facility],      
 
I have reviewed the flowsheets. Chart and Pertinent Notes have been reviewed. No change in PMH ,family and social history from Consult note.  
 
 
Ivonne Hammans, MD
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
## 2020-02-27 NOTE — DIALYSIS
[facility] Dialysis Team [place]  [phone] Vitals    Post   Assessment    Post    
Temp    98.2 LOC   Minimally responsive HR    109 Lungs     Clear/ 
Diminished, Trached and Mechanically Ventilated B/P   101/65 Cardiac     Sinus Tach Resp    22 Skin     Warm/Dry Pain level   0 Edema   2+ bilat upper extremities Orders: Duration:   Start:     End:    0330 Total:   3.5 hours Dialyzer:   Dialyzer/Set Up Inspection: Ranny Flair (02/26/20 2345) K Bath:   Dialysate K (mEq/L): 2 (02/26/20 2355) Ca Bath:   Dialysate CA (mEq/L): 2.5 (02/26/20 2355) Na/Bicarb:   Dialysate NA (mEq/L): 140 (02/26/20 2355) Target Fluid Removal:   Goal/Amount of Fluid to Remove (mL): 2000 mL (02/26/20 2345) Access Type & Location:   RUE AVF, + thrill, + bruit Labs Obtained/Reviewed Critical Results Called   Date when labs were drawn- 
Hgb-   
HGB Date Value Ref Range Status 02/26/2020 7.3 (L) 12.1 - 17.0 g/dL Final  
 
K-   
Potassium Date Value Ref Range Status 02/26/2020 4.3 3.5 - 5.1 mmol/L Final  
 
Ca-  
Calcium Date Value Ref Range Status 02/26/2020 8.4 (L) 8.5 - 10.1 MG/DL Final  
 
Bun-  
BUN Date Value Ref Range Status 02/26/2020 41 (H) 6 - 20 MG/DL Final  
 
Creat-  
Creatinine Date Value Ref Range Status 02/26/2020 3.82 (H) 0.70 - 1.30 MG/DL Final  
 
  
Medications/ Blood Products Given Name   Dose   Route and Time Blood Volume Processed (BVP):    87 Net Fluid Removed:  2000 mls Comments Time Out Done: See Pre Note Primary Nurse Rpt Pre:Care Assumed from Kirsty Sanches at 0200 Primary Nurse Rpt Post:Trevor Dior RN 
Pt Education:HD Procedure/Care Given Care Plan:Pt will tolerate HD with hemodynamic stability Tx Summary: Assumed care of the patient at 0200 with HD in progress. HD needle sites intact/visible in the RUE. Assessment and VS unchanged from previous assessment.   HD completed without incident, all possible blood rinsed back to the patient at the conclusion of the treatment. Admiting Diagnosis:ESRD Pt's previous clinic- 
Consent signed - Informed Consent Verified: Yes (02/26/20 6443) Buffy Consent - Yes Hepatitis Status- HepB Surface Antigen Negative 2/7/2020 Machine #- Machine Number: [id number] (02/26/20 3502) Telemetry status-Bedside Monitor Pre-dialysis wt. - Pre-Dialysis Weight: 74.9 kg (165 lb 2 oz) (02/19/20 0300)

## 2020-02-27 NOTE — DIALYSIS
[facility] Dialysis Team [place]  [phone] Vitals   Pre   Assessment   Pre Temp  98.5*F LOC  Responds to pain; alert but unable to assess orientation; minimal responsiveness with no purposeful responses HR   97 Lungs   Diminished throughout; Trach & on Vent./ rate controlled & 40% Fio2; Spo2 WNL; no distress & tolerating vent. B/P  149/75 Cardiac   NSR on bedside ICU telemetry monitoring; rate 90-low 100's Resp   22 Skin   Warm/Dry Pain level  0/10 *No signs of distress Edema  BUE +2 ; generalized non-pitting Orders: Duration:   Start:   2355 End:   N/A Total:   See Post HD Note Dialyzer:   Dialyzer/Set Up Inspection: Javon Landeros (02/26/20 2345) K Bath:   Dialysate K (mEq/L): 2 (02/26/20 2355) Ca Bath:   Dialysate CA (mEq/L): 2.5 (02/26/20 2355) Na/Bicarb:   Dialysate NA (mEq/L): 140 (02/26/20 2355) Dialysate Bicarb (mEq/L): 35 (02/26/20 2355) Target Fluid Removal:   Goal/Amount of Fluid to Remove (mL): 2000 mL (02/26/20 2345) Access Type & Location:   ANTONIO AVF: +bruit/+thrill; cannulated x 2 15 G needles without issue- +asp/+flush; Tx initiated without any issue. Labs Obtained/Reviewed Critical Results Called   Date when labs were drawn- 
Hgb-   
HGB Date Value Ref Range Status 02/26/2020 7.3 (L) 12.1 - 17.0 g/dL Final  
 
K-   
Potassium Date Value Ref Range Status 02/26/2020 4.3 3.5 - 5.1 mmol/L Final  
 
Ca-  
Calcium Date Value Ref Range Status 02/26/2020 8.4 (L) 8.5 - 10.1 MG/DL Final  
 
Bun-  
BUN Date Value Ref Range Status 02/26/2020 41 (H) 6 - 20 MG/DL Final  
 
Creat-  
Creatinine Date Value Ref Range Status 02/26/2020 3.82 (H) 0.70 - 1.30 MG/DL Final  
 
  
Medications/ Blood Products Given *Retacrit administered as ordered by primary RN prior to dialysis. Comments Time Out Done: Yes 2/26/20 4951 Primary Nurse Rpt Pre: Andreas Grande RN 
 Primary Nurse Rpt Post: Yoanna Valdez RN- assumed care of tx 2/27/20 @ 0200 Pt Education: Given- access care; procedural 
Care Plan: See Nephrology notes - MWF schedule ; plans for DC to [facility] Tx Summary: 
0000: HD started without issues; running well, VSS and pt in no apparent distress. Tolerating tx well. No s/s of distress. 0100: Tolerating tx well; no distress, VSS, no change in pt status. 0220: Bedside verbal tx report given to [address]; taking over pt care for remainder of tx. Xfer of pt care- no distress or changes in pt status, VSS & continues tx. Reported to primary RN changes made in care of tx on departure. Admiting Diagnosis: ESRD Consent signed - Informed Consent Verified: Yes (02/26/20 2345) Jacobita Consent - Yes verified: 2/26/20 2345 Hepatitis Status- Negative 2/7/20 Machine #- Machine Number: [id number] (02/26/20 0887) Telemetry status-ICU bedside telemetry monitoring Pre-dialysis wt.- 73.6 kg

## 2020-02-27 NOTE — PROGRESS NOTES
0730: Bedside and Verbal shift change report given to Ariadne Douglas (oncoming nurse) by Mikaela Chandler RN (offgoing nurse). Report included the following information SBAR, Kardex, ED Summary, OR Summary, Procedure Summary, Intake/Output, MAR, Accordion, Recent Results, Med Rec Status, Cardiac Rhythm Sinus Tach and Dual Neuro Assessment. TRANSFER - OUT REPORT: 
 
Verbal report given to Gisella Hill RN(name) on Saul Huddleston  being transferred to [facility](unit) for routine progression of care Report consisted of patients Situation, Background, Assessment and  
Recommendations(SBAR). Information from the following report(s) SBAR, Kardex, ED Summary, OR Summary, Procedure Summary, Intake/Output, MAR, Accordion, Recent Results, Med Rec Status and Cardiac Rhythm Sinus Tach was reviewed with the receiving nurse. Lines:  
Peripheral IV 02/17/20 Anterior; Left External jugular (Active) Site Assessment Clean, dry, & intact 2/27/2020  8:00 AM  
Phlebitis Assessment 0 2/27/2020  8:00 AM  
Infiltration Assessment 0 2/27/2020  8:00 AM  
Dressing Status Clean, dry, & intact 2/27/2020  8:00 AM  
Dressing Type Transparent 2/27/2020  8:00 AM  
Hub Color/Line Status Pink;Capped 2/27/2020  8:00 AM  
Action Taken Open ports on tubing capped 2/27/2020  8:00 AM  
Alcohol Cap Used Yes 2/27/2020  8:00 AM  
  
 
Opportunity for questions and clarification was provided. Patient transported with: 
[facility] staff and equipment

## 2020-02-27 NOTE — PROGRESS NOTES
Transitions of care Discharge to [facility] today Ambulance transport through [facility] for 10:00 am 
Report to be called to  [phone] Patient going to room 204A Wife notified and in agreement with discharge plan Candis Martínez RN,CRM

## 2020-03-11 LAB
BACTERIA SPEC CULT: NORMAL
SERVICE CMNT-IMP: NORMAL
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
## 2021-05-15 NOTE — PROGRESS NOTES
Hospitalist Progress Note    NAME: Jacquie Caputo   :  1960   MRN:  [id number]       Assessment / Plan:  Acute respiratory failure with hypoxia (improved) with Large Right Pleural Effusion due to Volume Overload in Settings of Renal Failure; s/p chest tube placement on   Hemopneumothorax: seen on CT Chest ()  -discussed last with Dr. Omid Shaffer on evening of  and I have initiated transfer request to [facility] as patient may need VATS and Decortication and needs CT Surgery Evaluation which is not available here  -cytology negative  -tPA in Chest tube on  to try and help break up loculations  -oxycodone added prn for pain from chest tube, IV fentanyl prn for severe pain  -s/p bronch on  with no endobronchial lesion     TTE Showed: Acute Systolic CHF: LVEF 76-96%; suspected to be related to Hypertension  -Cardiology evaluation appreciated  -stress test on  without inducible ischemia    Hyperkalemia: improved  COLTEN on CKD stage V  Worsening leg edema: now improving since starting HD  -Nephrology following  -Permcath placed     Accelerated HTN improved and patient with subsequent Hypotension; BP better after dialysis today   -discontinued NTG paste and Nifedipine on  with hypotension  -Continue Coreg and low dose lisinopril    Acute blood loss anemia s/p 1 unit pRBC's  Lower GI bleed with bright red blood per rectum  -Hold home ASA;  Uremia also likely contributing to platelet dysfunction  -continue PPI   -appreciate GI eval  -since no further bleeding diet was advanced and patient is tolerating well  -case discussed with Dr. Margot Baez today  -pending clinical course may consider CT A/P     Hyperosmolar Non Ketotic State with h/o DM type 2  Hypoglycemia on insulin gtt  -continue NPH at 5 units BID  -Humalog 3 units TID meals  -continue SSI     Hyperlipidemia  -Hold statin for now     Code Status: Full  Surrogate Decision Maker: Wife     DVT Prophylaxis: SCDs    Body mass index is 23.67 kg/(m^2). Subjective:     Chief Complaint / Reason for Physician Visit: follow-up COLTEN, hyperkalemia and hypoxia  Patient seen for follow-up  Denies complaints  Receiving HD today    Review of Systems:  Symptom Y/N Comments  Symptom Y/N Comments   Fever/Chills    Chest Pain n Denies any significant chest tube pain   Poor Appetite    Edema     Cough    Abdominal Pain n    Sputum    Joint Pain     SOB/IREEN n   Pruritis/Rash     Nausea/vomit n   Tolerating PT/OT     Diarrhea n   Tolerating Diet y    Constipation n   Other       Could NOT obtain due to:      Objective:     VITALS:   Last 24hrs VS reviewed since prior progress note.  Most recent are:  Patient Vitals for the past 24 hrs:   Temp Pulse Resp BP SpO2   04/28/18 1100 98.7 °F (37.1 °C) 87 - (!) 150/99 99 %   04/28/18 1045 - 90 - 138/84 98 %   04/28/18 1030 - (!) 101 - 127/78 94 %   04/28/18 1015 - 100 - 141/88 94 %   04/28/18 1000 - 94 - 122/80 96 %   04/28/18 0945 - 91 - 136/82 95 %   04/28/18 0930 - 92 - 141/85 95 %   04/28/18 0915 - 94 - 154/83 (!) 88 %   04/28/18 0900 - 90 - (!) 153/102 95 %   04/28/18 0845 - 92 - (!) 155/91 93 %   04/28/18 0830 - 86 20 160/89 95 %   04/28/18 0820 98.7 °F (37.1 °C) 79 - (!) 163/103 97 %   04/28/18 0808 98.6 °F (37 °C) 78 18 (!) 149/94 96 %   04/28/18 0342 - - - 142/88 -   04/28/18 0310 98.6 °F (37 °C) 81 18 (!) 163/95 96 %   04/28/18 0024 98.4 °F (36.9 °C) 83 18 (!) 151/92 97 %   04/27/18 1930 97.8 °F (36.6 °C) 77 18 150/90 97 %   04/27/18 1700 98.1 °F (36.7 °C) 78 16 122/85 98 %   04/27/18 1324 97.6 °F (36.4 °C) 86 16 (!) 157/91 98 %   04/27/18 1302 - 80 15 151/86 93 %   04/27/18 1300 - 83 17 154/81 93 %   04/27/18 1257 - 85 13 158/84 (!) 85 %   04/27/18 1255 - 85 18 153/86 (!) 89 %   04/27/18 1250 - 85 19 161/87 93 %   04/27/18 1247 - 84 19 154/84 93 %   04/27/18 1244 97.6 °F (36.4 °C) 88 13 141/84 91 %   04/27/18 1233 - 89 20 148/79 100 %   04/27/18 1231 - 88 14 146/83 100 %   04/27/18 1227 - 95 14 178/80 100 %   04/27/18 1223 - 91 17 148/82 99 %   04/27/18 1219 - 91 17 146/78 99 %   04/27/18 1215 - 88 (!) 0 146/78 96 %       Intake/Output Summary (Last 24 hours) at 04/28/18 1213  Last data filed at 04/28/18 1100   Gross per 24 hour   Intake             1160 ml   Output             2320 ml   Net            -1160 ml        PHYSICAL EXAM:  General: WD, WN. Alert, cooperative, no acute distress    EENT:  EOMI. Anicteric sclerae. MM dry  Resp:  Increasing breath sounds right lung field. No accessory muscle use; CT tube in place  CV:  Regular  rhythm,   BLE edema improving  GI:  Soft, less distended, mildly tender.  + bowel sounds  Neurologic:  Alert and oriented, speech is normal  Psych:   Fair insight. Not anxious nor agitated  Skin:  No rashes noted. No jaundice    Reviewed most current lab test results and cultures  YES  Reviewed most current radiology test results   YES  Review and summation of old records today    NO  Reviewed patient's current orders and MAR    YES  PMH/ reviewed - no change compared to H&P  ________________________________________________________________________  Care Plan discussed with:    Comments   Patient x    Family      RN x    Care Manager x    Consultant  x Dr. Naomi Pérez NP at [facility] team rounds were held today with , nursing, pharmacist and clinical coordinator. Patient's plan of care was discussed; medications were reviewed and discharge planning was addressed.      ________________________________________________________________________  Total NON critical care TIME:  35   Minutes    Total CRITICAL CARE TIME Spent:   Minutes non procedure based       Comments   >50% of visit spent in counseling and coordination of care x Working on transfer to [address] [place] [address]   ________________________________________________________________________  Geetha Leslie, MD     Procedures: see electronic medical records for all procedures/Xrays and details which were not copied into this note but were reviewed prior to creation of Plan. LABS:  I reviewed today's most current labs and imaging studies.   Pertinent labs include:  Recent Labs      04/28/18 0038 04/27/18 0430 04/26/18 0436   WBC  6.5  5.9  6.1   HGB  7.3*  7.3*  8.3*   HCT  23.3*  22.5*  25.7*   PLT  92*  95*  115*     Recent Labs      04/28/18 0038 04/27/18 0430 04/26/18 0436   NA  133*  138  140   K  4.5  4.1  4.4   CL  101  105  106   CO2  27  27  22   GLU  228*  66  77   BUN  50*  50*  73*   CREA  4.41*  4.00*  5.38*   CA  6.7*  6.8*  7.1*   PHOS   --    --   6.4*   ALB  1.3*  1.3*  1.4*   TBILI  0.2  0.2   --    SGOT  21  16   --    ALT  11*  12   --        Signed: Vernona Curling, MD DOMINIQUE/CIPRIANO/[place]

## 2021-09-01 NOTE — DIABETES MGMT
[address] Followup Progress Note Presentation Stiven Ocasio is a 64 y.o. male admitted with seizure like activity and PEA en route to hospital and has been intubated and on a ventilator since admission and consulted by Provider for advanced specialty nursing care related to inpatient diabetes management. Subjective Patient remains intubated and ventilated. On no sedation. Palliative care following and had meeting with family yesterday re: goals of care. No decisions made yesterday. Objective Physical exam 
General intubated on ventilator Vital Signs Visit Vitals /76 Pulse 74 Temp 99.1 °F (37.3 °C) Resp 15 Ht 6' 2\" (1.88 m) Wt 73.9 kg (162 lb 14.7 oz) SpO2 94% BMI 20.92 kg/m² Laboratory CKD on HD 
 
 
 
BG trends: hovering in low to mid 200s. On TF @50cc/hour continuous. Required 8units of correction yesterday. Lantus increased to 8units today. NO recommendations. Assessment and Plan Nursing Diagnosis Risk for unstable blood glucose pattern Nursing Intervention Domain 5251 Decision-making Support Nursing Interventions Examined current inpatient diabetes control Explored factors facilitating and impeding inpatient management Identified self-management practices impeding diabetes control Explored corrective strategies with patient and responsible inpatient provider Informed patient of rational for basal bolus insulin strategy while hospitalized Signed By: CANDI [facility] Contact via [employer]/ [phone] February 13, 2020 Radiation Oncology Consultation    Jovan Swartz  : 1936  MRN: [id number]    Date of Consult: 2021     Primary Physician: Baldomero Jacques DO  Referring Physician:  No ref. provider found         Cancer Diagnosis: NSCLC: squamous cell carcinoma, RLL, lung    Cancer Stage: **    Chief Complaint: Right lung cancer, discuss role of radiotherapy     History of Prior Radiation Therapy:   None    History of Present Illness:  Jovan Swartz is a 85 year old male former smoker (quit ) with 112.5 pack year hx with PMHx of Dementia, ETOH abuse, HTN, MI, PE, Schizophrenia and Goldman's esophagus. The patient's HCPOA document was activated 18 and Primary Agent is Ailin Swartz, Spouse. The patient currently resides in a skilled nursing facility at [facility]. The patient also has COPD, emphysema and chronic hypoxemic respiratory failure. The patient is followed by Dr. Lovett, Pulmonary Medicine, for his lung disease. Recent CT CHEST WO (21) noted a pulmonary nodule in right lung that had increased from 1.9 x 1.5 cm to 2.9 x 2.5 cm from prior CT on 20. Further evaluation with PET CT and Biopsy eventually resulted in recent diagnosis of NSCLC: squamous cell carcinoma of RLL, lung.    3/11/21: PET CT: showed enlarging FDG avid right lower lobe pulmonary nodule, suspicious for primary lung neoplasm. Focal FDG uptake along the right posterior costophrenic sulcus; this may relate to physiologic diaphragmatic activity but pleural disease cannot be completely excluded. Otherwise, no FDG avid metastasis. Other findings include: heterogeneous prostate gland activity is nonspecific and can be seen in prostatitis, BPH or neoplasm AND nonspecific activity within the right upper arm and adjacent to prosthesis may be inflammatory.    21: CT GUIDED LUNG BX; Right Lower Lung: consistent with squamous cell carcinoma. PD-L1 22C3 (KEYTRUDA): EXPRESSED - Tumor Proportion Score (TPS): 10%,  Intensity: 1+    6/22/21: Thoracic Multidisciplinary Conference: The patient's case was discussed with recommendation for repeat PET CT, PFTs and CT CHEST W/.    8/24/21: PET CT: noted increased size and intensity of FDG avid right lower lobe mass measuring up to 3.6 cm, increased from 2.9 cm on 3/11/2021. Pathologic avidity medial inferior right pleura is increased from prior and suspicious for metastatic involvement. No FDG avid intrathoracic lymphadenopathy. OTHER findings include: New L2 compression fracture, with multiple stable compression  deformities elsewhere, Increased activity along the inferior humeral component of right shoulder arthroplasty, which could be combination of inflammatory/muscular activity AND increased tracer uptake in the penile and scrotal region, possibly inflammatory or infectious. Possible associated abscess formation.     8/25/21: CT CHEST W/: showed interval enlargement of a right lower lobe mass, now measuring up to 4.0 cm. Unchanged appearance of the partially collapsed left lower lobe. There is associated moderate elevation of the left hemidiaphragm, unchanged. 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 Multiple thoracic compression fractures are similar to the comparison. A focus of increased metabolic activity in the inferior, medial right  pleural space demonstrates no distinct abnormality on CT.    8/27/21: Thoracic Multidisciplinary Conference. The patient's case discussed, again, and the patient felt not to be a surgical candidate; therefore, consensus recommendation for treatment with SBRT with limited number of fractions.    Radiation Oncology consult requested regarding the role of radiotherapy in the treatment of right lung cancer.    **    Past Medical History:  History of Connective Tissue Disorder: negative  History of Inflammatory Bowel Disease: negative  Pacemaker / AICD: No  Pregnancy Status: N/A Male Patient

## 2022-09-01 NOTE — PROGRESS NOTES
[facility] 
 [address] [facility], [facility] [address] [facility] Phone: [phone]   [phone]   
  
Nephrology Progress Note Oswald Wheeler     1960     [id number] Date of Admission : 2/5/2020 02/26/20 CC: Follow up for ESRD Assessment and Plan ESRD- HD  
- Dialyzes MWF at [address] [place] [address]. F/b Dr Taylor Jackson  
- HD for later today 
- awaiting Vibra bed Hypotension: 
- on midodrine 10mg TID 
  
Resp failure s/p trach Seizure - per CCM / Neuro Multiple b/l infarcts from hypoxic injury: 
- per neuro 
  
S/P PEA arrest  
  
Hx of R Pleural effusion w/ Trapped Lung - s/p VATS and decortication 4/2018 
  
Anemia of CKD  
- continue LUIS  
  
Solitary Kidney S/p Prior Left Nephrectomy for RCC 
  
Sec HTPH Malnutrition s/p PEG Interval History: 
Seen and examined. No new issues overnight. Remains unresponsive. Awaiting for Vibra bed at this time. Review of Systems: Review of systems not obtained due to patient factors. Current Medications:  
Current Facility-Administered Medications Medication Dose Route Frequency  sodium chloride (NS) flush 5-40 mL  5-40 mL IntraVENous Q8H  
 acetaminophen (TYLENOL) suppository 650 mg  650 mg Rectal Q6H PRN  
 0.9% sodium chloride infusion 250 mL  250 mL IntraVENous PRN  
 0.9% sodium chloride infusion 250 mL  250 mL IntraVENous PRN  
 balsam peru-castor oil (VENELEX) ointment   Topical Q8H  
 atorvastatin (LIPITOR) tablet 20 mg  20 mg Oral DAILY  epoetin tyler-epbx (RETACRIT) injection 20,000 Units  20,000 Units SubCUTAneous Q MON, WED & FRI  lansoprazole (PREVACID) 3 mg/mL oral suspension 30 mg  30 mg Per NG tube ACB  insulin glargine (LANTUS) injection 15 Units  15 Units SubCUTAneous DAILY  acetaminophen (TYLENOL) solution 650 mg  650 mg Per NG tube Q6H PRN  
 albumin human 25% (BUMINATE) solution 12.5 g  12.5 g IntraVENous DIALYSIS PRN  
 midodrine (PROAMITINE) tablet 10 mg  10 mg Oral Q8H  
 Spoke with patient and reschedule for December, PFT appointment patient will keep for 9/9.    Angel Bee MA      heparin (porcine) injection 5,000 Units  5,000 Units SubCUTAneous Q12H  
 insulin lispro (HUMALOG) injection   SubCUTAneous Q6H  
 lipase-protease-amylase (CREON 24,000) capsule 3 Cap  3 Cap Oral Q8H  
 white petrolatum-mineral oil (AKWA TEARS) 83-15 % ophthalmic ointment   Both Eyes Q12H  
 sodium chloride (NS) flush 5-40 mL  5-40 mL IntraVENous Q8H  
 sodium chloride (NS) flush 5-40 mL  5-40 mL IntraVENous PRN  
 glucose chewable tablet 16 g  4 Tab Oral PRN  
 glucagon (GLUCAGEN) injection 1 mg  1 mg IntraMUSCular PRN  
 dextrose 10% infusion 0-250 mL  0-250 mL IntraVENous PRN  chlorhexidine (ORAL CARE KIT) 0.12 % mouthwash 15 mL  15 mL Oral Q12H Allergies Allergen Reactions  Tramadol Other (comments) Brought down blood sugar too fast  
 
 
Objective: 
Vitals:   
Vitals:  
 02/26/20 0700 02/26/20 0705 02/26/20 0800 02/26/20 0900 BP: 134/72  137/73 144/74 Pulse: 99 (!) 104 (!) 102 99 Resp: 23 25 23 24 Temp:   100.2 °F (37.9 °C) TempSrc:      
SpO2: 96% 96% 90% 91% Weight:      
Height:      
 
Intake and Output: 
02/26 0701 - 02/26 1900 In: 28 Out: -  
02/24 1901 - 02/26 0700 In: 4140 [I.V.:720] Out: 775 [Drains:775] Physical Examination: 
General:  On vent HEENT: normocephalic Neck: trach in place Lungs : CTA anteriorlly CVS: RRR, S1 S2 normal, No murmur Abdomen: Soft, NT, + PEG Extremities: 2+ upper extremity Edema Neurologic:unresponsive on vent Psych: Unable to assess Access: RUE AVF cannulated 
 
[]    High complexity decision making was performed 
[]    Patient is at high-risk of decompensation with multiple organ involvement Lab Data Personally Reviewed: I have reviewed all the pertinent labs, microbiology data and radiology studies during assessment. Recent Labs  
  02/26/20 
0448 02/25/20 
2488 02/24/20 
0355 * 134* 134* K 4.3 4.2 5.2*  
 100 103 CO2 26 28 24 * 188* 135* BUN 41* 29* 49* CREA 3.82* 3.15* 4.65* CA 8.4* 8.1* 7.7* MG 2.9* 2.6* 3.0*  
PHOS 1.8* 2.3* 3.8 ALB  --  1.4*  --   
SGOT  --  20  --   
ALT  --  11*  --   
 
Recent Labs  
  02/26/20 
0448 02/25/20 
0318 02/24/20 
0355 WBC 10.5 12.4* 11.5* HGB 7.3* 8.3* 8.0*  
HCT 23.9* 27.5* 26.5*  
 256 251 No results found for: SDES Lab Results Component Value Date/Time Culture result: NO GROWTH 5 DAYS 02/19/2020 07:28 PM  
 Culture result: NO GROWTH 7 DAYS 02/19/2020 07:28 PM  
 Culture result: NO GROWTH 5 DAYS 02/18/2020 02:29 PM  
 Culture result: (A) 02/18/2020 02:29 PM  
  HEAVY * METHICILLIN RESISTANT STAPHYLOCOCCUS AUREUS * Culture result: SCANT NORMAL RESPIRATORY BON 02/18/2020 02:29 PM  
 
Recent Results (from the past 24 hour(s)) GLUCOSE, POC Collection Time: 02/25/20 11:33 AM  
Result Value Ref Range Glucose (POC) 160 (H) 65 - 100 mg/dL Performed by Jewell Aragon POC EG7 Collection Time: 02/25/20  4:07 PM  
Result Value Ref Range Calcium, ionized (POC) 1.20 1. 12 - 1.32 mmol/L  
 FIO2 (POC) 0.60 % pH (POC) 7.233 (LL) 7.35 - 7.45    
 pCO2 (POC) 72.5 (H) 35.0 - 45.0 MMHG  
 pO2 (POC) 68 (L) 80 - 100 MMHG  
 HCO3 (POC) 30.6 (H) 22 - 26 MMOL/L Base excess (POC) 3 mmol/L  
 sO2 (POC) 88 (L) 92 - 97 % Site RIGHT RADIAL Device: VENT Mode ASSIST CONTROL Tidal volume 450 ml Set Rate 18 bpm  
 PEEP/CPAP (POC) 5 cmH2O  
 PIP (POC) 26 Allens test (POC) YES Specimen type (POC) ARTERIAL Total resp. rate 18 Volume control YES    
POC EG7 Collection Time: 02/25/20  5:43 PM  
Result Value Ref Range Calcium, ionized (POC) 1.19 1.12 - 1.32 mmol/L  
 FIO2 (POC) 0.60 % pH (POC) 7.430 7.35 - 7.45    
 pCO2 (POC) 41.0 35.0 - 45.0 MMHG  
 pO2 (POC) 70 (L) 80 - 100 MMHG  
 HCO3 (POC) 27.3 (H) 22 - 26 MMOL/L Base excess (POC) 3 mmol/L  
 sO2 (POC) 94 92 - 97 % Site RIGHT RADIAL Device: VENT Mode ASSIST CONTROL Tidal volume 468 ml Set Rate 26 bpm  
 PEEP/CPAP (POC) 5 cmH2O  
 PIP (POC) 24 Allens test (POC) YES Specimen type (POC) ARTERIAL Total resp. rate 26 GLUCOSE, POC Collection Time: 02/25/20  6:36 PM  
Result Value Ref Range Glucose (POC) 105 (H) 65 - 100 mg/dL Performed by Noemi Toney GLUCOSE, POC Collection Time: 02/26/20 12:18 AM  
Result Value Ref Range Glucose (POC) 109 (H) 65 - 100 mg/dL Performed by Shannan Smyth METABOLIC PANEL, BASIC Collection Time: 02/26/20  4:48 AM  
Result Value Ref Range Sodium 134 (L) 136 - 145 mmol/L Potassium 4.3 3.5 - 5.1 mmol/L Chloride 103 97 - 108 mmol/L  
 CO2 26 21 - 32 mmol/L Anion gap 5 5 - 15 mmol/L Glucose 150 (H) 65 - 100 mg/dL BUN 41 (H) 6 - 20 MG/DL Creatinine 3.82 (H) 0.70 - 1.30 MG/DL  
 BUN/Creatinine ratio 11 (L) 12 - 20 GFR est AA 20 (L) >60 ml/min/1.73m2 GFR est non-AA 16 (L) >60 ml/min/1.73m2 Calcium 8.4 (L) 8.5 - 10.1 MG/DL MAGNESIUM Collection Time: 02/26/20  4:48 AM  
Result Value Ref Range Magnesium 2.9 (H) 1.6 - 2.4 mg/dL PHOSPHORUS Collection Time: 02/26/20  4:48 AM  
Result Value Ref Range Phosphorus 1.8 (L) 2.6 - 4.7 MG/DL  
CBC W/O DIFF Collection Time: 02/26/20  4:48 AM  
Result Value Ref Range WBC 10.5 4.1 - 11.1 K/uL  
 RBC 3.12 (L) 4.10 - 5.70 M/uL HGB 7.3 (L) 12.1 - 17.0 g/dL HCT 23.9 (L) 36.6 - 50.3 % MCV 76.6 (L) 80.0 - 99.0 FL  
 MCH 23.4 (L) 26.0 - 34.0 PG  
 MCHC 30.5 30.0 - 36.5 g/dL  
 RDW 18.5 (H) 11.5 - 14.5 % PLATELET 323 885 - 916 K/uL MPV 11.6 8.9 - 12.9 FL  
 NRBC 0.0 0  WBC ABSOLUTE NRBC 0.00 0.00 - 0.01 K/uL SAMPLES BEING HELD Collection Time: 02/26/20  4:48 AM  
Result Value Ref Range SAMPLES BEING HELD 1PST COMMENT Add-on orders for these samples will be processed based on acceptable specimen integrity and analyte stability, which may vary by analyte.   
GLUCOSE, POC  
 Collection Time: 02/26/20  7:13 AM  
Result Value Ref Range Glucose (POC) 194 (H) 65 - 100 mg/dL Performed by Alexx Wharton Collection Time: 02/26/20  8:12 AM  
Result Value Ref Range Calcium, ionized (POC) 1.16 1.12 - 1.32 mmol/L  
 FIO2 (POC) 0.40 % pH (POC) 7.402 7.35 - 7.45    
 pCO2 (POC) 42.1 35.0 - 45.0 MMHG  
 pO2 (POC) 71 (L) 80 - 100 MMHG  
 HCO3 (POC) 26.2 (H) 22 - 26 MMOL/L Base excess (POC) 1 mmol/L  
 sO2 (POC) 94 92 - 97 % Site RIGHT RADIAL Device: VENT Mode ASSIST CONTROL Tidal volume 450 ml Set Rate 18 bpm  
 PEEP/CPAP (POC) 5 cmH2O Allens test (POC) YES Specimen type (POC) ARTERIAL Volume control YES Total time spent with patient:  xxx   min. Care Plan discussed with: 
Patient Family RN Consulting Physician [facility],      
 
I have reviewed the flowsheets. Chart and Pertinent Notes have been reviewed. No change in PMH ,family and social history from Consult note.  
 
 
Colby Crum MD
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
## (undated) DEVICE — MEDI-VAC YANK SUCT HNDL W/TPRD BULBOUS TIP: Brand: CARDINAL HEALTH

## (undated) DEVICE — SUTURE VCRL 1 L27IN ABSRB VLT TP-1 L65MM 1/2 CIR TAPERPOINT J650G

## (undated) DEVICE — SYR 3ML LL TIP 1/10ML GRAD --

## (undated) DEVICE — SUTURE VCRL SZ 3-0 L27IN ABSRB UD L26MM SH 1/2 CIR J416H

## (undated) DEVICE — (D)STRIP SKN CLSR 0.5X4IN WHT --

## (undated) DEVICE — NDL HYPO REG BVL RW 22GX1IN --

## (undated) DEVICE — SUT ETHLN 4-0 18IN PS2 BLK --

## (undated) DEVICE — KENDALL SCD EXPRESS SLEEVES, KNEE LENGTH, MEDIUM: Brand: KENDALL SCD

## (undated) DEVICE — SOLUTION IV 1000ML 0.9% SOD CHL

## (undated) DEVICE — SOLUTION LACTATED RINGERS INJECTION USP

## (undated) DEVICE — BNDG ADHESIVE FABRIC 2X3.5IN -- CONVERT TO ITEM 357955

## (undated) DEVICE — PART NUMBER 108260, VTI 8 MHZ DISPOSABLE DOPPLER PROBE, STRAIGHT, BOX: Brand: VTI 8 MHZ DISPOSABLE DOPPLER PROBE, STRAIGHT, BOX

## (undated) DEVICE — SUT SLK 2 60IN TIE MP BLK --

## (undated) DEVICE — Z CONVERTED USE 2271148 CONNECTOR TBNG POLYPR 5IN1 TOUGH SHATTERPROOF FOR 5-11MM TB

## (undated) DEVICE — Device

## (undated) DEVICE — SOLUTION IRRIG 1000ML H2O STRL BLT

## (undated) DEVICE — DISH PETRI W/LID STRL -- MIN CASE ORDER IS 2 CA

## (undated) DEVICE — 3M™ IOBAN™ 2 ANTIMICROBIAL INCISE DRAPE 6648EZ: Brand: IOBAN™ 2

## (undated) DEVICE — CATH FOL TY IC BAG 16FR 2000ML -- CONVERT TO ITEM 363158

## (undated) DEVICE — SUT CHRMC 3-0 27IN SH BRN --

## (undated) DEVICE — SPONGE TONSIL MED X RAY DETECTABLE STRL LTX FREE

## (undated) DEVICE — CATHETER THOR 28FR L23CM DIA9.3MM POLYVI CHL TAPR CONN TIP

## (undated) DEVICE — X-RAY SPONGES,16 PLY: Brand: DERMACEA

## (undated) DEVICE — TOWEL 4 PLY TISS 19X30 SUE WHT

## (undated) DEVICE — INTENDED FOR TISSUE SEPARATION, AND OTHER PROCEDURES THAT REQUIRE A SHARP SURGICAL BLADE TO PUNCTURE OR CUT.: Brand: BARD-PARKER ® CARBON RIB-BACK BLADES

## (undated) DEVICE — GAUZE SPONGES,12 PLY: Brand: CURITY

## (undated) DEVICE — (D)PREP SKN CHLRAPRP APPL 26ML -- CONVERT TO ITEM 371833

## (undated) DEVICE — COVER,MAYO STAND,STERILE: Brand: MEDLINE

## (undated) DEVICE — (D)ATOMIZER LARYNGO TRACHAEL -- DISC BY MFR NO SUB

## (undated) DEVICE — TOWEL SURG W17XL27IN STD BLU COT NONFENESTRATED PREWASHED

## (undated) DEVICE — SUT SLK 0 30IN FSL BLK --

## (undated) DEVICE — BULB SYRINGE, IRRIGATION WITH PROTECTIVE CAP, 60 CC, INDIVIDUALLY WRAPPED: Brand: DOVER

## (undated) DEVICE — SUTURE PERMAHAND SZ 3-0 L30IN NONABSORBABLE BLK SILK BRAID A304H

## (undated) DEVICE — KERLIX BANDAGE ROLL: Brand: KERLIX

## (undated) DEVICE — SUT PROL 6-0 18IN BV1 DA BLU --

## (undated) DEVICE — STERILE POLYISOPRENE POWDER-FREE SURGICAL GLOVES WITH EMOLLIENT COATING: Brand: PROTEXIS

## (undated) DEVICE — INFECTION CONTROL KIT SYS

## (undated) DEVICE — BLADE ELECTRODE: Brand: EDGE

## (undated) DEVICE — CATH URETH INTMIT ROB 10FR FUN -- CONVERT TO ITEM 151712

## (undated) DEVICE — SLIM BODY SKIN STAPLER: Brand: APPOSE ULC

## (undated) DEVICE — TUBING HYDR IRR --

## (undated) DEVICE — AMD ANTIMICROBIAL DRAIN SPONGES, 6 PLY, 0.2% POLYHEXAMETHYLENE BIGUANIDE HCI (PHMB): Brand: EXCILON

## (undated) DEVICE — DEVON™ KNEE AND BODY STRAP 60" X 3" (1.5 M X 7.6 CM): Brand: DEVON

## (undated) DEVICE — SURGICAL PROCEDURE KIT GEN LAPAROSCOPY LF

## (undated) DEVICE — Z DISCONTINUED PER MEDLINE LINE GAS SAMPLING O2/CO2 LNG AD 13 FT NSL W/ TBNG FILTERLINE

## (undated) DEVICE — SYR 10ML LUER LOK 1/5ML GRAD --

## (undated) DEVICE — SUTURE VCRL SZ 2-0 L36IN ABSRB UD L40MM CT 1/2 CIR J957H

## (undated) DEVICE — FCPS BIOP PULM RAD JAW 100CML -- BX/10 M00515180

## (undated) DEVICE — CATH KT GASTMY PEG PSH 20FR --

## (undated) DEVICE — TELFA NON-ADHERENT ABSORBENT DRESSING: Brand: TELFA

## (undated) DEVICE — DRAPE,REIN 53X77,STERILE: Brand: MEDLINE

## (undated) DEVICE — SUTURE VCRL SZ 2-0 L27IN ABSRB VLT L26MM UR-6 5/8 CIR J602H

## (undated) DEVICE — NEEDLE HYPO 18GA L1.5IN PNK S STL HUB POLYPR SHLD REG BVL

## (undated) DEVICE — SINGLE USE SUCTION VALVE MAJ-209: Brand: SINGLE USE SUCTION VALVE (STERILE)

## (undated) DEVICE — SYRINGE MED 20ML STD CLR PLAS LUERSLIP TIP N CTRL DISP

## (undated) DEVICE — TRAP SUC MUCOUS 70ML -- MEDICHOICE MEDLINE

## (undated) DEVICE — CATHETER THOR 36FR L23CM DIA12MM POLYVI CHL TAPR CONN TIP

## (undated) DEVICE — BINDER ABD H12IN FOR 30-45IN WAIST UNIV 4 PNL PREM DSGN E

## (undated) DEVICE — SOLIDIFIER MEDC 1200ML -- CONVERT TO 356117

## (undated) DEVICE — SUTURE PERMAHAND SZ 2-0 L30IN NONABSORBABLE BLK SILK W/O A305H

## (undated) DEVICE — HANDLE LT SNAP ON ULT DURABLE LENS FOR TRUMPF ALC DISPOSABLE

## (undated) DEVICE — 1200 GUARD II KIT W/5MM TUBE W/O VAC TUBE: Brand: GUARDIAN

## (undated) DEVICE — HEMADUCT 7MM FLAT, FULL DUCT: Brand: CARDINAL HEALTH

## (undated) DEVICE — CLIP INT SM WIDE RED TI TRNSVRS GRV CHEVRON SHP W PRECIS

## (undated) DEVICE — 3M™ WARMING BLANKET, LOWER BODY, 10 PER CASE, 42568: Brand: BAIR HUGGER™

## (undated) DEVICE — MAGNETIC DRAPE: Brand: DEVON

## (undated) DEVICE — REM POLYHESIVE ADULT PATIENT RETURN ELECTRODE: Brand: VALLEYLAB

## (undated) DEVICE — STERILE POLYISOPRENE POWDER-FREE SURGICAL GLOVES: Brand: PROTEXIS

## (undated) DEVICE — SINGLE USE BIOPSY VALVE MAJ-210: Brand: SINGLE USE BIOPSY VALVE (STERILE)

## (undated) DEVICE — PAD 05IN BASE 3IN PEAK M DENS CONVOLUTED FOAM

## (undated) DEVICE — NEEDLE HYPO 22GA L1.5IN BLK S STL HUB POLYPR SHLD REG BVL

## (undated) DEVICE — SET ADMIN 16ML TBNG L100IN 2 Y INJ SITE IV PIGGY BK DISP

## (undated) DEVICE — SKIN MARKER,REGULAR TIP WITH RULER AND LABELS: Brand: DEVON

## (undated) DEVICE — STOPCOCK IV 4 W TRNSPAR

## (undated) DEVICE — FALCON® SAMPLE CONTAINER, WITH LID, 4.5OZ (110ML), INDIVIDUALLY WRAPPED, STERILE, 100/CASE: Brand: FALCON A CORNING BRAND

## (undated) DEVICE — MEDI-VAC NON-CONDUCTIVE SUCTION TUBING: Brand: CARDINAL HEALTH

## (undated) DEVICE — SUT ETHLN 3-0 18IN PS1 BLK --

## (undated) DEVICE — LOOP VES MAXI YEL 2/PK

## (undated) DEVICE — SUTURE PROL SZ 5-0 L24IN NONABSORBABLE BLU RB-2 L13IN 1/2 8554H

## (undated) DEVICE — BASIN EMSIS 16OZ GRAPHITE PLAS KID SHP MOLD GRAD FOR ORAL

## (undated) DEVICE — SUTURE VCRL SZ 0 L36IN ABSRB VLT L40MM CT 1/2 CIR J358H

## (undated) DEVICE — SUTURE MCRYL SZ 4-0 L27IN ABSRB UD L19MM PS-2 1/2 CIR PRIM Y426H

## (undated) DEVICE — YANKAUER BULB TIP, NO VENT: Brand: ARGYLE

## (undated) DEVICE — KENDALL RADIOLUCENT FOAM MONITORING ELECTRODE RECTANGULAR SHAPE: Brand: KENDALL

## (undated) DEVICE — SCANLAN® VASCU-STATT® II PLUS S-USE BULLDOG CLAMP W/FIRM PRESS GENTLE-JAW™- MINI ANGLED 45°(GREEN), CLAMPING PRESSURE 20-25 G (2/STERILE PKG): Brand: SCANLAN® VASCU-STATT® II PLUS S-USE BULLDOG CLAMP W/FIRM PRESS GENTLE-JAW™